# Patient Record
Sex: MALE | Race: WHITE | NOT HISPANIC OR LATINO | Employment: FULL TIME | URBAN - METROPOLITAN AREA
[De-identification: names, ages, dates, MRNs, and addresses within clinical notes are randomized per-mention and may not be internally consistent; named-entity substitution may affect disease eponyms.]

---

## 2017-06-13 ENCOUNTER — ALLSCRIPTS OFFICE VISIT (OUTPATIENT)
Dept: OTHER | Facility: OTHER | Age: 62
End: 2017-06-13

## 2017-06-13 ENCOUNTER — GENERIC CONVERSION - ENCOUNTER (OUTPATIENT)
Dept: OTHER | Facility: OTHER | Age: 62
End: 2017-06-13

## 2017-06-13 DIAGNOSIS — E78.5 HYPERLIPIDEMIA: ICD-10-CM

## 2017-06-13 DIAGNOSIS — R73.01 IMPAIRED FASTING GLUCOSE: ICD-10-CM

## 2017-06-13 DIAGNOSIS — I10 ESSENTIAL (PRIMARY) HYPERTENSION: ICD-10-CM

## 2017-06-13 DIAGNOSIS — Z12.5 ENCOUNTER FOR SCREENING FOR MALIGNANT NEOPLASM OF PROSTATE: ICD-10-CM

## 2017-06-13 LAB — HBA1C MFR BLD HPLC: 6 %

## 2017-07-24 ENCOUNTER — ALLSCRIPTS OFFICE VISIT (OUTPATIENT)
Dept: OTHER | Facility: OTHER | Age: 62
End: 2017-07-24

## 2017-07-24 DIAGNOSIS — M77.52 OTHER ENTHESOPATHY OF LEFT FOOT: ICD-10-CM

## 2017-07-24 DIAGNOSIS — R22.42 LOCALIZED SWELLING, MASS AND LUMP, LEFT LOWER LIMB: ICD-10-CM

## 2017-07-24 DIAGNOSIS — S96.912A STRAIN OF UNSPECIFIED MUSCLE AND TENDON AT ANKLE AND FOOT LEVEL, LEFT FOOT, INITIAL ENCOUNTER: ICD-10-CM

## 2017-07-26 ENCOUNTER — HOSPITAL ENCOUNTER (OUTPATIENT)
Dept: ULTRASOUND IMAGING | Facility: HOSPITAL | Age: 62
Discharge: HOME | End: 2017-07-26
Payer: COMMERCIAL

## 2017-07-26 DIAGNOSIS — R22.42 LOCALIZED SWELLING, MASS AND LUMP, LEFT LOWER LIMB: ICD-10-CM

## 2017-07-26 PROCEDURE — 76882 US LMTD JT/FCL EVL NVASC XTR: CPT

## 2017-07-27 ENCOUNTER — GENERIC CONVERSION - ENCOUNTER (OUTPATIENT)
Dept: OTHER | Facility: OTHER | Age: 62
End: 2017-07-27

## 2017-07-28 ENCOUNTER — TRANSCRIBE ORDERS (OUTPATIENT)
Dept: ADMINISTRATIVE | Facility: HOSPITAL | Age: 62
End: 2017-07-28

## 2017-07-28 DIAGNOSIS — M77.52 TENDINITIS OF LEFT ANKLE: Primary | ICD-10-CM

## 2017-07-28 DIAGNOSIS — S96.912S STRAIN OF LEFT ANKLE, SEQUELA: ICD-10-CM

## 2017-08-04 ENCOUNTER — HOSPITAL ENCOUNTER (OUTPATIENT)
Dept: MRI IMAGING | Facility: HOSPITAL | Age: 62
Discharge: HOME | End: 2017-08-04
Payer: COMMERCIAL

## 2017-08-04 DIAGNOSIS — S96.912A STRAIN OF UNSPECIFIED MUSCLE AND TENDON AT ANKLE AND FOOT LEVEL, LEFT FOOT, INITIAL ENCOUNTER: ICD-10-CM

## 2017-08-04 DIAGNOSIS — M77.52 OTHER ENTHESOPATHY OF LEFT FOOT: ICD-10-CM

## 2017-08-04 PROCEDURE — 73721 MRI JNT OF LWR EXTRE W/O DYE: CPT

## 2017-08-08 ENCOUNTER — GENERIC CONVERSION - ENCOUNTER (OUTPATIENT)
Dept: OTHER | Facility: OTHER | Age: 62
End: 2017-08-08

## 2017-08-14 ENCOUNTER — GENERIC CONVERSION - ENCOUNTER (OUTPATIENT)
Dept: OTHER | Facility: OTHER | Age: 62
End: 2017-08-14

## 2017-08-21 ENCOUNTER — APPOINTMENT (OUTPATIENT)
Dept: PHYSICAL THERAPY | Facility: REHABILITATION | Age: 62
End: 2017-08-21
Payer: COMMERCIAL

## 2017-08-21 PROCEDURE — 97110 THERAPEUTIC EXERCISES: CPT

## 2017-08-21 PROCEDURE — 97140 MANUAL THERAPY 1/> REGIONS: CPT

## 2017-08-21 PROCEDURE — 97161 PT EVAL LOW COMPLEX 20 MIN: CPT

## 2017-08-28 ENCOUNTER — APPOINTMENT (OUTPATIENT)
Dept: PHYSICAL THERAPY | Facility: REHABILITATION | Age: 62
End: 2017-08-28
Payer: COMMERCIAL

## 2017-08-28 PROCEDURE — 97110 THERAPEUTIC EXERCISES: CPT

## 2017-08-28 PROCEDURE — 97140 MANUAL THERAPY 1/> REGIONS: CPT

## 2017-09-01 ENCOUNTER — APPOINTMENT (OUTPATIENT)
Dept: PHYSICAL THERAPY | Facility: REHABILITATION | Age: 62
End: 2017-09-01
Payer: COMMERCIAL

## 2017-09-06 ENCOUNTER — APPOINTMENT (OUTPATIENT)
Dept: PHYSICAL THERAPY | Facility: REHABILITATION | Age: 62
End: 2017-09-06
Payer: COMMERCIAL

## 2017-09-08 ENCOUNTER — APPOINTMENT (OUTPATIENT)
Dept: PHYSICAL THERAPY | Facility: REHABILITATION | Age: 62
End: 2017-09-08
Payer: COMMERCIAL

## 2017-09-11 ENCOUNTER — APPOINTMENT (OUTPATIENT)
Dept: PHYSICAL THERAPY | Facility: REHABILITATION | Age: 62
End: 2017-09-11
Payer: COMMERCIAL

## 2017-09-11 PROCEDURE — 97112 NEUROMUSCULAR REEDUCATION: CPT

## 2017-09-11 PROCEDURE — 97140 MANUAL THERAPY 1/> REGIONS: CPT

## 2017-09-11 PROCEDURE — 97110 THERAPEUTIC EXERCISES: CPT

## 2017-09-18 ENCOUNTER — APPOINTMENT (OUTPATIENT)
Dept: PHYSICAL THERAPY | Facility: REHABILITATION | Age: 62
End: 2017-09-18
Payer: COMMERCIAL

## 2017-09-18 PROCEDURE — 97110 THERAPEUTIC EXERCISES: CPT

## 2017-09-18 PROCEDURE — 97140 MANUAL THERAPY 1/> REGIONS: CPT

## 2017-09-22 ENCOUNTER — ALLSCRIPTS OFFICE VISIT (OUTPATIENT)
Dept: OTHER | Facility: OTHER | Age: 62
End: 2017-09-22

## 2017-09-22 DIAGNOSIS — S93.401A SPRAIN OF LIGAMENT OF RIGHT ANKLE: ICD-10-CM

## 2017-09-25 ENCOUNTER — APPOINTMENT (OUTPATIENT)
Dept: PHYSICAL THERAPY | Facility: REHABILITATION | Age: 62
End: 2017-09-25
Payer: COMMERCIAL

## 2017-09-25 PROCEDURE — 97140 MANUAL THERAPY 1/> REGIONS: CPT

## 2017-09-25 PROCEDURE — 97110 THERAPEUTIC EXERCISES: CPT

## 2017-09-25 PROCEDURE — 97112 NEUROMUSCULAR REEDUCATION: CPT

## 2017-10-02 ENCOUNTER — APPOINTMENT (OUTPATIENT)
Dept: PHYSICAL THERAPY | Facility: REHABILITATION | Age: 62
End: 2017-10-02
Payer: COMMERCIAL

## 2017-10-02 PROCEDURE — 97140 MANUAL THERAPY 1/> REGIONS: CPT

## 2017-10-02 PROCEDURE — 97112 NEUROMUSCULAR REEDUCATION: CPT

## 2017-10-02 PROCEDURE — 97110 THERAPEUTIC EXERCISES: CPT

## 2017-10-09 ENCOUNTER — APPOINTMENT (OUTPATIENT)
Dept: PHYSICAL THERAPY | Facility: REHABILITATION | Age: 62
End: 2017-10-09
Payer: COMMERCIAL

## 2017-10-16 ENCOUNTER — APPOINTMENT (OUTPATIENT)
Dept: PHYSICAL THERAPY | Facility: REHABILITATION | Age: 62
End: 2017-10-16
Payer: COMMERCIAL

## 2017-10-16 PROCEDURE — G8990 OTHER PT/OT CURRENT STATUS: HCPCS

## 2017-10-16 PROCEDURE — 97110 THERAPEUTIC EXERCISES: CPT

## 2017-10-16 PROCEDURE — 97140 MANUAL THERAPY 1/> REGIONS: CPT

## 2017-10-16 PROCEDURE — G8991 OTHER PT/OT GOAL STATUS: HCPCS

## 2017-10-23 ENCOUNTER — APPOINTMENT (OUTPATIENT)
Dept: PHYSICAL THERAPY | Facility: REHABILITATION | Age: 62
End: 2017-10-23
Payer: COMMERCIAL

## 2017-10-30 ENCOUNTER — APPOINTMENT (OUTPATIENT)
Dept: PHYSICAL THERAPY | Facility: REHABILITATION | Age: 62
End: 2017-10-30
Payer: COMMERCIAL

## 2017-10-30 PROCEDURE — G8992 OTHER PT/OT  D/C STATUS: HCPCS

## 2017-10-30 PROCEDURE — 97140 MANUAL THERAPY 1/> REGIONS: CPT

## 2017-10-30 PROCEDURE — 97110 THERAPEUTIC EXERCISES: CPT

## 2017-10-30 PROCEDURE — G8991 OTHER PT/OT GOAL STATUS: HCPCS

## 2017-12-13 DIAGNOSIS — R73.01 IMPAIRED FASTING GLUCOSE: ICD-10-CM

## 2018-01-12 NOTE — MISCELLANEOUS
Message   Recorded as Task   Date: 09/08/2016 09:22 AM, Created By: Gayle Hare   Task Name: Follow Up   Assigned To: Gayle Hare   Regarding Patient: Bernardo Ocampo, Status: Active   Comment:    Gayle Hare - 08 Sep 2016 9:22 AM     TASK CREATED  Caller: Self; Other; [phone] (Home)  Pt called and wants to know if he can get shingles vaccine, he has already checked with insurance re: getting in office  Wayne Tellez Jr - 08 Sep 2016 4:00 PM     TASK REPLIED TO: Previously Assigned To Jessica Fonseca - 08 Sep 2016 6:07 PM     TASK EDITED                 L/m and notified to schedule zostavax        Active Problems    1  Colon cancer screening (V76 51) (Z12 11)   2  Engages in travel abroad (V49 89) (Z78 9)   3  Herpes simplex type 1 infection (054 9) (B00 9)   4  Hyperlipidemia (272 4) (E78 5)   5  Hypertension (401 9) (I10)   6  Idiopathic angioedema (995 1) (T78 3XXA)   7  Influenza vaccination administered during current admission (V04 81) (Z23)   8  Need for hepatitis A immunization (V05 3) (Z23)   9  Need for hepatitis A vaccination (V05 3) (Z23)    Current Meds   1  Atorvastatin Calcium 40 MG Oral Tablet (Lipitor); TAKE 1 TABLET DAILY; Therapy: 00BCN3329 to (Evaluate:28Jan2017)  Requested for: 45Cgh1465; Last   Rx:52Rzm2392 Ordered   2  Benicar HCT 40-12 5 MG Oral Tablet; TAKE 1 TABLET DAILY; Therapy: 81FTN7661 to (Evaluate:28Jan2017)  Requested for: 73Svm4954; Last   Rx:43Qfq7091 Ordered   3  Ciprofloxacin HCl - 500 MG Oral Tablet (Cipro); TAKE 1 TABLET TWICE DAILY; Therapy: 09Edn0195 to (Evaluate:29Jna3107)  Requested for: 59976 80 22 97; Last   Rx:91Owq3301 Ordered   4  Denavir 1 % External Cream; USE AS DIRECTED; Therapy: 53ZUG5676 to (Last Rx:66Yej5531)  Requested for: 67Alc7123 Ordered   5  ValACYclovir HCl - 1 GM Oral Tablet (Valtrex); 2 BID x 1 day for cold sores; Therapy: 24Apr2015 to (Last Rx:24Apr2015)  Requested for: 24Apr2015 Ordered   6  Vivotif Carolyne Vaccine Oral Capsule Delayed Release (Vivotif Carolyne Vaccine Oral   Capsule Delayed Release); take 1 tab every other day 1 hr prior to   a a meal with a cold drink  All 4 doses should be completed 1 wk prior to potential exposure; Therapy: 24Apr2015 to (Last Rx:24Apr2015)  Requested for: 24Apr2015 Ordered    Allergies    1   Capoten TABS   2  Vasotec TABS    Signatures   Electronically signed by : Jim Delarosa, ; Sep  8 2016  6:09PM EST                       (Author)

## 2018-01-14 VITALS
DIASTOLIC BLOOD PRESSURE: 74 MMHG | HEART RATE: 100 BPM | HEIGHT: 70 IN | BODY MASS INDEX: 31.92 KG/M2 | WEIGHT: 223 LBS | SYSTOLIC BLOOD PRESSURE: 124 MMHG

## 2018-01-14 VITALS
BODY MASS INDEX: 32.95 KG/M2 | WEIGHT: 230.13 LBS | TEMPERATURE: 98.2 F | HEIGHT: 70 IN | SYSTOLIC BLOOD PRESSURE: 150 MMHG | HEART RATE: 88 BPM | DIASTOLIC BLOOD PRESSURE: 78 MMHG

## 2018-01-14 VITALS
SYSTOLIC BLOOD PRESSURE: 134 MMHG | WEIGHT: 228 LBS | DIASTOLIC BLOOD PRESSURE: 80 MMHG | TEMPERATURE: 98.5 F | RESPIRATION RATE: 16 BRPM | HEART RATE: 82 BPM | HEIGHT: 70 IN | BODY MASS INDEX: 32.64 KG/M2

## 2018-01-14 NOTE — RESULT NOTES
Verified Results  Hemoglobin A1c- POC [id number] 06:03PM Мария Watson     Test Name Result Flag Reference   HEMOGLOBIN A1C 6 0

## 2018-01-15 NOTE — RESULT NOTES
Verified Results  * MRI ANKLE/HEEL LEFT  WO CONTRAST [id number] 06:54PM Jose J Amato Order Number: [id number]    - Patient Instructions: To schedule this appointment, please contact Central Scheduling at [phone]  Test Name Result Flag Reference   MRI ANKLE/HEEL LEFT  WO CONTRAST (Report)     MRI LEFT ANKLE - WITHOUT CONTRAST     INDICATION: M77 52: Other enthesopathy of left foot S96 912A: Strain of unspecified muscle and tendon at ankle and foot level, left foot, initial encounter  History taken directly from the electronic ordering system  COMPARISON: Left ankle ultrasound from 7/26/2017  TECHNIQUE:  MR sequences were obtained of the left ankle including: Localizers, coronal STIR, coronal T1, axial T2 fat sat, axial PD, sagittal T2 fat sat, sagittal T1 sequences were obtained  Images were acquired on a1 5 Maggy Unit  Gadolinium was not    used  FINDINGS:     SUBCUTANEOUS TISSUES: Normal     JOINT EFFUSION: None  TENDONS:   Achilles tendon: There is fusiform thickening of the Achilles tendon centered 6 5 cm proximal to its insertion consistent with severe tendinosis with a superimposed tiny partial tear involving the posterior surface of the tendon (series 6 image 9 )   Peroneus brevis and longus: Normal    Posterior tibialis, flexor digitorum longus, flexor hallucis longus: Normal    Anterior tibialis, extensor digitorum longus, extensor hallucis longus: Normal      LIGAMENTS:   Lateral collateral ligament complex: There is a chronic avulsion fracture fragment adjacent to the anterior aspect the lateral malleolus  Distal tibiofibular syndesmosis: Normal    Medial collateral ligament complex: Normal      PLANTAR FASCIA: Normal      ARTICULAR SURFACES: Intact  SINUS TARSI: Normal      Tarsal Tunnel: Unremarkable       BONE MARROW SIGNAL: Normal      MUSCULATURE: Normal        IMPRESSION:     There is fusiform thickening of the Achilles tendon centered 6 5 cm proximal to its insertion consistent with severe tendinosis with a superimposed tiny partial tear involving the posterior surface of the tendon (series 6 image 9 )     There is a chronic avulsion fracture fragment adjacent to the anterior aspect the lateral malleolus         ##sigslh##sigslh       Workstation performed: [id number]     Signed by:   Sharita Alejo MD   8/7/17

## 2018-01-17 NOTE — RESULT NOTES
Verified Results  US EXTREMITY SOFT TISSUE [id number] 06:18PM Jhoana Party Order Number: [id number]    - Patient Instructions: To schedule this appointment, please contact Central Scheduling at [phone]  Test Name Result Flag Reference   US EXTREMITY SOFT TISSUE (Report)     ULTRASOUND left ankle     TECHNIQUE:  Using a high frequency transducer, the right ankle was scanned at the area posterior palpable abnormality  INDICATION: Posterior palpable abnormality for one month with pain  COMPARISON:        FINDINGS:     Diffuse thickening of the left Achilles tendon  Increased color-flow of the left Achilles when compared with the right  No surrounding soft tissue collection  No Achilles rupture  IMPRESSION:     Left Achilles tendinitis versus partial tear   Consider MRI follow-up       Workstation performed: [id number]     Signed by:   Cecelia Llanos MD   7/26/17

## 2018-03-07 NOTE — PROGRESS NOTES
History of Present Illness    Revaccination   Vaccine Information: Vaccine(s) Given (names): Hep A 10/29/15  Spoke with patient regarding vaccine out of temperature range and risks and benefits of revaccination  Action(s): Pt will be revaccinated  Appointment scheduled: [id number]  Other Information: patient received Hep a vaccine for traveling to [facility] in 8036  the second dose was non viable  Does patient need to be revaccinated if he has already traveled?     Revaccination Completed: [id number]  Active Problems    1  Colon cancer screening (V76 51) (Z12 11)   2  Engages in travel abroad (V49 89) (Z78 9)   3  Herpes simplex type 1 infection (054 9) (B00 9)   4  Hyperlipidemia (272 4) (E78 5)   5  Hypertension (401 9) (I10)   6  Idiopathic angioedema (995 1) (T78 3XXA)   7  Influenza vaccination administered during current admission (V04 81) (Z23)   8  Need for hepatitis A immunization (V05 3) (Z23)   9  Need for hepatitis A vaccination (V05 3) (Z23)   10  Need for revaccination (V05 9) (Z23)    Immunizations  Hepatitis A --- [place]: [id number] Jet: 29-Oct-2015   Influenza --- Series1: 03-Oct-2014     Current Meds   1  Atorvastatin Calcium 40 MG Oral Tablet; TAKE 1 TABLET DAILY   2  Benicar HCT 40-12 5 MG Oral Tablet; TAKE 1 TABLET DAILY   3  Ciprofloxacin HCl - 500 MG Oral Tablet; TAKE 1 TABLET TWICE DAILY   4  Denavir 1 % External Cream; USE AS DIRECTED   5  ValACYclovir HCl - 1 GM Oral Tablet; 2 BID x 1 day for cold sores   6  Vivotif Carolyne Vaccine Oral Capsule Delayed Release; take 1 tab every other day 1 hr prior   to a a meal with a cold drink  All 4 doses should be completed 1 wk prior to potential exposure    Allergies    1   Capoten TABS   2  Vasotec TABS    Signatures   Electronically signed by : AMOR Aguirre ; Jun 12 2017  9:32PM EST

## 2018-07-16 DIAGNOSIS — I10 HYPERTENSION, UNSPECIFIED TYPE: Primary | ICD-10-CM

## 2018-07-16 DIAGNOSIS — E78.5 HYPERLIPIDEMIA, UNSPECIFIED HYPERLIPIDEMIA TYPE: ICD-10-CM

## 2018-07-16 RX ORDER — OLMESARTAN MEDOXOMIL AND HYDROCHLOROTHIAZIDE 40/12.5 40; 12.5 MG/1; MG/1
1 TABLET ORAL DAILY
COMMUNITY
Start: 2012-05-31 | End: 2018-07-16 | Stop reason: SDUPTHER

## 2018-07-16 RX ORDER — ATORVASTATIN CALCIUM 40 MG/1
1 TABLET, FILM COATED ORAL DAILY
COMMUNITY
Start: 2012-05-31 | End: 2018-07-16 | Stop reason: SDUPTHER

## 2018-07-16 NOTE — TELEPHONE ENCOUNTER
Spoke with patient today, told patient that we will be sending 90 days supply to his [facility] with no refill since he is do for a annual checkup, pt will call us back to schedule an appointment for his physical

## 2018-07-17 RX ORDER — OLMESARTAN MEDOXOMIL AND HYDROCHLOROTHIAZIDE 40/12.5 40; 12.5 MG/1; MG/1
1 TABLET ORAL DAILY
Qty: 90 TABLET | Refills: 0 | Status: SHIPPED | OUTPATIENT
Start: 2018-07-17 | End: 2018-11-06 | Stop reason: SDUPTHER

## 2018-07-17 RX ORDER — ATORVASTATIN CALCIUM 40 MG/1
40 TABLET, FILM COATED ORAL DAILY
Qty: 90 TABLET | Refills: 0 | Status: SHIPPED | OUTPATIENT
Start: 2018-07-17 | End: 2018-11-06 | Stop reason: SDUPTHER

## 2018-07-24 LAB
LEFT EYE DIABETIC RETINOPATHY: NORMAL
RIGHT EYE DIABETIC RETINOPATHY: NORMAL
SEVERITY (EYE EXAM): NORMAL

## 2018-11-06 DIAGNOSIS — I10 HYPERTENSION, UNSPECIFIED TYPE: ICD-10-CM

## 2018-11-06 DIAGNOSIS — E78.5 HYPERLIPIDEMIA, UNSPECIFIED HYPERLIPIDEMIA TYPE: ICD-10-CM

## 2018-11-06 RX ORDER — ATORVASTATIN CALCIUM 40 MG/1
TABLET, FILM COATED ORAL
Qty: 90 TABLET | Refills: 3 | Status: SHIPPED | OUTPATIENT
Start: 2018-11-06 | End: 2019-09-09 | Stop reason: SDUPTHER

## 2018-11-06 RX ORDER — OLMESARTAN MEDOXOMIL AND HYDROCHLOROTHIAZIDE 40/12.5 40; 12.5 MG/1; MG/1
TABLET ORAL
Qty: 90 TABLET | Refills: 3 | Status: SHIPPED | OUTPATIENT
Start: 2018-11-06 | End: 2019-02-27 | Stop reason: RX

## 2019-01-02 ENCOUNTER — OFFICE VISIT (OUTPATIENT)
Dept: FAMILY MEDICINE CLINIC | Facility: CLINIC | Age: 64
End: 2019-01-02
Payer: COMMERCIAL

## 2019-01-02 VITALS
DIASTOLIC BLOOD PRESSURE: 80 MMHG | OXYGEN SATURATION: 95 % | SYSTOLIC BLOOD PRESSURE: 120 MMHG | RESPIRATION RATE: 18 BRPM | HEART RATE: 92 BPM | WEIGHT: 218 LBS | HEIGHT: 70 IN | BODY MASS INDEX: 31.21 KG/M2

## 2019-01-02 DIAGNOSIS — R73.01 IMPAIRED FASTING GLUCOSE: ICD-10-CM

## 2019-01-02 DIAGNOSIS — I10 ESSENTIAL HYPERTENSION: Primary | ICD-10-CM

## 2019-01-02 DIAGNOSIS — Z11.59 NEED FOR HEPATITIS C SCREENING TEST: ICD-10-CM

## 2019-01-02 DIAGNOSIS — L91.8 CUTANEOUS SKIN TAGS: ICD-10-CM

## 2019-01-02 DIAGNOSIS — B00.9 HERPES SIMPLEX TYPE 1 INFECTION: ICD-10-CM

## 2019-01-02 DIAGNOSIS — Z12.11 ENCOUNTER FOR FIT (FECAL IMMUNOCHEMICAL TEST) SCREENING: ICD-10-CM

## 2019-01-02 DIAGNOSIS — E78.2 MIXED HYPERLIPIDEMIA: ICD-10-CM

## 2019-01-02 DIAGNOSIS — Z23 FLU VACCINE NEED: ICD-10-CM

## 2019-01-02 DIAGNOSIS — M75.82 ROTATOR CUFF TENDINITIS, LEFT: ICD-10-CM

## 2019-01-02 DIAGNOSIS — L57.0 ACTINIC KERATOSIS: ICD-10-CM

## 2019-01-02 DIAGNOSIS — Z12.5 PROSTATE CANCER SCREENING: ICD-10-CM

## 2019-01-02 PROBLEM — R73.03 PREDIABETES: Status: ACTIVE | Noted: 2017-06-13

## 2019-01-02 PROCEDURE — 90682 RIV4 VACC RECOMBINANT DNA IM: CPT

## 2019-01-02 PROCEDURE — 3074F SYST BP LT 130 MM HG: CPT | Performed by: FAMILY MEDICINE

## 2019-01-02 PROCEDURE — 3079F DIAST BP 80-89 MM HG: CPT | Performed by: FAMILY MEDICINE

## 2019-01-02 PROCEDURE — 99214 OFFICE O/P EST MOD 30 MIN: CPT | Performed by: FAMILY MEDICINE

## 2019-01-02 PROCEDURE — 90471 IMMUNIZATION ADMIN: CPT

## 2019-01-02 RX ORDER — BIMATOPROST 0.01 %
DROPS OPHTHALMIC (EYE)
Refills: 3 | COMMUNITY
Start: 2018-12-11

## 2019-01-02 NOTE — ASSESSMENT & PLAN NOTE
Blood pressure is quite well controlled  He is having some lightheadedness occasionally when standing  He should contact me if this persists or worsens as we may want to bump back on his blood pressure medication

## 2019-01-02 NOTE — PROGRESS NOTES
Assessment/Plan:       Problem List Items Addressed This Visit        Endocrine    Impaired fasting glucose     Check an A1c  Relevant Orders    Hemoglobin A1C       Cardiovascular and Mediastinum    Essential hypertension - Primary     Blood pressure is quite well controlled  He is having some lightheadedness occasionally when standing  He should contact me if this persists or worsens as we may want to bump back on his blood pressure medication  Relevant Orders    Lipid Panel with Direct LDL reflex    Comprehensive metabolic panel       Musculoskeletal and Integument    Cutaneous skin tags     He has multiple periorbital skin tags which would like to consider having removed  He also does have some pretty obvious ptosis and I am going to have him see Dr Morgan Ortiz referral to Ophthalmology    Actinic keratosis     I have suggested dermatology evaluation for this at some point  I explained these are pre cancers lesions but they could at some point become cancers  There is a treatment with a topical cream that could take care of these  Relevant Orders    Ambulatory referral to Dermatology       Other    Hyperlipidemia     Check fasting lipid           Other Visit Diagnoses     Flu vaccine need        Relevant Orders    influenza vaccine, [id number], quadrivalent, recombinant, PF, 0 5 mL, for patients 18 yr+ (FLUBLOK) (Completed)    Need for hepatitis C screening test        Relevant Orders    Hepatitis C antibody    Encounter for FIT (fecal immunochemical test) screening        Relevant Orders    Occult Bloood,Fecal Immunochemical    Prostate cancer screening        Relevant Orders    PSA, Total Screen    Rotator cuff tendinitis, left                Subjective:      Patient ID: Esthela Vaughan is a 58 y o  male  HPI  He presents today for follow-up for chronic health issues    His initial complaint is that of left shoulder pain intermittently over the past 2-3 months  He denies acute injury  Pain is worse with abduction  He occasionally gets a numb sensation of his left elbow as well  He has tried no medications for this  He has a history of hypertension and denies any current problems chest pain, shortness of breath or palpitations  He has a history of hyperlipidemia  He remains on statin therapy without myalgias  He has history of elevated glucose but denies polyuria or polydipsia  He has a history of cold sores which she gets once or twice a year  He is using a topical cream which he feels is helpful  I offered him Valtrex but he does not feel he needs it  He has a history of angioedema which fortunately has not recurred in many years  The following portions of the patient's history were reviewed and updated as appropriate: allergies, current medications, past family history, past medical history, past social history, past surgical history and problem list     Review of Systems   Constitutional: Negative for appetite change, chills, fatigue, fever and unexpected weight change  HENT: Negative for trouble swallowing  Eyes: Negative for visual disturbance  Respiratory: Negative for cough, chest tightness, shortness of breath and wheezing  Cardiovascular: Negative for chest pain  Gastrointestinal: Negative for abdominal distention, abdominal pain, blood in stool, constipation and diarrhea  Endocrine: Negative for polyuria  Genitourinary: Negative for difficulty urinating and flank pain  Musculoskeletal: Positive for arthralgias  Negative for myalgias  Skin: Negative for rash  He notes some bumpy lesions on his scalp  Neurological: Negative for dizziness and light-headedness  Hematological: Negative for adenopathy  Does not bruise/bleed easily  Psychiatric/Behavioral: Negative for sleep disturbance           Objective:      /80   Pulse 92   Resp 18   Ht 5' 10" (1 778 m)   Wt 98 9 kg (218 lb)   SpO2 95% BMI 31 28 kg/m²          Physical Exam   Constitutional: He is oriented to person, place, and time  He appears well-developed and well-nourished  No distress  HENT:   Head: Normocephalic  Eyes: Pupils are equal, round, and reactive to light  Neck: No tracheal deviation present  No thyromegaly present  Cardiovascular: Normal rate, regular rhythm and normal heart sounds  No murmur heard  Pulmonary/Chest: Effort normal  No respiratory distress  He has no wheezes  He has no rales  Abdominal: Soft  He exhibits no distension  There is no tenderness  Musculoskeletal: Normal range of motion  He exhibits tenderness (He has some mild tenderness over the anterior left shoulder  He has a mildly positive Gonzalez sign  Empty can is normal )  Neurological: He is alert and oriented to person, place, and time  No cranial nerve deficit  Skin: Skin is warm  No rash noted  He is not diaphoretic  No erythema  He has some scattered actinic keratosis on his scalp   Psychiatric: He has a normal mood and affect   Judgment and thought content normal          Kimberly Ruth MD

## 2019-01-02 NOTE — ASSESSMENT & PLAN NOTE
I have suggested dermatology evaluation for this at some point  I explained these are pre cancers lesions but they could at some point become cancers  There is a treatment with a topical cream that could take care of these

## 2019-01-02 NOTE — ASSESSMENT & PLAN NOTE
He has multiple periorbital skin tags which would like to consider having removed    He also does have some pretty obvious ptosis and I am going to have him see Dr Rui Lake

## 2019-01-02 NOTE — ASSESSMENT & PLAN NOTE
This is currently stable with topical cream   If he wants I could always send in some Valtrex for him

## 2019-01-04 LAB
ALBUMIN SERPL-MCNC: 4.3 G/DL (ref 3.6–5.1)
ALBUMIN/GLOB SERPL: 1.4 (CALC) (ref 1–2.5)
ALP SERPL-CCNC: 72 U/L (ref 40–115)
ALT SERPL-CCNC: 39 U/L (ref 9–46)
AST SERPL-CCNC: 21 U/L (ref 10–35)
BILIRUB SERPL-MCNC: 0.6 MG/DL (ref 0.2–1.2)
BUN SERPL-MCNC: 24 MG/DL (ref 7–25)
BUN/CREAT SERPL: ABNORMAL (CALC) (ref 6–22)
CALCIUM SERPL-MCNC: 9.6 MG/DL (ref 8.6–10.3)
CHLORIDE SERPL-SCNC: 98 MMOL/L (ref 98–110)
CHOLEST SERPL-MCNC: 168 MG/DL
CHOLEST/HDLC SERPL: 3.8 (CALC)
CO2 SERPL-SCNC: 27 MMOL/L (ref 20–32)
CREAT SERPL-MCNC: 1.08 MG/DL (ref 0.7–1.25)
GLOBULIN SER CALC-MCNC: 3 G/DL (CALC) (ref 1.9–3.7)
GLUCOSE SERPL-MCNC: 247 MG/DL (ref 65–99)
HBA1C MFR BLD: 11.5 % OF TOTAL HGB
HCV AB S/CO SERPL IA: 0.02
HCV AB SERPL QL IA: NORMAL
HDLC SERPL-MCNC: 44 MG/DL
LDLC SERPL CALC-MCNC: 105 MG/DL (CALC)
NONHDLC SERPL-MCNC: 124 MG/DL (CALC)
POTASSIUM SERPL-SCNC: 3.8 MMOL/L (ref 3.5–5.3)
PROT SERPL-MCNC: 7.3 G/DL (ref 6.1–8.1)
PSA SERPL-MCNC: 5.6 NG/ML
SL AMB EGFR AFRICAN AMERICAN: 84 ML/MIN/1.73M2
SL AMB EGFR NON AFRICAN AMERICAN: 72 ML/MIN/1.73M2
SODIUM SERPL-SCNC: 135 MMOL/L (ref 135–146)
TRIGL SERPL-MCNC: 98 MG/DL

## 2019-01-14 ENCOUNTER — TELEPHONE (OUTPATIENT)
Dept: FAMILY MEDICINE CLINIC | Facility: CLINIC | Age: 64
End: 2019-01-14

## 2019-01-14 ENCOUNTER — CLINICAL SUPPORT (OUTPATIENT)
Dept: FAMILY MEDICINE CLINIC | Facility: CLINIC | Age: 64
End: 2019-01-14
Payer: COMMERCIAL

## 2019-01-14 DIAGNOSIS — Z23 NEED FOR ZOSTER VACCINE: Primary | ICD-10-CM

## 2019-01-14 DIAGNOSIS — R97.20 ELEVATED PSA: ICD-10-CM

## 2019-01-14 DIAGNOSIS — E11.8 TYPE 2 DIABETES MELLITUS WITH COMPLICATION, UNSPECIFIED WHETHER LONG TERM INSULIN USE: Primary | ICD-10-CM

## 2019-01-14 PROCEDURE — 90471 IMMUNIZATION ADMIN: CPT | Performed by: FAMILY MEDICINE

## 2019-01-14 PROCEDURE — 90750 HZV VACC RECOMBINANT IM: CPT | Performed by: FAMILY MEDICINE

## 2019-01-14 RX ORDER — METFORMIN HYDROCHLORIDE EXTENDED-RELEASE TABLETS 1000 MG/1
1000 TABLET, FILM COATED, EXTENDED RELEASE ORAL 2 TIMES DAILY WITH MEALS
Qty: 180 TABLET | Refills: 1 | Status: SHIPPED | OUTPATIENT
Start: 2019-01-14 | End: 2019-01-17

## 2019-01-14 NOTE — TELEPHONE ENCOUNTER
----- Message from Ramesh Redd MD sent at 1/4/2019  4:51 PM EST -----  Call patient regarding test   Diabetic control is quite poor  His A1c is up to 11 5  Please initiate metformin 1000 mg twice daily and get him set up for diabetic education  He also has an elevated PSA and I would like him to see Urology  Please make sure he follows up with 1 of us within the next month to deal with his very high A1c

## 2019-01-14 NOTE — TELEPHONE ENCOUNTER
Call patient regarding test   Diabetic control is quite poor   His A1c is up to 11 5   Please initiate metformin 1000 mg twice daily and get him set up for diabetic education  Melisa Vargas also has an elevated PSA and I would like him to see Urology  Riri Jj make sure he follows up with 1 of us within the next month to deal with his very high A1c

## 2019-01-17 ENCOUNTER — TELEPHONE (OUTPATIENT)
Dept: UROLOGY | Facility: AMBULATORY SURGERY CENTER | Age: 64
End: 2019-01-17

## 2019-01-17 DIAGNOSIS — E11.8 TYPE 2 DIABETES MELLITUS WITH COMPLICATION, UNSPECIFIED WHETHER LONG TERM INSULIN USE: ICD-10-CM

## 2019-01-17 RX ORDER — METFORMIN HYDROCHLORIDE 500 MG/1
1000 TABLET, FILM COATED, EXTENDED RELEASE ORAL 2 TIMES DAILY WITH MEALS
Qty: 360 TABLET | Refills: 1 | Status: SHIPPED | OUTPATIENT
Start: 2019-01-17 | End: 2019-01-18

## 2019-01-17 NOTE — TELEPHONE ENCOUNTER
Pharmacy called  Pts insurance wont cover 1000mg of Metformin  They would possibly cover 500MG 2 tabs bid

## 2019-01-17 NOTE — TELEPHONE ENCOUNTER
Reason for appointment/Complaint/Diagnosis : Elevated PSA     Insurance: [employer]    History of Cancer? no                       If yes, what kind? Previous urologist?     None                  Records requested/where? In [address] [place] [address] Rd    Outside testing/where? In [facility]    Location Preference for office visit?  [place]

## 2019-01-18 DIAGNOSIS — R73.01 IMPAIRED FASTING GLUCOSE: Primary | ICD-10-CM

## 2019-01-18 RX ORDER — METFORMIN HYDROCHLORIDE 500 MG/1
500 TABLET, EXTENDED RELEASE ORAL 2 TIMES DAILY WITH MEALS
Qty: 360 TABLET | Refills: 1 | Status: SHIPPED | OUTPATIENT
Start: 2019-01-18 | End: 2019-03-20

## 2019-01-18 NOTE — TELEPHONE ENCOUNTER
Pt called asking for refill bec going on a trip soon      Called [facility] and was informed pt's insurance will only cover Metformin ER

## 2019-02-01 ENCOUNTER — OFFICE VISIT (OUTPATIENT)
Dept: DIABETES SERVICES | Facility: CLINIC | Age: 64
End: 2019-02-01
Payer: COMMERCIAL

## 2019-02-01 VITALS — WEIGHT: 221.2 LBS | BODY MASS INDEX: 31.74 KG/M2

## 2019-02-01 DIAGNOSIS — E11.8 TYPE 2 DIABETES MELLITUS WITH COMPLICATION, UNSPECIFIED WHETHER LONG TERM INSULIN USE: Primary | ICD-10-CM

## 2019-02-01 PROCEDURE — 97802 MEDICAL NUTRITION INDIV IN: CPT | Performed by: DIETITIAN, REGISTERED

## 2019-02-01 NOTE — PROGRESS NOTES
Medical Nutrition Therapy        Assessment    Visit Type: Initial visit    Chief complaint T2DM    HPI: Teresa Moblye was diagnosed with Type 2 diabetes approximately 3 weeks ago and is here for initial Medical Nutrition Therapy  Alejandro's diet history reveals meal skipping, excessive carbohydrate intake at meals, high intake of saturated fat, and inadequate intake of low fat dairy, fruit, and whole grains    Currently meals range from 30 to 135 grams of carbohydrate  He reports that he usually skips breakfast and often eats out for lunch  Explained basic pathophysiology of diabetes and impact of diet on blood glucose levels  Together we discussed what foods contain CHO, reading a food label, serving sizes, and importance of consistent carbohydrate intake  Used the portion booklet to teach Teresa Mobley more about food groups and basic carbohydrate counting  Created an individualized meal plan for Alejandro with 3 meals providing 45 - 60 g carb per meal   Teresa Mobley declined written version of sample meals  Discussed verbally how to alter portions and food choices from his diet recall to fit 45 - 60 g carb/meal range  Eulene Bowels demonstrated good understanding, Teresa Mobley also has a referral for Living Well with Diabetes classes  He declined scheduling an assessment for classes  He was provided with class schedule and instructed to call if he wishes to schedule a class assessment  Ht Readings from Last 1 Encounters:   01/02/19 5' 10" (1 778 m)     Wt Readings from Last 2 Encounters:   02/01/19 100 kg (221 lb 3 2 oz)   01/02/19 98 9 kg (218 lb)     Weight Change: No    Medical Diagnosis/ICD10: E11 8 Type 2 Diabetes Mellitus with complication, unspecified whether long term insulin use      Barriers to Learning: no barriers    Do you follow any special diet presently?: No  Who shops: patient  Who cooks: patient    Food Log: Completed via the method of food recall    Breakfast:wakes 6 am  Skips breakfast Has 3-4 cups coffee throughout the morning until lunch  Morning Snack:n/a  Lunch:12 pm  Cheeseburger from restaurant with side of french fries  OR Meat and potatoes OR Pasta cajun style with a white sauce (3 cups) Unsweetened iced tea with sweet and low and lemon  Afternoon Snack: n/a  Dinner:7 pm  Chicken salad on pumpernickel bread  Iced tea unsweetened  OR pork/beef/ chicken with rice or mashed potatoes (1 1/2 cups) and brussels, sprouts or green beans, corn (1 cup) ,cauliflower, broccoli  Evening Snack:no  Beverages: unsweetened iced tea with sweet and low  Eating out/Take out:eats out every day for lunch  Exercise Walks from the car to the office every day (about 30 -40 feet)  Calorie needs 1900 kcals/day Carbs: 45 - 60 g/meal         Nutrition Diagnosis:  Excess carbohydrate intake  related to Food and nutrition related knowledge deficit concerning appropriate amount of carbohydrate intake as  evidenced by Estimated carbohydrate intake that is consistently more than recommended amounts    Intervention: increased fiber intake, label reading, carbohydrate counting, increased plant based foods, meal planning, individualized meal plan and monitoring portion control     Treatment Goals: Patient understands education and recommendations, Patient will consume 3 meals a day, Patient will monitor portion control, Patient will increase their intake of plant based foods, Patient will count carbohydrates and Patient will monitor blood glucose    Monitoring and evaluation:    Term code indicator  FH 1 6 3 Carbohydrate Intake Criteria: 45 - 60 grams of carbohydrate per meal  Term code indicator  FH 4 4 Mealtime Behavior Criteria: 3 meals per day, 4 - 5 hours apart    Patients Response to Instruction:  Edwin Mosley  Expected Compliancefair    Thank you for coming to the [facility] for education today  Please feel free to call with any questions or concerns      Tanya Davenport, RD  [address] [facility] [address] [facility] [id number]

## 2019-02-01 NOTE — PATIENT INSTRUCTIONS
45 - 60 grams of carbohydrate per meal  Use portion book to help stay within this range per meal   3 meals per day, 4 - 5 hours apart

## 2019-02-13 ENCOUNTER — OFFICE VISIT (OUTPATIENT)
Dept: UROLOGY | Facility: AMBULATORY SURGERY CENTER | Age: 64
End: 2019-02-13
Payer: COMMERCIAL

## 2019-02-13 VITALS
DIASTOLIC BLOOD PRESSURE: 84 MMHG | BODY MASS INDEX: 32.41 KG/M2 | SYSTOLIC BLOOD PRESSURE: 128 MMHG | WEIGHT: 226.4 LBS | HEART RATE: 78 BPM | HEIGHT: 70 IN

## 2019-02-13 DIAGNOSIS — R97.20 ELEVATED PSA: Primary | ICD-10-CM

## 2019-02-13 PROCEDURE — 99204 OFFICE O/P NEW MOD 45 MIN: CPT | Performed by: UROLOGY

## 2019-02-13 RX ORDER — CIPROFLOXACIN 500 MG/1
500 TABLET, FILM COATED ORAL 2 TIMES DAILY
Qty: 6 TABLET | Refills: 0 | Status: SHIPPED | OUTPATIENT
Start: 2019-02-13 | End: 2019-02-16

## 2019-02-13 NOTE — PROGRESS NOTES
2/13/2019    Cielo Desai  1955  [id number]        Assessment  Elevated PSA      Discussion  We discussed his current PSA of 5 6 and the potential underlying risk of prostate cancer  I recommend repeating the PSA in assuming that the PSA remains elevated we will proceed with a transrectal ultrasound-guided biopsy of the prostate  The patient is amenable with this plan and wishes to proceed  History of Present Illness  58 y o  male with a history of mild lower urinary tract symptoms including nocturia x1 episode per night  Otherwise he has minimal daytime symptoms  He denies hematuria or erectile dysfunction  He is referred for a PSA of 5 6  There are no prior PSA levels for my review  The patient denies family history of prostate cancer  He states that his father passed suddenly at 58years of age  The patient himself has a history of newly diagnosed type 2 diabetes  AUA Symptom Score  AUA SYMPTOM SCORE      Most Recent Value   AUA SYMPTOM SCORE   How often have you had a sensation of not emptying your bladder completely after you finished urinating? 1   How often have you had to urinate again less than two hours after you finished urinating? 4   How often have you found you stopped and started again several times when you urinate? 1   How often have you found it difficult to postpone urination? 0   How often have you had a weak urinary stream?  2   How often have you had to push or strain to begin urination? 2   How many times did you most typically get up to urinate from the time you went to bed at night until the time you got up in the morning? 1   Quality of Life: If you were to spend the rest of your life with your urinary condition just the way it is now, how would you feel about that?  0   AUA SYMPTOM SCORE  11          Review of Systems  Review of Systems   Constitutional: Negative  HENT: Negative  Eyes: Negative  Respiratory: Negative      Cardiovascular: Negative  Gastrointestinal: Negative  Endocrine: Negative  Genitourinary: Negative  Musculoskeletal: Negative  Skin: Negative  Allergic/Immunologic: Negative  Neurological: Negative  Hematological: Negative  Psychiatric/Behavioral: Negative  Past Medical History  Past Medical History:   Diagnosis Date    Diabetes mellitus ([facility] [address] )     Hypertension        Past Social History  History reviewed  No pertinent surgical history  Past Family History  Family History   Problem Relation Age of Onset    No Known Problems Brother     Stroke Mother        Past Social history  Social History     Socioeconomic History    Marital status: Single     Spouse name: Not on file    Number of children: Not on file    Years of education: Not on file    Highest education level: Not on file   Occupational History    Occupation: Work history - travels a lot   Social Needs    Financial resource strain: Not on file    Food insecurity:     Worry: Not on file     Inability: Not on file   [employer] needs:     Medical: Not on file     Non-medical: Not on file   Tobacco Use    Smoking status: Former Smoker    Smokeless tobacco: Never Used   Substance and Sexual Activity    Alcohol use:  Yes     Alcohol/week: 1 2 - 1 8 oz     Types: 2 - 3 Cans of beer per week     Comment: social    Drug use: No    Sexual activity: Not on file   Lifestyle    Physical activity:     Days per week: Not on file     Minutes per session: Not on file    Stress: Not on file   Relationships    Social connections:     Talks on phone: Not on file     Gets together: Not on file     Attends Zoroastrian service: Not on file     Active member of club or organization: Not on file     Attends meetings of clubs or organizations: Not on file     Relationship status: Not on file    Intimate partner violence:     Fear of current or ex partner: Not on file     Emotionally abused: Not on file     Physically abused: Not on file Forced sexual activity: Not on file   Other Topics Concern    Not on file   Social History Narrative    Engages in travel abroad    Living independently alone'       Current Medications  Current Outpatient Medications   Medication Sig Dispense Refill    atorvastatin (LIPITOR) 40 mg tablet TAKE 1 TABLET BY MOUTH EVERY DAY 90 tablet 3    LUMIGAN 0 01 % ophthalmic drops INSTILL 1 DROP INTO BOTH EYES AT BEDTIME  3    metFORMIN (GLUCOPHAGE-XR) 500 mg 24 hr tablet Take 1 tablet (500 mg total) by mouth 2 (two) times a day with meals 360 tablet 1    olmesartan-hydrochlorothiazide (BENICAR HCT) 40-12 5 MG per tablet TAKE 1 TABLET BY MOUTH EVERY DAY 90 tablet 3    ciprofloxacin (CIPRO) 500 mg tablet Take 1 tablet (500 mg total) by mouth 2 (two) times a day for 3 days Start one day prior to biopsy 6 tablet 0     No current facility-administered medications for this visit  Allergies  Allergies   Allergen Reactions    Captopril Cough    Enalapril Cough       Past Medical History, Social History, Family History, medications and allergies were reviewed  Vitals  Vitals:    02/13/19 0821   BP: 128/84   BP Location: Left arm   Patient Position: Sitting   Cuff Size: Adult   Pulse: 78   Weight: 103 kg (226 lb 6 4 oz)   Height: 5' 10" (1 778 m)       Physical Exam  Physical Exam    On examination he is in no acute distress  His abdomen is soft nontender nondistended   examination reveals no CVA tenderness  Skin is warm  Extremities without edema  Neurologic is grossly intact and nonfocal   Gait normal   Affect normal   Digital rectal examination deferred until the time of prostate biopsy      Results  Lab Results   Component Value Date    PSA 5 6 (H) 01/03/2019     Lab Results   Component Value Date    CALCIUM 9 6 01/03/2019    K 3 8 01/03/2019    CO2 27 01/03/2019    CL 98 01/03/2019    BUN 24 01/03/2019     No results found for: WBC, HGB, HCT, MCV, PLT      Office Urine Dip  No results found for this or any previous visit (from the past 1 hour(s)) ]

## 2019-02-16 LAB — PSA SERPL-MCNC: 3.4 NG/ML

## 2019-02-27 ENCOUNTER — TELEPHONE (OUTPATIENT)
Dept: FAMILY MEDICINE CLINIC | Facility: CLINIC | Age: 64
End: 2019-02-27

## 2019-02-27 DIAGNOSIS — I10 ESSENTIAL HYPERTENSION: Primary | ICD-10-CM

## 2019-02-27 NOTE — TELEPHONE ENCOUNTER
Pharmacy faxed sheet to us stating Benicar-HCTZ is on long term MFR Back order  Requesting alternative medication   Please advise

## 2019-02-27 NOTE — TELEPHONE ENCOUNTER
Order put in  Attempted to call and notify pt  L/m to call back  Click on encounter above to go in and approve medication as suggested

## 2019-02-28 RX ORDER — LOSARTAN POTASSIUM AND HYDROCHLOROTHIAZIDE 12.5; 1 MG/1; MG/1
1 TABLET ORAL DAILY
Qty: 90 TABLET | Refills: 3 | Status: SHIPPED | OUTPATIENT
Start: 2019-02-28 | End: 2019-03-20 | Stop reason: ALTCHOICE

## 2019-03-20 ENCOUNTER — OFFICE VISIT (OUTPATIENT)
Dept: FAMILY MEDICINE CLINIC | Facility: CLINIC | Age: 64
End: 2019-03-20
Payer: COMMERCIAL

## 2019-03-20 VITALS
HEART RATE: 80 BPM | OXYGEN SATURATION: 96 % | DIASTOLIC BLOOD PRESSURE: 60 MMHG | BODY MASS INDEX: 31.21 KG/M2 | SYSTOLIC BLOOD PRESSURE: 136 MMHG | WEIGHT: 218 LBS | HEIGHT: 70 IN | RESPIRATION RATE: 18 BRPM

## 2019-03-20 DIAGNOSIS — E78.2 MIXED HYPERLIPIDEMIA: ICD-10-CM

## 2019-03-20 DIAGNOSIS — E11.9 TYPE 2 DIABETES MELLITUS WITHOUT COMPLICATION, WITHOUT LONG-TERM CURRENT USE OF INSULIN (HCC): ICD-10-CM

## 2019-03-20 DIAGNOSIS — Z23 NEED FOR ZOSTER VACCINE: ICD-10-CM

## 2019-03-20 DIAGNOSIS — I10 ESSENTIAL HYPERTENSION: ICD-10-CM

## 2019-03-20 DIAGNOSIS — E11.8 TYPE 2 DIABETES MELLITUS WITH COMPLICATION, UNSPECIFIED WHETHER LONG TERM INSULIN USE: Primary | ICD-10-CM

## 2019-03-20 PROBLEM — R73.03 PREDIABETES: Status: RESOLVED | Noted: 2017-06-13 | Resolved: 2019-03-20

## 2019-03-20 PROBLEM — R73.01 IMPAIRED FASTING GLUCOSE: Status: RESOLVED | Noted: 2017-06-13 | Resolved: 2019-03-20

## 2019-03-20 PROBLEM — M24.573 EQUINUS CONTRACTURE OF ANKLE: Status: ACTIVE | Noted: 2017-08-14

## 2019-03-20 LAB — SL AMB POCT HEMOGLOBIN AIC: 9.8 (ref ?–6.5)

## 2019-03-20 PROCEDURE — 90750 HZV VACC RECOMBINANT IM: CPT | Performed by: FAMILY MEDICINE

## 2019-03-20 PROCEDURE — 83036 HEMOGLOBIN GLYCOSYLATED A1C: CPT | Performed by: FAMILY MEDICINE

## 2019-03-20 PROCEDURE — 99214 OFFICE O/P EST MOD 30 MIN: CPT | Performed by: FAMILY MEDICINE

## 2019-03-20 PROCEDURE — 90471 IMMUNIZATION ADMIN: CPT | Performed by: FAMILY MEDICINE

## 2019-03-20 PROCEDURE — 3078F DIAST BP <80 MM HG: CPT | Performed by: FAMILY MEDICINE

## 2019-03-20 PROCEDURE — 3008F BODY MASS INDEX DOCD: CPT | Performed by: FAMILY MEDICINE

## 2019-03-20 PROCEDURE — 3046F HEMOGLOBIN A1C LEVEL >9.0%: CPT | Performed by: FAMILY MEDICINE

## 2019-03-20 PROCEDURE — 3075F SYST BP GE 130 - 139MM HG: CPT | Performed by: FAMILY MEDICINE

## 2019-03-20 RX ORDER — OLMESARTAN MEDOXOMIL AND HYDROCHLOROTHIAZIDE 40/12.5 40; 12.5 MG/1; MG/1
1 TABLET ORAL DAILY
Refills: 2 | COMMUNITY
Start: 2019-03-01 | End: 2019-06-25

## 2019-03-20 NOTE — PROGRESS NOTES
Assessment/Plan:       Problem List Items Addressed This Visit        Endocrine    Type 2 diabetes mellitus without complication, without long-term current use of insulin ([facility] [address] )     Lab Results   Component Value Date    HGBA1C 9 8 (A) 03/20/2019      A1c has improved from 11 5-9 8  I would like him to start taking metformin 1000 mg twice a day more consistently  He does not really need to be on the long-acting formation, so I gave him a printed prescription for metformin 1000 mg twice daily, as his insurance company would not cover metformin XR 1000 mg tablets  I am going to bring him back in about 3 months and we can repeat his A1c in the office  Relevant Medications    metFORMIN (GLUCOPHAGE) 1000 MG tablet       Cardiovascular and Mediastinum    Essential hypertension     Currently well controlled  No changes         Relevant Medications    olmesartan-hydrochlorothiazide (BENICAR HCT) 40-12 5 MG per tablet       Other    Hyperlipidemia     Continue atorvastatin  Other Visit Diagnoses     Type 2 diabetes mellitus with complication, unspecified whether long term insulin use (HCC)    -  Primary    Relevant Medications    metFORMIN (GLUCOPHAGE) 1000 MG tablet    Other Relevant Orders    POCT hemoglobin A1c (Completed)    Need for zoster vaccine        Relevant Orders    Zoster Vaccine Recombinant IM (Completed)            Subjective:      Patient ID: Valarie Connors is a 58 y o  male  HPI The patient presents today for follow-up for diabetes  Fortunately, the A1c is improving, but still remains quite elevated at 9 8  There have been no significant episodes of hypo or hyperglycemia  The patient is not experiencing any blurry vision, polyuria, polydipsia, or peripheral neuropathy symptoms  Patient remains compliant with medications and routine follow-up  He has hypertension and denies chest pain, shortness of breath or palpitations  He has hyperlipidemia tolerates atorvastatin    His history of angioedema but has no current swelling issues  The following portions of the patient's history were reviewed and updated as appropriate: allergies, current medications, past family history, past medical history, past social history, past surgical history and problem list     Review of Systems   Constitutional: Negative for appetite change, chills, fatigue, fever and unexpected weight change  HENT: Negative for trouble swallowing  Eyes: Negative for visual disturbance  Respiratory: Negative for cough, chest tightness, shortness of breath and wheezing  Cardiovascular: Negative for chest pain  Gastrointestinal: Negative for abdominal distention, abdominal pain, blood in stool, constipation and diarrhea  Endocrine: Negative for polyuria  Genitourinary: Negative for difficulty urinating and flank pain  Musculoskeletal: Negative for arthralgias and myalgias  Skin: Negative for rash  Neurological: Negative for dizziness and light-headedness  Hematological: Negative for adenopathy  Does not bruise/bleed easily  Psychiatric/Behavioral: Negative for sleep disturbance  Objective:      /60   Pulse 80   Resp 18   Ht 5' 10" (1 778 m)   Wt 98 9 kg (218 lb)   SpO2 96%   BMI 31 28 kg/m²          Physical Exam   Constitutional: He is oriented to person, place, and time  He appears well-developed and well-nourished  No distress  HENT:   Head: Normocephalic  Eyes: Pupils are equal, round, and reactive to light  Right eye exhibits no discharge  Left eye exhibits no discharge  Neck: No tracheal deviation present  No thyromegaly present  Cardiovascular: Normal rate, regular rhythm and normal heart sounds  No murmur heard  Pulmonary/Chest: Effort normal  No respiratory distress  He has no wheezes  He has no rales  Abdominal: Soft  He exhibits no distension  There is no tenderness  Musculoskeletal: Normal range of motion  He exhibits no edema     Lymphadenopathy: He has no cervical adenopathy  Neurological: He is alert and oriented to person, place, and time  No cranial nerve deficit  Skin: Skin is warm  He is not diaphoretic  No erythema  Psychiatric: He has a normal mood and affect   Judgment and thought content normal          Barbara Green MD

## 2019-03-20 NOTE — ASSESSMENT & PLAN NOTE
Continue atorvastatin 
Currently well controlled    No changes
Lab Results   Component Value Date    HGBA1C 9 8 (A) 03/20/2019      A1c has improved from 11 5-9 8  I would like him to start taking metformin 1000 mg twice a day more consistently  He does not really need to be on the long-acting formation, so I gave him a printed prescription for metformin 1000 mg twice daily, as his insurance company would not cover metformin XR 1000 mg tablets  I am going to bring him back in about 3 months and we can repeat his A1c in the office 
28-Oct-2017 10:02

## 2019-04-04 ENCOUNTER — TELEPHONE (OUTPATIENT)
Dept: UROLOGY | Facility: AMBULATORY SURGERY CENTER | Age: 64
End: 2019-04-04

## 2019-04-09 LAB — PSA SERPL-MCNC: 4.9 NG/ML

## 2019-04-12 ENCOUNTER — PROCEDURE VISIT (OUTPATIENT)
Dept: UROLOGY | Facility: AMBULATORY SURGERY CENTER | Age: 64
End: 2019-04-12
Payer: COMMERCIAL

## 2019-04-12 VITALS
WEIGHT: 218.6 LBS | HEIGHT: 70 IN | DIASTOLIC BLOOD PRESSURE: 74 MMHG | SYSTOLIC BLOOD PRESSURE: 136 MMHG | BODY MASS INDEX: 31.3 KG/M2 | HEART RATE: 88 BPM

## 2019-04-12 DIAGNOSIS — R97.20 ELEVATED PSA: Primary | ICD-10-CM

## 2019-04-12 PROCEDURE — G0416 PROSTATE BIOPSY, ANY MTHD: HCPCS | Performed by: PATHOLOGY

## 2019-04-12 PROCEDURE — 55700 PR BIOPSY OF PROSTATE,NEEDLE/PUNCH: CPT | Performed by: UROLOGY

## 2019-04-12 PROCEDURE — 76942 ECHO GUIDE FOR BIOPSY: CPT | Performed by: UROLOGY

## 2019-04-12 PROCEDURE — 76872 US TRANSRECTAL: CPT | Performed by: UROLOGY

## 2019-04-12 PROCEDURE — 88344 IMHCHEM/IMCYTCHM EA MLT ANTB: CPT | Performed by: PATHOLOGY

## 2019-04-12 RX ORDER — ERYTHROMYCIN 5 MG/G
OINTMENT OPHTHALMIC
COMMUNITY
Start: 2019-04-09 | End: 2019-07-08 | Stop reason: ALTCHOICE

## 2019-04-26 ENCOUNTER — TELEPHONE (OUTPATIENT)
Dept: UROLOGY | Facility: AMBULATORY SURGERY CENTER | Age: 64
End: 2019-04-26

## 2019-04-26 ENCOUNTER — OFFICE VISIT (OUTPATIENT)
Dept: UROLOGY | Facility: AMBULATORY SURGERY CENTER | Age: 64
End: 2019-04-26
Payer: COMMERCIAL

## 2019-04-26 VITALS
DIASTOLIC BLOOD PRESSURE: 84 MMHG | HEIGHT: 70 IN | HEART RATE: 60 BPM | SYSTOLIC BLOOD PRESSURE: 138 MMHG | WEIGHT: 219.2 LBS | BODY MASS INDEX: 31.38 KG/M2

## 2019-04-26 DIAGNOSIS — C61 PROSTATE CANCER (HCC): Primary | ICD-10-CM

## 2019-04-26 PROCEDURE — 99215 OFFICE O/P EST HI 40 MIN: CPT | Performed by: UROLOGY

## 2019-04-29 ENCOUNTER — TELEPHONE (OUTPATIENT)
Dept: UROLOGY | Facility: AMBULATORY SURGERY CENTER | Age: 64
End: 2019-04-29

## 2019-05-03 ENCOUNTER — APPOINTMENT (OUTPATIENT)
Dept: LAB | Facility: HOSPITAL | Age: 64
End: 2019-05-03
Payer: COMMERCIAL

## 2019-05-03 ENCOUNTER — LAB REQUISITION (OUTPATIENT)
Dept: LAB | Facility: HOSPITAL | Age: 64
End: 2019-05-03
Payer: COMMERCIAL

## 2019-05-03 DIAGNOSIS — R97.20 ELEVATED PROSTATE SPECIFIC ANTIGEN (PSA): ICD-10-CM

## 2019-05-03 DIAGNOSIS — Z12.11 ENCOUNTER FOR FIT (FECAL IMMUNOCHEMICAL TEST) SCREENING: ICD-10-CM

## 2019-05-03 DIAGNOSIS — C61 MALIGNANT NEOPLASM OF PROSTATE (HCC): ICD-10-CM

## 2019-05-03 LAB — HEMOCCULT STL QL IA: NEGATIVE

## 2019-05-03 PROCEDURE — G0328 FECAL BLOOD SCRN IMMUNOASSAY: HCPCS

## 2019-05-08 ENCOUNTER — DOCUMENTATION (OUTPATIENT)
Dept: UROLOGY | Facility: AMBULATORY SURGERY CENTER | Age: 64
End: 2019-05-08

## 2019-05-13 LAB — SCAN RESULT: NORMAL

## 2019-05-21 ENCOUNTER — TELEPHONE (OUTPATIENT)
Dept: UROLOGY | Facility: AMBULATORY SURGERY CENTER | Age: 64
End: 2019-05-21

## 2019-06-21 ENCOUNTER — TELEPHONE (OUTPATIENT)
Dept: FAMILY MEDICINE CLINIC | Facility: CLINIC | Age: 64
End: 2019-06-21

## 2019-06-21 DIAGNOSIS — I10 ESSENTIAL HYPERTENSION: Primary | ICD-10-CM

## 2019-06-21 NOTE — TELEPHONE ENCOUNTER
Pharmacy has reach out via fax, stating Olmesartan-HCTZ 40-12 5 Mg is on back order  Losartan-HCTZ is available  Confirming dosage if you can please advice on what dose?

## 2019-06-25 RX ORDER — LOSARTAN POTASSIUM AND HYDROCHLOROTHIAZIDE 12.5; 1 MG/1; MG/1
1 TABLET ORAL DAILY
Qty: 90 TABLET | Refills: 0 | Status: SHIPPED | OUTPATIENT
Start: 2019-06-25 | End: 2019-07-08 | Stop reason: ALTCHOICE

## 2019-06-27 NOTE — TELEPHONE ENCOUNTER
Received a fax from [phone] , Losartan -HCTZ 100-12 5MG  is not available they would like to know if you can resend individual prescription  I did call pt, before sending this message to you    He mention that he was not aware of previous changes, he has 2  More weeks  of his Olmersatan 40mg -HCTZ 12 5MG and he will call other pharmacy and contact [facility] pharmacy to see when he will be able to get this RX, he is doing really good with this medication and he does not want any changes

## 2019-07-08 ENCOUNTER — OFFICE VISIT (OUTPATIENT)
Dept: FAMILY MEDICINE CLINIC | Facility: CLINIC | Age: 64
End: 2019-07-08
Payer: COMMERCIAL

## 2019-07-08 VITALS
HEART RATE: 80 BPM | BODY MASS INDEX: 30.92 KG/M2 | OXYGEN SATURATION: 96 % | RESPIRATION RATE: 18 BRPM | HEIGHT: 70 IN | WEIGHT: 216 LBS | DIASTOLIC BLOOD PRESSURE: 70 MMHG | SYSTOLIC BLOOD PRESSURE: 120 MMHG

## 2019-07-08 DIAGNOSIS — E66.9 OBESITY, CLASS I, BMI 30-34.9: ICD-10-CM

## 2019-07-08 DIAGNOSIS — I10 ESSENTIAL HYPERTENSION: ICD-10-CM

## 2019-07-08 DIAGNOSIS — E78.2 MIXED HYPERLIPIDEMIA: ICD-10-CM

## 2019-07-08 DIAGNOSIS — E11.9 TYPE 2 DIABETES MELLITUS WITHOUT COMPLICATION, WITHOUT LONG-TERM CURRENT USE OF INSULIN (HCC): ICD-10-CM

## 2019-07-08 DIAGNOSIS — Z00.00 WELCOME TO MEDICARE PREVENTIVE VISIT: Primary | ICD-10-CM

## 2019-07-08 DIAGNOSIS — Z23 NEED FOR 23-POLYVALENT PNEUMOCOCCAL POLYSACCHARIDE VACCINE: ICD-10-CM

## 2019-07-08 DIAGNOSIS — C61 MALIGNANT NEOPLASM OF PROSTATE (HCC): ICD-10-CM

## 2019-07-08 DIAGNOSIS — B00.9 HERPES SIMPLEX TYPE 1 INFECTION: ICD-10-CM

## 2019-07-08 LAB — SL AMB POCT HEMOGLOBIN AIC: 6.8 (ref ?–6.5)

## 2019-07-08 PROCEDURE — G0402 INITIAL PREVENTIVE EXAM: HCPCS | Performed by: FAMILY MEDICINE

## 2019-07-08 PROCEDURE — 83036 HEMOGLOBIN GLYCOSYLATED A1C: CPT | Performed by: FAMILY MEDICINE

## 2019-07-08 PROCEDURE — G0403 EKG FOR INITIAL PREVENT EXAM: HCPCS | Performed by: FAMILY MEDICINE

## 2019-07-08 PROCEDURE — G0009 ADMIN PNEUMOCOCCAL VACCINE: HCPCS | Performed by: FAMILY MEDICINE

## 2019-07-08 PROCEDURE — 90732 PPSV23 VACC 2 YRS+ SUBQ/IM: CPT | Performed by: FAMILY MEDICINE

## 2019-07-08 RX ORDER — OLMESARTAN MEDOXOMIL AND HYDROCHLOROTHIAZIDE 40/12.5 40; 12.5 MG/1; MG/1
TABLET ORAL
Refills: 0 | COMMUNITY
Start: 2019-07-03 | End: 2019-07-08 | Stop reason: SDUPTHER

## 2019-07-08 RX ORDER — OLMESARTAN MEDOXOMIL AND HYDROCHLOROTHIAZIDE 40/12.5 40; 12.5 MG/1; MG/1
1 TABLET ORAL DAILY
Qty: 90 TABLET | Refills: 3 | Status: SHIPPED | OUTPATIENT
Start: 2019-07-08 | End: 2020-07-17

## 2019-07-08 NOTE — TELEPHONE ENCOUNTER
Pts med list has been updated at todays scheduled appt  He is dropping [facility] and going to [facility]  Pharmacy has been updated

## 2019-07-08 NOTE — PROGRESS NOTES
Assessment and Plan:     Problem List Items Addressed This Visit        Endocrine    Type 2 diabetes mellitus without complication, without long-term current use of insulin ([facility] [address] )     Lab Results   Component Value Date    HGBA1C 9 8 (A) 03/20/2019     A1c today was 6 8, which is a rather large drop from his previous reading of 9 8  Continue on the metformin           Relevant Orders    Microalbumin / creatinine urine ratio    POCT hemoglobin A1c (Completed)    Comprehensive metabolic panel    Hemoglobin A1C       Cardiovascular and Mediastinum    Essential hypertension    Relevant Medications    olmesartan-hydrochlorothiazide (BENICAR HCT) 40-12 5 MG per tablet    Other Relevant Orders    CBC and differential    Comprehensive metabolic panel       Genitourinary    Malignant neoplasm of prostate ([facility] [address] )       Other    Herpes simplex type 1 infection    Hyperlipidemia     Continue on atorvastatin  Relevant Orders    Comprehensive metabolic panel    Lipid Panel with Direct LDL reflex      Other Visit Diagnoses     Welcome to Medicare preventive visit    -  Primary    EKG showed normal sinus rhythm  Prevnar 13 will be given today  Follow-up in 6 months time  Relevant Orders    POCT ECG (Completed)    Obesity, Class I, BMI 30-34 9        BMI 30 0-30 9,adult        Need for 23-polyvalent pneumococcal polysaccharide vaccine        Relevant Orders    PNEUMOCOCCAL POLYSACCHARIDE VACCINE 23-VALENT =>1YO SQ IM (Completed)      BMI Counseling: Body mass index is 30 99 kg/m²  Discussed the patient's BMI with him  The BMI is above average  BMI counseling and education was provided to the patient  Nutrition recommendations include reducing portion sizes, decreasing overall calorie intake and 3-5 servings of fruits/vegetables daily  Exercise recommendations include moderate aerobic physical activity for 150 minutes/week  Patient presents today for Medicare wellness visit  Overall, he is doing well    His screening EKG was normal   Diabetic control is improving  He has no trouble with activities of daily life, depression, anxiety or cognitive decline  He continues to be extremely active as an , traveling [place]  He is up-to-date on colon cancer screening as he completed a FIT test back in May  He unfortunately has been diagnosed with prostate cancer, but will be undergoing radiation therapy in the next month or so at [facility]  He does not have a living will, so given copy of 5 wishes today  He will have Prevnar 13 today  History of Present Illness:     Patient presents for Welcome to Medicare visit  He feels he is doing relatively well overall  He has a history of type 2 diabetes and has taken metformin at least daily since I saw him last   His A1c is improved significantly  He notes that he often misses his 2nd dose  He has a history of prostate cancer and has recently gone to [facility] for 2nd opinion  He will be initiating radiation within the next month  He is not having any significant voiding issues at this time  He has history of angioedema with no recent recurrence  The patient presents today for a hypertension follow-up  Patient remains compliant with medications and denies any chest pain, shortness of breath, palpitations, lightheadedness or diaphoresis  Patient Care Team:  Raleigh Bell MD as PCP - General (Family Medicine)  Justin Lewis (Optometry)     Review of Systems:     Review of Systems   Constitutional: Negative for appetite change, chills, fatigue, fever and unexpected weight change  HENT: Negative for trouble swallowing  Eyes: Negative for visual disturbance  Respiratory: Negative for cough, chest tightness, shortness of breath and wheezing  Cardiovascular: Negative for chest pain  Gastrointestinal: Negative for abdominal distention, abdominal pain, blood in stool, bowel incontinence, constipation and diarrhea     Endocrine: Negative for polyuria  Genitourinary: Negative for difficulty urinating and flank pain  Musculoskeletal: Negative for arthralgias and myalgias  Skin: Negative for rash  Neurological: Negative for dizziness and light-headedness  Hematological: Negative for adenopathy  Does not bruise/bleed easily  Psychiatric/Behavioral: Negative for sleep disturbance  The patient is not nervous/anxious  Problem List:     Patient Active Problem List   Diagnosis    Herpes simplex type 1 infection    Hyperlipidemia    Essential hypertension    Idiopathic angioedema    Cutaneous skin tags    Actinic keratosis    Equinus contracture of ankle    Type 2 diabetes mellitus without complication, without long-term current use of insulin (HCC)    Malignant neoplasm of prostate [facility])      Past Medical and Surgical History:     Past Medical History:   Diagnosis Date    Diabetes mellitus ([facility] [address] )     Hypertension      No past surgical history on file     Family History:     Family History   Problem Relation Age of Onset    No Known Problems Brother     Stroke Mother       Social History:     Social History     Tobacco Use   Smoking Status Former Smoker   Smokeless Tobacco Never Used     Social History     Substance and Sexual Activity   Alcohol Use Yes    Alcohol/week: 2 0 - 3 0 standard drinks    Types: 2 - 3 Cans of beer per week    Comment: social     Social History     Substance and Sexual Activity   Drug Use No      Medications and Allergies:     Current Outpatient Medications   Medication Sig Dispense Refill    atorvastatin (LIPITOR) 40 mg tablet TAKE 1 TABLET BY MOUTH EVERY DAY 90 tablet 3    LUMIGAN 0 01 % ophthalmic drops INSTILL 1 DROP INTO BOTH EYES AT BEDTIME  3    metFORMIN (GLUCOPHAGE) 1000 MG tablet Take 1 tablet (1,000 mg total) by mouth 2 (two) times a day with meals 180 tablet 3    olmesartan-hydrochlorothiazide (BENICAR HCT) 40-12 5 MG per tablet Take 1 tablet by mouth daily 90 tablet 3     No current facility-administered medications for this visit  Allergies   Allergen Reactions    Captopril Cough    Enalapril Cough      Immunizations:     Immunization History   Administered Date(s) Administered    Hep A, adult 04/24/2015, 10/29/2015    Hep A, ped/adol, 2 dose 03/07/2017    Influenza Quadrivalent Preservative Free 3 years and older IM 10/03/2014    Influenza, recombinant, quadrivalent,injectable, preservative free 01/02/2019    Pneumococcal Polysaccharide PPV23 07/08/2019    Tdap 02/11/1997    Zoster 07/24/2017    Zoster Vaccine Recombinant 01/14/2019, 03/20/2019      Medicare Screening Tests and Risk Assessments:     Yanet Richmond is here for his Welcome to Medicare visit  Health Risk Assessment:  Patient rates overall health as very good  Patient feels that their physical health rating is Same  Eyesight was rated as Same  Hearing was rated as Same  Patient feels that their emotional and mental health rating is Same  Pain experienced by patient in the last 7 days has been None  Emotional/Mental Health:  Patient has not been feeling nervous/anxious  PHQ-9 Depression Screening:    Frequency of the following problems over the past two weeks:      1  Little interest or pleasure in doing things: 0 - not at all      2  Feeling down, depressed, or hopeless: 0 - not at all  PHQ-2 Score: 0          Broken Bones/Falls: Fall Risk Assessment:    In the past year, patient has experienced: No history of falling in past year          Bladder/Bowel:  Patient has not leaked urine accidently in the last six months  Patient reports no loss of bowel control  Immunizations:  Patient has had a flu vaccination within the last year  Patient has received a pneumonia shot  Patient has received a shingles shot  Patient has not received tetanus/diphtheria shot  Home Safety:  Patient does not have trouble with stairs inside or outside of their home     Patient currently reports that there are no safety hazards present in home, working smoke alarms, working carbon monoxide detectors  Preventative Screenings:   prostate cancer screen performed, colon cancer screen completed, cholesterol screen completed, glaucoma eye exam completed,     Nutrition:  Current diet: Regular with servings of the following:    Medications:  Patient is currently taking over-the-counter supplements  Patient is able to manage medications  Lifestyle Choices:  Patient reports no tobacco use  Patient has smoked or used tobacco in the past   Patient has stopped his tobacco use  Patient reports alcohol use  Patient drives a vehicle  Patient wears seat belt  Activities of Daily Living:  Can get out of bed by his or her self, able to dress self, able to make own meals, able to do own shopping, able to bathe self, can do own laundry/housekeeping, can manage own money, pay bills and track expenses    Previous Hospitalizations:  No hospitalization or ED visit in past 12 months        Advanced Directives:  Patient has not decided on power of   Patient has not completed advanced directive  Advanced Directives:   Patient has no living will for healthcare, does not have durable POA for healthcare, Information on ACP and/or AD provided  5 wishes given  End of life assessment reviewed with patient  No exam data present     Physical Exam:     /70   Pulse 80   Resp 18   Ht 5' 10" (1 778 m)   Wt 98 kg (216 lb)   SpO2 96%   BMI 30 99 kg/m²     Physical Exam   Constitutional: He is oriented to person, place, and time  He appears well-developed and well-nourished  No distress  HENT:   Head: Normocephalic  Eyes: Pupils are equal, round, and reactive to light  Right eye exhibits no discharge  Left eye exhibits no discharge  Neck: No tracheal deviation present  No thyromegaly present  Cardiovascular: Normal rate, regular rhythm and normal heart sounds  No murmur heard    Pulmonary/Chest: Effort normal  No respiratory distress  He has no wheezes  He has no rales  Abdominal: Soft  He exhibits no distension  There is no tenderness  Musculoskeletal: Normal range of motion  He exhibits no edema  Lymphadenopathy:     He has no cervical adenopathy  Neurological: He is alert and oriented to person, place, and time  No cranial nerve deficit  Skin: Skin is warm  He is not diaphoretic  No erythema  Psychiatric: He has a normal mood and affect   Judgment and thought content normal

## 2019-07-08 NOTE — PATIENT INSTRUCTIONS
Obesity   AMBULATORY CARE:   Obesity  is when your body mass index (BMI) is greater than 30  Your healthcare provider will use your height and weight to measure your BMI  The risks of obesity include  many health problems, such as injuries or physical disability  You may need tests to check for the following:  · Diabetes     · High blood pressure or high cholesterol     · Heart disease     · Gallbladder or liver disease     · Cancer of the colon, breast, prostate, liver, or kidney     · Sleep apnea     · Arthritis or gout  Seek care immediately if:   · You have a severe headache, confusion, or difficulty speaking  · You have weakness on one side of your body  · You have chest pain, sweating, or shortness of breath  Contact your healthcare provider if:   · You have symptoms of gallbladder or liver disease, such as pain in your upper abdomen  · You have knee or hip pain and discomfort while walking  · You have symptoms of diabetes, such as intense hunger and thirst, and frequent urination  · You have symptoms of sleep apnea, such as snoring or daytime sleepiness  · You have questions or concerns about your condition or care  Treatment for obesity  focuses on helping you lose weight to improve your health  Even a small decrease in BMI can reduce the risk for many health problems  Your healthcare provider will help you set a weight-loss goal   · Lifestyle changes  are the first step in treating obesity  These include making healthy food choices and getting regular physical activity  Your healthcare provider may suggest a weight-loss program that involves coaching, education, and therapy  · Medicine  may help you lose weight when it is used with a healthy diet and physical activity  · Surgery  can help you lose weight if you are very obese and have other health problems  There are several types of weight-loss surgery  Ask your healthcare provider for more information    Be successful losing weight:   · Set small, realistic goals  An example of a small goal is to walk for 20 minutes 5 days a week  Anther goal is to lose 5% of your body weight  · Tell friends, family members, and coworkers about your goals  and ask for their support  Ask a friend to lose weight with you, or join a weight-loss support group  · Identify foods or triggers that may cause you to overeat , and find ways to avoid them  Remove tempting high-calorie foods from your home and workplace  Place a bowl of fresh fruit on your kitchen counter  If stress causes you to eat, then find other ways to cope with stress  · Keep a diary to track what you eat and drink  Also write down how many minutes of physical activity you do each day  Weigh yourself once a week and record it in your diary  Eating changes: You will need to eat 500 to 1,000 fewer calories each day than you currently eat to lose 1 to 2 pounds a week  The following changes will help you cut calories:  · Eat smaller portions  Use small plates, no larger than 9 inches in diameter  Fill your plate half full of fruits and vegetables  Measure your food using measuring cups until you know what a serving size looks like  · Eat 3 meals and 1 or 2 snacks each day  Plan your meals in advance  [employer] and eat at home most of the time  Eat slowly  · Eat fruits and vegetables at every meal   They are low in calories and high in fiber, which makes you feel full  Do not add butter, margarine, or cream sauce to vegetables  Use herbs to season steamed vegetables  · Eat less fat and fewer fried foods  Eat more baked or grilled chicken and fish  These protein sources are lower in calories and fat than red meat  Limit fast food  Dress your salads with olive oil and vinegar instead of bottled dressing  · Limit the amount of sugar you eat  Do not drink sugary beverages  Limit alcohol  Activity changes:  Physical activity is good for your body in many ways   It helps you burn calories and build strong muscles  It decreases stress and depression, and improves your mood  It can also help you sleep better  Talk to your healthcare provider before you begin an exercise program   · Exercise for at least 30 minutes 5 days a week  Start slowly  Set aside time each day for physical activity that you enjoy and that is convenient for you  It is best to do both weight training and an activity that increases your heart rate, such as walking, bicycling, or swimming  · Find ways to be more active  Do yard work and housecleaning  Walk up the stairs instead of using elevators  Spend your leisure time going to events that require walking, such as outdoor festivals or fairs  This extra physical activity can help you lose weight and keep it off  Follow up with your healthcare provider as directed: You may need to meet with a dietitian  Write down your questions so you remember to ask them during your visits  © 2017 [address] Information is for End User's use only and may not be sold, redistributed or otherwise used for commercial purposes  All illustrations and images included in CareNotes® are the copyrighted property of [facility]  or Сергей Meier  The above information is an  only  It is not intended as medical advice for individual conditions or treatments  Talk to your doctor, nurse or pharmacist before following any medical regimen to see if it is safe and effective for you  Urinary Incontinence   WHAT YOU NEED TO KNOW:   What is urinary incontinence? Urinary incontinence (UI) is when you lose control of your bladder  What causes UI? UI occurs because your bladder cannot store or empty urine properly  The following are the most common types of UI:  · Stress incontinence  is when you leak urine due to increased bladder pressure  This may happen when you cough, sneeze, or exercise       · Urge incontinence  is when you feel the need to urinate right away and leak urine accidentally  · Mixed incontinence  is when you have both stress and urge UI  What are the signs and symptoms of UI?   · You feel like your bladder does not empty completely when you urinate  · You urinate often and need to urinate immediately  · You leak urine when you sleep, or you wake up with the urge to urinate  · You leak urine when you cough, sneeze, exercise, or laugh  How is UI diagnosed? Your healthcare provider will ask how often you leak urine and whether you have stress or urge symptoms  Tell him which medicines you take, how often you urinate, and how much liquid you drink each day  You may need any of the following tests:  · Urine tests  may show infection or kidney function  · A pelvic exam  may be done to check for blockages  A pelvic exam will also show if your bladder, uterus, or other organs have moved out of place  · An x-ray, ultrasound, or CT  may show problems with parts of your urinary system  You may be given contrast liquid to help your organs show up better in the pictures  Tell the healthcare provider if you have ever had an allergic reaction to contrast liquid  Do not enter the MRI room with anything metal  Metal can cause serious injury  Tell the healthcare provider if you have any metal in or on your body  · A bladder scan  will show how much urine is left in your bladder after you urinate  You will be asked to urinate and then healthcare providers will use a small ultrasound machine to check the urine left in your bladder  · Cystometry  is used to check the function of your urinary system  Your healthcare provider checks the pressure in your bladder while filling it with fluid  Your bladder pressure may also be tested when your bladder is full and while you urinate  How is UI treated? · Medicines  can help strengthen your bladder control      · Electrical stimulation  is used to send a small amount of electrical energy to your pelvic floor muscles  This helps control your bladder function  Electrodes may be placed outside your body or in your rectum  For women, the electrodes may be placed in the vagina  · A bulking agent  may be injected into the wall of your urethra to make it thicker  This helps keep your urethra closed and decreases urine leakage  · Devices  such as a clamp, pessary, or tampon may help stop urine leaks  Ask your healthcare provider for more information about these and other devices  · Surgery  may be needed if other treatments do not work  Several types of surgery can help improve your bladder control  Ask your healthcare provider for more information about the surgery you may need  How can I manage my symptoms? · Do pelvic muscle exercises often  Your pelvic muscles help you stop urinating  Squeeze these muscles tight for 5 seconds, then relax for 5 seconds  Gradually work up to squeezing for 10 seconds  Do 3 sets of 15 repetitions a day, or as directed  This will help strengthen your pelvic muscles and improve bladder control  · A catheter  may be used to help empty your bladder  A catheter is a tiny, plastic tube that is put into your bladder to drain your urine  Your healthcare provider may tell you to use a catheter to prevent your bladder from getting too full and leaking urine  · Keep a UI record  Write down how often you leak urine and how much you leak  Make a note of what you were doing when you leaked urine  · Train your bladder  Go to the bathroom at set times, such as every 2 hours, even if you do not feel the urge to go  You can also try to hold your urine when you feel the urge to go  For example, hold your urine for 5 minutes when you feel the urge to go  As that becomes easier, hold your urine for 10 minutes  · Drink liquids as directed  Ask your healthcare provider how much liquid to drink each day and which liquids are best for you   You may need to limit the amount of liquid you drink to help control your urine leakage  Limit or do not have drinks that contain caffeine or alcohol  Do not drink any liquid right before you go to bed  · Prevent constipation  Eat a variety of high-fiber foods  Good examples are high-fiber cereals, beans, vegetables, and whole-grain breads  Prune juice may help make your bowel movement softer  Walking is the best way to trigger your intestines to have a bowel movement  · Exercise regularly and maintain a healthy weight  Ask your healthcare provider how much you should weigh and about the best exercise plan for you  Weight loss and exercise will decrease pressure on your bladder and help you control your leakage  Ask him to help you create a weight loss plan if you are overweight  When should I seek immediate care? · You have severe pain  · You are confused or cannot think clearly  When should I contact my healthcare provider? · You have a fever  · You see blood in your urine  · You have pain when you urinate  · You have new or worse pain, even after treatment  · Your mouth feels dry or you have vision changes  · Your urine is cloudy or smells bad  · You have questions or concerns about your condition or care  CARE AGREEMENT:   You have the right to help plan your care  Learn about your health condition and how it may be treated  Discuss treatment options with your caregivers to decide what care you want to receive  You always have the right to refuse treatment  The above information is an  only  It is not intended as medical advice for individual conditions or treatments  Talk to your doctor, nurse or pharmacist before following any medical regimen to see if it is safe and effective for you  © 2017 [address] [employer] Information is for End User's use only and may not be sold, redistributed or otherwise used for commercial purposes   All illustrations and images included in CareNotes® are the copyrighted property of A D A M , Inc  or Сергей Meier  Cigarette Smoking and Your Health   AMBULATORY CARE:   Risks to your health if you smoke:  Nicotine and other chemicals found in tobacco damage every cell in your body  Even if you are a light smoker, you have an increased risk for cancer, heart disease, and lung disease  If you are pregnant or have diabetes, smoking increases your risk for complications  Benefits to your health if you stop smoking:   · You decrease respiratory symptoms such as coughing, wheezing, and shortness of breath  · You reduce your risk for cancers of the lung, mouth, throat, kidney, bladder, pancreas, stomach, and cervix  If you already have cancer, you increase the benefits of chemotherapy  You also reduce your risk for cancer returning or a second cancer from developing  · You reduce your risk for heart disease, blood clots, heart attack, and stroke  · You reduce your risk for lung infections, and diseases such as pneumonia, asthma, chronic bronchitis, and emphysema  · Your circulation improves  More oxygen can be delivered to your body  If you have diabetes, you lower your risk for complications, such as kidney, artery, and eye diseases  You also lower your risk for nerve damage  Nerve damage can lead to amputations, poor vision, and blindness  · You improve your body's ability to heal and to fight infections  Benefits to the health of others if you stop smoking:  Tobacco is harmful to nonsmokers who breathe in your secondhand smoke  The following are ways the health of others around you may improve when you stop smoking:  · You lower the risks for lung cancer and heart disease in nonsmoking adults  · If you are pregnant, you lower the risk for miscarriage, early delivery, low birth weight, and stillbirth  You also lower your baby's risk for SIDS, obesity, developmental delay, and neurobehavioral problems, such as ADHD  · If you have children, you lower their risk for ear infections, colds, pneumonia, bronchitis, and asthma  For more information and support to stop smoking:   · Smokefree  gov  Phone: 7- 004 - 754-2541  Web Address: www smokefr20/20 [employer]  Follow up with your healthcare provider as directed:  Write down your questions so you remember to ask them during your visits  © 2017 [address] Information is for End User's use only and may not be sold, redistributed or otherwise used for commercial purposes  All illustrations and images included in CareNotes® are the copyrighted property of A D A M , Inc  or Сергей Meier  The above information is an  only  It is not intended as medical advice for individual conditions or treatments  Talk to your doctor, nurse or pharmacist before following any medical regimen to see if it is safe and effective for you  Fall Prevention   WHAT YOU NEED TO KNOW:   What is fall prevention? Fall prevention includes ways to make your home and other areas safer  It also includes ways you can move more carefully to prevent a fall  What increases my risk for falls? · Lack of vitamin D    · Not getting enough sleep each night    · Trouble walking or keeping your balance, or foot problems    · Health conditions that cause changes in your blood pressure, vision, or muscle strength and coordination    · Medicines that make you dizzy, weak, or sleepy    · Problems seeing clearly    · Shoes that have high heels or are not supportive    · Tripping hazards, such as items left on the floor, no handrails on the stairs, or broken steps  How can I help protect myself from falls? · Stand or sit up slowly  This may help you keep your balance and prevent falls  If you need to get up during the night, sit up first  Be sure you are fully awake before you stand  Turn on the light before you start walking  Go slowly in case you are still sleepy   Make sure you will not trip over any pets sleeping in the bedroom  · Use assistive devices as directed  Your healthcare provider may suggest that you use a cane or walker to help you keep your balance  You may need to have grab bars put in your bathroom near the toilet or in the shower  · Wear shoes that fit well and have soles that   Wear shoes both inside and outside  Use slippers with good   Do not wear shoes with high heels  · Wear a personal alarm  This is a device that allows you to call 911 if you fall and need help  Ask your healthcare provider for more information  · Stay active  Exercise can help strengthen your muscles and improve your balance  Your healthcare provider may recommend water aerobics or walking  He or she may also recommend physical therapy to improve your coordination  Never start an exercise program without talking to your healthcare provider first      · Manage medical conditions  Keep all appointments with your healthcare providers  Visit your eye doctor as directed  How can I make my home safer? · Add items to prevent falls in the bathroom  Put nonslip strips on your bath or shower floor to prevent you from slipping  Use a bath mat if you do not have carpet in the bathroom  This will prevent you from falling when you step out of the bath or shower  Use a shower seat so you do not need to stand while you shower  Sit on the toilet or a chair in your bathroom to dry yourself and put on clothing  This will prevent you from losing your balance from drying or dressing yourself while you are standing  · Keep paths clear  Remove books, shoes, and other objects from walkways and stairs  Place cords for telephones and lamps out of the way so that you do not need to walk over them  Tape them down if you cannot move them  Remove small rugs  If you cannot remove a rug, secure it with double-sided tape  This will prevent you from tripping  · Install bright lights in your home  Use night lights to help light paths to the bathroom or kitchen  Always turn on the light before you start walking  · Keep items you use often on shelves within reach  Do not use a step stool to help you reach an item  · Paint or place reflective tape on the edges of your stairs  This will help you see the stairs better  Call 911 or have someone else call if:   · You have fallen and are unconscious  · You have fallen and cannot move part of your body  Contact your healthcare provider if:   · You have fallen and have pain or a headache  · You have questions or concerns about your condition or care  CARE AGREEMENT:   You have the right to help plan your care  Learn about your health condition and how it may be treated  Discuss treatment options with your caregivers to decide what care you want to receive  You always have the right to refuse treatment  The above information is an  only  It is not intended as medical advice for individual conditions or treatments  Talk to your doctor, nurse or pharmacist before following any medical regimen to see if it is safe and effective for you  © 2017 [address] [employer] Information is for End User's use only and may not be sold, redistributed or otherwise used for commercial purposes  All illustrations and images included in CareNotes® are the copyrighted property of [employer]  or Сергей Meier  Advance Directives   WHAT YOU NEED TO KNOW:   What are advance directives? Advance directives are legal documents that state your wishes and plans for medical care  These plans are made ahead of time in case you lose your ability to make decisions for yourself  Advance directives can apply to any medical decision, such as the treatments you want, and if you want to donate organs  What are the types of advance directives? There are many types of advance directives, and each state has rules about how to use them   You may choose a combination of any of the following:  · Living will: This is a written record of the treatment you want  You can also choose which treatments you do not want, which to limit, and which to stop at a certain time  This includes surgery, medicine, IV fluid, and tube feedings  · Durable power of  for healthcare [facility]): This is a written record that states who you want to make healthcare choices for you when you are unable to make them for yourself  This person, called a proxy, is usually a family member or a friend  You may choose more than 1 proxy  · Do not resuscitate (DNR) order:  A DNR order is used in case your heart stops beating or you stop breathing  It is a request not to have certain forms of treatment, such as CPR  A DNR order may be included in other types of advance directives  · Medical directive: This covers the care that you want if you are in a coma, near death, or unable to make decisions for yourself  You can list the treatments you want for each condition  Treatment may include pain medicine, surgery, blood transfusions, dialysis, IV or tube feedings, and a ventilator (breathing machine)  · Values history: This document has questions about your views, beliefs, and how you feel and think about life  This information can help others choose the care that you would choose  Why are advance directives important? An advance directive helps you control your care  Although spoken wishes may be used, it is better to have your wishes written down  Spoken wishes can be misunderstood, or not followed  Treatments may be given even if you do not want them  An advance directive may make it easier for your family to make difficult choices about your care  How do I decide what to put in my advance directives? · Make informed decisions:  Make sure you fully understand treatments or care you may receive   Think about the benefits and problems your decisions could cause for you or your family  Talk to healthcare providers if you have concerns or questions before you write down your wishes  You may also want to talk with your Yarsani or , or a   Check your state laws to make sure that what you put in your advance directive is legal      · Sign all forms:  Sign and date your advance directive when you have finished  You may also need 2 witnesses to sign the forms  Witnesses cannot be your doctor or his staff, your spouse, heirs or beneficiaries, people you owe money to, or your chosen proxy  Talk to your family, proxy, and healthcare providers about your advance directive  Give each person a copy, and keep one for yourself in a place you can get to easily  Do not keep it hidden or locked away  · Review and revise your plans: You can revise your advance directive at any time, as long as you are able to make decisions  Review your plan every year, and when there are changes in your life, or your health  When you make changes, let your family, proxy, and healthcare providers know  Give each a new copy  Where can I find more information? · [employer]  [address] , [address]  Phone: 3- 280 - [phone]  Phone: 2- 346 - [phone]  Web Address: http://www  aafp org  · [address] [facility])  [address] [facility] [place], [address] [facility] , [address] [place] [address]  Phone: [phone]  Phone: [phone]  Web Address: Rosalio hunt  CARE AGREEMENT:   You have the right to help plan your care  To help with this plan, you must learn about your health condition and treatment options  You must also learn about advance directives and how they are used  Work with your healthcare providers to decide what care will be used to treat you  You always have the right to refuse treatment  The above information is an  only   It is not intended as medical advice for individual conditions or treatments  Talk to your doctor, nurse or pharmacist before following any medical regimen to see if it is safe and effective for you  © 2017 [address] [employer] Information is for End User's use only and may not be sold, redistributed or otherwise used for commercial purposes  All illustrations and images included in CareNotes® are the copyrighted property of A D A M , Inc  or Сергей Meier

## 2019-07-08 NOTE — ASSESSMENT & PLAN NOTE
Lab Results   Component Value Date    HGBA1C 9 8 (A) 03/20/2019     A1c today was 6 8, which is a rather large drop from his previous reading of 9 8    Continue on the metformin

## 2019-07-08 NOTE — PROGRESS NOTES
Assessment and Plan:     Problem List Items Addressed This Visit        Endocrine    Type 2 diabetes mellitus without complication, without long-term current use of insulin ([address] )       Cardiovascular and Mediastinum    Essential hypertension    Relevant Medications    olmesartan-hydrochlorothiazide (BENICAR HCT) 40-12 5 MG per tablet       Other    Hyperlipidemia      Other Visit Diagnoses     Welcome to Medicare preventive visit    -  Primary    Obesity, Class I, BMI 30-34 9            Patient presents today for Medicare wellness visit  Overall, he is doing quite well  He is just joining Medicare this month  He has had no problems with depression or anxiety  He has had no falls  He continues to take care of all of his own activities of daily life  He has no cardiovascular limitations to exertion  He is following at [facility] for his prostate cancer and will be undergoing radiation within the upcoming months  He completed a FIT test in May for his colon cancer screening  We will repeat this again next year  He declines colonoscopy at this time  His baseline EKG revealed normal sinus rhythm with no ischemic changes  He has not had Prevnar 13 so this will be given to him today as he has a history of diabetes  He will have Pneumovax next year  He was given a copy of 5 wishes to complete his living will  BMI Counseling: Body mass index is 30 99 kg/m²  Discussed the patient's BMI with him  The BMI is above average  BMI counseling and education was provided to the patient  Nutrition recommendations include reducing portion sizes, decreasing overall calorie intake and 3-5 servings of fruits/vegetables daily  Exercise recommendations include moderate aerobic physical activity for 150 minutes/week       History of Present Illness:     Patient presents for Medicare Annual Wellness visit    Patient Care Team:  Kimberly Butler MD as PCP - General (Family Medicine)  [facility] (Optometry)     Problem List:     Patient Active Problem List   Diagnosis    Herpes simplex type 1 infection    Hyperlipidemia    Essential hypertension    Idiopathic angioedema    Cutaneous skin tags    Actinic keratosis    Equinus contracture of ankle    Type 2 diabetes mellitus without complication, without long-term current use of insulin (HCC)    Malignant neoplasm of prostate [facility])      Past Medical and Surgical History:     Past Medical History:   Diagnosis Date    Diabetes mellitus ([address] )     Hypertension      No past surgical history on file  Family History:     Family History   Problem Relation Age of Onset    No Known Problems Brother     Stroke Mother       Social History:     Social History     Tobacco Use   Smoking Status Former Smoker   Smokeless Tobacco Never Used     Social History     Substance and Sexual Activity   Alcohol Use Yes    Alcohol/week: 2 0 - 3 0 standard drinks    Types: 2 - 3 Cans of beer per week    Comment: social     Social History     Substance and Sexual Activity   Drug Use No      Medications and Allergies:     Current Outpatient Medications   Medication Sig Dispense Refill    atorvastatin (LIPITOR) 40 mg tablet TAKE 1 TABLET BY MOUTH EVERY DAY 90 tablet 3    LUMIGAN 0 01 % ophthalmic drops INSTILL 1 DROP INTO BOTH EYES AT BEDTIME  3    metFORMIN (GLUCOPHAGE) 1000 MG tablet Take 1 tablet (1,000 mg total) by mouth 2 (two) times a day with meals 180 tablet 3    olmesartan-hydrochlorothiazide (BENICAR HCT) 40-12 5 MG per tablet TK 1 T PO QD  0     No current facility-administered medications for this visit        Allergies   Allergen Reactions    Captopril Cough    Enalapril Cough      Immunizations:     Immunization History   Administered Date(s) Administered    Hep A, adult 04/24/2015, 10/29/2015    Hep A, ped/adol, 2 dose 03/07/2017    Influenza Quadrivalent Preservative Free 3 years and older IM 10/03/2014    Influenza, recombinant, quadrivalent,injectable, preservative free 01/02/2019    Tdap 02/11/1997    Zoster 07/24/2017    Zoster Vaccine Recombinant 01/14/2019, 03/20/2019      Medicare Screening Tests and Risk Assessments:     Renaldo Zaidi is here for his Welcome to Medicare visit  Health Risk Assessment:  Patient rates overall health as very good  Patient feels that their physical health rating is Same  Eyesight was rated as Same  Hearing was rated as Same  Patient feels that their emotional and mental health rating is Same  Pain experienced by patient in the last 7 days has been None  Emotional/Mental Health:  Patient has not been feeling nervous/anxious  PHQ-9 Depression Screening:    Frequency of the following problems over the past two weeks:      1  Little interest or pleasure in doing things: 0 - not at all      2  Feeling down, depressed, or hopeless: 0 - not at all  PHQ-2 Score: 0          Broken Bones/Falls: Fall Risk Assessment:    In the past year, patient has experienced: No history of falling in past year          Bladder/Bowel:  Patient has not leaked urine accidently in the last six months  Patient reports no loss of bowel control  Immunizations:  Patient has had a flu vaccination within the last year  Patient has not received a pneumonia shot  Patient has received a shingles shot  Patient has not received tetanus/diphtheria shot  Home Safety:  Patient does not have trouble with stairs inside or outside of their home  Patient currently reports that there are no safety hazards present in home, working smoke alarms, working carbon monoxide detectors  Preventative Screenings:   prostate cancer screen performed, colon cancer screen completed, cholesterol screen completed, glaucoma eye exam completed,     Nutrition:  Current diet: Regular with servings of the following:    Medications:  Patient is currently taking over-the-counter supplements  Patient is able to manage medications      Lifestyle Choices:  Patient reports no tobacco use  Patient has smoked or used tobacco in the past   Patient has stopped his tobacco use  Patient reports alcohol use  Patient drives a vehicle  Patient wears seat belt  Activities of Daily Living:  Can get out of bed by his or her self, able to dress self, able to make own meals, able to do own shopping, able to bathe self, can do own laundry/housekeeping, can manage own money, pay bills and track expenses    Previous Hospitalizations:  No hospitalization or ED visit in past 12 months        Advanced Directives:  Patient has not decided on power of   Patient has not completed advanced directive  Advanced Directives:   Patient has no living will for healthcare, does not have durable POA for healthcare, Information on ACP and/or AD provided  5 wishes given  End of life assessment reviewed with patient

## 2019-07-15 ENCOUNTER — TELEPHONE (OUTPATIENT)
Dept: UROLOGY | Facility: AMBULATORY SURGERY CENTER | Age: 64
End: 2019-07-15

## 2019-07-15 NOTE — TELEPHONE ENCOUNTER
Patient cancelled his 08/28/19 followup appointment with Dr Shania Abarca  Did not wish to reschedule  Stated he was seeing a urologist at [facility]

## 2019-07-15 NOTE — TELEPHONE ENCOUNTER
Called patient to discuss results of genomic testing  Voicemail left to call for discussion  Plan to continue active surveillance as the genomic testing places him into a low risk category  Next follow-up scheduled for August 2019 with PSA

## 2019-07-15 NOTE — TELEPHONE ENCOUNTER
Appointment Information   Name: Ciarra Perez MRN: [id number]   Date: 8/28/2019 Status: Can   Time: 3:30 PM Length: 15   Visit Type: FOLLOW UP PG [[id number]] Copay: $0 00   Provider: John Dao MD Department: [facility]:  Department Phone: [phone]   Referral Number:   Referral Status:     Notes: 4 m f/u psa ptv   Made On:  Canceled: 4/26/2019 4:35 PM  7/15/2019 2:58 PM By:  By: Brian Saldivar [[id number]] (ES)  Minh Vringo [[id number]] (ES)   Cancel Rsn: Patient (Patient is seeing a urologist at [facility] not wish to reschedule )

## 2019-07-25 ENCOUNTER — OFFICE VISIT (OUTPATIENT)
Dept: FAMILY MEDICINE CLINIC | Facility: CLINIC | Age: 64
End: 2019-07-25
Payer: COMMERCIAL

## 2019-07-25 VITALS
BODY MASS INDEX: 31.28 KG/M2 | DIASTOLIC BLOOD PRESSURE: 84 MMHG | HEART RATE: 66 BPM | OXYGEN SATURATION: 97 % | SYSTOLIC BLOOD PRESSURE: 116 MMHG | WEIGHT: 218.5 LBS | HEIGHT: 70 IN | TEMPERATURE: 97.6 F

## 2019-07-25 DIAGNOSIS — L02.214 INGUINAL ABSCESS: Primary | ICD-10-CM

## 2019-07-25 PROCEDURE — 99213 OFFICE O/P EST LOW 20 MIN: CPT | Performed by: PHYSICIAN ASSISTANT

## 2019-07-25 PROCEDURE — 3008F BODY MASS INDEX DOCD: CPT | Performed by: PHYSICIAN ASSISTANT

## 2019-07-25 PROCEDURE — 1036F TOBACCO NON-USER: CPT | Performed by: PHYSICIAN ASSISTANT

## 2019-07-25 RX ORDER — CEPHALEXIN 500 MG/1
500 CAPSULE ORAL EVERY 6 HOURS SCHEDULED
Qty: 40 CAPSULE | Refills: 0 | Status: SHIPPED | OUTPATIENT
Start: 2019-07-25 | End: 2019-08-04

## 2019-07-25 NOTE — PROGRESS NOTES
[facility]  FAMILY PRACTICE ACUTE OFFICE VISIT       NAME: Janie Adames  AGE: 58 y o  SEX: male       : 1955        MRN: [id number]    DATE: 2019  TIME: 11:27 AM    Assessment and Plan     Problem List Items Addressed This Visit     None      Visit Diagnoses     Inguinal abscess    -  Primary    Start Keflex 500 mg every 6 hours x 10 days  Apply warm compresses 3 times a day for 10-15 mintues  Declined I&D today  Call if worsens or persists  Relevant Medications    cephalexin (KEFLEX) 500 mg capsule                Chief Complaint     Chief Complaint   Patient presents with    Cyst       History of Present Illness   Maira Nelson is a 58y o -year-old male who presents for infected area in the left buttock  Notes that he has a sore in the left gluteal fold - has recurrently every 1 5 years or so - about 3 times before in the same area - this episode began 1 week ago and is getting worse - wants to clear up before start radiation for prostate cancer in the near future - no other sx - no tx tried - no drainage yet but usually occurs at some point with previous episodes        Review of Systems   Review of Systems   Constitutional: Negative for chills and fever     Skin:        Lesion as in HPI       Active Problem List     Patient Active Problem List   Diagnosis    Herpes simplex type 1 infection    Hyperlipidemia    Essential hypertension    Idiopathic angioedema    Cutaneous skin tags    Actinic keratosis    Equinus contracture of ankle    Type 2 diabetes mellitus without complication, without long-term current use of insulin (HCC)    Malignant neoplasm of prostate ([address] )         Social History:  Social History     Socioeconomic History    Marital status: Single     Spouse name: Not on file    Number of children: Not on file    Years of education: Not on file    Highest education level: Not on file   Occupational History    Occupation: Work history - travels a lot   Social Needs    Financial resource strain: Not on file    Food insecurity:     Worry: Not on file     Inability: Not on file    Transportation needs:     Medical: Not on file     Non-medical: Not on file   Tobacco Use    Smoking status: Former Smoker    Smokeless tobacco: Never Used   Substance and Sexual Activity    Alcohol use: Yes     Alcohol/week: 2 0 - 3 0 standard drinks     Types: 2 - 3 Cans of beer per week     Comment: social    Drug use: No    Sexual activity: Not on file   Lifestyle    Physical activity:     Days per week: Not on file     Minutes per session: Not on file    Stress: Not on file   Relationships    Social connections:     Talks on phone: Not on file     Gets together: Not on file     Attends Church service: Not on file     Active member of club or organization: Not on file     Attends meetings of clubs or organizations: Not on file     Relationship status: Not on file    Intimate partner violence:     Fear of current or ex partner: Not on file     Emotionally abused: Not on file     Physically abused: Not on file     Forced sexual activity: Not on file   Other Topics Concern    Not on file   Social History Narrative    Engages in travel abroad    Living independently alone'       Objective     Vitals:    07/25/19 1044   BP: 116/84   BP Location: Left arm   Patient Position: Sitting   Cuff Size: Large   Pulse: 66   Temp: 97 6 °F (36 4 °C)   SpO2: 97%   Weight: 99 1 kg (218 lb 8 oz)   Height: 5' 10" (1 778 m)     Wt Readings from Last 3 Encounters:   07/25/19 99 1 kg (218 lb 8 oz)   07/08/19 98 kg (216 lb)   04/26/19 99 4 kg (219 lb 3 2 oz)       Physical Exam   Constitutional: He appears well-developed and well-nourished  No distress  Cardiovascular: Normal rate, regular rhythm, normal heart sounds and intact distal pulses  No murmur heard  Pulmonary/Chest: Effort normal and breath sounds normal  He has no wheezes  He has no rales     Musculoskeletal: He exhibits no edema  Skin: Skin is warm and dry  Cystic mass in the left gluteal cleft about 1 5 cm in diameter with overlying erythema and slight central pustule noted - no drainage or warmth noted   Psychiatric: He has a normal mood and affect  Vitals reviewed  Pertinent Laboratory/Diagnostic Studies:  Results for orders placed or performed in visit on 07/08/19   POCT hemoglobin A1c   Result Value Ref Range    Hemoglobin A1C 6 8 (A) 6 5           ALLERGIES:  Allergies   Allergen Reactions    Captopril Cough    Enalapril Cough       Current Medications     Current Outpatient Medications   Medication Sig Dispense Refill    atorvastatin (LIPITOR) 40 mg tablet TAKE 1 TABLET BY MOUTH EVERY DAY 90 tablet 3    LUMIGAN 0 01 % ophthalmic drops INSTILL 1 DROP INTO BOTH EYES AT BEDTIME  3    metFORMIN (GLUCOPHAGE) 1000 MG tablet Take 1 tablet (1,000 mg total) by mouth 2 (two) times a day with meals 180 tablet 3    olmesartan-hydrochlorothiazide (BENICAR HCT) 40-12 5 MG per tablet Take 1 tablet by mouth daily 90 tablet 3    cephalexin (KEFLEX) 500 mg capsule Take 1 capsule (500 mg total) by mouth every 6 (six) hours for 10 days 40 capsule 0     No current facility-administered medications for this visit            Steph Pardo PA-C  7/25/2019 11:27 AM  [facility]

## 2019-07-25 NOTE — PATIENT INSTRUCTIONS
Problem List Items Addressed This Visit     None      Visit Diagnoses     Inguinal abscess    -  Primary    Start Keflex 500 mg every 6 hours x 10 days  Apply warm compresses 3 times a day for 10-15 mintues  Declined I&D today  Call if worsens or persists      Relevant Medications    cephalexin (KEFLEX) 500 mg capsule

## 2019-09-09 DIAGNOSIS — E78.5 HYPERLIPIDEMIA, UNSPECIFIED HYPERLIPIDEMIA TYPE: ICD-10-CM

## 2019-09-09 RX ORDER — ATORVASTATIN CALCIUM 40 MG/1
TABLET, FILM COATED ORAL
Qty: 30 TABLET | Refills: 5 | Status: SHIPPED | OUTPATIENT
Start: 2019-09-09 | End: 2020-03-16 | Stop reason: SDUPTHER

## 2019-12-30 ENCOUNTER — OFFICE VISIT (OUTPATIENT)
Dept: URGENT CARE | Facility: MEDICAL CENTER | Age: 64
End: 2019-12-30
Payer: COMMERCIAL

## 2019-12-30 VITALS
DIASTOLIC BLOOD PRESSURE: 85 MMHG | WEIGHT: 220 LBS | HEART RATE: 74 BPM | BODY MASS INDEX: 31.5 KG/M2 | OXYGEN SATURATION: 96 % | SYSTOLIC BLOOD PRESSURE: 145 MMHG | RESPIRATION RATE: 20 BRPM | TEMPERATURE: 96.9 F | HEIGHT: 70 IN

## 2019-12-30 DIAGNOSIS — B02.9 HERPES ZOSTER WITHOUT COMPLICATION: Primary | ICD-10-CM

## 2019-12-30 PROCEDURE — G0382 LEV 3 HOSP TYPE B ED VISIT: HCPCS | Performed by: FAMILY MEDICINE

## 2019-12-30 RX ORDER — VALACYCLOVIR HYDROCHLORIDE 1 G/1
1000 TABLET, FILM COATED ORAL 3 TIMES DAILY
Qty: 21 TABLET | Refills: 0 | Status: SHIPPED | OUTPATIENT
Start: 2019-12-30 | End: 2020-08-04 | Stop reason: ALTCHOICE

## 2019-12-30 NOTE — PROGRESS NOTES
[facility]        NAME: Nyasia Mccollum is a 71 y o  male  : 1955    MRN: [id number]  DATE: 2019  TIME: 6:17 PM    Assessment and Plan   Herpes zoster without complication [S37 9]  1  Herpes zoster without complication  valACYclovir (VALTREX) 1,000 mg tablet         Patient Instructions       Follow up with PCP in 3-5 days  Proceed to  ER if symptoms worsen  Chief Complaint     Chief Complaint   Patient presents with    Hand Swelling     Patient states he noticed blisters on his L middle finger  Patient states it itches         History of Present Illness       70-year-old male here today with blistering rash of the left middle finger and a since yesterday  It is slightly tender to touch  Slightly pruritic  Prior to rash appearing, he has some slight pruritus  Denies any fever chills  Describes childhood history of chickenpox  However, patient informs he has received both Zostrix in the past and Shingrix most recently  Review of Systems   Review of Systems   Gastrointestinal: Negative for abdominal distention  Skin: Positive for rash           Current Medications       Current Outpatient Medications:     atorvastatin (LIPITOR) 40 mg tablet, TAKE 1 TABLET BY MOUTH EVERY DAY, Disp: 30 tablet, Rfl: 5    LUMIGAN 0 01 % ophthalmic drops, INSTILL 1 DROP INTO BOTH EYES AT BEDTIME, Disp: , Rfl: 3    metFORMIN (GLUCOPHAGE) 1000 MG tablet, Take 1 tablet (1,000 mg total) by mouth 2 (two) times a day with meals, Disp: 180 tablet, Rfl: 3    olmesartan-hydrochlorothiazide (BENICAR HCT) 40-12 5 MG per tablet, Take 1 tablet by mouth daily, Disp: 90 tablet, Rfl: 3    valACYclovir (VALTREX) 1,000 mg tablet, Take 1 tablet (1,000 mg total) by mouth 3 (three) times a day for 7 days, Disp: 21 tablet, Rfl: 0    Current Allergies     Allergies as of 2019 - Reviewed 2019   Allergen Reaction Noted    Captopril Cough 2012    Enalapril Cough 2012            The following portions of the patient's history were reviewed and updated as appropriate: allergies, current medications, past family history, past medical history, past social history, past surgical history and problem list      Past Medical History:   Diagnosis Date    Diabetes mellitus ([address] )     Hypertension        History reviewed  No pertinent surgical history  Family History   Problem Relation Age of Onset    No Known Problems Brother     Stroke Mother          Medications have been verified  Objective   /85   Pulse 74   Temp (!) 96 9 °F (36 1 °C)   Resp 20   Ht 5' 10" (1 778 m)   Wt 99 8 kg (220 lb)   SpO2 96%   BMI 31 57 kg/m²        Physical Exam     Physical Exam   Skin:   Left hand:  Dorsal aspect reveals cluster of blister erythematous borders at the base of the left middle finger consistent with shingles

## 2019-12-30 NOTE — PATIENT INSTRUCTIONS
I prescribed valacyclovir 1000 mg t i d  For 7 days  Advised patient to keep lesions covered until they of dried out  Follow-up PCP if symptoms persist or worsen  Shingles   WHAT YOU NEED TO KNOW:   Shingles is a painful infection caused by the same virus that causes chickenpox (varicella-zoster virus)  After you get chickenpox, the virus stays in your body for several years without causing any symptoms  Shingles occurs when the virus becomes active again  Once active, the virus will travel along a nerve to your skin and cause a rash  DISCHARGE INSTRUCTIONS:   Return to the emergency department if:   · You have painful, red, warm skin around the blisters, or the blisters drain pus  · Your neck is stiff or you have trouble moving it  · You have trouble moving your arms, legs, or face  · You have a seizure  · You have weakness in an arm or leg  · You become confused, or have difficulty speaking  · You have dizziness, a severe headache, hearing or vision loss  Contact your healthcare provider if:   · You feel weak or have a headache  · You have a cough, chills, or a fever  · You have abdominal pain, nausea, or vomiting  · Your rash becomes more itchy or painful  · Your rash spreads to other parts of your body  · Your pain worsens and does not go away even after taking medicine  · You have questions or concerns about your condition or care  Medicines:   · Antiviral medicine  helps decrease symptoms and healing time  They may also decrease your risk of developing nerve pain  You will need to start taking them within 3 days of the start of symptoms to prevent nerve pain  · Pain medicine  may be prescribed or suggested by your healthcare provider  You may need NSAIDs, acetaminophen, or opioid medicine depending on how much pain you are in  Do not wait until the pain is severe before you take more pain medicine       · Topical anesthetics  are used to numb the skin and decrease pain  They can be a cream, gel, spray, or patch  · Anticonvulsants  decrease nerve pain and may help you sleep at night  · Antidepressants  may be used to decrease nerve pain  · Take your medicine as directed  Contact your healthcare provider if you think your medicine is not helping or if you have side effects  Tell him of her if you are allergic to any medicine  Keep a list of the medicines, vitamins, and herbs you take  Include the amounts, and when and why you take them  Bring the list or the pill bottles to follow-up visits  Carry your medicine list with you in case of an emergency  Follow up with your healthcare provider as directed:  Write down your questions so you remember to ask them during your visits  Self-care:  Keep your rash clean and dry  Cover your rash with a bandage or clothing  Do not use bandages that stick to your skin  The sticky part may irritate your skin and make your rash last longer  Prevent the spread of shingles: The virus can be passed to a person who has never had chickenpox  This person may get chickenpox, but not shingles  You may pass the virus to others as long as you have a rash  The virus is spread by direct contact with the fluid from the blisters  Usually, you cannot spread the virus once the blisters dry up  Prevent shingles or another shingles outbreak:  A vaccine may be given to help prevent shingles  Ask for more information about this vaccine  © 2017 [employer] Information is for End User's use only and may not be sold, redistributed or otherwise used for commercial purposes  All illustrations and images included in CareNotes® are the copyrighted property of A [employer]  or Сергей Meier  The above information is an  only  It is not intended as medical advice for individual conditions or treatments   Talk to your doctor, nurse or pharmacist before following any medical regimen to see if it is safe and effective for you

## 2020-02-19 ENCOUNTER — OFFICE VISIT (OUTPATIENT)
Dept: FAMILY MEDICINE CLINIC | Facility: CLINIC | Age: 65
End: 2020-02-19
Payer: MEDICARE

## 2020-02-19 VITALS
SYSTOLIC BLOOD PRESSURE: 138 MMHG | BODY MASS INDEX: 30.49 KG/M2 | DIASTOLIC BLOOD PRESSURE: 86 MMHG | HEART RATE: 72 BPM | OXYGEN SATURATION: 95 % | RESPIRATION RATE: 18 BRPM | WEIGHT: 213 LBS | HEIGHT: 70 IN

## 2020-02-19 DIAGNOSIS — I10 ESSENTIAL HYPERTENSION: ICD-10-CM

## 2020-02-19 DIAGNOSIS — E11.9 TYPE 2 DIABETES MELLITUS WITHOUT COMPLICATION, WITHOUT LONG-TERM CURRENT USE OF INSULIN (HCC): Primary | ICD-10-CM

## 2020-02-19 DIAGNOSIS — C61 MALIGNANT NEOPLASM OF PROSTATE (HCC): ICD-10-CM

## 2020-02-19 DIAGNOSIS — E66.9 OBESITY, CLASS I, BMI 30-34.9: ICD-10-CM

## 2020-02-19 DIAGNOSIS — E11.8 TYPE 2 DIABETES MELLITUS WITH COMPLICATION (HCC): ICD-10-CM

## 2020-02-19 DIAGNOSIS — C61 PROSTATE CANCER (HCC): ICD-10-CM

## 2020-02-19 PROBLEM — M24.573 EQUINUS CONTRACTURE OF ANKLE: Status: RESOLVED | Noted: 2017-08-14 | Resolved: 2020-02-19

## 2020-02-19 LAB — SL AMB POCT HEMOGLOBIN AIC: 7.2 (ref ?–6.5)

## 2020-02-19 PROCEDURE — 90682 RIV4 VACC RECOMBINANT DNA IM: CPT

## 2020-02-19 PROCEDURE — 99214 OFFICE O/P EST MOD 30 MIN: CPT

## 2020-02-19 PROCEDURE — G0008 ADMIN INFLUENZA VIRUS VAC: HCPCS

## 2020-02-19 PROCEDURE — 3051F HG A1C>EQUAL 7.0%<8.0%: CPT

## 2020-02-19 PROCEDURE — 83036 HEMOGLOBIN GLYCOSYLATED A1C: CPT

## 2020-02-19 PROCEDURE — 4040F PNEUMOC VAC/ADMIN/RCVD: CPT

## 2020-02-19 PROCEDURE — 3079F DIAST BP 80-89 MM HG: CPT

## 2020-02-19 PROCEDURE — 3075F SYST BP GE 130 - 139MM HG: CPT

## 2020-02-19 PROCEDURE — 1036F TOBACCO NON-USER: CPT

## 2020-02-19 NOTE — ASSESSMENT & PLAN NOTE
Lab Results   Component Value Date    HGBA1C 7 2 (A) 02/19/2020    diabetic control has worsened just a bit with an A1c of 7 2  He will get some blood work done about 4 months and I will see him back in 4-6 months  Continue with metformin  Watch his carbohydrates

## 2020-02-19 NOTE — PROGRESS NOTES
Assessment/Plan:       Problem List Items Addressed This Visit        Endocrine    Type 2 diabetes mellitus ([place] [address] ) - Primary       Lab Results   Component Value Date    HGBA1C 7 2 (A) 02/19/2020    diabetic control has worsened just a bit with an A1c of 7 2  He will get some blood work done about 4 months and I will see him back in 4-6 months  Continue with metformin  Watch his carbohydrates  Relevant Medications    metFORMIN (GLUCOPHAGE) 1000 MG tablet    Other Relevant Orders    POCT hemoglobin A1c (Completed)    influenza vaccine, 7954-1500, quadrivalent, recombinant, PF, 0 5 mL, for patients 18 yr+ (FLUBLOK) (Completed)    Lipid Panel with Direct LDL reflex    Hemoglobin A1C    Microalbumin / creatinine urine ratio       Cardiovascular and Mediastinum    Hypertension    Relevant Orders    CBC and differential    Comprehensive metabolic panel       Genitourinary    Malignant neoplasm of prostate ([place] [address] )      follow-up at [facility]           Other Visit Diagnoses     Obesity, Class I, BMI 30-34 9        Relevant Orders    Lipid Panel with Direct LDL reflex    Type 2 diabetes mellitus with complication (HCC)        Relevant Medications    metFORMIN (GLUCOPHAGE) 1000 MG tablet    Other Relevant Orders    Hemoglobin A1C    Prostate cancer ([place] [address] )            BMI Counseling: Body mass index is 30 56 kg/m²  The BMI is above normal  Nutrition recommendations include encouraging healthy choices of fruits and vegetables  Exercise recommendations include moderate physical activity 150 minutes/week  Subjective:      Patient ID: Romie Jimenez is a 71 y o  male  HPI     The patient presents today for follow-up for diabetes  Fortunately, the A1c is well controlled  There have been no significant episodes of hypo or hyperglycemia  The patient is not experiencing any blurry vision, polyuria, polydipsia, or peripheral neuropathy symptoms  Patient remains compliant with medications and routine follow-up  The patient presents today for a hypertension follow-up  Patient remains compliant with medications and denies any chest pain, shortness of breath, palpitations, lightheadedness or diaphoresis  He has history of angioedema but has had no recent recurrences  He has history of prostate cancer and is following at [facility]  He has history history of hyperlipidemia and denies myalgias from statin therapy    The following portions of the patient's history were reviewed and updated as appropriate: allergies, current medications, past family history, past medical history, past social history, past surgical history and problem list       Current Outpatient Medications:     atorvastatin (LIPITOR) 40 mg tablet, TAKE 1 TABLET BY MOUTH EVERY DAY, Disp: 30 tablet, Rfl: 5    LUMIGAN 0 01 % ophthalmic drops, INSTILL 1 DROP INTO BOTH EYES AT BEDTIME, Disp: , Rfl: 3    metFORMIN (GLUCOPHAGE) 1000 MG tablet, Take 1 tablet (1,000 mg total) by mouth 2 (two) times a day with meals, Disp: 180 tablet, Rfl: 3    olmesartan-hydrochlorothiazide (BENICAR HCT) 40-12 5 MG per tablet, Take 1 tablet by mouth daily, Disp: 90 tablet, Rfl: 3    valACYclovir (VALTREX) 1,000 mg tablet, Take 1 tablet (1,000 mg total) by mouth 3 (three) times a day for 7 days, Disp: 21 tablet, Rfl: 0     Review of Systems   Constitutional: Negative for appetite change, chills, fatigue, fever and unexpected weight change  HENT: Negative for trouble swallowing  Eyes: Negative for visual disturbance  Respiratory: Negative for cough, chest tightness, shortness of breath and wheezing  Cardiovascular: Negative for chest pain  Gastrointestinal: Negative for abdominal distention, abdominal pain, blood in stool, constipation and diarrhea  Endocrine: Negative for polyuria  Genitourinary: Negative for difficulty urinating and flank pain  Musculoskeletal: Negative for arthralgias and myalgias  Skin: Negative for rash     Neurological: Negative for dizziness and light-headedness  Hematological: Negative for adenopathy  Does not bruise/bleed easily  Psychiatric/Behavioral: Negative for sleep disturbance  Objective:      /86   Pulse 72   Resp 18   Ht 5' 10" (1 778 m)   Wt 96 6 kg (213 lb)   SpO2 95%   BMI 30 56 kg/m²          Physical Exam   Constitutional: He is oriented to person, place, and time  He appears well-developed and well-nourished  No distress  HENT:   Head: Normocephalic and atraumatic  Right Ear: External ear normal    Left Ear: External ear normal    Mouth/Throat: Oropharynx is clear and moist  No oropharyngeal exudate  Eyes: Pupils are equal, round, and reactive to light  EOM are normal  Right eye exhibits no discharge  Left eye exhibits no discharge  No scleral icterus  Neck: Normal carotid pulses present  Carotid bruit is not present  No tracheal deviation, no edema and no erythema present  No thyromegaly present  Cardiovascular: Normal rate, regular rhythm and normal heart sounds  Exam reveals no gallop  No murmur heard  Pulmonary/Chest: Effort normal  No stridor  No tachypnea  No respiratory distress  He has no wheezes  He has no rales  Abdominal: Soft  Bowel sounds are normal  He exhibits no distension and no mass  There is no hepatosplenomegaly  There is no tenderness  There is no rebound, no guarding and no CVA tenderness  Musculoskeletal: Normal range of motion  He exhibits no edema, tenderness or deformity  Lymphadenopathy:     He has no cervical adenopathy  Neurological: He is alert and oriented to person, place, and time  He displays normal reflexes  No cranial nerve deficit  He exhibits normal muscle tone  Coordination normal    Skin: Skin is warm  No rash noted  He is not diaphoretic  No erythema  No pallor  Psychiatric: His speech is normal and behavior is normal  Judgment and thought content normal  His mood appears not anxious   Cognition and memory are normal  He does not exhibit a depressed mood           Elkin Gauthier MD

## 2020-03-16 DIAGNOSIS — E78.5 HYPERLIPIDEMIA, UNSPECIFIED HYPERLIPIDEMIA TYPE: ICD-10-CM

## 2020-03-16 RX ORDER — ATORVASTATIN CALCIUM 40 MG/1
40 TABLET, FILM COATED ORAL DAILY
Qty: 90 TABLET | Refills: 3 | Status: SHIPPED | OUTPATIENT
Start: 2020-03-16 | End: 2021-03-29

## 2020-06-16 LAB
ALBUMIN SERPL-MCNC: 4.5 G/DL (ref 3.6–5.1)
ALBUMIN/CREAT UR: 16 MCG/MG CREAT
ALBUMIN/GLOB SERPL: 1.5 (CALC) (ref 1–2.5)
ALP SERPL-CCNC: 58 U/L (ref 35–144)
ALT SERPL-CCNC: 24 U/L (ref 9–46)
AST SERPL-CCNC: 15 U/L (ref 10–35)
BASOPHILS # BLD AUTO: 72 CELLS/UL (ref 0–200)
BASOPHILS NFR BLD AUTO: 1.1 %
BILIRUB SERPL-MCNC: 0.3 MG/DL (ref 0.2–1.2)
BUN SERPL-MCNC: 32 MG/DL (ref 7–25)
BUN/CREAT SERPL: 31 (CALC) (ref 6–22)
CALCIUM SERPL-MCNC: 9.8 MG/DL (ref 8.6–10.3)
CHLORIDE SERPL-SCNC: 105 MMOL/L (ref 98–110)
CHOLEST SERPL-MCNC: 159 MG/DL
CHOLEST/HDLC SERPL: 4 (CALC)
CO2 SERPL-SCNC: 26 MMOL/L (ref 20–32)
CREAT SERPL-MCNC: 1.02 MG/DL (ref 0.7–1.25)
CREAT UR-MCNC: 114 MG/DL (ref 20–320)
EOSINOPHIL # BLD AUTO: 163 CELLS/UL (ref 15–500)
EOSINOPHIL NFR BLD AUTO: 2.5 %
ERYTHROCYTE [DISTWIDTH] IN BLOOD BY AUTOMATED COUNT: 12.7 % (ref 11–15)
GLOBULIN SER CALC-MCNC: 3 G/DL (CALC) (ref 1.9–3.7)
GLUCOSE SERPL-MCNC: 191 MG/DL (ref 65–99)
HBA1C MFR BLD: 7.7 % OF TOTAL HGB
HCT VFR BLD AUTO: 41.9 % (ref 38.5–50)
HDLC SERPL-MCNC: 40 MG/DL
HGB BLD-MCNC: 13.9 G/DL (ref 13.2–17.1)
LDLC SERPL CALC-MCNC: 101 MG/DL (CALC)
LYMPHOCYTES # BLD AUTO: 1476 CELLS/UL (ref 850–3900)
LYMPHOCYTES NFR BLD AUTO: 22.7 %
MCH RBC QN AUTO: 30.8 PG (ref 27–33)
MCHC RBC AUTO-ENTMCNC: 33.2 G/DL (ref 32–36)
MCV RBC AUTO: 92.7 FL (ref 80–100)
MICROALBUMIN UR-MCNC: 1.8 MG/DL
MONOCYTES # BLD AUTO: 501 CELLS/UL (ref 200–950)
MONOCYTES NFR BLD AUTO: 7.7 %
NEUTROPHILS # BLD AUTO: 4290 CELLS/UL (ref 1500–7800)
NEUTROPHILS NFR BLD AUTO: 66 %
NONHDLC SERPL-MCNC: 119 MG/DL (CALC)
PLATELET # BLD AUTO: 275 THOUSAND/UL (ref 140–400)
PMV BLD REES-ECKER: 10.6 FL (ref 7.5–12.5)
POTASSIUM SERPL-SCNC: 4.2 MMOL/L (ref 3.5–5.3)
PROT SERPL-MCNC: 7.5 G/DL (ref 6.1–8.1)
RBC # BLD AUTO: 4.52 MILLION/UL (ref 4.2–5.8)
SL AMB EGFR AFRICAN AMERICAN: 89 ML/MIN/1.73M2
SL AMB EGFR NON AFRICAN AMERICAN: 77 ML/MIN/1.73M2
SODIUM SERPL-SCNC: 142 MMOL/L (ref 135–146)
TRIGL SERPL-MCNC: 88 MG/DL
WBC # BLD AUTO: 6.5 THOUSAND/UL (ref 3.8–10.8)

## 2020-07-17 DIAGNOSIS — I10 ESSENTIAL HYPERTENSION: ICD-10-CM

## 2020-07-17 RX ORDER — OLMESARTAN MEDOXOMIL AND HYDROCHLOROTHIAZIDE 40/12.5 40; 12.5 MG/1; MG/1
1 TABLET ORAL DAILY
Qty: 90 TABLET | Refills: 3 | Status: SHIPPED | OUTPATIENT
Start: 2020-07-17 | End: 2021-07-06

## 2020-08-04 ENCOUNTER — OFFICE VISIT (OUTPATIENT)
Dept: FAMILY MEDICINE CLINIC | Facility: CLINIC | Age: 65
End: 2020-08-04
Payer: MEDICARE

## 2020-08-04 VITALS
SYSTOLIC BLOOD PRESSURE: 100 MMHG | OXYGEN SATURATION: 98 % | HEART RATE: 76 BPM | HEIGHT: 70 IN | TEMPERATURE: 97.1 F | WEIGHT: 210 LBS | DIASTOLIC BLOOD PRESSURE: 60 MMHG | BODY MASS INDEX: 30.06 KG/M2 | RESPIRATION RATE: 18 BRPM

## 2020-08-04 DIAGNOSIS — I10 ESSENTIAL HYPERTENSION: ICD-10-CM

## 2020-08-04 DIAGNOSIS — E11.9 TYPE 2 DIABETES MELLITUS WITHOUT COMPLICATION, WITHOUT LONG-TERM CURRENT USE OF INSULIN (HCC): ICD-10-CM

## 2020-08-04 DIAGNOSIS — L20.84 INTRINSIC ECZEMA: ICD-10-CM

## 2020-08-04 DIAGNOSIS — E78.2 MIXED HYPERLIPIDEMIA: ICD-10-CM

## 2020-08-04 DIAGNOSIS — C61 MALIGNANT NEOPLASM OF PROSTATE (HCC): ICD-10-CM

## 2020-08-04 DIAGNOSIS — Z12.11 COLON CANCER SCREENING: ICD-10-CM

## 2020-08-04 DIAGNOSIS — Z00.00 MEDICARE ANNUAL WELLNESS VISIT, SUBSEQUENT: Primary | ICD-10-CM

## 2020-08-04 DIAGNOSIS — E66.9 OBESITY, CLASS I, BMI 30-34.9: ICD-10-CM

## 2020-08-04 PROBLEM — L82.1 SK (SEBORRHEIC KERATOSIS): Status: ACTIVE | Noted: 2020-08-04

## 2020-08-04 PROCEDURE — 4040F PNEUMOC VAC/ADMIN/RCVD: CPT | Performed by: FAMILY MEDICINE

## 2020-08-04 PROCEDURE — 3074F SYST BP LT 130 MM HG: CPT | Performed by: FAMILY MEDICINE

## 2020-08-04 PROCEDURE — 1125F AMNT PAIN NOTED PAIN PRSNT: CPT | Performed by: FAMILY MEDICINE

## 2020-08-04 PROCEDURE — 3078F DIAST BP <80 MM HG: CPT | Performed by: FAMILY MEDICINE

## 2020-08-04 PROCEDURE — G0439 PPPS, SUBSEQ VISIT: HCPCS | Performed by: FAMILY MEDICINE

## 2020-08-04 PROCEDURE — 1036F TOBACCO NON-USER: CPT | Performed by: FAMILY MEDICINE

## 2020-08-04 PROCEDURE — 99214 OFFICE O/P EST MOD 30 MIN: CPT | Performed by: FAMILY MEDICINE

## 2020-08-04 PROCEDURE — 3008F BODY MASS INDEX DOCD: CPT | Performed by: FAMILY MEDICINE

## 2020-08-04 PROCEDURE — 1123F ACP DISCUSS/DSCN MKR DOCD: CPT | Performed by: FAMILY MEDICINE

## 2020-08-04 PROCEDURE — 1160F RVW MEDS BY RX/DR IN RCRD: CPT | Performed by: FAMILY MEDICINE

## 2020-08-04 PROCEDURE — 3051F HG A1C>EQUAL 7.0%<8.0%: CPT | Performed by: FAMILY MEDICINE

## 2020-08-04 PROCEDURE — 1170F FXNL STATUS ASSESSED: CPT | Performed by: FAMILY MEDICINE

## 2020-08-04 NOTE — PROGRESS NOTES
Assessment and Plan:     Problem List Items Addressed This Visit        Endocrine    Type 2 diabetes mellitus ([facility] )      Other Visit Diagnoses     Medicare annual wellness visit, subsequent    -  Primary    Obesity, Class I, BMI 30-34 9        Colon cancer screening        Relevant Orders    Cologuard      Patient presented for Medicare wellness  Overall, he is stable  He screens negative for depression or cognitive decline  He completes all of his own ADLs  He did complete a 5 wishes for living will  He is up-to-date on vaccines  He is due for colon cancer screening which he will complete via Cologuard  He is followed routinely by Urology for history of prostate cancer     Preventive health issues were discussed with patient, and age appropriate screening tests were ordered as noted in patient's After Visit Summary  Personalized health advice and appropriate referrals for health education or preventive services given if needed, as noted in patient's After Visit Summary  History of Present Illness:     Patient presents for Medicare Annual Wellness visit    Patient Care Team:  Rubens Craven MD as PCP - General (Family Medicine)  Calton Mcburney (Optometry)     Problem List:     Patient Active Problem List   Diagnosis    Herpes simplex type 1 infection    Hyperlipidemia    Hypertension    Idiopathic angioedema    Cutaneous skin tags    Actinic keratosis    Type 2 diabetes mellitus ([facility] )    Malignant neoplasm of prostate [facility])      Past Medical and Surgical History:     Past Medical History:   Diagnosis Date    Diabetes mellitus ([facility] )     Hypertension      History reviewed  No pertinent surgical history     Family History:     Family History   Problem Relation Age of Onset    No Known Problems Brother     Stroke Mother       Social History:        Social History     Socioeconomic History    Marital status: Single     Spouse name: None    Number of children: None    Years of education: None    Highest education level: None   Occupational History    Occupation: Work history - travels a lot   Social Needs    Financial resource strain: None    Food insecurity     Worry: None     Inability: None    Transportation needs     Medical: None     Non-medical: None   Tobacco Use    Smoking status: Former Smoker    Smokeless tobacco: Never Used   Substance and Sexual Activity    Alcohol use: Yes     Alcohol/week: 2 0 - 3 0 standard drinks     Types: 2 - 3 Cans of beer per week     Comment: social    Drug use: No    Sexual activity: None   Lifestyle    Physical activity     Days per week: None     Minutes per session: None    Stress: None   Relationships    Social connections     Talks on phone: None     Gets together: None     Attends Taoist service: None     Active member of club or organization: None     Attends meetings of clubs or organizations: None     Relationship status: None    Intimate partner violence     Fear of current or ex partner: None     Emotionally abused: None     Physically abused: None     Forced sexual activity: None   Other Topics Concern    None   Social History Narrative    Engages in travel abroad    Living independently alone'      Medications and Allergies:     Current Outpatient Medications   Medication Sig Dispense Refill    atorvastatin (LIPITOR) 40 mg tablet Take 1 tablet (40 mg total) by mouth daily 90 tablet 3    LUMIGAN 0 01 % ophthalmic drops INSTILL 1 DROP INTO BOTH EYES AT BEDTIME  3    metFORMIN (GLUCOPHAGE) 1000 MG tablet Take 1 tablet (1,000 mg total) by mouth 2 (two) times a day with meals 180 tablet 3    olmesartan-hydrochlorothiazide (BENICAR HCT) 40-12 5 MG per tablet TAKE 1 TABLET BY MOUTH DAILY 90 tablet 3    valACYclovir (VALTREX) 1,000 mg tablet Take 1 tablet (1,000 mg total) by mouth 3 (three) times a day for 7 days 21 tablet 0     No current facility-administered medications for this visit        Allergies   Allergen Reactions    Captopril Cough    Enalapril Cough      Immunizations:     Immunization History   Administered Date(s) Administered    Hep A, adult 04/24/2015, 10/29/2015    Hep A, ped/adol, 2 dose 03/07/2017    Influenza Quadrivalent Preservative Free 3 years and older IM 10/03/2014    Influenza, recombinant, quadrivalent,injectable, preservative free 01/02/2019, 02/19/2020    Pneumococcal Polysaccharide PPV23 07/08/2019    Tdap 02/11/1997    Zoster 07/24/2017    Zoster Vaccine Recombinant 01/14/2019, 03/20/2019      Health Maintenance:         Topic Date Due    Hepatitis C Screening  Completed         Topic Date Due    DTaP,Tdap,and Td Vaccines (2 - Td) 02/11/2007    Influenza Vaccine  07/01/2020      Medicare Health Risk Assessment:     /60   Pulse 76   Temp (!) 97 1 °F (36 2 °C)   Resp 18   Ht 5' 10"   Wt 95 3 kg (210 lb)   SpO2 98%   BMI 30 13 kg/m²      Elly Wood is here for his Subsequent Wellness visit  Health Risk Assessment:   Patient rates overall health as excellent  Patient feels that their physical health rating is same  Eyesight was rated as same  Hearing was rated as same  Patient feels that their emotional and mental health rating is same  Pain experienced in the last 7 days has been none  Patient states that he has experienced no weight loss or gain in last 6 months  Depression Screening:   PHQ-2 Score: 0      Fall Risk Screening: In the past year, patient has experienced: no history of falling in past year      Home Safety:  Patient does not have trouble with stairs inside or outside of their home  Patient has no working smoke alarms and has no working carbon monoxide detector  Home safety hazards include: none  Nutrition:   Current diet is Diabetic  Medications:   Patient is not currently taking any over-the-counter supplements  Patient is able to manage medications       Activities of Daily Living (ADLs)/Instrumental Activities of Daily Living (IADLs):   Walk and transfer into and out of bed and chair?: Yes  Dress and groom yourself?: Yes    Bathe or shower yourself?: Yes    Feed yourself? Yes  Do your laundry/housekeeping?: Yes  Manage your money, pay your bills and track your expenses?: Yes  Make your own meals?: Yes    Do your own shopping?: Yes    Previous Hospitalizations:   Any hospitalizations or ED visits within the last 12 months?: No      Advance Care Planning:   Living will: Yes    Durable POA for healthcare:  Yes    Advanced directive: Yes      Cognitive Screening:   Provider or family/friend/caregiver concerned regarding cognition?: No    PREVENTIVE SCREENINGS      Cardiovascular Screening:    General: Screening Not Indicated and History Lipid Disorder      Diabetes Screening:     General: Screening Not Indicated and History Diabetes      Colorectal Cancer Screening:     General: Risks and Benefits Discussed    Due for: Cologuard      Prostate Cancer Screening:    General: History Prostate Cancer      Osteoporosis Screening:    General: Screening Not Indicated      Abdominal Aortic Aneurysm (AAA) Screening:    Risk factors include: age between 73-69 yo and tobacco use        Lung Cancer Screening:     General: Screening Not Indicated      Hepatitis C Screening:    General: Screening Current      Kaylene Lan MD

## 2020-08-04 NOTE — ASSESSMENT & PLAN NOTE
He has some a case that her persistent  Hold on dermatology evaluation for now  Continue to monitor

## 2020-08-04 NOTE — PROGRESS NOTES
Assessment/Plan:       Problem List Items Addressed This Visit        Endocrine    Type 2 diabetes mellitus ([facility] )    Relevant Orders    Hemoglobin A1C       Cardiovascular and Mediastinum    Essential hypertension    Relevant Orders    CBC and differential    Comprehensive metabolic panel    Lipid Panel with Direct LDL reflex       Musculoskeletal and Integument    Intrinsic eczema     Patient has what appears to be eczema on in particular his right hand  I would suggest a moisturizing agents such as Eucerin chronically and over-the-counter hydrocortisone as needed  If this is getting worse, he should contact me and I can send in a prescription cortisone cream             Genitourinary    Malignant neoplasm of prostate ([facility] [address] )     Continue at [facility]  Other    Mixed hyperlipidemia    Relevant Orders    Comprehensive metabolic panel    Lipid Panel with Direct LDL reflex      Other Visit Diagnoses     Medicare annual wellness visit, subsequent    -  Primary    Obesity, Class I, BMI 30-34 9        Colon cancer screening        Relevant Orders    Cologuard          BMI Counseling: Body mass index is 30 13 kg/m²  The BMI is above normal  Nutrition recommendations include encouraging healthy choices of fruits and vegetables  Exercise recommendations include moderate physical activity 150 minutes/week  Subjective:      Patient ID: Haylee Wang is a 76 y o  male  HPI   Patient presents today for type 2 diabetes  A1c has trended up a bit to 7 7  He has been compliant with metformin  He does not always exercise admits to occasional dietary indiscretions  The patient presents today for a hypertension follow-up  Patient remains compliant with medications and denies any chest pain, shortness of breath, palpitations, lightheadedness or diaphoresis  He has hyperlipidemia and remains on statin therapy  He has history of prostate cancer  He follows routinely at [facility]    He notes he is voiding relatively well  He has had no significant issues recently  He is almost 1 year status post radiation  He is having some scaling of his hands in particular his right hand currently  He notes this gets worse during winter time  It is mildly itchy  The following portions of the patient's history were reviewed and updated as appropriate: allergies, current medications, past family history, past medical history, past social history, past surgical history and problem list       Current Outpatient Medications:     atorvastatin (LIPITOR) 40 mg tablet, Take 1 tablet (40 mg total) by mouth daily, Disp: 90 tablet, Rfl: 3    LUMIGAN 0 01 % ophthalmic drops, INSTILL 1 DROP INTO BOTH EYES AT BEDTIME, Disp: , Rfl: 3    metFORMIN (GLUCOPHAGE) 1000 MG tablet, Take 1 tablet (1,000 mg total) by mouth 2 (two) times a day with meals, Disp: 180 tablet, Rfl: 3    olmesartan-hydrochlorothiazide (BENICAR HCT) 40-12 5 MG per tablet, TAKE 1 TABLET BY MOUTH DAILY, Disp: 90 tablet, Rfl: 3     Review of Systems   Constitutional: Negative for appetite change, chills, fatigue, fever and unexpected weight change  HENT: Negative for trouble swallowing  Eyes: Negative for visual disturbance  Respiratory: Negative for cough, chest tightness, shortness of breath and wheezing  Cardiovascular: Negative for chest pain, palpitations and leg swelling  Gastrointestinal: Negative for abdominal distention, abdominal pain, blood in stool, constipation and diarrhea  Endocrine: Negative for polyuria  Genitourinary: Negative for difficulty urinating and flank pain  Musculoskeletal: Negative for arthralgias and myalgias  Skin: Negative for rash  He has a sensation of tingling and discomfort over his right distal kneecap/patellar tendon when kneeling for prolonged period  Neurological: Negative for dizziness and light-headedness  Hematological: Negative for adenopathy  Does not bruise/bleed easily  Psychiatric/Behavioral: Negative for sleep disturbance  Objective:      /60   Pulse 76   Temp (!) 97 1 °F (36 2 °C)   Resp 18   Ht 5' 10"   Wt 95 3 kg (210 lb)   SpO2 98%   BMI 30 13 kg/m²          Physical Exam   Constitutional: He is oriented to person, place, and time  He appears well-developed  No distress  HENT:   Head: Normocephalic  Eyes: Pupils are equal, round, and reactive to light  Right eye exhibits no discharge  Left eye exhibits no discharge  Neck: No tracheal deviation present  No thyromegaly present  Cardiovascular: Normal rate, regular rhythm and normal heart sounds  Pulses are no weak pulses  No murmur heard  Pulses:       Dorsalis pedis pulses are 2+ on the right side and 2+ on the left side  Posterior tibial pulses are 2+ on the right side and 2+ on the left side  Pulmonary/Chest: Effort normal  No respiratory distress  He has no wheezes  He has no rales  Abdominal: Soft  Bowel sounds are normal  He exhibits no distension  There is no abdominal tenderness  Musculoskeletal: Normal range of motion  Comments: He has no effusions in bilateral knees  He has full range of motion of the right knee  There is no tenderness over his patellar bursa or tendon  Feet:   Right Foot:   Skin Integrity: Negative for ulcer, skin breakdown, erythema, warmth, callus or dry skin  Left Foot:   Skin Integrity: Negative for ulcer, skin breakdown, erythema, warmth, callus or dry skin  Lymphadenopathy:     He has no cervical adenopathy  Neurological: He is alert and oriented to person, place, and time  No cranial nerve deficit  Skin: Skin is warm  He is not diaphoretic  No erythema  Psychiatric: Judgment and thought content normal          [place] Kimberley Araujo Charter, MDDiabetic Foot Exam    Patient's shoes and socks removed  Right Foot/Ankle   Right Foot Inspection  Skin Exam: skin normal and skin intact no dry skin, no warmth, no callus, no erythema, no maceration, no abnormal color, no pre-ulcer, no ulcer and no callus                          Toe Exam: ROM and strength within normal limits  Sensory   Vibration: intact  Proprioception: intact   Monofilament testing: intact  Vascular  Capillary refills: < 3 seconds  The right DP pulse is 2+  The right PT pulse is 2+  Left Foot/Ankle  Left Foot Inspection  Skin Exam: skin normal and skin intactno dry skin, no warmth, no erythema, no maceration, normal color, no pre-ulcer, no ulcer and no callus                         Toe Exam: ROM and strength within normal limits                   Sensory   Vibration: intact  Proprioception: intact  Monofilament: intact  Vascular  Capillary refills: < 3 seconds  The left DP pulse is 2+  The left PT pulse is 2+  Assign Risk Category:  No deformity present; No loss of protective sensation;  No weak pulses       Risk: 0

## 2020-08-04 NOTE — ASSESSMENT & PLAN NOTE
Patient has what appears to be eczema on in particular his right hand  I would suggest a moisturizing agents such as Eucerin chronically and over-the-counter hydrocortisone as needed    If this is getting worse, he should contact me and I can send in a prescription cortisone cream

## 2020-09-04 ENCOUNTER — TELEPHONE (OUTPATIENT)
Dept: FAMILY MEDICINE CLINIC | Facility: CLINIC | Age: 65
End: 2020-09-04

## 2020-12-16 LAB
ALBUMIN SERPL-MCNC: 4.3 G/DL (ref 3.6–5.1)
ALBUMIN/GLOB SERPL: 1.5 (CALC) (ref 1–2.5)
ALP SERPL-CCNC: 61 U/L (ref 35–144)
ALT SERPL-CCNC: 20 U/L (ref 9–46)
AST SERPL-CCNC: 12 U/L (ref 10–35)
BASOPHILS # BLD AUTO: 50 CELLS/UL (ref 0–200)
BASOPHILS NFR BLD AUTO: 0.8 %
BILIRUB SERPL-MCNC: 0.3 MG/DL (ref 0.2–1.2)
BUN SERPL-MCNC: 29 MG/DL (ref 7–25)
BUN/CREAT SERPL: 26 (CALC) (ref 6–22)
CALCIUM SERPL-MCNC: 9.9 MG/DL (ref 8.6–10.3)
CHLORIDE SERPL-SCNC: 103 MMOL/L (ref 98–110)
CHOLEST SERPL-MCNC: 148 MG/DL
CHOLEST/HDLC SERPL: 3.1 (CALC)
CO2 SERPL-SCNC: 27 MMOL/L (ref 20–32)
CREAT SERPL-MCNC: 1.12 MG/DL (ref 0.7–1.25)
EOSINOPHIL # BLD AUTO: 158 CELLS/UL (ref 15–500)
EOSINOPHIL NFR BLD AUTO: 2.5 %
ERYTHROCYTE [DISTWIDTH] IN BLOOD BY AUTOMATED COUNT: 13 % (ref 11–15)
GLOBULIN SER CALC-MCNC: 2.8 G/DL (CALC) (ref 1.9–3.7)
GLUCOSE SERPL-MCNC: 202 MG/DL (ref 65–99)
HBA1C MFR BLD: 9.9 % OF TOTAL HGB
HCT VFR BLD AUTO: 39.2 % (ref 38.5–50)
HDLC SERPL-MCNC: 47 MG/DL
HGB BLD-MCNC: 13 G/DL (ref 13.2–17.1)
LDLC SERPL CALC-MCNC: 86 MG/DL (CALC)
LYMPHOCYTES # BLD AUTO: 1733 CELLS/UL (ref 850–3900)
LYMPHOCYTES NFR BLD AUTO: 27.5 %
MCH RBC QN AUTO: 30.5 PG (ref 27–33)
MCHC RBC AUTO-ENTMCNC: 33.2 G/DL (ref 32–36)
MCV RBC AUTO: 92 FL (ref 80–100)
MONOCYTES # BLD AUTO: 473 CELLS/UL (ref 200–950)
MONOCYTES NFR BLD AUTO: 7.5 %
NEUTROPHILS # BLD AUTO: 3887 CELLS/UL (ref 1500–7800)
NEUTROPHILS NFR BLD AUTO: 61.7 %
NONHDLC SERPL-MCNC: 101 MG/DL (CALC)
PLATELET # BLD AUTO: 259 THOUSAND/UL (ref 140–400)
PMV BLD REES-ECKER: 10.9 FL (ref 7.5–12.5)
POTASSIUM SERPL-SCNC: 5 MMOL/L (ref 3.5–5.3)
PROT SERPL-MCNC: 7.1 G/DL (ref 6.1–8.1)
RBC # BLD AUTO: 4.26 MILLION/UL (ref 4.2–5.8)
SL AMB EGFR AFRICAN AMERICAN: 79 ML/MIN/1.73M2
SL AMB EGFR NON AFRICAN AMERICAN: 68 ML/MIN/1.73M2
SODIUM SERPL-SCNC: 138 MMOL/L (ref 135–146)
TRIGL SERPL-MCNC: 65 MG/DL
WBC # BLD AUTO: 6.3 THOUSAND/UL (ref 3.8–10.8)

## 2021-01-24 ENCOUNTER — OFFICE VISIT (OUTPATIENT)
Dept: URGENT CARE | Facility: MEDICAL CENTER | Age: 66
End: 2021-01-24
Payer: MEDICARE

## 2021-01-24 VITALS
WEIGHT: 210 LBS | SYSTOLIC BLOOD PRESSURE: 132 MMHG | HEART RATE: 89 BPM | RESPIRATION RATE: 20 BRPM | HEIGHT: 70 IN | BODY MASS INDEX: 30.06 KG/M2 | OXYGEN SATURATION: 98 % | DIASTOLIC BLOOD PRESSURE: 85 MMHG | TEMPERATURE: 96.1 F

## 2021-01-24 DIAGNOSIS — S61.001A AVULSION OF SKIN OF RIGHT THUMB, INITIAL ENCOUNTER: Primary | ICD-10-CM

## 2021-01-24 PROCEDURE — 90471 IMMUNIZATION ADMIN: CPT | Performed by: PHYSICIAN ASSISTANT

## 2021-01-24 PROCEDURE — 99213 OFFICE O/P EST LOW 20 MIN: CPT | Performed by: PHYSICIAN ASSISTANT

## 2021-01-24 PROCEDURE — G0463 HOSPITAL OUTPT CLINIC VISIT: HCPCS | Performed by: PHYSICIAN ASSISTANT

## 2021-01-24 PROCEDURE — 90715 TDAP VACCINE 7 YRS/> IM: CPT

## 2021-01-24 NOTE — PROGRESS NOTES
[facility]        NAME: Michel Man is a 76 y o  male  : 1955    MRN: [id number]  DATE: 2021  TIME: 3:14 PM    Assessment and Plan   Avulsion of skin of right thumb, initial encounter [S61 001A]  1  Avulsion of skin of right thumb, initial encounter  TDAP Vaccine greater than or equal to 6yo         Patient Instructions     Keep dressing on for the next 2 days and keep it dry  When taking wound off may have to soak dressing to get off  Follow up with PCP in 3-5 days  Proceed to  ER if symptoms worsen  Chief Complaint     Chief Complaint   Patient presents with    Finger Laceration     Patient state he cut the end of his finger with a utility knife  Area cleansed and patient instructed to apply pressure         History of Present Illness       78-year-old male presents with a right thumb injury  Patient reports he was using utility knife to cut some things and accidentally cut his right thumb  Denies any numbness or tingling  Not sure of last tetanus shot  No other injuries reported  Finger Laceration  This is a new problem  The current episode started today  The problem occurs constantly  The problem has been unchanged  Pertinent negatives include no fever, numbness, vomiting or weakness  Nothing aggravates the symptoms  He has tried nothing for the symptoms  The treatment provided no relief  Review of Systems   Review of Systems   Constitutional: Negative  Negative for fever  Respiratory: Negative  Cardiovascular: Negative  Gastrointestinal: Negative  Negative for vomiting  Musculoskeletal: Negative  Skin: Positive for wound  Neurological: Negative  Negative for weakness and numbness           Current Medications       Current Outpatient Medications:     atorvastatin (LIPITOR) 40 mg tablet, Take 1 tablet (40 mg total) by mouth daily, Disp: 90 tablet, Rfl: 3    LUMIGAN 0 01 % ophthalmic drops, INSTILL 1 DROP INTO BOTH EYES AT BEDTIME, Disp: , Rfl: 3    metFORMIN (GLUCOPHAGE) 1000 MG tablet, Take 1 tablet (1,000 mg total) by mouth 2 (two) times a day with meals, Disp: 180 tablet, Rfl: 3    olmesartan-hydrochlorothiazide (BENICAR HCT) 40-12 5 MG per tablet, TAKE 1 TABLET BY MOUTH DAILY, Disp: 90 tablet, Rfl: 3    Current Allergies     Allergies as of 01/24/2021 - Reviewed 01/24/2021   Allergen Reaction Noted    Captopril Cough 12/17/2012    Crestor [rosuvastatin] Diarrhea 08/04/2020    Enalapril Cough 12/17/2012            The following portions of the patient's history were reviewed and updated as appropriate: allergies, current medications, past family history, past medical history, past social history, past surgical history and problem list      Past Medical History:   Diagnosis Date    Diabetes mellitus ([facility] [address] )     Hypertension        History reviewed  No pertinent surgical history  Family History   Problem Relation Age of Onset    No Known Problems Brother     Stroke Mother          Medications have been verified  Objective   /85   Pulse 89   Temp (!) 96 1 °F (35 6 °C)   Resp 20   Ht 5' 10" (1 778 m)   Wt 95 3 kg (210 lb)   SpO2 98%   BMI 30 13 kg/m²   No LMP for male patient  Physical Exam     Physical Exam  Vitals signs and nursing note reviewed  Constitutional:       General: He is not in acute distress  Appearance: He is well-developed  HENT:      Head: Normocephalic and atraumatic  Right Ear: External ear normal       Left Ear: External ear normal       Nose: Nose normal    Eyes:      General:         Right eye: No discharge  Left eye: No discharge  Conjunctiva/sclera: Conjunctivae normal    Neck:      Musculoskeletal: Normal range of motion and neck supple  Cardiovascular:      Rate and Rhythm: Normal rate and regular rhythm  Pulmonary:      Effort: Pulmonary effort is normal  No respiratory distress  Musculoskeletal: Normal range of motion        Right hand: He exhibits laceration and swelling  He exhibits normal range of motion, no tenderness, no bony tenderness, normal two-point discrimination, normal capillary refill and no deformity  Normal sensation noted  Hands:    Skin:     General: Skin is warm and dry  Findings: Laceration present  Neurological:      Mental Status: He is alert and oriented to person, place, and time  Wound was cleaned thoroughly with dermal cleanser and sterile water  Surgical foam dressing was applied over wound and roll gauze was used to hold surgical foam in place with tape

## 2021-01-24 NOTE — PATIENT INSTRUCTIONS
Skin Avulsion   WHAT YOU NEED TO KNOW:   Skin avulsion is a wound that happens when skin is torn from your body during an accident or other injury  The torn skin may be lost or too damaged to be repaired, and it must be removed  A wound of this type cannot be stitched closed because there is tissue missing  Avulsion wounds are usually bigger and have more scars because of the missing tissue  DISCHARGE INSTRUCTIONS:   Medicines:   · Antibiotic ointment:  Your healthcare provider may tell you to gently rub a topical antibiotic ointment on your wound  This will help prevent an infection and help your wound heal faster  · Pain medicine: You may be given medicine to take away or decrease pain  Do not wait until the pain is severe before you take your medicine  · NSAIDs , such as ibuprofen, help decrease swelling, pain, and fever  This medicine is available with or without a doctor's order  NSAIDs can cause stomach bleeding or kidney problems in certain people  If you take blood thinner medicine, always ask if NSAIDs are safe for you  Always read the medicine label and follow directions  Do not give these medicines to children under 10months of age without direction from your child's healthcare provider  · Take your medicine as directed  Contact your healthcare provider if you think your medicine is not helping or if you have side effects  Tell him of her if you are allergic to any medicine  Keep a list of the medicines, vitamins, and herbs you take  Include the amounts, and when and why you take them  Bring the list or the pill bottles to follow-up visits  Carry your medicine list with you in case of an emergency  Care for your wound:  Avulsion wounds may take longer to heal because they cannot be closed with tape or stitches  Keep your wound clean and protected to prevent infection and speed healing  · Clean your wound:  Wash your hands with soap and water before and after you care for your wound   You may be able to use a soft cloth to gently clean the wound after the first 24 to 48 hours  After that, gently clean the wound once or twice a day with cool water  Do not soak your wound  Use soap to clean around the wound, but try not to get any on the wound itself  Do not use alcohol or hydrogen peroxide to clean your wound unless you are directed to  Gently pat the area dry and reapply the bandage as directed  · Elevate your wound:  Prop your injured area on pillows to raise it above the level of your heart  This will help reduce pain and swelling  Do this for 30 minutes at a time, as often as you can  · Bandage your wound:  Bandages keep your wound clean, dry, and protected from infection  They may also prevent swelling  Use a bandage that does not stick to your wound, and has a spongy layer to absorb fluids  Leave your bandage on as long as directed  Ask your healthcare provider when and how to change your bandage  Do not wrap the bandage too tightly  This could cut off blood flow and cause more injury  · Use cool compresses:  Wet a washcloth or towel with cool water and hold it on your wound as directed  Ask how often to apply the compress and for how long each time  · Reduce scarring:  Avoid direct sunlight on your wound  Sunlight may burn or change the color of the new skin over your wound  Use sunscreen (SPF 30 or higher) on the new skin for at least 1 year after it heals  Support for leg and arm wounds: You may need to use crutches if the wound is on your leg  You may need to use a sling if the wound is on your arm  Crutches and slings help protect the injured area, prevent further injury, and heal the area in the right position  Follow up with your healthcare provider within 2 days or as directed: If you have stitches, ask when to return to have them removed  Write down your questions so you remember to ask them during your visits     Contact your healthcare provider if:   · You have new pain, or it gets worse  · You have trouble moving the injured body area  · Your wound splits open or does not seem to be healing  Return to the emergency department if:   · You have a fever  · You have painful swelling, redness, or warmth around your wound  · Your wound is red and there are red streaks on your skin starting at your wound and moving upward  · Your wound is draining pus  · You have heavy bleeding or bleeding that does not stop after 10 minutes of holding firm, direct pressure over the wound  · You feel like there is an object stuck in your wound  © Copyright [address] Information is for End User's use only and may not be sold, redistributed or otherwise used for commercial purposes  All illustrations and images included in CareNotes® are the copyrighted property of A D A M , Inc  or Kyree Delgado  The above information is an  only  It is not intended as medical advice for individual conditions or treatments  Talk to your doctor, nurse or pharmacist before following any medical regimen to see if it is safe and effective for you

## 2021-02-02 ENCOUNTER — VBI (OUTPATIENT)
Dept: ADMINISTRATIVE | Facility: OTHER | Age: 66
End: 2021-02-02

## 2021-02-10 ENCOUNTER — OFFICE VISIT (OUTPATIENT)
Dept: FAMILY MEDICINE CLINIC | Facility: CLINIC | Age: 66
End: 2021-02-10
Payer: MEDICARE

## 2021-02-10 VITALS
BODY MASS INDEX: 30.21 KG/M2 | SYSTOLIC BLOOD PRESSURE: 140 MMHG | OXYGEN SATURATION: 97 % | HEIGHT: 70 IN | HEART RATE: 85 BPM | TEMPERATURE: 97.8 F | WEIGHT: 211 LBS | DIASTOLIC BLOOD PRESSURE: 80 MMHG

## 2021-02-10 DIAGNOSIS — C61 MALIGNANT NEOPLASM OF PROSTATE (HCC): ICD-10-CM

## 2021-02-10 DIAGNOSIS — E11.9 TYPE 2 DIABETES MELLITUS WITHOUT COMPLICATION, WITHOUT LONG-TERM CURRENT USE OF INSULIN (HCC): ICD-10-CM

## 2021-02-10 DIAGNOSIS — Z23 FLU VACCINE NEED: Primary | ICD-10-CM

## 2021-02-10 DIAGNOSIS — I10 ESSENTIAL HYPERTENSION: ICD-10-CM

## 2021-02-10 PROCEDURE — G0008 ADMIN INFLUENZA VIRUS VAC: HCPCS

## 2021-02-10 PROCEDURE — 99214 OFFICE O/P EST MOD 30 MIN: CPT | Performed by: FAMILY MEDICINE

## 2021-02-10 PROCEDURE — 90662 IIV NO PRSV INCREASED AG IM: CPT

## 2021-02-10 NOTE — PROGRESS NOTES
"12/19/2018     Visit #9       No ref. provider found      72 y.o.           Time in/Out:  5765-0881    ASSESS:    Vitals:    12/19/18 1031   Weight: 102.9 kg (226 lb 12.8 oz)   Height: 1.549 m (5' 1\")            Wt Readings from Last 2 Encounters:   12/19/18 102.9 kg (226 lb 12.8 oz)   12/10/18 102.8 kg (226 lb 11.2 oz)            Body mass index is 42.85 kg/m².       Difference:  + 0.2     Starting weight:  233.5 lbs     Difference:  - 6.7 lbs     Current Dietary/Exercise Habits:  Has not been walking because her wlaking partner is out of town and it is cold outside. Still tracking blood sugars. Weighed herself at home and her weight went up about 6 lbs. She realized it was probably because of the craft Dr. Pepper she had been drinking more often. It did not cause her blood sugar to go up though. Has restarted [employer], feels her blood sugars are much better controlled on it. Has not looked into a gym. More interested in using her hand/foot indoor bike at home. Feels limited if she were to go to the gym because of back pain and she isn't sure what exercises she could do there.     Estimated Stage of Change:    Preparation as evidenced by considering starting exercise, but not ready to commit right away.      ADVISE:      Physical Activity:  Ragini has committed to using her indoor exercise bike for 5 minutes 3x a week starting with one day a week until the new year. On January 1st she is going to increase that to 3 days of the week.      Dietary Guidelines:  Encouraged Ragini to avoid sugar sweetened beverages and choose diet when at all possible and limiting these to only special occasions, but not a daily event. Although they did not cause her blood sugar to rise she is aware that they contribute to her weight gain and will not be beneficial for helping her to meet her weight loss goals.     The patient has been advised of how weight management and physical activity impacts their health and will help to " [address] [facility], PharmD, Jim An    The following is per review of patient's pertinent medical/medication history:     Findings:   Current DM Regimen: Metformin 1gm BID     2/19/2020 6/15/2020 12/15/2020   Hemoglobin A1C 7 2 (A) 7 7 (H) 9 9 (H)     Recommendations:   · Medications: consider addition of GLP1 or SGLT2i based on patient preference; if cost concerns consider glimepiride  · Pharmacy: consider pharmacy referral for assistance with DM mgmt in between PCP appt  · SMBG: consider daily FBG, does not appear patient has glucometer supplies per chart review         Demographics  Interaction Method: Virtual    Topic(s) Addressed  Diabetes    Intervention(s) Made    Pharmacologic:  Medication Initiation    Non-Pharmacologic:      Other    Tool(s) Used  Not Applicable    Time Spent:   Time Spent in Direct Patient Care: 0 minutes    Time Spent in Care Coordination: 20 minutes reduce complications and health risk factors.        AGREE:  New Goals:  1) Starting January 1st use indoor bicycle for 5 minutes 3x a day for 3 days of the week   2) Consider referral for deconditioning   3) Avoid sugar sweetened drinks, only have on occasion     ASSIST:  Our main focus today was in regards to exercise. Ragini needs to get into a habit of more regular physical activity and has committed to a goal today. We will she how she does meeting this.       ARRANGE:     Return for follow-up in 4 weeks      The patient was assisted in making follow-up appointment per orders.

## 2021-02-10 NOTE — PROGRESS NOTES
The the is a trial is just work 3 months well pressure Assessment/Plan:       Problem List Items Addressed This Visit        Endocrine    Type 2 diabetes mellitus without complication, without long-term current use of insulin (HCC)    Relevant Medications    Empagliflozin 25 MG TABS    Other Relevant Orders    Hemoglobin A1C    Comprehensive metabolic panel       Cardiovascular and Mediastinum    Essential hypertension    Relevant Orders    Hemoglobin A1C    Comprehensive metabolic panel       Genitourinary    Malignant neoplasm of prostate ([address] )     Continue follow-up at [facility] every 6 months  Other Visit Diagnoses     Flu vaccine need    -  Primary    Relevant Orders    influenza vaccine, high-dose, PF 0 7 mL (FLUZONE HIGH-DOSE) (Completed)          BMI Counseling: Body mass index is 30 28 kg/m²  The BMI is above normal  Nutrition recommendations include encouraging healthy choices of fruits and vegetables  Exercise recommendations include moderate physical activity 150 minutes/week  Subjective:      Patient ID: Royal García is a 76 y o  male  HPI   Patient presents today for follow-up for type 2 diabetes  He denies polyuria or polydipsia  Unfortunately, A1c has gone up  He notes his diet has not been as good as it normally is  He has a history of hyperlipidemia and tolerates atorvastatin without significant myalgias  He has hypertension and denies chest pain, shortness of breath, palpitations or lightheadedness  He has history of prostate cancer and follows at [facility]      The following portions of the patient's history were reviewed and updated as appropriate: allergies, current medications, past family history, past medical history, past social history, past surgical history and problem list       Current Outpatient Medications:     atorvastatin (LIPITOR) 40 mg tablet, Take 1 tablet (40 mg total) by mouth daily, Disp: 90 tablet, Rfl: 3    LUMIGAN 0 01 % ophthalmic drops, INSTILL 1 DROP INTO BOTH EYES AT BEDTIME, Disp: , Rfl: 3    metFORMIN (GLUCOPHAGE) 1000 MG tablet, Take 1 tablet (1,000 mg total) by mouth 2 (two) times a day with meals, Disp: 180 tablet, Rfl: 3    olmesartan-hydrochlorothiazide (BENICAR HCT) 40-12 5 MG per tablet, TAKE 1 TABLET BY MOUTH DAILY, Disp: 90 tablet, Rfl: 3    Empagliflozin 25 MG TABS, Take 1 tablet (25 mg total) by mouth daily, Disp: 90 tablet, Rfl: 3     Review of Systems   Constitutional: Negative for appetite change, chills, fatigue, fever and unexpected weight change  HENT: Negative for trouble swallowing  Eyes: Negative for visual disturbance  Respiratory: Negative for cough, chest tightness, shortness of breath and wheezing  Cardiovascular: Negative for chest pain, palpitations and leg swelling  Gastrointestinal: Negative for abdominal distention, abdominal pain, blood in stool, constipation and diarrhea  Endocrine: Negative for polyuria  Genitourinary: Negative for difficulty urinating and flank pain  Musculoskeletal: Negative for arthralgias and myalgias  Skin: Negative for rash  Neurological: Negative for dizziness and light-headedness  Hematological: Negative for adenopathy  Does not bruise/bleed easily  Psychiatric/Behavioral: Negative for dysphoric mood and sleep disturbance  The patient is not nervous/anxious  Objective:      /80 (BP Location: Left arm, Patient Position: Sitting, Cuff Size: Standard)   Pulse 85   Temp 97 8 °F (36 6 °C)   Ht 5' 10" (1 778 m)   Wt 95 7 kg (211 lb)   SpO2 97%   BMI 30 28 kg/m²          Physical Exam  Vitals signs reviewed  Constitutional:       General: He is not in acute distress  Appearance: He is well-developed  He is obese  He is not diaphoretic  HENT:      Head: Normocephalic  Eyes:      General:         Right eye: No discharge  Left eye: No discharge  Pupils: Pupils are equal, round, and reactive to light     Neck:      Thyroid: No thyromegaly  Trachea: No tracheal deviation  Cardiovascular:      Rate and Rhythm: Normal rate and regular rhythm  Heart sounds: Normal heart sounds  No murmur  Pulmonary:      Effort: Pulmonary effort is normal  No respiratory distress  Breath sounds: No wheezing or rales  Abdominal:      General: There is no distension  Palpations: Abdomen is soft  Tenderness: There is no abdominal tenderness  Musculoskeletal: Normal range of motion  Lymphadenopathy:      Cervical: No cervical adenopathy  Skin:     General: Skin is warm  Findings: No erythema  Neurological:      Mental Status: He is alert and oriented to person, place, and time  Cranial Nerves: No cranial nerve deficit  Psychiatric:         Thought Content:  Thought content normal          Judgment: Judgment normal            Jaron Villegas MD

## 2021-02-25 DIAGNOSIS — E11.8 TYPE 2 DIABETES MELLITUS WITH COMPLICATION (HCC): ICD-10-CM

## 2021-03-10 DIAGNOSIS — Z23 ENCOUNTER FOR IMMUNIZATION: ICD-10-CM

## 2021-03-11 ENCOUNTER — TELEPHONE (OUTPATIENT)
Dept: FAMILY MEDICINE CLINIC | Facility: CLINIC | Age: 66
End: 2021-03-11

## 2021-03-11 DIAGNOSIS — E11.9 TYPE 2 DIABETES MELLITUS WITHOUT COMPLICATION, WITHOUT LONG-TERM CURRENT USE OF INSULIN (HCC): Primary | ICD-10-CM

## 2021-03-11 NOTE — TELEPHONE ENCOUNTER
Patient called reporting pharmacy states Empagliflozin (Jardiance) 25 mg tabs will cost $700  Requesting alternative

## 2021-03-12 NOTE — TELEPHONE ENCOUNTER
Call placed to patient, unable to reach  Last A1c ~3 months ago; would recommend in-person appt with POCT A1c with PharmD  If patient calls back, will schedule him with PharmD

## 2021-03-16 ENCOUNTER — TELEPHONE (OUTPATIENT)
Dept: FAMILY MEDICINE CLINIC | Facility: CLINIC | Age: 66
End: 2021-03-16

## 2021-03-16 DIAGNOSIS — E11.9 TYPE 2 DIABETES MELLITUS WITHOUT COMPLICATION, WITHOUT LONG-TERM CURRENT USE OF INSULIN (HCC): Primary | ICD-10-CM

## 2021-03-16 NOTE — TELEPHONE ENCOUNTER
Pt has a scheduled appt with [facility] today  Unfortunately pt is due for a A1c and we are out of in office testing A1c cartilages  Pt has been advise to get A1c lab done  [facility]  Lab ordered and rescheduled pt for [place] at 11a to f/u with [facility]

## 2021-03-17 ENCOUNTER — CLINICAL SUPPORT (OUTPATIENT)
Dept: FAMILY MEDICINE CLINIC | Facility: CLINIC | Age: 66
End: 2021-03-17

## 2021-03-17 VITALS — SYSTOLIC BLOOD PRESSURE: 132 MMHG | HEART RATE: 80 BPM | DIASTOLIC BLOOD PRESSURE: 82 MMHG

## 2021-03-17 DIAGNOSIS — I10 ESSENTIAL HYPERTENSION: Primary | ICD-10-CM

## 2021-03-17 DIAGNOSIS — E11.9 TYPE 2 DIABETES MELLITUS WITHOUT COMPLICATION, WITHOUT LONG-TERM CURRENT USE OF INSULIN (HCC): Primary | ICD-10-CM

## 2021-03-17 DIAGNOSIS — E11.9 TYPE 2 DIABETES MELLITUS WITHOUT COMPLICATION, WITHOUT LONG-TERM CURRENT USE OF INSULIN (HCC): ICD-10-CM

## 2021-03-17 LAB — HBA1C MFR BLD: 9.9 % OF TOTAL HGB

## 2021-03-17 NOTE — TELEPHONE ENCOUNTER
Per pharmacist encounter on 03/17/21, pending orders for signature/concurrence from Ora Mighty, MD Janyce Ganser

## 2021-03-17 NOTE — PROGRESS NOTES
[address]  Fernandez Chang, PharmD, BCACP      Reason for visit: Appointment with 76y o  year old for management of T2DM  Current DM Regimen:   Metformin 1000mg BID   Empagliflozin 25mg daily (started 2/2021)    Next PCP appt: 6/9/2021    ASSESSMENT/PLAN                                                                                     1  Type 2 diabetes: goal A1c <7% based on ADA guidelines  Most recent A1c above goal; only reflective of metformin  Would benefit from GLP1 vs SGLT2i d/t increased glycemic potency  No SMBG readings to review  BMP shows hyperglycemia; patient was fasting  No serum B12 while on metformin  Duration: < 5 years (dx 2019)  Microvascular complications: none  Macrovascular complications: none  (No) Aspirin (Yes ) Statin (Yes ) ACEI/ARB  Eye Exam:  7/2018   Foot Exam: 8/2020     Most recent labs and diabetes goals discussed with patient in clinic today   MEDICATIONS: If PCP is in agreeance, patient would benefit from starting [address]; continue metformin  o Will pend rx for PCP signature/concurrence  · Injectable Rx storage and administration reviewed with patient today:   (+) proper injection:   (+) proper storage:    - avoids excessive heat/sunlight     -stores unused pens in refrigerator    - pen in use at room temperature for no more than 56 days   (+) visually inspects insulin prior to administration   (+) resuspends insulin prior to injection   (+) rotates site of administration: abdominal region, buttocks, thigh, or upper arm   (+) proper disposal of sharps   (+) uses new needle for each injection   (+) holds needle into skin for at least 5 seconds      TLCs: Re-enforced the importance of a Diabetic Diet, exercise 30 minutes 5 days a week as tolerated, weight loss/control  2  Hyperlipidemia without clinical ASCVD event: 2018 ACC/AHA lipid guidelines recommend at least moderate intensity statin     Patient currently on high intensity and tolerating well without side effects  Lipid panel/LFTs acceptable   Continue atorvastatin as prescribed  3  HTN: BP goal less than 130/80 mmHg  In office BP very close to goal, HR acceptable  No home readings to review  Patient with room for lifestyle modifications; would anticipate BP will improve as patient loses weight d/t GLP1  Serum K+ WNL   MEDICATIONS: continue as prescribed    4  Medication Reconciliation: Medication list reviewed with pt at today's visit  Discrepancies as discussed below   Medication list updated to reflect medications pt is currently taking    Follow-Up: 1 month via magalieen    SUBJECTIVE                                                                                                            Diagnosed with diabetes: < 5 years  ASCVD History: denies    Previous DM medications/tolerability:    None    1  Medication Adherence: Medication list reviewed with patient, reports the following discrepancies/problems:   Empagliflozin: did not  d/t cost, was ~$700    Misses doses:  No    Likely has not met rx deductible for the year; Ozempic/Trulicity is ~$390/24-REF    2  Medication Efficacy:    Review of Systems   Gastrointestinal: Negative for constipation, diarrhea, nausea and vomiting  Endocrine: Negative for polydipsia, polyphagia and polyuria  Musculoskeletal: Negative for myalgias  Does not check BG at home     3   Lifestyle: 2 meals/day; works as  and occasionally travels; will be traveling next week and eating out frequently    Diet Recall      L -  Food: sandwich     D - Food: cooks at home     Daily Beverages: denies     Snacks: limited      Exercise: no formal routine     Social History     Tobacco Use   Smoking Status Former Smoker   Smokeless Tobacco Never Used     Social History     Substance and Sexual Activity   Alcohol Use Yes    Alcohol/week: 2 0 - 3 0 standard drinks    Types: 2 - 3 Cans of beer per week    Comment: social      OBJECTIVE                                                                                                      Vitals:    03/17/21 1128   BP: 132/82   Pulse: 80       Pertinent Lab Data:  Patient was fasting for most recent labs  Lab Results   Component Value Date    SODIUM 138 12/15/2020    K 5 0 12/15/2020     12/15/2020    CO2 27 12/15/2020    BUN 29 (H) 12/15/2020    CREATININE 1 12 12/15/2020    GLUC 202 (H) 12/15/2020    CALCIUM 9 9 12/15/2020       Lab Results   Component Value Date    HGBA1C 9 9 (H) 03/16/2021     No results found for: Rosanna Villalpando    Microalbumin/Creatinine: 16 (6/2020)    Demographics  Interaction Method: Face to Face  Type of Intervention: New    Topic(s) Addressed  Diabetes  Hypertension  Device Education    Intervention(s) Made    Pharmacologic:  Medication Initiation: ozempic    Non-Pharmacologic:      Other    Tool(s) Used  Not Applicable    Time Spent:   Time Spent in Direct Patient Care: 40 minutes    Time Spent in Care Coordination: 20 minutes    Recommendation(s) Accepted by the Patient/Caregiver:  All Accepted

## 2021-03-18 ENCOUNTER — IMMUNIZATIONS (OUTPATIENT)
Dept: FAMILY MEDICINE CLINIC | Facility: HOSPITAL | Age: 66
End: 2021-03-18

## 2021-03-18 DIAGNOSIS — Z23 ENCOUNTER FOR IMMUNIZATION: Primary | ICD-10-CM

## 2021-03-18 PROCEDURE — 91301 SARS-COV-2 / COVID-19 MRNA VACCINE (MODERNA) 100 MCG: CPT

## 2021-03-18 PROCEDURE — 0011A SARS-COV-2 / COVID-19 MRNA VACCINE (MODERNA) 100 MCG: CPT

## 2021-03-29 DIAGNOSIS — E78.5 HYPERLIPIDEMIA, UNSPECIFIED HYPERLIPIDEMIA TYPE: ICD-10-CM

## 2021-03-29 RX ORDER — ATORVASTATIN CALCIUM 40 MG/1
TABLET, FILM COATED ORAL
Qty: 90 TABLET | Refills: 3 | Status: SHIPPED | OUTPATIENT
Start: 2021-03-29 | End: 2022-04-12

## 2021-04-01 ENCOUNTER — TELEPHONE (OUTPATIENT)
Dept: FAMILY MEDICINE CLINIC | Facility: CLINIC | Age: 66
End: 2021-04-01

## 2021-04-01 NOTE — TELEPHONE ENCOUNTER
Patient called stating Semaglutide,0 25 or 0 5MG/DOS, 2 MG/1 5ML SOPN needs a prior auth  Thank you!

## 2021-04-09 ENCOUNTER — TELEPHONE (OUTPATIENT)
Dept: FAMILY MEDICINE CLINIC | Facility: CLINIC | Age: 66
End: 2021-04-09

## 2021-04-09 NOTE — TELEPHONE ENCOUNTER
Dr Martha Joyner at bedside. ravindra authorization forms has been faxed over to [facility] waiting on response from insurance   I did call the pharmacy and they stated that the medication for the pt is ready for  his insurance does cover 19,000 and the pt would have to pay [phone]  I did speak to the pt and inform him about it he did state that he is aware and he is out on the road on business and hasn't been able to pick it up

## 2021-04-15 ENCOUNTER — IMMUNIZATIONS (OUTPATIENT)
Dept: FAMILY MEDICINE CLINIC | Facility: HOSPITAL | Age: 66
End: 2021-04-15

## 2021-04-15 DIAGNOSIS — Z23 ENCOUNTER FOR IMMUNIZATION: Primary | ICD-10-CM

## 2021-04-15 PROCEDURE — 0012A SARS-COV-2 / COVID-19 MRNA VACCINE (MODERNA) 100 MCG: CPT

## 2021-04-15 PROCEDURE — 91301 SARS-COV-2 / COVID-19 MRNA VACCINE (MODERNA) 100 MCG: CPT

## 2021-04-28 ENCOUNTER — CLINICAL SUPPORT (OUTPATIENT)
Dept: FAMILY MEDICINE CLINIC | Facility: CLINIC | Age: 66
End: 2021-04-28

## 2021-04-28 DIAGNOSIS — E11.9 TYPE 2 DIABETES MELLITUS WITHOUT COMPLICATION, WITHOUT LONG-TERM CURRENT USE OF INSULIN (HCC): Primary | ICD-10-CM

## 2021-04-28 NOTE — PROGRESS NOTES
[facility]  Sunita Ag, PharmD, BCACP      Reason for visit: Appointment with 76y o  year old for management of T2DM  Current DM Regimen:   Metformin    Ozempic (started 3/2021)    Next PCP appt: 6/9/2021    ASSESSMENT/PLAN                                                                                     1  Type 2 diabetes: goal A1c <7% based on ADA guidelines  Most recent A1c above goal; only reflective of metformin  Recently started on Ozempic, tolerating well  No SMBG readings to review  BMP shows hyperglycemia; patient was fasting  No serum B12 while on metformin  Duration: < 5 years (dx 2019)  Microvascular complications: none  Macrovascular complications: none  (No) Aspirin (Yes ) Statin (Yes ) ACEI/ARB  Eye Exam:  7/2018   Foot Exam: 8/2020     Most recent labs and diabetes goals discussed with patient in clinic today   MEDICATIONS: If PCP is in agreeance, patient would benefit from increasing Ozempic to 0 5mg after next dose; continue metformin  o Will consider 90-day supply of 1mg/week if patient continues to tolerate Ozempic   TLCs: Re-enforced the importance of a Diabetic Diet, exercise 30 minutes 5 days a week as tolerated, weight loss/control  2  Medication Reconciliation: Medication list reviewed with pt at today's visit  Discrepancies as discussed below   Medication list updated to reflect medications pt is currently taking    Follow-Up: 1 month via phone    SUBJECTIVE                                                                                                              1  Medication Adherence: Medication list reviewed with patient, reports the following discrepancies/problems:   Ozempic: has taken x3 doses of 0 25mg; only received 56-day supply from pharmacy instead of 90-day supply  High cost with rx but has now likely met rx deductible  Misses doses:  No    2   Medication Efficacy:    Review of Systems Gastrointestinal: Negative for constipation, diarrhea, nausea and vomiting  Endocrine: Negative for polydipsia, polyphagia and polyuria  Musculoskeletal: Negative for myalgias  Does not check BG at home, not interested at this time  3  Lifestyle: 2 meals/day; works as  and occasionally travels; will be traveling next week and eating out frequently  Diet Recall      L -  Food: sandwich     D - Food: cooks at home     Daily Beverages: denies     Snacks: limited      Exercise: no formal routine     Social History     Tobacco Use   Smoking Status Former Smoker   Smokeless Tobacco Never Used     Social History     Substance and Sexual Activity   Alcohol Use Yes    Alcohol/week: 2 0 - 3 0 standard drinks    Types: 2 - 3 Cans of beer per week    Comment: social      OBJECTIVE                                                                                                      There were no vitals filed for this visit  Pertinent Lab Data:  Patient was fasting for most recent labs  Lab Results   Component Value Date    SODIUM 138 12/15/2020    K 5 0 12/15/2020     12/15/2020    CO2 27 12/15/2020    BUN 29 (H) 12/15/2020    CREATININE 1 12 12/15/2020    GLUC 202 (H) 12/15/2020    CALCIUM 9 9 12/15/2020       Lab Results   Component Value Date    HGBA1C 9 9 (H) 03/16/2021     No results found for: Adelina Kim    Microalbumin/Creatinine: 16 (6/2020)    Demographics  Interaction Method: Phone  Type of Intervention: Follow-Up    Topic(s) Addressed  Diabetes    Intervention(s) Made    Pharmacologic:      Medication Adjustment - Dose or Frequency: Ozempic    Non-Pharmacologic:      Other    Tool(s) Used  Not Applicable    Time Spent:   Time Spent in Direct Patient Care: 15 minutes    Time Spent in Care Coordination: 10 minutes    Recommendation(s) Accepted by the Patient/Caregiver:  All Accepted

## 2021-05-26 ENCOUNTER — CLINICAL SUPPORT (OUTPATIENT)
Dept: FAMILY MEDICINE CLINIC | Facility: CLINIC | Age: 66
End: 2021-05-26

## 2021-05-26 DIAGNOSIS — E11.9 TYPE 2 DIABETES MELLITUS WITHOUT COMPLICATION, WITHOUT LONG-TERM CURRENT USE OF INSULIN (HCC): Primary | ICD-10-CM

## 2021-05-26 RX ORDER — SEMAGLUTIDE 1.34 MG/ML
1 INJECTION, SOLUTION SUBCUTANEOUS WEEKLY
Qty: 9 ML | Refills: 1 | Status: SHIPPED | OUTPATIENT
Start: 2021-05-26 | End: 2021-11-29

## 2021-05-26 NOTE — PROGRESS NOTES
[address] [facility]  Morgan Christensen, PharmD, BCACP      Reason for visit: Appointment with 76y o  year old for management of T2DM  Current DM Regimen:   Metformin    Ozempic (started 3/2021)    Next PCP appt: 6/9/2021    ASSESSMENT/PLAN                                                                                     1  Type 2 diabetes: goal A1c <7% based on ADA guidelines  Most recent A1c above goal; only reflective of metformin  Recently started on Ozempic, tolerating well  No SMBG readings to review  BMP shows hyperglycemia; patient was fasting  No serum B12 while on metformin  Duration: < 5 years (dx 2019)  Microvascular complications: none  Macrovascular complications: none  (No) Aspirin (Yes ) Statin (Yes ) ACEI/ARB  Eye Exam:  7/2018   Foot Exam: 8/2020     Most recent labs and diabetes goals discussed with patient in clinic today   MEDICATIONS: If PCP is in agreeance, patient would benefit from increasing Ozempic to 1mg after next dose; continue metformin  o Rx sent for 90-day supply to pharmacy per CPA   TLCs: Re-enforced the importance of a Diabetic Diet, exercise 30 minutes 5 days a week as tolerated, weight loss/control  2  Medication Reconciliation: Medication list reviewed with pt at today's visit  Discrepancies as discussed below   Medication list updated to reflect medications pt is currently taking    Follow-Up: 2 weeks with PCP as scheduled; will f/u with patient based on A1c (which will not be fully reflective of Ozempic)  SUBJECTIVE                                                                                                              1  Medication Adherence: Medication list reviewed with patient, reports the following discrepancies/problems:   Ozempic: on last pen  Misses doses:  No    Visits Dr Navarro Paiz - last exam a couple weeks ago    2   Medication Efficacy:    Review of Systems   Gastrointestinal: Negative for constipation, diarrhea, nausea and vomiting  Endocrine: Negative for polydipsia, polyphagia and polyuria  Musculoskeletal: Negative for myalgias  Does not check BG at home, not interested at this time  3  Lifestyle: 2 meals/day; Denies changes since last appt  Diet Recall      L -  Food: sandwich     D - Food: cooks at home     Daily Beverages: denies     Snacks: limited      Exercise: no formal routine     Social History     Tobacco Use   Smoking Status Former Smoker   Smokeless Tobacco Never Used     Social History     Substance and Sexual Activity   Alcohol Use Yes    Alcohol/week: 2 0 - 3 0 standard drinks    Types: 2 - 3 Cans of beer per week    Comment: social      OBJECTIVE                                                                                                      Pertinent Lab Data:  Patient was fasting for most recent labs  Lab Results   Component Value Date    SODIUM 138 12/15/2020    K 5 0 12/15/2020     12/15/2020    CO2 27 12/15/2020    BUN 29 (H) 12/15/2020    CREATININE 1 12 12/15/2020    GLUC 202 (H) 12/15/2020    CALCIUM 9 9 12/15/2020     Lab Results   Component Value Date    HGBA1C 9 9 (H) 03/16/2021     No results found for: Stevphen Opal    Microalbumin/Creatinine: 16 (6/2020)    Demographics  Interaction Method: Phone  Type of Intervention: Follow-Up    Topic(s) Addressed  Diabetes    Intervention(s) Made    Pharmacologic:      Medication Adjustment - Dose or Frequency: Ozempic    Non-Pharmacologic:      Other    Tool(s) Used  Not Applicable    Time Spent:   Time Spent in Direct Patient Care: 15 minutes    Time Spent in Care Coordination: 10 minutes    Recommendation(s) Accepted by the Patient/Caregiver:  All Accepted

## 2021-05-28 ENCOUNTER — TELEPHONE (OUTPATIENT)
Dept: ADMINISTRATIVE | Facility: OTHER | Age: 66
End: 2021-05-28

## 2021-05-28 NOTE — LETTER
Diabetic Eye Exam Form    Date Requested: 21  Patient: Jaylene Nolan  Patient : 1955   Referring Provider: Angela Veloz MD    Dilated Retinal Exam, Optomap-Iris Exam, or Fundus Photography Done           Yes ([place] one above)         No     Date of Diabetic Eye Exam ______________________________    Left Eye      Exam did show retinopathy    Exam did not show retinopathy         Mild       Moderate       None       Proliferative       Severe     Right Eye     Exam did show retinopathy    Exam did not show retinopathy         Mild       Moderate       None       Proliferative       Severe     Comments __________________________________________________________    Practice Providing Exam ______________________________________________    Exam Performed By (print name) _______________________________________      Provider Signature ___________________________________________________      These reports are needed for  compliance    Please fax this completed form and a copy of the Diabetic Eye Exam report to our office located at [place] [address] as soon as possible to [phone] adilia [employer]: Phone [phone]    We thank you for your assistance in treating our mutual patient

## 2021-05-28 NOTE — TELEPHONE ENCOUNTER
----- Message from Anna Major Pharmacist sent at 5/26/2021  2:22 PM EDT -----  Regarding: Care Gap Outreach  05/26/21    Hello, our patient has had Diabetic Eye Exam completed/performed  Please assist in updating the patient chart by making an External outreach to [facility] facility located in [facility]  The date of service is UNKNOWN      Thank you,  Anna Major, Pharmacist  [facility]

## 2021-06-01 NOTE — TELEPHONE ENCOUNTER
Upon review of the In Basket request and the patient's chart, initial outreach has been made via fax, please see Contacts section for details       Thank you  Victorino Duggan

## 2021-06-02 LAB
ALBUMIN SERPL-MCNC: 4.5 G/DL (ref 3.6–5.1)
ALBUMIN/GLOB SERPL: 1.6 (CALC) (ref 1–2.5)
ALP SERPL-CCNC: 61 U/L (ref 35–144)
ALT SERPL-CCNC: 17 U/L (ref 9–46)
AST SERPL-CCNC: 13 U/L (ref 10–35)
BILIRUB SERPL-MCNC: 0.5 MG/DL (ref 0.2–1.2)
BUN SERPL-MCNC: 30 MG/DL (ref 7–25)
BUN/CREAT SERPL: 26 (CALC) (ref 6–22)
CALCIUM SERPL-MCNC: 10.2 MG/DL (ref 8.6–10.3)
CHLORIDE SERPL-SCNC: 100 MMOL/L (ref 98–110)
CO2 SERPL-SCNC: 27 MMOL/L (ref 20–32)
CREAT SERPL-MCNC: 1.15 MG/DL (ref 0.7–1.25)
GLOBULIN SER CALC-MCNC: 2.9 G/DL (CALC) (ref 1.9–3.7)
GLUCOSE SERPL-MCNC: 184 MG/DL (ref 65–99)
HBA1C MFR BLD: 9 % OF TOTAL HGB
POTASSIUM SERPL-SCNC: 4.4 MMOL/L (ref 3.5–5.3)
PROT SERPL-MCNC: 7.4 G/DL (ref 6.1–8.1)
SL AMB EGFR AFRICAN AMERICAN: 76 ML/MIN/1.73M2
SL AMB EGFR NON AFRICAN AMERICAN: 66 ML/MIN/1.73M2
SODIUM SERPL-SCNC: 135 MMOL/L (ref 135–146)

## 2021-06-04 ENCOUNTER — DOCUMENTATION (OUTPATIENT)
Dept: FAMILY MEDICINE CLINIC | Facility: CLINIC | Age: 66
End: 2021-06-04

## 2021-06-04 DIAGNOSIS — E11.9 TYPE 2 DIABETES MELLITUS WITHOUT COMPLICATION, WITHOUT LONG-TERM CURRENT USE OF INSULIN (HCC): Primary | ICD-10-CM

## 2021-06-04 NOTE — PROGRESS NOTES
Mariajose Murillo    Communication with patient: Yes, Phone call with patient    Reason for documentation: call received from patient    Patient had to reschedule PCP appt in 6/2021 for personal reasons and was not able to get an appt with PCP until 8/2021  Patient was fasting for labs; lab results reviewed with patient  Discussion with patient on SMBG and professional CGM however patient would like to hold off for now  Patient will contact PharmD if he would like to discuss further  Patient may consider having POCT A1c obtained during PCP appt in 8/2021, aware this would be < 3 months from past A1c and insurance may not fully cover         Demographics  Interaction Method: Phone  Type of Intervention: Follow-Up    Topic(s) Addressed  Diabetes    Intervention(s) Made      Non-Pharmacologic:      Other: CGM, SMBG    Tool(s) Used  Not Applicable    Time Spent:   Time Spent in Direct Patient Care: 10 minutes    Time Spent in Care Coordination: 10 minutes    Recommendation(s) Accepted by the Patient/Caregiver: None Accepted

## 2021-06-04 NOTE — TELEPHONE ENCOUNTER
Upon review of the In Basket request we were able to locate, review, and update the patient chart as requested for Diabetic Eye Exam     Any additional questions or concerns should be emailed to the Practice Liaisons via [email or web address] email, please do not reply via In Basket      Thank you  Flor Guerra

## 2021-07-04 DIAGNOSIS — I10 ESSENTIAL HYPERTENSION: ICD-10-CM

## 2021-07-06 RX ORDER — OLMESARTAN MEDOXOMIL AND HYDROCHLOROTHIAZIDE 40/12.5 40; 12.5 MG/1; MG/1
1 TABLET ORAL DAILY
Qty: 90 TABLET | Refills: 3 | Status: SHIPPED | OUTPATIENT
Start: 2021-07-06 | End: 2021-07-26

## 2021-07-24 DIAGNOSIS — I10 ESSENTIAL HYPERTENSION: ICD-10-CM

## 2021-07-26 RX ORDER — OLMESARTAN MEDOXOMIL AND HYDROCHLOROTHIAZIDE 40/12.5 40; 12.5 MG/1; MG/1
1 TABLET ORAL DAILY
Qty: 90 TABLET | Refills: 3 | Status: SHIPPED | OUTPATIENT
Start: 2021-07-26 | End: 2022-07-18 | Stop reason: SDUPTHER

## 2021-08-02 ENCOUNTER — OFFICE VISIT (OUTPATIENT)
Dept: FAMILY MEDICINE CLINIC | Facility: CLINIC | Age: 66
End: 2021-08-02
Payer: MEDICARE

## 2021-08-02 VITALS
BODY MASS INDEX: 28.63 KG/M2 | SYSTOLIC BLOOD PRESSURE: 120 MMHG | HEART RATE: 88 BPM | HEIGHT: 70 IN | DIASTOLIC BLOOD PRESSURE: 80 MMHG | WEIGHT: 200 LBS | RESPIRATION RATE: 12 BRPM | OXYGEN SATURATION: 97 %

## 2021-08-02 DIAGNOSIS — E11.8 TYPE 2 DIABETES MELLITUS WITH COMPLICATION (HCC): ICD-10-CM

## 2021-08-02 DIAGNOSIS — I10 ESSENTIAL HYPERTENSION: ICD-10-CM

## 2021-08-02 DIAGNOSIS — C61 MALIGNANT NEOPLASM OF PROSTATE (HCC): ICD-10-CM

## 2021-08-02 DIAGNOSIS — E78.2 MIXED HYPERLIPIDEMIA: ICD-10-CM

## 2021-08-02 DIAGNOSIS — E11.9 TYPE 2 DIABETES MELLITUS WITHOUT COMPLICATION, WITHOUT LONG-TERM CURRENT USE OF INSULIN (HCC): Primary | ICD-10-CM

## 2021-08-02 PROCEDURE — 99214 OFFICE O/P EST MOD 30 MIN: CPT | Performed by: FAMILY MEDICINE

## 2021-08-02 NOTE — ASSESSMENT & PLAN NOTE
Lab Results   Component Value Date    HGBA1C 9 0 (H) 06/01/2021   Unfortunately his last A1c remained at 9 but is on a downward trend  He only really had been on Ozempic for about a month  He would like to hold on further medication adjustments right now and see how he can do over the next few months  I will see him back within 4 months and he will have blood work done prior to that    He declines home monitoring of his glucose or CGM at this time

## 2021-08-02 NOTE — PROGRESS NOTES
Assessment/Plan:       Problem List Items Addressed This Visit        Endocrine    Type 2 diabetes mellitus without complication, without long-term current use of insulin ([facility] ) - Primary       Lab Results   Component Value Date    HGBA1C 9 0 (H) 06/01/2021   Unfortunately his last A1c remained at 9 but is on a downward trend  He only really had been on Ozempic for about a month  He would like to hold on further medication adjustments right now and see how he can do over the next few months  I will see him back within 4 months and he will have blood work done prior to that  He declines home monitoring of his glucose or CGM at this time         Relevant Orders    Comprehensive metabolic panel    Lipid Panel with Direct LDL reflex    Hemoglobin A1C    CBC and differential       Cardiovascular and Mediastinum    Essential hypertension    Relevant Orders    Comprehensive metabolic panel    Lipid Panel with Direct LDL reflex    Hemoglobin A1C    CBC and differential       Genitourinary    Malignant neoplasm of prostate ([facility] )     Continue follow[facility]up at [facility]  Other    Mixed hyperlipidemia    Relevant Orders    Lipid Panel with Direct LDL reflex      Other Visit Diagnoses     Type 2 diabetes mellitus with complication ([place] [address] )              BMI Counseling: Body mass index is 28 7 kg/m²  The BMI is above normal  Nutrition recommendations include encouraging healthy choices of fruits and vegetables  Exercise recommendations include moderate physical activity 150 minutes/week  Subjective:      Patient ID: Mike Moore is a 78 y o  male  HPI The patient presents today for a hypertension follow-up  Patient remains compliant with medications and denies any chest pain, shortness of breath, palpitations, lightheadedness or diaphoresis  He has history of type 2 diabetes  A1c is improving but is still elevated  He denies excessive polyuria or polydipsia    He has history of hyperlipidemia tolerate statin therapy without excessive myalgias  The following portions of the patient's history were reviewed and updated as appropriate: allergies, current medications, past family history, past medical history, past social history, past surgical history and problem list       Current Outpatient Medications:     atorvastatin (LIPITOR) 40 mg tablet, TAKE 1 TABLET(40 MG) BY MOUTH DAILY, Disp: 90 tablet, Rfl: 3    LUMIGAN 0 01 % ophthalmic drops, INSTILL 1 DROP INTO BOTH EYES AT BEDTIME, Disp: , Rfl: 3    metFORMIN (GLUCOPHAGE) 1000 MG tablet, TAKE 1 TABLET(1000 MG) BY MOUTH TWICE DAILY WITH MEALS, Disp: 180 tablet, Rfl: 3    olmesartan-hydrochlorothiazide (BENICAR HCT) 40-12 5 MG per tablet, TAKE 1 TABLET BY MOUTH DAILY, Disp: 90 tablet, Rfl: 3    semaglutide, 1 mg/dose, (Ozempic, 1 MG/DOSE,) 4 MG/3ML SOPN injection pen, Inject 0 75 mL (1 mg total) under the skin once a week For diabetes, Disp: 9 mL, Rfl: 1     Review of Systems   Constitutional: Negative for appetite change, chills, fatigue, fever and unexpected weight change  HENT: Negative for trouble swallowing  Eyes: Negative for visual disturbance  Respiratory: Negative for cough, chest tightness, shortness of breath and wheezing  Cardiovascular: Negative for chest pain, palpitations and leg swelling  Gastrointestinal: Negative for abdominal distention, abdominal pain, blood in stool, constipation and diarrhea  Endocrine: Negative for polyuria  Genitourinary: Negative for difficulty urinating, flank pain and urgency  Musculoskeletal: Negative for arthralgias and myalgias  Skin: Negative for rash  Neurological: Negative for dizziness and light-headedness  Hematological: Negative for adenopathy  Does not bruise/bleed easily  Psychiatric/Behavioral: Negative for dysphoric mood and sleep disturbance  The patient is not nervous/anxious            Objective:      /80 (BP Location: Left arm, Patient Position: Sitting, Cuff Size: Standard)   Pulse 88   Resp 12   Ht 5' 10" (1 778 m)   Wt 90 7 kg (200 lb)   SpO2 97%   BMI 28 70 kg/m²          Physical Exam  Constitutional:       General: He is not in acute distress  Appearance: He is well-developed  He is not diaphoretic  HENT:      Head: Normocephalic  Eyes:      General:         Right eye: No discharge  Left eye: No discharge  Pupils: Pupils are equal, round, and reactive to light  Neck:      Thyroid: No thyromegaly  Trachea: No tracheal deviation  Cardiovascular:      Rate and Rhythm: Normal rate and regular rhythm  Heart sounds: Normal heart sounds  No murmur heard  Pulmonary:      Effort: Pulmonary effort is normal  No respiratory distress  Breath sounds: No wheezing or rales  Abdominal:      General: There is no distension  Palpations: Abdomen is soft  Tenderness: There is no abdominal tenderness  Musculoskeletal:         General: Normal range of motion  Right lower leg: No edema  Left lower leg: No edema  Lymphadenopathy:      Cervical: No cervical adenopathy  Skin:     General: Skin is warm  Coloration: Skin is not jaundiced  Findings: No erythema  Neurological:      Mental Status: He is alert and oriented to person, place, and time  Cranial Nerves: No cranial nerve deficit  Psychiatric:         Thought Content:  Thought content normal          Judgment: Judgment normal            Taylor Knight MD

## 2021-11-18 LAB
ALBUMIN SERPL-MCNC: 4.1 G/DL (ref 3.6–5.1)
ALBUMIN/GLOB SERPL: 1.4 (CALC) (ref 1–2.5)
ALP SERPL-CCNC: 61 U/L (ref 35–144)
ALT SERPL-CCNC: 15 U/L (ref 9–46)
AST SERPL-CCNC: 12 U/L (ref 10–35)
BASOPHILS # BLD AUTO: 48 CELLS/UL (ref 0–200)
BASOPHILS NFR BLD AUTO: 0.7 %
BILIRUB SERPL-MCNC: 0.4 MG/DL (ref 0.2–1.2)
BUN SERPL-MCNC: 26 MG/DL (ref 7–25)
BUN/CREAT SERPL: 23 (CALC) (ref 6–22)
CALCIUM SERPL-MCNC: 10 MG/DL (ref 8.6–10.3)
CHLORIDE SERPL-SCNC: 101 MMOL/L (ref 98–110)
CHOLEST SERPL-MCNC: 142 MG/DL
CHOLEST/HDLC SERPL: 3 (CALC)
CO2 SERPL-SCNC: 27 MMOL/L (ref 20–32)
CREAT SERPL-MCNC: 1.13 MG/DL (ref 0.7–1.25)
EOSINOPHIL # BLD AUTO: 235 CELLS/UL (ref 15–500)
EOSINOPHIL NFR BLD AUTO: 3.4 %
ERYTHROCYTE [DISTWIDTH] IN BLOOD BY AUTOMATED COUNT: 12.6 % (ref 11–15)
GLOBULIN SER CALC-MCNC: 3 G/DL (CALC) (ref 1.9–3.7)
GLUCOSE SERPL-MCNC: 150 MG/DL (ref 65–99)
HBA1C MFR BLD: 6.8 % OF TOTAL HGB
HCT VFR BLD AUTO: 35.3 % (ref 38.5–50)
HDLC SERPL-MCNC: 48 MG/DL
HGB BLD-MCNC: 12 G/DL (ref 13.2–17.1)
LDLC SERPL CALC-MCNC: 80 MG/DL (CALC)
LYMPHOCYTES # BLD AUTO: 1601 CELLS/UL (ref 850–3900)
LYMPHOCYTES NFR BLD AUTO: 23.2 %
MCH RBC QN AUTO: 31.3 PG (ref 27–33)
MCHC RBC AUTO-ENTMCNC: 34 G/DL (ref 32–36)
MCV RBC AUTO: 91.9 FL (ref 80–100)
MONOCYTES # BLD AUTO: 552 CELLS/UL (ref 200–950)
MONOCYTES NFR BLD AUTO: 8 %
NEUTROPHILS # BLD AUTO: 4464 CELLS/UL (ref 1500–7800)
NEUTROPHILS NFR BLD AUTO: 64.7 %
NONHDLC SERPL-MCNC: 94 MG/DL (CALC)
PLATELET # BLD AUTO: 236 THOUSAND/UL (ref 140–400)
PMV BLD REES-ECKER: 10.6 FL (ref 7.5–12.5)
POTASSIUM SERPL-SCNC: 4.1 MMOL/L (ref 3.5–5.3)
PROT SERPL-MCNC: 7.1 G/DL (ref 6.1–8.1)
RBC # BLD AUTO: 3.84 MILLION/UL (ref 4.2–5.8)
SL AMB EGFR AFRICAN AMERICAN: 78 ML/MIN/1.73M2
SL AMB EGFR NON AFRICAN AMERICAN: 67 ML/MIN/1.73M2
SODIUM SERPL-SCNC: 136 MMOL/L (ref 135–146)
TRIGL SERPL-MCNC: 60 MG/DL
WBC # BLD AUTO: 6.9 THOUSAND/UL (ref 3.8–10.8)

## 2021-11-29 DIAGNOSIS — E11.9 TYPE 2 DIABETES MELLITUS WITHOUT COMPLICATION, WITHOUT LONG-TERM CURRENT USE OF INSULIN (HCC): ICD-10-CM

## 2021-11-29 RX ORDER — SEMAGLUTIDE 1.34 MG/ML
INJECTION, SOLUTION SUBCUTANEOUS
Qty: 9 ML | Refills: 1 | Status: SHIPPED | OUTPATIENT
Start: 2021-11-29 | End: 2022-05-09

## 2021-12-20 ENCOUNTER — OFFICE VISIT (OUTPATIENT)
Dept: FAMILY MEDICINE CLINIC | Facility: CLINIC | Age: 66
End: 2021-12-20
Payer: MEDICARE

## 2021-12-20 VITALS
SYSTOLIC BLOOD PRESSURE: 126 MMHG | DIASTOLIC BLOOD PRESSURE: 82 MMHG | OXYGEN SATURATION: 99 % | BODY MASS INDEX: 28.49 KG/M2 | WEIGHT: 199 LBS | RESPIRATION RATE: 18 BRPM | HEART RATE: 80 BPM | HEIGHT: 70 IN

## 2021-12-20 DIAGNOSIS — E78.2 MIXED HYPERLIPIDEMIA: ICD-10-CM

## 2021-12-20 DIAGNOSIS — E66.3 OVERWEIGHT WITH BODY MASS INDEX (BMI) OF 28 TO 28.9 IN ADULT: ICD-10-CM

## 2021-12-20 DIAGNOSIS — Z00.00 MEDICARE ANNUAL WELLNESS VISIT, SUBSEQUENT: Primary | ICD-10-CM

## 2021-12-20 DIAGNOSIS — Z23 FLU VACCINE NEED: ICD-10-CM

## 2021-12-20 DIAGNOSIS — E11.9 TYPE 2 DIABETES MELLITUS WITHOUT COMPLICATION, WITHOUT LONG-TERM CURRENT USE OF INSULIN (HCC): ICD-10-CM

## 2021-12-20 DIAGNOSIS — I10 ESSENTIAL HYPERTENSION: ICD-10-CM

## 2021-12-20 DIAGNOSIS — L91.8 CUTANEOUS SKIN TAGS: ICD-10-CM

## 2021-12-20 DIAGNOSIS — C61 MALIGNANT NEOPLASM OF PROSTATE (HCC): ICD-10-CM

## 2021-12-20 DIAGNOSIS — D64.9 ANEMIA, UNSPECIFIED TYPE: ICD-10-CM

## 2021-12-20 PROCEDURE — 99214 OFFICE O/P EST MOD 30 MIN: CPT | Performed by: FAMILY MEDICINE

## 2021-12-20 PROCEDURE — 1123F ACP DISCUSS/DSCN MKR DOCD: CPT | Performed by: FAMILY MEDICINE

## 2021-12-20 PROCEDURE — G0438 PPPS, INITIAL VISIT: HCPCS | Performed by: FAMILY MEDICINE

## 2021-12-20 PROCEDURE — 90662 IIV NO PRSV INCREASED AG IM: CPT

## 2021-12-20 PROCEDURE — G0008 ADMIN INFLUENZA VIRUS VAC: HCPCS

## 2021-12-24 LAB
BASOPHILS # BLD AUTO: 41 CELLS/UL (ref 0–200)
BASOPHILS NFR BLD AUTO: 0.6 %
BUN SERPL-MCNC: 30 MG/DL (ref 7–25)
BUN/CREAT SERPL: 24 (CALC) (ref 6–22)
CALCIUM SERPL-MCNC: 9.7 MG/DL (ref 8.6–10.3)
CHLORIDE SERPL-SCNC: 102 MMOL/L (ref 98–110)
CO2 SERPL-SCNC: 23 MMOL/L (ref 20–32)
CREAT SERPL-MCNC: 1.27 MG/DL (ref 0.7–1.25)
EOSINOPHIL # BLD AUTO: 190 CELLS/UL (ref 15–500)
EOSINOPHIL NFR BLD AUTO: 2.8 %
ERYTHROCYTE [DISTWIDTH] IN BLOOD BY AUTOMATED COUNT: 12.5 % (ref 11–15)
FERRITIN SERPL-MCNC: 174 NG/ML (ref 24–380)
GLUCOSE SERPL-MCNC: 120 MG/DL (ref 65–99)
HCT VFR BLD AUTO: 38.2 % (ref 38.5–50)
HGB BLD-MCNC: 12.8 G/DL (ref 13.2–17.1)
IRON SATN MFR SERPL: 34 % (CALC) (ref 20–48)
IRON SERPL-MCNC: 110 MCG/DL (ref 50–180)
LYMPHOCYTES # BLD AUTO: 1571 CELLS/UL (ref 850–3900)
LYMPHOCYTES NFR BLD AUTO: 23.1 %
MCH RBC QN AUTO: 31.2 PG (ref 27–33)
MCHC RBC AUTO-ENTMCNC: 33.5 G/DL (ref 32–36)
MCV RBC AUTO: 93.2 FL (ref 80–100)
MONOCYTES # BLD AUTO: 558 CELLS/UL (ref 200–950)
MONOCYTES NFR BLD AUTO: 8.2 %
NEUTROPHILS # BLD AUTO: 4440 CELLS/UL (ref 1500–7800)
NEUTROPHILS NFR BLD AUTO: 65.3 %
PLATELET # BLD AUTO: 274 THOUSAND/UL (ref 140–400)
PMV BLD REES-ECKER: 10.6 FL (ref 7.5–12.5)
POTASSIUM SERPL-SCNC: 4.7 MMOL/L (ref 3.5–5.3)
RBC # BLD AUTO: 4.1 MILLION/UL (ref 4.2–5.8)
SL AMB EGFR AFRICAN AMERICAN: 67 ML/MIN/1.73M2
SL AMB EGFR NON AFRICAN AMERICAN: 58 ML/MIN/1.73M2
SODIUM SERPL-SCNC: 137 MMOL/L (ref 135–146)
TIBC SERPL-MCNC: 324 MCG/DL (CALC) (ref 250–425)
WBC # BLD AUTO: 6.8 THOUSAND/UL (ref 3.8–10.8)

## 2021-12-28 DIAGNOSIS — R52 BODY ACHES: ICD-10-CM

## 2021-12-28 DIAGNOSIS — J02.9 SORE THROAT: ICD-10-CM

## 2021-12-28 DIAGNOSIS — R51.9 NONINTRACTABLE HEADACHE, UNSPECIFIED CHRONICITY PATTERN, UNSPECIFIED HEADACHE TYPE: Primary | ICD-10-CM

## 2021-12-28 PROCEDURE — U0003 INFECTIOUS AGENT DETECTION BY NUCLEIC ACID (DNA OR RNA); SEVERE ACUTE RESPIRATORY SYNDROME CORONAVIRUS 2 (SARS-COV-2) (CORONAVIRUS DISEASE [COVID-19]), AMPLIFIED PROBE TECHNIQUE, MAKING USE OF HIGH THROUGHPUT TECHNOLOGIES AS DESCRIBED BY CMS-2020-01-R: HCPCS | Performed by: FAMILY MEDICINE

## 2021-12-28 PROCEDURE — U0005 INFEC AGEN DETEC AMPLI PROBE: HCPCS | Performed by: FAMILY MEDICINE

## 2021-12-29 ENCOUNTER — TELEMEDICINE (OUTPATIENT)
Dept: FAMILY MEDICINE CLINIC | Facility: CLINIC | Age: 66
End: 2021-12-29
Payer: MEDICARE

## 2021-12-29 VITALS — TEMPERATURE: 99 F

## 2021-12-29 DIAGNOSIS — U07.1 COVID-19: Primary | ICD-10-CM

## 2021-12-29 PROCEDURE — 99442 PR PHYS/QHP TELEPHONE EVALUATION 11-20 MIN: CPT | Performed by: FAMILY MEDICINE

## 2021-12-29 RX ORDER — PROMETHAZINE HYDROCHLORIDE AND CODEINE PHOSPHATE 6.25; 1 MG/5ML; MG/5ML
5 SYRUP ORAL EVERY 4 HOURS PRN
Qty: 120 ML | Refills: 0 | Status: SHIPPED | OUTPATIENT
Start: 2021-12-29 | End: 2022-01-05 | Stop reason: ALTCHOICE

## 2021-12-29 RX ORDER — FLUVOXAMINE MALEATE 100 MG
100 TABLET ORAL 2 TIMES DAILY
Qty: 14 TABLET | Refills: 0 | Status: SHIPPED | OUTPATIENT
Start: 2021-12-29 | End: 2022-01-05 | Stop reason: ALTCHOICE

## 2021-12-29 RX ORDER — CHOLECALCIFEROL (VITAMIN D3) 50 MCG
2000 TABLET ORAL DAILY
Qty: 30 TABLET | Refills: 0 | Status: SHIPPED | OUTPATIENT
Start: 2021-12-29 | End: 2022-06-20 | Stop reason: ALTCHOICE

## 2021-12-29 RX ORDER — BUDESONIDE 180 UG/1
4 AEROSOL, POWDER RESPIRATORY (INHALATION) 2 TIMES DAILY
Qty: 1 EACH | Refills: 0 | Status: SHIPPED | OUTPATIENT
Start: 2021-12-29 | End: 2022-01-05 | Stop reason: SDUPTHER

## 2022-01-04 ENCOUNTER — TELEPHONE (OUTPATIENT)
Dept: FAMILY MEDICINE CLINIC | Facility: CLINIC | Age: 67
End: 2022-01-04

## 2022-01-04 NOTE — TELEPHONE ENCOUNTER
----- Message from Cheyenne Talbot MD sent at 1/3/2022 10:06 PM EST -----  Regarding: FW: Covid Recovery  Please see if we can perform a virtual visit to check in on this patient with COVID  I did send in codeine cough syrup which could certainly be contributing to his fatigue  It is certainly reassuring that he has been having and normal oxygen level   ----- Message -----  From: Bhanu Gudino  Sent: 1/3/2022   4:58 PM EST  To: Cheyenne Talbot MD  Subject: Marlin Carlos: Covid Recovery                                 ----- Message -----  From: Gene Miller  Sent: 1/3/2022   2:59 PM EST  To: [facility]  Subject: Kallie Severs Afternoon Dr [place],                  I'm still extremely fatigued  Sleep most of the day as well as all night, around 10 hours  Pulse and oxygen both at 97  Coughing can still be an issue  A refill for that medication would be helpful  Feng Adames

## 2022-01-05 ENCOUNTER — TELEMEDICINE (OUTPATIENT)
Dept: FAMILY MEDICINE CLINIC | Facility: CLINIC | Age: 67
End: 2022-01-05
Payer: MEDICARE

## 2022-01-05 VITALS — OXYGEN SATURATION: 97 %

## 2022-01-05 DIAGNOSIS — U07.1 COVID-19: Primary | ICD-10-CM

## 2022-01-05 PROCEDURE — 99442 PR PHYS/QHP TELEPHONE EVALUATION 11-20 MIN: CPT | Performed by: FAMILY MEDICINE

## 2022-01-05 RX ORDER — BENZONATATE 200 MG/1
200 CAPSULE ORAL 3 TIMES DAILY PRN
Qty: 30 CAPSULE | Refills: 1 | Status: SHIPPED | OUTPATIENT
Start: 2022-01-05 | End: 2022-06-20 | Stop reason: ALTCHOICE

## 2022-01-05 RX ORDER — BUDESONIDE 180 UG/1
4 AEROSOL, POWDER RESPIRATORY (INHALATION) 2 TIMES DAILY
Qty: 1 EACH | Refills: 0 | Status: SHIPPED | OUTPATIENT
Start: 2022-01-05 | End: 2022-01-06 | Stop reason: ALTCHOICE

## 2022-01-05 NOTE — PROGRESS NOTES
COVID-19 Outpatient Progress Note    Assessment/Plan:    Problem List Items Addressed This Visit        Other    COVID-19 - Primary    Relevant Medications    benzonatate (TESSALON) 200 MG capsule    budesonide (Pulmicort Flexhaler) 180 MCG/ACT inhaler         Disposition:     I have spent 15 minutes directly with the patient  Greater than 50% of this time was spent in counseling/coordination of care regarding: risks and benefits of treatment options and impressions  I am going to place him on budesonide as he is having a persistent cough  I also prescribed him Tessalon Perles  He will contact me if he is getting worse  Encounter provider Kimberly Ruth MD    Provider located at [address] [id number] [phone]    Recent Visits  Date Type Provider Dept   01/04/22 Telephone [address] [place] [address]   12/29/21 Telemedicine Kimberly Ruth MD [facility]   Showing recent visits within past 7 days and meeting all other requirements  Today's Visits  Date Type Provider Dept   01/05/22 Telemedicine Kimberly Ruth MD [facility]   Showing today's visits and meeting all other requirements  Future Appointments  No visits were found meeting these conditions  Showing future appointments within next 150 days and meeting all other requirements     This virtual check-in was done via telephone and he agrees to proceed  Patient agrees to participate in a virtual check in via telephone or video visit instead of presenting to the office to address urgent/immediate medical needs  Patient is aware this is a billable service  After connecting through Telephone, the patient was identified by name and date of birth  Eranalaina Powersjuan daniel was informed that this was a telemedicine visit and that the exam was being conducted confidentially over secure lines  Lina Mccabe acknowledged consent and understanding of privacy and security of the telemedicine visit  I informed the patient that I have reviewed his record in Epic and presented the opportunity for him to ask any questions regarding the visit today  The patient agreed to participate  It was my intent to perform this visit via video technology but the patient was not able to do a video connection so the visit was completed via audio telephone only  Verification of patient location:  Patient is located in the following state in which I hold an active license: [place]    Subjective:   Lina Mccabe is a 78 y o  male who has been screened for COVID-19  Symptom change since last report: improving  Patient's symptoms include fatigue and cough  Patient denies fever, chills, congestion, sore throat (this has resolved), anosmia, loss of taste, shortness of breath, abdominal pain, nausea, vomiting, diarrhea, myalgias and headaches  Date of symptom onset: 12/27/2021  Date of positive COVID-19 PCR: 12/28/2021  COVID-19 vaccination status: Fully vaccinated (primary series)    Patient is doing well  He is concerned about some persistent fatigue as well as a hacking cough mostly at bedtime  Lab Results   Component Value Date    SARSCOV2 Positive (A) 12/28/2021    SARSCOV2 Negative 08/24/2021     Past Medical History:   Diagnosis Date    Diabetes mellitus ([facility] )     Hypertension      History reviewed  No pertinent surgical history  Current Outpatient Medications   Medication Sig Dispense Refill    atorvastatin (LIPITOR) 40 mg tablet TAKE 1 TABLET(40 MG) BY MOUTH DAILY 90 tablet 3    benzonatate (TESSALON) 200 MG capsule Take 1 capsule (200 mg total) by mouth 3 (three) times a day as needed for cough 30 capsule 1    budesonide (Pulmicort Flexhaler) 180 MCG/ACT inhaler Inhale 4 puffs 2 (two) times a day for 14 days Rinse mouth after use   1 each 0    Cholecalciferol (Vitamin D) 50 MCG (2000 UT) tablet Take 1 tablet (2,000 Units total) by mouth daily 30 tablet 0    LUMIGAN 0 01 % ophthalmic drops INSTILL 1 DROP INTO BOTH EYES AT BEDTIME  3    metFORMIN (GLUCOPHAGE) 1000 MG tablet TAKE 1 TABLET(1000 MG) BY MOUTH TWICE DAILY WITH MEALS 180 tablet 3    olmesartan-hydrochlorothiazide (BENICAR HCT) 40-12 5 MG per tablet TAKE 1 TABLET BY MOUTH DAILY 90 tablet 3    Ozempic, 1 MG/DOSE, 4 MG/3ML SOPN injection pen INJECT 1MG UNDER THE SKIN ONCE A WEEK 9 mL 1     No current facility-administered medications for this visit  Allergies   Allergen Reactions    Captopril Cough    Crestor [Rosuvastatin] Diarrhea    Enalapril Cough       Review of Systems   Constitutional: Positive for fatigue  Negative for chills and fever  HENT: Negative for congestion and sore throat (this has resolved)  Respiratory: Positive for cough  Negative for shortness of breath  Gastrointestinal: Negative for abdominal pain, diarrhea, nausea and vomiting  Musculoskeletal: Negative for myalgias  Neurological: Negative for headaches  Objective:    Vitals:    01/05/22 1823   SpO2: 97%       Physical Exam    VIRTUAL VISIT DISCLAIMER    Sonja Osborn verbally agrees to participate in [employer]  Pt is aware that [employer] could be limited without vital signs or the ability to perform a full hands-on physical exam  Alejandro Adames understands he or the provider may request at any time to terminate the video visit and request the patient to seek care or treatment in person

## 2022-01-06 DIAGNOSIS — U07.1 COVID-19: Primary | ICD-10-CM

## 2022-01-06 RX ORDER — FLUTICASONE PROPIONATE 110 UG/1
4 AEROSOL, METERED RESPIRATORY (INHALATION) 2 TIMES DAILY
Qty: 12 G | Refills: 0 | Status: SHIPPED | OUTPATIENT
Start: 2022-01-06 | End: 2022-06-20 | Stop reason: ALTCHOICE

## 2022-04-10 DIAGNOSIS — E11.8 TYPE 2 DIABETES MELLITUS WITH COMPLICATION (HCC): ICD-10-CM

## 2022-04-10 DIAGNOSIS — E78.5 HYPERLIPIDEMIA, UNSPECIFIED HYPERLIPIDEMIA TYPE: ICD-10-CM

## 2022-04-12 DIAGNOSIS — E78.5 HYPERLIPIDEMIA, UNSPECIFIED HYPERLIPIDEMIA TYPE: ICD-10-CM

## 2022-04-12 DIAGNOSIS — E11.8 TYPE 2 DIABETES MELLITUS WITH COMPLICATION (HCC): ICD-10-CM

## 2022-04-12 RX ORDER — ATORVASTATIN CALCIUM 40 MG/1
40 TABLET, FILM COATED ORAL DAILY
Qty: 90 TABLET | Refills: 3 | OUTPATIENT
Start: 2022-04-12 | End: 2022-07-11

## 2022-04-12 RX ORDER — ATORVASTATIN CALCIUM 40 MG/1
TABLET, FILM COATED ORAL
Qty: 90 TABLET | Refills: 3 | Status: SHIPPED | OUTPATIENT
Start: 2022-04-12 | End: 2022-07-15 | Stop reason: SDUPTHER

## 2022-05-09 DIAGNOSIS — E11.9 TYPE 2 DIABETES MELLITUS WITHOUT COMPLICATION, WITHOUT LONG-TERM CURRENT USE OF INSULIN (HCC): ICD-10-CM

## 2022-05-09 RX ORDER — SEMAGLUTIDE 1.34 MG/ML
INJECTION, SOLUTION SUBCUTANEOUS
Qty: 9 ML | Refills: 1 | Status: SHIPPED | OUTPATIENT
Start: 2022-05-09

## 2022-06-11 LAB
ALBUMIN SERPL-MCNC: 4.3 G/DL (ref 3.6–5.1)
ALBUMIN/GLOB SERPL: 1.4 (CALC) (ref 1–2.5)
ALP SERPL-CCNC: 67 U/L (ref 35–144)
ALT SERPL-CCNC: 21 U/L (ref 9–46)
AST SERPL-CCNC: 13 U/L (ref 10–35)
BASOPHILS # BLD AUTO: 61 CELLS/UL (ref 0–200)
BASOPHILS NFR BLD AUTO: 0.9 %
BILIRUB SERPL-MCNC: 0.3 MG/DL (ref 0.2–1.2)
BUN SERPL-MCNC: 29 MG/DL (ref 7–25)
BUN/CREAT SERPL: 24 (CALC) (ref 6–22)
CALCIUM SERPL-MCNC: 10 MG/DL (ref 8.6–10.3)
CHLORIDE SERPL-SCNC: 101 MMOL/L (ref 98–110)
CHOLEST SERPL-MCNC: 151 MG/DL
CHOLEST/HDLC SERPL: 2.6 (CALC)
CO2 SERPL-SCNC: 26 MMOL/L (ref 20–32)
CREAT SERPL-MCNC: 1.22 MG/DL (ref 0.7–1.25)
EOSINOPHIL # BLD AUTO: 258 CELLS/UL (ref 15–500)
EOSINOPHIL NFR BLD AUTO: 3.8 %
ERYTHROCYTE [DISTWIDTH] IN BLOOD BY AUTOMATED COUNT: 12.6 % (ref 11–15)
GLOBULIN SER CALC-MCNC: 3 G/DL (CALC) (ref 1.9–3.7)
GLUCOSE SERPL-MCNC: 220 MG/DL (ref 65–99)
HBA1C MFR BLD: 8.4 % OF TOTAL HGB
HCT VFR BLD AUTO: 38.9 % (ref 38.5–50)
HDLC SERPL-MCNC: 58 MG/DL
HGB BLD-MCNC: 12.9 G/DL (ref 13.2–17.1)
LDLC SERPL CALC-MCNC: 79 MG/DL (CALC)
LYMPHOCYTES # BLD AUTO: 1584 CELLS/UL (ref 850–3900)
LYMPHOCYTES NFR BLD AUTO: 23.3 %
MCH RBC QN AUTO: 31.2 PG (ref 27–33)
MCHC RBC AUTO-ENTMCNC: 33.2 G/DL (ref 32–36)
MCV RBC AUTO: 94 FL (ref 80–100)
MONOCYTES # BLD AUTO: 462 CELLS/UL (ref 200–950)
MONOCYTES NFR BLD AUTO: 6.8 %
NEUTROPHILS # BLD AUTO: 4434 CELLS/UL (ref 1500–7800)
NEUTROPHILS NFR BLD AUTO: 65.2 %
NONHDLC SERPL-MCNC: 93 MG/DL (CALC)
PLATELET # BLD AUTO: 240 THOUSAND/UL (ref 140–400)
PMV BLD REES-ECKER: 10.2 FL (ref 7.5–12.5)
POTASSIUM SERPL-SCNC: 4.5 MMOL/L (ref 3.5–5.3)
PROT SERPL-MCNC: 7.3 G/DL (ref 6.1–8.1)
RBC # BLD AUTO: 4.14 MILLION/UL (ref 4.2–5.8)
SL AMB EGFR AFRICAN AMERICAN: 71 ML/MIN/1.73M2
SL AMB EGFR NON AFRICAN AMERICAN: 61 ML/MIN/1.73M2
SODIUM SERPL-SCNC: 137 MMOL/L (ref 135–146)
TRIGL SERPL-MCNC: 61 MG/DL
WBC # BLD AUTO: 6.8 THOUSAND/UL (ref 3.8–10.8)

## 2022-06-20 ENCOUNTER — OFFICE VISIT (OUTPATIENT)
Dept: FAMILY MEDICINE CLINIC | Facility: CLINIC | Age: 67
End: 2022-06-20
Payer: MEDICARE

## 2022-06-20 VITALS
HEIGHT: 70 IN | WEIGHT: 198 LBS | SYSTOLIC BLOOD PRESSURE: 120 MMHG | DIASTOLIC BLOOD PRESSURE: 74 MMHG | BODY MASS INDEX: 28.35 KG/M2 | HEART RATE: 70 BPM | OXYGEN SATURATION: 98 %

## 2022-06-20 DIAGNOSIS — D64.9 ANEMIA, UNSPECIFIED TYPE: ICD-10-CM

## 2022-06-20 DIAGNOSIS — L20.84 INTRINSIC ECZEMA: ICD-10-CM

## 2022-06-20 DIAGNOSIS — I10 ESSENTIAL HYPERTENSION: ICD-10-CM

## 2022-06-20 DIAGNOSIS — E11.9 TYPE 2 DIABETES MELLITUS WITHOUT COMPLICATION, WITHOUT LONG-TERM CURRENT USE OF INSULIN (HCC): ICD-10-CM

## 2022-06-20 DIAGNOSIS — E11.8 TYPE 2 DIABETES MELLITUS WITH COMPLICATION (HCC): ICD-10-CM

## 2022-06-20 DIAGNOSIS — E78.2 MIXED HYPERLIPIDEMIA: ICD-10-CM

## 2022-06-20 DIAGNOSIS — C61 MALIGNANT NEOPLASM OF PROSTATE (HCC): ICD-10-CM

## 2022-06-20 DIAGNOSIS — Z23 NEED FOR PNEUMOCOCCAL VACCINATION: Primary | ICD-10-CM

## 2022-06-20 PROBLEM — L91.8 CUTANEOUS SKIN TAGS: Status: RESOLVED | Noted: 2019-01-02 | Resolved: 2022-06-20

## 2022-06-20 PROCEDURE — G0009 ADMIN PNEUMOCOCCAL VACCINE: HCPCS

## 2022-06-20 PROCEDURE — 99214 OFFICE O/P EST MOD 30 MIN: CPT | Performed by: FAMILY MEDICINE

## 2022-06-20 PROCEDURE — 90677 PCV20 VACCINE IM: CPT

## 2022-06-20 RX ORDER — TRIAMCINOLONE ACETONIDE 1 MG/G
CREAM TOPICAL 2 TIMES DAILY
Qty: 30 G | Refills: 0 | Status: SHIPPED | OUTPATIENT
Start: 2022-06-20 | End: 2022-07-27

## 2022-06-20 NOTE — ASSESSMENT & PLAN NOTE
Lab Results   Component Value Date    HGBA1C 8 4 (H) 06/10/2022   He has some mild CKD    Continue routine monitoring

## 2022-06-20 NOTE — ASSESSMENT & PLAN NOTE
This seems to be pretty persistent on his hands this year so I placed him on triamcinolone    He is typically using just Cortaid

## 2022-06-20 NOTE — PROGRESS NOTES
Assessment/Plan:       Problem List Items Addressed This Visit        Endocrine    Type 2 diabetes mellitus with complication [facility])       Lab Results   Component Value Date    HGBA1C 8 4 (H) 06/10/2022   He has some mild CKD  Continue routine monitoring              Cardiovascular and Mediastinum    Essential hypertension    Relevant Orders    Hemoglobin A1C    Comprehensive metabolic panel    CBC and differential       Musculoskeletal and Integument    Intrinsic eczema     This seems to be pretty persistent on his hands this year so I placed him on triamcinolone  He is typically using just Cortaid           Relevant Medications    triamcinolone (KENALOG) 0 1 % cream       Genitourinary    Malignant neoplasm of prostate ([address] )       Other    Mixed hyperlipidemia    Relevant Orders    Comprehensive metabolic panel    Anemia    Relevant Orders    CBC and differential      Other Visit Diagnoses     Need for pneumococcal vaccination    -  Primary    Relevant Orders    Pneumococcal Conjugate Vaccine 20-valent (Pcv20) (Completed)          He had COVID back in late December  This would be a great time for him to consider getting a booster  Subjective:      Patient ID: Bandar Velasquez is a 78 y o  male  HPI patient presents today for follow-up for chronic health issues  He has no acute complaints  A1c has trended up and he notes his diet has not been excellent  He declines diabetic Education but feels he can make some lifestyle changes on his own  Lipids look excellent  Blood pressure is well controlled  He is having no chest pain, shortness a breath or palpitations  His eczema seems to be getting worse on both hands      The following portions of the patient's history were reviewed and updated as appropriate: allergies, current medications, past family history, past medical history, past social history, past surgical history and problem list       Current Outpatient Medications:     atorvastatin (LIPITOR) 40 mg tablet, TAKE 1 TABLET(40 MG) BY MOUTH DAILY, Disp: 90 tablet, Rfl: 3    LUMIGAN 0 01 % ophthalmic drops, INSTILL 1 DROP INTO BOTH EYES AT BEDTIME, Disp: , Rfl: 3    metFORMIN (GLUCOPHAGE) 1000 MG tablet, TAKE 1 TABLET(1000 MG) BY MOUTH TWICE DAILY WITH MEALS, Disp: 180 tablet, Rfl: 3    olmesartan-hydrochlorothiazide (BENICAR HCT) 40-12 5 MG per tablet, TAKE 1 TABLET BY MOUTH DAILY, Disp: 90 tablet, Rfl: 3    Ozempic, 1 MG/DOSE, 4 MG/3ML SOPN injection pen, INJECT 1 MG UNDER THE SKIN ONCE A WEEK, Disp: 9 mL, Rfl: 1    triamcinolone (KENALOG) 0 1 % cream, Apply topically 2 (two) times a day, Disp: 30 g, Rfl: 0     Review of Systems   Constitutional: Negative for appetite change, chills, fatigue, fever and unexpected weight change  HENT: Negative for trouble swallowing  Eyes: Negative for visual disturbance  Respiratory: Negative for cough, chest tightness, shortness of breath and wheezing  Cardiovascular: Negative for chest pain, palpitations and leg swelling  Gastrointestinal: Negative for abdominal distention, abdominal pain, blood in stool, constipation and diarrhea  Endocrine: Negative for polyuria  Genitourinary: Negative for difficulty urinating and flank pain  Musculoskeletal: Negative for arthralgias and myalgias  Skin: Negative for rash  Neurological: Negative for dizziness and light-headedness  Hematological: Negative for adenopathy  Does not bruise/bleed easily  Psychiatric/Behavioral: Negative for dysphoric mood and sleep disturbance  The patient is not nervous/anxious  Objective:      /74 (BP Location: Left arm, Patient Position: Sitting, Cuff Size: Adult)   Pulse 70   Ht 5' 10" (1 778 m)   Wt 89 8 kg (198 lb)   SpO2 98%   BMI 28 41 kg/m²          Physical Exam  Vitals reviewed  Constitutional:       General: He is not in acute distress  Appearance: He is well-developed  He is not diaphoretic  HENT:      Head: Normocephalic     Eyes: General:         Right eye: No discharge  Left eye: No discharge  Pupils: Pupils are equal, round, and reactive to light  Neck:      Thyroid: No thyromegaly  Trachea: No tracheal deviation  Cardiovascular:      Rate and Rhythm: Normal rate and regular rhythm  Heart sounds: Normal heart sounds  No murmur heard  Pulmonary:      Effort: Pulmonary effort is normal  No respiratory distress  Breath sounds: No wheezing or rales  Abdominal:      General: There is no distension  Palpations: Abdomen is soft  Tenderness: There is no abdominal tenderness  Musculoskeletal:         General: Normal range of motion  Right lower leg: No edema  Left lower leg: No edema  Lymphadenopathy:      Cervical: No cervical adenopathy  Skin:     General: Skin is warm  Findings: Rash (He has a rash on bilateral dorsal hands consistent with significant eczema  He has a few spots that look like dyshidrotic eczema) present  No erythema  Neurological:      General: No focal deficit present  Mental Status: He is alert and oriented to person, place, and time  Gait: Gait normal    Psychiatric:         Thought Content:  Thought content normal          Judgment: Judgment normal            Nura Nj MD

## 2022-07-15 DIAGNOSIS — E78.5 HYPERLIPIDEMIA, UNSPECIFIED HYPERLIPIDEMIA TYPE: ICD-10-CM

## 2022-07-15 RX ORDER — ATORVASTATIN CALCIUM 40 MG/1
40 TABLET, FILM COATED ORAL DAILY
Qty: 90 TABLET | Refills: 3 | Status: SHIPPED | OUTPATIENT
Start: 2022-07-15

## 2022-07-18 DIAGNOSIS — I10 ESSENTIAL HYPERTENSION: ICD-10-CM

## 2022-07-18 RX ORDER — OLMESARTAN MEDOXOMIL AND HYDROCHLOROTHIAZIDE 40/12.5 40; 12.5 MG/1; MG/1
1 TABLET ORAL DAILY
Qty: 90 TABLET | Refills: 3 | Status: SHIPPED | OUTPATIENT
Start: 2022-07-18

## 2022-07-27 DIAGNOSIS — L20.84 INTRINSIC ECZEMA: ICD-10-CM

## 2022-07-27 RX ORDER — TRIAMCINOLONE ACETONIDE 1 MG/G
CREAM TOPICAL
Qty: 30 G | Refills: 0 | Status: SHIPPED | OUTPATIENT
Start: 2022-07-27 | End: 2022-10-16

## 2022-09-06 DIAGNOSIS — E11.9 TYPE 2 DIABETES MELLITUS WITHOUT COMPLICATION, WITHOUT LONG-TERM CURRENT USE OF INSULIN (HCC): ICD-10-CM

## 2022-09-06 RX ORDER — SEMAGLUTIDE 1.34 MG/ML
1 INJECTION, SOLUTION SUBCUTANEOUS WEEKLY
Qty: 9 ML | Refills: 1 | Status: SHIPPED | OUTPATIENT
Start: 2022-09-06

## 2022-10-12 ENCOUNTER — RA CDI HCC (OUTPATIENT)
Dept: OTHER | Facility: HOSPITAL | Age: 67
End: 2022-10-12

## 2022-10-12 LAB
ALBUMIN SERPL-MCNC: 4.4 G/DL (ref 3.6–5.1)
ALBUMIN/GLOB SERPL: 1.4 (CALC) (ref 1–2.5)
ALP SERPL-CCNC: 66 U/L (ref 35–144)
ALT SERPL-CCNC: 15 U/L (ref 9–46)
AST SERPL-CCNC: 13 U/L (ref 10–35)
BASOPHILS # BLD AUTO: 60 CELLS/UL (ref 0–200)
BASOPHILS NFR BLD AUTO: 1 %
BILIRUB SERPL-MCNC: 0.5 MG/DL (ref 0.2–1.2)
BUN SERPL-MCNC: 28 MG/DL (ref 7–25)
BUN/CREAT SERPL: 24 (CALC) (ref 6–22)
CALCIUM SERPL-MCNC: 9.7 MG/DL (ref 8.6–10.3)
CHLORIDE SERPL-SCNC: 99 MMOL/L (ref 98–110)
CO2 SERPL-SCNC: 26 MMOL/L (ref 20–32)
CREAT SERPL-MCNC: 1.17 MG/DL (ref 0.7–1.35)
EOSINOPHIL # BLD AUTO: 132 CELLS/UL (ref 15–500)
EOSINOPHIL NFR BLD AUTO: 2.2 %
ERYTHROCYTE [DISTWIDTH] IN BLOOD BY AUTOMATED COUNT: 12.3 % (ref 11–15)
GFR/BSA.PRED SERPLBLD CYS-BASED-ARV: 68 ML/MIN/1.73M2
GLOBULIN SER CALC-MCNC: 3.2 G/DL (CALC) (ref 1.9–3.7)
GLUCOSE SERPL-MCNC: 176 MG/DL (ref 65–99)
HBA1C MFR BLD: 8 % OF TOTAL HGB
HCT VFR BLD AUTO: 37.8 % (ref 38.5–50)
HGB BLD-MCNC: 12.7 G/DL (ref 13.2–17.1)
LYMPHOCYTES # BLD AUTO: 1386 CELLS/UL (ref 850–3900)
LYMPHOCYTES NFR BLD AUTO: 23.1 %
MCH RBC QN AUTO: 29.8 PG (ref 27–33)
MCHC RBC AUTO-ENTMCNC: 33.6 G/DL (ref 32–36)
MCV RBC AUTO: 88.7 FL (ref 80–100)
MONOCYTES # BLD AUTO: 510 CELLS/UL (ref 200–950)
MONOCYTES NFR BLD AUTO: 8.5 %
NEUTROPHILS # BLD AUTO: 3912 CELLS/UL (ref 1500–7800)
NEUTROPHILS NFR BLD AUTO: 65.2 %
PLATELET # BLD AUTO: 268 THOUSAND/UL (ref 140–400)
PMV BLD REES-ECKER: 10.7 FL (ref 7.5–12.5)
POTASSIUM SERPL-SCNC: 4.2 MMOL/L (ref 3.5–5.3)
PROT SERPL-MCNC: 7.6 G/DL (ref 6.1–8.1)
RBC # BLD AUTO: 4.26 MILLION/UL (ref 4.2–5.8)
SODIUM SERPL-SCNC: 134 MMOL/L (ref 135–146)
WBC # BLD AUTO: 6 THOUSAND/UL (ref 3.8–10.8)

## 2022-10-12 NOTE — PROGRESS NOTES
E11 65  [facility]  coding opportunities          Chart Reviewed number of suggestions sent to Provider: 1     Patients Insurance     Medicare Insurance: [employer]

## 2022-10-16 DIAGNOSIS — L20.84 INTRINSIC ECZEMA: ICD-10-CM

## 2022-10-16 RX ORDER — TRIAMCINOLONE ACETONIDE 1 MG/G
CREAM TOPICAL
Qty: 30 G | Refills: 0 | Status: SHIPPED | OUTPATIENT
Start: 2022-10-16

## 2022-10-17 ENCOUNTER — OFFICE VISIT (OUTPATIENT)
Dept: FAMILY MEDICINE CLINIC | Facility: CLINIC | Age: 67
End: 2022-10-17
Payer: MEDICARE

## 2022-10-17 ENCOUNTER — LAB (OUTPATIENT)
Dept: LAB | Facility: MEDICAL CENTER | Age: 67
End: 2022-10-17
Payer: MEDICARE

## 2022-10-17 VITALS
WEIGHT: 194.2 LBS | OXYGEN SATURATION: 96 % | DIASTOLIC BLOOD PRESSURE: 76 MMHG | HEIGHT: 70 IN | SYSTOLIC BLOOD PRESSURE: 128 MMHG | RESPIRATION RATE: 12 BRPM | HEART RATE: 76 BPM | BODY MASS INDEX: 27.8 KG/M2

## 2022-10-17 DIAGNOSIS — E78.2 MIXED HYPERLIPIDEMIA: ICD-10-CM

## 2022-10-17 DIAGNOSIS — I10 ESSENTIAL HYPERTENSION: ICD-10-CM

## 2022-10-17 DIAGNOSIS — L72.3 SEBACEOUS CYST: ICD-10-CM

## 2022-10-17 DIAGNOSIS — Z23 NEED FOR INFLUENZA VACCINATION: Primary | ICD-10-CM

## 2022-10-17 DIAGNOSIS — L20.84 INTRINSIC ECZEMA: ICD-10-CM

## 2022-10-17 DIAGNOSIS — E11.8 TYPE 2 DIABETES MELLITUS WITH COMPLICATION (HCC): ICD-10-CM

## 2022-10-17 DIAGNOSIS — D64.9 ANEMIA, UNSPECIFIED TYPE: ICD-10-CM

## 2022-10-17 DIAGNOSIS — C61 MALIGNANT NEOPLASM OF PROSTATE (HCC): ICD-10-CM

## 2022-10-17 DIAGNOSIS — D64.9 RELATIVE ANEMIA: Primary | ICD-10-CM

## 2022-10-17 DIAGNOSIS — C61 PROSTATE CANCER (HCC): ICD-10-CM

## 2022-10-17 LAB
FERRITIN SERPL-MCNC: 183 NG/ML (ref 8–388)
IRON SERPL-MCNC: 85 UG/DL (ref 65–175)
PSA SERPL-MCNC: 1.3 NG/ML (ref 0–4)
TIBC SERPL-MCNC: 311 UG/DL (ref 250–450)
TSH SERPL DL<=0.05 MIU/L-ACNC: 2.49 UIU/ML (ref 0.45–4.5)

## 2022-10-17 PROCEDURE — G0103 PSA SCREENING: HCPCS

## 2022-10-17 PROCEDURE — 36415 COLL VENOUS BLD VENIPUNCTURE: CPT

## 2022-10-17 PROCEDURE — 90662 IIV NO PRSV INCREASED AG IM: CPT

## 2022-10-17 PROCEDURE — 83550 IRON BINDING TEST: CPT

## 2022-10-17 PROCEDURE — 83540 ASSAY OF IRON: CPT

## 2022-10-17 PROCEDURE — 82728 ASSAY OF FERRITIN: CPT

## 2022-10-17 PROCEDURE — 84443 ASSAY THYROID STIM HORMONE: CPT

## 2022-10-17 PROCEDURE — G0008 ADMIN INFLUENZA VIRUS VAC: HCPCS

## 2022-10-17 PROCEDURE — 99214 OFFICE O/P EST MOD 30 MIN: CPT | Performed by: FAMILY MEDICINE

## 2022-10-17 NOTE — PROGRESS NOTES
Assessment/Plan:       Problem List Items Addressed This Visit        Endocrine    Type 2 diabetes mellitus with complication (HCC)    Relevant Orders    Hemoglobin A1C    Comprehensive metabolic panel    CBC and differential       Cardiovascular and Mediastinum    Essential hypertension    Relevant Orders    Hemoglobin A1C    Comprehensive metabolic panel    CBC and differential       Musculoskeletal and Integument    Intrinsic eczema    Relevant Orders    TSH, 3rd generation with Free T4 reflex (Completed)    Sebaceous cyst mid back       Genitourinary    Malignant neoplasm of prostate ([address] )       Other    Mixed hyperlipidemia    Relevant Orders    Comprehensive metabolic panel    CBC and differential    Anemia    Relevant Orders    TSH, 3rd generation with Free T4 reflex (Completed)      Other Visit Diagnoses     Need for influenza vaccination    -  Primary    Relevant Orders    influenza vaccine, high-dose, PF 0 7 mL (FLUZONE HIGH-DOSE) (Completed)            Subjective:      Patient ID: Susan Call is a 75 y o  male  HPI  Patient presents for follow-up for chronic health issues to  Diabetic control is improved slightly  He now has an A1c of 8 0  His blood pressure has been well controlled at home  He denies chest pain, shortness breast or palpitations        The following portions of the patient's history were reviewed and updated as appropriate: allergies, current medications, past family history, past medical history, past social history, past surgical history and problem list       Current Outpatient Medications:   •  atorvastatin (LIPITOR) 40 mg tablet, Take 1 tablet (40 mg total) by mouth daily, Disp: 90 tablet, Rfl: 3  •  LUMIGAN 0 01 % ophthalmic drops, INSTILL 1 DROP INTO BOTH EYES AT BEDTIME, Disp: , Rfl: 3  •  metFORMIN (GLUCOPHAGE) 1000 MG tablet, TAKE 1 TABLET(1000 MG) BY MOUTH TWICE DAILY WITH MEALS, Disp: 180 tablet, Rfl: 3  •  olmesartan-hydrochlorothiazide (BENICAR HCT) 40-12 5 MG per tablet, Take 1 tablet by mouth daily, Disp: 90 tablet, Rfl: 3  •  semaglutide, 1 mg/dose, (Ozempic, 1 MG/DOSE,) 4 MG/3ML SOPN injection pen, Inject 0 75 mL (1 mg total) under the skin once a week, Disp: 9 mL, Rfl: 1  •  triamcinolone (KENALOG) 0 1 % cream, APPLY TOPICALLY TO THE AFFECTED AREA TWICE DAILY, Disp: 30 g, Rfl: 0     Review of Systems   Constitutional: Negative for fatigue  HENT: Negative for trouble swallowing  Respiratory: Negative for chest tightness, shortness of breath and wheezing  Cardiovascular: Negative for chest pain, palpitations and leg swelling  Gastrointestinal: Negative for abdominal pain, constipation and diarrhea  Endocrine: Negative for polyuria  Genitourinary: Negative for difficulty urinating and flank pain  Musculoskeletal: Negative for arthralgias and myalgias  Skin: Negative for rash  Neurological: Negative for light-headedness  Psychiatric/Behavioral: Negative for sleep disturbance  Objective:      /76 (BP Location: Left arm, Patient Position: Sitting, Cuff Size: Standard)   Pulse 76   Resp 12   Ht 5' 10" (1 778 m)   Wt 88 1 kg (194 lb 3 2 oz)   SpO2 96%   BMI 27 86 kg/m²          Physical Exam  Vitals reviewed  Constitutional:       Appearance: He is well-developed  HENT:      Head: Normocephalic  Cardiovascular:      Rate and Rhythm: Regular rhythm  Heart sounds: Normal heart sounds  No murmur heard  Pulmonary:      Effort: No respiratory distress  Breath sounds: No wheezing or rales  Abdominal:      General: There is no distension  Tenderness: There is no abdominal tenderness  Musculoskeletal:      Right lower leg: No edema  Left lower leg: No edema  Skin:     Findings: No erythema or rash  Neurological:      Mental Status: He is alert and oriented to person, place, and time             [facility]

## 2023-01-23 DIAGNOSIS — E11.9 TYPE 2 DIABETES MELLITUS WITHOUT COMPLICATION, WITHOUT LONG-TERM CURRENT USE OF INSULIN (HCC): ICD-10-CM

## 2023-02-06 DIAGNOSIS — L20.84 INTRINSIC ECZEMA: ICD-10-CM

## 2023-02-06 RX ORDER — TRIAMCINOLONE ACETONIDE 1 MG/G
CREAM TOPICAL
Qty: 30 G | Refills: 0 | Status: SHIPPED | OUTPATIENT
Start: 2023-02-06

## 2023-03-07 ENCOUNTER — OFFICE VISIT (OUTPATIENT)
Dept: FAMILY MEDICINE CLINIC | Facility: CLINIC | Age: 68
End: 2023-03-07

## 2023-03-07 VITALS
HEIGHT: 70 IN | BODY MASS INDEX: 27.09 KG/M2 | DIASTOLIC BLOOD PRESSURE: 80 MMHG | WEIGHT: 189.2 LBS | OXYGEN SATURATION: 97 % | RESPIRATION RATE: 16 BRPM | SYSTOLIC BLOOD PRESSURE: 110 MMHG | HEART RATE: 82 BPM | TEMPERATURE: 97.4 F

## 2023-03-07 DIAGNOSIS — E11.8 TYPE 2 DIABETES MELLITUS WITH COMPLICATION (HCC): ICD-10-CM

## 2023-03-07 DIAGNOSIS — H66.001 NON-RECURRENT ACUTE SUPPURATIVE OTITIS MEDIA OF RIGHT EAR WITHOUT SPONTANEOUS RUPTURE OF TYMPANIC MEMBRANE: Primary | ICD-10-CM

## 2023-03-07 DIAGNOSIS — H60.311 ACUTE DIFFUSE OTITIS EXTERNA OF RIGHT EAR: ICD-10-CM

## 2023-03-07 DIAGNOSIS — C61 MALIGNANT NEOPLASM OF PROSTATE (HCC): ICD-10-CM

## 2023-03-07 RX ORDER — OFLOXACIN 3 MG/ML
5 SOLUTION AURICULAR (OTIC) DAILY
Qty: 10 ML | Refills: 0 | Status: SHIPPED | OUTPATIENT
Start: 2023-03-07 | End: 2023-03-07

## 2023-03-07 RX ORDER — OFLOXACIN 3 MG/ML
5 SOLUTION AURICULAR (OTIC) 2 TIMES DAILY
Qty: 10 ML | Refills: 0 | Status: SHIPPED | OUTPATIENT
Start: 2023-03-07

## 2023-03-07 NOTE — PROGRESS NOTES
Assessment/Plan:       Problem List Items Addressed This Visit        Endocrine    Type 2 diabetes mellitus with complication ([facility] [address] )     Follow up as planned continue current medication  Lab Results   Component Value Date    HGBA1C 8 0 (H) 10/11/2022               Genitourinary    Malignant neoplasm of prostate ([facility] [address] )     Continue follow up at [facility]        Other Visit Diagnoses     Non-recurrent acute suppurative otitis media of right ear without spontaneous rupture of tympanic membrane    -  Primary    Acute diffuse otitis externa of right ear        Relevant Medications    ofloxacin (FLOXIN) 0 3 % otic solution          Ceruem partially removed, hearing noted to improve by patient, treat otitis externa return or see ENT if not resolved  Ear cerumen removal    Date/Time: 3/7/2023 1:52 PM  Performed by: Armaan Patel MD  Authorized by: Armaan Patel MD   Universal Protocol:  Consent: Verbal consent obtained  Timeout called at: 3/7/2023 1:52 PM   Patient understanding: patient states understanding of the procedure being performed      Patient location:  Bedside  Procedure details:     Local anesthetic:  None    Location:  R ear    Procedure type: irrigation only      Approach:  External  Post-procedure details:     Complication:  None    Hearing quality:  Improved    Patient tolerance of procedure: Tolerated well, no immediate complications  Comments:      Cerumen partiall removed, not able to fully remove, will treat otitis externa, follow up if not resolved or refer to ENT  Subjective:      Patient ID: Lian Elizalde is a 75 y o  male  HPI     75year old male presenting for right ear clogged for one week  Has noted pain and at one pain had a bleed from his right ear, the bleeding has since resolved but he has been unable to hear out of the ear          The following portions of the patient's history were reviewed and updated as appropriate: allergies, current medications, past family history, past medical history, past social history, past surgical history and problem list       Current Outpatient Medications:   •  atorvastatin (LIPITOR) 40 mg tablet, Take 1 tablet (40 mg total) by mouth daily, Disp: 90 tablet, Rfl: 3  •  LUMIGAN 0 01 % ophthalmic drops, INSTILL 1 DROP INTO BOTH EYES AT BEDTIME, Disp: , Rfl: 3  •  metFORMIN (GLUCOPHAGE) 1000 MG tablet, TAKE 1 TABLET(1000 MG) BY MOUTH TWICE DAILY WITH MEALS, Disp: 180 tablet, Rfl: 3  •  ofloxacin (FLOXIN) 0 3 % otic solution, Administer 5 drops to the right ear 2 (two) times a day, Disp: 10 mL, Rfl: 0  •  olmesartan-hydrochlorothiazide (BENICAR HCT) 40-12 5 MG per tablet, Take 1 tablet by mouth daily, Disp: 90 tablet, Rfl: 3  •  semaglutide, 1 mg/dose, (Ozempic, 1 MG/DOSE,) 4 mg/3 mL injection pen, Inject 0 75 mL (1 mg total) under the skin once a week, Disp: 9 mL, Rfl: 3  •  triamcinolone (KENALOG) 0 1 % cream, APPLY TOPICALLY TO THE AFFECTED AREA TWICE DAILY, Disp: 30 g, Rfl: 0     Review of Systems   Constitutional: Negative for activity change and appetite change  Respiratory: Negative for apnea and chest tightness  Cardiovascular: Negative for palpitations  Gastrointestinal: Negative for abdominal distention and abdominal pain  Musculoskeletal: Negative for arthralgias and back pain  Objective:      /80 (BP Location: Right arm, Cuff Size: Large)   Pulse 82   Temp (!) 97 4 °F (36 3 °C) (Tympanic)   Resp 16   Ht 5' 10" (1 778 m)   Wt 85 8 kg (189 lb 3 2 oz)   SpO2 97%   BMI 27 15 kg/m²          Physical Exam  Constitutional:       Appearance: Normal appearance  HENT:      Right Ear: Tympanic membrane normal  There is impacted cerumen  Left Ear: Tympanic membrane normal       Ears:      Comments: dried blood in ear canal      Nose: Nose normal       Mouth/Throat:      Mouth: Mucous membranes are moist    Eyes:      Pupils: Pupils are equal, round, and reactive to light     Cardiovascular: Rate and Rhythm: Normal rate and regular rhythm  Musculoskeletal:         General: Normal range of motion  Skin:     Capillary Refill: Capillary refill takes less than 2 seconds  Neurological:      General: No focal deficit present  Mental Status: He is alert and oriented to person, place, and time             Abdirizak Mehta MD

## 2023-03-07 NOTE — ASSESSMENT & PLAN NOTE
Follow up as planned continue current medication  Lab Results   Component Value Date    HGBA1C 8 0 (H) 10/11/2022

## 2023-03-16 ENCOUNTER — OFFICE VISIT (OUTPATIENT)
Dept: FAMILY MEDICINE CLINIC | Facility: CLINIC | Age: 68
End: 2023-03-16

## 2023-03-16 VITALS
SYSTOLIC BLOOD PRESSURE: 120 MMHG | DIASTOLIC BLOOD PRESSURE: 80 MMHG | WEIGHT: 188 LBS | TEMPERATURE: 98.4 F | HEART RATE: 89 BPM | OXYGEN SATURATION: 95 % | BODY MASS INDEX: 26.92 KG/M2 | RESPIRATION RATE: 16 BRPM | HEIGHT: 70 IN

## 2023-03-16 DIAGNOSIS — H61.21 IMPACTED CERUMEN OF RIGHT EAR: Primary | ICD-10-CM
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
## 2023-03-16 NOTE — PROGRESS NOTES
Assessment/Plan:       Problem List Items Addressed This Visit    None  Visit Diagnoses     Impacted cerumen of right ear    -  Primary    Relevant Medications    carbamide peroxide (DEBROX) 6 5 % otic solution    Other Relevant Orders    Ambulatory Referral to Otolaryngology    Dog bite, initial encounter              1  Impacted cerumen of right ear  Removal unsuccessful no complications placed on Debrox and referral for ENT  - Ambulatory Referral to Otolaryngology; Future  - carbamide peroxide (DEBROX) 6 5 % otic solution; Administer 5 drops to the right ear 2 (two) times a day  Dispense: 15 mL; Refill: 0    2  Dog bite, initial encounter  Patient had superficial dog bite to left leg yesterday appears to be healing well up-to-date on tetanus advised to keep area clean follow-up if worsening  Ear cerumen removal    Date/Time: 3/16/2023 2:11 PM  Performed by: Benjamin Espinal MD  Authorized by: Benjamin Espinal MD   Universal Protocol:  Consent: Verbal consent obtained  Consent given by: patient  Timeout called at: 3/16/2023 1:55 PM   Patient understanding: patient states understanding of the procedure being performed      Patient location:  Bedside  Procedure details:     Location:  R ear    Procedure type: irrigation only      Approach:  External  Post-procedure details:     Complication:  None    Hearing quality:  Diminished    Patient tolerance of procedure: Tolerated well, no immediate complications  Comments:      Attempted irrigation unsuccessful no complications but unable to remove cerumen placed referral to ENT advised to use Debrox eardrops 2 times per day until he sees them  Subjective:      Patient ID: Nataly Murillo is a 75 y o  male  HPI      71-year-old male following up from last week otitis externa and cerumen impaction reports his ear is still blocked  Did use ofloxacin eardrops for the last week      The following portions of the patient's history were reviewed and updated as appropriate: allergies, current medications, past family history, past medical history, past social history, past surgical history and problem list       Current Outpatient Medications:   •  atorvastatin (LIPITOR) 40 mg tablet, Take 1 tablet (40 mg total) by mouth daily, Disp: 90 tablet, Rfl: 3  •  carbamide peroxide (DEBROX) 6 5 % otic solution, Administer 5 drops to the right ear 2 (two) times a day, Disp: 15 mL, Rfl: 0  •  LUMIGAN 0 01 % ophthalmic drops, INSTILL 1 DROP INTO BOTH EYES AT BEDTIME, Disp: , Rfl: 3  •  metFORMIN (GLUCOPHAGE) 1000 MG tablet, TAKE 1 TABLET(1000 MG) BY MOUTH TWICE DAILY WITH MEALS, Disp: 180 tablet, Rfl: 3  •  ofloxacin (FLOXIN) 0 3 % otic solution, Administer 5 drops to the right ear 2 (two) times a day, Disp: 10 mL, Rfl: 0  •  olmesartan-hydrochlorothiazide (BENICAR HCT) 40-12 5 MG per tablet, Take 1 tablet by mouth daily, Disp: 90 tablet, Rfl: 3  •  semaglutide, 1 mg/dose, (Ozempic, 1 MG/DOSE,) 4 mg/3 mL injection pen, Inject 0 75 mL (1 mg total) under the skin once a week, Disp: 9 mL, Rfl: 3  •  triamcinolone (KENALOG) 0 1 % cream, APPLY TOPICALLY TO THE AFFECTED AREA TWICE DAILY, Disp: 30 g, Rfl: 0     Review of Systems   Constitutional: Negative for activity change and appetite change  Respiratory: Negative for apnea and chest tightness  Cardiovascular: Negative for chest pain and palpitations  Gastrointestinal: Negative for abdominal distention and abdominal pain  Musculoskeletal: Negative for arthralgias  Objective:      /80 (BP Location: Left arm, Cuff Size: Standard)   Pulse 89   Temp 98 4 °F (36 9 °C) (Tympanic)   Resp 16   Ht 5' 10" (1 778 m)   Wt 85 3 kg (188 lb)   SpO2 95%   BMI 26 98 kg/m²          Physical Exam  HENT:      Right Ear: There is impacted cerumen        Left Ear: Tympanic membrane normal       Mouth/Throat:      Mouth: Mucous membranes are moist    Cardiovascular:      Rate and Rhythm: Normal rate and regular rhythm  Musculoskeletal:         General: Normal range of motion  Skin:     General: Skin is warm  Capillary Refill: Capillary refill takes less than 2 seconds  Comments: Superficial scratch/bite marks on left thigh   Neurological:      General: No focal deficit present  Mental Status: He is alert and oriented to person, place, and time             Jimmy Zaragoza MD

## 2023-04-12 PROBLEM — Z01.818 PREOP EXAMINATION: Status: ACTIVE | Noted: 2023-04-12

## 2023-04-30 DIAGNOSIS — E11.8 TYPE 2 DIABETES MELLITUS WITH COMPLICATION (HCC): ICD-10-CM

## 2023-07-05 ENCOUNTER — RA CDI HCC (OUTPATIENT)
Dept: OTHER | Facility: HOSPITAL | Age: 68
End: 2023-07-05

## 2023-07-05 NOTE — PROGRESS NOTES
E11.65   [address] [facility] coding opportunities          Chart Reviewed number of suggestions sent to Provider: 1     Patients Insurance     Medicare Insurance: [employer]

## 2023-07-11 LAB
ALBUMIN SERPL-MCNC: 4.3 G/DL (ref 3.6–5.1)
ALBUMIN/GLOB SERPL: 1.3 (CALC) (ref 1–2.5)
ALP SERPL-CCNC: 81 U/L (ref 35–144)
ALT SERPL-CCNC: 19 U/L (ref 9–46)
AST SERPL-CCNC: 15 U/L (ref 10–35)
BASOPHILS # BLD AUTO: 61 CELLS/UL (ref 0–200)
BASOPHILS NFR BLD AUTO: 1 %
BILIRUB SERPL-MCNC: 0.5 MG/DL (ref 0.2–1.2)
BUN SERPL-MCNC: 31 MG/DL (ref 7–25)
BUN/CREAT SERPL: 25 (CALC) (ref 6–22)
CALCIUM SERPL-MCNC: 9.9 MG/DL (ref 8.6–10.3)
CHLORIDE SERPL-SCNC: 101 MMOL/L (ref 98–110)
CO2 SERPL-SCNC: 26 MMOL/L (ref 20–32)
CREAT SERPL-MCNC: 1.26 MG/DL (ref 0.7–1.35)
EOSINOPHIL # BLD AUTO: 159 CELLS/UL (ref 15–500)
EOSINOPHIL NFR BLD AUTO: 2.6 %
ERYTHROCYTE [DISTWIDTH] IN BLOOD BY AUTOMATED COUNT: 12.7 % (ref 11–15)
GFR/BSA.PRED SERPLBLD CYS-BASED-ARV: 62 ML/MIN/1.73M2
GLOBULIN SER CALC-MCNC: 3.3 G/DL (CALC) (ref 1.9–3.7)
GLUCOSE SERPL-MCNC: 171 MG/DL (ref 65–99)
HBA1C MFR BLD: 8.6 % OF TOTAL HGB
HCT VFR BLD AUTO: 39.6 % (ref 38.5–50)
HGB BLD-MCNC: 13 G/DL (ref 13.2–17.1)
LYMPHOCYTES # BLD AUTO: 1531 CELLS/UL (ref 850–3900)
LYMPHOCYTES NFR BLD AUTO: 25.1 %
MCH RBC QN AUTO: 30.2 PG (ref 27–33)
MCHC RBC AUTO-ENTMCNC: 32.8 G/DL (ref 32–36)
MCV RBC AUTO: 91.9 FL (ref 80–100)
MONOCYTES # BLD AUTO: 506 CELLS/UL (ref 200–950)
MONOCYTES NFR BLD AUTO: 8.3 %
NEUTROPHILS # BLD AUTO: 3843 CELLS/UL (ref 1500–7800)
NEUTROPHILS NFR BLD AUTO: 63 %
PLATELET # BLD AUTO: 272 THOUSAND/UL (ref 140–400)
PMV BLD REES-ECKER: 10.4 FL (ref 7.5–12.5)
POTASSIUM SERPL-SCNC: 4.3 MMOL/L (ref 3.5–5.3)
PROT SERPL-MCNC: 7.6 G/DL (ref 6.1–8.1)
RBC # BLD AUTO: 4.31 MILLION/UL (ref 4.2–5.8)
SODIUM SERPL-SCNC: 136 MMOL/L (ref 135–146)
WBC # BLD AUTO: 6.1 THOUSAND/UL (ref 3.8–10.8)

## 2023-07-12 ENCOUNTER — OFFICE VISIT (OUTPATIENT)
Dept: FAMILY MEDICINE CLINIC | Facility: CLINIC | Age: 68
End: 2023-07-12
Payer: MEDICARE

## 2023-07-12 VITALS
RESPIRATION RATE: 12 BRPM | DIASTOLIC BLOOD PRESSURE: 80 MMHG | SYSTOLIC BLOOD PRESSURE: 128 MMHG | WEIGHT: 185.4 LBS | BODY MASS INDEX: 26.54 KG/M2 | HEIGHT: 70 IN | OXYGEN SATURATION: 96 % | TEMPERATURE: 97.2 F | HEART RATE: 87 BPM

## 2023-07-12 DIAGNOSIS — E78.2 MIXED HYPERLIPIDEMIA: ICD-10-CM

## 2023-07-12 DIAGNOSIS — N18.2 TYPE 2 DIABETES MELLITUS WITH STAGE 2 CHRONIC KIDNEY DISEASE, WITHOUT LONG-TERM CURRENT USE OF INSULIN (HCC): ICD-10-CM

## 2023-07-12 DIAGNOSIS — L82.1 SK (SEBORRHEIC KERATOSIS): ICD-10-CM

## 2023-07-12 DIAGNOSIS — R26.9 GAIT DISORDER: ICD-10-CM

## 2023-07-12 DIAGNOSIS — E11.8 TYPE 2 DIABETES MELLITUS WITH COMPLICATION (HCC): ICD-10-CM

## 2023-07-12 DIAGNOSIS — C61 MALIGNANT NEOPLASM OF PROSTATE (HCC): ICD-10-CM

## 2023-07-12 DIAGNOSIS — Z23 ENCOUNTER FOR IMMUNIZATION: ICD-10-CM

## 2023-07-12 DIAGNOSIS — I10 ESSENTIAL HYPERTENSION: Primary | ICD-10-CM

## 2023-07-12 DIAGNOSIS — L20.84 INTRINSIC ECZEMA: ICD-10-CM

## 2023-07-12 DIAGNOSIS — E11.22 TYPE 2 DIABETES MELLITUS WITH STAGE 2 CHRONIC KIDNEY DISEASE, WITHOUT LONG-TERM CURRENT USE OF INSULIN (HCC): ICD-10-CM

## 2023-07-12 PROBLEM — Z01.818 PREOP EXAMINATION: Status: RESOLVED | Noted: 2023-04-12 | Resolved: 2023-07-12

## 2023-07-12 PROBLEM — M22.2X2 PATELLOFEMORAL PAIN SYNDROME OF BOTH KNEES: Status: ACTIVE | Noted: 2023-07-12

## 2023-07-12 PROBLEM — M22.2X1 PATELLOFEMORAL PAIN SYNDROME OF BOTH KNEES: Status: ACTIVE | Noted: 2023-07-12

## 2023-07-12 PROCEDURE — 99214 OFFICE O/P EST MOD 30 MIN: CPT | Performed by: FAMILY MEDICINE

## 2023-07-12 NOTE — PROGRESS NOTES
Assessment/Plan:       Problem List Items Addressed This Visit        Endocrine    Type 2 diabetes mellitus with kidney complication, without long-term current use of insulin (HCC)    Relevant Orders    Albumin / creatinine urine ratio    Comprehensive metabolic panel    Hemoglobin A1C       Cardiovascular and Mediastinum    Essential hypertension - Primary    Relevant Orders    Albumin / creatinine urine ratio    Comprehensive metabolic panel    Hemoglobin A1C    CBC and differential       Musculoskeletal and Integument    Intrinsic eczema    SK (seborrheic keratosis)       Genitourinary    Malignant neoplasm of prostate ([address])     Continue follow-up with x-rays            Other    Mixed hyperlipidemia    Relevant Orders    Lipid Panel with Direct LDL reflex    Gait disorder     Neurologic exam today is normal.  He is having some imbalance. I am going to send him to our physical therapy team.         Relevant Orders    Ambulatory Referral to Physical Therapy   Other Visit Diagnoses     Encounter for immunization                Subjective:      Patient ID: Evelia Barker is a 75 y.o. male. Patient presents today for follow-up of chronic health issues. He has noted some issues with his balance. He feels off balance especially when he stands abruptly or turns abruptly. He denies any peripheral neuropathy symptoms such as foot pain or numbness. Diabetes gotten slightly worse with an A1c of 8.6. He had no falls or syncope.   He is having some pain in bilateral knees      The following portions of the patient's history were reviewed and updated as appropriate: allergies, current medications, past family history, past medical history, past social history, past surgical history and problem list.      Current Outpatient Medications:   •  atorvastatin (LIPITOR) 40 mg tablet, Take 1 tablet (40 mg total) by mouth daily, Disp: 90 tablet, Rfl: 3  •  metFORMIN (GLUCOPHAGE) 1000 MG tablet, TAKE 1 TABLET(1000 MG) BY MOUTH TWICE DAILY WITH MEALS, Disp: 180 tablet, Rfl: 3  •  olmesartan-hydrochlorothiazide (BENICAR HCT) 40-12.5 MG per tablet, Take 1 tablet by mouth daily, Disp: 90 tablet, Rfl: 3  •  semaglutide, 1 mg/dose, (Ozempic, 1 MG/DOSE,) 4 mg/3 mL injection pen, Inject 0.75 mL (1 mg total) under the skin once a week, Disp: 9 mL, Rfl: 3  •  triamcinolone (KENALOG) 0.1 % cream, APPLY TOPICALLY TO THE AFFECTED AREA TWICE DAILY, Disp: 30 g, Rfl: 0     Review of Systems   Constitutional: Negative for appetite change, chills, fatigue, fever and unexpected weight change. HENT: Negative for trouble swallowing. Eyes: Negative for visual disturbance. Respiratory: Negative for cough, chest tightness, shortness of breath and wheezing. Cardiovascular: Negative for chest pain, palpitations and leg swelling. Gastrointestinal: Negative for abdominal distention, abdominal pain, blood in stool, constipation and diarrhea. Endocrine: Negative for polyuria. Genitourinary: Negative for difficulty urinating and flank pain. Musculoskeletal: Positive for myalgias (r foot and calf cramps on occasion). Negative for arthralgias. Skin: Negative for rash. Neurological: Negative for dizziness, weakness, light-headedness and headaches. Hematological: Negative for adenopathy. Does not bruise/bleed easily. Psychiatric/Behavioral: Negative for dysphoric mood and sleep disturbance. The patient is not nervous/anxious. Objective:      /80 (BP Location: Left arm, Patient Position: Sitting, Cuff Size: Large)   Pulse 87   Temp (!) 97.2 °F (36.2 °C) (Temporal)   Resp 12   Ht 5' 10" (1.778 m)   Wt 84.1 kg (185 lb 6.4 oz)   SpO2 96%   BMI 26.60 kg/m²          Physical Exam  Vitals reviewed. Constitutional:       Appearance: He is well-developed. HENT:      Head: Normocephalic. Neck:      Vascular: No carotid bruit. Cardiovascular:      Rate and Rhythm: Regular rhythm.       Pulses: no weak pulses          Dorsalis pedis pulses are 2+ on the right side and 2+ on the left side. Posterior tibial pulses are 2+ on the right side and 2+ on the left side. Heart sounds: Normal heart sounds. No murmur heard. Pulmonary:      Effort: No respiratory distress. Breath sounds: No wheezing or rales. Abdominal:      General: There is no distension. Tenderness: There is no abdominal tenderness. Musculoskeletal:      Cervical back: No tenderness. Right lower leg: No edema. Left lower leg: No edema. Feet:      Right foot:      Skin integrity: No ulcer, skin breakdown, erythema, warmth, callus or dry skin. Left foot:      Skin integrity: No ulcer, skin breakdown, erythema, warmth, callus or dry skin. Lymphadenopathy:      Cervical: No cervical adenopathy. Skin:     Findings: No erythema or rash. Neurological:      Mental Status: He is alert and oriented to person, place, and time. Psychiatric:         Mood and Affect: Mood normal.         Diabetic Foot Exam    Patient's shoes and socks removed. Right Foot/Ankle   Right Foot Inspection  Skin Exam: skin normal and skin intact. No dry skin, no warmth, no callus, no erythema, no maceration, no abnormal color, no pre-ulcer, no ulcer and no callus. Toe Exam: ROM and strength within normal limits. No swelling, no tenderness, erythema and  no right toe deformity    Sensory   Monofilament testing: intact    Vascular  Capillary refills: < 3 seconds  The right DP pulse is 2+. The right PT pulse is 2+. Left Foot/Ankle  Left Foot Inspection  Skin Exam: skin normal and skin intact. No dry skin, no warmth, no erythema, no maceration, normal color, no pre-ulcer, no ulcer and no callus. Toe Exam: ROM and strength within normal limits. No swelling, no tenderness, no erythema and no left toe deformity. Sensory   Monofilament testing: intact    Vascular  Capillary refills: < 3 seconds  The left DP pulse is 2+. The left PT pulse is 2+.      Assign Risk Category  No deformity present  No loss of protective sensation  No weak pulses  Risk: 0    Raji Alamo MD

## 2023-07-12 NOTE — PATIENT INSTRUCTIONS
Patellofemoral Pain Syndrome   WHAT YOU NEED TO KNOW:   Patellofemoral pain syndrome (PFPS) is pain in or around your patella (kneecap). PFPS is also called runner's knee or jumper's knee and is common in athletes. Rest, ice, and elevate your knee. You may need tape or brace to support your kneecap. Wear shoe inserts as directed and go to physical therapy. DISCHARGE INSTRUCTIONS:   Call your doctor if:   You have trouble walking. You have a fever. Your knee brace or sleeve is too tight. Your symptoms are not getting better, or they get worse. Your pain and swelling increase even after you take pain medicine. You have questions or concerns about your condition or care. Manage your symptoms:       Change your activity. You may need to rest your knee. You may need to change your exercise routine to low-impact activities. Apply ice  on your knee for 15 to 20 minutes every hour or as directed. Use an ice pack, or put crushed ice in a plastic bag. Cover it with a towel before you apply it. Ice helps prevent tissue damage and decreases swelling and pain. Elevate  your knee above the level of your heart as often as you can. This will help decrease swelling and pain. Prop your leg on pillows or blankets to keep it elevated comfortably. Do not  put a pillow directly under your knee. Support  your knee by wrapping it with tape or an elastic bandage. You may need a brace for more support. This will help decrease swelling and keep your kneecap in the correct spot. Wear shoe inserts as directed. Orthotics or arch supports help keep your foot and ankle stable and in line to decrease stress on your knee. Go to physical therapy as directed. A physical therapist will teach you exercises to help improve movement and strength, and to decrease pain. Maintain a healthy weight. Ask your healthcare provider what a healthy weight is for you.  Ask him or her to help you create a weight loss plan if you are overweight. Weight loss can help decrease pressure on your knee. Medicines: You may need the following:  Acetaminophen  decreases pain and fever. It is available without a doctor's order. Ask how much to take and how often to take it. Follow directions. Read the labels of all other medicines you are using to see if they also contain acetaminophen, or ask your doctor or pharmacist. Acetaminophen can cause liver damage if not taken correctly. NSAIDs , such as ibuprofen, help decrease swelling, pain, and fever. This medicine is available with or without a doctor's order. NSAIDs can cause stomach bleeding or kidney problems in certain people. If you take blood thinner medicine, always ask your healthcare provider if NSAIDs are safe for you. Always read the medicine label and follow directions. Take your medicine as directed. Contact your healthcare provider if you think your medicine is not helping or if you have side effects. Tell your provider if you are allergic to any medicine. Keep a list of the medicines, vitamins, and herbs you take. Include the amounts, and when and why you take them. Bring the list or the pill bottles to follow-up visits. Carry your medicine list with you in case of an emergency. Prevent another episode:   Wear the right shoes for your activities. Increase activity gradually. Warm up before you exercise. Stretch your leg muscles before and after activity. Follow up with your doctor as directed: You may be referred to an orthopedic surgeon. Write down your questions so you remember to ask them during your visits. © Copyright Jourdan Peraza 2022 Information is for End User's use only and may not be sold, redistributed or otherwise used for commercial purposes. The above information is an  only. It is not intended as medical advice for individual conditions or treatments.  Talk to your doctor, nurse or pharmacist before following any medical regimen to see if it is safe and effective for you.

## 2023-07-12 NOTE — ASSESSMENT & PLAN NOTE
Neurologic exam today is normal.  He is having some imbalance.   I am going to send him to our physical therapy team.

## 2023-08-04 DIAGNOSIS — E78.5 HYPERLIPIDEMIA, UNSPECIFIED HYPERLIPIDEMIA TYPE: ICD-10-CM

## 2023-08-04 DIAGNOSIS — I10 ESSENTIAL HYPERTENSION: ICD-10-CM

## 2023-08-04 RX ORDER — ATORVASTATIN CALCIUM 40 MG/1
40 TABLET, FILM COATED ORAL DAILY
Qty: 90 TABLET | Refills: 3 | Status: SHIPPED | OUTPATIENT
Start: 2023-08-04

## 2023-08-04 RX ORDER — OLMESARTAN MEDOXOMIL AND HYDROCHLOROTHIAZIDE 40/12.5 40; 12.5 MG/1; MG/1
1 TABLET ORAL DAILY
Qty: 90 TABLET | Refills: 3 | Status: SHIPPED | OUTPATIENT
Start: 2023-08-04

## 2023-08-04 RX ORDER — ATORVASTATIN CALCIUM 40 MG/1
40 TABLET, FILM COATED ORAL DAILY
Qty: 30 TABLET | Refills: 0 | Status: SHIPPED | OUTPATIENT
Start: 2023-08-04 | End: 2023-08-04

## 2023-10-24 LAB
ALBUMIN SERPL-MCNC: 4.5 G/DL (ref 3.6–5.1)
ALBUMIN/GLOB SERPL: 1.3 (CALC) (ref 1–2.5)
ALP SERPL-CCNC: 74 U/L (ref 35–144)
ALT SERPL-CCNC: 19 U/L (ref 9–46)
AST SERPL-CCNC: 14 U/L (ref 10–35)
BASOPHILS # BLD AUTO: 67 CELLS/UL (ref 0–200)
BASOPHILS NFR BLD AUTO: 1 %
BILIRUB SERPL-MCNC: 0.5 MG/DL (ref 0.2–1.2)
BUN SERPL-MCNC: 31 MG/DL (ref 7–25)
BUN/CREAT SERPL: 26 (CALC) (ref 6–22)
CALCIUM SERPL-MCNC: 10.4 MG/DL (ref 8.6–10.3)
CHLORIDE SERPL-SCNC: 98 MMOL/L (ref 98–110)
CHOLEST SERPL-MCNC: 158 MG/DL
CHOLEST/HDLC SERPL: 3 (CALC)
CO2 SERPL-SCNC: 27 MMOL/L (ref 20–32)
CREAT SERPL-MCNC: 1.2 MG/DL (ref 0.7–1.35)
EOSINOPHIL # BLD AUTO: 161 CELLS/UL (ref 15–500)
EOSINOPHIL NFR BLD AUTO: 2.4 %
ERYTHROCYTE [DISTWIDTH] IN BLOOD BY AUTOMATED COUNT: 12.2 % (ref 11–15)
GFR/BSA.PRED SERPLBLD CYS-BASED-ARV: 65 ML/MIN/1.73M2
GLOBULIN SER CALC-MCNC: 3.4 G/DL (CALC) (ref 1.9–3.7)
GLUCOSE SERPL-MCNC: 204 MG/DL (ref 65–99)
HBA1C MFR BLD: 8.5 % OF TOTAL HGB
HCT VFR BLD AUTO: 40.3 % (ref 38.5–50)
HDLC SERPL-MCNC: 52 MG/DL
HGB BLD-MCNC: 13.6 G/DL (ref 13.2–17.1)
LDLC SERPL CALC-MCNC: 92 MG/DL (CALC)
LYMPHOCYTES # BLD AUTO: 1668 CELLS/UL (ref 850–3900)
LYMPHOCYTES NFR BLD AUTO: 24.9 %
MCH RBC QN AUTO: 30.6 PG (ref 27–33)
MCHC RBC AUTO-ENTMCNC: 33.7 G/DL (ref 32–36)
MCV RBC AUTO: 90.6 FL (ref 80–100)
MONOCYTES # BLD AUTO: 496 CELLS/UL (ref 200–950)
MONOCYTES NFR BLD AUTO: 7.4 %
NEUTROPHILS # BLD AUTO: 4308 CELLS/UL (ref 1500–7800)
NEUTROPHILS NFR BLD AUTO: 64.3 %
NONHDLC SERPL-MCNC: 106 MG/DL (CALC)
PLATELET # BLD AUTO: 282 THOUSAND/UL (ref 140–400)
PMV BLD REES-ECKER: 10.9 FL (ref 7.5–12.5)
POTASSIUM SERPL-SCNC: 4.7 MMOL/L (ref 3.5–5.3)
PROT SERPL-MCNC: 7.9 G/DL (ref 6.1–8.1)
RBC # BLD AUTO: 4.45 MILLION/UL (ref 4.2–5.8)
SODIUM SERPL-SCNC: 134 MMOL/L (ref 135–146)
TRIGL SERPL-MCNC: 62 MG/DL
WBC # BLD AUTO: 6.7 THOUSAND/UL (ref 3.8–10.8)

## 2023-10-30 ENCOUNTER — TELEPHONE (OUTPATIENT)
Dept: FAMILY MEDICINE CLINIC | Facility: CLINIC | Age: 68
End: 2023-10-30

## 2023-10-30 DIAGNOSIS — E11.22 TYPE 2 DIABETES MELLITUS WITH STAGE 2 CHRONIC KIDNEY DISEASE, WITHOUT LONG-TERM CURRENT USE OF INSULIN: Primary | ICD-10-CM

## 2023-10-30 DIAGNOSIS — N18.2 TYPE 2 DIABETES MELLITUS WITH STAGE 2 CHRONIC KIDNEY DISEASE, WITHOUT LONG-TERM CURRENT USE OF INSULIN: Primary | ICD-10-CM

## 2023-10-30 NOTE — TELEPHONE ENCOUNTER
PATIENT CALLED SAID HE HAS ONE MORE REFILL ON HIS OZEMPIC 1MG LEFT AND NO PHARMACY HAS THIS IN STOCK , THOUGHT DR. Anuja Bernal WANT TO HOGAN THE MEDICINE SINCE IT IS IMPOSIBLE TO FIND.

## 2023-11-01 RX ORDER — DULAGLUTIDE 4.5 MG/.5ML
4.5 INJECTION, SOLUTION SUBCUTANEOUS
Qty: 6 ML | Refills: 1 | Status: SHIPPED | OUTPATIENT
Start: 2023-11-01 | End: 2024-04-29

## 2023-11-01 NOTE — TELEPHONE ENCOUNTER
Pharmacist Tracking Tool  Reason For Outreach: Embedded Pharmacist  Demographics:  Intervention Method: edelighthart Message  Type of Intervention: New  Topics Addressed: Diabetes  Pharmacologic Interventions: Medication Conversion  Non-Pharmacologic Interventions: Care coordination  Time:  Direct Patient Care:  5  mins  Care Coordination:  10  mins  Recommendation Recipient: Patient/Caregiver  Outcome: Accepted

## 2023-11-21 ENCOUNTER — TELEPHONE (OUTPATIENT)
Dept: FAMILY MEDICINE CLINIC | Facility: CLINIC | Age: 68
End: 2023-11-21

## 2023-11-21 NOTE — TELEPHONE ENCOUNTER
[facility], Pharmacist    Support Staff: see below -  please include the appeal rationale below when you submit the appeal for [facility]  ____________________________________________________________________     Reason for documentation: review for [facility] prior auth denial    Denial reason per CMM:   Denied. This drug is not covered on the formulary. We are denying your request because we do not show that you have tried at least 2 covered drugs that can treat your condition. Other covered drug(s) is/are: Bydureon BCise 2 mg/0.85ml, Byetta 10 MCG Pen 10 mcg/0.4ml, Ozempic (0.25 or 0.5 MG/DOSE) 2 mg/1.5ml, Ozempic (1 MG/DOSE) (2 mg/1.5ml, 4 mg/3ml), Rybelsus tablet (3 mg, 7 mg, 14 mg), Trulicity (2.32 MC/2.4PA, 1.5 mg/0.5ml, 3 mg/0.5ml, 4.5/0.5ml), Victoza 18mg/3ml4.5,mg/0.5ml). We may be able to make an exception to cover this drug. Your doctor will need to send us medical records showing that you tried this drug. If you cannot take the covered drug, your doctor will need to tell us why. Note: Some covered drug(s) may have quantity limits. Please refer to the formulary for details. Appeal rationale: Previous formulary options include Ozempic from 3/2021-11/2023; patient was converted to Trulicity in 26/1796. Patient has been maintained on metformin 2000mg/day since 1/2019. Since being on Ozempic, his A1c has started in increase above his A1c goal < 7%. Given most recent A1c, he would benefit from converting to [facility] due to Mounjaro's higher potency for glycemic lowering and weight loss when compared to available data for Ozempic and Trulicity.      Lab Results   Component Value Date    HGBA1C 8.5 (H) 10/23/2023    HGBA1C 8.6 (H) 07/10/2023    HGBA1C 8.0 (H) 10/11/2022    HGBA1C 8.4 (H) 06/10/2022    HGBA1C 6.8 (H) 11/18/2021    HGBA1C 9.0 (H) 06/01/2021    HGBA1C 9.9 (H) 03/16/2021

## 2023-11-22 ENCOUNTER — OFFICE VISIT (OUTPATIENT)
Dept: FAMILY MEDICINE CLINIC | Facility: CLINIC | Age: 68
End: 2023-11-22
Payer: MEDICARE

## 2023-11-22 ENCOUNTER — TELEPHONE (OUTPATIENT)
Dept: FAMILY MEDICINE CLINIC | Facility: CLINIC | Age: 68
End: 2023-11-22

## 2023-11-22 VITALS
SYSTOLIC BLOOD PRESSURE: 136 MMHG | BODY MASS INDEX: 26.37 KG/M2 | HEART RATE: 83 BPM | OXYGEN SATURATION: 100 % | WEIGHT: 184.2 LBS | HEIGHT: 70 IN | DIASTOLIC BLOOD PRESSURE: 82 MMHG

## 2023-11-22 DIAGNOSIS — I10 ESSENTIAL HYPERTENSION: ICD-10-CM

## 2023-11-22 DIAGNOSIS — N18.2 TYPE 2 DIABETES MELLITUS WITH STAGE 2 CHRONIC KIDNEY DISEASE, WITHOUT LONG-TERM CURRENT USE OF INSULIN: Primary | ICD-10-CM

## 2023-11-22 DIAGNOSIS — C61 MALIGNANT NEOPLASM OF PROSTATE (HCC): ICD-10-CM

## 2023-11-22 DIAGNOSIS — Z12.11 COLON CANCER SCREENING: ICD-10-CM

## 2023-11-22 DIAGNOSIS — L20.84 INTRINSIC ECZEMA: ICD-10-CM

## 2023-11-22 DIAGNOSIS — E11.22 TYPE 2 DIABETES MELLITUS WITH STAGE 2 CHRONIC KIDNEY DISEASE, WITHOUT LONG-TERM CURRENT USE OF INSULIN: Primary | ICD-10-CM

## 2023-11-22 DIAGNOSIS — L72.3 SEBACEOUS CYST: ICD-10-CM

## 2023-11-22 DIAGNOSIS — Z23 ENCOUNTER FOR IMMUNIZATION: ICD-10-CM

## 2023-11-22 DIAGNOSIS — E78.2 MIXED HYPERLIPIDEMIA: ICD-10-CM

## 2023-11-22 DIAGNOSIS — D64.9 ANEMIA, UNSPECIFIED TYPE: ICD-10-CM

## 2023-11-22 LAB
BILIRUB UR QL STRIP: NEGATIVE
CLARITY UR: CLEAR
COLOR UR: COLORLESS
GLUCOSE UR STRIP-MCNC: ABNORMAL MG/DL
HGB UR QL STRIP.AUTO: NEGATIVE
KETONES UR STRIP-MCNC: NEGATIVE MG/DL
LEUKOCYTE ESTERASE UR QL STRIP: NEGATIVE
NITRITE UR QL STRIP: NEGATIVE
PH UR STRIP.AUTO: 5.5 [PH]
PROT UR STRIP-MCNC: NEGATIVE MG/DL
SP GR UR STRIP.AUTO: 1.01 (ref 1–1.03)
UROBILINOGEN UR STRIP-ACNC: <2 MG/DL

## 2023-11-22 PROCEDURE — 99214 OFFICE O/P EST MOD 30 MIN: CPT | Performed by: FAMILY MEDICINE

## 2023-11-22 PROCEDURE — 90662 IIV NO PRSV INCREASED AG IM: CPT | Performed by: FAMILY MEDICINE

## 2023-11-22 PROCEDURE — G0008 ADMIN INFLUENZA VIRUS VAC: HCPCS | Performed by: FAMILY MEDICINE

## 2023-11-22 NOTE — ASSESSMENT & PLAN NOTE
He had an obvious sebaceous cyst that ruptured. Pictures show purulent drainage. At this point, this seems to be pretty stable but if he keeps having an issue in this particular spot, I am going to have him see surgery.

## 2023-11-22 NOTE — TELEPHONE ENCOUNTER
Appeal has been faxed to [facility]. Documentation has been attached and form was signed by PCP 11/22/23 IM.

## 2023-11-22 NOTE — ASSESSMENT & PLAN NOTE
Lab Results   Component Value Date    HGBA1C 8.5 (H) 21/81/7224   Now on Trulicity for 3 weeks. A1c does remain a bit elevated 8.5 so would like to get labs in about 3 months. If is not improving I would really like to get him on something such as [address], which his insurance company previously rejected.

## 2023-11-22 NOTE — ASSESSMENT & PLAN NOTE
Blood pressure was elevated upon arrival but came down throughout the visit. Continue current regimen.

## 2023-11-22 NOTE — TELEPHONE ENCOUNTER
Spoke with representative Mojgan Fernandez to process appeal for Pt Mounjaro rejection. Rep faxed over the forms which shall soon be received to complete.  Appeal ticket #[id number]

## 2023-11-22 NOTE — PROGRESS NOTES
Assessment/Plan:       Problem List Items Addressed This Visit        Endocrine    Type 2 diabetes mellitus with kidney complication, without long-term current use of insulin ([address]) - Primary       Lab Results   Component Value Date    HGBA1C 8.5 (H) 59/64/5371   Now on Trulicity for 3 weeks. A1c does remain a bit elevated 8.5 so would like to get labs in about 3 months. If is not improving I would really like to get him on something such as [employer] [place], which his insurance company previously rejected. Relevant Orders    UA w Reflex to Microscopic w Reflex to Culture - Clinic Collect (Completed)    Comprehensive metabolic panel    Hemoglobin A1C    Lipid Panel with Direct LDL reflex       Cardiovascular and Mediastinum    Essential hypertension     Blood pressure was elevated upon arrival but came down throughout the visit. Continue current regimen. Relevant Orders    Hemoglobin A1C    CBC and differential    Lipid Panel with Direct LDL reflex       Musculoskeletal and Integument    Intrinsic eczema    Sebaceous cyst mid back     He had an obvious sebaceous cyst that ruptured. Pictures show purulent drainage. At this point, this seems to be pretty stable but if he keeps having an issue in this particular spot, I am going to have him see surgery. Genitourinary    Malignant neoplasm of prostate ([address])       Other    Mixed hyperlipidemia    Relevant Orders    Comprehensive metabolic panel    Lipid Panel with Direct LDL reflex    Anemia    Relevant Orders    Comprehensive metabolic panel    CBC and differential   Other Visit Diagnoses     Encounter for immunization        Relevant Orders    influenza vaccine, high-dose, PF 0.7 mL (FLUZONE HIGH-DOSE)    Colon cancer screening        Relevant Orders    Cologuard            Subjective:      Patient ID: Rachel Infante is a 70 y.o. male. HPI patient presents today for follow-up of chronic health issues.   He does have a small bump on his back which seems to be stable. He does occasionally get some drainage from it. Diabetic control is gotten slightly worse but he admits to poor diet lately as well as running out of Trulicity for a few weeks. He remains compliant with metformin. He has not no history of chest pain, shortness of breath, palpitation or lightheadedness. The following portions of the patient's history were reviewed and updated as appropriate: allergies, current medications, past family history, past medical history, past social history, past surgical history and problem list.      Current Outpatient Medications:   •  atorvastatin (LIPITOR) 40 mg tablet, TAKE 1 TABLET(40 MG) BY MOUTH DAILY, Disp: 90 tablet, Rfl: 3  •  dulaglutide (Trulicity) 4.5 IK/6.9KH injection, Inject 0.5 mL (4.5 mg total) under the skin every 7 days, Disp: 6 mL, Rfl: 1  •  metFORMIN (GLUCOPHAGE) 1000 MG tablet, TAKE 1 TABLET(1000 MG) BY MOUTH TWICE DAILY WITH MEALS, Disp: 180 tablet, Rfl: 3  •  olmesartan-hydrochlorothiazide (BENICAR HCT) 40-12.5 MG per tablet, TAKE 1 TABLET BY MOUTH DAILY, Disp: 90 tablet, Rfl: 3  •  triamcinolone (KENALOG) 0.1 % cream, APPLY TOPICALLY TO THE AFFECTED AREA TWICE DAILY, Disp: 30 g, Rfl: 0     Review of Systems   Constitutional:  Negative for fatigue. HENT:  Negative for trouble swallowing. Respiratory:  Negative for chest tightness, shortness of breath and wheezing. Cardiovascular:  Negative for chest pain, palpitations and leg swelling. Gastrointestinal:  Negative for abdominal pain, constipation and diarrhea. Endocrine: Negative for polyuria. Genitourinary:  Negative for difficulty urinating and flank pain. Musculoskeletal:  Negative for arthralgias and myalgias. Skin:  Negative for rash. Psychiatric/Behavioral:  Negative for sleep disturbance.           Objective:      /82 (BP Location: Left arm, Patient Position: Sitting, Cuff Size: Large)   Pulse 83   Ht 5' 10" (1.778 m)   Wt 83.6 kg (184 lb 3.2 oz)   SpO2 100%   BMI 26.43 kg/m²          Physical Exam  Vitals reviewed. Constitutional:       General: He is not in acute distress. Appearance: He is well-developed. He is not diaphoretic. HENT:      Head: Normocephalic. Eyes:      General:         Right eye: No discharge. Left eye: No discharge. Pupils: Pupils are equal, round, and reactive to light. Neck:      Thyroid: No thyromegaly. Trachea: No tracheal deviation. Cardiovascular:      Rate and Rhythm: Normal rate and regular rhythm. Heart sounds: Normal heart sounds. No murmur heard. Pulmonary:      Effort: Pulmonary effort is normal. No respiratory distress. Breath sounds: No wheezing or rales. Abdominal:      General: There is no distension. Palpations: Abdomen is soft. Tenderness: There is no abdominal tenderness. Musculoskeletal:         General: Normal range of motion. Right lower leg: No edema. Left lower leg: No edema. Lymphadenopathy:      Cervical: No cervical adenopathy. Skin:     General: Skin is warm. Findings: No erythema. Comments: He has what appears to be a 3 x 3  centimeter slightly raised cystic structure on his back consistent with a sebaceous cyst   Neurological:      General: No focal deficit present. Mental Status: He is alert and oriented to person, place, and time. Gait: Gait normal.   Psychiatric:         Thought Content:  Thought content normal.         Judgment: Judgment normal.           Nasima Mg MD

## 2023-11-27 DIAGNOSIS — E11.22 TYPE 2 DIABETES MELLITUS WITH STAGE 2 CHRONIC KIDNEY DISEASE, WITHOUT LONG-TERM CURRENT USE OF INSULIN: ICD-10-CM

## 2023-11-27 DIAGNOSIS — N18.2 TYPE 2 DIABETES MELLITUS WITH STAGE 2 CHRONIC KIDNEY DISEASE, WITHOUT LONG-TERM CURRENT USE OF INSULIN: ICD-10-CM

## 2023-11-27 RX ORDER — DULAGLUTIDE 4.5 MG/.5ML
4.5 INJECTION, SOLUTION SUBCUTANEOUS
Qty: 6 ML | Refills: 0 | Status: SHIPPED | OUTPATIENT
Start: 2023-11-27 | End: 2024-05-25

## 2023-12-16 LAB — COLOGUARD RESULT REPORTABLE: NEGATIVE

## 2024-01-10 ENCOUNTER — OFFICE VISIT (OUTPATIENT)
Dept: FAMILY MEDICINE CLINIC | Facility: CLINIC | Age: 69
End: 2024-01-10
Payer: MEDICARE

## 2024-01-10 VITALS
BODY MASS INDEX: 26.23 KG/M2 | DIASTOLIC BLOOD PRESSURE: 76 MMHG | WEIGHT: 182.8 LBS | OXYGEN SATURATION: 98 % | SYSTOLIC BLOOD PRESSURE: 132 MMHG | HEART RATE: 87 BPM
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
## 2024-01-10 DIAGNOSIS — E11.22 TYPE 2 DIABETES MELLITUS WITH STAGE 2 CHRONIC KIDNEY DISEASE, WITHOUT LONG-TERM CURRENT USE OF INSULIN: ICD-10-CM

## 2024-01-10 DIAGNOSIS — N18.2 TYPE 2 DIABETES MELLITUS WITH STAGE 2 CHRONIC KIDNEY DISEASE, WITHOUT LONG-TERM CURRENT USE OF INSULIN: ICD-10-CM

## 2024-01-10 PROCEDURE — 99213 OFFICE O/P EST LOW 20 MIN: CPT | Performed by: FAMILY MEDICINE

## 2024-01-10 RX ORDER — AMOXICILLIN AND CLAVULANATE POTASSIUM 875; 125 MG/1; MG/1
1 TABLET, FILM COATED ORAL EVERY 12 HOURS SCHEDULED
Qty: 10 TABLET | Refills: 0 | Status: SHIPPED | OUTPATIENT
Start: 2024-01-10 | End: 2024-01-15

## 2024-01-10 NOTE — PROGRESS NOTES
Assessment/Plan:       Problem List Items Addressed This Visit          Endocrine    Type 2 diabetes mellitus with kidney complication, without long-term current use of insulin (HCC)     High risk for infections  Lab Results   Component Value Date    HGBA1C 8.5 (H) 10/23/2023             Other Visit Diagnoses       Nail, injury by, initial encounter    -  Primary    Relevant Medications    amoxicillin-clavulanate (AUGMENTIN) 875-125 mg per tablet          Start prophylactic antibiotics due to risk factors, follow up if worsening, went through side of finger, nail completely removed prior to viist.     Wound cleaned, mupirocin applied, wrapped prior to discharge.  Return/ER precautions discussed, any worsening ,swelling erythema or drainage.    TDAP up to date    Subjective:      Patient ID: Alejandro Adames is a 68 y.o. male.    HPI    68 year old male with pmh of diabetes, presenting for nail injury, reports nail went through side of finger and punctured to other side, he cleaned the area, used a bandaid and applied neosporin.    No pain full ROM    The following portions of the patient's history were reviewed and updated as appropriate: allergies, current medications, past family history, past medical history, past social history, past surgical history and problem list.      Current Outpatient Medications:     amoxicillin-clavulanate (AUGMENTIN) 875-125 mg per tablet, Take 1 tablet by mouth every 12 (twelve) hours for 5 days, Disp: 10 tablet, Rfl: 0    atorvastatin (LIPITOR) 40 mg tablet, TAKE 1 TABLET(40 MG) BY MOUTH DAILY, Disp: 90 tablet, Rfl: 3    dulaglutide (Trulicity) 4.5 MG/0.5ML injection, Inject 0.5 mL (4.5 mg total) under the skin every 7 days, Disp: 6 mL, Rfl: 0    metFORMIN (GLUCOPHAGE) 1000 MG tablet, TAKE 1 TABLET(1000 MG) BY MOUTH TWICE DAILY WITH MEALS, Disp: 180 tablet, Rfl: 3    olmesartan-hydrochlorothiazide (BENICAR HCT) 40-12.5 MG per tablet, TAKE 1 TABLET BY MOUTH DAILY, Disp: 90 tablet, Rfl:  3    triamcinolone (KENALOG) 0.1 % cream, APPLY TOPICALLY TO THE AFFECTED AREA TWICE DAILY, Disp: 30 g, Rfl: 0     Review of Systems   Constitutional:  Negative for activity change and appetite change.   Respiratory:  Negative for apnea and chest tightness.    Cardiovascular:  Negative for chest pain and palpitations.   Gastrointestinal:  Negative for abdominal distention and abdominal pain.   Musculoskeletal:  Negative for arthralgias and back pain.         Objective:      /76 (BP Location: Right arm, Patient Position: Sitting, Cuff Size: Large)   Pulse 87   Wt 82.9 kg (182 lb 12.8 oz)   SpO2 98%   BMI 26.23 kg/m²          Physical Exam  Constitutional:       Appearance: Normal appearance.   Cardiovascular:      Rate and Rhythm: Normal rate and regular rhythm.      Pulses: Normal pulses.      Heart sounds: Normal heart sounds.   Pulmonary:      Effort: Pulmonary effort is normal.   Musculoskeletal:      Comments: Wound through ring finger, bleeding on palmar aspect, mild stopped with pressure   Neurological:      Mental Status: He is alert.           Romie Ely MD

## 2024-01-10 NOTE — PATIENT INSTRUCTIONS
1. Nail, injury by, initial encounter     Call us or go to the ER for any worsening, finger swelling or pain.

## 2024-02-12 DIAGNOSIS — N18.2 TYPE 2 DIABETES MELLITUS WITH STAGE 2 CHRONIC KIDNEY DISEASE, WITHOUT LONG-TERM CURRENT USE OF INSULIN: ICD-10-CM

## 2024-02-12 DIAGNOSIS — E11.22 TYPE 2 DIABETES MELLITUS WITH STAGE 2 CHRONIC KIDNEY DISEASE, WITHOUT LONG-TERM CURRENT USE OF INSULIN: ICD-10-CM

## 2024-02-13 RX ORDER — DULAGLUTIDE 4.5 MG/.5ML
4.5 INJECTION, SOLUTION SUBCUTANEOUS
Qty: 6 ML | Refills: 0 | Status: SHIPPED | OUTPATIENT
Start: 2024-02-13 | End: 2024-08-11

## 2024-02-17 LAB
ALBUMIN SERPL-MCNC: 4.5 G/DL (ref 3.6–5.1)
ALBUMIN/GLOB SERPL: 1.5 (CALC) (ref 1–2.5)
ALP SERPL-CCNC: 79 U/L (ref 35–144)
ALT SERPL-CCNC: 24 U/L (ref 9–46)
AST SERPL-CCNC: 13 U/L (ref 10–35)
BASOPHILS # BLD AUTO: 80 CELLS/UL (ref 0–200)
BASOPHILS NFR BLD AUTO: 1.6 %
BILIRUB SERPL-MCNC: 0.5 MG/DL (ref 0.2–1.2)
BUN SERPL-MCNC: 31 MG/DL (ref 7–25)
BUN/CREAT SERPL: 25 (CALC) (ref 6–22)
CALCIUM SERPL-MCNC: 10.2 MG/DL (ref 8.6–10.3)
CHLORIDE SERPL-SCNC: 97 MMOL/L (ref 98–110)
CHOLEST SERPL-MCNC: 146 MG/DL
CHOLEST/HDLC SERPL: 3.1 (CALC)
CO2 SERPL-SCNC: 27 MMOL/L (ref 20–32)
CREAT SERPL-MCNC: 1.23 MG/DL (ref 0.7–1.35)
EOSINOPHIL # BLD AUTO: 140 CELLS/UL (ref 15–500)
EOSINOPHIL NFR BLD AUTO: 2.8 %
ERYTHROCYTE [DISTWIDTH] IN BLOOD BY AUTOMATED COUNT: 12.7 % (ref 11–15)
GFR/BSA.PRED SERPLBLD CYS-BASED-ARV: 64 ML/MIN/1.73M2
GLOBULIN SER CALC-MCNC: 3.1 G/DL (CALC) (ref 1.9–3.7)
GLUCOSE SERPL-MCNC: 305 MG/DL (ref 65–99)
HBA1C MFR BLD: 10.4 % OF TOTAL HGB
HCT VFR BLD AUTO: 38.9 % (ref 38.5–50)
HDLC SERPL-MCNC: 47 MG/DL
HGB BLD-MCNC: 13.1 G/DL (ref 13.2–17.1)
LDLC SERPL CALC-MCNC: 83 MG/DL (CALC)
LYMPHOCYTES # BLD AUTO: 1150 CELLS/UL (ref 850–3900)
LYMPHOCYTES NFR BLD AUTO: 23 %
MCH RBC QN AUTO: 30.4 PG (ref 27–33)
MCHC RBC AUTO-ENTMCNC: 33.7 G/DL (ref 32–36)
MCV RBC AUTO: 90.3 FL (ref 80–100)
MONOCYTES # BLD AUTO: 465 CELLS/UL (ref 200–950)
MONOCYTES NFR BLD AUTO: 9.3 %
NEUTROPHILS # BLD AUTO: 3165 CELLS/UL (ref 1500–7800)
NEUTROPHILS NFR BLD AUTO: 63.3 %
NONHDLC SERPL-MCNC: 99 MG/DL (CALC)
PLATELET # BLD AUTO: 270 THOUSAND/UL (ref 140–400)
PMV BLD REES-ECKER: 10.9 FL (ref 7.5–12.5)
POTASSIUM SERPL-SCNC: 4.3 MMOL/L (ref 3.5–5.3)
PROT SERPL-MCNC: 7.6 G/DL (ref 6.1–8.1)
RBC # BLD AUTO: 4.31 MILLION/UL (ref 4.2–5.8)
SODIUM SERPL-SCNC: 133 MMOL/L (ref 135–146)
TRIGL SERPL-MCNC: 82 MG/DL
WBC # BLD AUTO: 5 THOUSAND/UL (ref 3.8–10.8)

## 2024-02-20 ENCOUNTER — TELEPHONE (OUTPATIENT)
Dept: FAMILY MEDICINE CLINIC | Facility: CLINIC | Age: 69
End: 2024-02-20

## 2024-02-20 NOTE — TELEPHONE ENCOUNTER
Pt CB to advise he is outside of [place]. He is in [place]. I informed we cannot do a telemedicine as our Providers do not hold license outside of the state. He asked if you can send a script to him in [place]. He may return after the first week of March.

## 2024-02-21 ENCOUNTER — TELEPHONE (OUTPATIENT)
Dept: FAMILY MEDICINE CLINIC | Facility: CLINIC | Age: 69
End: 2024-02-21

## 2024-02-22 NOTE — TELEPHONE ENCOUNTER
Patient returned phone call to schedule a virtual appointment with the pharmacist.  Please return the patients call.

## 2024-03-25 ENCOUNTER — TELEPHONE (OUTPATIENT)
Age: 69
End: 2024-03-25

## 2024-03-25 DIAGNOSIS — E11.22 TYPE 2 DIABETES MELLITUS WITH STAGE 2 CHRONIC KIDNEY DISEASE, WITHOUT LONG-TERM CURRENT USE OF INSULIN  (HCC): Primary | ICD-10-CM

## 2024-03-25 DIAGNOSIS — N18.2 TYPE 2 DIABETES MELLITUS WITH STAGE 2 CHRONIC KIDNEY DISEASE, WITHOUT LONG-TERM CURRENT USE OF INSULIN  (HCC): Primary | ICD-10-CM

## 2024-03-25 NOTE — TELEPHONE ENCOUNTER
Pt called to speak to Sharron regarding a message he received on the phone from her. Please review pt states he wants to talk to Sharron.

## 2024-03-27 NOTE — TELEPHONE ENCOUNTER
Pt is now back in [place] - called to speak to [facility].  Pt also needed lab work results.  Scheduled earliest available appt 2/22.  If earlier appt available please contact pt.    Warm transfer to  to go over labs.    Pt waiting for Breana to call back  [phone]

## 2024-03-28 RX ORDER — LANCETS 33 GAUGE
EACH MISCELLANEOUS
Qty: 100 EACH | Refills: 3 | Status: SHIPPED | OUTPATIENT
Start: 2024-03-28

## 2024-03-28 RX ORDER — BLOOD SUGAR DIAGNOSTIC
STRIP MISCELLANEOUS
Qty: 100 EACH | Refills: 3 | Status: SHIPPED | OUTPATIENT
Start: 2024-03-28

## 2024-03-28 RX ORDER — BLOOD-GLUCOSE METER
KIT MISCELLANEOUS
Qty: 1 KIT | Refills: 0 | Status: SHIPPED | OUTPATIENT
Start: 2024-03-28

## 2024-03-28 NOTE — TELEPHONE ENCOUNTER
Return VM  received from patient.     Call returned.     Patient has Trulicity #7 pens remaining (takes on Sunday), taking metformin as prescribed. Since having A1c drawn, patient has cut down pasta, rice and potato consumption. Prior to A1c, patient was snacking more due to holidays but feels that has cut down. Not currently checking blood sugar readings. Has noticed constipation every other day while being on Trulicity, no issues while on Ozempic.     Lab Results   Component Value Date    HGBA1C 10.4 (H) 02/16/2024    HGBA1C 8.5 (H) 10/23/2023    HGBA1C 8.6 (H) 07/10/2023       Reviewed GLP1 backorder with patient, will have patient continue on Trulicity for now to use up supply; will plan on converting to more potent GLP1 based on supply availability next month. Patient will start checking fasting blood sugar 2-3x/week, aware to bring in glucometer to PCP appointment.     Pharmacist Tracking Tool  Reason For Outreach: Embedded Pharmacist  Demographics:  Intervention Method: Phone  Type of Intervention: New  Topics Addressed: Diabetes  Pharmacologic Interventions: N/A  Non-Pharmacologic Interventions: Disease state education and Home Monitoring  Time:  Direct Patient Care:  15  mins  Care Coordination:  10  mins  Recommendation Recipient: Patient/Caregiver  Outcome: Accepted

## 2024-04-15 ENCOUNTER — RA CDI HCC (OUTPATIENT)
Dept: OTHER | Facility: HOSPITAL | Age: 69
End: 2024-04-15

## 2024-04-15 NOTE — PROGRESS NOTES
E11.65   HCC coding opportunities          Chart Reviewed number of suggestions sent to Provider: 1     Patients Insurance     Medicare Insurance: Medicare

## 2024-04-22 ENCOUNTER — CLINICAL SUPPORT (OUTPATIENT)
Dept: FAMILY MEDICINE CLINIC | Facility: CLINIC | Age: 69
End: 2024-04-22
Payer: MEDICARE

## 2024-04-22 ENCOUNTER — OFFICE VISIT (OUTPATIENT)
Dept: FAMILY MEDICINE CLINIC | Facility: CLINIC | Age: 69
End: 2024-04-22
Payer: MEDICARE

## 2024-04-22 VITALS
SYSTOLIC BLOOD PRESSURE: 134 MMHG | OXYGEN SATURATION: 97 % | DIASTOLIC BLOOD PRESSURE: 82 MMHG | HEART RATE: 94 BPM | BODY MASS INDEX: 25.63 KG/M2 | WEIGHT: 178.6 LBS

## 2024-04-22 DIAGNOSIS — N18.2 TYPE 2 DIABETES MELLITUS WITH STAGE 2 CHRONIC KIDNEY DISEASE, WITHOUT LONG-TERM CURRENT USE OF INSULIN  (HCC): Primary | ICD-10-CM

## 2024-04-22 DIAGNOSIS — E11.22 TYPE 2 DIABETES MELLITUS WITH STAGE 2 CHRONIC KIDNEY DISEASE, WITHOUT LONG-TERM CURRENT USE OF INSULIN  (HCC): Primary | ICD-10-CM

## 2024-04-22 DIAGNOSIS — L20.84 INTRINSIC ECZEMA: ICD-10-CM

## 2024-04-22 DIAGNOSIS — D64.9 ANEMIA, UNSPECIFIED TYPE: ICD-10-CM

## 2024-04-22 DIAGNOSIS — E11.9 TYPE 2 DIABETES MELLITUS WITHOUT COMPLICATION, WITHOUT LONG-TERM CURRENT USE OF INSULIN (HCC): ICD-10-CM

## 2024-04-22 DIAGNOSIS — E78.2 MIXED HYPERLIPIDEMIA: ICD-10-CM

## 2024-04-22 DIAGNOSIS — I10 ESSENTIAL HYPERTENSION: ICD-10-CM

## 2024-04-22 DIAGNOSIS — C61 MALIGNANT NEOPLASM OF PROSTATE (HCC): ICD-10-CM

## 2024-04-22 PROCEDURE — 99214 OFFICE O/P EST MOD 30 MIN: CPT | Performed by: FAMILY MEDICINE

## 2024-04-22 PROCEDURE — G2211 COMPLEX E/M VISIT ADD ON: HCPCS | Performed by: FAMILY MEDICINE

## 2024-04-22 NOTE — ASSESSMENT & PLAN NOTE
Lab Results   Component Value Date    HGBA1C 10.4 (H) 02/16/2024   Well-controlled this time.  He did much better on Ozempic and switch back over to 2 mg weekly.  Stop Trulicity.

## 2024-04-22 NOTE — ASSESSMENT & PLAN NOTE
Lab Results   Component Value Date    HGBA1C 10.4 (H) 02/16/2024   Will convert patient from Trulicity to Ozempic 2mg/week due to higher potency with Ozempic. Ozempic should not have supply issues based on conversations with pharmacy. Patient will  Ozempic to start this week, patient aware how to administer Ozempic as he was previously on.

## 2024-04-22 NOTE — PROGRESS NOTES
[facility] Clinical [facility]acy [facility]  Sharron Gray, Pharmacist    Patient was seen immediately after PCP appt     ASSESSMENT/PLAN                                                                                     1. Type 2 diabetes mellitus with stage 2 chronic kidney disease, without long-term current use of insulin  ([facility])  Assessment & Plan:    Lab Results   Component Value Date    HGBA1C 10.4 (H) 02/16/2024   Will convert patient from Trulicity to Ozempic 2mg/week due to higher potency with Ozempic. Ozempic should not have supply issues based on conversations with pharmacy. Patient will  Ozempic to start this week, patient aware how to administer Ozempic as he was previously on.         Orders:  -     semaglutide, 2 mg/dose, (Ozempic, 2 MG/DOSE,) 8 mg/ mL injection pen; Inject 0.75 mL (2 mg total) under the skin once a week        Dr. Regi marte, [facility]    Follow-Up: after labs in 3 months per patient preference.       SUBJECTIVE                                                                                                              Medication Adherence: Medication list reviewed with patient, reports the following discrepancies/problems:  Trulicity: takes on Saturdays, has 3 doses remaining    2. Medication Efficacy:      Home Monitoring:  Glucometer: Yes, Brand: OneTouch Verio Reflect       OBJECTIVE                                                                                                      Home Blood Glucose Readings:  Fasting blood sugar: 167, 220, 178, 150, 230, 210, 210, 191    Eye Exam:    Lab Results   Component Value Date    LEFTDIABRET None 09/01/2020    RIGHTDIABRET None 09/01/2020       Lab Results   Component Value Date    SODIUM 133 (L) 02/16/2024    K 4.3 02/16/2024    EGFR 64 02/16/2024    CREATININE 1.23 02/16/2024       Lab Results   Component Value Date    HGBA1C 10.4 (H) 02/16/2024    HGBA1C 8.5 (H) 10/23/2023    HGBA1C 8.6 (H) 07/10/2023        Pharmacist Tracking Tool  Reason For Outreach: Embedded Pharmacist  Demographics:  Intervention Method: In Person  Type of Intervention: Follow-Up  Topics Addressed: Diabetes  Pharmacologic Interventions: Medication Conversion  Non-Pharmacologic Interventions: N/A  Time:  Direct Patient Care:  10  mins   Care Coordination:  5  mins  Recommendation Recipient: Patient/Caregiver  Outcome: Accepted

## 2024-04-22 NOTE — PROGRESS NOTES
Assessment/Plan:       Problem List Items Addressed This Visit        Cardiovascular and Mediastinum    Essential hypertension     Well-controlled at this time.         Relevant Orders    Albumin / creatinine urine ratio    Comprehensive metabolic panel    Hemoglobin A1C    Lipid Panel with Direct LDL reflex       Endocrine    Type 2 diabetes mellitus with kidney complication, without long-term current use of insulin ([facility]) - Primary       Lab Results   Component Value Date    HGBA1C 10.4 (H) 02/16/2024   Well-controlled this time.  He did much better on Ozempic and switch back over to 2 mg weekly.  Stop Trulicity.         Relevant Orders    Albumin / creatinine urine ratio    Comprehensive metabolic panel    Hemoglobin A1C    Lipid Panel with Direct LDL reflex    TSH, 3rd generation with Free T4 reflex       Musculoskeletal and Integument    Intrinsic eczema    Relevant Orders    TSH, 3rd generation with Free T4 reflex       Genitourinary    Malignant neoplasm of prostate (HCC)     He continues to follow at [facility].            Blood    Anemia     Stable on recent labs.            Other    Mixed hyperlipidemia     Continue atorvastatin.         Relevant Orders    Albumin / creatinine urine ratio    Comprehensive metabolic panel    Hemoglobin A1C    Lipid Panel with Direct LDL reflex   Other Visit Diagnoses     Type 2 diabetes mellitus without complication, without long-term current use of insulin ([facility])        Relevant Orders    Albumin / creatinine urine ratio    Comprehensive metabolic panel    Hemoglobin A1C    Lipid Panel with Direct LDL reflex            Subjective:      Patient ID: Alejandro Adames is a 68 y.o. male.    HPI patient presents today for follow-up of chronic health issues.  Overall, he seems to be feeling pretty well.  He has noted some increasing nausea on Trulicity.  A1c has also trended up.  He denies any current positive chest pain, shortness of breath or palpitations.      The following  portions of the patient's history were reviewed and updated as appropriate: allergies, current medications, past family history, past medical history, past social history, past surgical history and problem list.      Current Outpatient Medications:   •  atorvastatin (LIPITOR) 40 mg tablet, TAKE 1 TABLET(40 MG) BY MOUTH DAILY, Disp: 90 tablet, Rfl: 3  •  glucose blood (OneTouch Verio) test strip, Check blood sugars once daily. Please substitute with appropriate alternative as covered by patient's insurance. Dx: E11.65, Disp: 100 each, Rfl: 3  •  metFORMIN (GLUCOPHAGE) 1000 MG tablet, TAKE 1 TABLET(1000 MG) BY MOUTH TWICE DAILY WITH MEALS, Disp: 180 tablet, Rfl: 3  •  olmesartan-hydrochlorothiazide (BENICAR HCT) 40-12.5 MG per tablet, TAKE 1 TABLET BY MOUTH DAILY, Disp: 90 tablet, Rfl: 3  •  OneTouch Delica Lancets 33G MISC, Check blood sugars once daily. Please substitute with appropriate alternative as covered by patient's insurance. Dx: E11.65, Disp: 100 each, Rfl: 3  •  semaglutide, 2 mg/dose, (Ozempic, 2 MG/DOSE,) 8 mg/ mL injection pen, Inject 0.75 mL (2 mg total) under the skin once a week, Disp: 9 mL, Rfl: 1  •  triamcinolone (KENALOG) 0.1 % cream, APPLY TOPICALLY TO THE AFFECTED AREA TWICE DAILY, Disp: 30 g, Rfl: 0     Review of Systems   Constitutional:  Negative for appetite change, chills, fatigue, fever and unexpected weight change.   HENT:  Negative for trouble swallowing.    Eyes:  Negative for visual disturbance.   Respiratory:  Negative for cough, chest tightness, shortness of breath and wheezing.    Cardiovascular:  Negative for chest pain, palpitations and leg swelling.   Gastrointestinal:  Negative for abdominal distention, abdominal pain, blood in stool, constipation and diarrhea.   Endocrine: Negative for polyuria.   Genitourinary:  Negative for difficulty urinating and flank pain.   Musculoskeletal:  Negative for arthralgias and myalgias.   Skin:  Negative for rash.   Neurological:  Negative for  dizziness and light-headedness.   Hematological:  Negative for adenopathy. Does not bruise/bleed easily.   Psychiatric/Behavioral:  Negative for dysphoric mood and sleep disturbance. The patient is not nervous/anxious.          Objective:      /82 (BP Location: Left arm, Patient Position: Sitting, Cuff Size: Large)   Pulse 94   Wt 81 kg (178 lb 9.6 oz)   SpO2 97%   BMI 25.63 kg/m²          Physical Exam  Vitals reviewed.   Constitutional:       General: He is not in acute distress.     Appearance: He is well-developed. He is not diaphoretic.   HENT:      Head: Normocephalic.   Eyes:      General:         Right eye: No discharge.         Left eye: No discharge.      Pupils: Pupils are equal, round, and reactive to light.   Neck:      Thyroid: No thyromegaly.      Trachea: No tracheal deviation.   Cardiovascular:      Rate and Rhythm: Normal rate and regular rhythm.      Heart sounds: Normal heart sounds. No murmur heard.  Pulmonary:      Effort: Pulmonary effort is normal. No respiratory distress.      Breath sounds: No wheezing or rales.   Abdominal:      General: There is no distension.      Palpations: Abdomen is soft.      Tenderness: There is no abdominal tenderness.   Musculoskeletal:         General: Normal range of motion.      Right lower leg: No edema.      Left lower leg: No edema.   Lymphadenopathy:      Cervical: No cervical adenopathy.   Skin:     General: Skin is warm.      Findings: No erythema.   Neurological:      General: No focal deficit present.      Mental Status: He is alert and oriented to person, place, and time.      Gait: Gait normal.   Psychiatric:         Thought Content: Thought content normal.         Judgment: Judgment normal.           Wayne Uriarte Jr, MD

## 2024-04-23 ENCOUNTER — TELEPHONE (OUTPATIENT)
Dept: ADMINISTRATIVE | Facility: OTHER | Age: 69
End: 2024-04-23

## 2024-04-23 NOTE — LETTER
Diabetic Eye Exam Form    Date Requested: 24  Patient: Alejandro Adames  Patient : 1955   Referring Provider: Wayne Uriarte Jr, MD      DIABETIC Eye Exam Date _______________________________      Type of Exam MUST be documented for Diabetic Eye Exams. Please CHECK ONE.     Retinal Exam       Dilated Retinal Exam       OCT       Optomap-Iris Exam      Fundus Photography       Left Eye - Please check Retinopathy or No Retinopathy        Exam did show retinopathy    Exam did not show retinopathy       Right Eye - Please check Retinopathy or No Retinopathy       Exam did show retinopathy    Exam did not show retinopathy       Comments __________________________________________________________    Practice Providing Exam ______________________________________________    Exam Performed By (print name) _______________________________________      Provider Signature ___________________________________________________      These reports are needed for  compliance.  Please fax this completed form and a copy of the Diabetic Eye Exam report to our office located at [address] [place] [address] as soon as possible via Fax [phone] attention Bessie: Phone [phone]  We thank you for your assistance in treating our mutual patient.

## 2024-04-23 NOTE — TELEPHONE ENCOUNTER
Upon review of the In Basket request we were able to locate, review, and update the patient chart as requested for Diabetic Eye Exam.    Any additional questions or concerns should be emailed to the Practice Liaisons via the appropriate education email address, please do not reply via In Basket.    Thank you  Bessie Garcia MA

## 2024-04-23 NOTE — TELEPHONE ENCOUNTER
----- Message from Sharron Gray, Pharmacist sent at 4/22/2024  2:58 PM EDT -----  04/22/24 2:58 PM    Hello, our patient Alejandro Adames has had Diabetic Eye Exam completed/performed. Please assist in updating the patient chart by making an External outreach to Dr. Deluna, [facility] facility located in [place]. The date of service is 5350-0624.    Thank you,  Sharron Gray, Pharmacist  [facility]

## 2024-04-23 NOTE — TELEPHONE ENCOUNTER
Upon review of the In Basket request and the patient's chart, initial outreach has been made via fax to facility. Please see Contacts section for details.     Thank you  Bessie Garcia MA

## 2024-05-02 DIAGNOSIS — E11.8 TYPE 2 DIABETES MELLITUS WITH COMPLICATION (HCC): ICD-10-CM

## 2024-08-05 DIAGNOSIS — I10 ESSENTIAL HYPERTENSION: ICD-10-CM

## 2024-08-05 DIAGNOSIS — E78.5 HYPERLIPIDEMIA, UNSPECIFIED HYPERLIPIDEMIA TYPE: ICD-10-CM

## 2024-08-05 RX ORDER — ATORVASTATIN CALCIUM 40 MG/1
40 TABLET, FILM COATED ORAL DAILY
Qty: 100 TABLET | Refills: 1 | Status: SHIPPED | OUTPATIENT
Start: 2024-08-05

## 2024-08-05 RX ORDER — OLMESARTAN MEDOXOMIL AND HYDROCHLOROTHIAZIDE 40/12.5 40; 12.5 MG/1; MG/1
1 TABLET ORAL DAILY
Qty: 100 TABLET | Refills: 1 | Status: SHIPPED | OUTPATIENT
Start: 2024-08-05

## 2024-08-10 DIAGNOSIS — E11.8 TYPE 2 DIABETES MELLITUS WITH COMPLICATION (HCC): ICD-10-CM

## 2024-08-14 ENCOUNTER — TELEPHONE (OUTPATIENT)
Dept: FAMILY MEDICINE CLINIC | Facility: CLINIC | Age: 69
End: 2024-08-14

## 2024-08-29 ENCOUNTER — TELEPHONE (OUTPATIENT)
Dept: FAMILY MEDICINE CLINIC | Facility: CLINIC | Age: 69
End: 2024-08-29

## 2024-08-29 DIAGNOSIS — N18.2 TYPE 2 DIABETES MELLITUS WITH STAGE 2 CHRONIC KIDNEY DISEASE, WITHOUT LONG-TERM CURRENT USE OF INSULIN  (HCC): Primary | ICD-10-CM

## 2024-08-29 DIAGNOSIS — E11.22 TYPE 2 DIABETES MELLITUS WITH STAGE 2 CHRONIC KIDNEY DISEASE, WITHOUT LONG-TERM CURRENT USE OF INSULIN  (HCC): Primary | ICD-10-CM

## 2024-08-29 LAB
ALBUMIN SERPL-MCNC: 4.3 G/DL (ref 3.6–5.1)
ALBUMIN/CREAT UR: 18 MG/G CREAT
ALBUMIN/GLOB SERPL: 1.4 (CALC) (ref 1–2.5)
ALP SERPL-CCNC: 99 U/L (ref 35–144)
ALT SERPL-CCNC: 14 U/L (ref 9–46)
AST SERPL-CCNC: 11 U/L (ref 10–35)
BILIRUB SERPL-MCNC: 0.5 MG/DL (ref 0.2–1.2)
BUN SERPL-MCNC: 31 MG/DL (ref 7–25)
BUN/CREAT SERPL: 26 (CALC) (ref 6–22)
CALCIUM SERPL-MCNC: 9.8 MG/DL (ref 8.6–10.3)
CHLORIDE SERPL-SCNC: 98 MMOL/L (ref 98–110)
CHOLEST SERPL-MCNC: 154 MG/DL
CHOLEST/HDLC SERPL: 3.2 (CALC)
CO2 SERPL-SCNC: 24 MMOL/L (ref 20–32)
CREAT SERPL-MCNC: 1.18 MG/DL (ref 0.7–1.28)
CREAT UR-MCNC: 77 MG/DL (ref 20–320)
GFR/BSA.PRED SERPLBLD CYS-BASED-ARV: 66 ML/MIN/1.73M2
GLOBULIN SER CALC-MCNC: 3.1 G/DL (CALC) (ref 1.9–3.7)
GLUCOSE SERPL-MCNC: 250 MG/DL (ref 65–99)
HBA1C MFR BLD: 9.8 % OF TOTAL HGB
HDLC SERPL-MCNC: 48 MG/DL
LDLC SERPL CALC-MCNC: 88 MG/DL (CALC)
MICROALBUMIN UR-MCNC: 1.4 MG/DL
NONHDLC SERPL-MCNC: 106 MG/DL (CALC)
POTASSIUM SERPL-SCNC: 4.5 MMOL/L (ref 3.5–5.3)
PROT SERPL-MCNC: 7.4 G/DL (ref 6.1–8.1)
SODIUM SERPL-SCNC: 133 MMOL/L (ref 135–146)
TRIGL SERPL-MCNC: 88 MG/DL
TSH SERPL-ACNC: 2.85 MIU/L (ref 0.4–4.5)

## 2024-09-09 ENCOUNTER — CLINICAL SUPPORT (OUTPATIENT)
Dept: FAMILY MEDICINE CLINIC | Facility: CLINIC | Age: 69
End: 2024-09-09

## 2024-09-09 ENCOUNTER — TELEPHONE (OUTPATIENT)
Dept: FAMILY MEDICINE CLINIC | Facility: CLINIC | Age: 69
End: 2024-09-09

## 2024-09-09 VITALS — SYSTOLIC BLOOD PRESSURE: 130 MMHG | BODY MASS INDEX: 24.31 KG/M2 | WEIGHT: 169.4 LBS | DIASTOLIC BLOOD PRESSURE: 86 MMHG

## 2024-09-09 DIAGNOSIS — E11.22 TYPE 2 DIABETES MELLITUS WITH STAGE 2 CHRONIC KIDNEY DISEASE, WITHOUT LONG-TERM CURRENT USE OF INSULIN  (HCC): Primary | ICD-10-CM

## 2024-09-09 DIAGNOSIS — N18.2 TYPE 2 DIABETES MELLITUS WITH STAGE 2 CHRONIC KIDNEY DISEASE, WITHOUT LONG-TERM CURRENT USE OF INSULIN  (HCC): Primary | ICD-10-CM

## 2024-09-09 RX ORDER — TIRZEPATIDE 7.5 MG/.5ML
7.5 INJECTION, SOLUTION SUBCUTANEOUS WEEKLY
Qty: 2 ML | Refills: 1 | Status: SHIPPED | OUTPATIENT
Start: 2024-09-09 | End: 2024-09-10

## 2024-09-09 NOTE — PROGRESS NOTES
[facility] Clinical Pharmacy Services  Sharron Gray, Pharmacist      ASSESSMENT/PLAN                                                                                     1. Type 2 diabetes mellitus with stage 2 chronic kidney disease, without long-term current use of insulin  ([facility])  Assessment & Plan:    Lab Results   Component Value Date    HGBA1C 9.8 (H) 08/28/2024   Most recent A1c above goal but minimally improved since converting from Trulicity to Ozempic  Reported fasting blood sugar elevated  Limited appetite during the day  May benefit from basal insulin; could consider converting Ozempic to Mounjaro however uncertain if Mounjaro will get blood sugar readings to goal.     Medications: reviewed basal insulin vs Mounjaro, patient would prefer to trial Mounjaro as he would like to avoid insulin if possible. Patient aware next steps if Mounjaro is ineffective  Mounjaro: will have patient start Mounjaro 7.5mg when he is due for Ozempic dose on 9/15. Patient will keep Ozempic in the fridge if needed for future use.  Home Monitoring:   BLOOD SUGAR TESTING  Please test your blood sugar:  1 Times per day.  When to test your blood sugar:   Before Breakfast  Target Blood sugar range: Before Meals: , 2 hours after meals: < 180  Call if your blood sugar is less than 60   Lifestyle Modifications:reviewed CHO amounts in foods  Orders:  -     Mounjaro 7.5 MG/0.5ML; Inject 0.5 mL (7.5 mg total) under the skin once a week      Follow-Up: 1 month, patient aware not to fill Mounjaro for second fill prior to seeing PharmD next      SUBJECTIVE                                                                                                              Medication Adherence: Medication list reviewed with patient, reports the following discrepancies/problems:  atorvastatin  metFORMIN  olmesartan-hydrochlorothiazide  OneTouch Delica Lancets 33G Misc  OneTouch Verio Strp  Ozempic: takes on Sunday, 1 full pen remaining    triamcinolone:     2. Medication Efficacy:    Review of Systems   Constitutional:  Positive for fatigue. Negative for unexpected weight change.   Eyes:  Negative for visual disturbance.   Gastrointestinal:  Negative for constipation, diarrhea, nausea and vomiting.   Endocrine: Positive for polyuria. Negative for polydipsia and polyphagia.   Neurological:         Occasional dizziness when changing positions, especially when hot outside.     Home blood sugar readings (no log, per memory):  284 this morning before breakfast. Stopped checked due to high blood sugar readings    3. Lifestyle: limited appetite during the week, limited snacking/drinking     Diet Recall - yesterday, only ate 1 time      D - Food: kielbase on roll with pickle spear     Daily Beverages: iced tea with sweet/low, black coffee     Snacks: limited     OBJECTIVE                                                                                                      Vitals:    09/09/24 1131   BP: 130/86   Weight: 76.8 kg (169 lb 6.4 oz)       Eye Exam:    Lab Results   Component Value Date    LEFTDIABRET None 01/31/2023    RIGHTDIABRET None 01/31/2023       Lab Results   Component Value Date    SODIUM 133 (L) 08/28/2024    K 4.5 08/28/2024    EGFR 66 08/28/2024    CREATININE 1.18 08/28/2024       Lab Results   Component Value Date    HGBA1C 9.8 (H) 08/28/2024    HGBA1C 10.4 (H) 02/16/2024    HGBA1C 8.5 (H) 10/23/2023       Pharmacist Tracking Tool  Reason For Outreach: Embedded Pharmacist  Demographics:  Intervention Method: In Person  Type of Intervention: Follow-Up  Topics Addressed: Diabetes  Pharmacologic Interventions: Medication Conversion  Non-Pharmacologic Interventions: Care coordination and Disease state education  Time:  Direct Patient Care:  20  mins   Care Coordination:  5  mins  Recommendation Recipient: Patient/Caregiver  Outcome: Accepted

## 2024-09-09 NOTE — ASSESSMENT & PLAN NOTE
Lab Results   Component Value Date    HGBA1C 9.8 (H) 08/28/2024   Most recent A1c above goal but minimally improved since converting from Trulicity to Ozempic  Reported fasting blood sugar elevated  Limited appetite during the day  May benefit from basal insulin; could consider converting Ozempic to Mounjaro however uncertain if Mounjaro will get blood sugar readings to goal.     Medications: reviewed basal insulin vs Mounjaro, patient would prefer to trial Mounjaro as he would like to avoid insulin if possible. Patient aware next steps if Mounjaro is ineffective  Mounjaro: will have patient start Mounjaro 7.5mg when he is due for Ozempic dose on 9/15. Patient will keep Ozempic in the fridge if needed for future use.  Home Monitoring:   BLOOD SUGAR TESTING  Please test your blood sugar:  1 Times per day.  When to test your blood sugar:   Before Breakfast  Target Blood sugar range: Before Meals: , 2 hours after meals: < 180  Call if your blood sugar is less than 60   Lifestyle Modifications:reviewed CHO amounts in foods

## 2024-09-10 DIAGNOSIS — N18.2 TYPE 2 DIABETES MELLITUS WITH STAGE 2 CHRONIC KIDNEY DISEASE, WITHOUT LONG-TERM CURRENT USE OF INSULIN  (HCC): ICD-10-CM

## 2024-09-10 DIAGNOSIS — E11.22 TYPE 2 DIABETES MELLITUS WITH STAGE 2 CHRONIC KIDNEY DISEASE, WITHOUT LONG-TERM CURRENT USE OF INSULIN  (HCC): ICD-10-CM

## 2024-09-10 RX ORDER — TIRZEPATIDE 7.5 MG/.5ML
7.5 INJECTION, SOLUTION SUBCUTANEOUS WEEKLY
Qty: 2 ML | Refills: 1 | Status: SHIPPED | OUTPATIENT
Start: 2024-09-10

## 2024-09-10 NOTE — TELEPHONE ENCOUNTER
PA for Mounjaro 7.5 MG/0.5ML SUBMITTED     via    [x]CMM-KEY: [id number]  []Surescripts-Case ID #   []Faxed to plan   []Other website   []Phone call Case ID #     Office notes sent, clinical questions answered. Awaiting determination    Turnaround time for your insurance to make a decision on your Prior Authorization can take 7-21 business days.

## 2024-09-18 NOTE — TELEPHONE ENCOUNTER
PA for Mounjaro 7.5 MG/0.5ML  APPROVED     Date(s) approved Until 9/8/25    Case # [id number]        Patient advised by          []Jooixhart Message  []Phone call   [x]LMOM  []L/M to call office as no active Communication consent on file  []Unable to leave detailed message as VM not approved on Communication consent       Pharmacy advised by    [x]Fax  []Phone call    Approval letter scanned into Media No Not Available

## 2024-09-25 ENCOUNTER — OFFICE VISIT (OUTPATIENT)
Dept: UROLOGY | Facility: AMBULATORY SURGERY CENTER | Age: 69
End: 2024-09-25
Payer: MEDICARE

## 2024-09-25 VITALS
OXYGEN SATURATION: 99 % | HEART RATE: 101 BPM | DIASTOLIC BLOOD PRESSURE: 74 MMHG | SYSTOLIC BLOOD PRESSURE: 118 MMHG | HEIGHT: 70 IN | WEIGHT: 168 LBS | BODY MASS INDEX: 24.05 KG/M2

## 2024-09-25 DIAGNOSIS — R19.00 FULLNESS OF INGUINAL REGION: ICD-10-CM

## 2024-09-25 DIAGNOSIS — N50.812 PAIN IN LEFT TESTICLE: Primary | ICD-10-CM

## 2024-09-25 LAB
SL AMB  POCT GLUCOSE, UA: NORMAL
SL AMB LEUKOCYTE ESTERASE,UA: NORMAL
SL AMB POCT BILIRUBIN,UA: NORMAL
SL AMB POCT BLOOD,UA: NORMAL
SL AMB POCT CLARITY,UA: CLEAR
SL AMB POCT COLOR,UA: YELLOW
SL AMB POCT KETONES,UA: NORMAL
SL AMB POCT NITRITE,UA: NORMAL
SL AMB POCT PH,UA: 5
SL AMB POCT SPECIFIC GRAVITY,UA: 1
SL AMB POCT URINE PROTEIN: 100
SL AMB POCT UROBILINOGEN: 0.2

## 2024-09-25 PROCEDURE — 99203 OFFICE O/P NEW LOW 30 MIN: CPT

## 2024-09-25 PROCEDURE — 81002 URINALYSIS NONAUTO W/O SCOPE: CPT

## 2024-09-25 RX ORDER — CELECOXIB 200 MG/1
CAPSULE ORAL
COMMUNITY
Start: 2024-09-23

## 2024-09-25 NOTE — PROGRESS NOTES
Office Visit- Urology  Alejandro Adames 1955 MRN: [id number]      Assessment/Discussion/Plan    69 y.o. male managed by     .  Prostate cancer  -History of Kayley 3+4 equal 7 prostate cancer stage II (cT1 cN0 M0)  -Status post radiation therapy completed in September 2019  -Follows with [facility]  -Last PSA low and and relatively stable at 0.56 in August 2024 stable from 0.66 in February 2024 0.48 in April 2023  -Continue follow-up with [facility] with follow-up appointment scheduled for May 2025  2. Left sided Testicular pain   -Chronic   -4 out of 10 on average  -Patient isolates pain to left hip, groin, testicle   -On exam today right testicle without any palpable abnormality.  Left testicle did seem to be more ascendent and mobile -possible retractile testicle?   Not in inguinal region.  No testicular mass appreciated on left testicle. on palpation of inguinal region there was increased fullness with suspicion of possible inguinal hernia  -Ultrasound of both groin and scrotum for further evaluation.  Will call patient the results        Chief Complaint:   Alejandro is a 69 y.o. male presenting to the office for an initial evaluation regarding  Left Testicular         Subjective    Patient is a 69-year-old male with a past urologic history of prostate cancer.  He follows with [facility] and is status post radiation therapy completed in September 2018.  He has a history of Kayley 3+4 equal 7 prostate cancer.  Postradiation PSAs have been stable most recent measurements returned at 0.56 in August 2024 from 0.66 November 2024 and .0.48 in April 2023.  He was last seen by [facility] in May 2024 May 2025    He presents to the office today for evaluation of left testicular pain.  He states that he has been having chronic pain for a number of months at this point in time.  He was recently evaluated by orthopedics he recommended that patient be evaluated by urology  to rule out a urologic etiology of his discomfort.  He describes left hip pain, groin pain and left testicular pain.  There is no inciting trauma or injury pain on average is a 4 out of 10 he takes Tylenol for the pain which is helpful.  He had x-ray images by orthopedics.  No associated urinary tract symptoms, no dysuria, gross materia, flank pain.  No previous history of nephrolithiasis    ROS:   Review of Systems   Constitutional: Negative.  Negative for chills, fatigue and fever.   HENT: Negative.     Respiratory:  Negative for shortness of breath.    Cardiovascular:  Negative for chest pain.   Gastrointestinal: Negative.  Negative for abdominal pain.   Endocrine: Negative.    Musculoskeletal: Negative.    Skin: Negative.    Neurological: Negative.  Negative for dizziness and light-headedness.   Hematological: Negative.    Psychiatric/Behavioral: Negative.           Past Medical History  Past Medical History:   Diagnosis Date    Cutaneous skin tags 1/2/2019    Diabetes mellitus (HCC)     Hypertension        Past Surgical History  History reviewed. No pertinent surgical history.    Past Family History  Family History   Problem Relation Age of Onset    No Known Problems Brother     Stroke Mother        Past Social history  Social History     Socioeconomic History    Marital status: Single     Spouse name: Not on file    Number of children: Not on file    Years of education: Not on file    Highest education level: Not on file   Occupational History    Occupation: Work history - travels a lot   Tobacco Use    Smoking status: Former    Smokeless tobacco: Never   Vaping Use    Vaping status: Never Used   Substance and Sexual Activity    Alcohol use: Yes     Alcohol/week: 2.0 - 3.0 standard drinks of alcohol     Types: 2 - 3 Cans of beer per week     Comment: social    Drug use: No    Sexual activity: Not on file   Other Topics Concern    Not on file   Social History Narrative    Engages in travel abroad    Living  "independently alone'     Social Determinants of Health     Financial Resource Strain: Not on file   Food Insecurity: Not on file   Transportation Needs: Not on file   Physical Activity: Not on file   Stress: Not on file   Social Connections: Not on file   Intimate Partner Violence: Not on file   Housing Stability: Not on file       Current Medications  Current Outpatient Medications   Medication Sig Dispense Refill    atorvastatin (LIPITOR) 40 mg tablet TAKE 1 TABLET BY MOUTH EVERY  tablet 1    celecoxib (CeleBREX) 200 mg capsule Take 1 capsule twice a day by oral route, for 1 month.      glucose blood (OneTouch Verio) test strip Check blood sugars once daily. Please substitute with appropriate alternative as covered by patient's insurance. Dx: E11.65 100 each 3    metFORMIN (GLUCOPHAGE) 1000 MG tablet TAKE 1 TABLET BY MOUTH TWICE A DAY WITH FOOD 180 tablet 1    Mounjaro 7.5 MG/0.5ML INJECT 0.5 ML (7.5 MG TOTAL) UNDER THE SKIN ONCE A WEEK 2 mL 1    olmesartan-hydrochlorothiazide (BENICAR HCT) 40-12.5 MG per tablet TAKE 1 TABLET BY MOUTH EVERY  tablet 1    OneTouch Delica Lancets 33G MISC Check blood sugars once daily. Please substitute with appropriate alternative as covered by patient's insurance. Dx: E11.65 100 each 3    triamcinolone (KENALOG) 0.1 % cream APPLY TOPICALLY TO THE AFFECTED AREA TWICE DAILY 30 g 0     No current facility-administered medications for this visit.       Allergies  Allergies   Allergen Reactions    Captopril Cough    Crestor [Rosuvastatin] Diarrhea    Enalapril Cough       OBJECTIVE    Vitals   Vitals:    09/25/24 1315   BP: 118/74   BP Location: Left arm   Patient Position: Sitting   Cuff Size: Standard   Pulse: 101   SpO2: 99%   Weight: 76.2 kg (168 lb)   Height: 5' 10\" (1.778 m)       PVR:    Physical Exam  Constitutional:       General: He is not in acute distress.     Appearance: Normal appearance. He is normal weight. He is not ill-appearing or toxic-appearing. "   HENT:      Head: Normocephalic and atraumatic.   Eyes:      Conjunctiva/sclera: Conjunctivae normal.   Cardiovascular:      Rate and Rhythm: Normal rate.   Pulmonary:      Effort: Pulmonary effort is normal. No respiratory distress.   Genitourinary:     Comments: On scrotal examination there is no swelling or erythema noted.  Right testicle palpably normal.  Left testicle  raised but not in inguinal canal.  Seems to be mobile able to be brought lower into scrotum.  No mass appreciated the left testicle.  No significant pain to palpation no induration cremasteric reflex intact.  Fullness in the left inguinal region possibly indicating a inguinal hernia.  Was able to pinpoint an area of pain in the inguinal region  Skin:     General: Skin is warm and dry.   Neurological:      General: No focal deficit present.      Mental Status: He is alert and oriented to person, place, and time.      Cranial Nerves: No cranial nerve deficit.   Psychiatric:         Mood and Affect: Mood normal.         Behavior: Behavior normal.         Thought Content: Thought content normal.          Labs:     Lab Results   Component Value Date    PSA 1.3 10/17/2022    PSA 4.9 (H) 04/08/2019    PSA 3.4 02/15/2019    PSA 5.6 (H) 01/03/2019     Lab Results   Component Value Date    CREATININE 1.18 08/28/2024      Lab Results   Component Value Date    HGBA1C 9.8 (H) 08/28/2024     Lab Results   Component Value Date    CALCIUM 9.8 08/28/2024    K 4.5 08/28/2024    CO2 24 08/28/2024    CL 98 08/28/2024    BUN 31 (H) 08/28/2024    CREATININE 1.18 08/28/2024       I have personally reviewed all pertinent lab results and reviewed with patient    Imaging       Floyd Stovall PA-C  Date: 9/25/2024 Time: 2:46 PM  [facility]    This note was written using fluency dictation software. Please excuse any resulting minor grammatical errors.

## 2024-10-07 ENCOUNTER — HOSPITAL ENCOUNTER (OUTPATIENT)
Dept: ULTRASOUND IMAGING | Facility: MEDICAL CENTER | Age: 69
Discharge: HOME | End: 2024-10-07
Payer: MEDICARE

## 2024-10-07 DIAGNOSIS — R19.00 FULLNESS OF INGUINAL REGION: ICD-10-CM

## 2024-10-07 PROCEDURE — 76870 US EXAM SCROTUM: CPT

## 2024-10-09 ENCOUNTER — IMMUNIZATIONS (OUTPATIENT)
Dept: FAMILY MEDICINE CLINIC | Facility: CLINIC | Age: 69
End: 2024-10-09
Payer: MEDICARE

## 2024-10-09 ENCOUNTER — CLINICAL SUPPORT (OUTPATIENT)
Dept: FAMILY MEDICINE CLINIC | Facility: CLINIC | Age: 69
End: 2024-10-09
Payer: MEDICARE

## 2024-10-09 VITALS — BODY MASS INDEX: 25.11 KG/M2 | WEIGHT: 175 LBS

## 2024-10-09 DIAGNOSIS — E11.22 TYPE 2 DIABETES MELLITUS WITH STAGE 2 CHRONIC KIDNEY DISEASE, WITHOUT LONG-TERM CURRENT USE OF INSULIN  (HCC): Primary | ICD-10-CM

## 2024-10-09 DIAGNOSIS — N18.2 TYPE 2 DIABETES MELLITUS WITH STAGE 2 CHRONIC KIDNEY DISEASE, WITHOUT LONG-TERM CURRENT USE OF INSULIN  (HCC): Primary | ICD-10-CM

## 2024-10-09 DIAGNOSIS — Z23 ENCOUNTER FOR IMMUNIZATION: Primary | ICD-10-CM

## 2024-10-09 PROCEDURE — G0008 ADMIN INFLUENZA VIRUS VAC: HCPCS

## 2024-10-09 PROCEDURE — 90662 IIV NO PRSV INCREASED AG IM: CPT

## 2024-10-09 RX ORDER — TIRZEPATIDE 10 MG/.5ML
10 INJECTION, SOLUTION SUBCUTANEOUS WEEKLY
Qty: 2 ML | Refills: 0 | Status: ON HOLD | OUTPATIENT
Start: 2024-10-09

## 2024-10-09 NOTE — ASSESSMENT & PLAN NOTE
Lab Results   Component Value Date    HGBA1C 9.8 (H) 08/28/2024     Most recent A1c above goal but not reflective of current treatment as patient was on Ozempic.   Fasting blood sugar improving but remains above goal     Medications:  mounjaro: patient would to increase further to see if blood sugar readings have further improvement prior to starting basal insulin  Home Monitoring:   BLOOD SUGAR TESTING  Please test your blood sugar:  1 Times per day.  When to test your blood sugar:   Before Breakfast  Target Blood sugar range: Before Meals: , 2 hours after meals: < 180  Call if your blood sugar is less than 60  Will complete foot exam during next visit    Orders:    Mounjaro 10 MG/0.5ML; Inject 0.5 mL (10 mg total) under the skin once a week

## 2024-10-09 NOTE — PROGRESS NOTES
[facility] Clinical Pharmacy Services  Sharron Gray, Pharmacist    Assessment/ Plan     Assessment & Plan  Type 2 diabetes mellitus with stage 2 chronic kidney disease, without long-term current use of insulin  ([facility])    Lab Results   Component Value Date    HGBA1C 9.8 (H) 08/28/2024     Most recent A1c above goal but not reflective of current treatment as patient was on Ozempic.   Fasting blood sugar improving but remains above goal     Medications:  mounjaro: patient would to increase further to see if blood sugar readings have further improvement prior to starting basal insulin  Home Monitoring:   BLOOD SUGAR TESTING  Please test your blood sugar:  1 Times per day.  When to test your blood sugar:   Before Breakfast  Target Blood sugar range: Before Meals: , 2 hours after meals: < 180  Call if your blood sugar is less than 60  Will complete foot exam during next visit    Orders:    Mounjaro 10 MG/0.5ML; Inject 0.5 mL (10 mg total) under the skin once a week      Patient to obtain flu shot today    Pain: reviewed with patient on DDI between celecoxib and naproxen as DDI could be contributing to CAMERON. Patient to stop naproxen, continue celecoxib and monitor for change in  CAMERON. As patient has had limited pain improvement on NSAID, patient to try taking acetaminophen on scheduled basis    Follow-up: 1 month - if patient sees improvement in blood sugar readings, can push to 2 months    Subjective   HPI    Medication Adherence/ Tolerability/ Cost:   Aleve: taking 1 tablet prior to bed, started around the same time he started celecoxib.   atorvastatin  Celecoxib: started 9/24, taking twice daily   metFORMIN  Mouncelina Sopn: Sundays, has taken x4 doses  olmesartan-hydrochlorothiazide  OneTouch Delica Lancets 33G Misc  OneTouch Verio Strp  triamcinolone    Would like to get flu shot today    Review of Systems   Constitutional:  Positive for unexpected weight change (increased).   Cardiovascular:  Positive for leg  swelling (since starting celecoxib).   Gastrointestinal:  Positive for constipation (slight but tolerable). Negative for diarrhea, nausea and vomiting.   Musculoskeletal:         No change in hip pain since starting celecoxib      2. Lifestyle: trying to limit cho intake during the day, usually only eats one meal    Objective       Blood Sugar Readings  The patient is currently checking blood glucose 1 times per day. Patient reports with SMBG logs.    Date AM Post-Breakfast Lunch Post-Lunch Dinner Post-Dinner Comments   10/9 240         10/7 238         10/5 270         10/3 217         9/30 254         9/17 245                                                                     Avg                ASCVD Risk:  The 10-year ASCVD risk score (Adelaida MENDOZA, et al., 2019) is: 29.3%    Values used to calculate the score:      Age: 69 years      Sex: Male      Is Non- : No      Diabetic: Yes      Tobacco smoker: No      Systolic Blood Pressure: 118 mmHg      Is BP treated: Yes      HDL Cholesterol: 48 mg/dL      Total Cholesterol: 154 mg/dL     Vitals:    Wt Readings from Last 3 Encounters:   10/09/24 79.4 kg (175 lb)   09/25/24 76.2 kg (168 lb)   09/09/24 76.8 kg (169 lb 6.4 oz)         Eye Exam:    Lab Results   Component Value Date    LEFTDIABRET None 01/31/2023    RIGHTDIABRET None 01/31/2023       Labs:  Lab Results   Component Value Date    SODIUM 133 (L) 08/28/2024    K 4.5 08/28/2024    EGFR 66 08/28/2024    CREATININE 1.18 08/28/2024       Lab Results   Component Value Date    HGBA1C 9.8 (H) 08/28/2024    HGBA1C 10.4 (H) 02/16/2024    HGBA1C 8.5 (H) 10/23/2023       Pharmacist Tracking Tool     Pharmacist Tracking Tool  Reason For Outreach: Embedded Pharmacist  Demographics:  Intervention Method: In Person  Type of Intervention: Follow-Up  Topics Addressed: Diabetes  Pharmacologic Interventions: Dose or Frequency Adjusted  Non-Pharmacologic Interventions: Disease state education  Time:  Direct  Patient Care:  15  mins  Care Coordination:  15  mins  Recommendation Recipient: Patient/Caregiver  Outcome: Accepted

## 2024-10-10 ENCOUNTER — TELEPHONE (OUTPATIENT)
Age: 69
End: 2024-10-10

## 2024-10-10 DIAGNOSIS — R59.9 ENLARGED LYMPH NODE: Primary | ICD-10-CM

## 2024-10-10 NOTE — TELEPHONE ENCOUNTER
Radiology Test Results - Caitlyn from Radiology Calling with report update    Pt under care of: Floyd Stovall     Imaging Completed: US scrotum and groin area    Significant Findings - Please review

## 2024-10-11 DIAGNOSIS — C61 MALIGNANT NEOPLASM OF PROSTATE (HCC): Primary | ICD-10-CM

## 2024-10-11 NOTE — TELEPHONE ENCOUNTER
Called patient to discuss findings of abnormal enlarged lymph node.  Radiology recommending tissue sampling.  Will place referral to IR for this

## 2024-10-14 NOTE — TELEPHONE ENCOUNTER
Rancho for Charli that Ct scan placed in system for him to have completed - he can call [phone] to schedule

## 2024-10-14 NOTE — TELEPHONE ENCOUNTER
Discussed with attending Dr. Rees.   he is recommending ASAP CT imaging at this point in time.  I placed the order for this please arrange this for ASAP if patient not able to get this week I will then put an order for stat.  Will CC IR to make them aware that we will obtain CT imaging first prior to proceeding with biopsy and hopefully can have CT-guided biopsy

## 2024-10-18 ENCOUNTER — DOCUMENTATION (OUTPATIENT)
Dept: UROLOGY | Facility: CLINIC | Age: 69
End: 2024-10-18

## 2024-10-18 ENCOUNTER — HOSPITAL ENCOUNTER (OUTPATIENT)
Dept: CT IMAGING | Facility: HOSPITAL | Age: 69
Discharge: HOME | End: 2024-10-18
Payer: MEDICARE

## 2024-10-18 ENCOUNTER — APPOINTMENT (INPATIENT)
Dept: RADIOLOGY | Facility: HOSPITAL | Age: 69
DRG: 824 | End: 2024-10-18
Payer: MEDICARE

## 2024-10-18 ENCOUNTER — HOSPITAL ENCOUNTER (INPATIENT)
Facility: HOSPITAL | Age: 69
LOS: 9 days | Discharge: HOME HEALTH | DRG: 824 | End: 2024-10-27
Attending: EMERGENCY MEDICINE | Admitting: INTERNAL MEDICINE
Payer: MEDICARE

## 2024-10-18 ENCOUNTER — PATIENT MESSAGE (OUTPATIENT)
Dept: UROLOGY | Facility: AMBULATORY SURGERY CENTER | Age: 69
End: 2024-10-18

## 2024-10-18 ENCOUNTER — TELEPHONE (OUTPATIENT)
Dept: UROLOGY | Facility: CLINIC | Age: 69
End: 2024-10-18

## 2024-10-18 DIAGNOSIS — N17.9 ACUTE KIDNEY INJURY (HCC): Primary | ICD-10-CM

## 2024-10-18 DIAGNOSIS — R19.00 RETROPERITONEAL MASS: ICD-10-CM

## 2024-10-18 DIAGNOSIS — I10 ESSENTIAL HYPERTENSION: ICD-10-CM

## 2024-10-18 DIAGNOSIS — N13.30 HYDRONEPHROSIS: ICD-10-CM

## 2024-10-18 DIAGNOSIS — C79.9 METASTATIC DISEASE (HCC): ICD-10-CM

## 2024-10-18 DIAGNOSIS — N13.39 OTHER HYDRONEPHROSIS: ICD-10-CM

## 2024-10-18 DIAGNOSIS — R59.9 ENLARGED LYMPH NODE: ICD-10-CM

## 2024-10-18 DIAGNOSIS — N17.9 ACUTE RENAL FAILURE, UNSPECIFIED ACUTE RENAL FAILURE TYPE (HCC): ICD-10-CM

## 2024-10-18 DIAGNOSIS — E53.8 FOLIC ACID DEFICIENCY: ICD-10-CM

## 2024-10-18 DIAGNOSIS — N13.30 HYDRONEPHROSIS OF LEFT KIDNEY: Primary | ICD-10-CM

## 2024-10-18 PROBLEM — E87.1 HYPONATREMIA: Status: ACTIVE | Noted: 2024-10-18

## 2024-10-18 PROBLEM — R91.1 LUNG NODULE: Status: ACTIVE | Noted: 2024-10-18

## 2024-10-18 LAB
ALBUMIN SERPL BCG-MCNC: 3.9 G/DL (ref 3.5–5)
ALP SERPL-CCNC: 142 U/L (ref 34–104)
ALT SERPL W P-5'-P-CCNC: 23 U/L (ref 7–52)
ANION GAP SERPL CALCULATED.3IONS-SCNC: 13 MMOL/L (ref 4–13)
AST SERPL W P-5'-P-CCNC: 15 U/L (ref 13–39)
BACTERIA UR QL AUTO: NORMAL /HPF
BASOPHILS # BLD AUTO: 0.05 THOUSANDS/ΜL (ref 0–0.1)
BASOPHILS NFR BLD AUTO: 0 % (ref 0–1)
BILIRUB SERPL-MCNC: 0.37 MG/DL (ref 0.2–1)
BILIRUB UR QL STRIP: NEGATIVE
BUN SERPL-MCNC: 40 MG/DL (ref 5–25)
CALCIUM SERPL-MCNC: 9.7 MG/DL (ref 8.4–10.2)
CHLORIDE SERPL-SCNC: 92 MMOL/L (ref 96–108)
CLARITY UR: CLEAR
CO2 SERPL-SCNC: 22 MMOL/L (ref 21–32)
COLOR UR: ABNORMAL
CREAT SERPL-MCNC: 2.43 MG/DL (ref 0.6–1.3)
EOSINOPHIL # BLD AUTO: 0.02 THOUSAND/ΜL (ref 0–0.61)
EOSINOPHIL NFR BLD AUTO: 0 % (ref 0–6)
ERYTHROCYTE [DISTWIDTH] IN BLOOD BY AUTOMATED COUNT: 13.5 % (ref 11.6–15.1)
GFR SERPL CREATININE-BSD FRML MDRD: 25 ML/MIN/1.73SQ M
GLUCOSE SERPL-MCNC: 271 MG/DL (ref 65–140)
GLUCOSE SERPL-MCNC: 276 MG/DL (ref 65–140)
GLUCOSE UR STRIP-MCNC: ABNORMAL MG/DL
HCT VFR BLD AUTO: 37.4 % (ref 36.5–49.3)
HGB BLD-MCNC: 12.4 G/DL (ref 12–17)
HGB UR QL STRIP.AUTO: NEGATIVE
IMM GRANULOCYTES # BLD AUTO: 0.04 THOUSAND/UL (ref 0–0.2)
IMM GRANULOCYTES NFR BLD AUTO: 0 % (ref 0–2)
KETONES UR STRIP-MCNC: ABNORMAL MG/DL
LEUKOCYTE ESTERASE UR QL STRIP: NEGATIVE
LYMPHOCYTES # BLD AUTO: 0.84 THOUSANDS/ΜL (ref 0.6–4.47)
LYMPHOCYTES NFR BLD AUTO: 7 % (ref 14–44)
MCH RBC QN AUTO: 29.8 PG (ref 26.8–34.3)
MCHC RBC AUTO-ENTMCNC: 33.2 G/DL (ref 31.4–37.4)
MCV RBC AUTO: 90 FL (ref 82–98)
MONOCYTES # BLD AUTO: 1.12 THOUSAND/ΜL (ref 0.17–1.22)
MONOCYTES NFR BLD AUTO: 10 % (ref 4–12)
NEUTROPHILS # BLD AUTO: 9.56 THOUSANDS/ΜL (ref 1.85–7.62)
NEUTS SEG NFR BLD AUTO: 83 % (ref 43–75)
NITRITE UR QL STRIP: NEGATIVE
NON-SQ EPI CELLS URNS QL MICRO: NORMAL /HPF
NRBC BLD AUTO-RTO: 0 /100 WBCS
PH UR STRIP.AUTO: 5.5 [PH]
PLATELET # BLD AUTO: 603 THOUSANDS/UL (ref 149–390)
PMV BLD AUTO: 10 FL (ref 8.9–12.7)
POTASSIUM SERPL-SCNC: 4.3 MMOL/L (ref 3.5–5.3)
PROT SERPL-MCNC: 8.5 G/DL (ref 6.4–8.4)
PROT UR STRIP-MCNC: ABNORMAL MG/DL
RBC # BLD AUTO: 4.16 MILLION/UL (ref 3.88–5.62)
RBC #/AREA URNS AUTO: NORMAL /HPF
SODIUM SERPL-SCNC: 127 MMOL/L (ref 135–147)
SP GR UR STRIP.AUTO: >=1.05 (ref 1–1.03)
UROBILINOGEN UR STRIP-ACNC: <2 MG/DL
WBC # BLD AUTO: 11.63 THOUSAND/UL (ref 4.31–10.16)
WBC #/AREA URNS AUTO: NORMAL /HPF

## 2024-10-18 PROCEDURE — 81001 URINALYSIS AUTO W/SCOPE: CPT

## 2024-10-18 PROCEDURE — 99223 1ST HOSP IP/OBS HIGH 75: CPT | Performed by: INTERNAL MEDICINE

## 2024-10-18 PROCEDURE — 93005 ELECTROCARDIOGRAM TRACING: CPT

## 2024-10-18 PROCEDURE — 80053 COMPREHEN METABOLIC PANEL: CPT

## 2024-10-18 PROCEDURE — 85025 COMPLETE CBC W/AUTO DIFF WBC: CPT

## 2024-10-18 PROCEDURE — 74177 CT ABD & PELVIS W/CONTRAST: CPT

## 2024-10-18 PROCEDURE — 82948 REAGENT STRIP/BLOOD GLUCOSE: CPT

## 2024-10-18 PROCEDURE — 81001 URINALYSIS AUTO W/SCOPE: CPT | Performed by: INTERNAL MEDICINE

## 2024-10-18 PROCEDURE — 71250 CT THORAX DX C-: CPT

## 2024-10-18 PROCEDURE — 36415 COLL VENOUS BLD VENIPUNCTURE: CPT

## 2024-10-18 PROCEDURE — 99285 EMERGENCY DEPT VISIT HI MDM: CPT | Performed by: EMERGENCY MEDICINE

## 2024-10-18 PROCEDURE — 99284 EMERGENCY DEPT VISIT MOD MDM: CPT

## 2024-10-18 RX ORDER — INSULIN LISPRO 100 [IU]/ML
1-6 INJECTION, SOLUTION INTRAVENOUS; SUBCUTANEOUS
Status: DISCONTINUED | OUTPATIENT
Start: 2024-10-19 | End: 2024-10-27 | Stop reason: HOSPADM

## 2024-10-18 RX ORDER — SODIUM CHLORIDE, SODIUM GLUCONATE, SODIUM ACETATE, POTASSIUM CHLORIDE, MAGNESIUM CHLORIDE, SODIUM PHOSPHATE, DIBASIC, AND POTASSIUM PHOSPHATE .53; .5; .37; .037; .03; .012; .00082 G/100ML; G/100ML; G/100ML; G/100ML; G/100ML; G/100ML; G/100ML
75 INJECTION, SOLUTION INTRAVENOUS CONTINUOUS
Status: DISCONTINUED | OUTPATIENT
Start: 2024-10-18 | End: 2024-10-19

## 2024-10-18 RX ORDER — ATORVASTATIN CALCIUM 40 MG/1
40 TABLET, FILM COATED ORAL DAILY
Status: DISCONTINUED | OUTPATIENT
Start: 2024-10-19 | End: 2024-10-27 | Stop reason: HOSPADM

## 2024-10-18 RX ORDER — ACETAMINOPHEN 325 MG/1
650 TABLET ORAL EVERY 6 HOURS PRN
Status: DISCONTINUED | OUTPATIENT
Start: 2024-10-18 | End: 2024-10-19

## 2024-10-18 RX ORDER — TAMSULOSIN HYDROCHLORIDE 0.4 MG/1
0.4 CAPSULE ORAL ONCE
Status: COMPLETED | OUTPATIENT
Start: 2024-10-18 | End: 2024-10-18

## 2024-10-18 RX ORDER — INSULIN GLARGINE 100 [IU]/ML
10 INJECTION, SOLUTION SUBCUTANEOUS
Status: DISCONTINUED | OUTPATIENT
Start: 2024-10-18 | End: 2024-10-21

## 2024-10-18 RX ORDER — ONDANSETRON 2 MG/ML
4 INJECTION INTRAMUSCULAR; INTRAVENOUS EVERY 6 HOURS PRN
Status: DISCONTINUED | OUTPATIENT
Start: 2024-10-18 | End: 2024-10-27 | Stop reason: HOSPADM

## 2024-10-18 RX ORDER — HYDROMORPHONE HCL/PF 1 MG/ML
0.5 SYRINGE (ML) INJECTION ONCE
Status: COMPLETED | OUTPATIENT
Start: 2024-10-18 | End: 2024-10-18

## 2024-10-18 RX ORDER — OXYCODONE HYDROCHLORIDE 5 MG/1
5 TABLET ORAL EVERY 4 HOURS PRN
Status: DISCONTINUED | OUTPATIENT
Start: 2024-10-18 | End: 2024-10-27 | Stop reason: HOSPADM

## 2024-10-18 RX ORDER — HYDROMORPHONE HCL IN WATER/PF 6 MG/30 ML
0.2 PATIENT CONTROLLED ANALGESIA SYRINGE INTRAVENOUS EVERY 4 HOURS PRN
Status: DISCONTINUED | OUTPATIENT
Start: 2024-10-18 | End: 2024-10-27 | Stop reason: HOSPADM

## 2024-10-18 RX ORDER — INSULIN LISPRO 100 [IU]/ML
1-5 INJECTION, SOLUTION INTRAVENOUS; SUBCUTANEOUS
Status: DISCONTINUED | OUTPATIENT
Start: 2024-10-18 | End: 2024-10-27 | Stop reason: HOSPADM

## 2024-10-18 RX ADMIN — HYDROMORPHONE HYDROCHLORIDE 0.5 MG: 1 INJECTION, SOLUTION INTRAMUSCULAR; INTRAVENOUS; SUBCUTANEOUS at 21:04

## 2024-10-18 RX ADMIN — SODIUM CHLORIDE, SODIUM GLUCONATE, SODIUM ACETATE, POTASSIUM CHLORIDE, MAGNESIUM CHLORIDE, SODIUM PHOSPHATE, DIBASIC, AND POTASSIUM PHOSPHATE 75 ML/HR: .53; .5; .37; .037; .03; .012; .00082 INJECTION, SOLUTION INTRAVENOUS at 22:42

## 2024-10-18 RX ADMIN — HYDROMORPHONE HYDROCHLORIDE 0.2 MG: 0.2 INJECTION, SOLUTION INTRAMUSCULAR; INTRAVENOUS; SUBCUTANEOUS at 22:42

## 2024-10-18 RX ADMIN — TAMSULOSIN HYDROCHLORIDE 0.4 MG: 0.4 CAPSULE ORAL at 18:49

## 2024-10-18 RX ADMIN — INSULIN LISPRO 3 UNITS: 100 INJECTION, SOLUTION INTRAVENOUS; SUBCUTANEOUS at 22:16

## 2024-10-18 RX ADMIN — IOHEXOL 100 ML: 350 INJECTION, SOLUTION INTRAVENOUS at 13:25

## 2024-10-18 RX ADMIN — INSULIN GLARGINE 10 UNITS: 100 INJECTION, SOLUTION SUBCUTANEOUS at 22:16

## 2024-10-18 RX ADMIN — OXYCODONE HYDROCHLORIDE 5 MG: 5 TABLET ORAL at 20:59

## 2024-10-18 NOTE — TELEPHONE ENCOUNTER
Called Radiology and put a rush on ct scan results.     Also called pt and LVM relaying AP's message below: Office call back number provided. Pt instructed to ask for MADAN Lynne.    Floyd Stovall PA-C   to Alejandro Antunezprashanth         10/18/24  3:13 PM  Hi Alejandro     I reviewed your imaging.  The formal report/read from radiology is not back yet but on my view there looks to be significant swelling within the left kidney that is likely the cause of your back/flank pain.  I do not see any specific stone within the ureter be causing this blockage of urine. I think for management of this kidney swelling now you should present to the ER as you may require a stent which will be a hollow strawlike structure that sits within the ureter to allow urine to go into the bladder.  Because of the weekend I would advise that you go to [facility]  as this is the location where a physician/surgeon is present due to the weekend     Floyd     This [facility] [employer] message has not been read.  Floyd Stovall PA-C   to [facility]       10/18/24  3:13 PM   Please call patient with recommendations.  Please also lets get read on CT scan expedited so that ER team has read front patient presents to ER.  Patient should go to [facility] because of weekend

## 2024-10-18 NOTE — ED PROVIDER NOTES
Chief Complaint   Patient presents with    Back Pain     Patient is having left lower back pain that started a week. Patient was sent by Urology for left hydronephrosis and retroperitoneal mass. Patient denies any fevers or chills. Patient denies any urinary symptoms.      History of Present Illness and Review of Systems   This is a 69 y.o. male with history of prostate cancer who comes in for evaluation of left flank pain and abnormal CT findings at the outpatient facility which showed left hydronephrosis retroperitoneal lymphadenopathy and new metastatic disease in his femur and lung.  Patient was told to come in to be seen by urology for likely stenting due to the hydronephrosis.  Patient denies any fever chills nausea vomiting diarrhea chest pain shortness of breath    No other complaints for this encounter.    Remainder of ROS Reviewed and Non-Pertinent    Past Medical, Past Surgical History:    has a past medical history of Cutaneous skin tags (1/2/2019), Diabetes mellitus (HCC), and Hypertension.   has no past surgical history on file.     Allergies:     Allergies   Allergen Reactions    Captopril Cough    Crestor [Rosuvastatin] Diarrhea    Enalapril Cough       Social and Family History:     Social History     Substance and Sexual Activity   Alcohol Use Yes    Alcohol/week: 2.0 - 3.0 standard drinks of alcohol    Types: 2 - 3 Cans of beer per week    Comment: social     Social History     Tobacco Use   Smoking Status Former   Smokeless Tobacco Never     Social History     Substance and Sexual Activity   Drug Use No       Physical Examination     Vitals:    10/19/24 1515 10/19/24 2125 10/20/24 0825 10/20/24 1501   BP: 132/73 134/73 133/73 152/82   BP Location: Left arm Right arm     Pulse: 87 87 98 81   Resp: 19 17 16 18   Temp: 97.6 °F (36.4 °C) 97.5 °F (36.4 °C) 98.7 °F (37.1 °C) 98.2 °F (36.8 °C)   TempSrc: Oral Oral     SpO2: 96% 97% 95% 95%   Weight:       Height:           Physical Exam  Vitals and  nursing note reviewed.   Constitutional:       General: He is not in acute distress.     Appearance: He is well-developed.   HENT:      Head: Normocephalic and atraumatic.   Eyes:      Conjunctiva/sclera: Conjunctivae normal.   Cardiovascular:      Rate and Rhythm: Normal rate and regular rhythm.      Heart sounds: No murmur heard.  Pulmonary:      Effort: Pulmonary effort is normal. No respiratory distress.      Breath sounds: Normal breath sounds.   Abdominal:      Palpations: Abdomen is soft.      Tenderness: There is abdominal tenderness in the suprapubic area. There is right CVA tenderness and left CVA tenderness. There is no guarding or rebound. Negative signs include Fiore's sign, Rovsing's sign, McBurney's sign and psoas sign.   Musculoskeletal:         General: No swelling.      Cervical back: Neck supple.   Skin:     General: Skin is warm and dry.      Capillary Refill: Capillary refill takes less than 2 seconds.   Neurological:      Mental Status: He is alert.   Psychiatric:         Mood and Affect: Mood normal.           Procedures   Procedures      MDM:   Medical Decision Making  Amount and/or Complexity of Data Reviewed  Labs: ordered.    Risk  Prescription drug management.  Decision regarding hospitalization.    69-year-old male with likely metastatic disease and prostate cancer who comes in for evaluation after being seen outpatient with found hydronephrosis.    Patient heart and lungs clear to auscultation abdomen is tender over the suprapubic area as well as bilateral CVA's.  Patient does not have any fever chills nausea vomiting diarrhea neurologically intact    Plan to get basic labs urinalysis    Labs for this patient significant for JULIO.  Urology consulted and the patient admitted  ED Course as of 10/20/24 1507   Fri Oct 18, 2024   1821  significant retroperitoneal mass and lymphadenopathy of unknown primary with new onset left sided hydronephrosis. Patient informed of CT results over phone.  Patient relays he is in left sided flank discomfort. Advised patient be sent to ER for further evalualtion, pain control and likely admission. stent vs PCN for collecting system decompression.    1823 CT from today Moderate left hydronephrosis with peripelvic/proximal periureteral inflammatory stranding secondary to obstructing large left retroperitoneal toribio conglomerate.  Enlarged left pelvic and other retroperitoneal nodes as described.  Findings consistent with metastasis, noting history of prostate cancer.  3 mm left lower lobe nodule.  CT of the chest recommended for full evaluation of the lung fields in setting of metastatic disease.     3 mm sclerotic lesions in the right iliac body and right femoral head may represent bone islands versus sclerotic mets.        Final Dispo   Final Diagnosis:  1. Acute kidney injury (HCC)    2. Hydronephrosis    3. Metastatic disease (HCC)    4. Other hydronephrosis    5. Retroperitoneal mass    6. Acute renal failure, unspecified acute renal failure type (HCC)      Time reflects when diagnosis was documented in both MDM as applicable and the Disposition within this note       Time User Action Codes Description Comment    10/18/2024  7:12 PM Michael Winters Add [N13.30] Hydronephrosis     10/18/2024  7:55 PM Jae Avendaño Add [N17.9] Acute kidney injury (HCC)     10/18/2024  7:55 PM Jae Avendaño Modify [N13.30] Hydronephrosis     10/18/2024  7:55 PM Jae Avendaño Modify [N17.9] Acute kidney injury (HCC)     10/18/2024  7:56 PM Jae Avendaño Add [C79.9] Metastatic disease (HCC)     10/18/2024  8:36 PM Yari Harrison Add [N13.39] Other hydronephrosis     10/19/2024  8:36 AM Dirk Guerrero Add [R19.00] Retroperitoneal mass     10/20/2024  9:23 AM Dirk Guerrero Add [N17.9] Acute renal failure, unspecified acute renal failure type (HCC)           ED Disposition       ED Disposition   Admit    Condition   Stable    Date/Time   Fri Oct 18, 2024  8:22 PM    Comment   Case  was discussed with Dr. Trevino and the patient's admission status was agreed to be Admission Status: inpatient status to the service of Dr. Trevino .               Follow-up Information    None       Medications   atorvastatin (LIPITOR) tablet 40 mg (40 mg Oral Given 10/20/24 0829)   ondansetron (ZOFRAN) injection 4 mg (has no administration in time range)   oxyCODONE (ROXICODONE) split tablet 2.5 mg (2.5 mg Oral Given 10/20/24 0841)     Or   oxyCODONE (ROXICODONE) IR tablet 5 mg ( Oral See Alternative 10/20/24 0841)   HYDROmorphone HCl (DILAUDID) injection 0.2 mg (0.2 mg Intravenous Given 10/18/24 2242)   insulin lispro (HumALOG/ADMELOG) 100 units/mL subcutaneous injection 1-6 Units (4 Units Subcutaneous Given 10/20/24 1130)   insulin lispro (HumALOG/ADMELOG) 100 units/mL subcutaneous injection 1-5 Units (3 Units Subcutaneous Given 10/19/24 2138)   insulin glargine (LANTUS) subcutaneous injection 10 Units 0.1 mL (10 Units Subcutaneous Given 10/19/24 2136)   ceFAZolin (ANCEF) IVPB (premix in dextrose) 2,000 mg 50 mL (2,000 mg Intravenous Not Given 10/19/24 0933)   amLODIPine (NORVASC) tablet 5 mg (5 mg Oral Given 10/20/24 0829)   ondansetron (ZOFRAN) injection 4 mg (has no administration in time range)   fentaNYL (SUBLIMAZE) injection 25 mcg (has no administration in time range)   heparin (porcine) subcutaneous injection 5,000 Units (5,000 Units Subcutaneous Given 10/20/24 1347)   acetaminophen (TYLENOL) tablet 650 mg (has no administration in time range)   insulin lispro (HumALOG/ADMELOG) 100 units/mL subcutaneous injection 3 Units (3 Units Subcutaneous Given 10/20/24 1130)   polyethylene glycol (MIRALAX) packet 17 g (17 g Oral Given 10/20/24 1003)   senna-docusate sodium (SENOKOT S) 8.6-50 mg per tablet 2 tablet (2 tablets Oral Given 10/20/24 1003)   folic acid (FOLVITE) tablet 1 mg (1 mg Oral Given 10/20/24 1008)   multi-electrolyte (PLASMALYTE-A/ISOLYTE-S PH 7.4) IV solution (100 mL/hr Intravenous New Bag  10/20/24 1347)   multi-electrolyte (ISOLYTE-S PH 7.4) bolus 1,000 mL (has no administration in time range)   tamsulosin (FLOMAX) capsule 0.4 mg (0.4 mg Oral Given 10/18/24 1849)   HYDROmorphone (DILAUDID) injection 0.5 mg (0.5 mg Intravenous Given 10/18/24 2104)   lidocaine 1% buffered (10 mL Infiltration Given 10/19/24 0932)   lidocaine 1% buffered (10 mL Infiltration Given 10/19/24 1053)   iohexol (OMNIPAQUE) 350 MG/ML injection (SINGLE-DOSE) 50 mL (20 mL Intrathecal Given 10/19/24 1059)   magnesium sulfate 2 g/50 mL IVPB (premix) 2 g (0 g Intravenous Stopped 10/20/24 1030)       Risk Stratification Tools                Orders Placed This Encounter   Procedures    CT chest wo contrast    IR nephrostomy tube placement    IR biopsy inguinal lymph node    IR nephrostomy to nephroureteral stent    CBC and differential    Comprehensive metabolic panel    UA w Reflex to Microscopic w Reflex to Culture    Urine Microscopic    Urinalysis with microscopic    Basic metabolic panel    CBC (With Platelets)    PSA Total, Diagnostic    Protime-INR    Leukemia/Lymphoma flow cytometry    LD,Blood    CBC and differential    Basic metabolic panel    Magnesium    Retic Count    Folate    Vitamin B12    TIBC Panel (incl. Iron, TIBC, % Iron Saturation)    Ferritin    CBC and differential    Basic metabolic panel    Magnesium    TSH, 3rd generation with Free T4 reflex    Diet Avtar/CHO Controlled; Consistent Carbohydrate Diet Level 2 (5 carb servings/75 grams CHO/meal)    Vital Signs per unit routine    Up and OOB as tolerated    Bladder Scan    Urinary retention protocol    Medication hold parameters, if not noted on medication order    Insulin Subcutaneous Notify Physician    Insulin Subcutaneous Instruction    Hypoglycemia Protocol    Apply SCD or Foot pumps    Fingerstick Glucose (POCT)    Fingerstick Glucose (POCT) Before meals and at bedtime    Vital signs (per unit protocol)    Continuous Pulse Ox    Nursing communication May  "Discharge From PACU    Fingerstick glucose for all known diabetic patients and/or for all patients who had blood glucose greater than 120 preoperatively    Vital signs (specify frequency)    Bed rest with bathroom privileges    Patient Assessment    IR Tube #1 Instructions    Change tube dressing daily and as needed    Patient may shower after 48 hours    IR Nurse phone call Call IR if patient reports increased pain or pressure when flushing, drainage around tube insertion site when flushing, leakage around tube insertion site with flushing, fever of >101, reid blood drainage, broken sutures, disp...    Nursing communication    Nursing communication Please release the IR Post-procedure \"Signed and Held\" orders.    Drainage Tube Home Care - (Does not include  Asept/Tenkhoff/PD catheters and G/GJ tubes)    Level 3 - DNAR (Do Not Attempt Resuscitation) and DNI (Do Not Intubate)    Inpatient consult to Urology    Inpatient Consult to IR    Inpatient Consult to IR    Inpatient consult to Oncology    Inpatient consult to Nephrology    OT eval and treat    PT eval and treat    ECG 12 lead    INPATIENT ADMISSION       Labs:     Labs Reviewed   CBC AND DIFFERENTIAL - Abnormal       Result Value Ref Range Status    WBC 11.63 (*) 4.31 - 10.16 Thousand/uL Final    RBC 4.16  3.88 - 5.62 Million/uL Final    Hemoglobin 12.4  12.0 - 17.0 g/dL Final    Hematocrit 37.4  36.5 - 49.3 % Final    MCV 90  82 - 98 fL Final    MCH 29.8  26.8 - 34.3 pg Final    MCHC 33.2  31.4 - 37.4 g/dL Final    RDW 13.5  11.6 - 15.1 % Final    MPV 10.0  8.9 - 12.7 fL Final    Platelets 603 (*) 149 - 390 Thousands/uL Final    nRBC 0  /100 WBCs Final    Segmented % 83 (*) 43 - 75 % Final    Immature Grans % 0  0 - 2 % Final    Lymphocytes % 7 (*) 14 - 44 % Final    Monocytes % 10  4 - 12 % Final    Eosinophils Relative 0  0 - 6 % Final    Basophils Relative 0  0 - 1 % Final    Absolute Neutrophils 9.56 (*) 1.85 - 7.62 Thousands/µL Final    Absolute " Immature Grans 0.04  0.00 - 0.20 Thousand/uL Final    Absolute Lymphocytes 0.84  0.60 - 4.47 Thousands/µL Final    Absolute Monocytes 1.12  0.17 - 1.22 Thousand/µL Final    Eosinophils Absolute 0.02  0.00 - 0.61 Thousand/µL Final    Basophils Absolute 0.05  0.00 - 0.10 Thousands/µL Final   COMPREHENSIVE METABOLIC PANEL - Abnormal    Sodium 127 (*) 135 - 147 mmol/L Final    Potassium 4.3  3.5 - 5.3 mmol/L Final    Chloride 92 (*) 96 - 108 mmol/L Final    CO2 22  21 - 32 mmol/L Final    ANION GAP 13  4 - 13 mmol/L Final    BUN 40 (*) 5 - 25 mg/dL Final    Creatinine 2.43 (*) 0.60 - 1.30 mg/dL Final    Comment: Standardized to IDMS reference method    Glucose 276 (*) 65 - 140 mg/dL Final    Comment: If the patient is fasting, the ADA then defines impaired fasting glucose as > 100 mg/dL and diabetes as > or equal to 123 mg/dL.    Calcium 9.7  8.4 - 10.2 mg/dL Final    AST 15  13 - 39 U/L Final    ALT 23  7 - 52 U/L Final    Comment: Specimen collection should occur prior to Sulfasalazine administration due to the potential for falsely depressed results.     Alkaline Phosphatase 142 (*) 34 - 104 U/L Final    Total Protein 8.5 (*) 6.4 - 8.4 g/dL Final    Albumin 3.9  3.5 - 5.0 g/dL Final    Total Bilirubin 0.37  0.20 - 1.00 mg/dL Final    Comment: Use of this assay is not recommended for patients undergoing treatment with eltrombopag due to the potential for falsely elevated results.  N-acetyl-p-benzoquinone imine (metabolite of Acetaminophen) will generate erroneously low results in samples for patients that have taken an overdose of Acetaminophen.    eGFR 25  ml/min/1.73sq m Final    Narrative:     National Kidney Disease Foundation guidelines for Chronic Kidney Disease (CKD):     Stage 1 with normal or high GFR (GFR > 90 mL/min/1.73 square meters)    Stage 2 Mild CKD (GFR = 60-89 mL/min/1.73 square meters)    Stage 3A Moderate CKD (GFR = 45-59 mL/min/1.73 square meters)    Stage 3B Moderate CKD (GFR = 30-44 mL/min/1.73  "square meters)    Stage 4 Severe CKD (GFR = 15-29 mL/min/1.73 square meters)    Stage 5 End Stage CKD (GFR <15 mL/min/1.73 square meters)  Note: GFR calculation is accurate only with a steady state creatinine   UA W REFLEX TO MICROSCOPIC WITH REFLEX TO CULTURE - Abnormal    Color, UA Light Yellow   Final    Clarity, UA Clear   Final    Specific Gravity, UA >=1.050 (*) 1.003 - 1.030 Final    pH, UA 5.5  4.5, 5.0, 5.5, 6.0, 6.5, 7.0, 7.5, 8.0 Final    Leukocytes, UA Negative  Negative Final    Nitrite, UA Negative  Negative Final    Protein, UA 30 (1+) (*) Negative mg/dl Final    Glucose,  (3/10%) (*) Negative mg/dl Final    Ketones, UA Trace (*) Negative mg/dl Final    Urobilinogen, UA <2.0  <2.0 mg/dl mg/dl Final    Bilirubin, UA Negative  Negative Final    Occult Blood, UA Negative  Negative Final   URINE MICROSCOPIC - Normal    RBC, UA 1-2  None Seen, 1-2 /hpf Final    WBC, UA 1-2  None Seen, 1-2 /hpf Final    Epithelial Cells None Seen  None Seen, Occasional /hpf Final    Bacteria, UA None Seen  None Seen, Occasional /hpf Final       Imaging:     CT chest wo contrast   Final Result by Dona Galvan MD (10/19 0930)      Nothing to suggest malignancy in the chest. The 3 mm left lower lobe nodule is of doubtful significance.         Workstation performed: [id number]         IR nephrostomy tube placement    (Results Pending)   IR biopsy inguinal lymph node    (Results Pending)   IR nephrostomy to nephroureteral stent    (Results Pending)      All details of the evaluation and treatment plan were made clear and additionally all questions and concerns were addressed while under my care.    Portions of the record may have been created with voice recognition software. Occasional wrong word or \"sound a like\" substitutions may have occurred due to the inherent limitations of voice recognition software. Read the chart carefully and recognize, using context, where substitutions have occurred.    The attending " physician physically available and evaluated the above patient alongside myself.      Michael Winters MD  10/20/24 7150

## 2024-10-19 ENCOUNTER — APPOINTMENT (INPATIENT)
Dept: RADIOLOGY | Facility: HOSPITAL | Age: 69
DRG: 824 | End: 2024-10-19
Attending: STUDENT IN AN ORGANIZED HEALTH CARE EDUCATION/TRAINING PROGRAM
Payer: MEDICARE

## 2024-10-19 ENCOUNTER — ANESTHESIA EVENT (INPATIENT)
Dept: RADIOLOGY | Facility: HOSPITAL | Age: 69
End: 2024-10-19
Payer: MEDICARE

## 2024-10-19 ENCOUNTER — ANESTHESIA (INPATIENT)
Dept: RADIOLOGY | Facility: HOSPITAL | Age: 69
End: 2024-10-19
Payer: MEDICARE

## 2024-10-19 PROBLEM — R19.00 RETROPERITONEAL MASS: Status: ACTIVE | Noted: 2024-10-19

## 2024-10-19 LAB
ANION GAP SERPL CALCULATED.3IONS-SCNC: 10 MMOL/L (ref 4–13)
BACTERIA UR QL AUTO: ABNORMAL /HPF
BILIRUB UR QL STRIP: NEGATIVE
BUN SERPL-MCNC: 40 MG/DL (ref 5–25)
CALCIUM SERPL-MCNC: 9.1 MG/DL (ref 8.4–10.2)
CHLORIDE SERPL-SCNC: 94 MMOL/L (ref 96–108)
CLARITY UR: CLEAR
CO2 SERPL-SCNC: 25 MMOL/L (ref 21–32)
COLOR UR: ABNORMAL
CREAT SERPL-MCNC: 2.54 MG/DL (ref 0.6–1.3)
ERYTHROCYTE [DISTWIDTH] IN BLOOD BY AUTOMATED COUNT: 13.5 % (ref 11.6–15.1)
FERRITIN SERPL-MCNC: 494 NG/ML (ref 24–336)
FOLATE SERPL-MCNC: 4.6 NG/ML
GFR SERPL CREATININE-BSD FRML MDRD: 24 ML/MIN/1.73SQ M
GLUCOSE SERPL-MCNC: 218 MG/DL (ref 65–140)
GLUCOSE SERPL-MCNC: 218 MG/DL (ref 65–140)
GLUCOSE SERPL-MCNC: 221 MG/DL (ref 65–140)
GLUCOSE SERPL-MCNC: 240 MG/DL (ref 65–140)
GLUCOSE SERPL-MCNC: 299 MG/DL (ref 65–140)
GLUCOSE UR STRIP-MCNC: ABNORMAL MG/DL
HCT VFR BLD AUTO: 33.2 % (ref 36.5–49.3)
HGB BLD-MCNC: 10.7 G/DL (ref 12–17)
HGB UR QL STRIP.AUTO: NEGATIVE
INR PPP: 1.05 (ref 0.85–1.19)
IRON SATN MFR SERPL: 10 % (ref 15–50)
IRON SERPL-MCNC: 20 UG/DL (ref 50–212)
KETONES UR STRIP-MCNC: ABNORMAL MG/DL
LDH SERPL-CCNC: 192 U/L (ref 140–271)
LEUKOCYTE ESTERASE UR QL STRIP: NEGATIVE
MCH RBC QN AUTO: 29 PG (ref 26.8–34.3)
MCHC RBC AUTO-ENTMCNC: 32.2 G/DL (ref 31.4–37.4)
MCV RBC AUTO: 90 FL (ref 82–98)
NITRITE UR QL STRIP: NEGATIVE
NON-SQ EPI CELLS URNS QL MICRO: ABNORMAL /HPF
PH UR STRIP.AUTO: 5.5 [PH]
PLATELET # BLD AUTO: 419 THOUSANDS/UL (ref 149–390)
PMV BLD AUTO: 10 FL (ref 8.9–12.7)
POTASSIUM SERPL-SCNC: 3.8 MMOL/L (ref 3.5–5.3)
PROT UR STRIP-MCNC: ABNORMAL MG/DL
PROTHROMBIN TIME: 14 SECONDS (ref 12.3–15)
PSA SERPL-MCNC: 0.35 NG/ML (ref 0–4)
RBC # BLD AUTO: 3.69 MILLION/UL (ref 3.88–5.62)
RBC #/AREA URNS AUTO: ABNORMAL /HPF
RETICS # AUTO: NORMAL 10*3/UL (ref 14356–105094)
RETICS # CALC: 1.02 % (ref 0.37–1.87)
SODIUM SERPL-SCNC: 129 MMOL/L (ref 135–147)
SP GR UR STRIP.AUTO: >=1.05 (ref 1–1.03)
TIBC SERPL-MCNC: 196 UG/DL (ref 250–450)
UIBC SERPL-MCNC: 176 UG/DL (ref 155–355)
UROBILINOGEN UR STRIP-ACNC: <2 MG/DL
VIT B12 SERPL-MCNC: 606 PG/ML (ref 180–914)
WBC # BLD AUTO: 10.76 THOUSAND/UL (ref 4.31–10.16)
WBC #/AREA URNS AUTO: ABNORMAL /HPF

## 2024-10-19 PROCEDURE — 07BC3ZX EXCISION OF PELVIS LYMPHATIC, PERCUTANEOUS APPROACH, DIAGNOSTIC: ICD-10-PCS | Performed by: STUDENT IN AN ORGANIZED HEALTH CARE EDUCATION/TRAINING PROGRAM

## 2024-10-19 PROCEDURE — 82948 REAGENT STRIP/BLOOD GLUCOSE: CPT

## 2024-10-19 PROCEDURE — 38505 NEEDLE BIOPSY LYMPH NODES: CPT

## 2024-10-19 PROCEDURE — C1887 CATHETER, GUIDING: HCPCS

## 2024-10-19 PROCEDURE — 50432 PLMT NEPHROSTOMY CATHETER: CPT

## 2024-10-19 PROCEDURE — 83540 ASSAY OF IRON: CPT | Performed by: INTERNAL MEDICINE

## 2024-10-19 PROCEDURE — 88185 FLOWCYTOMETRY/TC ADD-ON: CPT | Performed by: INTERNAL MEDICINE

## 2024-10-19 PROCEDURE — 85610 PROTHROMBIN TIME: CPT | Performed by: STUDENT IN AN ORGANIZED HEALTH CARE EDUCATION/TRAINING PROGRAM

## 2024-10-19 PROCEDURE — 99232 SBSQ HOSP IP/OBS MODERATE 35: CPT | Performed by: INTERNAL MEDICINE

## 2024-10-19 PROCEDURE — 82728 ASSAY OF FERRITIN: CPT | Performed by: INTERNAL MEDICINE

## 2024-10-19 PROCEDURE — 88342 IMHCHEM/IMCYTCHM 1ST ANTB: CPT | Performed by: STUDENT IN AN ORGANIZED HEALTH CARE EDUCATION/TRAINING PROGRAM

## 2024-10-19 PROCEDURE — 88341 IMHCHEM/IMCYTCHM EA ADD ANTB: CPT | Performed by: STUDENT IN AN ORGANIZED HEALTH CARE EDUCATION/TRAINING PROGRAM

## 2024-10-19 PROCEDURE — 76942 ECHO GUIDE FOR BIOPSY: CPT

## 2024-10-19 PROCEDURE — NC001 PR NO CHARGE: Performed by: STUDENT IN AN ORGANIZED HEALTH CARE EDUCATION/TRAINING PROGRAM

## 2024-10-19 PROCEDURE — 76377 3D RENDER W/INTRP POSTPROCES: CPT | Performed by: STUDENT IN AN ORGANIZED HEALTH CARE EDUCATION/TRAINING PROGRAM

## 2024-10-19 PROCEDURE — 38505 NEEDLE BIOPSY LYMPH NODES: CPT | Performed by: STUDENT IN AN ORGANIZED HEALTH CARE EDUCATION/TRAINING PROGRAM

## 2024-10-19 PROCEDURE — C1769 GUIDE WIRE: HCPCS

## 2024-10-19 PROCEDURE — 85045 AUTOMATED RETICULOCYTE COUNT: CPT | Performed by: INTERNAL MEDICINE

## 2024-10-19 PROCEDURE — 76942 ECHO GUIDE FOR BIOPSY: CPT | Performed by: STUDENT IN AN ORGANIZED HEALTH CARE EDUCATION/TRAINING PROGRAM

## 2024-10-19 PROCEDURE — 50432 PLMT NEPHROSTOMY CATHETER: CPT | Performed by: STUDENT IN AN ORGANIZED HEALTH CARE EDUCATION/TRAINING PROGRAM

## 2024-10-19 PROCEDURE — 88305 TISSUE EXAM BY PATHOLOGIST: CPT | Performed by: STUDENT IN AN ORGANIZED HEALTH CARE EDUCATION/TRAINING PROGRAM

## 2024-10-19 PROCEDURE — 83550 IRON BINDING TEST: CPT | Performed by: INTERNAL MEDICINE

## 2024-10-19 PROCEDURE — 99223 1ST HOSP IP/OBS HIGH 75: CPT | Performed by: INTERNAL MEDICINE

## 2024-10-19 PROCEDURE — 0T9430Z DRAINAGE OF LEFT KIDNEY PELVIS WITH DRAINAGE DEVICE, PERCUTANEOUS APPROACH: ICD-10-PCS | Performed by: STUDENT IN AN ORGANIZED HEALTH CARE EDUCATION/TRAINING PROGRAM

## 2024-10-19 PROCEDURE — C1894 INTRO/SHEATH, NON-LASER: HCPCS

## 2024-10-19 PROCEDURE — 88184 FLOWCYTOMETRY/ TC 1 MARKER: CPT | Performed by: INTERNAL MEDICINE

## 2024-10-19 PROCEDURE — 82607 VITAMIN B-12: CPT | Performed by: INTERNAL MEDICINE

## 2024-10-19 PROCEDURE — 84153 ASSAY OF PSA TOTAL: CPT | Performed by: PHYSICIAN ASSISTANT

## 2024-10-19 PROCEDURE — C1729 CATH, DRAINAGE: HCPCS

## 2024-10-19 PROCEDURE — 99222 1ST HOSP IP/OBS MODERATE 55: CPT | Performed by: UROLOGY

## 2024-10-19 PROCEDURE — 82746 ASSAY OF FOLIC ACID SERUM: CPT | Performed by: INTERNAL MEDICINE

## 2024-10-19 PROCEDURE — 80048 BASIC METABOLIC PNL TOTAL CA: CPT | Performed by: INTERNAL MEDICINE

## 2024-10-19 PROCEDURE — 83615 LACTATE (LD) (LDH) ENZYME: CPT | Performed by: INTERNAL MEDICINE

## 2024-10-19 PROCEDURE — 85027 COMPLETE CBC AUTOMATED: CPT | Performed by: INTERNAL MEDICINE

## 2024-10-19 RX ORDER — AMLODIPINE BESYLATE 5 MG/1
5 TABLET ORAL DAILY
Status: DISCONTINUED | OUTPATIENT
Start: 2024-10-19 | End: 2024-10-27 | Stop reason: HOSPADM

## 2024-10-19 RX ORDER — HEPARIN SODIUM 5000 [USP'U]/ML
5000 INJECTION, SOLUTION INTRAVENOUS; SUBCUTANEOUS EVERY 8 HOURS SCHEDULED
Status: DISCONTINUED | OUTPATIENT
Start: 2024-10-19 | End: 2024-10-27 | Stop reason: HOSPADM

## 2024-10-19 RX ORDER — PROPOFOL 10 MG/ML
INJECTION, EMULSION INTRAVENOUS AS NEEDED
Status: DISCONTINUED | OUTPATIENT
Start: 2024-10-19 | End: 2024-10-19

## 2024-10-19 RX ORDER — LIDOCAINE WITH 8.4% SOD BICARB 0.9%(10ML)
SYRINGE (ML) INJECTION AS NEEDED
Status: COMPLETED | OUTPATIENT
Start: 2024-10-19 | End: 2024-10-19

## 2024-10-19 RX ORDER — SODIUM CHLORIDE 9 MG/ML
100 INJECTION, SOLUTION INTRAVENOUS CONTINUOUS
Status: DISCONTINUED | OUTPATIENT
Start: 2024-10-19 | End: 2024-10-20

## 2024-10-19 RX ORDER — ROCURONIUM BROMIDE 10 MG/ML
INJECTION, SOLUTION INTRAVENOUS AS NEEDED
Status: DISCONTINUED | OUTPATIENT
Start: 2024-10-19 | End: 2024-10-19

## 2024-10-19 RX ORDER — SODIUM CHLORIDE 9 MG/ML
10 INJECTION, SOLUTION INTRAMUSCULAR; INTRAVENOUS; SUBCUTANEOUS DAILY
Qty: 300 ML | Refills: 3 | Status: SHIPPED | OUTPATIENT
Start: 2024-10-19 | End: 2025-02-16

## 2024-10-19 RX ORDER — CEFAZOLIN SODIUM 2 G/50ML
2000 SOLUTION INTRAVENOUS ONCE
Status: DISCONTINUED | OUTPATIENT
Start: 2024-10-19 | End: 2024-10-27 | Stop reason: HOSPADM

## 2024-10-19 RX ORDER — ONDANSETRON 2 MG/ML
INJECTION INTRAMUSCULAR; INTRAVENOUS AS NEEDED
Status: DISCONTINUED | OUTPATIENT
Start: 2024-10-19 | End: 2024-10-19

## 2024-10-19 RX ORDER — LIDOCAINE HYDROCHLORIDE 10 MG/ML
INJECTION, SOLUTION EPIDURAL; INFILTRATION; INTRACAUDAL; PERINEURAL AS NEEDED
Status: DISCONTINUED | OUTPATIENT
Start: 2024-10-19 | End: 2024-10-19

## 2024-10-19 RX ORDER — SODIUM CHLORIDE, SODIUM LACTATE, POTASSIUM CHLORIDE, CALCIUM CHLORIDE 600; 310; 30; 20 MG/100ML; MG/100ML; MG/100ML; MG/100ML
INJECTION, SOLUTION INTRAVENOUS CONTINUOUS PRN
Status: DISCONTINUED | OUTPATIENT
Start: 2024-10-19 | End: 2024-10-19

## 2024-10-19 RX ORDER — FENTANYL CITRATE/PF 50 MCG/ML
25 SYRINGE (ML) INJECTION
Status: DISCONTINUED | OUTPATIENT
Start: 2024-10-19 | End: 2024-10-21

## 2024-10-19 RX ORDER — PHENYLEPHRINE HCL IN 0.9% NACL 1 MG/10 ML
SYRINGE (ML) INTRAVENOUS AS NEEDED
Status: DISCONTINUED | OUTPATIENT
Start: 2024-10-19 | End: 2024-10-19

## 2024-10-19 RX ORDER — ONDANSETRON 2 MG/ML
4 INJECTION INTRAMUSCULAR; INTRAVENOUS ONCE AS NEEDED
Status: DISCONTINUED | OUTPATIENT
Start: 2024-10-19 | End: 2024-10-23 | Stop reason: HOSPADM

## 2024-10-19 RX ORDER — CEFAZOLIN SODIUM 1 G/3ML
INJECTION, POWDER, FOR SOLUTION INTRAMUSCULAR; INTRAVENOUS AS NEEDED
Status: DISCONTINUED | OUTPATIENT
Start: 2024-10-19 | End: 2024-10-19

## 2024-10-19 RX ORDER — FENTANYL CITRATE 50 UG/ML
INJECTION, SOLUTION INTRAMUSCULAR; INTRAVENOUS AS NEEDED
Status: DISCONTINUED | OUTPATIENT
Start: 2024-10-19 | End: 2024-10-19

## 2024-10-19 RX ORDER — AMOXICILLIN 250 MG
1 CAPSULE ORAL
Status: DISCONTINUED | OUTPATIENT
Start: 2024-10-19 | End: 2024-10-20

## 2024-10-19 RX ORDER — ACETAMINOPHEN 325 MG/1
650 TABLET ORAL EVERY 4 HOURS PRN
Status: DISCONTINUED | OUTPATIENT
Start: 2024-10-19 | End: 2024-10-27 | Stop reason: HOSPADM

## 2024-10-19 RX ADMIN — PROPOFOL 20 MG: 10 INJECTION, EMULSION INTRAVENOUS at 09:49

## 2024-10-19 RX ADMIN — FENTANYL CITRATE 25 MCG: 50 INJECTION INTRAMUSCULAR; INTRAVENOUS at 10:38

## 2024-10-19 RX ADMIN — INSULIN GLARGINE 10 UNITS: 100 INJECTION, SOLUTION SUBCUTANEOUS at 21:36

## 2024-10-19 RX ADMIN — FENTANYL CITRATE 25 MCG: 50 INJECTION INTRAMUSCULAR; INTRAVENOUS at 09:51

## 2024-10-19 RX ADMIN — ATORVASTATIN CALCIUM 40 MG: 40 TABLET, FILM COATED ORAL at 16:36

## 2024-10-19 RX ADMIN — SENNOSIDES AND DOCUSATE SODIUM 1 TABLET: 50; 8.6 TABLET ORAL at 21:37

## 2024-10-19 RX ADMIN — HEPARIN SODIUM 5000 UNITS: 5000 INJECTION INTRAVENOUS; SUBCUTANEOUS at 13:51

## 2024-10-19 RX ADMIN — ONDANSETRON 4 MG: 2 INJECTION INTRAMUSCULAR; INTRAVENOUS at 09:07

## 2024-10-19 RX ADMIN — ROCURONIUM 20 MG: 50 INJECTION, SOLUTION INTRAVENOUS at 09:49

## 2024-10-19 RX ADMIN — Medication 200 MCG: at 09:14

## 2024-10-19 RX ADMIN — Medication 10 ML: at 09:32

## 2024-10-19 RX ADMIN — AMLODIPINE BESYLATE 5 MG: 5 TABLET ORAL at 11:58

## 2024-10-19 RX ADMIN — LIDOCAINE HYDROCHLORIDE 50 MG: 10 INJECTION, SOLUTION EPIDURAL; INFILTRATION; INTRACAUDAL; PERINEURAL at 09:03

## 2024-10-19 RX ADMIN — PROPOFOL 150 MG: 10 INJECTION, EMULSION INTRAVENOUS at 09:03

## 2024-10-19 RX ADMIN — SODIUM CHLORIDE, SODIUM LACTATE, POTASSIUM CHLORIDE, AND CALCIUM CHLORIDE: .6; .31; .03; .02 INJECTION, SOLUTION INTRAVENOUS at 09:08

## 2024-10-19 RX ADMIN — NOREPINEPHRINE BITARTRATE 8 MCG: 1 INJECTION, SOLUTION, CONCENTRATE INTRAVENOUS at 09:54

## 2024-10-19 RX ADMIN — CEFAZOLIN 2000 MG: 1 INJECTION, POWDER, FOR SOLUTION INTRAMUSCULAR; INTRAVENOUS at 09:08

## 2024-10-19 RX ADMIN — ROCURONIUM 50 MG: 50 INJECTION, SOLUTION INTRAVENOUS at 09:03

## 2024-10-19 RX ADMIN — NOREPINEPHRINE BITARTRATE 8 MCG: 1 INJECTION, SOLUTION, CONCENTRATE INTRAVENOUS at 09:22

## 2024-10-19 RX ADMIN — HEPARIN SODIUM 5000 UNITS: 5000 INJECTION INTRAVENOUS; SUBCUTANEOUS at 21:36

## 2024-10-19 RX ADMIN — OXYCODONE HYDROCHLORIDE 5 MG: 5 TABLET ORAL at 21:37

## 2024-10-19 RX ADMIN — NOREPINEPHRINE BITARTRATE 8 MCG: 1 INJECTION, SOLUTION, CONCENTRATE INTRAVENOUS at 10:49

## 2024-10-19 RX ADMIN — INSULIN LISPRO 3 UNITS: 100 INJECTION, SOLUTION INTRAVENOUS; SUBCUTANEOUS at 21:38

## 2024-10-19 RX ADMIN — OXYCODONE HYDROCHLORIDE 5 MG: 5 TABLET ORAL at 17:38

## 2024-10-19 RX ADMIN — FENTANYL CITRATE 25 MCG: 50 INJECTION INTRAMUSCULAR; INTRAVENOUS at 09:03

## 2024-10-19 RX ADMIN — IOHEXOL 20 ML: 350 INJECTION, SOLUTION INTRAVENOUS at 10:59

## 2024-10-19 RX ADMIN — INSULIN LISPRO 2 UNITS: 100 INJECTION, SOLUTION INTRAVENOUS; SUBCUTANEOUS at 12:02

## 2024-10-19 RX ADMIN — OXYCODONE HYDROCHLORIDE 5 MG: 5 TABLET ORAL at 06:18

## 2024-10-19 RX ADMIN — NOREPINEPHRINE BITARTRATE 2 MCG/MIN: 1 INJECTION, SOLUTION, CONCENTRATE INTRAVENOUS at 09:18

## 2024-10-19 RX ADMIN — INSULIN LISPRO 2 UNITS: 100 INJECTION, SOLUTION INTRAVENOUS; SUBCUTANEOUS at 16:36

## 2024-10-19 RX ADMIN — Medication 10 ML: at 10:53

## 2024-10-19 RX ADMIN — SUGAMMADEX 160 MG: 100 INJECTION, SOLUTION INTRAVENOUS at 11:00

## 2024-10-19 RX ADMIN — OXYCODONE HYDROCHLORIDE 5 MG: 5 TABLET ORAL at 01:31

## 2024-10-19 RX ADMIN — SODIUM CHLORIDE 100 ML/HR: 0.9 INJECTION, SOLUTION INTRAVENOUS at 12:24

## 2024-10-19 RX ADMIN — NOREPINEPHRINE BITARTRATE 16 MCG: 1 INJECTION, SOLUTION, CONCENTRATE INTRAVENOUS at 10:42

## 2024-10-19 RX ADMIN — FENTANYL CITRATE 25 MCG: 50 INJECTION INTRAMUSCULAR; INTRAVENOUS at 09:46

## 2024-10-19 NOTE — CONSULTS
Inpatient consult to Urology  Consult performed by: Yari Harrison PA-C  Consult ordered by: Jae Avendaño MD      : UROLOGY  Alejandro Adames 69 y.o. male [id number]   Unit/Bed #: [facility] 909-01  Encounter: [id number]        Assessment  & Plan  :  69-year-old male with a history of prostate cancer status post radiation 2019 most recent PSA 0.5 clinically insignificant.  Follow-up with urology on outpatient discovered to have inguinal lymph nodes ordered outpatient CT scan which revealed hydronephrosis on the left side with large left retroperitoneal mass.  Nodes encasing left ureter causing upstream obstruction, associated with JULIO :    -CT scan revealing Conglomerate of left retroperitoneal nodes measuring approximately 6.5 x 5 x 13 cm (2:106)  These nodes encase the left ureter causing upstream obstruction,The left psoas muscle is infiltrated by this toribio mass,Enlarged nodes anterior to the aortic bifurcation and within the right retroperitoneum measure 3 and 1.8 cm (2:98),Enlarged left external iliac chain nodes measure 2.6 and 2 cm (2:130)  Other scattered enlarged retroperitoneal nodes  -JULIO of 2.43 most likely combination of postrenal and prerenal causes, patient taking NSAIDs on outpatient for pain control.  Continue to trend.  Consider nephrology consultation if no improvement despite conservative management.  Avoid nephrotoxins.  -In the setting of hydronephrosis.  Recommend IR percutaneous nephrostomy tube due to CT findings of nodes encasing the left ureter resulting in upstream obstruction.  High risk of stent failure.  Can consider internalization to PCNU.Patient not anticoagulated on outpatient.  Hold DVT prophylaxis for now pending PCN   placement.  INR 1.3  -Discussed PCN with patient at bedside.  And reasoning for PCN versus stent.  Discussed with patient possibility for internalization at a later date however timeframe with PCN is unknown.This will be based off of biopsy results and treatment  plans moving forward.  Patient agreeable to plan and workup.  -Recommend medical oncology consultation, most likely will recommend IR percutaneous biopsy of retroperitoneal mass or inguinal nodes.  Defer site to medical oncology and IR.   -Awaiting repeat PSA, most recent PSA 0.5 which does not appear to be consistent with metastatic prostate cancer.   -Awaiting repeat labs today.  -Keep n.p.o. pending IR availability for procedure.      Subjective :    Alejandro Adames  is a 69 y.o. male previously known to our practice in 2019 for elevated PSA status post a biopsy which revealed [place] 6 prostate cancer disease with a single core of 3+4 [place] 7 disease.  Patient's case discussed at multidisciplinary case review with plan being for molecular testing of pathology and surveillance in 4 months possible MRI and repeat biopsy in 1 year.  Patient then followed with Zay Santoyo for second opinion.  Recommendation was for treatment.  Patient declining and not interested in surgical intervention at that time and referral was made to radiation oncology.  [facility]  Continued to follow with radiation oncology at regular intervals with PSAs.  Most recent PSA 8/28/2024 at 0.56.  Patient most recently reestablished with our practice.  Patient was seen on 9/25/2024 for left-sided testicular pain noted to be chronic rating from left hip groin and testicle.  At that time ultrasound of groin and scrotum were obtained a which revealed abnormal enlarged left inguinal lymph node corresponding to the area of concern.  Patient arranged for IR biopsy of area.  Given these ultrasound findings, it was also recommended patient undergo CT scan of abdomen and pelvis for CT guided biopsy.  Patient had reached out to the office due to movement of pain from groin to left flank area.  Upon review of CT imaging patient noted to have hydronephrosis and recommended he go to the  emergency room for further workup.  CT imaging final read was expedited revealing moderate left hydronephrosis with.  Pelvic proximal periureteral inflammatory stranding secondary to obstructing large left retroperitoneal toribio conglomerate.  Enlarged left pelvic and other retroperitoneal nodes.  Findings consistent with metastasis.  3 mm left lower lobe nodule.  CT chest recommended.  And 3 mm sclerotic lesions in the right iliac body and right femoral head may represent bone islands versus sclerotic mets.  Upon evaluation in the ED patient was noted to have JULIO of 2.43, leukocytosis of 11, hemoglobin stable at 12.4 hyponatremia 127.  Patient is otherwise afebrile hemodynamically stable.  Patient was admitted to the medicine staff for assistance with management for chronic medical conditions and for further need for workup.  Patient currently sitting comfortably in bed no acute distress.  He reports that pain medication has significantly helped his flank pain.  Reports he has left-sided flank pain that radiates down to the groin left hip and left scrotum.  Denies any significant weight loss.  Reports that is difficult to say this as he is currently having adjustments in diabetes medication.  He reports that he has not had much of an appetite as of recent.  Patient is an .          Allergies   Allergen Reactions    Captopril Cough    Crestor [Rosuvastatin] Diarrhea    Enalapril Cough      Current Outpatient Medications   Medication Instructions    atorvastatin (LIPITOR) 40 mg, Oral, Daily    celecoxib (CeleBREX) 200 mg capsule Take 1 capsule twice a day by oral route, for 1 month.    glucose blood (OneTouch Verio) test strip Check blood sugars once daily. Please substitute with appropriate alternative as covered by patient's insurance. Dx: E11.65    metFORMIN (GLUCOPHAGE) 1,000 mg, Oral, 2 times daily with meals    Mounjaro 10 mg, Subcutaneous, Weekly    olmesartan-hydrochlorothiazide (BENICAR  HCT) 40-12.5 MG per tablet 1 tablet, Oral, Daily    OneTouch Delica Lancets 33G MISC Check blood sugars once daily. Please substitute with appropriate alternative as covered by patient's insurance. Dx: E11.65    triamcinolone (KENALOG) 0.1 % cream APPLY TOPICALLY TO THE AFFECTED AREA TWICE DAILY      Past Medical History:   Diagnosis Date    Cutaneous skin tags 1/2/2019    Diabetes mellitus (HCC)     Hypertension      History reviewed. No pertinent surgical history.  Family History   Problem Relation Age of Onset    No Known Problems Brother     Stroke Mother      Social History     Socioeconomic History    Marital status: Single     Spouse name: None    Number of children: None    Years of education: None    Highest education level: None   Occupational History    Occupation: Work history - travels a lot   Tobacco Use    Smoking status: Former    Smokeless tobacco: Never   Vaping Use    Vaping status: Never Used   Substance and Sexual Activity    Alcohol use: Yes     Alcohol/week: 2.0 - 3.0 standard drinks of alcohol     Types: 2 - 3 Cans of beer per week     Comment: social    Drug use: No    Sexual activity: None   Other Topics Concern    None   Social History Narrative    Engages in travel abroad    Living independently alone'     Social Determinants of Health     Financial Resource Strain: Not on file   Food Insecurity: Not on file   Transportation Needs: Not on file   Physical Activity: Not on file   Stress: Not on file   Social Connections: Not on file   Intimate Partner Violence: Not on file   Housing Stability: Not on file        Review of Systems     Objective     Physical Exam  HENT:      Head: Normocephalic and atraumatic.      Right Ear: External ear normal.      Left Ear: External ear normal.      Mouth/Throat:      Pharynx: Oropharynx is clear.   Eyes:      General: No scleral icterus.     Conjunctiva/sclera: Conjunctivae normal.   Cardiovascular:      Rate and Rhythm: Normal rate and regular rhythm.       Pulses: Normal pulses.      Heart sounds: No murmur heard.     No friction rub. No gallop.   Pulmonary:      Effort: Pulmonary effort is normal. No respiratory distress.      Breath sounds: No wheezing, rhonchi or rales.   Abdominal:      General: Bowel sounds are normal. There is no distension.      Palpations: Abdomen is soft.      Tenderness: There is no abdominal tenderness. There is no right CVA tenderness or left CVA tenderness.   Genitourinary:     Comments: Abnormal inguinal lymph nodes within the left groin.  Patient reporting chronic and has been there since he was a child.  Musculoskeletal:         General: Normal range of motion.   Skin:     General: Skin is warm and dry.   Neurological:      General: No focal deficit present.      Mental Status: He is alert and oriented to person, place, and time.                Imaging:  CT ABDOMEN AND PELVIS WITH IV CONTRAST     INDICATION: R59.9: Enlarged lymph nodes, unspecified. .  Abnormal enlarged lymph node in left groin.  History of prostate cancer status post radiation therapy.     COMPARISON: None.     TECHNIQUE: CT examination of the abdomen and pelvis was performed. Multiplanar 2D reformatted images were created from the source data.     This examination, like all CT scans performed in the [facility], was performed utilizing techniques to minimize radiation dose exposure, including the use of iterative reconstruction and automated exposure control. Radiation dose length   product (DLP) for this visit: 636 mGy-cm     IV Contrast: 100 mL of iohexol (OMNIPAQUE)  Enteric Contrast: Not administered.     FINDINGS:     ABDOMEN     LOWER CHEST: 3 mm left lower lobe nodule (2:24)     LIVER/BILIARY TREE: Unremarkable.     GALLBLADDER: No calcified gallstones. No pericholecystic inflammatory change.     SPLEEN: Unremarkable.     PANCREAS: Unremarkable.     ADRENAL GLANDS: Unremarkable.     KIDNEYS/URETERS:     Moderate left hydronephrosis with  peripelvic and proximal periureteral inflammatory change.     Rim calcified anterior right mid pole pelvic cyst. Otherwise right kidney/ureter unremarkable.     STOMACH AND BOWEL:  Colonic diverticulosis without findings of acute diverticulitis.  Stomach and small bowel remarkable.     APPENDIX: No findings to suggest appendicitis.     ABDOMINOPELVIC CAVITY:     Conglomerate of left retroperitoneal nodes measuring approximately 6.5 x 5 x 13 cm (2:106)  These nodes encase the left ureter causing upstream obstruction.  The left psoas muscle is infiltrated by this toribio mass.  Enlarged nodes anterior to the aortic bifurcation and within the right retroperitoneum measure 3 and 1.8 cm (2:98).  Enlarged left external iliac chain nodes measure 2.6 and 2 cm (2:130)  Other scattered enlarged retroperitoneal nodes.     No ascites. No pneumoperitoneum.     VESSELS:  There is toribio encasement of the left external iliac artery/vein, without focal vascular abnormality, noting that limited venous opacification limits evaluation for thrombus.     PELVIS     REPRODUCTIVE ORGANS: Unremarkable for patient's age.     URINARY BLADDER: Unremarkable.     ABDOMINAL WALL/INGUINAL REGIONS: Small fat-containing umbilical hernia.     BONES: No acute fracture or suspicious osseous lesion.  4 mm sclerotic focus in the right femoral head (2:158).  3 mm sclerotic focus in the right iliac body (2:115)        IMPRESSION:     Moderate left hydronephrosis with peripelvic/proximal periureteral inflammatory stranding secondary to obstructing large left retroperitoneal toribio conglomerate.  Enlarged left pelvic and other retroperitoneal nodes as described.  Findings consistent with metastasis, noting history of prostate cancer.  No other primary source of malignancy identified.     3 mm left lower lobe nodule.  CT of the chest recommended for full evaluation of the lung fields in setting of metastatic disease.     3 mm sclerotic lesions in the right iliac  body and right femoral head may represent bone islands versus sclerotic mets.  Correlation with any prior imaging advised.     Findings were discussed with Terrie STARK at 4:30 p.m. on 10/18/2024       Labs:  Lab Results   Component Value Date    SODIUM 127 (L) 10/18/2024    K 4.3 10/18/2024    CL 92 (L) 10/18/2024    CO2 22 10/18/2024    BUN 40 (H) 10/18/2024    CREATININE 2.43 (H) 10/18/2024    GLUC 276 (H) 10/18/2024    CALCIUM 9.7 10/18/2024         Lab Results   Component Value Date    WBC 11.63 (H) 10/18/2024    HGB 12.4 10/18/2024    HCT 37.4 10/18/2024    MCV 90 10/18/2024     (H) 10/18/2024         VTE Pharmacologic Prophylaxis: Reason for no pharmacologic prophylaxis need for surgical procedure.  VTE Mechanical Prophylaxis: sequential compression device     Yari Harrison PA-C

## 2024-10-19 NOTE — CONSULTS
"e-Consult (IPC)  - Interventional Radiology  Alejandro Adames 69 y.o. male MRN: [id number]  Unit/Bed#: [facility] 909-01 Encounter: [id number]          Interventional Radiology has been consulted to evaluate Alejandro Adames    We were consulted by Urology and SL[facility] concerning this patient with history of [place] 3+4 = 7 Stage II prostate cancer diagnosed/treated in 2019 with radiation therapy with low PSAs on surveillance who has had approximately 2 weeks of left flank pain and now has been found on imaging to have retroperitoneal and left iliac lymphadenopathy with associated left hydronephrosis secondary to obstruction by a retroperitoneal node conglomerate.  He has newly elevated creatinine consistent with obstructive nephropathy.  There is mild leukocytosis but he does not have clinical signs of an obstructed pyelonephritis.  Urinalysis is not overtly \"dirty\".    Given the toribio conglomerate, placement of a stent by urology may not be technically successful and would be at high risk of failure.  A consultation has therefore been requested for percutaneous nephrostomy/nephroureteral catheter placement.    Of note, he was also previously referred as an outpatient for biopsy of one of these abnormal lymph nodes.    Inpatient Consult to IR  Consult performed by: Yovani Gaxiola MD  Consult ordered by: Yari Harrison PA-C      Inpatient Consult to IR  Consult performed by: Yovani Gaxiola MD  Consult ordered by: Dirk Guerrero DO        10/19/24    Assessment/Recommendation:   Left PCN/PCNU placement is indicated and appropriate in this setting.  We will make arrangements for this to be performed with anesthesiology assistance.  DNR/DNI status in the periprocedural period will be discussed at the time of procedural consent.    Lymph node biopsy is performed in a different position and is less urgent.  Depending on staffing and timing, biopsy may be performed immediately following PCN/PCNU placement or at " a later date.    I have ordered an INR to complete pre-procedure workup.  Other labs have been reviewed and are appropriate.     21-30 minutes, >50% of the total time devoted to medical consultative verbal/EMR discussion between providers. Written report will be generated in the EMR.     Thank you for allowing Interventional Radiology to participate in the care of Alejandro Adames. Please don't hesitate to call or TigerText us with any questions.     Yovani Gaxiola MD

## 2024-10-19 NOTE — ASSESSMENT & PLAN NOTE
Baseline creatinine 1.2  CT showed lymphadenopathy partially obstructing L ureter  Likely secondary to hydronephrosis and decreased oral intake  Continue to hold olmesartan/HCTZ  Status post left nephrostomy tube placement today  Avoid nephrotoxic medications and hypovolemia  Continue with IV fluid for hydration  Consider nephrology evaluation if no improvement

## 2024-10-19 NOTE — ASSESSMENT & PLAN NOTE
POA  Suspect multifactorial in the setting of dehydration, acute pain hyperglycemia and possible malignancy  Continue to monitor

## 2024-10-19 NOTE — CONSULTS
Consultation Note: Medical Oncology:   Alejandro Adames 69 y.o. male MRN: [id number]  Unit/Bed#: [facility] 909-01 Encounter: [id number]    Assessment and Plan:  High-Suspicion of Metastatic Disease - Prostate Versus Other Solid Tumor Versus Lymphoma:  New Large Left Retroperitoneal Zhou Mass with Left Pelvic and Retroperitoneal LAD:   Sclerotic Osseous Lesions Involving the Right Iliac Body and Right Femoral Head:  Acute Kidney Injury Secondary to Obstructive-Nephropathy:  Normocytic Anemia of Unclear Chronicity:  Leukocytosis with Neutrophilic Predominance and Thrombocytosis (Likely Reactive):  History of Localized Intermediate-Risk Adenocarcinoma of the Prostate:  Patient is a 69-year-old male, with an established history of localized intermediate-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 Repeat PSA: 0.355. CT Abdomen, and Pelvis shows moderate left hydronephrosis, with peripelvic/proximal periureteral inflammatory stranding secondary to an obstructing large left retroperitoneal zhou conglomerate. Enlarged left pelvic, and other retroperitoneal nodes noted, as well. Sclerotic lesions (Measuring 3-millimeters) involving the right iliac body and right femoral head were identified, as well. CT Chest was unremarkable for evidence of distant metastatic disease. Highly-suspect metastatic disease. Differential considerations include: Recurrent metastatic prostate cancer versus metastatic disease of other solid primary (e.g. Melanoma, versus sarcoma, versus testicular choriocarcinoma) versus lymphoma. Patient is now status-post left inguinal lymph node biopsy on October 19th,  2024. Will follow-up pathology results, accordingly. Remainder of plan, as outlined below.    Summary of Recommendations:  Ordered LDH.  Initiated anemia-evaluation, as follows: Reticulocyte Count, Iron Panel, Vitamin B12, and Folate levels.  Status-post percutaneous nephrostomy-tube placement, and left inguinal LN biopsy on October 19th, 2024.  Follow-up pathology from left inguinal lymph node biopsy.  Will coordinate for close interval follow-up with Medical Oncology within 2-weeks of discharge.    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 Historical information was obtained from patient and prior documentation.     Of particular importance, patient has an established history of intermediate-risk adenocarcinoma of the prostate (Stage II - cT1c, cN0, cM0, [place] Score: 3+4=7, PSA: 4.9) followed by [facility]. Patient underwent SBRT (Administered 3,700-Gy over 5-fractions), of which was completed on September 18th, 2019. He continues to follow with Radiation Oncology, without evidence of biochemical recurrence of disease. Recent uptrend in PSA (PSA: 0.48 to 0.66) over approximately 10-months was noted to be within normal physiologic variation. Most recent PSA in October 2024: 0.355.    Interval events are remarkable for presentation to Urology for evaluation of left-sided testicular pain, in late September 2024. Ultrasound Scrotum and Groin was  performed, and demonstrated an abnormally enlarged left inguinal lymph node corresponding to the area of concern indicated by the patient (e.g. Hypoechoic reniform structure was noted, measuring 4.2 x 1.8 x 2.2-centimeters without a normal echogenic hilum). Outpatient tissue-sampling was scheduled; however, patient developed acute-onset left flank pain in the interim, prompting CT Abdomen, and Pelvis, of which demonstrated moderate left hydronephrosis, with peripelvic/proximal periureteral inflammatory stranding secondary to an obstructing large left retroperitoneal toribio conglomerate. Enlarged left pelvic, and other retroperitoneal nodes noted, as well. Left lower lobe nodule (Measuring 3-millimeters), as well as sclerotic lesions (Measuring 3-millimeters) involving the right iliac body and right femoral head were identified, as well. CT Chest was unremarkable for evidence of distant metastatic disease. Given the above-mentioned findings, patient was referred directly to the [facility] Emergency Department on October 18th, 2024. Urology was consulted, and recommended percutaneous nephrostomy-tube placement. Interventional Radiology was consulted for the above-mentioned, as well as consideration of tissue-sampling. Medical Oncology was consulted for further assistance with evaluation, as well.    Note: Patient was somnolent upon returning from Interventional Radiology procedure this afternoon. He was unable to provide a comprehensive history of present illness, or review of systems.    Review of Systems:  All systems reviewed and negative except otherwise listed in the History of Present Illness.    Historical Information:  Past Medical History:   Diagnosis Date    Cutaneous skin tags 1/2/2019    Diabetes mellitus (HCC)     Hypertension      History reviewed. No pertinent surgical history.  Social History:  Social History     Substance and Sexual Activity   Alcohol Use Yes     Alcohol/week: 2.0 - 3.0 standard drinks of alcohol    Types: 2 - 3 Cans of beer per week    Comment: social     Social History     Substance and Sexual Activity   Drug Use No     Social History     Tobacco Use   Smoking Status Former   Smokeless Tobacco Never     E-Cigarette/Vaping    E-Cigarette Use Never User      E-Cigarette/Vaping Substances     Family History: Non-Contributory.    Medications and Allergies: All Medications Reviewed.    Objective:  Vitals:    10/19/24 0810   BP: 160/88   Pulse: 91   Resp: 17   Temp: 98.8 °F (37.1 °C)   SpO2: 96%     Physical Exam:  General: Somnolent Post-Procedure - Biopsy and Percutaneous Nephrostomy-Tube Placement  HEENT: Atraumatic, and normocephalic; PERRLA; EOMI; Moist mucosal membranes  Neck: Trachea midline; No neck masses, thyromegaly, or cervical lymphadenopathy present  Cardiovascular: Tachycardic (Regular); No murmurs, rubs, or gallops appreciated; No edema  Respiratory: Clear to auscultation bilaterally; Adequate aeration; No supplemental oxygen requirement  Abdomen: Soft, Non-tender; Non-distended; No organomegaly appreciated; Bowel sounds present; Left Flank with Percutaneous Nephrostomy-Tube Draining Serosanguinous Fluid  Extremities: No obvious deformities; No peripheral edema; Moves all; Left Inguinal Lymphadenopathy with Accompanying Tenderness to Palpation  Neurologic: Appropriately alert (Somnolent); No focal neurologic deficits    WBC   Date Value Ref Range Status   10/19/2024 10.76 (H) 4.31 - 10.16 Thousand/uL Final   10/18/2024 11.63 (H) 4.31 - 10.16 Thousand/uL Final     White Blood Cell Count   Date Value Ref Range Status   02/16/2024 5.0 3.8 - 10.8 Thousand/uL Final   10/23/2023 6.7 3.8 - 10.8 Thousand/uL Final   07/10/2023 6.1 3.8 - 10.8 Thousand/uL Final     Hemoglobin   Date Value Ref Range Status   10/19/2024 10.7 (L) 12.0 - 17.0 g/dL Final   10/18/2024 12.4 12.0 - 17.0 g/dL Final   02/16/2024 13.1 (L) 13.2 - 17.1 g/dL Final   10/23/2023 13.6 13.2  - 17.1 g/dL Final   07/10/2023 13.0 (L) 13.2 - 17.1 g/dL Final     Platelet Count   Date Value Ref Range Status   02/16/2024 270 140 - 400 Thousand/uL Final   10/23/2023 282 140 - 400 Thousand/uL Final   07/10/2023 272 140 - 400 Thousand/uL Final     Platelets   Date Value Ref Range Status   10/19/2024 419 (H) 149 - 390 Thousands/uL Final   10/18/2024 603 (H) 149 - 390 Thousands/uL Final     MCV   Date Value Ref Range Status   10/19/2024 90 82 - 98 fL Final   10/18/2024 90 82 - 98 fL Final   02/16/2024 90.3 80.0 - 100.0 fL Final   10/23/2023 90.6 80.0 - 100.0 fL Final   07/10/2023 91.9 80.0 - 100.0 fL Final      Potassium   Date Value Ref Range Status   10/19/2024 3.8 3.5 - 5.3 mmol/L Final   10/18/2024 4.3 3.5 - 5.3 mmol/L Final   08/28/2024 4.5 3.5 - 5.3 mmol/L Final   02/16/2024 4.3 3.5 - 5.3 mmol/L Final   10/23/2023 4.7 3.5 - 5.3 mmol/L Final     Chloride   Date Value Ref Range Status   10/19/2024 94 (L) 96 - 108 mmol/L Final   10/18/2024 92 (L) 96 - 108 mmol/L Final   08/28/2024 98 98 - 110 mmol/L Final   02/16/2024 97 (L) 98 - 110 mmol/L Final   10/23/2023 98 98 - 110 mmol/L Final     CO2   Date Value Ref Range Status   10/19/2024 25 21 - 32 mmol/L Final   10/18/2024 22 21 - 32 mmol/L Final   08/28/2024 24 20 - 32 mmol/L Final   02/16/2024 27 20 - 32 mmol/L Final   10/23/2023 27 20 - 32 mmol/L Final     BUN   Date Value Ref Range Status   10/19/2024 40 (H) 5 - 25 mg/dL Final   10/18/2024 40 (H) 5 - 25 mg/dL Final   08/28/2024 31 (H) 7 - 25 mg/dL Final   02/16/2024 31 (H) 7 - 25 mg/dL Final   10/23/2023 31 (H) 7 - 25 mg/dL Final     Creatinine   Date Value Ref Range Status   10/19/2024 2.54 (H) 0.60 - 1.30 mg/dL Final     Comment:     Standardized to IDMS reference method   10/18/2024 2.43 (H) 0.60 - 1.30 mg/dL Final     Comment:     Standardized to IDMS reference method   08/28/2024 1.18 0.70 - 1.28 mg/dL Final   02/16/2024 1.23 0.70 - 1.35 mg/dL Final   10/23/2023 1.20 0.70 - 1.35 mg/dL Final     Glucose    Date Value Ref Range Status   10/19/2024 240 (H) 65 - 140 mg/dL Final     Comment:     If the patient is fasting, the ADA then defines impaired fasting glucose as > 100 mg/dL and diabetes as > or equal to 123 mg/dL.   10/18/2024 276 (H) 65 - 140 mg/dL Final     Comment:     If the patient is fasting, the ADA then defines impaired fasting glucose as > 100 mg/dL and diabetes as > or equal to 123 mg/dL.     Glucose, Random   Date Value Ref Range Status   08/28/2024 250 (H) 65 - 99 mg/dL Final     Comment:                   Fasting reference interval     For someone without known diabetes, a glucose  value >125 mg/dL indicates that they may have  diabetes and this should be confirmed with a  follow-up test.        02/16/2024 305 (H) 65 - 99 mg/dL Final     Comment:                   Fasting reference interval     For someone without known diabetes, a glucose  value >125 mg/dL indicates that they may have  diabetes and this should be confirmed with a  follow-up test.        10/23/2023 204 (H) 65 - 99 mg/dL Final     Comment:                   Fasting reference interval     For someone without known diabetes, a glucose  value >125 mg/dL indicates that they may have  diabetes and this should be confirmed with a  follow-up test.          eGFR    Date Value Ref Range Status   06/10/2022 71 > OR = 60 mL/min/1.73m2 Final   12/23/2021 67 > OR = 60 mL/min/1.73m2 Final   11/18/2021 78 > OR = 60 mL/min/1.73m2 Final     Calcium   Date Value Ref Range Status   10/19/2024 9.1 8.4 - 10.2 mg/dL Final   10/18/2024 9.7 8.4 - 10.2 mg/dL Final   08/28/2024 9.8 8.6 - 10.3 mg/dL Final   02/16/2024 10.2 8.6 - 10.3 mg/dL Final   10/23/2023 10.4 (H) 8.6 - 10.3 mg/dL Final     Albumin   Date Value Ref Range Status   10/18/2024 3.9 3.5 - 5.0 g/dL Final   08/28/2024 4.3 3.6 - 5.1 g/dL Final   02/16/2024 4.5 3.6 - 5.1 g/dL Final   10/23/2023 4.5 3.6 - 5.1 g/dL Final     Total Bilirubin   Date Value Ref Range Status   10/18/2024  "0.37 0.20 - 1.00 mg/dL Final     Comment:     Use of this assay is not recommended for patients undergoing treatment with eltrombopag due to the potential for falsely elevated results.  N-acetyl-p-benzoquinone imine (metabolite of Acetaminophen) will generate erroneously low results in samples for patients that have taken an overdose of Acetaminophen.     TOTAL BILIRUBIN   Date Value Ref Range Status   08/28/2024 0.5 0.2 - 1.2 mg/dL Final   02/16/2024 0.5 0.2 - 1.2 mg/dL Final   10/23/2023 0.5 0.2 - 1.2 mg/dL Final     Alkaline Phosphatase   Date Value Ref Range Status   10/18/2024 142 (H) 34 - 104 U/L Final   08/28/2024 99 35 - 144 U/L Final   02/16/2024 79 35 - 144 U/L Final   10/23/2023 74 35 - 144 U/L Final     AST   Date Value Ref Range Status   10/18/2024 15 13 - 39 U/L Final   08/28/2024 11 10 - 35 U/L Final   02/16/2024 13 10 - 35 U/L Final   10/23/2023 14 10 - 35 U/L Final     ALT   Date Value Ref Range Status   10/18/2024 23 7 - 52 U/L Final     Comment:     Specimen collection should occur prior to Sulfasalazine administration due to the potential for falsely depressed results.    08/28/2024 14 9 - 46 U/L Final   02/16/2024 24 9 - 46 U/L Final   10/23/2023 19 9 - 46 U/L Final    No results found for: \"LDH\" No results found for: \"TSH\" No results found for: \"V5EUNUW\" No results found for: \"FREET4\"     Patient was seen and discussed with attending physician, JACQUELINE Rojas D.O.  Hematology-Oncology Fellow (PGY-5)   "

## 2024-10-19 NOTE — PROGRESS NOTES
Progress Note - Hospitalist   Name: Alejandro Adames 69 y.o. male I MRN: [id number]  Unit/Bed#: [facility] 909-01 I Date of Admission: 10/18/2024   Date of Service: 10/19/2024 I Hospital Day: 1    Assessment & Plan  Other hydronephrosis  The patient started having left flank pain 1.5 weeks ago. He saw his urologist outpatient  for left flank pain and a CT was done which shows left hydronephrosis, retroperitoneal lymphadenopathy, and new sclerotic lesions concerning for metastatic disease. The patient was instructed by urologist to come to ED to be admitted for possible L ureter stent placement.   Urology and IR consulted s/p left percutaneous nephrostomy tube placement by IR today  Continue pain management   Retroperitoneal mass  CT scan with peripelvic/proximal periureteral inflammatory stranding secondary to obstructing large left retroperitoneal toribio conglomerate. Enlarged left pelvic and other retroperitoneal nodes   Findings consistent with metastasis. 3 mm sclerotic lesions in the right iliac body and right femoral head may represent bone islands versus sclerotic mets.       Patient with underlying history of prostate cancer  IR consult for biopsy, status post left inguinal lymph node biopsy today  Follow-up on biopsy report  Urology is following  Medical oncology consulted  Acute renal failure (ARF) (HCC)  Baseline creatinine 1.2  CT showed lymphadenopathy partially obstructing L ureter  Likely secondary to hydronephrosis and decreased oral intake  Continue to hold olmesartan/HCTZ  Status post left nephrostomy tube placement today  Avoid nephrotoxic medications and hypovolemia  Continue with IV fluid for hydration  Consider nephrology evaluation if no improvement  Hyponatremia  POA  Suspect multifactorial in the setting of dehydration, acute pain hyperglycemia and possible malignancy  Continue to monitor  Essential hypertension  BP elevated on presentation possibly secondary to pain  Olmesartan/HCTZ on hold due to  acute renal failure  Add Norvasc  Will continue to monitor  Type 2 diabetes mellitus with kidney complication, without long-term current use of insulin (HCC)  Lab Results   Component Value Date    HGBA1C 9.8 (H) 08/28/2024       Recent Labs     10/18/24  2138 10/19/24  1112 10/19/24  1202   POCGLU 271* 218* 221*       Blood Sugar Average: Last 72 hrs:  (P) 236.1024961206453211  Uncontrolled and hyperglycemic on admission   Accu-Cheks.  Hold home oral medications/non-insulin injectables  Continue Lantus at bedtime  Initiate hypoglycemia protocol    Malignant neoplasm of prostate (HCC)  Diagnosed in 2019 and underwent biopsy of the prostate and s/p XRT  PSA 0.355  Urology is following  Lung nodule  3 mm left lower lobe lung nodule noted on visible portion of lung on CT abdomen/pelvis  Given concern for metastatic disease will obtain CT chest to better clarify  Chest CT scan, negative for malignancy, per radiology, the 3 mm left lower lobe nodule is of doubtful significance.     VTE Pharmacologic Prophylaxis: VTE Score: 7 High Risk (Score >/= 5) - Pharmacological DVT Prophylaxis Ordered: heparin. Sequential Compression Devices Ordered.    Mobility:   Basic Mobility Inpatient Raw Score: 24  JH-HLM Goal: 8: Walk 250 feet or more  JH-HLM Achieved: 7: Walk 25 feet or more  JH-HLM Goal NOT achieved. Continue with multidisciplinary rounding and encourage appropriate mobility to improve upon JH-HLM goals.    Patient Centered Rounds: I performed bedside rounds with nursing staff today.   Discussions with Specialists or Other Care Team Provider: Nursing    Education and Discussions with Family / Patient: Patient declined call to .     Current Length of Stay: 1 day(s)  Current Patient Status: Inpatient   Certification Statement: The patient will continue to require additional inpatient hospital stay due to management of acute renal failure and hydronephrosis  Discharge Plan: Anticipate discharge in >72 hrs to  TBD    Code Status: Level 3 - DNAR and DNI    Subjective   Patient seen and examined  Comfortable in bed  I want to sleep  Patient just came back from nephrostomy tube placement and lymph node biopsy  No Chest pain or shortness of breath    Objective :  Temp:  [97.7 °F (36.5 °C)-98.8 °F (37.1 °C)] 98.1 °F (36.7 °C)  HR:  [] 85  BP: (131-179)/(71-89) 131/71  Resp:  [14-21] 17  SpO2:  [94 %-98 %] 96 %  O2 Device: None (Room air)    Body mass index is 25.11 kg/m².     Input and Output Summary (last 24 hours):     Intake/Output Summary (Last 24 hours) at 10/19/2024 1216  Last data filed at 10/19/2024 1101  Gross per 24 hour   Intake 1100 ml   Output 515 ml   Net 585 ml       Physical Exam  Patient is awake alert in no acute distress  Comfortable in bed  Pale  Lung clear to auscultation bilateral anteriorly  Heart positive S1-S2 no murmur  Abdomen soft nontender  Lower extremities no edema    Lines/Drains:  Lines/Drains/Airways       Active Status       Name Placement date Placement time Site Days    Nephrostomy Left 10 Fr. 10/19/24  1004  Left  less than 1                            Lab Results: I have reviewed the following results:   Results from last 7 days   Lab Units 10/19/24  0629 10/18/24  1849   WBC Thousand/uL 10.76* 11.63*   HEMOGLOBIN g/dL 10.7* 12.4   HEMATOCRIT % 33.2* 37.4   PLATELETS Thousands/uL 419* 603*   SEGS PCT %  --  83*   LYMPHO PCT %  --  7*   MONO PCT %  --  10   EOS PCT %  --  0     Results from last 7 days   Lab Units 10/19/24  0629 10/18/24  1849   SODIUM mmol/L 129* 127*   POTASSIUM mmol/L 3.8 4.3   CHLORIDE mmol/L 94* 92*   CO2 mmol/L 25 22   BUN mg/dL 40* 40*   CREATININE mg/dL 2.54* 2.43*   ANION GAP mmol/L 10 13   CALCIUM mg/dL 9.1 9.7   ALBUMIN g/dL  --  3.9   TOTAL BILIRUBIN mg/dL  --  0.37   ALK PHOS U/L  --  142*   ALT U/L  --  23   AST U/L  --  15   GLUCOSE RANDOM mg/dL 240* 276*     Results from last 7 days   Lab Units 10/19/24  0823   INR  1.05     Results from last 7 days    Lab Units 10/19/24  1202 10/19/24  1112 10/18/24  2138   POC GLUCOSE mg/dl 221* 218* 271*               Recent Cultures (last 7 days):           Last 24 Hours Medication List:     Current Facility-Administered Medications:     acetaminophen (TYLENOL) tablet 650 mg, Q4H PRN    amLODIPine (NORVASC) tablet 5 mg, Daily    atorvastatin (LIPITOR) tablet 40 mg, Daily    ceFAZolin (ANCEF) IVPB (premix in dextrose) 2,000 mg 50 mL, Once    fentaNYL (SUBLIMAZE) injection 25 mcg, Q3 min PRN    heparin (porcine) subcutaneous injection 5,000 Units, Q8H CHONG    HYDROmorphone HCl (DILAUDID) injection 0.2 mg, Q4H PRN    insulin glargine (LANTUS) subcutaneous injection 10 Units 0.1 mL, HS    insulin lispro (HumALOG/ADMELOG) 100 units/mL subcutaneous injection 1-5 Units, HS    insulin lispro (HumALOG/ADMELOG) 100 units/mL subcutaneous injection 1-6 Units, TID AC **AND** Fingerstick Glucose (POCT), TID AC    ondansetron (ZOFRAN) injection 4 mg, Q6H PRN    ondansetron (ZOFRAN) injection 4 mg, Once PRN    oxyCODONE (ROXICODONE) split tablet 2.5 mg, Q4H PRN **OR** oxyCODONE (ROXICODONE) IR tablet 5 mg, Q4H PRN    senna-docusate sodium (SENOKOT S) 8.6-50 mg per tablet 1 tablet, HS    sodium chloride 0.9 % infusion, Continuous    Administrative Statements   Today, Patient Was Seen By: Dirk Guerrero DO      **Please Note: This note may have been constructed using a voice recognition system.**

## 2024-10-19 NOTE — ASSESSMENT & PLAN NOTE
CT scan with peripelvic/proximal periureteral inflammatory stranding secondary to obstructing large left retroperitoneal toribio conglomerate. Enlarged left pelvic and other retroperitoneal nodes   Findings consistent with metastasis. 3 mm sclerotic lesions in the right iliac body and right femoral head may represent bone islands versus sclerotic mets.       Patient with underlying history of prostate cancer  IR consult for biopsy, status post left inguinal lymph node biopsy today  Follow-up on biopsy report  Urology is following  Medical oncology consulted

## 2024-10-19 NOTE — ASSESSMENT & PLAN NOTE
The patient started having left flank pain 1.5 weeks ago. He saw his urologist outpatient  for left flank pain and a CT was done which shows left hydronephrosis, retroperitoneal lymphadenopathy, and new sclerotic lesions concerning for metastatic disease. The patient was instructed by urologist to come to ED to be admitted for possible L ureter stent placement.   Urology and IR consulted s/p left percutaneous nephrostomy tube placement by IR today  Continue pain management

## 2024-10-19 NOTE — ASSESSMENT & PLAN NOTE
3 mm left lower lobe lung nodule noted on visible portion of lung on CT abdomen/pelvis  Given concern for metastatic disease will obtain CT chest to better clarify  Chest CT scan, negative for malignancy, per radiology, the 3 mm left lower lobe nodule is of doubtful significance.

## 2024-10-19 NOTE — ASSESSMENT & PLAN NOTE
BP elevated on presentation possibly secondary to pain  Olmesartan/HCTZ on hold due to acute renal failure  Add Norvasc  Will continue to monitor

## 2024-10-19 NOTE — SEDATION DOCUMENTATION
Pt in IR for Biopsy of left inguinal lymph node performed by Dr. Gaxiola. Pt tolerated well, with anesthesia presented throughout case. Specimen collected and sent to lab. IR Bedrest started at 11:00am (two hours). Report given to PACU RN.

## 2024-10-19 NOTE — ASSESSMENT & PLAN NOTE
Lab Results   Component Value Date    HGBA1C 9.8 (H) 08/28/2024       Recent Labs     10/18/24  2138 10/19/24  1112 10/19/24  1202   POCGLU 271* 218* 221*       Blood Sugar Average: Last 72 hrs:  (P) 236.1726536464041639  Uncontrolled and hyperglycemic on admission   Accu-Cheks.  Hold home oral medications/non-insulin injectables  Continue Lantus at bedtime  Initiate hypoglycemia protocol

## 2024-10-19 NOTE — ASSESSMENT & PLAN NOTE
The patient started having left flank pain 1.5 weeks ago. He saw his urologist outpatient today for left flank pain and a CT was done which shows left hydronephrosis, retroperitoneal lymphadenopathy, and new sclerotic lesions concerning for metastatic disease. The patient was instructed by urologist to come to ED to be admitted for possible L ureter stent placement.   He denies any trauma to the area. He reports left flank pain, nausea, fatigue, and constipation. He denies f/c, SOB, chest pain, palpitations, diarrhea, abdominal pain, increase in the frequency of urinating, urinary frequency, dysuria, hematuria, any change in color of urine, or any other muscle/joint pain.   Urology and IR consulted and discussing intervention  Continue pain management as per adult acute pain management order set

## 2024-10-19 NOTE — ASSESSMENT & PLAN NOTE
GFR 25, BUN 40, Creatinine 2.43  CT showed lymphadenopathy partially obstructing L ureter  Urology and IR consulted and discussing intervention  Trial IV fluids overnight  CBC and BMP in AM  Avoid nephrotoxic medications and hypovolemia

## 2024-10-19 NOTE — DISCHARGE INSTRUCTIONS
POST BIOPSY    Care after your procedure:    1. Limit your activities for 24 hours after your biopsy.    2. No driving day of biopsy.    3. Return to your normal diet.Small sips of flat soda will help with mild nausea.    4. Remove band-aid or dressing 24 hours after procedure.     Contact Interventional Radiology at [phone] (ALETHA PATIENTS: Contact Interventional Radiology at [phone]) (RENATO PATIENTS: Contact Interventional Radiology at [phone]) if:    1. Difficulty breathing, nausea or vomiting.    2. Chills or fever above 101 degrees F.     3. Pain at biopsy site not relieved by medication.     4. Develop any redness, swelling, heat, unusual drainage, heavy bruising or bleeding from biopsy site.     Nephrostomy Tube Care     WHAT YOU NEED TO KNOW:   A nephrostomy tube is a catheter (thin plastic tube) that is inserted through your skin and into your kidney. The nephrostomy tube drains urine from your kidney into a collecting bag outside your body. You may need a nephrostomy tube when something is blocking the normal flow of urine. A nephrostomy tube may be used for a short or a long period of time. The nephrostomy tube comes out of your back, so you will need someone to help care for your nephrostomy tube.    DISCHARGE INSTRUCTIONS:     Resume your normal diet. Small sips of flat soda will help with mild nausea.     How to clean the skin around the nephrostomy tube and change the bandage:  Since the nephrostomy tube comes out of your back, you will not be able to care for it by yourself. Ask someone to follow the general directions below to check and care for your nephrostomy tube.   Gather the items you will need.          Disposable (single use) under-pad, and a clean washcloth  Plain soap, warm water, and new medical gloves  Sterile gauze bandages  Clear adhesive dressing or medical tape  Skin barrier  Protective skin film  Trash bag  Remove the old bandage, and check the tube entry site.     Have the patient lie on his side with the nephrostomy tube entry site facing up. Place the under-pad where it will catch drainage as you are working with the nephrostomy tube.   Wash your hands with soap and water. Put on new medical gloves.  Gently remove the old bandage, without pulling on the tube. Do this by holding the skin beside the tube with one hand. With the other hand, gently remove sticky tape and the skin barrier by pulling in the same direction as hair growth. Do not touch the side of the bandage that is placed over or around the tube. Throw the bandage and skin barrier away in a trash bag.  Look for signs of infection, such as skin redness and swelling. Report any skin changes to healthcare providers.  Clean the tube entry site.    Hold the tube in place to keep it from being pulled out while you are cleaning around it.  You will need to clean the area twice. For the first cleaning, wet a new gauze bandage with soap and water.  Begin at the entry site of the tube. Wipe the skin in circles, moving away from the entry site. Remove blood and any other material with the gauze. Do this as often as needed. Use a new gauze bandage each time you clean the area, moving away from the entry site.   For the second cleaning, wet a new gauze bandage with water. Begin at the entry site of the tube. Wipe the skin in circles, moving away from the entry site. Use a new gauze bandage each time you clean the area, moving away from the entry site.   Gently pat the skin with a clean washcloth to dry it.    Apply the skin barrier and bandages.    Roll up a bandage to make it thick, and place it under  the place where the tube enters the skin. Place it to support the tube, and stop it from kinking or bending. Tape the bandage in place, and apply more bandages if directed by a healthcare provider.   Bring the tubing forward to the front and tape it to the skin. Do not stretch the tube tight, because this may pull the  nephrostomy tube out.  How often to change the bandage.  Change the bandage around the tube, every other day. If your bandages  get dirty or wet, change them right away, and as often as needed. If your nephrostomy tube is to be used for a long period of time, the tube needs to be changed every 2 to 3 months. Healthcare providers will tell you when you need to make an appointment to have your tube changed.     How to care for the urine drainage bag:   Ask if you need to measure and write down how much urine is in the bag before you empty it. Drain urine out of the drainage bag when it is ½ to ? full. Open the spout at the bottom of the bag to empty the urine into the toilet.   You may need to detach the drainage bag from the nephrostomy tube to change it.. If so, attach a new drainage bag tightly to the nephrostomy tube.     How to prevent problems with your nephrostomy tube:   Change bandages, directed.  This helps to prevent infection. Throw away or clean your drainage bag as directed by your healthcare provider.    Wipe the connecting ends of the drainage bag with alcohol before you reconnect the bag to the tube.  This helps prevent infection.     Keep the tube taped to your skin and connected to a drainage bag placed below the level of your kidneys.  This helps prevent urine from backing up into your kidneys. You may wear a small drainage bag strapped to your leg to let you move around more easily.    Check the catheter to be sure it is in place after you change your clothes or do other activities.  Do not wear tight clothing over the tube. Place the tubing over your thigh rather than under it when you are sitting down. Be sure that nothing is pulling on the nephrostomy tube when you move around.    Change positions if you see little or no urine in your drainage bag.  Check to see if the urine tube is twisted or bent. Be sure that you are not sitting or lying on the tube. If there are no kinks and there is little  or no urine in the drainage bag, tell your healthcare provider.    Flush out the tube as directed. Some tubes get flushed one time a day with 10 mls of NSS You will be given a prescription for the flushes.  To flush the nephrostomy tube, clean both connections with alcohol swap. Twist off the drainage bag tube and twist the saline syringe into the nephrostomy tube and flush briskly. Remove the syringe and twist the drainage bag tube back into the nephrostomy tube.  Keep the site covered while you shower.  Tape a piece of clear adhesive plastic over the dressing to keep it dry while you shower. Do not take tub baths.    Contact Interventional Radiology at [phone] (ALETHA PATIENTS: Contact Interventional Radiology at [phone]) (RENATO PATIENTS: Contact Interventional Radiology at [phone]) if:  The skin around the nephrostomy tube is red, swollen, itches, or has a rash.   You have a fever.  You have lower back or hip pain.  There are changes in how your urine looks or smells.  You have little or no urine draining from the nephrostomy tube.   You have nausea and are vomiting.  The black helen on your tube has moved, or the tube is longer than when it was put in.   You have questions or concerns about your condition or care.  The nephrostomy tube comes out completely.   There is blood, pus, or a bad smell coming from the place where the tube enters your skin.  Urine is leaking around the tube.    Tube Capping Trial        If your tube is capped and has no drainage bag, the urine will flow through the ureter         and into the bladder for you to urinate normally. This is called a capping trial.                   Continue to flush your tube one time per day. You will have an extra drainage bag to       keep at home in case the capping trial fails. Call the IR department if you experience        any of the following: Pain, fever greater than 101. Persistent nausea or vomiting.      The following pharmacies  carry the flush syringes.       [facility]                     [facility]                         [facility]       [address]                     [address] [facility]                    [phone]  [place]                       [place]  Phone [phone]            Phone [phone]                 [facility]                                                                                                   [phone]  [facility]             [facility]                             [address]                      [address] [facility]   [place]                                 [phone]  Phone [phone]            Phone [phone]    [facility]                                                                         [facility] [phone]  [facility] [address]  [place]   Phone [phone]

## 2024-10-19 NOTE — BRIEF OP NOTE (RAD/CATH)
INTERVENTIONAL RADIOLOGY PROCEDURE NOTE    Date: 10/19/2024    Procedure:   Procedure Summary       Date: 10/19/24 Room / Location: [facility] Interventional Radiology    Anesthesia Start: 0859 Anesthesia Stop:     Procedure: IR NEPHROSTOMY TUBE PLACEMENT Diagnosis: (Hydronephrosis secondary to malignant obstruction)    Scheduled Providers:  Responsible Provider: Eddie Belcher MD    Anesthesia Type: Not recorded ASA Status: Not recorded            Preoperative diagnosis:   1. Acute kidney injury (HCC)    2. Hydronephrosis    3. Metastatic disease (HCC)    4. Other hydronephrosis    5. Retroperitoneal mass         Postoperative diagnosis: Same.    Surgeon: Yovani Gaxiola MD     Assistant: None. No qualified resident was available.    Blood loss: Minimal    Specimens: Four 18-gauge x 1.3 cm cores of the left inguinal/external iliac lymph node.     Findings:     Moderate left hydronephrosis.  Successful placement of a 10.2 Fr Shore-Ortiz percutaneous nephrostomy tube.  There was evidence of incomplete obstruction secondary to extrinsic mass effect at the mid-ureter; some contrast did pass into the bladder.  Bleeding/clot formation in the collecting system precluded placement of a PCNU at this time.    Uncomplicated left inguinal/external iliac lymph node biopsy.  Four 18-gauge x 1.3 cm core needle specimens were obtained; three were sent in formalin and one in RPMI.    Complications: None immediate.    Anesthesia: general

## 2024-10-19 NOTE — H&P
"H&P - Hospitalist   Name: Alejandro Adames 69 y.o. male I MRN: [id number]  [facility] Date of Admission: 10/18/2024   Date of Service: 10/18/2024 I Hospital Day: 0     Assessment & Plan  Other hydronephrosis  The patient started having left flank pain 1.5 weeks ago. He saw his urologist outpatient today for left flank pain and a CT was done which shows left hydronephrosis, retroperitoneal lymphadenopathy, and new sclerotic lesions concerning for metastatic disease. The patient was instructed by urologist to come to ED to be admitted for possible L ureter stent placement.   He denies any trauma to the area. He reports left flank pain, nausea, fatigue, and constipation. He denies f/c, SOB, chest pain, palpitations, diarrhea, abdominal pain, increase in the frequency of urinating, urinary frequency, dysuria, hematuria, any change in color of urine, or any other muscle/joint pain.   Urology and IR consulted and discussing intervention  Continue pain management as per adult acute pain management order set  Essential hypertension  BP elevated on presentation possibly secondary to pain  Need to hold olmesartan/HCTZ due to acute renal failure, management of pain as per adult acute pain management order set. If remains uncontrolled can consider amlodipine vs as needed IV antihypertensive  Will continue to monitor  Type 2 diabetes mellitus with kidney complication, without long-term current use of insulin (HCC)  Lab Results   Component Value Date    HGBA1C 9.8 (H) 08/28/2024       No results for input(s): \"POCGLU\" in the last 72 hours.    Blood Sugar Average: Last 72 hrs:  Uncontrolled and hyperglycemic on admission  While hospitalized start Lantus 10 units nightly along with correctional insulin coverage with Accu-Cheks.  Hold home oral medications/non-insulin injectables  Initiate hypoglycemia protocol    Malignant neoplasm of prostate (HCC)  Diagnosed in 2019 and underwent biopsy of the prostate and s/p " XRT  PSA in AM, appreciate Urology consultation  Acute renal failure (ARF) (HCC)  GFR 25, BUN 40, Creatinine 2.43  CT showed lymphadenopathy partially obstructing L ureter  Urology and IR consulted and discussing intervention  Trial IV fluids overnight  CBC and BMP in AM  Avoid nephrotoxic medications and hypovolemia  Hyponatremia  Sodium 127. Corrected sodium is 131.  Blood glucose 276.   BMP in AM  Lung nodule  3 mm left lower lobe lung nodule noted on visible portion of lung on CT abdomen/pelvis  Given concern for metastatic disease will obtain CT chest to better clarify      VTE Pharmacologic Prophylaxis: VTE Score: 5 High Risk (Score >/= 5) - Pharmacological DVT Prophylaxis Contraindicated. Sequential Compression Devices Ordered.  Code Status: DNR/DNI    Anticipated Length of Stay: Patient will be admitted on an inpatient basis with an anticipated length of stay of greater than 2 midnights secondary to possible urology intervention.    History of Present Illness   Chief Complaint: Left flank pain    Alejandro Adames is a 69 y.o. male with a PMH of Type 2 DM, HTN, and prostate cancer who presents with left flank pain x 1.5 weeks. The patient saw his urologist outpatient today for the left flank pain and a CT was done which shows left hydronephrosis and a retroperitoneal mass. The patient was instructed by urologist to come to ED to be admitted for possible stent placement. The patient states the pain started as left groin and hip pain and then became left flank pain about a week ago. He describes the pain as constant and sharp and states the pain does not radiate. He states the pain is worse when laying down and improves a little with Tylenol. He denies any trauma to the area. He reports left flank pain, nausea, fatigue, and constipation. He denies f/c, SOB, cough, chest pain, palpitations, diarrhea, abdominal pain, increase in the frequency of urinating, urinary frequency, dysuria, hematuria, any change in color  of urine, or any other muscle/joint pain.     Review of Systems   Constitutional:  Positive for fatigue. Negative for chills and fever.   Respiratory:  Negative for cough and shortness of breath.    Cardiovascular:  Negative for chest pain and palpitations.   Gastrointestinal:  Positive for constipation and nausea. Negative for abdominal distention, abdominal pain, diarrhea and vomiting.   Genitourinary:  Negative for dysuria, frequency, hematuria and urgency.   Musculoskeletal:  Positive for back pain.   Neurological:  Negative for dizziness and headaches.       Historical Information   Past Medical History:   Diagnosis Date    Cutaneous skin tags 1/2/2019    Diabetes mellitus (HCC)     Hypertension      History reviewed. No pertinent surgical history.  Social History     Tobacco Use    Smoking status: Former    Smokeless tobacco: Never   Vaping Use    Vaping status: Never Used   Substance and Sexual Activity    Alcohol use: Yes     Alcohol/week: 2.0 - 3.0 standard drinks of alcohol     Types: 2 - 3 Cans of beer per week     Comment: social    Drug use: No    Sexual activity: Not on file     E-Cigarette/Vaping    E-Cigarette Use Never User      E-Cigarette/Vaping Substances     Family history non-contributory  Social History:  Marital Status: Single   Patient Pre-hospital Living Situation: Home  Patient Pre-hospital Level of Mobility: walks  Patient Pre-hospital Diet Restrictions: Diabetic diet    Meds/Allergies   I have reviewed home medications with patient personally.  Prior to Admission medications    Medication Sig Start Date End Date Taking? Authorizing Provider   atorvastatin (LIPITOR) 40 mg tablet TAKE 1 TABLET BY MOUTH EVERY DAY 8/5/24   Wayne Tellez Jr., MD   celecoxib (CeleBREX) 200 mg capsule Take 1 capsule twice a day by oral route, for 1 month. 9/23/24   Historical Provider, MD   glucose blood (OneTouch Verio) test strip Check blood sugars once daily. Please substitute with appropriate alternative  as covered by patient's insurance. Dx: E11.65 3/28/24   Wayne Tellez Jr., MD   metFORMIN (GLUCOPHAGE) 1000 MG tablet TAKE 1 TABLET BY MOUTH TWICE A DAY WITH FOOD 8/10/24   MD Alejo Valdovinos Jr. 10 MG/0.5ML Inject 0.5 mL (10 mg total) under the skin once a week 10/9/24   Wayne Tellez Jr., MD   olmesartan-hydrochlorothiazide (BENICAR HCT) 40-12.5 MG per tablet TAKE 1 TABLET BY MOUTH EVERY DAY 8/5/24   MD Leilani Valdovinos Jr. Delica Lancets 33G MISC Check blood sugars once daily. Please substitute with appropriate alternative as covered by patient's insurance. Dx: E11.65 3/28/24   Wayne Tellez Jr., MD   triamcinolone (KENALOG) 0.1 % cream APPLY TOPICALLY TO THE AFFECTED AREA TWICE DAILY 2/6/23   Wayne Tellez Jr., MD     Allergies   Allergen Reactions    Captopril Cough    Crestor [Rosuvastatin] Diarrhea    Enalapril Cough       Objective :  Temp:  [97.7 °F (36.5 °C)-97.9 °F (36.6 °C)] 97.9 °F (36.6 °C)  HR:  [] 88  BP: (159-179)/(78-89) 179/89  Resp:  [16] 16  SpO2:  [97 %-98 %] 97 %  O2 Device: None (Room air)    Physical Exam  Constitutional:       General: He is not in acute distress.  HENT:      Head: Normocephalic and atraumatic.      Nose: Nose normal.   Cardiovascular:      Rate and Rhythm: Normal rate and regular rhythm.      Heart sounds: No murmur heard.     No friction rub. No gallop.   Pulmonary:      Effort: Pulmonary effort is normal. No respiratory distress.      Breath sounds: Normal breath sounds. No wheezing, rhonchi or rales.   Abdominal:      General: There is no distension.      Palpations: Abdomen is soft.      Tenderness: There is no abdominal tenderness.   Musculoskeletal:         General: Tenderness present. No swelling.      Comments: L flank tenderness   Skin:     General: Skin is warm and dry.   Neurological:      General: No focal deficit present.      Mental Status: He is alert and oriented to person, place, and time.   Psychiatric:          Mood and Affect: Mood normal.         Behavior: Behavior normal.         Thought Content: Thought content normal.         Judgment: Judgment normal.         Lines/Drains:            Lab Results: I have reviewed the following results:  Results from last 7 days   Lab Units 10/18/24  1849   WBC Thousand/uL 11.63*   HEMOGLOBIN g/dL 12.4   HEMATOCRIT % 37.4   PLATELETS Thousands/uL 603*   SEGS PCT % 83*   LYMPHO PCT % 7*   MONO PCT % 10   EOS PCT % 0     Results from last 7 days   Lab Units 10/18/24  1849   SODIUM mmol/L 127*   POTASSIUM mmol/L 4.3   CHLORIDE mmol/L 92*   CO2 mmol/L 22   BUN mg/dL 40*   CREATININE mg/dL 2.43*   ANION GAP mmol/L 13   CALCIUM mg/dL 9.7   ALBUMIN g/dL 3.9   TOTAL BILIRUBIN mg/dL 0.37   ALK PHOS U/L 142*   ALT U/L 23   AST U/L 15   GLUCOSE RANDOM mg/dL 276*             Lab Results   Component Value Date    HGBA1C 9.8 (H) 08/28/2024    HGBA1C 10.4 (H) 02/16/2024    HGBA1C 8.5 (H) 10/23/2023           Imaging Results Review: I reviewed radiology reports from this admission including: CT abdomen/pelvis.  Other Study Results Review: No additional pertinent studies reviewed.        ** Please Note: This note has been constructed using a voice recognition system. **

## 2024-10-19 NOTE — SEDATION DOCUMENTATION
PT in IR for Nephrostomy tube placement performed by Dr. Gaxiola. Pt tolerated well with Anesthesia present throughout the case. 10 FR tubing placed left side. Pt to continue with Biopsy of left inguinal lymph node in IR.

## 2024-10-19 NOTE — ED ATTENDING ATTESTATION
10/18/2024  I, Jae Avendaño MD, saw and evaluated the patient. I have discussed the patient with the resident/non-physician practitioner and agree with the resident's/non-physician practitioner's findings, Plan of Care, and MDM as documented in the resident's/non-physician practitioner's note, except where noted. All available labs and Radiology studies were reviewed.  I was present for key portions of any procedure(s) performed by the resident/non-physician practitioner and I was immediately available to provide assistance.       At this point I agree with the current assessment done in the Emergency Department.  I have conducted an independent evaluation of this patient a history and physical is as follows:    69-year-old man with history of prostate cancer 5 years ago presenting with left flank pain and after abnormal CT findings.  Patient had outpatient CT which shows left hydronephrosis, retroperitoneal lymphadenopathy, and new sclerotic lesions which are concerning for new metastatic disease.  I discussed with the patient and he states he was not aware of the metastatic disease findings.  We discussed this potential diagnosis and he understands that he will have continued evaluation for this in the hospital.  Patient denies any fever.  Denies any urinary symptoms.  States he is making normal amount of urine.  On exam he is awake and alert no acute distress.  Heart regular rate and rhythm, no murmurs rubs or gallops.  Lungs clear to auscultation bilaterally.  Abdomen is soft, nontender, nondistended.  There is some left CVA tenderness.  Lab findings significant for JULIO.  Urology consulted.  Patient admitted.        ED Course         Critical Care Time  Procedures

## 2024-10-19 NOTE — ANESTHESIA POSTPROCEDURE EVALUATION
Post-Op Assessment Note    CV Status:  Stable  Pain Score: 0    Pain management: adequate       Mental Status:  Alert and awake   Hydration Status:  Euvolemic   PONV Controlled:  Controlled   Airway Patency:  Patent     Post Op Vitals Reviewed: Yes    No anethesia notable event occurred.    Staff: CRNA           Last Filed PACU Vitals:  Vitals Value Taken Time   Temp 97.8 °F (36.6 °C) 10/19/24 1108   Pulse 92 10/19/24 1113   /74 10/19/24 1109   Resp 30 10/19/24 1113   SpO2 95 % 10/19/24 1113   Vitals shown include unfiled device data.    Modified Jose:  Activity: 2 (10/19/2024 11:08 AM)  Respiration: 2 (10/19/2024 11:08 AM)  Circulation: 2 (10/19/2024 11:08 AM)  Consciousness: 2 (10/19/2024 11:08 AM)  Oxygen Saturation: 2 (10/19/2024 11:08 AM)  Modified Jose Score: 10 (10/19/2024 11:08 AM)

## 2024-10-19 NOTE — ASSESSMENT & PLAN NOTE
BP elevated on presentation possibly secondary to pain  Need to hold olmesartan/HCTZ due to acute renal failure, management of pain as per adult acute pain management order set. If remains uncontrolled can consider amlodipine vs as needed IV antihypertensive  Will continue to monitor

## 2024-10-19 NOTE — ASSESSMENT & PLAN NOTE
"Lab Results   Component Value Date    HGBA1C 9.8 (H) 08/28/2024       No results for input(s): \"POCGLU\" in the last 72 hours.    Blood Sugar Average: Last 72 hrs:  Uncontrolled and hyperglycemic on admission  While hospitalized start Lantus 10 units nightly along with correctional insulin coverage with Accu-Cheks.  Hold home oral medications/non-insulin injectables  Initiate hypoglycemia protocol    "

## 2024-10-19 NOTE — ASSESSMENT & PLAN NOTE
3 mm left lower lobe lung nodule noted on visible portion of lung on CT abdomen/pelvis  Given concern for metastatic disease will obtain CT chest to better clarify

## 2024-10-19 NOTE — ASSESSMENT & PLAN NOTE
Diagnosed in 2019 and underwent biopsy of the prostate and s/p XRT  PSA 0.355  Urology is following

## 2024-10-19 NOTE — ASSESSMENT & PLAN NOTE
Diagnosed in 2019 and underwent biopsy of the prostate and s/p XRT  PSA in AM, appreciate Urology consultation

## 2024-10-20 LAB
ANION GAP SERPL CALCULATED.3IONS-SCNC: 10 MMOL/L (ref 4–13)
ATRIAL RATE: 105 BPM
BASOPHILS # BLD AUTO: 0.07 THOUSANDS/ΜL (ref 0–0.1)
BASOPHILS NFR BLD AUTO: 1 % (ref 0–1)
BUN SERPL-MCNC: 41 MG/DL (ref 5–25)
CALCIUM SERPL-MCNC: 8.6 MG/DL (ref 8.4–10.2)
CHLORIDE SERPL-SCNC: 101 MMOL/L (ref 96–108)
CO2 SERPL-SCNC: 25 MMOL/L (ref 21–32)
CREAT SERPL-MCNC: 2.73 MG/DL (ref 0.6–1.3)
EOSINOPHIL # BLD AUTO: 0.09 THOUSAND/ΜL (ref 0–0.61)
EOSINOPHIL NFR BLD AUTO: 1 % (ref 0–6)
ERYTHROCYTE [DISTWIDTH] IN BLOOD BY AUTOMATED COUNT: 13.8 % (ref 11.6–15.1)
GFR SERPL CREATININE-BSD FRML MDRD: 22 ML/MIN/1.73SQ M
GLUCOSE SERPL-MCNC: 120 MG/DL (ref 65–140)
GLUCOSE SERPL-MCNC: 135 MG/DL (ref 65–140)
GLUCOSE SERPL-MCNC: 229 MG/DL (ref 65–140)
GLUCOSE SERPL-MCNC: 279 MG/DL (ref 65–140)
GLUCOSE SERPL-MCNC: 311 MG/DL (ref 65–140)
HCT VFR BLD AUTO: 32 % (ref 36.5–49.3)
HGB BLD-MCNC: 9.9 G/DL (ref 12–17)
IMM GRANULOCYTES # BLD AUTO: 0.03 THOUSAND/UL (ref 0–0.2)
IMM GRANULOCYTES NFR BLD AUTO: 1 % (ref 0–2)
LYMPHOCYTES # BLD AUTO: 0.63 THOUSANDS/ΜL (ref 0.6–4.47)
LYMPHOCYTES NFR BLD AUTO: 10 % (ref 14–44)
MAGNESIUM SERPL-MCNC: 1.8 MG/DL (ref 1.9–2.7)
MCH RBC QN AUTO: 28.6 PG (ref 26.8–34.3)
MCHC RBC AUTO-ENTMCNC: 30.9 G/DL (ref 31.4–37.4)
MCV RBC AUTO: 93 FL (ref 82–98)
MONOCYTES # BLD AUTO: 0.67 THOUSAND/ΜL (ref 0.17–1.22)
MONOCYTES NFR BLD AUTO: 10 % (ref 4–12)
NEUTROPHILS # BLD AUTO: 4.94 THOUSANDS/ΜL (ref 1.85–7.62)
NEUTS SEG NFR BLD AUTO: 77 % (ref 43–75)
NRBC BLD AUTO-RTO: 0 /100 WBCS
P AXIS: 73 DEGREES
PLATELET # BLD AUTO: 415 THOUSANDS/UL (ref 149–390)
PMV BLD AUTO: 9.2 FL (ref 8.9–12.7)
POTASSIUM SERPL-SCNC: 4.3 MMOL/L (ref 3.5–5.3)
PR INTERVAL: 138 MS
QRS AXIS: 40 DEGREES
QRSD INTERVAL: 78 MS
QT INTERVAL: 342 MS
QTC INTERVAL: 452 MS
RBC # BLD AUTO: 3.46 MILLION/UL (ref 3.88–5.62)
SODIUM SERPL-SCNC: 136 MMOL/L (ref 135–147)
T WAVE AXIS: 75 DEGREES
VENTRICULAR RATE: 105 BPM
WBC # BLD AUTO: 6.43 THOUSAND/UL (ref 4.31–10.16)

## 2024-10-20 PROCEDURE — 99232 SBSQ HOSP IP/OBS MODERATE 35: CPT | Performed by: INTERNAL MEDICINE

## 2024-10-20 PROCEDURE — 83735 ASSAY OF MAGNESIUM: CPT | Performed by: INTERNAL MEDICINE

## 2024-10-20 PROCEDURE — 97162 PT EVAL MOD COMPLEX 30 MIN: CPT

## 2024-10-20 PROCEDURE — 99222 1ST HOSP IP/OBS MODERATE 55: CPT | Performed by: INTERNAL MEDICINE

## 2024-10-20 PROCEDURE — 99232 SBSQ HOSP IP/OBS MODERATE 35: CPT | Performed by: PHYSICIAN ASSISTANT

## 2024-10-20 PROCEDURE — 93010 ELECTROCARDIOGRAM REPORT: CPT | Performed by: INTERNAL MEDICINE

## 2024-10-20 PROCEDURE — 97166 OT EVAL MOD COMPLEX 45 MIN: CPT

## 2024-10-20 PROCEDURE — 82948 REAGENT STRIP/BLOOD GLUCOSE: CPT

## 2024-10-20 PROCEDURE — 80048 BASIC METABOLIC PNL TOTAL CA: CPT | Performed by: INTERNAL MEDICINE

## 2024-10-20 PROCEDURE — 85025 COMPLETE CBC W/AUTO DIFF WBC: CPT | Performed by: INTERNAL MEDICINE

## 2024-10-20 RX ORDER — INSULIN LISPRO 100 [IU]/ML
3 INJECTION, SOLUTION INTRAVENOUS; SUBCUTANEOUS
Status: DISCONTINUED | OUTPATIENT
Start: 2024-10-20 | End: 2024-10-21

## 2024-10-20 RX ORDER — AMOXICILLIN 250 MG
2 CAPSULE ORAL 2 TIMES DAILY
Status: DISCONTINUED | OUTPATIENT
Start: 2024-10-20 | End: 2024-10-27 | Stop reason: HOSPADM

## 2024-10-20 RX ORDER — FOLIC ACID 1 MG/1
1 TABLET ORAL DAILY
Status: DISCONTINUED | OUTPATIENT
Start: 2024-10-20 | End: 2024-10-27 | Stop reason: HOSPADM

## 2024-10-20 RX ORDER — MAGNESIUM SULFATE HEPTAHYDRATE 40 MG/ML
2 INJECTION, SOLUTION INTRAVENOUS ONCE
Status: COMPLETED | OUTPATIENT
Start: 2024-10-20 | End: 2024-10-20

## 2024-10-20 RX ORDER — SODIUM CHLORIDE, SODIUM GLUCONATE, SODIUM ACETATE, POTASSIUM CHLORIDE, MAGNESIUM CHLORIDE, SODIUM PHOSPHATE, DIBASIC, AND POTASSIUM PHOSPHATE .53; .5; .37; .037; .03; .012; .00082 G/100ML; G/100ML; G/100ML; G/100ML; G/100ML; G/100ML; G/100ML
1000 INJECTION, SOLUTION INTRAVENOUS ONCE
Status: COMPLETED | OUTPATIENT
Start: 2024-10-20 | End: 2024-10-21

## 2024-10-20 RX ORDER — POLYETHYLENE GLYCOL 3350 17 G/17G
17 POWDER, FOR SOLUTION ORAL DAILY
Status: DISCONTINUED | OUTPATIENT
Start: 2024-10-20 | End: 2024-10-27 | Stop reason: HOSPADM

## 2024-10-20 RX ORDER — SODIUM CHLORIDE, SODIUM GLUCONATE, SODIUM ACETATE, POTASSIUM CHLORIDE, MAGNESIUM CHLORIDE, SODIUM PHOSPHATE, DIBASIC, AND POTASSIUM PHOSPHATE .53; .5; .37; .037; .03; .012; .00082 G/100ML; G/100ML; G/100ML; G/100ML; G/100ML; G/100ML; G/100ML
75 INJECTION, SOLUTION INTRAVENOUS CONTINUOUS
Status: DISCONTINUED | OUTPATIENT
Start: 2024-10-20 | End: 2024-10-23

## 2024-10-20 RX ORDER — AMOXICILLIN 250 MG
2 CAPSULE ORAL 2 TIMES DAILY
Status: DISCONTINUED | OUTPATIENT
Start: 2024-10-20 | End: 2024-10-20

## 2024-10-20 RX ADMIN — SENNOSIDES AND DOCUSATE SODIUM 2 TABLET: 50; 8.6 TABLET ORAL at 17:37

## 2024-10-20 RX ADMIN — INSULIN LISPRO 3 UNITS: 100 INJECTION, SOLUTION INTRAVENOUS; SUBCUTANEOUS at 17:37

## 2024-10-20 RX ADMIN — FOLIC ACID 1 MG: 1 TABLET ORAL at 10:03

## 2024-10-20 RX ADMIN — SODIUM CHLORIDE 100 ML/HR: 0.9 INJECTION, SOLUTION INTRAVENOUS at 02:39

## 2024-10-20 RX ADMIN — INSULIN LISPRO 3 UNITS: 100 INJECTION, SOLUTION INTRAVENOUS; SUBCUTANEOUS at 11:30

## 2024-10-20 RX ADMIN — POLYETHYLENE GLYCOL 3350 17 G: 17 POWDER, FOR SOLUTION ORAL at 10:03

## 2024-10-20 RX ADMIN — OXYCODONE HYDROCHLORIDE 5 MG: 5 TABLET ORAL at 02:38

## 2024-10-20 RX ADMIN — Medication 2.5 MG: at 08:41

## 2024-10-20 RX ADMIN — INSULIN LISPRO 2 UNITS: 100 INJECTION, SOLUTION INTRAVENOUS; SUBCUTANEOUS at 17:37

## 2024-10-20 RX ADMIN — INSULIN LISPRO 3 UNITS: 100 INJECTION, SOLUTION INTRAVENOUS; SUBCUTANEOUS at 21:27

## 2024-10-20 RX ADMIN — HEPARIN SODIUM 5000 UNITS: 5000 INJECTION INTRAVENOUS; SUBCUTANEOUS at 13:47

## 2024-10-20 RX ADMIN — AMLODIPINE BESYLATE 5 MG: 5 TABLET ORAL at 08:29

## 2024-10-20 RX ADMIN — HEPARIN SODIUM 5000 UNITS: 5000 INJECTION INTRAVENOUS; SUBCUTANEOUS at 21:27

## 2024-10-20 RX ADMIN — SODIUM CHLORIDE, SODIUM GLUCONATE, SODIUM ACETATE, POTASSIUM CHLORIDE, MAGNESIUM CHLORIDE, SODIUM PHOSPHATE, DIBASIC, AND POTASSIUM PHOSPHATE 100 ML/HR: .53; .5; .37; .037; .03; .012; .00082 INJECTION, SOLUTION INTRAVENOUS at 19:45

## 2024-10-20 RX ADMIN — SODIUM CHLORIDE 100 ML/HR: 0.9 INJECTION, SOLUTION INTRAVENOUS at 11:41

## 2024-10-20 RX ADMIN — MAGNESIUM SULFATE HEPTAHYDRATE 2 G: 40 INJECTION, SOLUTION INTRAVENOUS at 08:29

## 2024-10-20 RX ADMIN — SODIUM CHLORIDE, SODIUM GLUCONATE, SODIUM ACETATE, POTASSIUM CHLORIDE, MAGNESIUM CHLORIDE, SODIUM PHOSPHATE, DIBASIC, AND POTASSIUM PHOSPHATE 1000 ML: .53; .5; .37; .037; .03; .012; .00082 INJECTION, SOLUTION INTRAVENOUS at 15:07

## 2024-10-20 RX ADMIN — INSULIN LISPRO 4 UNITS: 100 INJECTION, SOLUTION INTRAVENOUS; SUBCUTANEOUS at 11:30

## 2024-10-20 RX ADMIN — INSULIN GLARGINE 10 UNITS: 100 INJECTION, SOLUTION SUBCUTANEOUS at 21:27

## 2024-10-20 RX ADMIN — ATORVASTATIN CALCIUM 40 MG: 40 TABLET, FILM COATED ORAL at 08:29

## 2024-10-20 RX ADMIN — SODIUM CHLORIDE, SODIUM GLUCONATE, SODIUM ACETATE, POTASSIUM CHLORIDE, MAGNESIUM CHLORIDE, SODIUM PHOSPHATE, DIBASIC, AND POTASSIUM PHOSPHATE 100 ML/HR: .53; .5; .37; .037; .03; .012; .00082 INJECTION, SOLUTION INTRAVENOUS at 13:47

## 2024-10-20 RX ADMIN — SENNOSIDES AND DOCUSATE SODIUM 2 TABLET: 50; 8.6 TABLET ORAL at 10:03

## 2024-10-20 RX ADMIN — HEPARIN SODIUM 5000 UNITS: 5000 INJECTION INTRAVENOUS; SUBCUTANEOUS at 05:54

## 2024-10-20 NOTE — ASSESSMENT & PLAN NOTE
Noted enlarged left pelvic and other retroperitoneal nodes consistent with likely metastatic disease with history of prostate cancer.

## 2024-10-20 NOTE — ASSESSMENT & PLAN NOTE
POA  Suspect multifactorial in the setting of dehydration, acute pain hyperglycemia and possible malignancy   Resolved  Replace magnesium  Continue to monitor

## 2024-10-20 NOTE — ASSESSMENT & PLAN NOTE
-CT findings with conglomerate of left retroperitoneal nodes measuring approximately 6.5 x 5 x 13 cm.  These nodes encased the left ureter causing upstream obstruction.  The left psoas muscle was infiltrated by this node mass.  Enlarged nodes anterior to the aortic bifurcation within the right retroperitoneal measures 3 and 1.8 cm.  Enlarged left external iliac chain nodes measuring 2.6 and 2 cm.  Other scattered enlarged retroperitoneal nodes.  Sclerotic lesions measuring 3 mm involving right iliac body and right femoral head.    -Status postbiopsy of left inguinal/external iliac lymph node biopsy  -Medical oncology consulted to follow-up with pathology discussion and results in 2 weeks time.

## 2024-10-20 NOTE — PHYSICAL THERAPY NOTE
Physical Therapy Evaluation     Patient's Name: Alejandro Adames    Admitting Diagnosis  Hydronephrosis [N13.30]  Back pain [M54.9]  Metastatic disease (HCC) [C79.9]  Acute kidney injury (HCC) [N17.9]  Other hydronephrosis [N13.39]    Problem List  Patient Active Problem List   Diagnosis    Herpes simplex type 1 infection    Mixed hyperlipidemia    Essential hypertension    Idiopathic angioedema    Actinic keratosis    Type 2 diabetes mellitus with kidney complication, without long-term current use of insulin (HCC)    Malignant neoplasm of prostate (HCC)    Intrinsic eczema    SK (seborrheic keratosis)    Anemia    COVID-19    Sebaceous cyst mid back    Patellofemoral pain syndrome of both knees    Gait disorder    Other hydronephrosis    Acute renal failure (ARF) (HCC)    Hyponatremia    Lung nodule    Retroperitoneal mass       Past Medical History  Past Medical History:   Diagnosis Date    Cutaneous skin tags 1/2/2019    Diabetes mellitus (HCC)     Hypertension        Past Surgical History  History reviewed. No pertinent surgical history.       10/20/24 0842   PT Last Visit   PT Visit Date 10/20/24   Note Type   Note type Evaluation   Pain Assessment   Pain Assessment Tool 0-10   Pain Score 4   Pain Location/Orientation Orientation: Left;Location: Abdomen   Hospital Pain Intervention(s) Repositioned;Ambulation/increased activity;Emotional support   Restrictions/Precautions   Weight Bearing Precautions Per Order No   Other Precautions Multiple lines;Fall Risk;Pain  (L side nephrostomy tube)   Home Living   Type of Home House   Home Layout Two level;Bed/bath upstairs  (1 ROSALINDA)   Bathroom Shower/Tub Tub/shower unit   Bathroom Toilet Standard   Bathroom Equipment   (denies)   Home Equipment   (denies)   Prior Function   Level of [place] Independent with ADLs;Independent with functional mobility;Independent with IADLS   Lives With Family   Receives Help From Family   IADLs Independent with driving;Independent with  meal prep;Independent with medication management   Falls in the last 6 months 0   Vocational Retired   General   Family/Caregiver Present No   Cognition   Overall Cognitive Status WFL   Attention Within functional limits   Orientation Level Oriented X4   Memory Within functional limits   Following Commands Follows all commands and directions without difficulty   Comments pleasant and cooperative   RLE Assessment   RLE Assessment WFL   LLE Assessment   LLE Assessment WFL   Bed Mobility   Supine to Sit Unable to assess   Sit to Supine Unable to assess   Additional Comments pt found/left sitting OOB with all needs wihtin reach   Transfers   Sit to Stand 7  Independent   Stand to Sit 7  Independent   Additional Comments completes without AD   Ambulation/Elevation   Gait pattern Short stride;Inconsistent lacie   Gait Assistance 5  Supervision   Assistive Device None   Distance 100'   Stair Management Assistance 5  Supervision   Additional items Verbal cues   Stair Management Technique One rail R;Alternating pattern;Foreward   Number of Stairs 7   Balance   Static Sitting Fair +   Dynamic Sitting Fair +   Static Standing Fair   Dynamic Standing Fair   Ambulatory Fair   Activity Tolerance   Activity Tolerance Patient tolerated treatment well   Medical Staff Made Aware OLENA Carpio; co-session completed this date 2* increased medical complexity and multiple co-morbidities   Nurse Made Aware RN cleared   Assessment   Prognosis Good   Assessment Pt seen for moderate complexity PT evaluation. Pt with active PT eval/treat orders. Pt is a 69 y.o. male who was admitted to [facility] on 10/18 with hydronephrosis s/p IR drain placement. Pt's active problems include: ARF, hyponatremia, lung nodule.Pt  has a past medical history of Cutaneous skin tags (1/2/2019), Diabetes mellitus (HCC), and Hypertension. Pt resides with brother in [facility] and was independent prior to hospital admission. Currently upon evaluation pt performing  funational transfers at I level and ambulation at S level without AD. Pt was left sitting OOB in recliner at the end of PT session with all needs within reach. Pt with no questions or concerns regarding d/c home; appears to be functioning at/ near baseline mobility levels. Pt with no further acute inpatient PT needs at this time- please re-consult if needed. PT to discharge pt and recommends home with family support as needed. Encourage pt to ambulate at least 3-4x/day with restorative/ nursing staff while remaining in hospital. The patient's AM-PAC Basic Mobility Inpatient Short Form Raw Score is 20. A Raw score of greater than 16 suggests the patient may benefit from discharge to home. Please also refer to the recommendation of the Physical Therapist for safe discharge planning.   Goals   Patient Goals to go home   Plan   PT Frequency   (eval only- d/c PT)   Discharge Recommendation   Rehab Resource Intensity Level, PT No post-acute rehabilitation needs   AM-PAC Basic Mobility Inpatient   Turning in Flat Bed Without Bedrails 3   Lying on Back to Sitting on Edge of Flat Bed Without Bedrails 3   Moving Bed to Chair 4   Standing Up From Chair Using Arms 4   Walk in Room 3   Climb 3-5 Stairs With Railing 3   Basic Mobility Inpatient Raw Score 20   Basic Mobility Standardized Score 43.99   [facility] Highest Level Of Mobility   -HLM Goal 6: Walk 10 steps or more   -HLM Achieved 7: Walk 25 feet or more   Modified Jim Scale   Modified Spiro Scale 2         Loren Jamison, PT DPT

## 2024-10-20 NOTE — CONSULTS
Consultation - Nephrology   Alejandro Adames 69 y.o. male MRN: [id number]  Unit/Bed#: [facility] 909-01 Encounter: [id number]      Assessment & Plan  Acute renal failure (ARF) (HCC)  Acute kidney injury, etiology multifactorial, noted likely component due to obstructive uropathy with recent left PCN placement.  However also suspect component due to contrast associate nephropathy as well as previous NSAID use as he was prescribed Celebrex as an outpatient.    Previous baseline creatinine noted in August 1.18.  With baseline creatinine near 1.25.  Hyponatremia  Likely component due to volume depletion, resolved with IV fluids    Other hydronephrosis  Postoperative left PCN placement given obstructive retroperitoneal mass  Retroperitoneal mass  Biopsy is currently pending.  Further management as per hematology oncology/urology.  Malignant neoplasm of prostate (HCC)  Noted enlarged left pelvic and other retroperitoneal nodes consistent with likely metastatic disease with history of prostate cancer.    Summary: Again etiology for renal failure multifactorial, suspecting prerenal component, to contrast associate nephropathy as well as obstructive uropathy given need for left PCN placement.  May have a component of ATN, patient remains nonoliguric.  Recommend continuation of IV fluids although will switch to Isolyte will also give an additional 1 L bolus.    Continue to avoid NSAIDs.    History of Present Illness   Physician Requesting Consult: Dirk Guerrero DO  Reason for Consult / Principal Problem: Kidney injury  HPI: Alejandro Adames is a 69 y.o. year old male who presents with left flank pain.    Patient is a 6-year-old male with known history of type 2 diabetes, hypertension and prostate cancer who presents with left flank pain for at least several weeks although may even longer since July.  He states that in July he started having some vague and intermittent groin pain.  Eventually had seen his orthopedist and was prescribed  Celebrex.  He used this for about a week however the pain in his hip started to move towards the back.  Also complains of significant swelling in his left knee and ankle.  Given the pain and discomfort patient underwent further imaging with contrast-enhanced CT.  CT revealed significant retroperitoneal adenopathy with left-sided hydronephrosis.    Presentation creatinine was 2.43 and has increased to 2.73.  Remains nonoliguric.  Left-sided flank pain has significant improved.  Appetite also has improved.  No longer taking NSAIDs.    History obtained from chart review and the patient    Review of Systems   Constitutional:  Positive for appetite change and fatigue.   Respiratory:  Negative for chest tightness and shortness of breath.    Gastrointestinal:  Negative for diarrhea, nausea and vomiting.   Genitourinary:  Positive for flank pain. Negative for difficulty urinating.   Skin:  Negative for wound.   Neurological:  Negative for dizziness, syncope and light-headedness.       Pertinent findings of a 10 point review of systems noted above otherwise all others negative    Historical Information   Patient Active Problem List   Diagnosis    Herpes simplex type 1 infection    Mixed hyperlipidemia    Essential hypertension    Idiopathic angioedema    Actinic keratosis    Type 2 diabetes mellitus with kidney complication, without long-term current use of insulin (HCC)    Malignant neoplasm of prostate (HCC)    Intrinsic eczema    SK (seborrheic keratosis)    Anemia    COVID-19    Sebaceous cyst mid back    Patellofemoral pain syndrome of both knees    Gait disorder    Other hydronephrosis    Acute renal failure (ARF) (HCC)    Hyponatremia    Lung nodule    Retroperitoneal mass     Past Medical History:   Diagnosis Date    Cutaneous skin tags 1/2/2019    Diabetes mellitus (HCC)     Hypertension      History reviewed. No pertinent surgical history.  Social History   Social History     Substance and Sexual Activity   Alcohol  Use Yes    Alcohol/week: 2.0 - 3.0 standard drinks of alcohol    Types: 2 - 3 Cans of beer per week    Comment: social     Social History     Substance and Sexual Activity   Drug Use No     Social History     Tobacco Use   Smoking Status Former   Smokeless Tobacco Never     Family History   Problem Relation Age of Onset    No Known Problems Brother     Stroke Mother        Meds/Allergies   current meds:   Current Facility-Administered Medications:     acetaminophen (TYLENOL) tablet 650 mg, Q4H PRN    amLODIPine (NORVASC) tablet 5 mg, Daily    atorvastatin (LIPITOR) tablet 40 mg, Daily    ceFAZolin (ANCEF) IVPB (premix in dextrose) 2,000 mg 50 mL, Once    fentaNYL (SUBLIMAZE) injection 25 mcg, Q3 min PRN    folic acid (FOLVITE) tablet 1 mg, Daily    heparin (porcine) subcutaneous injection 5,000 Units, Q8H CHONG    HYDROmorphone HCl (DILAUDID) injection 0.2 mg, Q4H PRN    insulin glargine (LANTUS) subcutaneous injection 10 Units 0.1 mL, HS    insulin lispro (HumALOG/ADMELOG) 100 units/mL subcutaneous injection 1-5 Units, HS    insulin lispro (HumALOG/ADMELOG) 100 units/mL subcutaneous injection 1-6 Units, TID AC **AND** Fingerstick Glucose (POCT), TID AC    insulin lispro (HumALOG/ADMELOG) 100 units/mL subcutaneous injection 3 Units, TID With Meals    multi-electrolyte (ISOLYTE-S PH 7.4) bolus 1,000 mL, Once    multi-electrolyte (PLASMALYTE-A/ISOLYTE-S PH 7.4) IV solution, Continuous, Last Rate: 100 mL/hr (10/20/24 1347)    ondansetron (ZOFRAN) injection 4 mg, Q6H PRN    ondansetron (ZOFRAN) injection 4 mg, Once PRN    oxyCODONE (ROXICODONE) split tablet 2.5 mg, Q4H PRN **OR** oxyCODONE (ROXICODONE) IR tablet 5 mg, Q4H PRN    polyethylene glycol (MIRALAX) packet 17 g, Daily    senna-docusate sodium (SENOKOT S) 8.6-50 mg per tablet 2 tablet, BID    Allergies   Allergen Reactions    Captopril Cough    Crestor [Rosuvastatin] Diarrhea    Enalapril Cough         Objective   /73   Pulse 98   Temp 98.7 °F (37.1 °C)    "Resp 16   Ht 5' 10\" (1.778 m)   Wt 79.4 kg (175 lb)   SpO2 95%   BMI 25.11 kg/m²     Intake/Output Summary (Last 24 hours) at 10/20/2024 1351  Last data filed at 10/20/2024 1143  Gross per 24 hour   Intake 2458.33 ml   Output 3450 ml   Net -991.67 ml       Current Weight: Weight - Scale: 79.4 kg (175 lb)    Physical Exam  Constitutional:       Appearance: He is not ill-appearing.   Eyes:      General: No scleral icterus.  Cardiovascular:      Rate and Rhythm: Normal rate and regular rhythm.   Pulmonary:      Effort: Pulmonary effort is normal.      Breath sounds: Normal breath sounds.   Abdominal:      General: There is no distension.      Palpations: Abdomen is soft.      Tenderness: There is no abdominal tenderness.   Musculoskeletal:         General: No deformity.      Right lower leg: No edema.      Left lower leg: No edema.   Lymphadenopathy:      Cervical: No cervical adenopathy.   Skin:     General: Skin is warm and dry.      Findings: No rash.   Neurological:      Mental Status: He is alert and oriented to person, place, and time.           Lab Results:    Results from last 7 days   Lab Units 10/20/24  0518   WBC Thousand/uL 6.43   HEMOGLOBIN g/dL 9.9*   HEMATOCRIT % 32.0*   PLATELETS Thousands/uL 415*     Results from last 7 days   Lab Units 10/20/24  0518   POTASSIUM mmol/L 4.3   CHLORIDE mmol/L 101   CO2 mmol/L 25   BUN mg/dL 41*   CREATININE mg/dL 2.73*   CALCIUM mg/dL 8.6       "

## 2024-10-20 NOTE — ASSESSMENT & PLAN NOTE
In the setting of hydronephrosis due to retroperitoneal mass  Continuing to trend renal function.  Anticipate should improve with conservative management now that PCN has been placed for optimal drainage.

## 2024-10-20 NOTE — ASSESSMENT & PLAN NOTE
The patient started having left flank pain 1.5 weeks ago. He saw his urologist outpatient  for left flank pain and a CT was done which shows left hydronephrosis, retroperitoneal lymphadenopathy, and new sclerotic lesions concerning for metastatic disease. The patient was instructed by urologist to come to ED to be admitted for possible L ureter stent placement.   Urology and IR consulted s/p left percutaneous nephrostomy tube placement by IR on 10/19  Continue pain management   Urology is following

## 2024-10-20 NOTE — ASSESSMENT & PLAN NOTE
Lab Results   Component Value Date    HGBA1C 9.8 (H) 08/28/2024       Recent Labs     10/19/24  1112 10/19/24  1202 10/19/24  1630 10/19/24  2048   POCGLU 218* 221* 218* 299*       Blood Sugar Average: Last 72 hrs:  (P) 245.4  Primary team

## 2024-10-20 NOTE — ANESTHESIA POSTPROCEDURE EVALUATION
Post-Op Assessment Note    CV Status:  Stable          No anethesia notable event occurred.    Staff: Anesthesiologist           Last Filed PACU Vitals:  Vitals Value Taken Time   Temp 97.8 °F (36.6 °C) 10/19/24 1108   Pulse 86 10/19/24 1128   /72 10/19/24 1115   Resp 21 10/19/24 1128   SpO2 94 % 10/19/24 1128   Vitals shown include unfiled device data.    Modified Jose:  Activity: 2 (10/19/2024 11:26 AM)  Respiration: 2 (10/19/2024 11:26 AM)  Circulation: 2 (10/19/2024 11:26 AM)  Consciousness: 2 (10/19/2024 11:26 AM)  Oxygen Saturation: 2 (10/19/2024 11:26 AM)  Modified Jose Score: 10 (10/19/2024 11:26 AM)

## 2024-10-20 NOTE — ASSESSMENT & PLAN NOTE
CT scan with peripelvic/proximal periureteral inflammatory stranding secondary to obstructing large left retroperitoneal toribio conglomerate. Enlarged left pelvic and other retroperitoneal nodes   Findings consistent with metastasis. 3 mm sclerotic lesions in the right iliac body and right femoral head may represent bone islands versus sclerotic mets.       Patient with underlying history of prostate cancer  IR consulted for biopsy, status post left inguinal lymph node biopsy on 10/19  Follow-up on biopsy report  Urology is following  Follow on medical oncology recommendation

## 2024-10-20 NOTE — ANESTHESIA PREPROCEDURE EVALUATION
Procedure:  IR NEPHROSTOMY TUBE PLACEMENT    Relevant Problems   CARDIO   (+) Essential hypertension   (+) Mixed hyperlipidemia      ENDO   (+) Type 2 diabetes mellitus with kidney complication, without long-term current use of insulin (HCC)      /RENAL   (+) Acute renal failure (ARF) (HCC)   (+) Malignant neoplasm of prostate (HCC)   (+) Other hydronephrosis   (+) Type 2 diabetes mellitus with kidney complication, without long-term current use of insulin (HCC)      HEMATOLOGY   (+) Anemia        Physical Exam    Airway    Mallampati score: II         Dental   No notable dental hx     Cardiovascular      Pulmonary      Other Findings        Anesthesia Plan  ASA Score- 3     Anesthesia Type- general with ASA Monitors.         Additional Monitors:     Airway Plan: ETT.    Comment: I, Dr. Belcher, the attending physician, have personally seen and evaluated the patient prior to anesthetic care.  I have reviewed the pre-anesthetic record, and other medical records if appropriate to the anesthetic care.  If a CRNA is involved in the case, I have reviewed the CRNA assessment, if present, and agree.  The patient is in a suitable condition to proceed with my formulated anesthetic plan.  .       Plan Factors-    Chart reviewed.                      Induction- intravenous.    Postoperative Plan-         Informed Consent- Anesthetic plan and risks discussed with patient.  I personally reviewed this patient with the CRNA. Discussed and agreed on the Anesthesia Plan with the CRNA..

## 2024-10-20 NOTE — ASSESSMENT & PLAN NOTE
Baseline creatinine 1.2  CT showed lymphadenopathy partially obstructing L ureter  Likely secondary to hydronephrosis and decreased oral intake  Continue to hold olmesartan/HCTZ  Status post left nephrostomy tube placement 10/19  Avoid nephrotoxic medications and hypovolemia  Continue with IV fluid for hydration  Will consult nephrology for further management

## 2024-10-20 NOTE — ASSESSMENT & PLAN NOTE
-In the setting of conglomerate of left retroperitoneal nodes measuring approximately 6.5 x 5 x 13 cm.  These nodes encased the left ureter causing upstream obstruction.  The left psoas muscle was infiltrated by this node mass.  Enlarged nodes anterior to the aortic bifurcation within the right retroperitoneal measures 3 and 1.8 cm.  Enlarged left external iliac chain nodes measuring 2.6 and 2 cm.  Other scattered enlarged retroperitoneal nodes.  -Now status post successful placement of left PCN.  PCNU unable to be placed at this time due to bleeding/clot formation in collecting system.  Findings revealed incomplete obstruction secondary to extrinsic mass effect at the mid ureter some contrast did pass into urinary bladder  -Anticipate PCNU conversion at a later date on outpatient, possible clamping trial and internalization to stent pending patient's course and management of retroperitoneal mass.  -Trending renal function.  2.54 yesterday elevated at 2.73 most likely reactionary to PCN placement.

## 2024-10-20 NOTE — PLAN OF CARE
Problem: PAIN - ADULT  Goal: Verbalizes/displays adequate comfort level or baseline comfort level  Description: Interventions:  - Encourage patient to monitor pain and request assistance  - Assess pain using appropriate pain scale  - Administer analgesics based on type and severity of pain and evaluate response  - Implement non-pharmacological measures as appropriate and evaluate response  - Consider cultural and social influences on pain and pain management  - Notify physician/advanced practitioner if interventions unsuccessful or patient reports new pain  Outcome: Progressing     Problem: INFECTION - ADULT  Goal: Absence or prevention of progression during hospitalization  Description: INTERVENTIONS:  - Assess and monitor for signs and symptoms of infection  - Monitor lab/diagnostic results  - Monitor all insertion sites, i.e. indwelling lines, tubes, and drains  - Monitor endotracheal if appropriate and nasal secretions for changes in amount and color  - [place] appropriate cooling/warming therapies per order  - Administer medications as ordered  - Instruct and encourage patient and family to use good hand hygiene technique  - Identify and instruct in appropriate isolation precautions for identified infection/condition  Outcome: Progressing  Goal: Absence of fever/infection during neutropenic period  Description: INTERVENTIONS:  - Monitor WBC    Outcome: Progressing     Problem: SAFETY ADULT  Goal: Patient will remain free of falls  Description: INTERVENTIONS:  - Educate patient/family on patient safety including physical limitations  - Instruct patient to call for assistance with activity   - Consult OT/PT to assist with strengthening/mobility   - Keep Call bell within reach  - Keep bed low and locked with side rails adjusted as appropriate  - Keep care items and personal belongings within reach  - Initiate and maintain comfort rounds  - Make Fall Risk Sign visible to staff  - Offer Toileting every  Hours,  in advance of need  - Initiate/Maintain alarm  - Obtain necessary fall risk management equipment:   - Apply yellow socks and bracelet for high fall risk patients  - Consider moving patient to room near nurses station  Outcome: Progressing  Goal: Maintain or return to baseline ADL function  Description: INTERVENTIONS:  -  Assess patient's ability to carry out ADLs; assess patient's baseline for ADL function and identify physical deficits which impact ability to perform ADLs (bathing, care of mouth/teeth, toileting, grooming, dressing, etc.)  - Assess/evaluate cause of self-care deficits   - Assess range of motion  - Assess patient's mobility; develop plan if impaired  - Assess patient's need for assistive devices and provide as appropriate  - Encourage maximum independence but intervene and supervise when necessary  - Involve family in performance of ADLs  - Assess for home care needs following discharge   - Consider OT consult to assist with ADL evaluation and planning for discharge  - Provide patient education as appropriate  Outcome: Progressing  Goal: Maintains/Returns to pre admission functional level  Description: INTERVENTIONS:  - Perform AM-PAC 6 Click Basic Mobility/ Daily Activity assessment daily.  - Set and communicate daily mobility goal to care team and patient/family/caregiver.   - Collaborate with rehabilitation services on mobility goals if consulted  - Perform Range of Motion  times a day.  - Reposition patient every  hours.  - Dangle patient  times a day  - Stand patient  times a day  - Ambulate patient  times a day  - Out of bed to chair  times a day   - Out of bed for meals  times a day  - Out of bed for toileting  - Record patient progress and toleration of activity level   Outcome: Progressing     Problem: DISCHARGE PLANNING  Goal: Discharge to home or other facility with appropriate resources  Description: INTERVENTIONS:  - Identify barriers to discharge w/patient and caregiver  - Arrange for  needed discharge resources and transportation as appropriate  - Identify discharge learning needs (meds, wound care, etc.)  - Arrange for interpretive services to assist at discharge as needed  - Refer to Case Management Department for coordinating discharge planning if the patient needs post-hospital services based on physician/advanced practitioner order or complex needs related to functional status, cognitive ability, or social support system  Outcome: Progressing     Problem: Knowledge Deficit  Goal: Patient/family/caregiver demonstrates understanding of disease process, treatment plan, medications, and discharge instructions  Description: Complete learning assessment and assess knowledge base.  Interventions:  - Provide teaching at level of understanding  - Provide teaching via preferred learning methods  Outcome: Progressing

## 2024-10-20 NOTE — ASSESSMENT & PLAN NOTE
BP elevated on presentation possibly secondary to pain  Olmesartan/HCTZ on hold due to acute renal failure  Norvasc was added  Continue to monitor

## 2024-10-20 NOTE — ASSESSMENT & PLAN NOTE
Acute kidney injury, etiology multifactorial, noted likely component due to obstructive uropathy with recent left PCN placement.  However also suspect component due to contrast associate nephropathy as well as previous NSAID use as he was prescribed Celebrex as an outpatient.    Previous baseline creatinine noted in August 1.18.  With baseline creatinine near 1.25.

## 2024-10-20 NOTE — PROGRESS NOTES
Progress Note - Hospitalist   Name: Alejandro Adames 69 y.o. male I MRN: [id number]  Unit/Bed#: [facility] 909-01 I Date of Admission: 10/18/2024   Date of Service: 10/20/2024 I Hospital Day: 2    Assessment & Plan  Other hydronephrosis  The patient started having left flank pain 1.5 weeks ago. He saw his urologist outpatient  for left flank pain and a CT was done which shows left hydronephrosis, retroperitoneal lymphadenopathy, and new sclerotic lesions concerning for metastatic disease. The patient was instructed by urologist to come to ED to be admitted for possible L ureter stent placement.   Urology and IR consulted s/p left percutaneous nephrostomy tube placement by IR on 10/19  Continue pain management   Urology is following  Retroperitoneal mass  CT scan with peripelvic/proximal periureteral inflammatory stranding secondary to obstructing large left retroperitoneal toribio conglomerate. Enlarged left pelvic and other retroperitoneal nodes   Findings consistent with metastasis. 3 mm sclerotic lesions in the right iliac body and right femoral head may represent bone islands versus sclerotic mets.       Patient with underlying history of prostate cancer  IR consulted for biopsy, status post left inguinal lymph node biopsy on 10/19  Follow-up on biopsy report  Urology is following  Follow on medical oncology recommendation  Acute renal failure (ARF) (HCC)  Baseline creatinine 1.2  CT showed lymphadenopathy partially obstructing L ureter  Likely secondary to hydronephrosis and decreased oral intake  Continue to hold olmesartan/HCTZ  Status post left nephrostomy tube placement 10/19  Avoid nephrotoxic medications and hypovolemia  Continue with IV fluid for hydration  Will consult nephrology for further management  Hyponatremia  POA  Suspect multifactorial in the setting of dehydration, acute pain hyperglycemia and possible malignancy   Resolved  Replace magnesium  Continue to monitor  Anemia  Anemia panel consistent with  anemia of chronic disease and folate deficiency  Start folic acid supplement  Monitor  Essential hypertension  BP elevated on presentation possibly secondary to pain  Olmesartan/HCTZ on hold due to acute renal failure  Norvasc was added  Continue to monitor  Type 2 diabetes mellitus with kidney complication, without long-term current use of insulin (HCC)  Lab Results   Component Value Date    HGBA1C 9.8 (H) 08/28/2024       Recent Labs     10/19/24  1202 10/19/24  1630 10/19/24  2048 10/20/24  0731   POCGLU 221* 218* 299* 135       Blood Sugar Average: Last 72 hrs:  (P) 227  Uncontrolled and hyperglycemic on admission   Accu-Cheks.  Hold home oral medications/non-insulin injectables  Continue Lantus at bedtime, add Humalog with meals  Initiate hypoglycemia protocol    Malignant neoplasm of prostate (HCC)  Diagnosed in 2019 and underwent biopsy of the prostate and s/p XRT  PSA 0.355  Urology is following  Lung nodule  3 mm left lower lobe lung nodule noted on visible portion of lung on CT abdomen/pelvis  Given concern for metastatic disease will obtain CT chest to better clarify  Chest CT scan, negative for malignancy, per radiology, the 3 mm left lower lobe nodule is of doubtful significance.     VTE Pharmacologic Prophylaxis: VTE Score: 7 High Risk (Score >/= 5) - Pharmacological DVT Prophylaxis Ordered: heparin. Sequential Compression Devices Ordered.    Mobility:   Basic Mobility Inpatient Raw Score: 24  JH-HLM Goal: 8: Walk 250 feet or more  JH-HLM Achieved: 6: Walk 10 steps or more  JH-HLM Goal NOT achieved. Continue with multidisciplinary rounding and encourage appropriate mobility to improve upon JH-HLM goals.    Patient Centered Rounds: I performed bedside rounds with nursing staff today.   Discussions with Specialists or Other Care Team Provider: Nursing    Education and Discussions with Family / Patient: Patient declined call to .     Current Length of Stay: 2 day(s)  Current Patient Status:  Inpatient   Certification Statement: The patient will continue to require additional inpatient hospital stay due to acute renal failure  Discharge Plan: Anticipate discharge in 48-72 hrs to home.    Code Status: Level 3 - DNAR and DNI    Subjective   Patient seen and examined  Comfortable sitting in chair  Requesting stool softeners  No chest pain or shortness of breath    Objective :  Temp:  [97.5 °F (36.4 °C)-98.7 °F (37.1 °C)] 98.7 °F (37.1 °C)  HR:  [85-98] 98  BP: (131-161)/(71-74) 133/73  Resp:  [14-21] 16  SpO2:  [94 %-97 %] 95 %  O2 Device: None (Room air)    Body mass index is 25.11 kg/m².     Input and Output Summary (last 24 hours):     Intake/Output Summary (Last 24 hours) at 10/20/2024 0943  Last data filed at 10/20/2024 0730  Gross per 24 hour   Intake 800 ml   Output 2750 ml   Net -1950 ml       Physical Exam  Patient is awake alert in no acute distress  Comfortable sitting in chair  Lung clear to auscultation bilateral anteriorly  Heart positive S1-S2 no murmur  Abdomen soft nontender  Left nephrostomy tube with clear urine  Lower extremities no edema    Lines/Drains:  Lines/Drains/Airways       Active Status       Name Placement date Placement time Site Days    Nephrostomy Left 10 Fr. 10/19/24  1004  Left  less than 1                            Lab Results: I have reviewed the following results:   Results from last 7 days   Lab Units 10/20/24  0518   WBC Thousand/uL 6.43   HEMOGLOBIN g/dL 9.9*   HEMATOCRIT % 32.0*   PLATELETS Thousands/uL 415*   SEGS PCT % 77*   LYMPHO PCT % 10*   MONO PCT % 10   EOS PCT % 1     Results from last 7 days   Lab Units 10/20/24  0518 10/19/24  0629 10/18/24  1849   SODIUM mmol/L 136   < > 127*   POTASSIUM mmol/L 4.3   < > 4.3   CHLORIDE mmol/L 101   < > 92*   CO2 mmol/L 25   < > 22   BUN mg/dL 41*   < > 40*   CREATININE mg/dL 2.73*   < > 2.43*   ANION GAP mmol/L 10   < > 13   CALCIUM mg/dL 8.6   < > 9.7   ALBUMIN g/dL  --   --  3.9   TOTAL BILIRUBIN mg/dL  --   --  0.37    ALK PHOS U/L  --   --  142*   ALT U/L  --   --  23   AST U/L  --   --  15   GLUCOSE RANDOM mg/dL 120   < > 276*    < > = values in this interval not displayed.     Results from last 7 days   Lab Units 10/19/24  0823   INR  1.05     Results from last 7 days   Lab Units 10/20/24  0731 10/19/24  2048 10/19/24  1630 10/19/24  1202 10/19/24  1112 10/18/24  2138   POC GLUCOSE mg/dl 135 299* 218* 221* 218* 271*               Recent Cultures (last 7 days):       Last 24 Hours Medication List:     Current Facility-Administered Medications:     acetaminophen (TYLENOL) tablet 650 mg, Q4H PRN    amLODIPine (NORVASC) tablet 5 mg, Daily    atorvastatin (LIPITOR) tablet 40 mg, Daily    ceFAZolin (ANCEF) IVPB (premix in dextrose) 2,000 mg 50 mL, Once    fentaNYL (SUBLIMAZE) injection 25 mcg, Q3 min PRN    folic acid (FOLVITE) tablet 1 mg, Daily    heparin (porcine) subcutaneous injection 5,000 Units, Q8H CHONG    HYDROmorphone HCl (DILAUDID) injection 0.2 mg, Q4H PRN    insulin glargine (LANTUS) subcutaneous injection 10 Units 0.1 mL, HS    insulin lispro (HumALOG/ADMELOG) 100 units/mL subcutaneous injection 1-5 Units, HS    insulin lispro (HumALOG/ADMELOG) 100 units/mL subcutaneous injection 1-6 Units, TID AC **AND** Fingerstick Glucose (POCT), TID AC    insulin lispro (HumALOG/ADMELOG) 100 units/mL subcutaneous injection 3 Units, TID With Meals    magnesium sulfate 2 g/50 mL IVPB (premix) 2 g, Once, Last Rate: 2 g (10/20/24 0829)    ondansetron (ZOFRAN) injection 4 mg, Q6H PRN    ondansetron (ZOFRAN) injection 4 mg, Once PRN    oxyCODONE (ROXICODONE) split tablet 2.5 mg, Q4H PRN **OR** oxyCODONE (ROXICODONE) IR tablet 5 mg, Q4H PRN    polyethylene glycol (MIRALAX) packet 17 g, Daily    senna-docusate sodium (SENOKOT S) 8.6-50 mg per tablet 2 tablet, BID    sodium chloride 0.9 % infusion, Continuous, Last Rate: 100 mL/hr (10/20/24 9057)    Administrative Statements   Today, Patient Was Seen By: Dirk Guerrero DO      **Please Note:  This note may have been constructed using a voice recognition system.**

## 2024-10-20 NOTE — ASSESSMENT & PLAN NOTE
Lab Results   Component Value Date    HGBA1C 9.8 (H) 08/28/2024       Recent Labs     10/19/24  1202 10/19/24  1630 10/19/24  2048 10/20/24  0731   POCGLU 221* 218* 299* 135       Blood Sugar Average: Last 72 hrs:  (P) 227  Uncontrolled and hyperglycemic on admission   Accu-Cheks.  Hold home oral medications/non-insulin injectables  Continue Lantus at bedtime, add Humalog with meals  Initiate hypoglycemia protocol

## 2024-10-20 NOTE — OCCUPATIONAL THERAPY NOTE
Occupational Therapy Evaluation     Patient Name: Alejandro Adames  Today's Date: 10/20/2024  Problem List  Principal Problem:    Other hydronephrosis  Active Problems:    Essential hypertension    Type 2 diabetes mellitus with kidney complication, without long-term current use of insulin (HCC)    Malignant neoplasm of prostate (HCC)    Anemia    Acute renal failure (ARF) (HCC)    Hyponatremia    Lung nodule    Retroperitoneal mass    Past Medical History  Past Medical History:   Diagnosis Date    Cutaneous skin tags 1/2/2019    Diabetes mellitus (HCC)     Hypertension      Past Surgical History  History reviewed. No pertinent surgical history.        10/20/24 0843   OT Last Visit   OT Visit Date 10/20/24   Note Type   Note type Evaluation   Pain Assessment   Pain Assessment Tool 0-10   Pain Score 4   Pain Location/Orientation Orientation: Left;Location: Abdomen;Location: Incision   Effect of Pain on Daily Activities limits comfort   Patient's Stated Pain Goal No pain   Hospital Pain Intervention(s) Repositioned;Ambulation/increased activity;Emotional support   Restrictions/Precautions   Weight Bearing Precautions Per Order No   Other Precautions Multiple lines;Fall Risk;Pain  (nephrostomy tube (L-side))   Home Living   Type of Home House   Home Layout Two level;Bed/bath upstairs  (1 ROSALINDA)   Bathroom Shower/Tub Tub/shower unit   Bathroom Toilet Standard   Bathroom Equipment   (pt denies)   Home Equipment   (pt denies)   Prior Function   Level of [place] Independent with ADLs;Independent with functional mobility;Independent with IADLS   Lives With Family   Receives Help From Family   IADLs Independent with driving;Independent with meal prep;Independent with medication management   Falls in the last 6 months 0   Vocational Retired   Lifestyle   Autonomy Pt reports I w/ ADLs, IADLs, and fxnl mobility w/o AD at baseline; + driving.   Reciprocal Relationships Pt lives w/ his brother.   Service to Others Pt is a  retired .   Intrinsic Gratification Pt enjoys hunting and fishing.   General   Family/Caregiver Present No   ADL   Where Assessed Chair   Eating Assistance 7  Independent   Grooming Assistance 7  Independent   UB Bathing Assistance 6  Modified Independent   LB Bathing Assistance 5  Supervision/Setup   UB Dressing Assistance 6  Modified independent   LB Dressing Assistance 5  Supervision/Setup   Toileting Assistance  6  Modified independent   Bed Mobility   Supine to Sit Unable to assess   Sit to Supine Unable to assess   Additional Comments Pt seated OOB in chair upon therapist's arrival and at end of OT evaluation w/ all needs within reach.   Transfers   Sit to Stand 7  Independent   Stand to Sit 7  Independent   Additional Comments completed w/o AD   Functional Mobility   Functional Mobility 5  Supervision   Additional Comments Pt ambulated household distance w/ S and w/o AD.   Additional items   (no AD)   Balance   Static Sitting Fair +   Dynamic Sitting Fair +   Static Standing Fair   Dynamic Standing Fair   Ambulatory Fair   Activity Tolerance   Activity Tolerance Patient tolerated treatment well   Medical Staff Made Aware PTLoren, due to pt's medical complexity and multiple comorbidities   Nurse Made Aware RN clearance prior to session   RUE Assessment   RUE Assessment WFL   LUE Assessment   LUE Assessment WFL   Hand Function   Gross Motor Coordination Functional   Fine Motor Coordination Functional   Cognition   Overall Cognitive Status WFL   Arousal/Participation Alert;Responsive;Cooperative   Attention Within functional limits   Orientation Level Oriented X4   Memory Within functional limits   Following Commands Follows all commands and directions without difficulty   Comments Pt identified by name and . Pt was pleasant, cooperative, and willing to participate in OT evaluation.   Assessment   Assessment Pt is a 69 y.o. male seen for OT evaluation s/p admission to [facility] on  10/18/2024 due to L flank pain. Pt diagnosed with Other hydronephrosis; s/p  left percutaneous nephrostomy tube placement by IR on 10/19. Pt has a significant PMH impacting occupational performance including: Cutaneous skin tags, Diabetes mellitus (HCC), and Hypertension. Pt with active OT evaluation and treatment orders and activity orders. PTA, pt living with his brother in a 2 SH w/ 1 ROSALINDA. Pt reports I w/ ADLs, IADLs, and fxnl mobility w/o AD at baseline; + driving. Pt agreeable and willing to participate in OT evaluation. During evaluation, pt was I for eating and grooming, mod I for UB ADLs and toileting, and S for LB ADLs. Pt was also I for transfers and mod I for fxnl mobility w/o AD. Pt performing ADLs and mobility tasks at/near baseline level of independence and reports having good social support upon return home. No further acute OT needs identified at this time. Recommend continued active ADL participation and mobilization with hospital staff while in the hospital to increase pt’s endurance and strength upon D/C. From OT standpoint, recommend D/C to home with family support when medically cleared. D/C pt from OT caseload at this time.   Goals   Patient Goals to go home   Plan   OT Frequency Eval only   Discharge Recommendation   Rehab Resource Intensity Level, OT No post-acute rehabilitation needs   AM-PAC Daily Activity Inpatient   Lower Body Dressing 3   Bathing 3   Toileting 4   Upper Body Dressing 4   Grooming 4   Eating 4   Daily Activity Raw Score 22   Daily Activity Standardized Score (Calc for Raw Score >=11) 47.1   AM-PAC Applied Cognition Inpatient   Following a Speech/Presentation 4   Understanding Ordinary Conversation 4   Taking Medications 4   Remembering Where Things Are Placed or Put Away 4   Remembering List of 4-5 Errands 4   Taking Care of Complicated Tasks 4   Applied Cognition Raw Score 24   Applied Cognition Standardized Score 62.21       The patient's raw score on the AM-PAC Daily  Activity Inpatient Short Form is 22. A raw score of greater than or equal to 19 suggests the patient may benefit from discharge to home. Please refer to the recommendation of the Occupational Therapist for safe discharge planning.    GABBY Doshi, OTR/L

## 2024-10-20 NOTE — ASSESSMENT & PLAN NOTE
-History of prostate cancer status post radiation in 2019.  Continue to repeat PSAs have been undetectable.  Last PSA in August 0.5.  Repeat this admission 0.3.  -Medical oncology following.

## 2024-10-20 NOTE — PROGRESS NOTES
Progress Note - Urology   Name: Alejandro Adames 69 y.o. male I MRN: [id number]  Unit/Bed#: [facility] 909-01 I Date of Admission: 10/18/2024   Date of Service: 10/20/2024 I Hospital Day: 2     Assessment & Plan  Other hydronephrosis  -In the setting of conglomerate of left retroperitoneal nodes measuring approximately 6.5 x 5 x 13 cm.  These nodes encased the left ureter causing upstream obstruction.  The left psoas muscle was infiltrated by this node mass.  Enlarged nodes anterior to the aortic bifurcation within the right retroperitoneal measures 3 and 1.8 cm.  Enlarged left external iliac chain nodes measuring 2.6 and 2 cm.  Other scattered enlarged retroperitoneal nodes.  -Now status post successful placement of left PCN.  PCNU unable to be placed at this time due to bleeding/clot formation in collecting system.  Findings revealed incomplete obstruction secondary to extrinsic mass effect at the mid ureter some contrast did pass into urinary bladder  -Anticipate PCNU conversion at a later date on outpatient, possible clamping trial and internalization to stent pending patient's course and management of retroperitoneal mass.  -Trending renal function.  2.54 yesterday elevated at 2.73 most likely reactionary to PCN placement.  Essential hypertension  Primary team  Type 2 diabetes mellitus with kidney complication, without long-term current use of insulin ([facility])  Lab Results   Component Value Date    HGBA1C 9.8 (H) 08/28/2024       Recent Labs     10/19/24  1112 10/19/24  1202 10/19/24  1630 10/19/24  2048   POCGLU 218* 221* 218* 299*       Blood Sugar Average: Last 72 hrs:  (P) 245.4  Primary team  Malignant neoplasm of prostate (HCC)  -History of prostate cancer status post radiation in 2019.  Continue to repeat PSAs have been undetectable.  Last PSA in August 0.5.  Repeat this admission 0.3.  -Medical oncology following.  Acute renal failure (ARF) (HCC)  In the setting of hydronephrosis due to retroperitoneal  mass  Continuing to trend renal function.  Anticipate should improve with conservative management now that PCN has been placed for optimal drainage.  Hyponatremia  Per primary  Lung nodule  -Chest CT nothing to suggest malignancy in the chest.  3 mm left lower lobe nodule thought to be of doubtful significance.  Retroperitoneal mass  -CT findings with conglomerate of left retroperitoneal nodes measuring approximately 6.5 x 5 x 13 cm.  These nodes encased the left ureter causing upstream obstruction.  The left psoas muscle was infiltrated by this node mass.  Enlarged nodes anterior to the aortic bifurcation within the right retroperitoneal measures 3 and 1.8 cm.  Enlarged left external iliac chain nodes measuring 2.6 and 2 cm.  Other scattered enlarged retroperitoneal nodes.  Sclerotic lesions measuring 3 mm involving right iliac body and right femoral head.    -Status postbiopsy of left inguinal/external iliac lymph node biopsy  -Medical oncology consulted to follow-up with pathology discussion and results in 2 weeks time.    Urology service will follow.  Patient cleared from urologic standpoint once renal function begins to trend downward.    Subjective   Patient sitting comfortably in bed in no acute distress.  Reporting mild tenderness at site of PCN.  And biopsy site    Objective :  Temp:  [97.5 °F (36.4 °C)-98.8 °F (37.1 °C)] 97.5 °F (36.4 °C)  HR:  [85-91] 87  BP: (131-161)/(71-88) 134/73  Resp:  [14-21] 17  SpO2:  [94 %-97 %] 97 %  O2 Device: None (Room air)    I/O         10/18 0701  10/19 0700 10/19 0701  10/20 0700    I.V. (mL/kg)  1100 (13.9)    Total Intake(mL/kg)  1100 (13.9)    Urine (mL/kg/hr) 365 2125 (1.1)    Total Output 365 2125    Net -365 -1025                Lines/Drains/Airways       Active Status       Name Placement date Placement time Site Days    Nephrostomy Left 10 Fr. 10/19/24  1004  Left  less than 1                  Physical Exam  Constitutional:       General: He is not in acute  distress.     Appearance: He is normal weight. He is not ill-appearing, toxic-appearing or diaphoretic.   HENT:      Head: Normocephalic and atraumatic.      Right Ear: External ear normal.      Left Ear: External ear normal.      Nose: Nose normal.      Mouth/Throat:      Pharynx: Oropharynx is clear.   Eyes:      General: No scleral icterus.     Conjunctiva/sclera: Conjunctivae normal.   Cardiovascular:      Rate and Rhythm: Normal rate and regular rhythm.      Pulses: Normal pulses.      Heart sounds: No murmur heard.     No friction rub. No gallop.   Pulmonary:      Effort: Pulmonary effort is normal. No respiratory distress.      Breath sounds: No wheezing, rhonchi or rales.   Abdominal:      General: Bowel sounds are normal. There is no distension.      Tenderness: There is no abdominal tenderness.   Genitourinary:     Comments: Stent in place draining dileep/pink-tinged colored urine.  Good urinary output  Musculoskeletal:      Cervical back: Normal range of motion.   Skin:     General: Skin is warm and dry.   Neurological:      General: No focal deficit present.      Mental Status: He is alert and oriented to person, place, and time.           Lab Results: I have reviewed the following results:  Recent Labs     10/18/24  1849 10/19/24  0629 10/19/24  0823   WBC 11.63* 10.76*  --    HGB 12.4 10.7*  --    HCT 37.4 33.2*  --    * 419*  --    SODIUM 127* 129*  --    K 4.3 3.8  --    CL 92* 94*  --    CO2 22 25  --    BUN 40* 40*  --    CREATININE 2.43* 2.54*  --    GLUC 276* 240*  --    AST 15  --   --    ALT 23  --   --    ALB 3.9  --   --    TBILI 0.37  --   --    ALKPHOS 142*  --   --    INR  --   --  1.05     CT ABDOMEN AND PELVIS WITH IV CONTRAST     INDICATION: R59.9: Enlarged lymph nodes, unspecified. .  Abnormal enlarged lymph node in left groin.  History of prostate cancer status post radiation therapy.     COMPARISON: None.     TECHNIQUE: CT examination of the abdomen and pelvis was performed.  Multiplanar 2D reformatted images were created from the source data.     This examination, like all CT scans performed in the [facility], was performed utilizing techniques to minimize radiation dose exposure, including the use of iterative reconstruction and automated exposure control. Radiation dose length   product (DLP) for this visit: 636 mGy-cm     IV Contrast: 100 mL of iohexol (OMNIPAQUE)  Enteric Contrast: Not administered.     FINDINGS:     ABDOMEN     LOWER CHEST: 3 mm left lower lobe nodule (2:24)     LIVER/BILIARY TREE: Unremarkable.     GALLBLADDER: No calcified gallstones. No pericholecystic inflammatory change.     SPLEEN: Unremarkable.     PANCREAS: Unremarkable.     ADRENAL GLANDS: Unremarkable.     KIDNEYS/URETERS:     Moderate left hydronephrosis with peripelvic and proximal periureteral inflammatory change.     Rim calcified anterior right mid pole pelvic cyst. Otherwise right kidney/ureter unremarkable.     STOMACH AND BOWEL:  Colonic diverticulosis without findings of acute diverticulitis.  Stomach and small bowel remarkable.     APPENDIX: No findings to suggest appendicitis.     ABDOMINOPELVIC CAVITY:     Conglomerate of left retroperitoneal nodes measuring approximately 6.5 x 5 x 13 cm (2:106)  These nodes encase the left ureter causing upstream obstruction.  The left psoas muscle is infiltrated by this toribio mass.  Enlarged nodes anterior to the aortic bifurcation and within the right retroperitoneum measure 3 and 1.8 cm (2:98).  Enlarged left external iliac chain nodes measure 2.6 and 2 cm (2:130)  Other scattered enlarged retroperitoneal nodes.     No ascites. No pneumoperitoneum.     VESSELS:  There is toribio encasement of the left external iliac artery/vein, without focal vascular abnormality, noting that limited venous opacification limits evaluation for thrombus.     PELVIS     REPRODUCTIVE ORGANS: Unremarkable for patient's age.     URINARY BLADDER: Unremarkable.      ABDOMINAL WALL/INGUINAL REGIONS: Small fat-containing umbilical hernia.     BONES: No acute fracture or suspicious osseous lesion.  4 mm sclerotic focus in the right femoral head (2:158).  3 mm sclerotic focus in the right iliac body (2:115)        IMPRESSION:     Moderate left hydronephrosis with peripelvic/proximal periureteral inflammatory stranding secondary to obstructing large left retroperitoneal toribio conglomerate.  Enlarged left pelvic and other retroperitoneal nodes as described.  Findings consistent with metastasis, noting history of prostate cancer.  No other primary source of malignancy identified.     3 mm left lower lobe nodule.  CT of the chest recommended for full evaluation of the lung fields in setting of metastatic disease.     3 mm sclerotic lesions in the right iliac body and right femoral head may represent bone islands versus sclerotic mets.  Correlation with any prior imaging advised.     Findings were discussed with Terrie STARK at 4:30 p.m. on 10/18/2024       VTE Pharmacologic Prophylaxis: Heparin  VTE Mechanical Prophylaxis: sequential compression device      Yari Harrison PA-C

## 2024-10-20 NOTE — ASSESSMENT & PLAN NOTE
-Chest CT nothing to suggest malignancy in the chest.  3 mm left lower lobe nodule thought to be of doubtful significance.

## 2024-10-20 NOTE — ASSESSMENT & PLAN NOTE
Anemia panel consistent with anemia of chronic disease and folate deficiency  Start folic acid supplement  Monitor

## 2024-10-21 PROBLEM — R35.89 POLYURIA: Status: ACTIVE | Noted: 2024-10-21

## 2024-10-21 LAB
ANION GAP SERPL CALCULATED.3IONS-SCNC: 9 MMOL/L (ref 4–13)
BASOPHILS # BLD AUTO: 0.05 THOUSANDS/ΜL (ref 0–0.1)
BASOPHILS NFR BLD AUTO: 1 % (ref 0–1)
BUN SERPL-MCNC: 38 MG/DL (ref 5–25)
CALCIUM SERPL-MCNC: 8.8 MG/DL (ref 8.4–10.2)
CHLORIDE SERPL-SCNC: 102 MMOL/L (ref 96–108)
CO2 SERPL-SCNC: 26 MMOL/L (ref 21–32)
CREAT SERPL-MCNC: 2.22 MG/DL (ref 0.6–1.3)
EOSINOPHIL # BLD AUTO: 0.16 THOUSAND/ΜL (ref 0–0.61)
EOSINOPHIL NFR BLD AUTO: 2 % (ref 0–6)
ERYTHROCYTE [DISTWIDTH] IN BLOOD BY AUTOMATED COUNT: 13.7 % (ref 11.6–15.1)
GFR SERPL CREATININE-BSD FRML MDRD: 28 ML/MIN/1.73SQ M
GLUCOSE SERPL-MCNC: 169 MG/DL (ref 65–140)
GLUCOSE SERPL-MCNC: 170 MG/DL (ref 65–140)
GLUCOSE SERPL-MCNC: 204 MG/DL (ref 65–140)
GLUCOSE SERPL-MCNC: 216 MG/DL (ref 65–140)
GLUCOSE SERPL-MCNC: 283 MG/DL (ref 65–140)
HCT VFR BLD AUTO: 32.9 % (ref 36.5–49.3)
HGB BLD-MCNC: 10.2 G/DL (ref 12–17)
IMM GRANULOCYTES # BLD AUTO: 0.03 THOUSAND/UL (ref 0–0.2)
IMM GRANULOCYTES NFR BLD AUTO: 0 % (ref 0–2)
LYMPHOCYTES # BLD AUTO: 0.85 THOUSANDS/ΜL (ref 0.6–4.47)
LYMPHOCYTES NFR BLD AUTO: 12 % (ref 14–44)
MAGNESIUM SERPL-MCNC: 2.1 MG/DL (ref 1.9–2.7)
MCH RBC QN AUTO: 29 PG (ref 26.8–34.3)
MCHC RBC AUTO-ENTMCNC: 31 G/DL (ref 31.4–37.4)
MCV RBC AUTO: 94 FL (ref 82–98)
MONOCYTES # BLD AUTO: 0.71 THOUSAND/ΜL (ref 0.17–1.22)
MONOCYTES NFR BLD AUTO: 10 % (ref 4–12)
NEUTROPHILS # BLD AUTO: 5.04 THOUSANDS/ΜL (ref 1.85–7.62)
NEUTS SEG NFR BLD AUTO: 75 % (ref 43–75)
NRBC BLD AUTO-RTO: 0 /100 WBCS
PLATELET # BLD AUTO: 438 THOUSANDS/UL (ref 149–390)
PMV BLD AUTO: 9.2 FL (ref 8.9–12.7)
POTASSIUM SERPL-SCNC: 4.1 MMOL/L (ref 3.5–5.3)
RBC # BLD AUTO: 3.52 MILLION/UL (ref 3.88–5.62)
SCAN RESULT: NORMAL
SODIUM SERPL-SCNC: 137 MMOL/L (ref 135–147)
TSH SERPL DL<=0.05 MIU/L-ACNC: 2.1 UIU/ML (ref 0.45–4.5)
WBC # BLD AUTO: 6.84 THOUSAND/UL (ref 4.31–10.16)

## 2024-10-21 PROCEDURE — 85025 COMPLETE CBC W/AUTO DIFF WBC: CPT | Performed by: INTERNAL MEDICINE

## 2024-10-21 PROCEDURE — 99232 SBSQ HOSP IP/OBS MODERATE 35: CPT | Performed by: INTERNAL MEDICINE

## 2024-10-21 PROCEDURE — 84443 ASSAY THYROID STIM HORMONE: CPT | Performed by: INTERNAL MEDICINE

## 2024-10-21 PROCEDURE — 80048 BASIC METABOLIC PNL TOTAL CA: CPT | Performed by: INTERNAL MEDICINE

## 2024-10-21 PROCEDURE — 83735 ASSAY OF MAGNESIUM: CPT | Performed by: INTERNAL MEDICINE

## 2024-10-21 PROCEDURE — 99232 SBSQ HOSP IP/OBS MODERATE 35: CPT

## 2024-10-21 PROCEDURE — 99232 SBSQ HOSP IP/OBS MODERATE 35: CPT | Performed by: UROLOGY

## 2024-10-21 PROCEDURE — 82948 REAGENT STRIP/BLOOD GLUCOSE: CPT

## 2024-10-21 RX ORDER — INSULIN LISPRO 100 [IU]/ML
5 INJECTION, SOLUTION INTRAVENOUS; SUBCUTANEOUS
Status: DISCONTINUED | OUTPATIENT
Start: 2024-10-21 | End: 2024-10-27 | Stop reason: HOSPADM

## 2024-10-21 RX ORDER — INSULIN GLARGINE 100 [IU]/ML
15 INJECTION, SOLUTION SUBCUTANEOUS
Status: DISCONTINUED | OUTPATIENT
Start: 2024-10-21 | End: 2024-10-25

## 2024-10-21 RX ADMIN — HEPARIN SODIUM 5000 UNITS: 5000 INJECTION INTRAVENOUS; SUBCUTANEOUS at 13:43

## 2024-10-21 RX ADMIN — INSULIN LISPRO 5 UNITS: 100 INJECTION, SOLUTION INTRAVENOUS; SUBCUTANEOUS at 11:26

## 2024-10-21 RX ADMIN — FOLIC ACID 1 MG: 1 TABLET ORAL at 08:30

## 2024-10-21 RX ADMIN — SODIUM CHLORIDE, SODIUM GLUCONATE, SODIUM ACETATE, POTASSIUM CHLORIDE, MAGNESIUM CHLORIDE, SODIUM PHOSPHATE, DIBASIC, AND POTASSIUM PHOSPHATE 100 ML/HR: .53; .5; .37; .037; .03; .012; .00082 INJECTION, SOLUTION INTRAVENOUS at 22:41

## 2024-10-21 RX ADMIN — ACETAMINOPHEN 650 MG: 325 TABLET, FILM COATED ORAL at 12:47

## 2024-10-21 RX ADMIN — ACETAMINOPHEN 650 MG: 325 TABLET, FILM COATED ORAL at 20:50

## 2024-10-21 RX ADMIN — SODIUM CHLORIDE, SODIUM GLUCONATE, SODIUM ACETATE, POTASSIUM CHLORIDE, MAGNESIUM CHLORIDE, SODIUM PHOSPHATE, DIBASIC, AND POTASSIUM PHOSPHATE 100 ML/HR: .53; .5; .37; .037; .03; .012; .00082 INJECTION, SOLUTION INTRAVENOUS at 05:33

## 2024-10-21 RX ADMIN — INSULIN LISPRO 2 UNITS: 100 INJECTION, SOLUTION INTRAVENOUS; SUBCUTANEOUS at 11:26

## 2024-10-21 RX ADMIN — INSULIN LISPRO 1 UNITS: 100 INJECTION, SOLUTION INTRAVENOUS; SUBCUTANEOUS at 17:11

## 2024-10-21 RX ADMIN — POLYETHYLENE GLYCOL 3350 17 G: 17 POWDER, FOR SOLUTION ORAL at 08:30

## 2024-10-21 RX ADMIN — INSULIN LISPRO 3 UNITS: 100 INJECTION, SOLUTION INTRAVENOUS; SUBCUTANEOUS at 08:30

## 2024-10-21 RX ADMIN — HEPARIN SODIUM 5000 UNITS: 5000 INJECTION INTRAVENOUS; SUBCUTANEOUS at 22:34

## 2024-10-21 RX ADMIN — ATORVASTATIN CALCIUM 40 MG: 40 TABLET, FILM COATED ORAL at 08:30

## 2024-10-21 RX ADMIN — OXYCODONE HYDROCHLORIDE 5 MG: 5 TABLET ORAL at 01:03

## 2024-10-21 RX ADMIN — HEPARIN SODIUM 5000 UNITS: 5000 INJECTION INTRAVENOUS; SUBCUTANEOUS at 05:25

## 2024-10-21 RX ADMIN — AMLODIPINE BESYLATE 5 MG: 5 TABLET ORAL at 08:30

## 2024-10-21 RX ADMIN — INSULIN LISPRO 2 UNITS: 100 INJECTION, SOLUTION INTRAVENOUS; SUBCUTANEOUS at 08:30

## 2024-10-21 RX ADMIN — INSULIN LISPRO 5 UNITS: 100 INJECTION, SOLUTION INTRAVENOUS; SUBCUTANEOUS at 17:12

## 2024-10-21 RX ADMIN — INSULIN GLARGINE 15 UNITS: 100 INJECTION, SOLUTION SUBCUTANEOUS at 22:34

## 2024-10-21 RX ADMIN — SENNOSIDES AND DOCUSATE SODIUM 2 TABLET: 50; 8.6 TABLET ORAL at 08:30

## 2024-10-21 RX ADMIN — INSULIN LISPRO 1 UNITS: 100 INJECTION, SOLUTION INTRAVENOUS; SUBCUTANEOUS at 22:34

## 2024-10-21 RX ADMIN — SODIUM CHLORIDE, SODIUM GLUCONATE, SODIUM ACETATE, POTASSIUM CHLORIDE, MAGNESIUM CHLORIDE, SODIUM PHOSPHATE, DIBASIC, AND POTASSIUM PHOSPHATE 100 ML/HR: .53; .5; .37; .037; .03; .012; .00082 INJECTION, SOLUTION INTRAVENOUS at 17:11

## 2024-10-21 NOTE — PROGRESS NOTES
Progress Note - Urology   Name: Alejandro Adames 69 y.o. male I MRN: [id number]  Unit/Bed#: [facility] 909-01 I Date of Admission: 10/18/2024   Date of Service: 10/21/2024 I Hospital Day: 3    Assessment & Plan  Other hydronephrosis  Likely secondary to retroperitoneal mass encasing the left ureter   S/p placement of left PCN 10/19  Anticipate PCNU conversion at a later date on outpatient, possible clamping trial and internalization to stent pending patient's course and management of retroperitoneal mass  Creatinine 2.22, trending down s/p PCN placement  PCN output 4400 mL / 24 hours  Continue to trend labs and monitor PCN output  Malignant neoplasm of prostate (HCC)  History of prostate cancer status post radiation in 2019  PSA 0.3  Medical oncology following.  Acute renal failure (ARF) (HCC)  In the setting of hydronephrosis due to retroperitoneal mass encasing left ureter, s/p left PCN placement 10/19  trend renal function.  Anticipate improvement with conservative management now that PCN has been placed for optimal drainage.  Left PCN output 4400 mL / 24 hours  Urine output 1365 mL / 24-hour  Creat 2.22, trending down  Lung nodule  Chest CT: 3 mm left lower lobe nodule thought to be of doubtful significance.  Retroperitoneal mass  CT findings with conglomerate of left retroperitoneal nodes measuring approximately 6.5 x 5 x 13 cm.  These nodes encased the left ureter causing upstream obstruction.  The left psoas muscle was infiltrated by this node mass.  Enlarged nodes anterior to the aortic bifurcation within the right retroperitoneal measures 3 and 1.8 cm.  Enlarged left external iliac chain nodes measuring 2.6 and 2 cm.  Other scattered enlarged retroperitoneal nodes.  Sclerotic lesions measuring 3 mm involving right iliac body and right femoral head.  Status postbiopsy of left inguinal/external iliac lymph node   Medical oncology following     Ok for discharge from Urology service perspective.  Please contact the  [employer] role for the Urology service with any questions/concerns.    Subjective   Patient with history of prostate cancer status post radiation in 2019.  He underwent outpatient CT scan secondary to left-sided flank pain. CT imaging revealed moderate left hydronephrosis and retroperitoneal mass.    Objective :  Temp:  [98 °F (36.7 °C)-98.7 °F (37.1 °C)] 98.7 °F (37.1 °C)  HR:  [81-92] 92  BP: (152-159)/(82-86) 159/86  Resp:  [16-18] 16  SpO2:  [94 %-98 %] 94 %  O2 Device: None (Room air)    I/O         10/19 0701  10/20 0700 10/20 0701  10/21 0700 10/21 0701  10/22 0700    P.O.  360     I.V. (mL/kg) 1100 (13.9) 2328.3 (29.3)     Other  10     Total Intake(mL/kg) 1100 (13.9) 2698.3 (34)     Urine (mL/kg/hr) 2575 (1.4) 5765 (3) 600 (4.7)    Total Output 2575 5765 600    Net -1475 -3066.7 -600                 Lines/Drains/Airways       Active Status       Name Placement date Placement time Site Days    Nephrostomy Left 10 Fr. 10/19/24  1004  Left  1                  Physical Exam  Constitutional:       General: He is not in acute distress.     Appearance: Normal appearance. He is not ill-appearing, toxic-appearing or diaphoretic.   HENT:      Head: Normocephalic and atraumatic.   Eyes:      General: No scleral icterus.  Cardiovascular:      Rate and Rhythm: Normal rate.   Pulmonary:      Effort: Pulmonary effort is normal. No respiratory distress.   Abdominal:      General: Abdomen is flat. There is no distension.   Genitourinary:     Comments: Left nephrostomy tube draining pink-tinged urine  Musculoskeletal:         General: No swelling.   Skin:     General: Skin is warm and dry.      Coloration: Skin is not jaundiced or pale.      Findings: No rash.   Neurological:      General: No focal deficit present.      Mental Status: He is alert and oriented to person, place, and time.      Gait: Gait normal.   Psychiatric:         Mood and Affect: Mood normal.         Behavior: Behavior normal.         Thought Content:  Thought content normal.         Judgment: Judgment normal.           Lab Results: I have reviewed the following results:  Recent Labs     10/18/24  1849 10/19/24  0629 10/19/24  0823 10/20/24  0518 10/21/24  0604   WBC 11.63*   < >  --  6.43 6.84   HGB 12.4   < >  --  9.9* 10.2*   HCT 37.4   < >  --  32.0* 32.9*   *   < >  --  415* 438*   SODIUM 127*   < >  --  136  --    K 4.3   < >  --  4.3  --    CL 92*   < >  --  101  --    CO2 22   < >  --  25  --    BUN 40*   < >  --  41*  --    CREATININE 2.43*   < >  --  2.73*  --    GLUC 276*   < >  --  120  --    MG  --   --   --  1.8*  --    AST 15  --   --   --   --    ALT 23  --   --   --   --    ALB 3.9  --   --   --   --    TBILI 0.37  --   --   --   --    ALKPHOS 142*  --   --   --   --    INR  --   --  1.05  --   --     < > = values in this interval not displayed.       Imaging Results Review: I reviewed radiology reports from this admission including: CT abdomen/pelvis.  Other Study Results Review: No additional pertinent studies reviewed.    VTE Pharmacologic Prophylaxis: VTE covered by:  heparin (porcine), Subcutaneous, 5,000 Units at 10/21/24 8799

## 2024-10-21 NOTE — PLAN OF CARE
Problem: PAIN - ADULT  Goal: Verbalizes/displays adequate comfort level or baseline comfort level  Description: Interventions:  - Encourage patient to monitor pain and request assistance  - Assess pain using appropriate pain scale  - Administer analgesics based on type and severity of pain and evaluate response  - Implement non-pharmacological measures as appropriate and evaluate response  - Consider cultural and social influences on pain and pain management  - Notify physician/advanced practitioner if interventions unsuccessful or patient reports new pain  Outcome: Progressing     Problem: INFECTION - ADULT  Goal: Absence or prevention of progression during hospitalization  Description: INTERVENTIONS:  - Assess and monitor for signs and symptoms of infection  - Monitor lab/diagnostic results  - Monitor all insertion sites, i.e. indwelling lines, tubes, and drains  - Monitor endotracheal if appropriate and nasal secretions for changes in amount and color  - [place] appropriate cooling/warming therapies per order  - Administer medications as ordered  - Instruct and encourage patient and family to use good hand hygiene technique  - Identify and instruct in appropriate isolation precautions for identified infection/condition  Outcome: Progressing

## 2024-10-21 NOTE — ASSESSMENT & PLAN NOTE
In the setting of hydronephrosis due to retroperitoneal mass encasing left ureter, s/p left PCN placement 10/19  trend renal function.  Anticipate improvement with conservative management now that PCN has been placed for optimal drainage.  Left PCN output 4400 mL / 24 hours  Urine output 1365 mL / 24-hour  Creat 2.22, trending down

## 2024-10-21 NOTE — PROGRESS NOTES
Progress Note -Interventional Radiology NP  Alejandro Adames 69 y.o. male MRN: [id number]  Unit/Bed#: [facility] 909-01 Encounter: [id number]    Assessment/Plan:    Alejandro Adames is a 69y male who presented with approximately 2 weeks of left flank pain and now has been found on imaging to have retroperitoneal and left iliac lymphadenopathy with associated left hydronephrosis secondary to obstruction by a retroperitoneal node conglomerate.  He has newly elevated creatinine consistent with obstructive nephropathy.  There is mild leukocytosis but he does not have clinical signs of an obstructed pyelonephritis.     Urology recommended IR PCN vs PCNU placement.    Patient is s/p left PCN placement on 10/19/24.    Patient has 10 Fr left PCN tube. PCN draining clear dileep urine.    Plan for possible PCN to PCNU conversion in 2-3 weeks outpatient.  Will plan to schedule once patient is discharged.    R/x for flushes sent to [facility]. Patient can  day of discharge.    Continue to flush left PCN daily with 10 ml NSS and record output.    Please contact IR immediately if unable to flush, pain or leaking with flushing, if tube becomes dislodged of if output significantly decreases.    Feel free to reach out to IR with any questions or concerns.      Subjective:  Patient sitting up in bedside chair, awake and alert. Patient has no reports of pain. Reports that he is eating, drinking and urinating well. Tenderness to PCN insertion site with palpation.      Patient Active Problem List   Diagnosis    Herpes simplex type 1 infection    Mixed hyperlipidemia    Essential hypertension    Idiopathic angioedema    Actinic keratosis    Type 2 diabetes mellitus with kidney complication, without long-term current use of insulin (HCC)    Malignant neoplasm of prostate (HCC)    Intrinsic eczema    SK (seborrheic keratosis)    Anemia    COVID-19    Sebaceous cyst mid back    Patellofemoral pain syndrome of both knees    Gait disorder  "   Other hydronephrosis    Acute renal failure (ARF) (HCC)    Hyponatremia    Lung nodule    Retroperitoneal mass    Polyuria          Objective:    Vitals:  /86   Pulse 92   Temp 98.7 °F (37.1 °C)   Resp 16   Ht 5' 10\" (1.778 m)   Wt 79.4 kg (175 lb)   SpO2 94%   BMI 25.11 kg/m²   Body mass index is 25.11 kg/m².  Weight (last 2 days)       None            I/Os:    Intake/Output Summary (Last 24 hours) at 10/21/2024 1347  Last data filed at 10/21/2024 1249  Gross per 24 hour   Intake 430 ml   Output 6465 ml   Net -6035 ml       Invasive Devices       Peripheral Intravenous Line  Duration             Peripheral IV 10/18/24 Distal;Left;Upper;Ventral (anterior) Arm 2 days              Drain  Duration             Nephrostomy Left 10 Fr. 2 days                    Physical Exam:  Physical Exam  HENT:      Head: Normocephalic.      Mouth/Throat:      Mouth: Mucous membranes are moist.   Cardiovascular:      Rate and Rhythm: Normal rate.      Pulses: Normal pulses.   Pulmonary:      Effort: Pulmonary effort is normal.   Abdominal:      Palpations: Abdomen is soft.   Genitourinary:     Comments: Left PCN draining dileep urine  Musculoskeletal:         General: Tenderness present. Normal range of motion.      Cervical back: Normal range of motion.      Comments: Left CVA tenderness w/palpation at tube insertion site.   Skin:     General: Skin is warm and dry.      Capillary Refill: Capillary refill takes less than 2 seconds.   Neurological:      Mental Status: He is alert and oriented to person, place, and time.   Psychiatric:         Mood and Affect: Mood normal.         Behavior: Behavior normal.         Thought Content: Thought content normal.         Judgment: Judgment normal.                      Lab Results and Cultures:   CBC with diff:   Lab Results   Component Value Date    WBC 6.84 10/21/2024    HGB 10.2 (L) 10/21/2024    HCT 32.9 (L) 10/21/2024    MCV 94 10/21/2024     (H) 10/21/2024    RBC 3.52 " "(L) 10/21/2024    MCH 29.0 10/21/2024    MCHC 31.0 (L) 10/21/2024    RDW 13.7 10/21/2024    MPV 9.2 10/21/2024    NRBC 0 10/21/2024      BMP/CMP:  Lab Results   Component Value Date    K 4.1 10/21/2024    K 4.5 08/28/2024     10/21/2024    CL 98 08/28/2024    CO2 26 10/21/2024    CO2 24 08/28/2024    BUN 38 (H) 10/21/2024    BUN 31 (H) 08/28/2024    CREATININE 2.22 (H) 10/21/2024    CALCIUM 8.8 10/21/2024    CALCIUM 9.8 08/28/2024    AST 15 10/18/2024    AST 11 08/28/2024    ALT 23 10/18/2024    ALT 14 08/28/2024    ALKPHOS 142 (H) 10/18/2024    ALKPHOS 99 08/28/2024    EGFR 28 10/21/2024   ,     Coags:   Lab Results   Component Value Date    INR 1.05 10/19/2024   ,   Results from last 7 days   Lab Units 10/19/24  0823   INR  1.05        Lipid Panel: No results found for: \"CHOL\"  Lab Results   Component Value Date    HDL 48 08/28/2024     Lab Results   Component Value Date    HDL 48 08/28/2024     Lab Results   Component Value Date    LDLCALC 88 08/28/2024     Lab Results   Component Value Date    TRIG 88 08/28/2024       HgbA1c:   Lab Results   Component Value Date    HGBA1C 9.8 (H) 08/28/2024    HGBA1C 10.4 (H) 02/16/2024    HGBA1C 8.5 (H) 10/23/2023       Blood Culture: No results found for: \"BLOODCX\",   Urinalysis:   Lab Results   Component Value Date    COLORU Light Yellow 10/18/2024    CLARITYU Clear 10/18/2024    SPECGRAV >=1.050 (H) 10/18/2024    PHUR 5.5 10/18/2024    LEUKOCYTESUR Negative 10/18/2024    NITRITE Negative 10/18/2024    GLUCOSEU 300 (3/10%) (A) 10/18/2024    KETONESU Trace (A) 10/18/2024    BILIRUBINUR Negative 10/18/2024    BLOODU Negative 10/18/2024   ,   Urine Culture: No results found for: \"URINECX\",   Wound Culure:  No results found for: \"WOUNDCULT\"    VTE Pharmacologic Prophylaxis: Heparin      Thank you for allowing me to participate in the care of Alejandro Adames. Please don't hesitate to call, text, email, or TigerText with any questions.     This text is generated with voice " recognition software. There may be translation, syntax,  or grammatical errors. If you have any questions, please contact the dictating provider.

## 2024-10-21 NOTE — ASSESSMENT & PLAN NOTE
Likely secondary to retroperitoneal mass encasing the left ureter   S/p placement of left PCN 10/19  Anticipate PCNU conversion at a later date on outpatient, possible clamping trial and internalization to stent pending patient's course and management of retroperitoneal mass  Creatinine 2.22, trending down s/p PCN placement  PCN output 4400 mL / 24 hours  Continue to trend labs and monitor PCN output

## 2024-10-21 NOTE — ASSESSMENT & PLAN NOTE
CT findings with conglomerate of left retroperitoneal nodes measuring approximately 6.5 x 5 x 13 cm.  These nodes encased the left ureter causing upstream obstruction.  The left psoas muscle was infiltrated by this node mass.  Enlarged nodes anterior to the aortic bifurcation within the right retroperitoneal measures 3 and 1.8 cm.  Enlarged left external iliac chain nodes measuring 2.6 and 2 cm.  Other scattered enlarged retroperitoneal nodes.  Sclerotic lesions measuring 3 mm involving right iliac body and right femoral head.  Status postbiopsy of left inguinal/external iliac lymph node   Medical oncology following

## 2024-10-21 NOTE — PROGRESS NOTES
NEPHROLOGY PROGRESS NOTE    Alejandro Aadmes 69 y.o. male MRN: [id number]  Unit/Bed#: [facility] 909-01 Encounter: [id number]  Reason for Consult: Acute kidney injury and hyponatremia    Patient is stable clinically with no significantly acute changes.  No confusion no respiratory distress.  Excellent output via nephrostomy tube.    ASSESSMENT/PLAN:  1.  Renal    Patient has acute renal failure with baseline creatinine of around 1.2 so it is well-preserved kidney function.  Found to have retroperitoneal mass with left-sided hydronephrosis and underwent nephrostomy tube placement on the left.  Post relief of obstruction yesterday patient put out 4.4 L by the nephrostomy tube alone and is having a postobstructive diuresis this was polyuric with 5.7 L produced totally.  Labs today show resolution of hyponatremia sodium is under 37 mEq/L and creatinine is down to 2.2.    Continue IV fluids to avoid volume depletion  Monitor urine output  BMP a.m.  Nephrostomy tube management per urology and interventional radiology.    2.  Retroperitoneal mass.        Patient with history of prostate cancer per the chart and PSA just done was still low at 0.35.  He underwent left inguinal lymph node biopsy pathology pending.    3.  Polyuria    Patient with over 5 L of urine yesterday so is having a postobstructive diuresis.  Will continue with IV fluids as he is running isotonic fluids at 100 cc/h once output drops we will begin to taper down IV fluids.    SUBJECTIVE:  Review of Systems   Constitutional: Negative for chills, diaphoresis and fever.   HENT: Negative.     Eyes: Negative.    Cardiovascular:  Negative for chest pain, dyspnea on exertion and orthopnea.   Respiratory:  Negative for cough, shortness of breath and sputum production.    Gastrointestinal:  Negative for abdominal pain, diarrhea, nausea and vomiting.   Neurological:  Negative for dizziness and headaches.   Psychiatric/Behavioral:  Negative for altered mental status.   "      OBJECTIVE:  Current Weight: Weight - Scale: 79.4 kg (175 lb)  Vitals:Temp (24hrs), Av.3 °F (36.8 °C), Min:98 °F (36.7 °C), Max:98.7 °F (37.1 °C)  Current: Temperature: 98.7 °F (37.1 °C)   Blood pressure 159/86, pulse 92, temperature 98.7 °F (37.1 °C), resp. rate 16, height 5' 10\" (1.778 m), weight 79.4 kg (175 lb), SpO2 94%. , Body mass index is 25.11 kg/m².      Intake/Output Summary (Last 24 hours) at 10/21/2024 1044  Last data filed at 10/21/2024 0948  Gross per 24 hour   Intake 2758.33 ml   Output 5990 ml   Net -3231.67 ml       Physical Exam: /86   Pulse 92   Temp 98.7 °F (37.1 °C)   Resp 16   Ht 5' 10\" (1.778 m)   Wt 79.4 kg (175 lb)   SpO2 94%   BMI 25.11 kg/m²   Physical Exam  Constitutional:       General: He is not in acute distress.     Appearance: He is not toxic-appearing.   HENT:      Head: Normocephalic and atraumatic.      Nose: Nose normal.      Mouth/Throat:      Mouth: Mucous membranes are moist.   Eyes:      General: No scleral icterus.     Extraocular Movements: Extraocular movements intact.   Cardiovascular:      Rate and Rhythm: Normal rate and regular rhythm.      Heart sounds:      No gallop.   Pulmonary:      Effort: Pulmonary effort is normal. No respiratory distress.      Breath sounds: No wheezing or rales.   Abdominal:      General: Bowel sounds are normal. There is no distension.      Palpations: Abdomen is soft.      Tenderness: There is no abdominal tenderness.   Neurological:      Mental Status: He is alert.         Medications:    Current Facility-Administered Medications:     acetaminophen (TYLENOL) tablet 650 mg, 650 mg, Oral, Q4H PRN, Dirk Guerrero DO    amLODIPine (NORVASC) tablet 5 mg, 5 mg, Oral, Daily, Dirk Guerrero DO, 5 mg at 10/21/24 0830    atorvastatin (LIPITOR) tablet 40 mg, 40 mg, Oral, Daily, Zeyad Trevino DO, 40 mg at 10/21/24 0830    ceFAZolin (ANCEF) IVPB (premix in dextrose) 2,000 mg 50 mL, 2,000 mg, Intravenous, Once, Yovani Cha" MD Minor    fentaNYL (SUBLIMAZE) injection 25 mcg, 25 mcg, Intravenous, Q3 min PRN, Michelle Jensen CRNA    folic acid (FOLVITE) tablet 1 mg, 1 mg, Oral, Daily, Dirk Guerrero DO, 1 mg at 10/21/24 0830    heparin (porcine) subcutaneous injection 5,000 Units, 5,000 Units, Subcutaneous, Q8H CHONG, Dirk Guerrero DO, 5,000 Units at 10/21/24 0525    HYDROmorphone HCl (DILAUDID) injection 0.2 mg, 0.2 mg, Intravenous, Q4H PRN, Zeyad Trevino DO, 0.2 mg at 10/18/24 2242    insulin glargine (LANTUS) subcutaneous injection 10 Units 0.1 mL, 10 Units, Subcutaneous, HS, Zeyad Trevino DO, 10 Units at 10/20/24 2127    insulin lispro (HumALOG/ADMELOG) 100 units/mL subcutaneous injection 1-5 Units, 1-5 Units, Subcutaneous, HS, Zeyad Trevino DO, 3 Units at 10/20/24 2127    insulin lispro (HumALOG/ADMELOG) 100 units/mL subcutaneous injection 1-6 Units, 1-6 Units, Subcutaneous, TID AC, 2 Units at 10/21/24 0830 **AND** Fingerstick Glucose (POCT), , , TID AC, Zeyad Trevino DO    insulin lispro (HumALOG/ADMELOG) 100 units/mL subcutaneous injection 3 Units, 3 Units, Subcutaneous, TID With Meals, Dirk Guerrero DO, 3 Units at 10/21/24 0830    multi-electrolyte (PLASMALYTE-A/ISOLYTE-S PH 7.4) IV solution, 100 mL/hr, Intravenous, Continuous, Nakul Du DO, Last Rate: 100 mL/hr at 10/21/24 0533, 100 mL/hr at 10/21/24 0533    ondansetron (ZOFRAN) injection 4 mg, 4 mg, Intravenous, Q6H PRN, Zeyad Trevino DO    ondansetron (ZOFRAN) injection 4 mg, 4 mg, Intravenous, Once PRN, Michelle Jensen CRNA    oxyCODONE (ROXICODONE) split tablet 2.5 mg, 2.5 mg, Oral, Q4H PRN, 2.5 mg at 10/20/24 0841 **OR** oxyCODONE (ROXICODONE) IR tablet 5 mg, 5 mg, Oral, Q4H PRN, Zeyad Trevino DO, 5 mg at 10/21/24 0103    polyethylene glycol (MIRALAX) packet 17 g, 17 g, Oral, Daily, Dirk Guerrero DO, 17 g at 10/21/24 0830    senna-docusate sodium (SENOKOT S) 8.6-50 mg per tablet 2 tablet, 2 tablet, Oral, BID, Dirk Guerrero DO, 2 tablet at 10/21/24  "0830    Laboratory Results:  Lab Results   Component Value Date    WBC 6.84 10/21/2024    HGB 10.2 (L) 10/21/2024    HCT 32.9 (L) 10/21/2024    MCV 94 10/21/2024     (H) 10/21/2024     Lab Results   Component Value Date    SODIUM 137 10/21/2024    K 4.1 10/21/2024     10/21/2024    CO2 26 10/21/2024    BUN 38 (H) 10/21/2024    CREATININE 2.22 (H) 10/21/2024    GLUC 283 (H) 10/21/2024    CALCIUM 8.8 10/21/2024     Lab Results   Component Value Date    CALCIUM 8.8 10/21/2024     No results found for: \"LABPROT\"    "

## 2024-10-21 NOTE — CASE MANAGEMENT
Case Management Assessment & Discharge Planning Note    Patient name Alejandro Adames  Location [facility] 909/[facility] 909-01 MRN [id number]  : 1955 Date 10/21/2024       Current Admission Date: 10/18/2024  Current Admission Diagnosis:Other hydronephrosis   Patient Active Problem List    Diagnosis Date Noted Date Diagnosed    Retroperitoneal mass 10/19/2024     Other hydronephrosis 10/18/2024     Acute renal failure (ARF) (HCC) 10/18/2024     Hyponatremia 10/18/2024     Lung nodule 10/18/2024     Patellofemoral pain syndrome of both knees 2023     Gait disorder 2023     Sebaceous cyst mid back 10/17/2022     COVID-19 2021     Anemia 2021     Intrinsic eczema 2020     SK (seborrheic keratosis) 2020     Malignant neoplasm of prostate (HCC) 2019     Type 2 diabetes mellitus with kidney complication, without long-term current use of insulin (HCC)      Actinic keratosis 2019     Idiopathic angioedema 2015     Essential hypertension 2012     Mixed hyperlipidemia 2012     Herpes simplex type 1 infection 05/10/2012       LOS (days): 3  Geometric Mean LOS (GMLOS) (days):   Days to GMLOS:     OBJECTIVE:    Risk of Unplanned Readmission Score: 15.96         Current admission status: Inpatient       Preferred Pharmacy:   [id number] - [place] - [address]  [address] [id number]  Phone: [phone] Fax: [phone]    [facility] - [place] - [address]  [address]  [place] [address]  Phone: [phone] Fax: [phone]    Primary Care Provider: Wayne Uriarte Jr, MD    Primary Insurance: MEDICARE  Secondary Insurance: That{img}    ASSESSMENT:  Active Health Care Proxies    There are no active Health Care Proxies on file.                 Readmission Root Cause  30 Day Readmission: No    Patient Information  Admitted from:: Home  Mental Status:  Alert  During Assessment patient was accompanied by: Not accompanied during assessment  Assessment information provided by:: Patient  Primary Caregiver: Self  Support Systems: Self, Family members  County of Residence: [place]  What city do you live in?: [place]  Home entry access options. Select all that apply.: Stairs  Number of steps to enter home.: 2  Type of Current Residence: 2 story home  Upon entering residence, is there a bedroom on the main floor (no further steps)?: No  A bedroom is located on the following floor levels of residence (select all that apply):: 2nd Floor  Upon entering residence, is there a bathroom on the main floor (no further steps)?: No  Indicate which floors of current residence have a bathroom (select all the apply):: 2nd Floor  Number of steps to 2nd floor from main floor: One Flight  Living Arrangements: Lives w/ Family members, Other (Comment) (lives with brother)  Is patient a ?: No    Activities of Daily Living Prior to Admission  Functional Status: Independent  Completes ADLs independently?: Yes  Ambulates independently?: Yes  Does patient use assisted devices?: No  Does patient currently own DME?: No  Does patient have a history of Outpatient Therapy (PT/OT)?: Yes  Does the patient have a history of Short-Term Rehab?: No  Does patient have a history of HHC?: No  Does patient currently have HHC?: No         Patient Information Continued  Income Source: SSI/SSD  Does patient have prescription coverage?: Yes  Does patient receive dialysis treatments?: No  Does patient have a history of substance abuse?: No  Does patient have a history of Mental Health Diagnosis?: No         Means of Transportation  Means of Transport to [facility]:: Drives Self      Social Determinants of Health (SDOH)      Flowsheet Row Most Recent Value   Housing Stability    In the last 12 months, was there a time when you were not able to pay the mortgage or rent on time? N   In the past 12 months, how many  times have you moved where you were living? 0   At any time in the past 12 months, were you homeless or living in a shelter (including now)? N   Transportation Needs    In the past 12 months, has lack of transportation kept you from medical appointments or from getting medications? no   In the past 12 months, has lack of transportation kept you from meetings, work, or from getting things needed for daily living? No   Food Insecurity    Within the past 12 months, you worried that your food would run out before you got the money to buy more. Never true   Within the past 12 months, the food you bought just didn't last and you didn't have money to get more. Never true   Utilities    In the past 12 months has the electric, gas, oil, or water company threatened to shut off services in your home? No            DISCHARGE DETAILS:    Discharge planning discussed with:: Pt  Freedom of Choice: Yes  Comments - Freedom of Choice: Pt agreeable to blanket HHC referrals  CM contacted family/caregiver?: No- see comments (Pt alert and oriented)  Were Treatment Team discharge recommendations reviewed with patient/caregiver?: Yes  Did patient/caregiver verbalize understanding of patient care needs?: Yes  Were patient/caregiver advised of the risks associated with not following Treatment Team discharge recommendations?: Yes         Requested Home Health Care         Is the patient interested in HHC at discharge?: Yes  Home Health Discipline requested:: Nursing  Home Health Follow-Up Provider:: PCP  Home Health Services Needed:: Other (comment) (nephrostomy tubes, will require flushing and teaching)  Homebound Criteria Met:: Immunosuppressed  Supporting Clincal Findings:: Limited Endurance    DME Referral Provided  Referral made for DME?: No    Other Referral/Resources/Interventions Provided:  Interventions: HHC  Referral Comments: CM spoke with pt regarding VNA services on discharge, pt agreeable to blanket referrals         Treatment  Team Recommendation: Home with Home Health Care  Discharge Destination Plan:: Home with Home Health Care               CM spoke with pt to complete assessment and explain CM role.  Pt lives with his brother in a 2 story home, has 2 [address].  Pt's bedroom and bathroom is on 2nd floor.  Pt reports being independent with all ADL's.  Pt has no DME.  Pt reports previous outpatient PT, denies STR or HHC.  Pt denies any mental health or substance use hx.  Pt receives SSD and drives.  Pt agreeable to blanket VNA referrals.  CM will continue to follow for discharge planning needs.

## 2024-10-21 NOTE — PROGRESS NOTES
"   KAYLYN STUDENT  Inpatient Progress Note for TRAINING ONLY  Not Part of Legal Medical Record     Progress Note - Alejandro Adames 69 y.o. male MRN: [id number]  Unit/Bed#: [facility] 909-01 Encounter: [id number]    Assessment:  Other hydronephrosis  Patient initially reported several week history of left flank and hip pain, US of scrotum and groin on 10/7 by outpatient urology showed an enlarged inguinal lymph node. A subsequent CT showed \"Moderate left hydronephrosis with peripelvic/proximal periureteral inflammatory stranding secondary to obstructing large left retroperitoneal toribio conglomerate. Enlarged left pelvic and other retroperitoneal nodes \"   Underwent left IR nephrostomy tube placement on 10/19  Continue pain regimen  Appreciate urology consult  Monitor urine output    Retroperitoneal mass  CT showed \"Conglomerate of left retroperitoneal nodes measuring approximately 6.5 x 5 x 13 cm. Enlarged nodes anterior to the aortic bifurcation and within the right retroperitoneum measure 3 and 1.8 cm. Enlarged left external iliac chain nodes measure 2.6 and 2 cm\"  Biopsy results pending  Appreciate heme/onc consult and recommendation    Essential Hypertension  BP continued elevation   Olmesartan-HCTZ held due to acute renal failure   Cont amlodipine 5mg    Lung nodule   Incidental finding on CT of abdomen and pelvis showed 3 mm left lower lobe nodule, further CT of chest showed \"Nothing to suggest malignancy in the chest. The 3 mm left lower lobe nodule is of doubtful significance.     Acute renal failure   Baseline creatinine 1.2  Likely secondary to hydronephrosis and decreased oral intake  BUN/ creat slightly improved today  Continue to hold olmesartan/HCTZ  S/p left nephrostomy tube placement 10/18  Per nephrology \"Patient with over 5 L of urine yesterday so is having a postobstructive diuresis\"  Continue to monitor CMP  Continue IV fluids    Hyponatremia  Resolved   Continued to monitor CMP    Anemia  Improving " "  Continue folic acid   Continue to monitor CBC     Type 2 Diabetes mellitus with kidney complication  POCglu 216 this AM  Change Lantus 15 u QHS  Change Humalog 5 u and SSI   Hypoglycemia protocol   Diabetic diet    Malignant neoplasm of prostate  Diagnosed in 2019 and underwent biopsy of the prostate and s/p XRT  PSA 0.355  Urology is following    Principal Problem:    Other hydronephrosis  Active Problems:    Essential hypertension    Type 2 diabetes mellitus with kidney complication, without long-term current use of insulin (HCC)    Malignant neoplasm of prostate (HCC)    Anemia    Acute renal failure (ARF) (HCC)    Hyponatremia    Lung nodule    Retroperitoneal mass            VTE Pharmacologic Prophylaxis:   Pharmacologic: Heparin  Mechanical VTE Prophylaxis in Place: Yes    Patient Centered Rounds: I have performed bedside rounds with nursing staff today.      Time Spent for Care: 30 minutes.  More than 50% of total time spent on counseling and coordination of care as described above.    Current Length of Stay: 3 day(s)    Current Patient Status: Inpatient   Certification Statement: The patient will continue to require additional inpatient hospital stay due to pending workup    Discharge Plan:     Code Status: Level 3 - DNAR and DNI    Subjective:   Mr. Adames is a 68 y/o male with a PMH of Type 2 DM, HTN, and prostate cancer.    Overnight he states he slept okay. He had some pack pain which was relived with oxycodone. He has been urinating but staes he has not had a BM in over a week. He denies abdominal pain and states he has been passing gas. Today he reports no fever, chills, headache, dissness, chest pain, palpitations, cough, trouble breathing, hematuria, dysuria, and is in no acute pain. He has been eating and drinking well. He states he is very thirsty and has been peeing \"a ton\". He is ambulating well.      Objective:     Vitals:   Temp (24hrs), Av.3 °F (36.8 °C), Min:98 °F (36.7 °C), Max:98.7 " °F (37.1 °C)    Temp:  [98 °F (36.7 °C)-98.7 °F (37.1 °C)] 98.7 °F (37.1 °C)  HR:  [81-92] 92  Resp:  [16-18] 16  BP: (152-159)/(82-86) 159/86  SpO2:  [94 %-98 %] 94 %  Body mass index is 25.11 kg/m².     Input and Output Summary (last 24 hours):       Intake/Output Summary (Last 24 hours) at 10/21/2024 0908  Last data filed at 10/21/2024 0836  Gross per 24 hour   Intake 2578.33 ml   Output 6040 ml   Net -3461.67 ml       Physical Exam:     Physical Exam  Constitutional:       Appearance: Normal appearance.   HENT:      Head: Normocephalic and atraumatic.      Nose: No congestion.   Eyes:      Conjunctiva/sclera: Conjunctivae normal.   Cardiovascular:      Rate and Rhythm: Normal rate and regular rhythm.   Pulmonary:      Effort: Pulmonary effort is normal.      Breath sounds: Normal breath sounds. No wheezing, rhonchi or rales.   Abdominal:      General: Bowel sounds are normal. There is no distension.      Palpations: Abdomen is soft.      Tenderness: There is no abdominal tenderness.   Musculoskeletal:      Right lower leg: No edema.      Left lower leg: No edema.   Skin:     General: Skin is warm and dry.   Neurological:      Mental Status: He is alert and oriented to person, place, and time.         Historical Information   Past Medical History:   Diagnosis Date    Cutaneous skin tags 1/2/2019    Diabetes mellitus (HCC)     Hypertension      History reviewed. No pertinent surgical history.  Social History   Social History     Substance and Sexual Activity   Alcohol Use Yes    Alcohol/week: 2.0 - 3.0 standard drinks of alcohol    Types: 2 - 3 Cans of beer per week    Comment: social     Social History     Substance and Sexual Activity   Drug Use No     Social History     Tobacco Use   Smoking Status Former   Smokeless Tobacco Never     Family History: Family history non-contributory    Meds/Allergies   all medications and allergies reviewed  Allergies   Allergen Reactions    Captopril Cough    Crestor  [Rosuvastatin] Diarrhea    Enalapril Cough       Additional Data:     Labs:    Results from last 7 days   Lab Units 10/21/24  0604   WBC Thousand/uL 6.84   HEMOGLOBIN g/dL 10.2*   HEMATOCRIT % 32.9*   PLATELETS Thousands/uL 438*   SEGS PCT % 75   LYMPHO PCT % 12*   MONO PCT % 10   EOS PCT % 2     Results from last 7 days   Lab Units 10/20/24  0518 10/19/24  0629 10/18/24  1849   SODIUM mmol/L 136   < > 127*   POTASSIUM mmol/L 4.3   < > 4.3   CHLORIDE mmol/L 101   < > 92*   CO2 mmol/L 25   < > 22   BUN mg/dL 41*   < > 40*   CREATININE mg/dL 2.73*   < > 2.43*   ANION GAP mmol/L 10   < > 13   CALCIUM mg/dL 8.6   < > 9.7   ALBUMIN g/dL  --   --  3.9   TOTAL BILIRUBIN mg/dL  --   --  0.37   ALK PHOS U/L  --   --  142*   ALT U/L  --   --  23   AST U/L  --   --  15   GLUCOSE RANDOM mg/dL 120   < > 276*    < > = values in this interval not displayed.     Results from last 7 days   Lab Units 10/19/24  0823   INR  1.05     Results from last 7 days   Lab Units 10/21/24  0826 10/20/24  2113 10/20/24  1559 10/20/24  1116 10/20/24  0731 10/19/24  2048 10/19/24  1630 10/19/24  1202 10/19/24  1112 10/18/24  2138   POC GLUCOSE mg/dl 216* 311* 229* 279* 135 299* 218* 221* 218* 271*                 * I Have Reviewed All Lab Data Listed Above.  * Additional Pertinent Lab Tests Reviewed: All Labs Within Last 24 Hours Reviewed    Imaging:      Recent Cultures (last 7 days):           Last 24 Hours Medication List:   Current Facility-Administered Medications   Medication Dose Route Frequency Provider Last Rate    acetaminophen  650 mg Oral Q4H PRN Dirk Guerrero DO      amLODIPine  5 mg Oral Daily Dirk Guerrero DO      atorvastatin  40 mg Oral Daily Zeyad Trevino DO      cefazolin  2,000 mg Intravenous Once Yovani Gaxiola MD      fentaNYL  25 mcg Intravenous Q3 min PRN Michelle Jensen CRNA      folic acid  1 mg Oral Daily Dirk Guerrero DO      heparin (porcine)  5,000 Units Subcutaneous Q8H [facility] Dirk Guerrero DO       HYDROmorphone  0.2 mg Intravenous Q4H PRN Zeyad Trevino, DO      insulin glargine  10 Units Subcutaneous HS Zeyad Trevino, DO      insulin lispro  1-5 Units Subcutaneous HS Zeyad Trevino, DO      insulin lispro  1-6 Units Subcutaneous TID AC Zeyad Trevino, DO      insulin lispro  3 Units Subcutaneous TID With Meals Dirk Guerrero DO      multi-electrolyte  100 mL/hr Intravenous Continuous Nakul Du,  mL/hr (10/21/24 3883)    ondansetron  4 mg Intravenous Q6H PRN Zeyad Trevino, DO      ondansetron  4 mg Intravenous Once PRN Michelle Jensen CRNA      oxyCODONE  2.5 mg Oral Q4H PRN Zeyad Trevino DO      Or    oxyCODONE  5 mg Oral Q4H PRN Zeyad Trevino, DO      polyethylene glycol  17 g Oral Daily Dirk Guerrero,       senna-docusate sodium  2 tablet Oral BID Dirk Guerrero DO          Today, Patient Was Seen By: Caity Davenport    ** Please Note: Dictation voice to text software may have been used in the creation of this document. **

## 2024-10-22 LAB
ANION GAP SERPL CALCULATED.3IONS-SCNC: 9 MMOL/L (ref 4–13)
BUN SERPL-MCNC: 35 MG/DL (ref 5–25)
CALCIUM SERPL-MCNC: 8.3 MG/DL (ref 8.4–10.2)
CHLORIDE SERPL-SCNC: 104 MMOL/L (ref 96–108)
CO2 SERPL-SCNC: 25 MMOL/L (ref 21–32)
CREAT SERPL-MCNC: 1.78 MG/DL (ref 0.6–1.3)
GFR SERPL CREATININE-BSD FRML MDRD: 37 ML/MIN/1.73SQ M
GLUCOSE SERPL-MCNC: 124 MG/DL (ref 65–140)
GLUCOSE SERPL-MCNC: 159 MG/DL (ref 65–140)
GLUCOSE SERPL-MCNC: 224 MG/DL (ref 65–140)
GLUCOSE SERPL-MCNC: 92 MG/DL (ref 65–140)
GLUCOSE SERPL-MCNC: 95 MG/DL (ref 65–140)
POTASSIUM SERPL-SCNC: 3.8 MMOL/L (ref 3.5–5.3)
SODIUM SERPL-SCNC: 138 MMOL/L (ref 135–147)

## 2024-10-22 PROCEDURE — 99232 SBSQ HOSP IP/OBS MODERATE 35: CPT | Performed by: INTERNAL MEDICINE

## 2024-10-22 PROCEDURE — 82948 REAGENT STRIP/BLOOD GLUCOSE: CPT

## 2024-10-22 PROCEDURE — 80048 BASIC METABOLIC PNL TOTAL CA: CPT | Performed by: INTERNAL MEDICINE

## 2024-10-22 RX ADMIN — INSULIN LISPRO 5 UNITS: 100 INJECTION, SOLUTION INTRAVENOUS; SUBCUTANEOUS at 11:51

## 2024-10-22 RX ADMIN — AMLODIPINE BESYLATE 5 MG: 5 TABLET ORAL at 08:36

## 2024-10-22 RX ADMIN — FOLIC ACID 1 MG: 1 TABLET ORAL at 08:39

## 2024-10-22 RX ADMIN — INSULIN LISPRO 2 UNITS: 100 INJECTION, SOLUTION INTRAVENOUS; SUBCUTANEOUS at 11:52

## 2024-10-22 RX ADMIN — HEPARIN SODIUM 5000 UNITS: 5000 INJECTION INTRAVENOUS; SUBCUTANEOUS at 06:13

## 2024-10-22 RX ADMIN — ATORVASTATIN CALCIUM 40 MG: 40 TABLET, FILM COATED ORAL at 08:36

## 2024-10-22 RX ADMIN — SODIUM CHLORIDE, SODIUM GLUCONATE, SODIUM ACETATE, POTASSIUM CHLORIDE, MAGNESIUM CHLORIDE, SODIUM PHOSPHATE, DIBASIC, AND POTASSIUM PHOSPHATE 75 ML/HR: .53; .5; .37; .037; .03; .012; .00082 INJECTION, SOLUTION INTRAVENOUS at 08:37

## 2024-10-22 RX ADMIN — INSULIN GLARGINE 15 UNITS: 100 INJECTION, SOLUTION SUBCUTANEOUS at 22:27

## 2024-10-22 RX ADMIN — INSULIN LISPRO 5 UNITS: 100 INJECTION, SOLUTION INTRAVENOUS; SUBCUTANEOUS at 17:26

## 2024-10-22 RX ADMIN — HEPARIN SODIUM 5000 UNITS: 5000 INJECTION INTRAVENOUS; SUBCUTANEOUS at 14:13

## 2024-10-22 RX ADMIN — ACETAMINOPHEN 650 MG: 325 TABLET, FILM COATED ORAL at 14:19

## 2024-10-22 RX ADMIN — INSULIN LISPRO 5 UNITS: 100 INJECTION, SOLUTION INTRAVENOUS; SUBCUTANEOUS at 08:40

## 2024-10-22 RX ADMIN — INSULIN LISPRO 1 UNITS: 100 INJECTION, SOLUTION INTRAVENOUS; SUBCUTANEOUS at 22:26

## 2024-10-22 RX ADMIN — SENNOSIDES AND DOCUSATE SODIUM 2 TABLET: 50; 8.6 TABLET ORAL at 08:35

## 2024-10-22 RX ADMIN — ACETAMINOPHEN 650 MG: 325 TABLET, FILM COATED ORAL at 03:18

## 2024-10-22 RX ADMIN — SODIUM CHLORIDE, SODIUM GLUCONATE, SODIUM ACETATE, POTASSIUM CHLORIDE, MAGNESIUM CHLORIDE, SODIUM PHOSPHATE, DIBASIC, AND POTASSIUM PHOSPHATE 75 ML/HR: .53; .5; .37; .037; .03; .012; .00082 INJECTION, SOLUTION INTRAVENOUS at 22:25

## 2024-10-22 RX ADMIN — HEPARIN SODIUM 5000 UNITS: 5000 INJECTION INTRAVENOUS; SUBCUTANEOUS at 22:27

## 2024-10-22 NOTE — ASSESSMENT & PLAN NOTE
Current Rx: Amlodipine 5 mg daily  Blood pressure is currently acceptable  Continue amlodipine at current dose

## 2024-10-22 NOTE — ASSESSMENT & PLAN NOTE
Etiology thought to be multifactorial due to component of obstructive uropathy +/- contrast associated nephropathy +/- previous NSAID use  Creatinine on admission 2.43 on 10/18/2024  Peak creatinine 2.73 on 10/20/2024  Creatinine today improved to 1.78  Currently polyuric with urine output of close to 6000 cc  Decrease IV fluids to 75 cc/h  Trend BMP daily

## 2024-10-22 NOTE — ASSESSMENT & PLAN NOTE
Anemia panel consistent with anemia of chronic disease and folate deficiency  Continue folic acid supplementation  Monitor hemoglobin

## 2024-10-22 NOTE — ASSESSMENT & PLAN NOTE
3 mm left lower lobe lung nodule noted on visible portion of lung on CT abdomen/pelvis  Chest CT scan, negative for malignancy, per radiology, the 3 mm left lower lobe nodule is of doubtful significance.

## 2024-10-22 NOTE — PLAN OF CARE
Problem: PAIN - ADULT  Goal: Verbalizes/displays adequate comfort level or baseline comfort level  Description: Interventions:  - Encourage patient to monitor pain and request assistance  - Assess pain using appropriate pain scale  - Administer analgesics based on type and severity of pain and evaluate response  - Implement non-pharmacological measures as appropriate and evaluate response  - Consider cultural and social influences on pain and pain management  - Notify physician/advanced practitioner if interventions unsuccessful or patient reports new pain  Outcome: Progressing     Problem: INFECTION - ADULT  Goal: Absence or prevention of progression during hospitalization  Description: INTERVENTIONS:  - Assess and monitor for signs and symptoms of infection  - Monitor lab/diagnostic results  - Monitor all insertion sites, i.e. indwelling lines, tubes, and drains  - Monitor endotracheal if appropriate and nasal secretions for changes in amount and color  - [place] appropriate cooling/warming therapies per order  - Administer medications as ordered  - Instruct and encourage patient and family to use good hand hygiene technique  - Identify and instruct in appropriate isolation precautions for identified infection/condition  Outcome: Progressing     Problem: SAFETY ADULT  Goal: Maintain or return to baseline ADL function  Description: INTERVENTIONS:  -  Assess patient's ability to carry out ADLs; assess patient's baseline for ADL function and identify physical deficits which impact ability to perform ADLs (bathing, care of mouth/teeth, toileting, grooming, dressing, etc.)  - Assess/evaluate cause of self-care deficits   - Assess range of motion  - Assess patient's mobility; develop plan if impaired  - Assess patient's need for assistive devices and provide as appropriate  - Encourage maximum independence but intervene and supervise when necessary  - Involve family in performance of ADLs  - Assess for home care  needs following discharge   - Consider OT consult to assist with ADL evaluation and planning for discharge  - Provide patient education as appropriate  Outcome: Progressing

## 2024-10-22 NOTE — ASSESSMENT & PLAN NOTE
Baseline creatinine 1.2  CT showed lymphadenopathy partially obstructing L ureter  Likely secondary to hydronephrosis and decreased oral intake  Status post left nephrostomy placement by IR 10/19  Receiving IV fluids with improving kidney function  Discussed with nephrology  Avoid nephrotoxins  Nephrology following       Major bleeding event  []   [x]       Thromboembolic event  Takes warfarin in AM  Duration of warfarin Therapy: indefinitely    INR Range:  2.0-3.0  Cautious when holding warfarin--INR drops quickly     Patient is generally rarely in range. He fluctuates on his dose due to diet inconsistencies and alcohol. He self- doses and adjusts based on what he has been drinking. Non-compliant with apts.    Was due back 9/18---> 10/23---> 12/21---> 2/15---> 3/21 --> 5/9    INR is 1.6 today after patient missed over the weekend d/t drinking.  Counseled patient on the difficulty of self adjusting doses and how it can impact consistency in INR   Take 7.5mg tonight continue dose of 3.75 mg on Tues, Thurs and Sat and 7.5 mg all other days of the week    Encouraged to maintain a consistency of vegetables/salads.  Recheck INR in 3 weeks, 7/18  Does not need pw printed or apt card, [facility]     Consent form signed on 1/4/24    Referring is Dr. Windy Livingston  INR (no units)   Date Value   06/30/2023 2.01 (H)   04/15/2021 4.43 (H)   09/20/2018 2.1   08/17/2018 1.9     INR,(POC) (no units)   Date Value   05/30/2024 2.4   04/25/2024 1.6   04/11/2024 4.2   03/28/2024 3.4     For Pharmacy Admin Tracking Only    Intervention Detail: Dose Adjustment: 1, reason: Therapy Optimization  Total # of Interventions Recommended: 1  Total # of Interventions Accepted: 1  Time Spent (min): 15

## 2024-10-22 NOTE — CASE MANAGEMENT
Case Management Discharge Planning Note    Patient name Alejandro Adames  Location [facility] 909/[facility] 909-01 MRN [id number]  : 1955 Date 10/22/2024       Current Admission Date: 10/18/2024  Current Admission Diagnosis:Other hydronephrosis   Patient Active Problem List    Diagnosis Date Noted Date Diagnosed    Polyuria 10/21/2024     Retroperitoneal mass 10/19/2024     Other hydronephrosis 10/18/2024     Acute renal failure (ARF) (HCC) 10/18/2024     Hyponatremia 10/18/2024     Lung nodule 10/18/2024     Patellofemoral pain syndrome of both knees 2023     Gait disorder 2023     Sebaceous cyst mid back 10/17/2022     COVID-19 2021     Anemia 2021     Intrinsic eczema 2020     SK (seborrheic keratosis) 2020     Malignant neoplasm of prostate (HCC) 2019     Type 2 diabetes mellitus with kidney complication, without long-term current use of insulin (HCC)      Actinic keratosis 2019     Idiopathic angioedema 2015     Essential hypertension 2012     Mixed hyperlipidemia 2012     Herpes simplex type 1 infection 05/10/2012       LOS (days): 4  Geometric Mean LOS (GMLOS) (days): 2.5  Days to GMLOS:-1.2     OBJECTIVE:  Risk of Unplanned Readmission Score: 18.25         Current admission status: Inpatient   Preferred Pharmacy:   [id number] - [place] - [address]  [address] [address]  Phone: [phone] Fax: [phone]    [facility] - [place], [place] - [address]  [address]  [place] [address]  Phone: [phone] Fax: [phone]    Primary Care Provider: Wayne Uriarte Jr, MD    Primary Insurance: MEDICARE  Secondary Insurance: [facility]    DISCHARGE DETAILS:    Discharge planning discussed with:: Pt  Freedom of Choice: Yes  Comments - Freedom of Choice: Pt chose [facility] for [facility]  CM contacted family/caregiver?: No- see comments  Were Treatment Team  discharge recommendations reviewed with patient/caregiver?: Yes  Did patient/caregiver verbalize understanding of patient care needs?: Yes  Were patient/caregiver advised of the risks associated with not following Treatment Team discharge recommendations?: Yes         Requested Home Health Care         Is the patient interested in HHC at discharge?: Yes  Home Health Discipline requested:: Nursing  Home Health Agency Name:: [facility]  A External Referral Reason (only applicable if external HHA name selected): Patient has established relationship with provider  Home Health Follow-Up Provider:: PCP  Home Health Services Needed:: Other (comment) (Nephrostomy tubes, will require flushing and teaching)  Homebound Criteria Met:: Immunosuppressed  Supporting Clincal Findings:: Limited Endurance    DME Referral Provided  Referral made for DME?: No    Other Referral/Resources/Interventions Provided:  Interventions: HHC  Referral Comments: Pt chose [facility] for HHC         Treatment Team Recommendation: Home with Home Health Care  Discharge Destination Plan:: Home with Home Health Care                                IMM Given (Date):: 10/22/24  IMM Given to:: Patient                            IMM reviewed with patient, patient agrees with discharge determination.

## 2024-10-22 NOTE — PROGRESS NOTES
NEPHROLOGY HOSPITAL PROGRESS NOTE   Alejandro Adames 69 y.o. male MRN: [id number]  Unit/Bed#: [facility] 909-01 Encounter: [id number]  Reason for Consult: JULIO  Assessment & Plan  Acute renal failure (ARF) (HCC)  Etiology thought to be multifactorial due to component of obstructive uropathy +/- contrast associated nephropathy +/- previous NSAID use  Creatinine on admission 2.43 on 10/18/2024  Peak creatinine 2.73 on 10/20/2024  Creatinine today improved to 1.78  Currently polyuric with urine output of close to 6000 cc  Decrease IV fluids to 75 cc/h  Trend BMP daily  Hyponatremia  This is currently resolved  Other hydronephrosis  Left PCN placement given obstructive retroperitoneal mass management per  Retroperitoneal mass  Biopsy results are currently pending  Management per oncology  Malignant neoplasm of prostate (HCC)  Noted enlarged left pelvic and in the retroperitoneum nodes consistent with likely metastatic disease with history of prostate cancer  Essential hypertension  Current Rx: Amlodipine 5 mg daily  Blood pressure is currently acceptable  Continue amlodipine at current dose  Type 2 diabetes mellitus with kidney complication, without long-term current use of insulin (HCC)  Management per primary team  Polyuria  Most likely due to postobstructive diuresis, management as above    SUBJECTIVE / 24H INTERVAL HISTORY:  Urine output recorded as 5775 cc.  Denies dyspnea.  Denies abdominal pain.  Nephrostomy tube in place.    OBJECTIVE:  Current Weight: Weight - Scale: 79.4 kg (175 lb)  Vitals:    10/20/24 2116 10/21/24 0829 10/21/24 1512 10/21/24 2356   BP: 154/82 159/86 152/79 139/74   Pulse: 84 92 79 79   Resp: 16 16 18 18   Temp: 98 °F (36.7 °C) 98.7 °F (37.1 °C) 97.9 °F (36.6 °C) 98.1 °F (36.7 °C)   TempSrc:       SpO2: 98% 94% 97% 97%   Weight:       Height:           Intake/Output Summary (Last 24 hours) at 10/22/2024 0732  Last data filed at 10/22/2024 0620  Gross per 24 hour   Intake 3489 ml   Output 5775 ml    Net -2286 ml     Review of Systems   Constitutional:  Negative for chills and fever.   HENT:  Negative for ear pain and sore throat.    Eyes:  Negative for pain and visual disturbance.   Respiratory:  Negative for cough and shortness of breath.    Cardiovascular:  Negative for chest pain and palpitations.   Gastrointestinal:  Negative for abdominal pain and vomiting.   Genitourinary:  Negative for dysuria and hematuria.   Musculoskeletal:  Negative for arthralgias and back pain.   Skin:  Negative for color change and rash.   Neurological:  Negative for seizures and syncope.   All other systems reviewed and are negative.    Physical Exam  Vitals and nursing note reviewed.   Constitutional:       General: He is not in acute distress.     Appearance: He is well-developed.   HENT:      Head: Normocephalic and atraumatic.   Eyes:      Conjunctiva/sclera: Conjunctivae normal.   Cardiovascular:      Rate and Rhythm: Normal rate and regular rhythm.      Pulses: Normal pulses.      Heart sounds: Normal heart sounds. No murmur heard.  Pulmonary:      Effort: Pulmonary effort is normal. No respiratory distress.      Breath sounds: Normal breath sounds.   Abdominal:      Palpations: Abdomen is soft.      Tenderness: There is no abdominal tenderness.   Genitourinary:     Comments: Left-sided PCN in place  Musculoskeletal:         General: No swelling.      Cervical back: Neck supple.      Right lower leg: No edema.      Left lower leg: No edema.   Skin:     General: Skin is warm and dry.      Capillary Refill: Capillary refill takes less than 2 seconds.   Neurological:      Mental Status: He is alert.   Psychiatric:         Mood and Affect: Mood normal.       Medications:    Current Facility-Administered Medications:     acetaminophen (TYLENOL) tablet 650 mg, 650 mg, Oral, Q4H PRN, Dirk Guerrero DO, 650 mg at 10/22/24 0318    amLODIPine (NORVASC) tablet 5 mg, 5 mg, Oral, Daily, Dirk Guerrero DO, 5 mg at 10/21/24 4467     atorvastatin (LIPITOR) tablet 40 mg, 40 mg, Oral, Daily, Zeyad Trevino DO, 40 mg at 10/21/24 0830    ceFAZolin (ANCEF) IVPB (premix in dextrose) 2,000 mg 50 mL, 2,000 mg, Intravenous, Once, Yovani Gaxiola MD    folic acid (FOLVITE) tablet 1 mg, 1 mg, Oral, Daily, Dirk Guerrero DO, 1 mg at 10/21/24 0830    heparin (porcine) subcutaneous injection 5,000 Units, 5,000 Units, Subcutaneous, Q8H CHONG, Dirk Guerrero DO, 5,000 Units at 10/22/24 0613    HYDROmorphone HCl (DILAUDID) injection 0.2 mg, 0.2 mg, Intravenous, Q4H PRN, Zeyad Trevino DO, 0.2 mg at 10/18/24 2242    insulin glargine (LANTUS) subcutaneous injection 15 Units 0.15 mL, 15 Units, Subcutaneous, HS, Dirk Guerrero DO, 15 Units at 10/21/24 2234    insulin lispro (HumALOG/ADMELOG) 100 units/mL subcutaneous injection 1-5 Units, 1-5 Units, Subcutaneous, HS, Zeyad Trevino DO, 1 Units at 10/21/24 2234    insulin lispro (HumALOG/ADMELOG) 100 units/mL subcutaneous injection 1-6 Units, 1-6 Units, Subcutaneous, TID AC, 1 Units at 10/21/24 1711 **AND** Fingerstick Glucose (POCT), , , TID AC, Zeyad Trevino DO    insulin lispro (HumALOG/ADMELOG) 100 units/mL subcutaneous injection 5 Units, 5 Units, Subcutaneous, TID With Meals, Dirk Guerrero DO, 5 Units at 10/21/24 1712    multi-electrolyte (PLASMALYTE-A/ISOLYTE-S PH 7.4) IV solution, 100 mL/hr, Intravenous, Continuous, Nakul Du DO, Last Rate: 100 mL/hr at 10/21/24 2241, 100 mL/hr at 10/21/24 2241    ondansetron (ZOFRAN) injection 4 mg, 4 mg, Intravenous, Q6H PRN, Zeyad Trevino DO    ondansetron (ZOFRAN) injection 4 mg, 4 mg, Intravenous, Once PRN, Michelle Jensen CRNA    oxyCODONE (ROXICODONE) split tablet 2.5 mg, 2.5 mg, Oral, Q4H PRN, 2.5 mg at 10/20/24 0841 **OR** oxyCODONE (ROXICODONE) IR tablet 5 mg, 5 mg, Oral, Q4H PRN, Zeyad Trevino DO, 5 mg at 10/21/24 0103    polyethylene glycol (MIRALAX) packet 17 g, 17 g, Oral, Daily, Dirk Guerrero DO, 17 g at 10/21/24 0830    senna-docusate  "sodium (SENOKOT S) 8.6-50 mg per tablet 2 tablet, 2 tablet, Oral, BID, Dirk Guerrero, , 2 tablet at 10/21/24 0830    Laboratory Results:  Results from last 7 days   Lab Units 10/22/24  0534 10/21/24  0947 10/21/24  0604 10/20/24  0518 10/19/24  0629 10/18/24  1849   WBC Thousand/uL  --   --  6.84 6.43 10.76* 11.63*   HEMOGLOBIN g/dL  --   --  10.2* 9.9* 10.7* 12.4   HEMATOCRIT %  --   --  32.9* 32.0* 33.2* 37.4   PLATELETS Thousands/uL  --   --  438* 415* 419* 603*   POTASSIUM mmol/L 3.8 4.1  --  4.3 3.8 4.3   CHLORIDE mmol/L 104 102  --  101 94* 92*   CO2 mmol/L 25 26  --  25 25 22   BUN mg/dL 35* 38*  --  41* 40* 40*   CREATININE mg/dL 1.78* 2.22*  --  2.73* 2.54* 2.43*   CALCIUM mg/dL 8.3* 8.8  --  8.6 9.1 9.7   MAGNESIUM mg/dL  --  2.1  --  1.8*  --   --        Portions of the record may have been created with voice recognition software. Occasional wrong word or \"sound a like\" substitutions may have occurred due to the inherent limitations of voice recognition software. Read the chart carefully and recognize, using context, where substitutions have occurred.If you have any questions, please contact the dictating provider.    "

## 2024-10-22 NOTE — PLAN OF CARE
Problem: PAIN - ADULT  Goal: Verbalizes/displays adequate comfort level or baseline comfort level  Description: Interventions:  - Encourage patient to monitor pain and request assistance  - Assess pain using appropriate pain scale  - Administer analgesics based on type and severity of pain and evaluate response  - Implement non-pharmacological measures as appropriate and evaluate response  - Consider cultural and social influences on pain and pain management  - Notify physician/advanced practitioner if interventions unsuccessful or patient reports new pain  Outcome: Progressing     Problem: INFECTION - ADULT  Goal: Absence or prevention of progression during hospitalization  Description: INTERVENTIONS:  - Assess and monitor for signs and symptoms of infection  - Monitor lab/diagnostic results  - Monitor all insertion sites, i.e. indwelling lines, tubes, and drains  - Monitor endotracheal if appropriate and nasal secretions for changes in amount and color  - [place] appropriate cooling/warming therapies per order  - Administer medications as ordered  - Instruct and encourage patient and family to use good hand hygiene technique  - Identify and instruct in appropriate isolation precautions for identified infection/condition  Outcome: Progressing

## 2024-10-22 NOTE — ASSESSMENT & PLAN NOTE
Pressures reviewed, systolic hypertension noted  Olmesartan HCTZ presently on hold  Continue amlodipine 5 mg p.o. daily  Monitor blood pressures  Avoid hypotension

## 2024-10-22 NOTE — ASSESSMENT & PLAN NOTE
Lab Results   Component Value Date    HGBA1C 9.8 (H) 08/28/2024       Recent Labs     10/21/24  1646 10/21/24  2112 10/22/24  0744 10/22/24  1117   POCGLU 169* 170* 95 224*       Blood Sugar Average: Last 72 hrs:  (P) [id number]  Uncontrolled diabetes mellitus type 2 A1c 9.8  Accu-Cheks reviewed  Presently on insulin therapy  Discussed insulin therapy at discharge patient is reluctant.  He would like to continue with M[facility]  Outpatient endocrinology follow up recommended he is agreeable.  Contact information placed in discharge instructions  Monitor Accu-Cheks  Avoid hypoglycemia

## 2024-10-22 NOTE — PROGRESS NOTES
Progress Note - Hospitalist   Name: Alejandro Adames 69 y.o. male I MRN: [id number]  [facility] I Date of Admission: 10/18/2024   Date of Service: 10/22/2024 I Hospital Day: 4    Assessment & Plan  Other hydronephrosis  The patient started having left flank pain 1.5 weeks ago. He saw his urologist outpatient  for left flank pain and a CT was done which shows left hydronephrosis, retroperitoneal lymphadenopathy, and new sclerotic lesions concerning for metastatic disease. The patient was instructed by urologist to come to ED to be admitted for possible L ureter stent placement.  Status post left PCN placement 10/19/2024 per IR  Urology inputs noted  Supportive cares  Retroperitoneal mass  CT scan with peripelvic/proximal periureteral inflammatory stranding secondary to obstructing large left retroperitoneal toribio conglomerate. Enlarged left pelvic and other retroperitoneal nodes   Findings consistent with metastasis. 3 mm sclerotic lesions in the right iliac body and right femoral head may represent bone islands versus sclerotic mets.       Patient with underlying history of prostate cancer  IR consulted for biopsy, status post left inguinal lymph node biopsy on 10/19  Follow-up on biopsy report  Urology is following  Follow on medical oncology recommendation  Acute renal failure (ARF) (HCC)  Baseline creatinine 1.2  CT showed lymphadenopathy partially obstructing L ureter  Likely secondary to hydronephrosis and decreased oral intake  Status post left nephrostomy placement by IR 10/19  Receiving IV fluids with improving kidney function  Discussed with nephrology  Avoid nephrotoxins  Nephrology following    Hyponatremia  POA  Suspect multifactorial  Resolved  Anemia  Anemia panel consistent with anemia of chronic disease and folate deficiency  Continue folic acid supplementation  Monitor hemoglobin    Essential hypertension  Pressures reviewed, systolic hypertension noted  Olmesartan HCTZ presently on  hold  Continue amlodipine 5 mg p.o. daily  Monitor blood pressures  Avoid hypotension    Type 2 diabetes mellitus with kidney complication, without long-term current use of insulin (HCC)  Lab Results   Component Value Date    HGBA1C 9.8 (H) 08/28/2024       Recent Labs     10/21/24  1646 10/21/24  2112 10/22/24  0744 10/22/24  1117   POCGLU 169* 170* 95 224*       Blood Sugar Average: Last 72 hrs:  (P) [id number]  Uncontrolled diabetes mellitus type 2 A1c 9.8  Accu-Cheks reviewed  Presently on insulin therapy  Discussed insulin therapy at discharge patient is reluctant.  He would like to continue with Mounjaro  Outpatient endocrinology follow up recommended he is agreeable.  Contact information placed in discharge instructions  Monitor Accu-Cheks  Avoid hypoglycemia    Malignant neoplasm of prostate (HCC)  Diagnosed in 2019 and underwent biopsy of the prostate and s/p XRT  PSA 0.355  Urology inputs noted  Lung nodule  3 mm left lower lobe lung nodule noted on visible portion of lung on CT abdomen/pelvis  Chest CT scan, negative for malignancy, per radiology, the 3 mm left lower lobe nodule is of doubtful significance.     VTE Pharmacologic Prophylaxis: VTE Score: 7 High Risk (Score >/= 5) - Pharmacological DVT Prophylaxis Ordered: heparin. Sequential Compression Devices Ordered.    Mobility:   Basic Mobility Inpatient Raw Score: 24  JH-HLM Goal: 8: Walk 250 feet or more  JH-HLM Achieved: 8: Walk 250 feet ot more  JH-HLM Goal achieved. Continue to encourage appropriate mobility.    Patient Centered Rounds: I performed bedside rounds with nursing staff today.   Discussions with Specialists or Other Care Team Provider: Nephrology, case management    Education and Discussions with Family / Patient:  Discussed with the patient, reports he is keeping them updated.     Current Length of Stay: 4 day(s)  Current Patient Status: Inpatient   Certification Statement: The patient will continue to require additional  inpatient hospital stay due to as outlined  Discharge Plan:  Awaiting clinical symptomatic improvement, nephrology inputs for disposition planning, possible discharge 24 to 48 hours    Code Status: Level 3 - DNAR and DNI    Subjective     Comfortably in chair  Reports feeling better  Uncontrolled diabetes and diabetes care discussed  Insulin therapy recommended discharge patient is reluctant.  He would like to continue with Mounjaro  Close Accu-Chek monitoring and outpatient endocrinology follow-up discussed he is agreeable  History chart labs medications reviewed  Left lower extremity edema noted likely due to a trip and lymphadenopathy contributing discussed limb elevation compression stockings.      Objective :  Temp:  [97.9 °F (36.6 °C)-98.1 °F (36.7 °C)] 98.1 °F (36.7 °C)  HR:  [79-92] 92  BP: (139-154)/(74-95) 154/95  Resp:  [18] 18  SpO2:  [97 %-98 %] 97 %  O2 Device: None (Room air)    Body mass index is 25.11 kg/m².     Input and Output Summary (last 24 hours):     Intake/Output Summary (Last 24 hours) at 10/22/2024 1311  Last data filed at 10/22/2024 1101  Gross per 24 hour   Intake 3579 ml   Output 5925 ml   Net -2346 ml       Physical Exam    Comfortably sitting up in chair  Neck supple  Lungs vesicular breath sounds  Heart sounds S1-S2 noted  Abdomen soft  Awake follows commands  Left lower extremity edema noted  No rash    Lines/Drains:  Lines/Drains/Airways       Active Status       Name Placement date Placement time Site Days    Nephrostomy Left 10 Fr. 10/19/24  1004  Left  3                            Lab Results: I have reviewed the following results:   Results from last 7 days   Lab Units 10/21/24  0604   WBC Thousand/uL 6.84   HEMOGLOBIN g/dL 10.2*   HEMATOCRIT % 32.9*   PLATELETS Thousands/uL 438*   SEGS PCT % 75   LYMPHO PCT % 12*   MONO PCT % 10   EOS PCT % 2     Results from last 7 days   Lab Units 10/22/24  0534 10/19/24  0629 10/18/24  1849   SODIUM mmol/L 138   < > 127*   POTASSIUM mmol/L  3.8   < > 4.3   CHLORIDE mmol/L 104   < > 92*   CO2 mmol/L 25   < > 22   BUN mg/dL 35*   < > 40*   CREATININE mg/dL 1.78*   < > 2.43*   ANION GAP mmol/L 9   < > 13   CALCIUM mg/dL 8.3*   < > 9.7   ALBUMIN g/dL  --   --  3.9   TOTAL BILIRUBIN mg/dL  --   --  0.37   ALK PHOS U/L  --   --  142*   ALT U/L  --   --  23   AST U/L  --   --  15   GLUCOSE RANDOM mg/dL 92   < > 276*    < > = values in this interval not displayed.     Results from last 7 days   Lab Units 10/19/24  0823   INR  1.05     Results from last 7 days   Lab Units 10/22/24  1117 10/22/24  0744 10/21/24  2112 10/21/24  1646 10/21/24  1125 10/21/24  0826 10/20/24  2113 10/20/24  1559 10/20/24  1116 10/20/24  0731 10/19/24  2048 10/19/24  1630   POC GLUCOSE mg/dl 224* 95 170* 169* 204* 216* 311* 229* 279* 135 299* 218*               Recent Cultures (last 7 days):         Imaging Results Review: I personally reviewed the following image studies/reports in PACS and discussed pertinent findings with Radiology: CT chest, CT abdomen/pelvis, and procedure reports. My interpretation of the radiology images/reports is:  .  Other Study Results Review: EKG was reviewed.     Last 24 Hours Medication List:     Current Facility-Administered Medications:     acetaminophen (TYLENOL) tablet 650 mg, Q4H PRN    amLODIPine (NORVASC) tablet 5 mg, Daily    atorvastatin (LIPITOR) tablet 40 mg, Daily    ceFAZolin (ANCEF) IVPB (premix in dextrose) 2,000 mg 50 mL, Once    folic acid (FOLVITE) tablet 1 mg, Daily    heparin (porcine) subcutaneous injection 5,000 Units, Q8H CHONG    HYDROmorphone HCl (DILAUDID) injection 0.2 mg, Q4H PRN    insulin glargine (LANTUS) subcutaneous injection 15 Units 0.15 mL, HS    insulin lispro (HumALOG/ADMELOG) 100 units/mL subcutaneous injection 1-5 Units, HS    insulin lispro (HumALOG/ADMELOG) 100 units/mL subcutaneous injection 1-6 Units, TID AC **AND** Fingerstick Glucose (POCT), TID AC    insulin lispro (HumALOG/ADMELOG) 100 units/mL subcutaneous  injection 5 Units, TID With Meals    multi-electrolyte (PLASMALYTE-A/ISOLYTE-S PH 7.4) IV solution, Continuous, Last Rate: 75 mL/hr (10/22/24 0837)    ondansetron (ZOFRAN) injection 4 mg, Q6H PRN    ondansetron (ZOFRAN) injection 4 mg, Once PRN    oxyCODONE (ROXICODONE) split tablet 2.5 mg, Q4H PRN **OR** oxyCODONE (ROXICODONE) IR tablet 5 mg, Q4H PRN    polyethylene glycol (MIRALAX) packet 17 g, Daily    senna-docusate sodium (SENOKOT S) 8.6-50 mg per tablet 2 tablet, BID    Administrative Statements   Today, Patient Was Seen By: Reji Naik MD  I have spent a total time of 33 minutes in caring for this patient on the day of the visit/encounter including Diagnostic results, Importance of tx compliance, Counseling / Coordination of care, Obtaining or reviewing history  , and Communicating with other healthcare professionals .    **Please Note: This note may have been constructed using a voice recognition system.**

## 2024-10-22 NOTE — ASSESSMENT & PLAN NOTE
Noted enlarged left pelvic and in the retroperitoneum nodes consistent with likely metastatic disease with history of prostate cancer

## 2024-10-22 NOTE — ASSESSMENT & PLAN NOTE
Diagnosed in 2019 and underwent biopsy of the prostate and s/p XRT  PSA 0.355  Urology inputs noted

## 2024-10-22 NOTE — ASSESSMENT & PLAN NOTE
The patient started having left flank pain 1.5 weeks ago. He saw his urologist outpatient  for left flank pain and a CT was done which shows left hydronephrosis, retroperitoneal lymphadenopathy, and new sclerotic lesions concerning for metastatic disease. The patient was instructed by urologist to come to ED to be admitted for possible L ureter stent placement.  Status post left PCN placement 10/19/2024 per IR  Urology inputs noted  Supportive cares

## 2024-10-22 NOTE — PROGRESS NOTES
Patient:    MRN:  [id number]    [place] Request ID:  [id number]    Level of care reserved:  Home Health Agency    Partner Reserved:  [facility]04 [phone]    Clinical needs requested:    Geography searched:  [phone]    Start of Service:    Request sent:  10:14am EDT on 10/21/2024 by Jazmin Moss    Partner reserved:  12:47pm EDT on 10/22/2024 by Jazmin Moss    Choice list shared:  10:28am EDT on 10/21/2024 by Jazmin Moss

## 2024-10-23 ENCOUNTER — DOCUMENTATION (OUTPATIENT)
Dept: HEMATOLOGY ONCOLOGY | Facility: CLINIC | Age: 69
End: 2024-10-23

## 2024-10-23 ENCOUNTER — TELEPHONE (OUTPATIENT)
Dept: HEMATOLOGY ONCOLOGY | Facility: CLINIC | Age: 69
End: 2024-10-23

## 2024-10-23 LAB
ANION GAP SERPL CALCULATED.3IONS-SCNC: 10 MMOL/L (ref 4–13)
BUN SERPL-MCNC: 31 MG/DL (ref 5–25)
CALCIUM SERPL-MCNC: 8.3 MG/DL (ref 8.4–10.2)
CHLORIDE SERPL-SCNC: 105 MMOL/L (ref 96–108)
CO2 SERPL-SCNC: 24 MMOL/L (ref 21–32)
CREAT SERPL-MCNC: 1.7 MG/DL (ref 0.6–1.3)
GFR SERPL CREATININE-BSD FRML MDRD: 39 ML/MIN/1.73SQ M
GLUCOSE SERPL-MCNC: 102 MG/DL (ref 65–140)
GLUCOSE SERPL-MCNC: 181 MG/DL (ref 65–140)
GLUCOSE SERPL-MCNC: 251 MG/DL (ref 65–140)
GLUCOSE SERPL-MCNC: 83 MG/DL (ref 65–140)
GLUCOSE SERPL-MCNC: 88 MG/DL (ref 65–140)
POTASSIUM SERPL-SCNC: 3.9 MMOL/L (ref 3.5–5.3)
SODIUM SERPL-SCNC: 139 MMOL/L (ref 135–147)

## 2024-10-23 PROCEDURE — 82948 REAGENT STRIP/BLOOD GLUCOSE: CPT

## 2024-10-23 PROCEDURE — 80048 BASIC METABOLIC PNL TOTAL CA: CPT | Performed by: INTERNAL MEDICINE

## 2024-10-23 PROCEDURE — 99232 SBSQ HOSP IP/OBS MODERATE 35: CPT | Performed by: INTERNAL MEDICINE

## 2024-10-23 RX ADMIN — INSULIN LISPRO 5 UNITS: 100 INJECTION, SOLUTION INTRAVENOUS; SUBCUTANEOUS at 09:29

## 2024-10-23 RX ADMIN — ACETAMINOPHEN 650 MG: 325 TABLET, FILM COATED ORAL at 22:00

## 2024-10-23 RX ADMIN — INSULIN GLARGINE 15 UNITS: 100 INJECTION, SOLUTION SUBCUTANEOUS at 21:54

## 2024-10-23 RX ADMIN — AMLODIPINE BESYLATE 5 MG: 5 TABLET ORAL at 09:27

## 2024-10-23 RX ADMIN — INSULIN LISPRO 5 UNITS: 100 INJECTION, SOLUTION INTRAVENOUS; SUBCUTANEOUS at 17:36

## 2024-10-23 RX ADMIN — ATORVASTATIN CALCIUM 40 MG: 40 TABLET, FILM COATED ORAL at 09:27

## 2024-10-23 RX ADMIN — SENNOSIDES AND DOCUSATE SODIUM 2 TABLET: 50; 8.6 TABLET ORAL at 09:27

## 2024-10-23 RX ADMIN — FOLIC ACID 1 MG: 1 TABLET ORAL at 09:27

## 2024-10-23 RX ADMIN — ACETAMINOPHEN 650 MG: 325 TABLET, FILM COATED ORAL at 17:30

## 2024-10-23 RX ADMIN — INSULIN LISPRO 1 UNITS: 100 INJECTION, SOLUTION INTRAVENOUS; SUBCUTANEOUS at 21:55

## 2024-10-23 RX ADMIN — HEPARIN SODIUM 5000 UNITS: 5000 INJECTION INTRAVENOUS; SUBCUTANEOUS at 21:55

## 2024-10-23 RX ADMIN — HEPARIN SODIUM 5000 UNITS: 5000 INJECTION INTRAVENOUS; SUBCUTANEOUS at 05:48

## 2024-10-23 RX ADMIN — ACETAMINOPHEN 650 MG: 325 TABLET, FILM COATED ORAL at 02:35

## 2024-10-23 RX ADMIN — SENNOSIDES AND DOCUSATE SODIUM 2 TABLET: 50; 8.6 TABLET ORAL at 17:30

## 2024-10-23 RX ADMIN — ACETAMINOPHEN 650 MG: 325 TABLET, FILM COATED ORAL at 09:27

## 2024-10-23 RX ADMIN — INSULIN LISPRO 3 UNITS: 100 INJECTION, SOLUTION INTRAVENOUS; SUBCUTANEOUS at 12:41

## 2024-10-23 RX ADMIN — INSULIN LISPRO 5 UNITS: 100 INJECTION, SOLUTION INTRAVENOUS; SUBCUTANEOUS at 12:42

## 2024-10-23 RX ADMIN — HEPARIN SODIUM 5000 UNITS: 5000 INJECTION INTRAVENOUS; SUBCUTANEOUS at 13:01

## 2024-10-23 NOTE — ASSESSMENT & PLAN NOTE
Baseline creatinine 1.2  CT showed lymphadenopathy partially obstructing L ureter  Likely secondary to hydronephrosis and decreased oral intake  Status post left nephrostomy placement by IR 10/19  Kidney function improving  Discussed with nephrology, nephrology plans to monitor off of IV fluids noted  Monitor postvoid residuals, avoid nephrotoxins  Monitor kidney function  Nephrology following

## 2024-10-23 NOTE — ASSESSMENT & PLAN NOTE
Etiology thought to be multifactorial due to component of obstructive uropathy +/- contrast associated nephropathy +/- previous NSAID use  Baseline creatinine 1.0-1.2  Creatinine on admission 2.43 on 10/18/2024  Peak creatinine 2.73 on 10/20/2024  Creatinine today improved to 1.70  Currently polyuric but polyuria decreasing  Discontinue IV fluids today  Trend BMP daily

## 2024-10-23 NOTE — PROGRESS NOTES
NEPHROLOGY HOSPITAL PROGRESS NOTE   Alejandro Adames 69 y.o. male MRN: [id number]  Unit/Bed#: [facility] 909-01 Encounter: [id number]  Reason for Consult: JULIO  Assessment & Plan  Acute renal failure (ARF) (HCC)  Etiology thought to be multifactorial due to component of obstructive uropathy +/- contrast associated nephropathy +/- previous NSAID use  Baseline creatinine 1.0-1.2  Creatinine on admission 2.43 on 10/18/2024  Peak creatinine 2.73 on 10/20/2024  Creatinine today improved to 1.70  Currently polyuric but polyuria decreasing  Discontinue IV fluids today  Trend BMP daily  Hyponatremia  This is currently resolved  Other hydronephrosis  Left PCN placement given obstructive retroperitoneal mass  Management per urology  Retroperitoneal mass  Biopsy results are currently pending  Management per oncology  Malignant neoplasm of prostate (HCC)  Noted enlarged left pelvic and in the retroperitoneum nodes consistent with likely metastatic disease with history of prostate cancer  Essential hypertension  Blood pressure currently above goal  Current Rx: Amlodipine 5 mg daily  Blood pressure will improve after stopping IV fluids today  Continue amlodipine at current dose  Type 2 diabetes mellitus with kidney complication, without long-term current use of insulin (HCC)  Management per primary team  Polyuria  Most likely due to postobstructive diuresis, management as above    Discussed with internal medicine team.  After discussion, we agreed that kidney function continues to show improvement and polyuria is improving and today observe off IV fluids and monitor kidney function to make sure there is no volume depletion before discharge.    SUBJECTIVE / 24H INTERVAL HISTORY:  Urine output recorded as 4800 cc.  Denies dyspnea.  Complains of some lower abdominal pain.  Continues to have significant urine output from nephrostomy tube.    OBJECTIVE:  Current Weight: Weight - Scale: 79.4 kg (175 lb)  Vitals:    10/22/24 0838 10/22/24 0845  10/22/24 1501 10/22/24 2108   BP: 154/95  145/83 157/85   Pulse: 92      Resp:   18 15   Temp:   98.6 °F (37 °C) 97.6 °F (36.4 °C)   TempSrc:    Oral   SpO2: 98% 97%     Weight:       Height:           Intake/Output Summary (Last 24 hours) at 10/23/2024 0719  Last data filed at 10/23/2024 0455  Gross per 24 hour   Intake 3623.75 ml   Output 4875 ml   Net -1251.25 ml     Review of Systems   Constitutional:  Negative for chills and fever.   HENT:  Negative for ear pain and sore throat.    Eyes:  Negative for pain and visual disturbance.   Respiratory:  Negative for cough and shortness of breath.    Cardiovascular:  Negative for chest pain and palpitations.   Gastrointestinal:  Positive for abdominal pain. Negative for vomiting.   Genitourinary:  Negative for dysuria and hematuria.   Musculoskeletal:  Negative for arthralgias and back pain.   Skin:  Negative for color change and rash.   Neurological:  Negative for seizures and syncope.   All other systems reviewed and are negative.    Physical Exam  Vitals and nursing note reviewed.   Constitutional:       General: He is not in acute distress.     Appearance: He is well-developed.   HENT:      Head: Normocephalic and atraumatic.   Eyes:      Conjunctiva/sclera: Conjunctivae normal.   Cardiovascular:      Rate and Rhythm: Normal rate and regular rhythm.      Pulses: Normal pulses.      Heart sounds: Normal heart sounds. No murmur heard.  Pulmonary:      Effort: Pulmonary effort is normal. No respiratory distress.      Breath sounds: Normal breath sounds.   Abdominal:      Palpations: Abdomen is soft.      Tenderness: There is no abdominal tenderness.   Genitourinary:     Comments: Left-sided PCN present  Musculoskeletal:         General: No swelling.      Cervical back: Neck supple.      Right lower leg: No edema.      Left lower leg: No edema.   Skin:     General: Skin is warm and dry.      Capillary Refill: Capillary refill takes less than 2 seconds.   Neurological:       Mental Status: He is alert.   Psychiatric:         Mood and Affect: Mood normal.       Medications:    Current Facility-Administered Medications:     acetaminophen (TYLENOL) tablet 650 mg, 650 mg, Oral, Q4H PRN, Dirk Guerrero DO, 650 mg at 10/23/24 0235    amLODIPine (NORVASC) tablet 5 mg, 5 mg, Oral, Daily, Dirk Guerrero DO, 5 mg at 10/22/24 0836    atorvastatin (LIPITOR) tablet 40 mg, 40 mg, Oral, Daily, Zeyad Trevino DO, 40 mg at 10/22/24 0836    ceFAZolin (ANCEF) IVPB (premix in dextrose) 2,000 mg 50 mL, 2,000 mg, Intravenous, Once, Yovani Gaxiola MD    folic acid (FOLVITE) tablet 1 mg, 1 mg, Oral, Daily, Dirk Guerrero DO, 1 mg at 10/22/24 0839    heparin (porcine) subcutaneous injection 5,000 Units, 5,000 Units, Subcutaneous, Q8H CHONG, Dirk Guerrero DO, 5,000 Units at 10/23/24 0548    HYDROmorphone HCl (DILAUDID) injection 0.2 mg, 0.2 mg, Intravenous, Q4H PRN, Zeyad Trevino DO, 0.2 mg at 10/18/24 2242    insulin glargine (LANTUS) subcutaneous injection 15 Units 0.15 mL, 15 Units, Subcutaneous, HS, Dirk Guerrero DO, 15 Units at 10/22/24 2227    insulin lispro (HumALOG/ADMELOG) 100 units/mL subcutaneous injection 1-5 Units, 1-5 Units, Subcutaneous, HS, Zeyad Trevino DO, 1 Units at 10/22/24 2226    insulin lispro (HumALOG/ADMELOG) 100 units/mL subcutaneous injection 1-6 Units, 1-6 Units, Subcutaneous, TID AC, 2 Units at 10/22/24 1152 **AND** Fingerstick Glucose (POCT), , , TID AC, Zeyad Trevino DO    insulin lispro (HumALOG/ADMELOG) 100 units/mL subcutaneous injection 5 Units, 5 Units, Subcutaneous, TID With Meals, Dirk Guerrero DO, 5 Units at 10/22/24 1726    ondansetron (ZOFRAN) injection 4 mg, 4 mg, Intravenous, Q6H PRN, Zeyad Trevino DO    ondansetron (ZOFRAN) injection 4 mg, 4 mg, Intravenous, Once PRN, Michelle Jensen CRNA    oxyCODONE (ROXICODONE) split tablet 2.5 mg, 2.5 mg, Oral, Q4H PRN, 2.5 mg at 10/20/24 0841 **OR** oxyCODONE (ROXICODONE) IR tablet 5 mg, 5 mg, Oral, Q4H PRN,  "Zeyadjuan daniel Trevino , 5 mg at 10/21/24 0103    polyethylene glycol (MIRALAX) packet 17 g, 17 g, Oral, Daily, Dirk Guerrero DO, 17 g at 10/21/24 0830    senna-docusate sodium (SENOKOT S) 8.6-50 mg per tablet 2 tablet, 2 tablet, Oral, BID, Dirk Guerrero DO, 2 tablet at 10/22/24 0835    Laboratory Results:  Results from last 7 days   Lab Units 10/23/24  0451 10/22/24  0534 10/21/24  0947 10/21/24  0604 10/20/24  0518 10/19/24  0629 10/18/24  1849   WBC Thousand/uL  --   --   --  6.84 6.43 10.76* 11.63*   HEMOGLOBIN g/dL  --   --   --  10.2* 9.9* 10.7* 12.4   HEMATOCRIT %  --   --   --  32.9* 32.0* 33.2* 37.4   PLATELETS Thousands/uL  --   --   --  438* 415* 419* 603*   POTASSIUM mmol/L 3.9 3.8 4.1  --  4.3 3.8 4.3   CHLORIDE mmol/L 105 104 102  --  101 94* 92*   CO2 mmol/L 24 25 26  --  25 25 22   BUN mg/dL 31* 35* 38*  --  41* 40* 40*   CREATININE mg/dL 1.70* 1.78* 2.22*  --  2.73* 2.54* 2.43*   CALCIUM mg/dL 8.3* 8.3* 8.8  --  8.6 9.1 9.7   MAGNESIUM mg/dL  --   --  2.1  --  1.8*  --   --        Portions of the record may have been created with voice recognition software. Occasional wrong word or \"sound a like\" substitutions may have occurred due to the inherent limitations of voice recognition software. Read the chart carefully and recognize, using context, where substitutions have occurred.If you have any questions, please contact the dictating provider.    "

## 2024-10-23 NOTE — TELEPHONE ENCOUNTER
"Soft Intake Form   Patient Details   Alejandro Adames     1955     Reason For Appointment   Who is Calling? Velma Vaughn   If not patient, Name? NA    DID YOU CONFIRM INSURANCE WITH PATIENT? Okd by Finance, Routed to finance (Medicare is primary)   Who is the Referring Doctor? Dr. Guerra   What is the diagnosis? 1. Left retroperitoneal mass with left pelvic and retroperitoneal lymphadenopathy 2. Sclerotic osseous lesions involving the right iliac body and right femoral head 3. History of prostate adenocarcinoma s/p SBRT completed September 2019 4. Obstructive nephropathy  5. Anemia   Has this diagnosis been confirmed by a biopsy/surgery?    If yes, what is the date it was done? L Inguinal/external iliac LN bx taken on 10/19   Biopsy done at [facility]?  If not, where was it done? Yes   Was imaging done, and was it done at [facility]?  If not, where was it done? Yes-H   Have you been seen by another Oncologist?  If so, who and where (name of facility, city and state) Per notes: localized intermediate-risk adenocarcinoma of the prostate (Stage II - cT1c, cN0, cM0, [place] Score: 3+4=7, PSA: 4.9) status-post SBRT (Administered 3,700-Gy over 5-fractions - Completed on September 18th, 2019)    For 2nd Opinions Only: Are you currently undergoing treatment, or are you scheduled to start treatment?  If yes, name of facility, city and state  No   For \"History Of\" only: Have you completed treatment?  Yes   Have you had Genetic Testing done in the past?  If so, advise to bring test results to their visit Unknown   Record Gathering Information   Did you advise to have records faxed to [phone]?  The patient is currently inpatient. Rad Onc and Urology notes from [facility] in Care Everywhere (2019)   Did you advise to have disks sent to the proper address with imaging? (\"History of\" Patients)  5 years of imaging for breast patients-Mammos, US etc The patient is currently inpatient   Scheduling Information   Did " you send new patient paperwork?  Email or mail? NA   What is the best call back number?   (If the [facility] is calling, please use their number) NA   Miscellaneous Information      The patient is scheduled for his HFU appointment with Dr. Castellon on 11/1 at 1100 in the [facility].

## 2024-10-23 NOTE — ASSESSMENT & PLAN NOTE
Blood pressure currently above goal  Current Rx: Amlodipine 5 mg daily  Blood pressure will improve after stopping IV fluids today  Continue amlodipine at current dose

## 2024-10-23 NOTE — PROGRESS NOTES
Progress Note - Hospitalist   Name: Alejandro Adames 69 y.o. male I MRN: [id number]  Unit/Bed#: [facility] 909-01 I Date of Admission: 10/18/2024   Date of Service: 10/23/2024 I Hospital Day: 5    Assessment & Plan  Other hydronephrosis  The patient started having left flank pain 1.5 weeks ago. He saw his urologist outpatient  for left flank pain and a CT was done which shows left hydronephrosis, retroperitoneal lymphadenopathy, and new sclerotic lesions concerning for metastatic disease. The patient was instructed by urologist to come to ED to be admitted for possible L ureter stent placement.  Status post left PCN placement 10/19/2024 per IR  Urology inputs noted  Supportive cares  Outpatient follow-up  Retroperitoneal mass  CT scan with peripelvic/proximal periureteral inflammatory stranding secondary to obstructing large left retroperitoneal toribio conglomerate. Enlarged left pelvic and other retroperitoneal nodes   Findings consistent with metastasis. 3 mm sclerotic lesions in the right iliac body and right femoral head may represent bone islands versus sclerotic mets.       Patient with underlying history of prostate cancer  IR consulted for biopsy, status post left inguinal lymph node biopsy on 10/19  Follow-up on biopsy report  Urology is following  Follow on medical oncology recommendation  Acute renal failure (ARF) (HCC)  Baseline creatinine 1.2  CT showed lymphadenopathy partially obstructing L ureter  Likely secondary to hydronephrosis and decreased oral intake  Status post left nephrostomy placement by IR 10/19  Kidney function improving  Discussed with nephrology, nephrology plans to monitor off of IV fluids noted  Monitor postvoid residuals, avoid nephrotoxins  Monitor kidney function  Nephrology following  Hyponatremia  POA  Suspect multifactorial  Resolved  Anemia  Anemia panel consistent with anemia of chronic disease and folate deficiency  Continue folic acid supplementation  Monitor hemoglobin  Essential  hypertension  Blood pressures reviewed, systolic hypertension noted  Continue amlodipine 5 mg p.o. daily  Olmesartan HCTZ presently on hold  Nephrology inputs noted  Monitor blood pressures      Type 2 diabetes mellitus with kidney complication, without long-term current use of insulin (HCC)  Lab Results   Component Value Date    HGBA1C 9.8 (H) 08/28/2024       Recent Labs     10/22/24  1703 10/22/24  2109 10/23/24  0731 10/23/24  1114   POCGLU 124 159* 83 251*       Blood Sugar Average: Last 72 hrs:  (P) 189.9958897628423377  Uncontrolled diabetes mellitus type 2 A1c 9.8  Accu-Cheks reviewed  Continue Lantus, Humalog 3 times daily with meals  Discussed insulin therapy at discharge patient is reluctant.  He would like to continue with Mounjaro  Outpatient endocrinology follow up recommended he is agreeable.  Contact information placed in discharge instructions  Monitor Accu-Cheks  Avoid hypoglycemia    Malignant neoplasm of prostate (HCC)  Diagnosed in 2019 and underwent biopsy of the prostate and s/p XRT  PSA 0.355  Urology inputs noted  Lung nodule  3 mm left lower lobe lung nodule noted on visible portion of lung on CT abdomen/pelvis  Chest CT scan, negative for malignancy, per radiology, the 3 mm left lower lobe nodule is of doubtful significance.     VTE Pharmacologic Prophylaxis: VTE Score: 7 High Risk (Score >/= 5) - Pharmacological DVT Prophylaxis Ordered: heparin. Sequential Compression Devices Ordered.    Mobility:   Basic Mobility Inpatient Raw Score: 24  JH-HLM Goal: 8: Walk 250 feet or more  JH-HLM Achieved: 8: Walk 250 feet ot more  JH-HLM Goal achieved. Continue to encourage appropriate mobility.    Patient Centered Rounds: I performed bedside rounds with nursing staff today.   Discussions with Specialists or Other Care Team Provider: Nephrology, case management    Education and Discussions with Family / Patient:  Discussed with the patient, reports he is keeping his family updated.     Current Length  of Stay: 5 day(s)  Current Patient Status: Inpatient   Certification Statement: The patient will continue to require additional inpatient hospital stay due to as outlined  Discharge Plan:  Nephrology recommends monitoring off IV fluids, possible discharge 24 to 48 hours    Code Status: Level 3 - DNAR and DNI    Subjective     Comfortably sitting up in chair  Reports feeling better  Occasional flank pain on and off responsive to Tylenol  Encourage out of bed and ambulation as able      Objective :  Temp:  [97.5 °F (36.4 °C)-98.6 °F (37 °C)] 97.5 °F (36.4 °C)  HR:  [88] 88  BP: (145-157)/(83-87) 156/87  Resp:  [15-18] 18    Body mass index is 25.11 kg/m².     Input and Output Summary (last 24 hours):     Intake/Output Summary (Last 24 hours) at 10/23/2024 1311  Last data filed at 10/23/2024 1100  Gross per 24 hour   Intake 3565.75 ml   Output 4550 ml   Net -984.25 ml       Physical Exam    Comfortably in chair  Neck supple  Lungs vesicular breath sounds  Diminished breath sounds at the bases  Heart sounds S1-S2 noted  Abdomen soft  Left PCN noted  Awake follows commands  No pedal edema  No rash    Lines/Drains:  Lines/Drains/Airways       Active Status       Name Placement date Placement time Site Days    Nephrostomy Left 10 Fr. 10/19/24  1004  Left  4                            Lab Results: I have reviewed the following results:   Results from last 7 days   Lab Units 10/21/24  0604   WBC Thousand/uL 6.84   HEMOGLOBIN g/dL 10.2*   HEMATOCRIT % 32.9*   PLATELETS Thousands/uL 438*   SEGS PCT % 75   LYMPHO PCT % 12*   MONO PCT % 10   EOS PCT % 2     Results from last 7 days   Lab Units 10/23/24  0451 10/19/24  0629 10/18/24  1849   SODIUM mmol/L 139   < > 127*   POTASSIUM mmol/L 3.9   < > 4.3   CHLORIDE mmol/L 105   < > 92*   CO2 mmol/L 24   < > 22   BUN mg/dL 31*   < > 40*   CREATININE mg/dL 1.70*   < > 2.43*   ANION GAP mmol/L 10   < > 13   CALCIUM mg/dL 8.3*   < > 9.7   ALBUMIN g/dL  --   --  3.9   TOTAL BILIRUBIN  mg/dL  --   --  0.37   ALK PHOS U/L  --   --  142*   ALT U/L  --   --  23   AST U/L  --   --  15   GLUCOSE RANDOM mg/dL 88   < > 276*    < > = values in this interval not displayed.     Results from last 7 days   Lab Units 10/19/24  0823   INR  1.05     Results from last 7 days   Lab Units 10/23/24  1114 10/23/24  0731 10/22/24  2109 10/22/24  1703 10/22/24  1117 10/22/24  0744 10/21/24  2112 10/21/24  1646 10/21/24  1125 10/21/24  0826 10/20/24  2113 10/20/24  1559   POC GLUCOSE mg/dl 251* 83 159* 124 224* 95 170* 169* 204* 216* 311* 229*               Recent Cultures (last 7 days):         Imaging Results Review: I personally reviewed the following image studies/reports in PACS and discussed pertinent findings with Radiology: CT chest, CT abdomen/pelvis, and procedure reports. My interpretation of the radiology images/reports is:  .  Other Study Results Review: No additional pertinent studies reviewed.    Last 24 Hours Medication List:     Current Facility-Administered Medications:     acetaminophen (TYLENOL) tablet 650 mg, Q4H PRN    amLODIPine (NORVASC) tablet 5 mg, Daily    atorvastatin (LIPITOR) tablet 40 mg, Daily    ceFAZolin (ANCEF) IVPB (premix in dextrose) 2,000 mg 50 mL, Once    folic acid (FOLVITE) tablet 1 mg, Daily    heparin (porcine) subcutaneous injection 5,000 Units, Q8H CHONG    HYDROmorphone HCl (DILAUDID) injection 0.2 mg, Q4H PRN    insulin glargine (LANTUS) subcutaneous injection 15 Units 0.15 mL, HS    insulin lispro (HumALOG/ADMELOG) 100 units/mL subcutaneous injection 1-5 Units, HS    insulin lispro (HumALOG/ADMELOG) 100 units/mL subcutaneous injection 1-6 Units, TID AC **AND** Fingerstick Glucose (POCT), TID AC    insulin lispro (HumALOG/ADMELOG) 100 units/mL subcutaneous injection 5 Units, TID With Meals    ondansetron (ZOFRAN) injection 4 mg, Q6H PRN    oxyCODONE (ROXICODONE) split tablet 2.5 mg, Q4H PRN **OR** oxyCODONE (ROXICODONE) IR tablet 5 mg, Q4H PRN    polyethylene glycol  (MIRALAX) packet 17 g, Daily    senna-docusate sodium (SENOKOT S) 8.6-50 mg per tablet 2 tablet, BID    Administrative Statements   Today, Patient Was Seen By: Reji Naik MD  I have spent a total time of 31 minutes in caring for this patient on the day of the visit/encounter including Diagnostic results, Impressions, and Communicating with other healthcare professionals .    **Please Note: This note may have been constructed using a voice recognition system.**

## 2024-10-23 NOTE — ASSESSMENT & PLAN NOTE
Lab Results   Component Value Date    HGBA1C 9.8 (H) 08/28/2024       Recent Labs     10/22/24  1703 10/22/24  2109 10/23/24  0731 10/23/24  1114   POCGLU 124 159* 83 251*       Blood Sugar Average: Last 72 hrs:  (P) 189.2372805527994510  Uncontrolled diabetes mellitus type 2 A1c 9.8  Accu-Cheks reviewed  Continue Lantus, Humalog 3 times daily with meals  Discussed insulin therapy at discharge patient is reluctant.  He would like to continue with Mounjaro  Outpatient endocrinology follow up recommended he is agreeable.  Contact information placed in discharge instructions  Monitor Accu-Cheks  Avoid hypoglycemia

## 2024-10-23 NOTE — PROGRESS NOTES
Follow up on LN biopsy    All records needed are in patients chart. No records retrieval needed at this time.     Referral received/ Chart reviewed for work up completed    Imaging completed: [x][facility] []Other:   [] PET/CT   [] MRI   [x] CT   [] US   [] Mammo   [] Bone scan   [] N/A    Pathology completed: [x][facility] []Other:   Date: 4/12/19 - prostate   10/19/24 - lymph node    Location:   []N/A    Additional records needed:[][facility] []Other:   [] Genomic report   [] Genetic testing results   [] Office Note   [] Procedure/ Operative note   [] Lab results   [x] N/A      [] Radiation Oncology records retrieval needed (PN to route to rad/onc clerical pool once scheduled)  Date:  Location:       Referring physician:   U      Labs ordered:   Imaging ordered:

## 2024-10-23 NOTE — ASSESSMENT & PLAN NOTE
The patient started having left flank pain 1.5 weeks ago. He saw his urologist outpatient  for left flank pain and a CT was done which shows left hydronephrosis, retroperitoneal lymphadenopathy, and new sclerotic lesions concerning for metastatic disease. The patient was instructed by urologist to come to ED to be admitted for possible L ureter stent placement.  Status post left PCN placement 10/19/2024 per IR  Urology inputs noted  Supportive cares  Outpatient follow-up

## 2024-10-23 NOTE — ASSESSMENT & PLAN NOTE
Blood pressures reviewed, systolic hypertension noted  Continue amlodipine 5 mg p.o. daily  Olmesartan HCTZ presently on hold  Nephrology inputs noted  Monitor blood pressures

## 2024-10-24 PROBLEM — C64.2 RENAL CELL CARCINOMA OF LEFT KIDNEY (HCC): Status: ACTIVE | Noted: 2024-10-24

## 2024-10-24 LAB
ANION GAP SERPL CALCULATED.3IONS-SCNC: 10 MMOL/L (ref 4–13)
BUN SERPL-MCNC: 31 MG/DL (ref 5–25)
CALCIUM SERPL-MCNC: 8.7 MG/DL (ref 8.4–10.2)
CHLORIDE SERPL-SCNC: 106 MMOL/L (ref 96–108)
CO2 SERPL-SCNC: 24 MMOL/L (ref 21–32)
CREAT SERPL-MCNC: 1.75 MG/DL (ref 0.6–1.3)
GFR SERPL CREATININE-BSD FRML MDRD: 38 ML/MIN/1.73SQ M
GLUCOSE SERPL-MCNC: 196 MG/DL (ref 65–140)
GLUCOSE SERPL-MCNC: 209 MG/DL (ref 65–140)
GLUCOSE SERPL-MCNC: 248 MG/DL (ref 65–140)
GLUCOSE SERPL-MCNC: 75 MG/DL (ref 65–140)
GLUCOSE SERPL-MCNC: 82 MG/DL (ref 65–140)
POTASSIUM SERPL-SCNC: 3.8 MMOL/L (ref 3.5–5.3)
SODIUM SERPL-SCNC: 140 MMOL/L (ref 135–147)

## 2024-10-24 PROCEDURE — 88305 TISSUE EXAM BY PATHOLOGIST: CPT | Performed by: STUDENT IN AN ORGANIZED HEALTH CARE EDUCATION/TRAINING PROGRAM

## 2024-10-24 PROCEDURE — 88342 IMHCHEM/IMCYTCHM 1ST ANTB: CPT | Performed by: STUDENT IN AN ORGANIZED HEALTH CARE EDUCATION/TRAINING PROGRAM

## 2024-10-24 PROCEDURE — 99232 SBSQ HOSP IP/OBS MODERATE 35: CPT | Performed by: INTERNAL MEDICINE

## 2024-10-24 PROCEDURE — 80048 BASIC METABOLIC PNL TOTAL CA: CPT | Performed by: INTERNAL MEDICINE

## 2024-10-24 PROCEDURE — 88341 IMHCHEM/IMCYTCHM EA ADD ANTB: CPT | Performed by: STUDENT IN AN ORGANIZED HEALTH CARE EDUCATION/TRAINING PROGRAM

## 2024-10-24 PROCEDURE — 82948 REAGENT STRIP/BLOOD GLUCOSE: CPT

## 2024-10-24 RX ORDER — SODIUM CHLORIDE 450 MG/100ML
75 INJECTION, SOLUTION INTRAVENOUS CONTINUOUS
Status: DISCONTINUED | OUTPATIENT
Start: 2024-10-24 | End: 2024-10-25

## 2024-10-24 RX ADMIN — INSULIN LISPRO 5 UNITS: 100 INJECTION, SOLUTION INTRAVENOUS; SUBCUTANEOUS at 08:48

## 2024-10-24 RX ADMIN — SODIUM CHLORIDE 75 ML/HR: 0.45 INJECTION, SOLUTION INTRAVENOUS at 22:58

## 2024-10-24 RX ADMIN — INSULIN LISPRO 5 UNITS: 100 INJECTION, SOLUTION INTRAVENOUS; SUBCUTANEOUS at 12:48

## 2024-10-24 RX ADMIN — INSULIN LISPRO 2 UNITS: 100 INJECTION, SOLUTION INTRAVENOUS; SUBCUTANEOUS at 22:46

## 2024-10-24 RX ADMIN — SODIUM CHLORIDE 75 ML/HR: 0.45 INJECTION, SOLUTION INTRAVENOUS at 11:13

## 2024-10-24 RX ADMIN — SENNOSIDES AND DOCUSATE SODIUM 2 TABLET: 50; 8.6 TABLET ORAL at 18:10

## 2024-10-24 RX ADMIN — AMLODIPINE BESYLATE 5 MG: 5 TABLET ORAL at 08:44

## 2024-10-24 RX ADMIN — ACETAMINOPHEN 650 MG: 325 TABLET, FILM COATED ORAL at 18:11

## 2024-10-24 RX ADMIN — INSULIN GLARGINE 15 UNITS: 100 INJECTION, SOLUTION SUBCUTANEOUS at 22:45

## 2024-10-24 RX ADMIN — HEPARIN SODIUM 5000 UNITS: 5000 INJECTION INTRAVENOUS; SUBCUTANEOUS at 12:50

## 2024-10-24 RX ADMIN — HEPARIN SODIUM 5000 UNITS: 5000 INJECTION INTRAVENOUS; SUBCUTANEOUS at 22:46

## 2024-10-24 RX ADMIN — INSULIN LISPRO 2 UNITS: 100 INJECTION, SOLUTION INTRAVENOUS; SUBCUTANEOUS at 12:48

## 2024-10-24 RX ADMIN — INSULIN LISPRO 2 UNITS: 100 INJECTION, SOLUTION INTRAVENOUS; SUBCUTANEOUS at 18:09

## 2024-10-24 RX ADMIN — INSULIN LISPRO 5 UNITS: 100 INJECTION, SOLUTION INTRAVENOUS; SUBCUTANEOUS at 18:08

## 2024-10-24 RX ADMIN — ACETAMINOPHEN 650 MG: 325 TABLET, FILM COATED ORAL at 06:07

## 2024-10-24 RX ADMIN — ACETAMINOPHEN 650 MG: 325 TABLET, FILM COATED ORAL at 22:57

## 2024-10-24 RX ADMIN — ATORVASTATIN CALCIUM 40 MG: 40 TABLET, FILM COATED ORAL at 08:44

## 2024-10-24 RX ADMIN — HEPARIN SODIUM 5000 UNITS: 5000 INJECTION INTRAVENOUS; SUBCUTANEOUS at 05:59

## 2024-10-24 RX ADMIN — FOLIC ACID 1 MG: 1 TABLET ORAL at 08:44

## 2024-10-24 RX ADMIN — SENNOSIDES AND DOCUSATE SODIUM 2 TABLET: 50; 8.6 TABLET ORAL at 08:44

## 2024-10-24 NOTE — ASSESSMENT & PLAN NOTE
Blood pressure currently above goal  Current Rx: Amlodipine 5 mg daily  Blood pressure is currently acceptable  Continue current dose of amlodipine

## 2024-10-24 NOTE — ASSESSMENT & PLAN NOTE
CT scan with peripelvic/proximal periureteral inflammatory stranding secondary to obstructing large left retroperitoneal toribio conglomerate. Enlarged left pelvic and other retroperitoneal nodes   Findings consistent with metastasis. 3 mm sclerotic lesions in the right iliac body and right femoral head may represent bone islands versus sclerotic mets.     Patient with underlying history of prostate cancer  IR consulted for biopsy, status post left inguinal lymph node biopsy on 10/19  Biopsy reported -metastatic carcinoma most suggestive of renal origin  Oncology following

## 2024-10-24 NOTE — ASSESSMENT & PLAN NOTE
This is currently resolved and sodium level is now rising with postobstructive diuresis  Use half-normal saline as above

## 2024-10-24 NOTE — ASSESSMENT & PLAN NOTE
Baseline creatinine 1.2  CT showed lymphadenopathy partially obstructing L ureter  Likely secondary to hydronephrosis and decreased oral intake  Status post left nephrostomy placement by IR 10/19  Discussed with nephrology patient likely with post obstructive diuresis requiring IV fluid hydration and close monitoring  Monitor kidney function  Avoid nephrotoxins  Nephrology following

## 2024-10-24 NOTE — PROGRESS NOTES
NEPHROLOGY HOSPITAL PROGRESS NOTE   Alejandro Adames 69 y.o. male MRN: [id number]  Unit/Bed#: [facility] 909-01 Encounter: [id number]  Reason for Consult: JULIO  Assessment & Plan  Acute renal failure (ARF) (HCC)  Etiology thought to be multifactorial due to component of obstructive uropathy +/- contrast associated nephropathy +/- previous NSAID use  Baseline creatinine 1.0-1.2  Creatinine on admission 2.43 on 10/18/2024  Peak creatinine 2.73 on 10/20/2024  Creatinine yesterday 1.70 status post discontinuation of IV fluids  Creatinine today increased to 1.75  Remains polyuric but overall improved  Resume IV fluids, use half-normal saline at 75 cc/h today  Trend BMP daily  Hyponatremia  This is currently resolved and sodium level is now rising with postobstructive diuresis  Use half-normal saline as above  Other hydronephrosis  Left PCN placement given obstructive retroperitoneal mass  Management per urology  Retroperitoneal mass  Biopsy results (prelim renal primary)  Management per oncology  Malignant neoplasm of prostate (HCC)  Noted enlarged left pelvic and in the retroperitoneum nodes  Biopsy results (prelim renal primary)  Essential hypertension  Blood pressure currently above goal  Current Rx: Amlodipine 5 mg daily  Blood pressure is currently acceptable  Continue current dose of amlodipine  Type 2 diabetes mellitus with kidney complication, without long-term current use of insulin (HCC)  Management per primary team  Polyuria  This is most likely due to postobstructive diuresis, management as above    Discussed with internal medicine team.  After discussion, we agreed that kidney function is slightly worsened with ongoing polyuria and to start half-normal saline and to maintain hospitalization for another 24 hours.    SUBJECTIVE / 24H INTERVAL HISTORY:  Urine output recorded as 4800 cc.  Denies dyspnea.  Complains of some lower abdominal pain.    OBJECTIVE:  Current Weight: Weight - Scale: 79.4 kg (175 lb)  Vitals:     10/23/24 0732 10/23/24 1503 10/23/24 2225 10/24/24 0718   BP: 156/87 151/84 119/85 150/87   BP Location:  Left arm Right arm    Pulse: 88 87 86 86   Resp: 18 18 20    Temp: 97.5 °F (36.4 °C) 98.1 °F (36.7 °C) 97.9 °F (36.6 °C) 98.5 °F (36.9 °C)   TempSrc:  Oral Oral Oral   SpO2:  97% 98%    Weight:       Height:           Intake/Output Summary (Last 24 hours) at 10/24/2024 0735  Last data filed at 10/24/2024 0601  Gross per 24 hour   Intake 842 ml   Output 4800 ml   Net -3958 ml     Review of Systems   Constitutional:  Negative for chills and fever.   HENT:  Negative for ear pain and sore throat.    Eyes:  Negative for pain and visual disturbance.   Respiratory:  Negative for cough and shortness of breath.    Cardiovascular:  Negative for chest pain and palpitations.   Gastrointestinal:  Positive for abdominal pain. Negative for vomiting.   Genitourinary:  Negative for dysuria and hematuria.   Musculoskeletal:  Negative for arthralgias and back pain.   Skin:  Negative for color change and rash.   Neurological:  Negative for seizures and syncope.   All other systems reviewed and are negative.    Physical Exam  Vitals and nursing note reviewed.   Constitutional:       General: He is not in acute distress.     Appearance: He is well-developed.   HENT:      Head: Normocephalic and atraumatic.   Eyes:      Conjunctiva/sclera: Conjunctivae normal.   Cardiovascular:      Rate and Rhythm: Normal rate and regular rhythm.      Pulses: Normal pulses.      Heart sounds: Normal heart sounds. No murmur heard.  Pulmonary:      Effort: Pulmonary effort is normal. No respiratory distress.      Breath sounds: Normal breath sounds.   Abdominal:      Palpations: Abdomen is soft.      Tenderness: There is no abdominal tenderness.   Genitourinary:     Comments: Left PCN present  Musculoskeletal:         General: No swelling.      Cervical back: Neck supple.      Right lower leg: No edema.      Left lower leg: No edema.   Skin:      General: Skin is warm and dry.      Capillary Refill: Capillary refill takes less than 2 seconds.   Neurological:      Mental Status: He is alert.   Psychiatric:         Mood and Affect: Mood normal.       Medications:    Current Facility-Administered Medications:     acetaminophen (TYLENOL) tablet 650 mg, 650 mg, Oral, Q4H PRN, Dirk Guerrero DO, 650 mg at 10/24/24 0607    amLODIPine (NORVASC) tablet 5 mg, 5 mg, Oral, Daily, Dirk Guerrero DO, 5 mg at 10/23/24 0927    atorvastatin (LIPITOR) tablet 40 mg, 40 mg, Oral, Daily, Zeyad Trevino DO, 40 mg at 10/23/24 0927    ceFAZolin (ANCEF) IVPB (premix in dextrose) 2,000 mg 50 mL, 2,000 mg, Intravenous, Once, Yovani Gaxiola MD    folic acid (FOLVITE) tablet 1 mg, 1 mg, Oral, Daily, Dirk Guerrero DO, 1 mg at 10/23/24 0927    heparin (porcine) subcutaneous injection 5,000 Units, 5,000 Units, Subcutaneous, Q8H CHONG, Dirk Guerrero DO, 5,000 Units at 10/24/24 0559    HYDROmorphone HCl (DILAUDID) injection 0.2 mg, 0.2 mg, Intravenous, Q4H PRN, Zeyad Trevino DO, 0.2 mg at 10/18/24 2242    insulin glargine (LANTUS) subcutaneous injection 15 Units 0.15 mL, 15 Units, Subcutaneous, HS, Dirk Guerrero DO, 15 Units at 10/23/24 2154    insulin lispro (HumALOG/ADMELOG) 100 units/mL subcutaneous injection 1-5 Units, 1-5 Units, Subcutaneous, HS, Zeyad Trevino DO, 1 Units at 10/23/24 2155    insulin lispro (HumALOG/ADMELOG) 100 units/mL subcutaneous injection 1-6 Units, 1-6 Units, Subcutaneous, TID AC, 3 Units at 10/23/24 1241 **AND** Fingerstick Glucose (POCT), , , TID AC, Zeyad Trevino DO    insulin lispro (HumALOG/ADMELOG) 100 units/mL subcutaneous injection 5 Units, 5 Units, Subcutaneous, TID With Meals, Dirk Guerrero DO, 5 Units at 10/23/24 1736    ondansetron (ZOFRAN) injection 4 mg, 4 mg, Intravenous, Q6H PRN, Zeyad Trevino DO    oxyCODONE (ROXICODONE) split tablet 2.5 mg, 2.5 mg, Oral, Q4H PRN, 2.5 mg at 10/20/24 0848 **OR** oxyCODONE (ROXICODONE) IR tablet 5 mg,  "5 mg, Oral, Q4H PRN, Zeyad Uriel, DO, 5 mg at 10/21/24 0103    polyethylene glycol (MIRALAX) packet 17 g, 17 g, Oral, Daily, Dirk Guerrero DO, 17 g at 10/21/24 0830    senna-docusate sodium (SENOKOT S) 8.6-50 mg per tablet 2 tablet, 2 tablet, Oral, BID, Dirk Guerrero DO, 2 tablet at 10/23/24 1730    sodium chloride infusion 0.45 %, 75 mL/hr, Intravenous, Continuous, Umberto Alessio Cohen MD    Laboratory Results:  Results from last 7 days   Lab Units 10/24/24  0559 10/23/24  0451 10/22/24  0534 10/21/24  0947 10/21/24  0604 10/20/24  0518 10/19/24  0629 10/18/24  1849   WBC Thousand/uL  --   --   --   --  6.84 6.43 10.76* 11.63*   HEMOGLOBIN g/dL  --   --   --   --  10.2* 9.9* 10.7* 12.4   HEMATOCRIT %  --   --   --   --  32.9* 32.0* 33.2* 37.4   PLATELETS Thousands/uL  --   --   --   --  438* 415* 419* 603*   POTASSIUM mmol/L 3.8 3.9 3.8 4.1  --  4.3 3.8 4.3   CHLORIDE mmol/L 106 105 104 102  --  101 94* 92*   CO2 mmol/L 24 24 25 26  --  25 25 22   BUN mg/dL 31* 31* 35* 38*  --  41* 40* 40*   CREATININE mg/dL 1.75* 1.70* 1.78* 2.22*  --  2.73* 2.54* 2.43*   CALCIUM mg/dL 8.7 8.3* 8.3* 8.8  --  8.6 9.1 9.7   MAGNESIUM mg/dL  --   --   --  2.1  --  1.8*  --   --        Portions of the record may have been created with voice recognition software. Occasional wrong word or \"sound a like\" substitutions may have occurred due to the inherent limitations of voice recognition software. Read the chart carefully and recognize, using context, where substitutions have occurred.If you have any questions, please contact the dictating provider.    "

## 2024-10-24 NOTE — PLAN OF CARE
Problem: PAIN - ADULT  Goal: Verbalizes/displays adequate comfort level or baseline comfort level  Description: Interventions:  - Encourage patient to monitor pain and request assistance  - Assess pain using appropriate pain scale  - Administer analgesics based on type and severity of pain and evaluate response  - Implement non-pharmacological measures as appropriate and evaluate response  - Consider cultural and social influences on pain and pain management  - Notify physician/advanced practitioner if interventions unsuccessful or patient reports new pain  Outcome: Progressing     Problem: INFECTION - ADULT  Goal: Absence or prevention of progression during hospitalization  Description: INTERVENTIONS:  - Assess and monitor for signs and symptoms of infection  - Monitor lab/diagnostic results  - Monitor all insertion sites, i.e. indwelling lines, tubes, and drains  - Monitor endotracheal if appropriate and nasal secretions for changes in amount and color  - [place] appropriate cooling/warming therapies per order  - Administer medications as ordered  - Instruct and encourage patient and family to use good hand hygiene technique  - Identify and instruct in appropriate isolation precautions for identified infection/condition  Outcome: Progressing     Problem: SAFETY ADULT  Goal: Patient will remain free of falls  Description: INTERVENTIONS:  - Educate patient/family on patient safety including physical limitations  - Instruct patient to call for assistance with activity   - Consult OT/PT to assist with strengthening/mobility   - Keep Call bell within reach  - Keep bed low and locked with side rails adjusted as appropriate  - Keep care items and personal belongings within reach  - Initiate and maintain comfort rounds  - Make Fall Risk Sign visible to staff  - Offer Toileting every 2 Hours, in advance of need  - Initiate/Maintain alarm  - Obtain necessary fall risk management equipment:   - Apply yellow socks and  bracelet for high fall risk patients  - Consider moving patient to room near nurses station  Outcome: Progressing  Goal: Maintain or return to baseline ADL function  Description: INTERVENTIONS:  -  Assess patient's ability to carry out ADLs; assess patient's baseline for ADL function and identify physical deficits which impact ability to perform ADLs (bathing, care of mouth/teeth, toileting, grooming, dressing, etc.)  - Assess/evaluate cause of self-care deficits   - Assess range of motion  - Assess patient's mobility; develop plan if impaired  - Assess patient's need for assistive devices and provide as appropriate  - Encourage maximum independence but intervene and supervise when necessary  - Involve family in performance of ADLs  - Assess for home care needs following discharge   - Consider OT consult to assist with ADL evaluation and planning for discharge  - Provide patient education as appropriate  Outcome: Progressing     Problem: DISCHARGE PLANNING  Goal: Discharge to home or other facility with appropriate resources  Description: INTERVENTIONS:  - Identify barriers to discharge w/patient and caregiver  - Arrange for needed discharge resources and transportation as appropriate  - Identify discharge learning needs (meds, wound care, etc.)  - Arrange for interpretive services to assist at discharge as needed  - Refer to Case Management Department for coordinating discharge planning if the patient needs post-hospital services based on physician/advanced practitioner order or complex needs related to functional status, cognitive ability, or social support system  Outcome: Progressing     Problem: Knowledge Deficit  Goal: Patient/family/caregiver demonstrates understanding of disease process, treatment plan, medications, and discharge instructions  Description: Complete learning assessment and assess knowledge base.  Interventions:  - Provide teaching at level of understanding  - Provide teaching via preferred  learning methods  Outcome: Progressing     Problem: GENITOURINARY - ADULT  Goal: Maintains or returns to baseline urinary function  Description: INTERVENTIONS:  - Assess urinary function  - Encourage oral fluids to ensure adequate hydration if ordered  - Administer IV fluids as ordered to ensure adequate hydration  - Administer ordered medications as needed  - Offer frequent toileting  - Follow urinary retention protocol if ordered  Outcome: Progressing

## 2024-10-24 NOTE — ASSESSMENT & PLAN NOTE
Blood pressures reviewed  Systolic hypertension noted  Continue amlodipine 5 mg p.o. daily  Presently HCTZ olmesartan on hold  Nephrology inputs noted  Monitor blood pressures  Avoid hypotension

## 2024-10-24 NOTE — ASSESSMENT & PLAN NOTE
Lab Results   Component Value Date    HGBA1C 9.8 (H) 08/28/2024       Recent Labs     10/23/24  1624 10/23/24  2109 10/24/24  0701 10/24/24  1110   POCGLU 102 181* 82 209*       Blood Sugar Average: Last 72 hrs:  (P) 162.2125175046671680  Uncontrolled diabetes mellitus type 2 A1c 9.8  Accu-Cheks reviewed  Continue Lantus 15 units, Humalog 5 needs 3 times daily with meals  Discussed insulin therapy at discharge patient is reluctant.  Outpatient endocrinology follow-up recommended  He would like to resume Mounjaro at discharge.  Monitor Accu-Cheks  Avoid hypoglycemia

## 2024-10-24 NOTE — PROGRESS NOTES
Progress Note - Hospitalist   Name: Alejandro Adames 69 y.o. male I MRN: [id number]  [facility] Date of Admission: 10/18/2024   Date of Service: 10/24/2024 I Hospital Day: 6    Assessment & Plan  Other hydronephrosis  The patient started having left flank pain 1.5 weeks ago. He saw his urologist outpatient  for left flank pain and a CT was done which shows left hydronephrosis, retroperitoneal lymphadenopathy, and new sclerotic lesions concerning for metastatic disease. The patient was instructed by urologist to come to ED to be admitted for possible L ureter stent placement.  Status post left PCN placement 10/19/2024 per IR  Urology inputs noted  Supportive cares  Outpatient follow-up  Retroperitoneal mass  CT scan with peripelvic/proximal periureteral inflammatory stranding secondary to obstructing large left retroperitoneal toribio conglomerate. Enlarged left pelvic and other retroperitoneal nodes   Findings consistent with metastasis. 3 mm sclerotic lesions in the right iliac body and right femoral head may represent bone islands versus sclerotic mets.     Patient with underlying history of prostate cancer  IR consulted for biopsy, status post left inguinal lymph node biopsy on 10/19  Biopsy reported -metastatic carcinoma most suggestive of renal origin  Oncology following  Acute renal failure (ARF) (HCC)  Baseline creatinine 1.2  CT showed lymphadenopathy partially obstructing L ureter  Likely secondary to hydronephrosis and decreased oral intake  Status post left nephrostomy placement by IR 10/19  Discussed with nephrology patient likely with post obstructive diuresis requiring IV fluid hydration and close monitoring  Monitor kidney function  Avoid nephrotoxins  Nephrology following    Hyponatremia  POA  Suspect multifactorial  Resolved  Anemia  Anemia panel consistent with anemia of chronic disease and folate deficiency  Continue folic acid supplementation  Monitor hemoglobin  Essential  hypertension  Blood pressures reviewed  Systolic hypertension noted  Continue amlodipine 5 mg p.o. daily  Presently HCTZ olmesartan on hold  Nephrology inputs noted  Monitor blood pressures  Avoid hypotension    Type 2 diabetes mellitus with kidney complication, without long-term current use of insulin (HCC)  Lab Results   Component Value Date    HGBA1C 9.8 (H) 08/28/2024       Recent Labs     10/23/24  1624 10/23/24  2109 10/24/24  0701 10/24/24  1110   POCGLU 102 181* 82 209*       Blood Sugar Average: Last 72 hrs:  (P) 162.0703364863711031  Uncontrolled diabetes mellitus type 2 A1c 9.8  Accu-Cheks reviewed  Continue Lantus 15 units, Humalog 5 needs 3 times daily with meals  Discussed insulin therapy at discharge patient is reluctant.  Outpatient endocrinology follow-up recommended  He would like to resume Mounjaro at discharge.  Monitor Accu-Cheks  Avoid hypoglycemia    Malignant neoplasm of prostate (HCC)  Diagnosed in 2019 and underwent biopsy of the prostate and s/p XRT  PSA 0.355  Urology inputs noted  Lung nodule  3 mm left lower lobe lung nodule noted on visible portion of lung on CT abdomen/pelvis  Chest CT scan, negative for malignancy, per radiology, the 3 mm left lower lobe nodule is of doubtful significance.     VTE Pharmacologic Prophylaxis: VTE Score: 7 High Risk (Score >/= 5) - Pharmacological DVT Prophylaxis Ordered: heparin. Sequential Compression Devices Ordered.    Mobility:   Basic Mobility Inpatient Raw Score: 24  JH-HLM Goal: 8: Walk 250 feet or more  JH-HLM Achieved: 8: Walk 250 feet ot more  JH-HLM Goal achieved. Continue to encourage appropriate mobility.    Patient Centered Rounds: I performed bedside rounds with nursing staff today.   Discussions with Specialists or Other Care Team Provider: Pathology, oncology, case management nephrology    Education and Discussions with Family / Patient:  Discussed with the patient, reports he is keeping his family updated and declined my offer to call  family.     Current Length of Stay: 6 day(s)  Current Patient Status: Inpatient   Certification Statement: The patient will continue to require additional inpatient hospital stay due to as outlined  Discharge Plan:  Awaiting nephrology, oncology inputs for disposition planning    Code Status: Level 3 - DNAR and DNI    Subjective     Comfortably sitting up in chair  Reports left lower extremity swelling -discussed possibly due to retroperitoneal involvement lymphadenopathy with lymphedema venous stasis.  Encouraged limb elevation compression stocking  Encouraged ambulation        Objective :  Temp:  [97.9 °F (36.6 °C)-98.5 °F (36.9 °C)] 98.5 °F (36.9 °C)  HR:  [86-87] 86  BP: (119-151)/(84-87) 150/87  Resp:  [18-20] 20  SpO2:  [97 %-98 %] 98 %  O2 Device: None (Room air)    Body mass index is 25.11 kg/m².     Input and Output Summary (last 24 hours):     Intake/Output Summary (Last 24 hours) at 10/24/2024 1256  Last data filed at 10/24/2024 0601  Gross per 24 hour   Intake 620 ml   Output 3250 ml   Net -2630 ml       Physical Exam      Comfortably sitting up in chair  Neck supple  Lungs diminished breath sounds bases  Vesicular breath sounds  Heart sounds S1-S2 noted  Abdomen soft  Awake follows commands  Left lower extremity edema noted  No rash      Lines/Drains:  Lines/Drains/Airways       Active Status       Name Placement date Placement time Site Days    Nephrostomy Left 10 Fr. 10/19/24  1004  Left  5                            Lab Results: I have reviewed the following results:   Results from last 7 days   Lab Units 10/21/24  0604   WBC Thousand/uL 6.84   HEMOGLOBIN g/dL 10.2*   HEMATOCRIT % 32.9*   PLATELETS Thousands/uL 438*   SEGS PCT % 75   LYMPHO PCT % 12*   MONO PCT % 10   EOS PCT % 2     Results from last 7 days   Lab Units 10/24/24  0559 10/19/24  0629 10/18/24  1849   SODIUM mmol/L 140   < > 127*   POTASSIUM mmol/L 3.8   < > 4.3   CHLORIDE mmol/L 106   < > 92*   CO2 mmol/L 24   < > 22   BUN mg/dL 31*    < > 40*   CREATININE mg/dL 1.75*   < > 2.43*   ANION GAP mmol/L 10   < > 13   CALCIUM mg/dL 8.7   < > 9.7   ALBUMIN g/dL  --   --  3.9   TOTAL BILIRUBIN mg/dL  --   --  0.37   ALK PHOS U/L  --   --  142*   ALT U/L  --   --  23   AST U/L  --   --  15   GLUCOSE RANDOM mg/dL 75   < > 276*    < > = values in this interval not displayed.     Results from last 7 days   Lab Units 10/19/24  0823   INR  1.05     Results from last 7 days   Lab Units 10/24/24  1110 10/24/24  0701 10/23/24  2109 10/23/24  1624 10/23/24  1114 10/23/24  0731 10/22/24  2109 10/22/24  1703 10/22/24  1117 10/22/24  0744 10/21/24  2112 10/21/24  1646   POC GLUCOSE mg/dl 209* 82 181* 102 251* 83 159* 124 224* 95 170* 169*               Recent Cultures (last 7 days):         Imaging Results Review: I personally reviewed the following image studies/reports in PACS and discussed pertinent findings with Radiology: CT chest, CT abdomen/pelvis, and procedure reports. My interpretation of the radiology images/reports is: Biopsy results.  Other Study Results Review: No additional pertinent studies reviewed.    Last 24 Hours Medication List:     Current Facility-Administered Medications:     acetaminophen (TYLENOL) tablet 650 mg, Q4H PRN    amLODIPine (NORVASC) tablet 5 mg, Daily    atorvastatin (LIPITOR) tablet 40 mg, Daily    ceFAZolin (ANCEF) IVPB (premix in dextrose) 2,000 mg 50 mL, Once    folic acid (FOLVITE) tablet 1 mg, Daily    heparin (porcine) subcutaneous injection 5,000 Units, Q8H CHONG    HYDROmorphone HCl (DILAUDID) injection 0.2 mg, Q4H PRN    insulin glargine (LANTUS) subcutaneous injection 15 Units 0.15 mL, HS    insulin lispro (HumALOG/ADMELOG) 100 units/mL subcutaneous injection 1-5 Units, HS    insulin lispro (HumALOG/ADMELOG) 100 units/mL subcutaneous injection 1-6 Units, TID AC **AND** Fingerstick Glucose (POCT), TID AC    insulin lispro (HumALOG/ADMELOG) 100 units/mL subcutaneous injection 5 Units, TID With Meals    ondansetron (ZOFRAN)  injection 4 mg, Q6H PRN    oxyCODONE (ROXICODONE) split tablet 2.5 mg, Q4H PRN **OR** oxyCODONE (ROXICODONE) IR tablet 5 mg, Q4H PRN    polyethylene glycol (MIRALAX) packet 17 g, Daily    senna-docusate sodium (SENOKOT S) 8.6-50 mg per tablet 2 tablet, BID    sodium chloride infusion 0.45 %, Continuous, Last Rate: 75 mL/hr (10/24/24 1113)    Administrative Statements   Today, Patient Was Seen By: Reji Naik MD  I have spent a total time of 32 minutes in caring for this patient on the day of the visit/encounter including Diagnostic results, Impressions, and Communicating with other healthcare professionals .    **Please Note: This note may have been constructed using a voice recognition system.**

## 2024-10-24 NOTE — ASSESSMENT & PLAN NOTE
Noted enlarged left pelvic and in the retroperitoneum nodes  Biopsy results (prelim renal primary)

## 2024-10-24 NOTE — PROGRESS NOTES
Hematology/Oncology Outpatient Office Note    Date of Service: 2024    [facility] [address]  [place] [address]  [phone]    Reason for Consultation:   Chief Complaint   Patient presents with    Follow-up       Cancer Stage at diagnosis: IV    Referral Physician: No ref. provider found    Primary Care Physician:  Wayne Uriarte Jr, MD     Nickname: Charli    Lives with his brother, Gordy Ellis ECO     Today's ECO    Goals and Barriers:  Current Goal: Minimize effects of disease burden, extend life.   Barriers to accomplishing this: malaise    Patient's Capacity to Self Care:  none    ASSESSMENT & PLAN      Diagnosis ICD-10-CM Associated Orders   1. Renal cell carcinoma of left kidney (HCC)  C64.2       2. Malignant neoplasm of prostate (HCC)  C61       3. Chemotherapy induced nausea and vomiting  R11.2     T45.1X5A             This is a 69 y.o. c PMHx notable for DM, HTN, prostate cancer under good control, being seen in consultation for metastatic RCC      Discussion of decision making  Oncology history updated, accordingly, during this visit  Goals of care/patient communication  I discussed with the patient the clinical course leading up to their cancer diagnosis. I reviewed relevant office notes, imaging reports and pathology result as well.  I told the patient that this is a case of incurable disease and what this means. We discussed that the goal of anti-cancer therapy is to provide best quality of life, extend overall survival, and progression free survival as shown in clinical trials. We also discussed that there might be a point when the cancer will no longer respond to this anti-neoplastic therapy. As a result, we also discussed the role of the palliative care team being introduced early in the treatment course. We will be making this referral  I explained the risks/benefits of the proposed cancer therapy:  Lenvima + Keytruda and after discussion including understanding risks of possible life-threatening complications and therapy-related malignancy development, informed consent for blood products and treatment has been signed and obtained.  TNM/Staging At Diagnosis  Cancer Staging   Malignant neoplasm of prostate (HCC)  Staging form: Prostate, AJCC 8th Edition  - Clinical: Stage IIB (cT1c, cN0, cM0, PSA: 4.9, Grade Group: 2) - Signed by Isaak Castellon MD on 10/24/2024  Prostate specific antigen (PSA) range: Less than 10  Trade score: 7  Histologic grading system: 5 grade system    Renal cell carcinoma of left kidney (HCC)  Staging form: Kidney, AJCC 8th Edition  - Clinical: Stage IV (cTX, cN1, cM1) - Signed by Isaak Castellon MD on 10/24/2024    Disease Features/Tumor Markers/Genetics  Tumor Marker: PSA  1/3/2019: 5.6  4/8/2019: 4.9  10/17/2022: 1.3  10/19/2024: 0.355  Notable Path Features:   10/19/2024 inguinal LN bx: retroperitoneal bx is positive this is most compatible with renal primary   Treatment: Lenvima + Keytruda  Other Supportive care:  Treatment Team Members  Surgeon  Rad Onc  Palliative  [facility]: Dr. Connors  Labs  10/24/2024: creat 1.75  Diagnostics  10/18/2024 CT Chest w/o c: Nothing to suggest malignancy in the chest. The 3 mm left lower lobe nodule is of doubtful significance.  Abd/pelv w/c: Moderate left hydronephrosis with peripelvic/proximal periureteral inflammatory stranding secondary to obstructing large left retroperitoneal toribio conglomerate. Conglomerate of left retroperitoneal nodes measuring approximately 6.5 x 5 x 13 cm (2:106)   Enlarged left pelvic and other retroperitoneal nodes as described  3 mm sclerotic lesions in the right iliac body and right femoral head  10/26/2024 MRI Abd w/wo c: No renal cortical mass is identified bilaterally. Mild urothelial thickening and enhancement within the left renal pelvis without measurable lesion.     Discussion of decision making    I  personally reviewed the following lab results, the image studies, pathology, other specialty/physicians consult notes and recommendations, and outside medical records. I had a lengthy discussion with the patient and shared the work-up findings. We discussed the diagnosis and management plan as below. I spent 41 minutes reviewing the records (labs, clinician notes, outside records, medical history, ordering medicine/tests/procedures, monitoring of anti-neoplastic toxicities, interpreting the imaging/labs previously done) and coordination of care as well as direct time with the patient today, of which greater than 50% of the time was spent in counseling and coordination of care with the patient/family.      Plan/Labs  Zofran 8mg q8 prn nausea/vomiting to be sent home  Tumor board discussion being pursued for consensus on the plan going forward  F/u Urologist at [facility] for prostate cancer monitoring   Referral for Palliative care for advanced disease  NM bone scan to determine if there is actual osseous disease  Zometa 4 mg q12 weeks  Guardant NGS to assess for actionable biomarkers  Coordination with PCP to see if he can come off of Alejo ELLIOTT NGS needs to be sent off the lymph node bx*  Restaging CT CAP w/c in 3 months   We discussed Lenvima 20 mg daily + Keytruda 400 mg q6 weeks which can be used for both clear cell and non clear cell indication as the histology is not clear to which subtype we are dealing with        Follow Up: q4 weeks for 3 months, then q12 weeks    All questions were answered to the patient's satisfaction during this encounter. The patient knows the contact information for our office and knows to reach out for any relevant concerns related to this encounter. They are to call for any temperature 100.4 or higher, new symptoms including but not restricted to shaking chills, decreased appetite, nausea, vomiting, diarrhea, increased fatigue, shortness of breath or chest pain, confusion, and not  feeling the strength to come to the clinic. For all other listed problems and medical diagnosis in their chart - they are managed by PCP and/or other specialists, which the patient acknowledges. Thank you very much for your consultation and making us a part of this patient's care. We are continuing to follow closely with you. Please do not hesitate to reach out to me with any additional questions or concerns.    Isaak Castellon MD  Hematology & Medical Oncology Staff Physician             Disclaimer: This document was prepared using Contour Direct technology. If a word or phrase is confusing, or does not make sense, this is likely due to recognition error which was not discovered during this clinician's review. If you believe an error has occurred, please contact me through [employer] service line for amena?cation.      ONCOLOGY HISTORY OF PRESENT ILLNESS        Oncology History   Malignant neoplasm of prostate (HCC)   6/18/2019 Initial Diagnosis    Malignant neoplasm of prostate (HCC)     10/24/2024 -  Cancer Staged    Staging form: Prostate, AJCC 8th Edition  - Clinical: Stage IIB (cT1c, cN0, cM0, PSA: 4.9, Grade Group: 2) - Signed by Isaak Castellon MD on 10/24/2024  Prostate specific antigen (PSA) range: Less than 10  Kayley score: 7  Histologic grading system: 5 grade system       Renal cell carcinoma of left kidney (HCC)   10/24/2024 Initial Diagnosis    Renal cell carcinoma of left kidney (HCC)     10/24/2024 -  Cancer Staged    Staging form: Kidney, AJCC 8th Edition  - Clinical: Stage IV (cTX, cN1, cM1) - Signed by Isaak Castellon MD on 10/24/2024         History of prostate adenocarcinoma localized intermediate-favorable risk adenocarcinoma of the prostate (Stage II - cT1c, cN0, cM0, Kayley Score: 3+4=7, PSA: 4.9)  s/p SBRT (Administered 3,700-Gy over 5-fractions - Completed on September 18th, 2019)    SUBJECTIVE  (INTERVAL HISTORY)      Clotting History None   Bleeding History None   Cancer  History Prostate s/p above treatment, RCC   Family Cancer History None   H/O Blood/Plt Transfusion None   Tobacco/etoh/drug abuse 2 PPD x 20 years, quit at age 49, no etoh abuse or rec drug use           Retired Electric  (retired)     Pain: L hip and mid lower abd when leaning forward    Intermittent diarrhea, constipation on Mounjaro.    I have reviewed the relevant past medical, surgical, social and family history. I have also reviewed allergies and medications for this patient.    Review of Systems  Baseline weight: 175 lbs    Lost 13 lbs while on Mounjaro, since 7/2024, appetite has been decreased.    Denies F/C, N/V, SOB, CP, LH, HA, rash, itching, gen weakness, melena, hematuria, hematochezia, falls.        A 10-point review of system was performed, pertinent positive and negative were detailed as above. Otherwise, the 10-point review of system was negative.      Past Medical History:   Diagnosis Date    Cutaneous skin tags 1/2/2019    Diabetes mellitus (HCC)     Hypertension        Past Surgical History:   Procedure Laterality Date    IR BIOPSY INGUINAL LYMPH NODE  10/19/2024    IR NEPHROSTOMY TUBE PLACEMENT  10/19/2024       Family History   Problem Relation Age of Onset    No Known Problems Brother     Stroke Mother        Social History     Socioeconomic History    Marital status: Single     Spouse name: Not on file    Number of children: Not on file    Years of education: Not on file    Highest education level: Not on file   Occupational History    Occupation: Work history - travels a lot   Tobacco Use    Smoking status: Former    Smokeless tobacco: Never   Vaping Use    Vaping status: Never Used   Substance and Sexual Activity    Alcohol use: Yes     Alcohol/week: 2.0 - 3.0 standard drinks of alcohol     Types: 2 - 3 Cans of beer per week     Comment: social    Drug use: No    Sexual activity: Not on file   Other Topics Concern    Not on file   Social History Narrative    Engages in travel  abroad    Living independently alone'     Social Determinants of Health     Financial Resource Strain: Not on file   Food Insecurity: Patient Unable To Answer (10/22/2024)    Nursing - Inadequate Food Risk Classification     Worried About Running Out of Food in the Last Year: Never true     Ran Out of Food in the Last Year: Never true     Ran Out of Food in the Last Year: 97   Transportation Needs: Patient Unable To Answer (10/22/2024)    Nursing - Transportation Risk Classification     Lack of Transportation: Not on file     Lack of Transportation: 97   Physical Activity: Not on file   Stress: Not on file   Social Connections: Not on file   Intimate Partner Violence: Patient Unable To Answer (10/22/2024)    Nursing IPS     Feels Physically and Emotionally Safe: Not on file     Physically Hurt by Someone: Not on file     Humiliated or Emotionally Abused by Someone: Not on file     Physically Hurt by Someone: 97     Hurt or Threatened by Someone: 97   Housing Stability: Patient Unable To Answer (10/22/2024)    Nursing: Inadequate Housing Risk Classification     Has Housing: Not on file     Worried About Losing Housing: Not on file     Unable to Get Utilities: Not on file     Unable to Pay for Housing in the Last Year: 97     Has Housin       Allergies   Allergen Reactions    Captopril Cough    Crestor [Rosuvastatin] Diarrhea    Enalapril Cough       Current Outpatient Medications   Medication Sig Dispense Refill    amLODIPine (NORVASC) 5 mg tablet Take 1 tablet (5 mg total) by mouth daily 30 tablet 0    atorvastatin (LIPITOR) 40 mg tablet TAKE 1 TABLET BY MOUTH EVERY  tablet 1    celecoxib (CeleBREX) 200 mg capsule Take 1 capsule twice a day by oral route, for 1 month.      folic acid (FOLVITE) 1 mg tablet Take 1 tablet (1 mg total) by mouth daily 30 tablet 0    glucose blood (OneTouch Verio) test strip Check blood sugars once daily. Please substitute with appropriate alternative as covered by patient's  insurance. Dx: E11.65 100 each 3    metFORMIN (GLUCOPHAGE) 1000 MG tablet TAKE 1 TABLET BY MOUTH TWICE A DAY WITH FOOD 180 tablet 1    Mounjaro 10 MG/0.5ML Inject 0.5 mL (10 mg total) under the skin once a week 2 mL 0    OneTouch Delica Lancets 33G MISC Check blood sugars once daily. Please substitute with appropriate alternative as covered by patient's insurance. Dx: E11.65 100 each 3    oxyCODONE (ROXICODONE) 5 immediate release tablet Take 0.5 tablets (2.5 mg total) by mouth every 8 (eight) hours as needed for severe pain Max Daily Amount: 7.5 mg 10 tablet 0    polyethylene glycol (MIRALAX) 17 g packet Take 17 g by mouth daily 510 g 0    senna-docusate sodium (SENOKOT S) 8.6-50 mg per tablet Take 1 tablet by mouth 2 (two) times a day 60 tablet 0    sodium chloride, PF, 0.9 % 10 mL by Intracatheter route daily Intracatheter flushing daily. May substitute prefilled syringe with normal saline 10 mL vials, 10 mL syringes, and 18 g blunt needles 300 mL 3    triamcinolone (KENALOG) 0.1 % cream APPLY TOPICALLY TO THE AFFECTED AREA TWICE DAILY 30 g 0     No current facility-administered medications for this visit.       (Not in a hospital admission)      Objective:     24 Hour Vitals Assessment:     Vitals:    11/01/24 0916   BP: 128/78   Pulse: 98   Resp: 16   Temp: (!) 97.4 °F (36.3 °C)   SpO2: 99%       PHYSICIAN EXAM:    General: Appearance: alert, cooperative, no distress.  HEENT: Normocephalic, atraumatic. No scleral icterus. conjunctivae clear. EOMI.  Chest: No tenderness to palpation. No open wound noted.  Lungs: Clear to auscultation bilaterally, Respirations unlabored.  Cardiac: Regular rate, +S1and S2  Abdomen: Soft, non-tender, non-distended. Bowel sounds are normal.   Extremities:  No edema, cyanosis, clubbing.  Skin: Skin color, turgor are normal. No rashes.  Lymphatics: no palpable supra-cervical, axillary, or inguinal adenopathy  Neurologic: Awake, Alert, and oriented, no gross focal deficits noted b/l.        DATA REVIEW:    Pathology Result:    Final Diagnosis   Date Value Ref Range Status   10/26/2024   Final    A. Urine, Renal, Left, ThinPrep:  Atypical urothelial cells (AUC) - see comment.  Benign squamous cells and mixed inflammatory cells.    Satisfactory for evaluation.    Comment:  The above diagnostic category is from the recently published book, The Sandra System for Reporting Urinary Cytology, and is in keeping with the ongoing effort for utilization of standardized diagnostic terminology in urine cytology.*    *The Sandra System for Reporting Urinary Cytology. Jennifer Blanca, Caitlyn Kerr, Grant Aguila; 2016.     Interpretation performed at [facility] [address] [place] [place] [address]      10/19/2024   Final    A. Lymph node, inguinal, left, biopsy:  -   Metastatic carcinoma most suggestive of renal origin, see comment.     Comment: The patient's history of prostate cancer with new onset acute renal failure and retroperitoneal mass are noted. As per radiology there are no clear masses identified. The current sample is positive for metastatic carcinoma. The morphologic and immunohistochemical findings are most consistent with renal origin. However, in the absence of a clear mass lesion, further clinical and radiographic work-up is advised.    Intradepartmental consultation is in agreement (IK/CV).  Dr. Castellon is notified of the findings by Dr. Hines via [employer] Secure Chat on 10/24/24.     04/12/2019   Final    A. Prostate, right peripheral zone, needle core biopsy:  - Benign prostate glands and stroma.    B. Prostate, right central zone, needle core biopsy:  - Prostatic adenocarcinoma, Craig grade 3 + 3 = 6/10 (Grade group 1), involving one of two cores and 5% of the involved core.  - Focal high-grade prostatic intraepithelial neoplasia (HGPIN).  - Perineural invasion is absent.    C. Prostate, left peripheral zone, needle core biopsy:  - Prostatic adenocarcinoma, Craig  grade 3 + 4 = 7 (>95% pattern 3 and <5% pattern 4, Grade group 2), involving one of 4 cores and 20% of the tissue.  - Prostatic adenocarcinoma, Houston grade 3 + 3 = 6/10 (Grade group 1), involving two of four cores and 5%, 10% of involved cores.  - Perineural invasion is absent.    D. Prostate, left central zone, needle core biopsy:  - Benign prostate glands and stroma.    Comment: Immunohistochemistry for prostate triple stain multiplex (,P63,[id number]) was preformed ob blocks A2, B1, C1, and C2 supporting the above diagnosis.    Intradepartmental consultation is in agreement.  Interpret[facility] [address]            Image Results:   Image result are reviewed and documented in Hematology/Oncology history    MRI abdomen w wo contrast  Narrative: MRI - ABDOMEN - WITH AND WITHOUT CONTRAST    INDICATION: 69 years / Male. evaluate for renal lesion given left iliac biopsy unexpectedly showed metastatic renal carcinoma in a patient with known prostate cancer.    COMPARISON: CT abdomen pelvis 10/18/2024    TECHNIQUE: Multiplanar/multisequence MRI of the abdomen was performed before and after administration of contrast.    IV Contrast: 7 mL of Gadobutrol injection (SINGLE-DOSE)    FINDINGS:    LOWER CHEST: Unremarkable.    LIVER:  Normal in size and configuration.  No suspicious mass.  Patent hepatic and portal veins.    BILE DUCTS: No intrahepatic or extrahepatic bile duct dilation.    GALLBLADDER: Normal.    PANCREAS: Unremarkable.    ADRENAL GLANDS: Unremarkable.    SPLEEN: Normal.    KIDNEYS/PROXIMAL URETERS: There is a left percutaneous nephrostomy tube with mild residual left hydroureteronephrosis (series 8 images 28-31). Mild urothelial thickening and enhancement. No discrete measurable mass either cortical or urothelial. Small   wedge-shaped scarlike focus in the posterior lower pole.    Oval bilobed cystlike structure in the anterior mid upper renal hilum has an  "appearance most suggestive of small renal artery aneurysm, 1.3 cm in diameter.    Bilateral symmetric perinephric fluid stranding.    BOWEL: No dilated loops of bowel.    PERITONEUM/RETROPERITONEUM: No ascites.    LYMPH NODES: Conglomerate left periaortic adenopathy again demonstrated, measuring approximately 4.1 x 2.9 x 8.3 cm (series 7 image 19, series 32729 image 319). Several enhancing right periaortic lymph nodes again noted, including a 2.8 x 1.1 cm node   abutting the IVC (series 21958 image 311).    VESSELS: No aneurysm.    ABDOMINAL WALL: Unremarkable    BONES: No suspicious osseous lesion.  Impression: No renal cortical mass is identified bilaterally. Mild urothelial thickening and enhancement within the left renal pelvis without measurable lesion.    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    Extensive retroperitoneal adenopathy again demonstrated, consistent with metastatic disease.    Resident: Gordy Drake I, the attending radiologist, have reviewed the images and agree with the final report above.    Workstation performed: [id number]      LABS:  Lab data are [facility] and [facility].       Lab Results   Component Value Date    HGB 10.2 (L) 10/21/2024    HCT 32.9 (L) 10/21/2024    MCV 94 10/21/2024     (H) 10/21/2024    WBC 6.84 10/21/2024    NRBC 0 10/21/2024     Lab Results   Component Value Date    K 3.8 10/27/2024     10/27/2024    CO2 25 10/27/2024    BUN 27 (H) 10/27/2024    CREATININE 1.40 (H) 10/27/2024    CALCIUM 9.0 10/27/2024    AST 15 10/18/2024    ALT 23 10/18/2024    ALKPHOS 142 (H) 10/18/2024    EGFR 50 10/27/2024       Lab Results   Component Value Date    IRON 20 (L) 10/19/2024    TIBC 196 (L) 10/19/2024    FERRITIN 494 (H) 10/19/2024    FERRITIN 183 10/17/2022    FERRITIN 174 12/23/2021       Lab Results   Component Value Date    [id number] 606 10/19/2024       No results for input(s): \"WBC\", \"CREAT\", \"PLT\" in the " last 72 hours.    By:  Isaak Castellon MD, 11/1/2024, 9:25 AM

## 2024-10-24 NOTE — ASSESSMENT & PLAN NOTE
Etiology thought to be multifactorial due to component of obstructive uropathy +/- contrast associated nephropathy +/- previous NSAID use  Baseline creatinine 1.0-1.2  Creatinine on admission 2.43 on 10/18/2024  Peak creatinine 2.73 on 10/20/2024  Creatinine yesterday 1.70 status post discontinuation of IV fluids  Creatinine today increased to 1.75  Remains polyuric but overall improved  Resume IV fluids, use half-normal saline at 75 cc/h today  Trend BMP daily

## 2024-10-25 ENCOUNTER — PATIENT OUTREACH (OUTPATIENT)
Dept: HEMATOLOGY ONCOLOGY | Facility: CLINIC | Age: 69
End: 2024-10-25

## 2024-10-25 LAB
ANION GAP SERPL CALCULATED.3IONS-SCNC: 9 MMOL/L (ref 4–13)
BUN SERPL-MCNC: 30 MG/DL (ref 5–25)
CALCIUM SERPL-MCNC: 8.5 MG/DL (ref 8.4–10.2)
CHLORIDE SERPL-SCNC: 104 MMOL/L (ref 96–108)
CO2 SERPL-SCNC: 23 MMOL/L (ref 21–32)
CREAT SERPL-MCNC: 1.67 MG/DL (ref 0.6–1.3)
GFR SERPL CREATININE-BSD FRML MDRD: 40 ML/MIN/1.73SQ M
GLUCOSE SERPL-MCNC: 154 MG/DL (ref 65–140)
GLUCOSE SERPL-MCNC: 185 MG/DL (ref 65–140)
GLUCOSE SERPL-MCNC: 252 MG/DL (ref 65–140)
GLUCOSE SERPL-MCNC: 264 MG/DL (ref 65–140)
GLUCOSE SERPL-MCNC: 278 MG/DL (ref 65–140)
POTASSIUM SERPL-SCNC: 4 MMOL/L (ref 3.5–5.3)
SODIUM SERPL-SCNC: 136 MMOL/L (ref 135–147)

## 2024-10-25 PROCEDURE — 99233 SBSQ HOSP IP/OBS HIGH 50: CPT | Performed by: INTERNAL MEDICINE

## 2024-10-25 PROCEDURE — 99232 SBSQ HOSP IP/OBS MODERATE 35: CPT | Performed by: INTERNAL MEDICINE

## 2024-10-25 PROCEDURE — 82948 REAGENT STRIP/BLOOD GLUCOSE: CPT

## 2024-10-25 PROCEDURE — 88341 IMHCHEM/IMCYTCHM EA ADD ANTB: CPT | Performed by: STUDENT IN AN ORGANIZED HEALTH CARE EDUCATION/TRAINING PROGRAM

## 2024-10-25 PROCEDURE — 80048 BASIC METABOLIC PNL TOTAL CA: CPT | Performed by: INTERNAL MEDICINE

## 2024-10-25 RX ORDER — INSULIN GLARGINE 100 [IU]/ML
20 INJECTION, SOLUTION SUBCUTANEOUS
Status: DISCONTINUED | OUTPATIENT
Start: 2024-10-25 | End: 2024-10-27 | Stop reason: HOSPADM

## 2024-10-25 RX ADMIN — Medication 2.5 MG: at 04:32

## 2024-10-25 RX ADMIN — INSULIN LISPRO 5 UNITS: 100 INJECTION, SOLUTION INTRAVENOUS; SUBCUTANEOUS at 12:11

## 2024-10-25 RX ADMIN — INSULIN LISPRO 5 UNITS: 100 INJECTION, SOLUTION INTRAVENOUS; SUBCUTANEOUS at 09:30

## 2024-10-25 RX ADMIN — SENNOSIDES AND DOCUSATE SODIUM 2 TABLET: 50; 8.6 TABLET ORAL at 17:09

## 2024-10-25 RX ADMIN — INSULIN LISPRO 3 UNITS: 100 INJECTION, SOLUTION INTRAVENOUS; SUBCUTANEOUS at 21:26

## 2024-10-25 RX ADMIN — Medication 2.5 MG: at 21:29

## 2024-10-25 RX ADMIN — ATORVASTATIN CALCIUM 40 MG: 40 TABLET, FILM COATED ORAL at 09:29

## 2024-10-25 RX ADMIN — SENNOSIDES AND DOCUSATE SODIUM 2 TABLET: 50; 8.6 TABLET ORAL at 09:29

## 2024-10-25 RX ADMIN — INSULIN LISPRO 1 UNITS: 100 INJECTION, SOLUTION INTRAVENOUS; SUBCUTANEOUS at 17:10

## 2024-10-25 RX ADMIN — INSULIN LISPRO 1 UNITS: 100 INJECTION, SOLUTION INTRAVENOUS; SUBCUTANEOUS at 09:30

## 2024-10-25 RX ADMIN — FOLIC ACID 1 MG: 1 TABLET ORAL at 09:29

## 2024-10-25 RX ADMIN — INSULIN LISPRO 5 UNITS: 100 INJECTION, SOLUTION INTRAVENOUS; SUBCUTANEOUS at 17:09

## 2024-10-25 RX ADMIN — AMLODIPINE BESYLATE 5 MG: 5 TABLET ORAL at 09:29

## 2024-10-25 RX ADMIN — HEPARIN SODIUM 5000 UNITS: 5000 INJECTION INTRAVENOUS; SUBCUTANEOUS at 06:33

## 2024-10-25 RX ADMIN — HEPARIN SODIUM 5000 UNITS: 5000 INJECTION INTRAVENOUS; SUBCUTANEOUS at 13:46

## 2024-10-25 RX ADMIN — POLYETHYLENE GLYCOL 3350 17 G: 17 POWDER, FOR SOLUTION ORAL at 09:29

## 2024-10-25 RX ADMIN — INSULIN GLARGINE 20 UNITS: 100 INJECTION, SOLUTION SUBCUTANEOUS at 21:26

## 2024-10-25 RX ADMIN — INSULIN LISPRO 3 UNITS: 100 INJECTION, SOLUTION INTRAVENOUS; SUBCUTANEOUS at 12:11

## 2024-10-25 RX ADMIN — HEPARIN SODIUM 5000 UNITS: 5000 INJECTION INTRAVENOUS; SUBCUTANEOUS at 21:25

## 2024-10-25 NOTE — ASSESSMENT & PLAN NOTE
CT scan with peripelvic/proximal periureteral inflammatory stranding secondary to obstructing large left retroperitoneal toribio conglomerate. Enlarged left pelvic and other retroperitoneal nodes   Findings consistent with metastasis. 3 mm sclerotic lesions in the right iliac body and right femoral head may represent bone islands versus sclerotic mets.     Patient with underlying history of prostate cancer  IR consulted for biopsy, status post left inguinal lymph node biopsy on 10/19  Biopsy reported -metastatic carcinoma most suggestive of renal origin  Given the biopsy result findings and oncology plans for further imaging and workup noted  Oncology following

## 2024-10-25 NOTE — PROGRESS NOTES
Medical Oncology/Hematology Progress Note  Alejandro Adames, male, 69 y.o., 1955,  [facility], [id number]     Assessment and plan:  High-Suspicion of Metastatic Disease - Prostate Versus Other Solid Tumor Versus Lymphoma:  New Large Left Retroperitoneal Toribio Mass with Left Pelvic and Retroperitoneal LAD:  Sclerotic Osseous Lesions Involving the Right Iliac Body and Right Femoral Head:  Acute Kidney Injury Secondary to Obstructive-Nephropathy:  Normocytic Anemia of Mixed Etiology: iron deficiency, folate deficiency, and anemia of chronic inflammation  Leukocytosis with Neutrophilic Predominance and Thrombocytosis (Likely Reactive):  History of Localized Intermediate-Risk Adenocarcinoma of the Prostate:  Patient is a 69-year-old male, with an established history of localized intermediate-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 Repeat PSA: 0.355. CT Abdomen, and Pelvis shows moderate left hydronephrosis, with peripelvic/proximal periureteral inflammatory stranding secondary to an obstructing large left retroperitoneal toribio conglomerate. Enlarged left pelvic, and other retroperitoneal nodes noted, as well. Sclerotic lesions (Measuring 3-millimeters) involving the right iliac body and right femoral head were identified, as well. CT Chest was unremarkable for evidence of distant metastatic disease. Highly-suspect metastatic disease. Differential considerations include: Recurrent metastatic prostate cancer versus metastatic disease of other solid primary (e.g. Melanoma, versus sarcoma, versus testicular choriocarcinoma) versus lymphoma. Patient is now  status-post left external inguinal node biopsy on October 19th, 2024 with results unexpectedly showing metastatic carcinoma of renal origin.     Summary of Recommendations:  Status-post percutaneous nephrostomy-tube placement, and left inguinal LN biopsy (10/19/24) unexpectedly showing metastatic carcinoma of renal origin in the setting of discordant CT Abd/Pelvis W (10/18) which did not show any renal lesions.  Discussed with radiology and it was determined that we should order MRI Abd/Pelvis WWO to r/o an occult renal cell carcinoma.  We will re-engage urology who was consulted early in the admission for their diagnostic opinion on the risk of a possible transition cell carcinoma  Anemia of Mixed Etiology: Iron deficiency, folate deficiency, and anemia of chronic inflammation  Folic acid 1 mg PO daily  Ferrous sulfate 324 mg PO Q48H with daily Vitamin C  Will coordinate for close interval follow-up with Medical Oncology within 2-weeks of discharge.    Interval history:  Pathology results of L inguinal lymph node biopsy unexpectedly showed metastatic carcinoma of renal origin.  Iron panel showed a mixed picture with iron deficiency, folate deficiency, and anemia of chronic inflammation.    History of present illness:  Alejandro Adames is a 69-year-old male, with an established history of hypertension, hyperlipidemia, type 2 diabetes mellitus, and localized intermediate-risk adenocarcinoma of the prostate (Stage II - cT1c, cN0, cM0, [place] Score: 3+4=7, PSA: 4.9) status-post SBRT (Administered 3,700-Gy over 5-fractions - Completed on September 18th, 2019); who initially presented to [facility] on October 18th, 2024, as a referral by Urology given finding of left hydronephrosis in the setting of a new retroperitoneal mass. Historical information was obtained from patient and prior documentation.      Of particular importance, patient has an established history of intermediate-risk  adenocarcinoma of the prostate (Stage II - cT1c, cN0, cM0, Kayley Score: 3+4=7, PSA: 4.9) followed by [facility]. Patient underwent SBRT (Administered 3,700-Gy over 5-fractions), of which was completed on September 18th, 2019. He continues to follow with Radiation Oncology, without evidence of biochemical recurrence of disease. Recent uptrend in PSA (PSA: 0.48 to 0.66) over approximately 10-months was noted to be within normal physiologic variation. Most recent PSA in October 2024: 0.355.     Interval events are remarkable for presentation to Urology for evaluation of left-sided testicular pain, in late September 2024. Ultrasound Scrotum and Groin was performed, and demonstrated an abnormally enlarged left inguinal lymph node corresponding to the area of concern indicated by the patient (e.g. Hypoechoic reniform structure was noted, measuring 4.2 x 1.8 x 2.2-centimeters without a normal echogenic hilum). Outpatient tissue-sampling was scheduled; however, patient developed acute-onset left flank pain in the interim, prompting CT Abdomen, and Pelvis, of which demonstrated moderate left hydronephrosis, with peripelvic/proximal periureteral inflammatory stranding secondary to an obstructing large left retroperitoneal toribio conglomerate. Enlarged left pelvic, and other retroperitoneal nodes noted, as well. Left lower lobe nodule (Measuring 3-millimeters), as well as sclerotic lesions (Measuring 3-millimeters) involving the right iliac body and right femoral head were identified, as well. CT Chest was unremarkable for evidence of distant metastatic disease. Given the above-mentioned findings, patient was referred directly to the [facility] Emergency Department on October 18th, 2024. Urology was consulted, and recommended percutaneous nephrostomy-tube placement. Interventional Radiology was consulted for the above-mentioned, as well as consideration of  tissue-sampling. Medical Oncology was consulted for further assistance with evaluation, as well.     Note: Patient was somnolent upon returning from Interventional Radiology procedure this afternoon. He was unable to provide a comprehensive history of present illness, or review of systems.    Review of Systems:   ROS negative except as indicated above.    Current Facility-Administered Medications:     acetaminophen (TYLENOL) tablet 650 mg, 650 mg, Oral, Q4H PRN, Dirk Guerrero DO, 650 mg at 10/24/24 2257    amLODIPine (NORVASC) tablet 5 mg, 5 mg, Oral, Daily, Dirk Guerrero DO, 5 mg at 10/25/24 0929    atorvastatin (LIPITOR) tablet 40 mg, 40 mg, Oral, Daily, Zeyad Trevino DO, 40 mg at 10/25/24 0929    ceFAZolin (ANCEF) IVPB (premix in dextrose) 2,000 mg 50 mL, 2,000 mg, Intravenous, Once, Yovani Gaxiola MD    folic acid (FOLVITE) tablet 1 mg, 1 mg, Oral, Daily, Dirk Guerrero DO, 1 mg at 10/25/24 0929    heparin (porcine) subcutaneous injection 5,000 Units, 5,000 Units, Subcutaneous, Q8H CHONG, Dirk Guerrero DO, 5,000 Units at 10/25/24 0633    HYDROmorphone HCl (DILAUDID) injection 0.2 mg, 0.2 mg, Intravenous, Q4H PRN, Zeyad Trevino DO, 0.2 mg at 10/18/24 2242    insulin glargine (LANTUS) subcutaneous injection 15 Units 0.15 mL, 15 Units, Subcutaneous, HS, Dirk Guerrero DO, 15 Units at 10/24/24 2245    insulin lispro (HumALOG/ADMELOG) 100 units/mL subcutaneous injection 1-5 Units, 1-5 Units, Subcutaneous, HS, Zeyad Trevino DO, 2 Units at 10/24/24 2246    insulin lispro (HumALOG/ADMELOG) 100 units/mL subcutaneous injection 1-6 Units, 1-6 Units, Subcutaneous, TID AC, 1 Units at 10/25/24 0930 **AND** Fingerstick Glucose (POCT), , , TID AC, Zeyad Trevino DO    insulin lispro (HumALOG/ADMELOG) 100 units/mL subcutaneous injection 5 Units, 5 Units, Subcutaneous, TID With Meals, Dirk Guerrero DO, 5 Units at 10/25/24 0930    ondansetron (ZOFRAN) injection 4 mg, 4 mg, Intravenous, Q6H PRN, Zeyad Trevino DO     "oxyCODONE (ROXICODONE) split tablet 2.5 mg, 2.5 mg, Oral, Q4H PRN, 2.5 mg at 10/25/24 0432 **OR** oxyCODONE (ROXICODONE) IR tablet 5 mg, 5 mg, Oral, Q4H PRN, Zeyad Trevino, DO, 5 mg at 10/21/24 0103    polyethylene glycol (MIRALAX) packet 17 g, 17 g, Oral, Daily, Dirk Guerrero, DO, 17 g at 10/25/24 0929    senna-docusate sodium (SENOKOT S) 8.6-50 mg per tablet 2 tablet, 2 tablet, Oral, BID, Dirk Guerrero DO, 2 tablet at 10/25/24 0929    sodium chloride infusion 0.45 %, 75 mL/hr, Intravenous, Continuous, Umberto Cohen MD, Last Rate: 75 mL/hr at 10/24/24 2258, 75 mL/hr at 10/24/24 2258      Medications Prior to Admission:     atorvastatin (LIPITOR) 40 mg tablet    celecoxib (CeleBREX) 200 mg capsule    glucose blood (OneTouch Verio) test strip    metFORMIN (GLUCOPHAGE) 1000 MG tablet    Mounjaro 10 MG/0.5ML    olmesartan-hydrochlorothiazide (BENICAR HCT) 40-12.5 MG per tablet    OneTouch Delica Lancets 33G MISC    triamcinolone (KENALOG) 0.1 % cream    Allergies   Allergen Reactions    Captopril Cough    Crestor [Rosuvastatin] Diarrhea    Enalapril Cough     Physical Exam:    /81   Pulse 84   Temp 98.6 °F (37 °C)   Resp 16   Ht 5' 10\" (1.778 m)   Wt 79.4 kg (175 lb)   SpO2 97%   BMI 25.11 kg/m²     General: WDWN, pleasant, no acute distress  HEENT: Atraumatic, and normocephalic; PERRLA; EOMI; Moist mucosal membranes  Neck: Trachea midline; No neck masses, thyromegaly, or cervical lymphadenopathy present  Cardiovascular: No murmurs, rubs, or gallops appreciated; RRR; No edema  Respiratory: Clear to auscultation bilaterally; Adequate aeration; No supplemental oxygen requirement  Abdomen: Soft, Non-tender; Non-distended; No organomegaly appreciated; Bowel sounds present; Left Flank with Percutaneous Nephrostomy-Tube Draining Serosanguinous Fluid  Extremities: No obvious deformities; No peripheral edema; Moves all; Left Inguinal Lymphadenopathy with Accompanying Tenderness to Palpation  Neurologic: " Appropriately alert; No focal neurologic deficits    Recent Results (from the past 48 hour(s))   Fingerstick Glucose (POCT)    Collection Time: 10/23/24  4:24 PM   Result Value Ref Range    POC Glucose 102 65 - 140 mg/dl   Fingerstick Glucose (POCT)    Collection Time: 10/23/24  9:09 PM   Result Value Ref Range    POC Glucose 181 (H) 65 - 140 mg/dl   Basic metabolic panel    Collection Time: 10/24/24  5:59 AM   Result Value Ref Range    Sodium 140 135 - 147 mmol/L    Potassium 3.8 3.5 - 5.3 mmol/L    Chloride 106 96 - 108 mmol/L    CO2 24 21 - 32 mmol/L    ANION GAP 10 4 - 13 mmol/L    BUN 31 (H) 5 - 25 mg/dL    Creatinine 1.75 (H) 0.60 - 1.30 mg/dL    Glucose 75 65 - 140 mg/dL    Calcium 8.7 8.4 - 10.2 mg/dL    eGFR 38 ml/min/1.73sq m   Fingerstick Glucose (POCT)    Collection Time: 10/24/24  7:01 AM   Result Value Ref Range    POC Glucose 82 65 - 140 mg/dl   Fingerstick Glucose (POCT)    Collection Time: 10/24/24 11:10 AM   Result Value Ref Range    POC Glucose 209 (H) 65 - 140 mg/dl   Fingerstick Glucose (POCT)    Collection Time: 10/24/24  4:05 PM   Result Value Ref Range    POC Glucose 196 (H) 65 - 140 mg/dl   Fingerstick Glucose (POCT)    Collection Time: 10/24/24  8:59 PM   Result Value Ref Range    POC Glucose 248 (H) 65 - 140 mg/dl   Fingerstick Glucose (POCT)    Collection Time: 10/25/24  8:47 AM   Result Value Ref Range    POC Glucose 154 (H) 65 - 140 mg/dl   Basic metabolic panel    Collection Time: 10/25/24  9:22 AM   Result Value Ref Range    Sodium 136 135 - 147 mmol/L    Potassium 4.0 3.5 - 5.3 mmol/L    Chloride 104 96 - 108 mmol/L    CO2 23 21 - 32 mmol/L    ANION GAP 9 4 - 13 mmol/L    BUN 30 (H) 5 - 25 mg/dL    Creatinine 1.67 (H) 0.60 - 1.30 mg/dL    Glucose 264 (H) 65 - 140 mg/dL    Calcium 8.5 8.4 - 10.2 mg/dL    eGFR 40 ml/min/1.73sq m   Fingerstick Glucose (POCT)    Collection Time: 10/25/24 11:16 AM   Result Value Ref Range    POC Glucose 252 (H) 65 - 140 mg/dl     IR nephrostomy tube  "placement    Result Date: 10/23/2024  Narrative: PROCEDURE: Left percutaneous nephrostomy placement, ultrasound-guided left external iliac lymph node biopsy Procedural Personnel Attending physician(s): Yovani Gaxiola MD Pre-procedure diagnosis: Hydronephrosis, lymphadenopathy. Post-procedure diagnosis: Same Clinical History: 69-year-old man with history of Kayley 3+4 = 7 Stage II prostate cancer diagnosed/treated in 2019 with radiation therapy with low PSAs on surveillance who has had approximately 2 weeks of left flank pain and now has been found on imaging to have retroperitoneal and left iliac lymphadenopathy with associated left hydronephrosis secondary to obstruction by a retroperitoneal node conglomerate.  He has newly elevated creatinine consistent with obstructive nephropathy.  There is mild leukocytosis but  he does not have clinical signs of an obstructed pyelonephritis.  Urinalysis is not overtly \"dirty\". Given the toriboi conglomerate, placement of a stent by urology may not be technically successful and would be at high risk of failure.  A consultation has therefore been requested for percutaneous nephrostomy/nephroureteral catheter placement. Of note, he was also previously referred as an outpatient for biopsy of one of these abnormal lymph nodes. PROCEDURE SUMMARY: - Left percutaneous nephrostomy placement under ultrasound and fluoroscopic guidance - Cone beam CT of the abdomen, interpreted on a separate workstation - Ultrasound-guided left external iliac lymph node biopsy PROCEDURE DETAILS: Pre-procedure Consent: Informed consent for the procedure including risks, benefits and alternatives was obtained and time-out was performed prior to the procedure. Preparation: The site was prepared and draped using maximal sterile barrier technique including cutaneous antisepsis. Anesthesia/sedation Level of anesthesia/sedation: General anesthesia Anesthesia/sedation administered by: Anesthesiology " Technique and Findings: Percutaneous nephrostomy placement: Initial ultrasound demonstrated a moderately dilated left renal collecting system. Local anesthetic was administered. Under real-time ultrasound guidance, a 22-gauge InRad SampleMaster needle was advanced into a dilated posterior lower pole calyx. There was no return of urine upon removal of the stylette and contrast injection demonstrated suboptimal angle for future conversion to a nephroureteral catheter. After two additional passes, a satisfactory access angle was achieved. Contrast injection opacified the collecting system, which demonstrated a high-grade but subocclusive stricture at the mid ureter secondary to extrinsic mass effect from the known retroperitoneal lymphadenopathy. Contrast did pass into the bladder. There were filling defects within the collecting system secondary to expected hemorrhage from access. A Nitrex wire was advanced via the needle but could not be navigated down the ureter secondary to developing clot within the collecting system. It was positioned within the renal pelvis and the tract was dilated with an AccuStick set. The transitional dilator and the Nitrex wire were exchanged for an angled Glidewire. A second attempt to traverse the ureter was similarly unsuccessful. The angled Glidewire was exchanged for an Amplatz wire for stability. The access tract was dilated and a 10.2 [place] MPD catheter was placed. The pigtail of the MPD catheter would not form as expected within the renal pelvis. This was favored secondary to thrombus within the collecting system rather than malposition. The MPD was exchanged for a 10.2 [place] Shore-Ortiz catheter which was formed within the renal pelvis. Given the possibility of malposition, a cone beam CT was performed. This was reviewed and interpreted on a separate workstation.  The course of the newly placed nephrostomy catheter was appropriate and it terminated in the renal pelvis.  There  was either  irregularity of the proximal ureter (as seen on prior diagnostic CT) or small volume extravasation of injected contrast from a wire perforation of the friable ureter/pelvis, too small to require additional intervention. The catheter was secured with a 2-0 Prolene suture.  The collecting system was serially irrigated to disrupt thrombus and attached to a gravity bag. Left external iliac lymph node biopsy: After transitioning the patient to a supine position, the left groin was assessed with ultrasound. There was a rounded enlarged left external iliac lymph node with complete effacement of the fatty hilum correlating to findings on prior diagnostic CT. This node was targeted for biopsy. Local anesthetic was administered. A small dermatotomy was made. Under real-time ultrasound guidance, a 17-gauge coaxial introducer needle was advanced into the node. Four 18-gauge by 1.3 cm core needle specimens were obtained using a Accelae device. Three of these were sent in formalin and one in RPMI for analysis. The access tract was embolized with Gelfoam slurry as a routine precaution. Contrast Contrast dose: 20 mL of iohexol (OMNIPAQUE) Radiation Dose Fluoroscopy time: 10.9 MINUTES (accession [id number]), NONE (accession [id number]) Reference air kerma: 26.97 mGy  (accession [id number]), 0 mGy  (accession [id number]) Additional Details Specimens removed: Four 18-gauge by 1.3 cm core biopsies of an enlarged left external iliac lymph node. Estimated blood loss (mL): Less than 10 Complications: No immediate complications.     Impression: 1. Successful placement of a left percutaneous nephrostomy catheter. 2. Ultrasound-guided biopsy of left external iliac lymph node. Plan: Return in 2 to 3 weeks for conversion of percutaneous nephrostomy catheter to percutaneous nephroureteral stent. Workstation performed: [id number]     IR biopsy inguinal lymph node    Result Date: 10/23/2024  Narrative: PROCEDURE: Left percutaneous  "nephrostomy placement, ultrasound-guided left external iliac lymph node biopsy Procedural Personnel Attending physician(s): Yovani Gaxiola MD Pre-procedure diagnosis: Hydronephrosis, lymphadenopathy. Post-procedure diagnosis: Same Clinical History: 69-year-old man with history of [place] 3+4 = 7 Stage II prostate cancer diagnosed/treated in 2019 with radiation therapy with low PSAs on surveillance who has had approximately 2 weeks of left flank pain and now has been found on imaging to have retroperitoneal and left iliac lymphadenopathy with associated left hydronephrosis secondary to obstruction by a retroperitoneal node conglomerate.  He has newly elevated creatinine consistent with obstructive nephropathy.  There is mild leukocytosis but  he does not have clinical signs of an obstructed pyelonephritis.  Urinalysis is not overtly \"dirty\". Given the toribio conglomerate, placement of a stent by urology may not be technically successful and would be at high risk of failure.  A consultation has therefore been requested for percutaneous nephrostomy/nephroureteral catheter placement. Of note, he was also previously referred as an outpatient for biopsy of one of these abnormal lymph nodes. PROCEDURE SUMMARY: - Left percutaneous nephrostomy placement under ultrasound and fluoroscopic guidance - Cone beam CT of the abdomen, interpreted on a separate workstation - Ultrasound-guided left external iliac lymph node biopsy PROCEDURE DETAILS: Pre-procedure Consent: Informed consent for the procedure including risks, benefits and alternatives was obtained and time-out was performed prior to the procedure. Preparation: The site was prepared and draped using maximal sterile barrier technique including cutaneous antisepsis. Anesthesia/sedation Level of anesthesia/sedation: General anesthesia Anesthesia/sedation administered by: Anesthesiology Technique and Findings: Percutaneous nephrostomy placement: Initial ultrasound " demonstrated a moderately dilated left renal collecting system. Local anesthetic was administered. Under real-time ultrasound guidance, a 22-gauge InRad SampleMaster needle was advanced into a dilated posterior lower pole calyx. There was no return of urine upon removal of the stylette and contrast injection demonstrated suboptimal angle for future conversion to a nephroureteral catheter. After two additional passes, a satisfactory access angle was achieved. Contrast injection opacified the collecting system, which demonstrated a high-grade but subocclusive stricture at the mid ureter secondary to extrinsic mass effect from the known retroperitoneal lymphadenopathy. Contrast did pass into the bladder. There were filling defects within the collecting system secondary to expected hemorrhage from access. A Nitrex wire was advanced via the needle but could not be navigated down the ureter secondary to developing clot within the collecting system. It was positioned within the renal pelvis and the tract was dilated with an AccuStick set. The transitional dilator and the Nitrex wire were exchanged for an angled Glidewire. A second attempt to traverse the ureter was similarly unsuccessful. The angled Glidewire was exchanged for an Amplatz wire for stability. The access tract was dilated and a 10.2 [place]an MPD catheter was placed. The pigtail of the MPD catheter would not form as expected within the renal pelvis. This was favored secondary to thrombus within the collecting system rather than malposition. The MPD was exchanged for a 10.2 [place] Shore-Ortiz catheter which was formed within the renal pelvis. Given the possibility of malposition, a cone beam CT was performed. This was reviewed and interpreted on a separate workstation.  The course of the newly placed nephrostomy catheter was appropriate and it terminated in the renal pelvis.  There was either  irregularity of the proximal ureter (as seen on prior diagnostic  CT) or small volume extravasation of injected contrast from a wire perforation of the friable ureter/pelvis, too small to require additional intervention. The catheter was secured with a 2-0 Prolene suture.  The collecting system was serially irrigated to disrupt thrombus and attached to a gravity bag. Left external iliac lymph node biopsy: After transitioning the patient to a supine position, the left groin was assessed with ultrasound. There was a rounded enlarged left external iliac lymph node with complete effacement of the fatty hilum correlating to findings on prior diagnostic CT. This node was targeted for biopsy. Local anesthetic was administered. A small dermatotomy was made. Under real-time ultrasound guidance, a 17-gauge coaxial introducer needle was advanced into the node. Four 18-gauge by 1.3 cm core needle specimens were obtained using a Azuki (Vozero/Gengibre) device. Three of these were sent in formalin and one in RPMI for analysis. The access tract was embolized with Gelfoam slurry as a routine precaution. Contrast Contrast dose: 20 mL of iohexol (OMNIPAQUE) Radiation Dose Fluoroscopy time: 10.9 MINUTES (accession [id number]), NONE (accession [id number]) Reference air kerma: 26.97 mGy  (accession [id number]), 0 mGy  (accession [id number]) Additional Details Specimens removed: Four 18-gauge by 1.3 cm core biopsies of an enlarged left external iliac lymph node. Estimated blood loss (mL): Less than 10 Complications: No immediate complications.     Impression: 1. Successful placement of a left percutaneous nephrostomy catheter. 2. Ultrasound-guided biopsy of left external iliac lymph node. Plan: Return in 2 to 3 weeks for conversion of percutaneous nephrostomy catheter to percutaneous nephroureteral stent. Workstation performed: [id number]     CT chest wo contrast    Result Date: 10/19/2024  Narrative: CT CHEST WITHOUT IV CONTRAST INDICATION:   Lung nodule on visible portion of CT A/P earlier today, CT A/P with concern for  malignancy. Evaluation for metastatic disease. COMPARISON: Abdomen CT 10/18/2024 per my review of the medical record, left retroperitoneal mass on abdomen CT with 3 mm left lower lobe nodule. TECHNIQUE: Chest CT without intravenous contrast.  Axial, sagittal, coronal 2D reformats and coronal MIPS from source data. Radiation dose length product (DLP):  317.16 mGy-cm . Radiation dose exposure minimized using iterative reconstruction and automated exposure control. FINDINGS: LUNGS: Nothing to suggest malignancy. Redemonstration of 3 mm lateral basal left lower lobe nodule, of doubtful significance (302, 188). Benign intrapulmonary lymph node or scar in the lateral right lower lobe (302/151). Benign calcified granulomas. Benign linear calcification in the right lower lobe. AIRWAYS: No significant filling defects. PLEURA:  Unremarkable. HEART/GREAT VESSELS: Normal heart size. Mild coronary artery calcification indicating atherosclerotic heart disease. MEDIASTINUM AND MICHELLE:  Unremarkable. CHEST WALL AND LOWER NECK: 1 cm left thyroid nodule. No follow-up needed. Mild right gynecomastia. UPPER ABDOMEN: Vicarious excretion of contrast in the gallbladder. Persistent contrast in the left renal collecting system from CT of the abdomen several hours earlier. OSSEOUS STRUCTURES: Mild degenerative disease in the spine.     Impression: Nothing to suggest malignancy in the chest. The 3 mm left lower lobe nodule is of doubtful significance. Workstation performed: [id number]     CT abdomen pelvis w contrast    Result Date: 10/18/2024  Narrative: CT ABDOMEN AND PELVIS WITH IV CONTRAST INDICATION: R59.9: Enlarged lymph nodes, unspecified. . Abnormal enlarged lymph node in left groin.  History of prostate cancer status post radiation therapy. COMPARISON: None. TECHNIQUE: CT examination of the abdomen and pelvis was performed. Multiplanar 2D reformatted images were created from the source data. This examination, like all CT scans  performed in the [facility], was performed utilizing techniques to minimize radiation dose exposure, including the use of iterative reconstruction and automated exposure control. Radiation dose length product (DLP) for this visit: 636 mGy-cm IV Contrast: 100 mL of iohexol (OMNIPAQUE) Enteric Contrast: Not administered. FINDINGS: ABDOMEN LOWER CHEST: 3 mm left lower lobe nodule (2:24) LIVER/BILIARY TREE: Unremarkable. GALLBLADDER: No calcified gallstones. No pericholecystic inflammatory change. SPLEEN: Unremarkable. PANCREAS: Unremarkable. ADRENAL GLANDS: Unremarkable. KIDNEYS/URETERS: Moderate left hydronephrosis with peripelvic and proximal periureteral inflammatory change. Rim calcified anterior right mid pole pelvic cyst. Otherwise right kidney/ureter unremarkable. STOMACH AND BOWEL: Colonic diverticulosis without findings of acute diverticulitis. Stomach and small bowel remarkable. APPENDIX: No findings to suggest appendicitis. ABDOMINOPELVIC CAVITY: Conglomerate of left retroperitoneal nodes measuring approximately 6.5 x 5 x 13 cm (2:106) These nodes encase the left ureter causing upstream obstruction. The left psoas muscle is infiltrated by this toribio mass. Enlarged nodes anterior to the aortic bifurcation and within the right retroperitoneum measure 3 and 1.8 cm (2:98). Enlarged left external iliac chain nodes measure 2.6 and 2 cm (2:130) Other scattered enlarged retroperitoneal nodes. No ascites. No pneumoperitoneum. VESSELS: There is toribio encasement of the left external iliac artery/vein, without focal vascular abnormality, noting that limited venous opacification limits evaluation for thrombus. PELVIS REPRODUCTIVE ORGANS: Unremarkable for patient's age. URINARY BLADDER: Unremarkable. ABDOMINAL WALL/INGUINAL REGIONS: Small fat-containing umbilical hernia. BONES: No acute fracture or suspicious osseous lesion. 4 mm sclerotic focus in the right femoral head (2:158). 3 mm sclerotic focus in  the right iliac body (2:115)     Impression: Moderate left hydronephrosis with peripelvic/proximal periureteral inflammatory stranding secondary to obstructing large left retroperitoneal toribio conglomerate. Enlarged left pelvic and other retroperitoneal nodes as described. Findings consistent with metastasis, noting history of prostate cancer. No other primary source of malignancy identified. 3 mm left lower lobe nodule. CT of the chest recommended for full evaluation of the lung fields in setting of metastatic disease. 3 mm sclerotic lesions in the right iliac body and right femoral head may represent bone islands versus sclerotic mets. Correlation with any prior imaging advised. Findings were discussed with Terrie STARK at 4:30 p.m. on 10/18/2024 Workstation performed: [id number]     US scrotum and groin area    Result Date: 10/10/2024  Narrative: SCROTAL ULTRASOUND INDICATION: R19.00: Intra-abdominal and pelvic swelling, mass and lump, unspecified site. COMPARISON: None TECHNIQUE: Ultrasound the scrotal contents was performed with a high frequency linear transducer utilizing volumetric sweep imaging as well as standard still image techniques. Imaging performed in longitudinal and transverse orientation. Color and spectral Doppler evaluation also performed bilaterally. FINDINGS: TESTES: Testes are symmetric and normal in size. RIGHT testis = 3.3 x 2.2 x 2.4 cm. Volume 8.8 mL Normal contour with homogeneous smooth echotexture. No intratesticular mass lesion or calcifications. LEFT testis = 3.7 x 1.8 x 2.7 cm. Volume 9.1 mL Normal contour with homogeneous smooth echotexture. No intratesticular mass lesion or calcifications. Doppler flow within both testes is present and symmetric. EPIDIDYMIDES: Normal size. Doppler ultrasound demonstrates normal blood flow. No epididymal lesions. HYDROCELE: No significant fluid present. VARICOCELE: None present. SCROTUM: Scrotal thickness and appearance within normal limits. No  evidence for hernia demonstrated. Targeted imaging of the area of concern in the left groin indicated by the patient demonstrates an underlying hypoechoic reniform structure measuring 4.2  x 1.8 x 2.2 cm without normal echogenic hilum.     Impression: Abnormal enlarged left inguinal lymph node corresponds to the area of concern indicated by the patient. Advise follow-up with ultrasound-guided tissue sampling. Remainder of the examination is normal. This study demonstrates a significant  finding and was documented as such in [facility] for liaison and referring practitioner notification. Workstation performed: [id number]     Labs and pertinent reports reviewed.      This note has been generated by voice recognition software system.  Therefore, there may be spelling, grammar, and or syntax errors. Please contact if questions arise.    Additional recommendations to follow per attending, Dr. White.    Marty Reyes DO, PGY4  Hematology/Oncology Fellow

## 2024-10-25 NOTE — PROGRESS NOTES
NEPHROLOGY HOSPITAL PROGRESS NOTE   Alejandro Adames 69 y.o. male MRN: [id number]  Unit/Bed#: [facility] 909-01 Encounter: [id number]  Reason for Consult: JULIO  Assessment & Plan  Acute renal failure (ARF) (HCC)  Etiology thought to be multifactorial due to component of obstructive uropathy +/- contrast associated nephropathy +/- previous NSAID use  Baseline creatinine 1.0-1.2  Creatinine on admission 2.43 on 10/18/2024  Peak creatinine 2.73 on 10/20/2024  Creatinine yesterday 1.75 increased after discontinuation of IV fluids  Creatinine today 1.67  Polyuria has been improving  Okay to stop IV fluids today  Trend BMP  Hyponatremia  This is currently resolved  Sodium level today 136  Other hydronephrosis  Left PCN placement given obstructive retroperitoneal mass  Management per urology  Retroperitoneal mass  Biopsy results (prelim renal primary)  Management per oncology  Malignant neoplasm of prostate (HCC)  Noted enlarged left pelvic and in the retroperitoneum nodes  Biopsy results (prelim renal primary)  Essential hypertension  Blood pressure currently acceptable  Current Rx: Amlodipine 5 mg daily  Continue current dose  Type 2 diabetes mellitus with kidney complication, without long-term current use of insulin (HCC)  Management per primary team  Polyuria  This is due to postobstructive diuresis, management as above    Discussed with internal medicine team.  After discussion, we agreed that kidney function has stabilized and to stop further IV fluids and monitor kidney function.    SUBJECTIVE / 24H INTERVAL HISTORY:  Urine output recorded as 3900 cc.  Complains of lower abdominal/groin pain.  Denies dyspnea.    OBJECTIVE:  Current Weight: Weight - Scale: 79.4 kg (175 lb)  Vitals:    10/24/24 1544 10/24/24 1923 10/24/24 2150 10/25/24 0336   BP: 147/84 145/82 146/82 148/83   Pulse: 78 78 75 83   Resp: 16  16 16   Temp: 98.4 °F (36.9 °C) 98.5 °F (36.9 °C) 98.3 °F (36.8 °C) 98.4 °F (36.9 °C)   TempSrc:       SpO2: 97% 96% 97%  97%   Weight:       Height:           Intake/Output Summary (Last 24 hours) at 10/25/2024 0740  Last data filed at 10/25/2024 0636  Gross per 24 hour   Intake 1458.75 ml   Output 3925 ml   Net -2466.25 ml     Review of Systems   Constitutional:  Negative for chills and fever.   HENT:  Negative for ear pain and sore throat.    Eyes:  Negative for pain and visual disturbance.   Respiratory:  Negative for cough and shortness of breath.    Cardiovascular:  Negative for chest pain and palpitations.   Gastrointestinal:  Positive for abdominal pain. Negative for vomiting.   Genitourinary:  Negative for dysuria and hematuria.   Musculoskeletal:  Negative for arthralgias and back pain.   Skin:  Negative for color change and rash.   Neurological:  Negative for seizures and syncope.   All other systems reviewed and are negative.    Physical Exam  Vitals and nursing note reviewed.   Constitutional:       General: He is not in acute distress.     Appearance: He is well-developed.   HENT:      Head: Normocephalic and atraumatic.   Eyes:      Conjunctiva/sclera: Conjunctivae normal.   Cardiovascular:      Rate and Rhythm: Normal rate and regular rhythm.      Pulses: Normal pulses.      Heart sounds: Normal heart sounds. No murmur heard.  Pulmonary:      Effort: Pulmonary effort is normal. No respiratory distress.      Breath sounds: Normal breath sounds.   Abdominal:      Palpations: Abdomen is soft.      Tenderness: There is no abdominal tenderness.   Genitourinary:     Comments: Left PCN present  Musculoskeletal:         General: No swelling.      Cervical back: Neck supple.      Right lower leg: No edema.      Left lower leg: No edema.   Skin:     General: Skin is warm and dry.      Capillary Refill: Capillary refill takes less than 2 seconds.   Neurological:      Mental Status: He is alert.   Psychiatric:         Mood and Affect: Mood normal.       Medications:    Current Facility-Administered Medications:     acetaminophen  (TYLENOL) tablet 650 mg, 650 mg, Oral, Q4H PRN, Dirk Guerrero DO, 650 mg at 10/24/24 2257    amLODIPine (NORVASC) tablet 5 mg, 5 mg, Oral, Daily, Dirk Guerrero DO, 5 mg at 10/24/24 0844    atorvastatin (LIPITOR) tablet 40 mg, 40 mg, Oral, Daily, Zeyad Trevino DO, 40 mg at 10/24/24 0844    ceFAZolin (ANCEF) IVPB (premix in dextrose) 2,000 mg 50 mL, 2,000 mg, Intravenous, Once, Yovani Gaxiola MD    folic acid (FOLVITE) tablet 1 mg, 1 mg, Oral, Daily, Dirk Guerrero DO, 1 mg at 10/24/24 0844    heparin (porcine) subcutaneous injection 5,000 Units, 5,000 Units, Subcutaneous, Q8H CHONG, Dirk Guerrero DO, 5,000 Units at 10/25/24 0633    HYDROmorphone HCl (DILAUDID) injection 0.2 mg, 0.2 mg, Intravenous, Q4H PRN, Zeyad Trevino DO, 0.2 mg at 10/18/24 2242    insulin glargine (LANTUS) subcutaneous injection 15 Units 0.15 mL, 15 Units, Subcutaneous, HS, Dirk Guerrero DO, 15 Units at 10/24/24 2245    insulin lispro (HumALOG/ADMELOG) 100 units/mL subcutaneous injection 1-5 Units, 1-5 Units, Subcutaneous, HS, Zeyad Trevino DO, 2 Units at 10/24/24 2246    insulin lispro (HumALOG/ADMELOG) 100 units/mL subcutaneous injection 1-6 Units, 1-6 Units, Subcutaneous, TID AC, 2 Units at 10/24/24 1809 **AND** Fingerstick Glucose (POCT), , , TID AC, Zeyad Trevino DO    insulin lispro (HumALOG/ADMELOG) 100 units/mL subcutaneous injection 5 Units, 5 Units, Subcutaneous, TID With Meals, Dirk Guerrero DO, 5 Units at 10/24/24 1808    ondansetron (ZOFRAN) injection 4 mg, 4 mg, Intravenous, Q6H PRN, Zeyad Trevino DO    oxyCODONE (ROXICODONE) split tablet 2.5 mg, 2.5 mg, Oral, Q4H PRN, 2.5 mg at 10/25/24 0432 **OR** oxyCODONE (ROXICODONE) IR tablet 5 mg, 5 mg, Oral, Q4H PRN, Zeyad Trevino DO, 5 mg at 10/21/24 0103    polyethylene glycol (MIRALAX) packet 17 g, 17 g, Oral, Daily, Dirk Guerrero DO, 17 g at 10/21/24 0830    senna-docusate sodium (SENOKOT S) 8.6-50 mg per tablet 2 tablet, 2 tablet, Oral, BID, Dirk Guerrero DO, 2 tablet  "at 10/24/24 1810    sodium chloride infusion 0.45 %, 75 mL/hr, Intravenous, Continuous, Umberto Cohen MD, Last Rate: 75 mL/hr at 10/24/24 2258, 75 mL/hr at 10/24/24 2258    Laboratory Results:  Results from last 7 days   Lab Units 10/24/24  0559 10/23/24  0451 10/22/24  0534 10/21/24  0947 10/21/24  0604 10/20/24  0518 10/19/24  0629 10/18/24  1849   WBC Thousand/uL  --   --   --   --  6.84 6.43 10.76* 11.63*   HEMOGLOBIN g/dL  --   --   --   --  10.2* 9.9* 10.7* 12.4   HEMATOCRIT %  --   --   --   --  32.9* 32.0* 33.2* 37.4   PLATELETS Thousands/uL  --   --   --   --  438* 415* 419* 603*   POTASSIUM mmol/L 3.8 3.9 3.8 4.1  --  4.3 3.8 4.3   CHLORIDE mmol/L 106 105 104 102  --  101 94* 92*   CO2 mmol/L 24 24 25 26  --  25 25 22   BUN mg/dL 31* 31* 35* 38*  --  41* 40* 40*   CREATININE mg/dL 1.75* 1.70* 1.78* 2.22*  --  2.73* 2.54* 2.43*   CALCIUM mg/dL 8.7 8.3* 8.3* 8.8  --  8.6 9.1 9.7   MAGNESIUM mg/dL  --   --   --  2.1  --  1.8*  --   --        Portions of the record may have been created with voice recognition software. Occasional wrong word or \"sound a like\" substitutions may have occurred due to the inherent limitations of voice recognition software. Read the chart carefully and recognize, using context, where substitutions have occurred.If you have any questions, please contact the dictating provider.    "

## 2024-10-25 NOTE — PROGRESS NOTES
Progress Note - Hospitalist   Name: Alejandro Adames 69 y.o. male I MRN: [id number]  [facility] I Date of Admission: 10/18/2024   Date of Service: 10/25/2024 I Hospital Day: 7    Assessment & Plan  Other hydronephrosis  The patient started having left flank pain 1.5 weeks ago. He saw his urologist outpatient  for left flank pain and a CT was done which shows left hydronephrosis, retroperitoneal lymphadenopathy, and new sclerotic lesions concerning for metastatic disease. The patient was instructed by urologist to come to ED to be admitted for possible L ureter stent placement.  Status post left PCN placement 10/19/2024 per IR  Urology inputs noted  Supportive cares    Retroperitoneal mass  CT scan with peripelvic/proximal periureteral inflammatory stranding secondary to obstructing large left retroperitoneal toribio conglomerate. Enlarged left pelvic and other retroperitoneal nodes   Findings consistent with metastasis. 3 mm sclerotic lesions in the right iliac body and right femoral head may represent bone islands versus sclerotic mets.     Patient with underlying history of prostate cancer  IR consulted for biopsy, status post left inguinal lymph node biopsy on 10/19  Biopsy reported -metastatic carcinoma most suggestive of renal origin  Given the biopsy result findings and oncology plans for further imaging and workup noted  Oncology following  Acute renal failure (ARF) (HCC)  Baseline creatinine 1.2  CT showed lymphadenopathy partially obstructing L ureter  Likely secondary to hydronephrosis and decreased oral intake  Status post left nephrostomy placement by IR 10/19  Kidney function improving  Discussed with nephrology  Avoid nephrotoxins  Monitor kidney function  Hyponatremia  POA  Suspect multifactorial  Resolved  Anemia  Anemia panel consistent with anemia of chronic disease and folate deficiency  Continue folic acid supplementation  Monitor hemoglobin  Essential hypertension  Blood pressures  reviewed  Continue budipine 5 mg p.o. daily  Presently HCTZ olmesartan on hold  Monitor blood pressures  Avoid hypotension      Type 2 diabetes mellitus with kidney complication, without long-term current use of insulin (HCC)  Lab Results   Component Value Date    HGBA1C 9.8 (H) 08/28/2024       Recent Labs     10/24/24  1605 10/24/24  2059 10/25/24  0847 10/25/24  1116   POCGLU 196* 248* 154* 252*       Blood Sugar Average: Last 72 hrs:  (P) 168.8231438423483571  Uncontrolled diabetes mellitus type 2 A1c 9.8  Accu-Cheks reviewed  Continue Lantus increased to 20 units daily, continue Humalog 5 units 3 times daily with meals  Outpatient endocrinology follow-up  Patient declines insulin therapy at discharge  He would like to resume Mounjaro at discharge  Monitor Accu-Cheks  Avoid hypoglycemia      Malignant neoplasm of prostate (HCC)  Diagnosed in 2019 and underwent biopsy of the prostate and s/p XRT  PSA 0.355  Urology inputs noted  Lung nodule  3 mm left lower lobe lung nodule noted on visible portion of lung on CT abdomen/pelvis  Chest CT scan, negative for malignancy, per radiology, the 3 mm left lower lobe nodule is of doubtful significance.     VTE Pharmacologic Prophylaxis: VTE Score: 7 High Risk (Score >/= 5) - Pharmacological DVT Prophylaxis Ordered: heparin. Sequential Compression Devices Ordered.    Mobility:   Basic Mobility Inpatient Raw Score: 24  JH-HLM Goal: 8: Walk 250 feet or more  JH-HLM Achieved: 7: Walk 25 feet or more  JH-HLM Goal achieved. Continue to encourage appropriate mobility.    Patient Centered Rounds: I performed bedside rounds with nursing staff today.   Discussions with Specialists or Other Care Team Provider: Nephrology, case management    Education and Discussions with Family / Patient:  Discussed with the patient, offered to call family reports he is keeping them updated.     Current Length of Stay: 7 day(s)  Current Patient Status: Inpatient   Certification Statement: The patient  will continue to require additional inpatient hospital stay due to as outlined  Discharge Plan:  Oncology plans for further imaging and management given biopsy results noted    Code Status: Level 3 - DNAR and DNI    Subjective     Sitting up in chair  Reports left flank and groin pain especially when sleeping flat  Reports required oxycodone yesterday night with relief  Offered oxycodone at discharge patient reports he will continue to take excess and Tylenol at home  Encouraged limb elevation      Objective :  Temp:  [98.3 °F (36.8 °C)-98.6 °F (37 °C)] 98.6 °F (37 °C)  HR:  [75-84] 84  BP: (145-148)/(81-84) 148/81  Resp:  [16] 16  SpO2:  [96 %-97 %] 97 %  O2 Device: None (Room air)    Body mass index is 25.11 kg/m².     Input and Output Summary (last 24 hours):     Intake/Output Summary (Last 24 hours) at 10/25/2024 1251  Last data filed at 10/25/2024 0636  Gross per 24 hour   Intake 1458.75 ml   Output 3925 ml   Net -2466.25 ml       Physical Exam    Comfortably sitting up in chair  Neck supple  Lungs diminished breath sounds at the bases  Vesicular breath sounds  Abdomen soft nontender  Awake, follows commands  Left lower extremity edema noted  No rash      Lines/Drains:  Lines/Drains/Airways       Active Status       Name Placement date Placement time Site Days    Nephrostomy Left 10 Fr. 10/19/24  1004  Left  6                            Lab Results: I have reviewed the following results:   Results from last 7 days   Lab Units 10/21/24  0604   WBC Thousand/uL 6.84   HEMOGLOBIN g/dL 10.2*   HEMATOCRIT % 32.9*   PLATELETS Thousands/uL 438*   SEGS PCT % 75   LYMPHO PCT % 12*   MONO PCT % 10   EOS PCT % 2     Results from last 7 days   Lab Units 10/25/24  0922 10/19/24  0629 10/18/24  1849   SODIUM mmol/L 136   < > 127*   POTASSIUM mmol/L 4.0   < > 4.3   CHLORIDE mmol/L 104   < > 92*   CO2 mmol/L 23   < > 22   BUN mg/dL 30*   < > 40*   CREATININE mg/dL 1.67*   < > 2.43*   ANION GAP mmol/L 9   < > 13   CALCIUM mg/dL  8.5   < > 9.7   ALBUMIN g/dL  --   --  3.9   TOTAL BILIRUBIN mg/dL  --   --  0.37   ALK PHOS U/L  --   --  142*   ALT U/L  --   --  23   AST U/L  --   --  15   GLUCOSE RANDOM mg/dL 264*   < > 276*    < > = values in this interval not displayed.     Results from last 7 days   Lab Units 10/19/24  0823   INR  1.05     Results from last 7 days   Lab Units 10/25/24  1116 10/25/24  0847 10/24/24  2059 10/24/24  1605 10/24/24  1110 10/24/24  0701 10/23/24  2109 10/23/24  1624 10/23/24  1114 10/23/24  0731 10/22/24  2109 10/22/24  1703   POC GLUCOSE mg/dl 252* 154* 248* 196* 209* 82 181* 102 251* 83 159* 124               Recent Cultures (last 7 days):         Imaging Results Review: I personally reviewed the following image studies/reports in PACS and discussed pertinent findings with Radiology: CT chest, CT abdomen/pelvis, and procedure reports. My interpretation of the radiology images/reports is: Biopsy results.  Other Study Results Review: No additional pertinent studies reviewed.    Last 24 Hours Medication List:     Current Facility-Administered Medications:     acetaminophen (TYLENOL) tablet 650 mg, Q4H PRN    amLODIPine (NORVASC) tablet 5 mg, Daily    atorvastatin (LIPITOR) tablet 40 mg, Daily    ceFAZolin (ANCEF) IVPB (premix in dextrose) 2,000 mg 50 mL, Once    folic acid (FOLVITE) tablet 1 mg, Daily    heparin (porcine) subcutaneous injection 5,000 Units, Q8H CHONG    HYDROmorphone HCl (DILAUDID) injection 0.2 mg, Q4H PRN    insulin glargine (LANTUS) subcutaneous injection 20 Units 0.2 mL, HS    insulin lispro (HumALOG/ADMELOG) 100 units/mL subcutaneous injection 1-5 Units, HS    insulin lispro (HumALOG/ADMELOG) 100 units/mL subcutaneous injection 1-6 Units, TID AC **AND** Fingerstick Glucose (POCT), TID AC    insulin lispro (HumALOG/ADMELOG) 100 units/mL subcutaneous injection 5 Units, TID With Meals    ondansetron (ZOFRAN) injection 4 mg, Q6H PRN    oxyCODONE (ROXICODONE) split tablet 2.5 mg, Q4H PRN **OR**  oxyCODONE (ROXICODONE) IR tablet 5 mg, Q4H PRN    polyethylene glycol (MIRALAX) packet 17 g, Daily    senna-docusate sodium (SENOKOT S) 8.6-50 mg per tablet 2 tablet, BID    Administrative Statements   Today, Patient Was Seen By: Reji Naik MD  I have spent a total time of 32 minutes in caring for this patient on the day of the visit/encounter including Diagnostic results, Impressions, and Communicating with other healthcare professionals .    **Please Note: This note may have been constructed using a voice recognition system.**

## 2024-10-25 NOTE — ASSESSMENT & PLAN NOTE
Lab Results   Component Value Date    HGBA1C 9.8 (H) 08/28/2024       Recent Labs     10/24/24  1605 10/24/24  2059 10/25/24  0847 10/25/24  1116   POCGLU 196* 248* 154* 252*       Blood Sugar Average: Last 72 hrs:  (P) 168.0921556631823902  Uncontrolled diabetes mellitus type 2 A1c 9.8  Accu-Cheks reviewed  Continue Lantus increased to 20 units daily, continue Humalog 5 units 3 times daily with meals  Outpatient endocrinology follow-up  Patient declines insulin therapy at discharge  He would like to resume Mounjaro at discharge  Monitor Accu-Cheks  Avoid hypoglycemia

## 2024-10-25 NOTE — ASSESSMENT & PLAN NOTE
Blood pressures reviewed  Continue budipine 5 mg p.o. daily  Presently HCTZ olmesartan on hold  Monitor blood pressures  Avoid hypotension

## 2024-10-25 NOTE — ASSESSMENT & PLAN NOTE
Baseline creatinine 1.2  CT showed lymphadenopathy partially obstructing L ureter  Likely secondary to hydronephrosis and decreased oral intake  Status post left nephrostomy placement by IR 10/19  Kidney function improving  Discussed with nephrology  Avoid nephrotoxins  Monitor kidney function

## 2024-10-25 NOTE — CASE MANAGEMENT
Case Management Discharge Planning Note    Patient name Alejandro Adames  Location [facility] 909/[facility] 909-01 MRN [id number]  : 1955 Date 10/25/2024       Current Admission Date: 10/18/2024  Current Admission Diagnosis:Other hydronephrosis   Patient Active Problem List    Diagnosis Date Noted Date Diagnosed    Renal cell carcinoma of left kidney (HCC) 10/24/2024     Polyuria 10/21/2024     Retroperitoneal mass 10/19/2024     Other hydronephrosis 10/18/2024     Acute renal failure (ARF) (HCC) 10/18/2024     Hyponatremia 10/18/2024     Lung nodule 10/18/2024     Patellofemoral pain syndrome of both knees 2023     Gait disorder 2023     Sebaceous cyst mid back 10/17/2022     COVID-19 2021     Anemia 2021     Intrinsic eczema 2020     SK (seborrheic keratosis) 2020     Malignant neoplasm of prostate (HCC) 2019     Type 2 diabetes mellitus with kidney complication, without long-term current use of insulin ([facility])      Actinic keratosis 2019     Idiopathic angioedema 2015     Essential hypertension 2012     Mixed hyperlipidemia 2012     Herpes simplex type 1 infection 05/10/2012       LOS (days): 7  Geometric Mean LOS (GMLOS) (days): 2.5  Days to GMLOS:-4.1     OBJECTIVE:  Risk of Unplanned Readmission Score: 14.81         Current admission status: Inpatient   Preferred Pharmacy:   [id number] - [place] - [address]  [address]  [place] [address]  Phone: [phone] Fax: [phone]    [facility] - [place], [place] - [address]  [address]  [place] [address]  Phone: [phone] Fax: [phone]    Primary Care Provider: Wayne Uriarte Jr, MD    Primary Insurance: MEDICARE  Secondary Insurance: [facility]    DISCHARGE DETAILS:    Discharge planning discussed with:: Pt  Freedom of Choice: Yes  Comments - Freedom of Choice: Pt chose [facility] for [facility]  CM contacted  family/caregiver?: No- see comments (Pt declined)  Were Treatment Team discharge recommendations reviewed with patient/caregiver?: Yes  Did patient/caregiver verbalize understanding of patient care needs?: Yes  Were patient/caregiver advised of the risks associated with not following Treatment Team discharge recommendations?: Yes              DME Referral Provided  Referral made for DME?: No    Other Referral/Resources/Interventions Provided:  Interventions: C  Referral Comments: Pt to return home and follow-up with [facility]         Treatment Team Recommendation: Home with Home Health Care  Discharge Destination Plan:: Home with Home Health Care                                IMM Given (Date):: 10/25/24  IMM Given to:: Patient                            IMM reviewed with patient, patient agrees with discharge determination.

## 2024-10-25 NOTE — ASSESSMENT & PLAN NOTE
Etiology thought to be multifactorial due to component of obstructive uropathy +/- contrast associated nephropathy +/- previous NSAID use  Baseline creatinine 1.0-1.2  Creatinine on admission 2.43 on 10/18/2024  Peak creatinine 2.73 on 10/20/2024  Creatinine yesterday 1.75 increased after discontinuation of IV fluids  Creatinine today 1.67  Polyuria has been improving  Okay to stop IV fluids today  Trend BMP

## 2024-10-26 ENCOUNTER — APPOINTMENT (INPATIENT)
Dept: RADIOLOGY | Facility: HOSPITAL | Age: 69
DRG: 824 | End: 2024-10-26
Payer: MEDICARE

## 2024-10-26 LAB
ANION GAP SERPL CALCULATED.3IONS-SCNC: 9 MMOL/L (ref 4–13)
BUN SERPL-MCNC: 29 MG/DL (ref 5–25)
CALCIUM SERPL-MCNC: 8.9 MG/DL (ref 8.4–10.2)
CHLORIDE SERPL-SCNC: 107 MMOL/L (ref 96–108)
CO2 SERPL-SCNC: 23 MMOL/L (ref 21–32)
CREAT SERPL-MCNC: 1.49 MG/DL (ref 0.6–1.3)
GFR SERPL CREATININE-BSD FRML MDRD: 46 ML/MIN/1.73SQ M
GLUCOSE SERPL-MCNC: 130 MG/DL (ref 65–140)
GLUCOSE SERPL-MCNC: 139 MG/DL (ref 65–140)
GLUCOSE SERPL-MCNC: 140 MG/DL (ref 65–140)
GLUCOSE SERPL-MCNC: 292 MG/DL (ref 65–140)
GLUCOSE SERPL-MCNC: 297 MG/DL (ref 65–140)
POTASSIUM SERPL-SCNC: 4 MMOL/L (ref 3.5–5.3)
SODIUM SERPL-SCNC: 139 MMOL/L (ref 135–147)

## 2024-10-26 PROCEDURE — 80048 BASIC METABOLIC PNL TOTAL CA: CPT | Performed by: INTERNAL MEDICINE

## 2024-10-26 PROCEDURE — 88112 CYTOPATH CELL ENHANCE TECH: CPT | Performed by: PATHOLOGY

## 2024-10-26 PROCEDURE — 99232 SBSQ HOSP IP/OBS MODERATE 35: CPT | Performed by: INTERNAL MEDICINE

## 2024-10-26 PROCEDURE — 82948 REAGENT STRIP/BLOOD GLUCOSE: CPT

## 2024-10-26 PROCEDURE — 99232 SBSQ HOSP IP/OBS MODERATE 35: CPT | Performed by: UROLOGY

## 2024-10-26 PROCEDURE — 74183 MRI ABD W/O CNTR FLWD CNTR: CPT

## 2024-10-26 PROCEDURE — A9585 GADOBUTROL INJECTION: HCPCS | Performed by: INTERNAL MEDICINE

## 2024-10-26 RX ORDER — LIDOCAINE 40 MG/G
CREAM TOPICAL DAILY PRN
Status: DISCONTINUED | OUTPATIENT
Start: 2024-10-26 | End: 2024-10-27 | Stop reason: HOSPADM

## 2024-10-26 RX ORDER — DOCOSANOL 100 MG/G
CREAM TOPICAL
Status: DISCONTINUED | OUTPATIENT
Start: 2024-10-26 | End: 2024-10-27 | Stop reason: HOSPADM

## 2024-10-26 RX ORDER — GADOBUTROL 604.72 MG/ML
7 INJECTION INTRAVENOUS
Status: COMPLETED | OUTPATIENT
Start: 2024-10-26 | End: 2024-10-26

## 2024-10-26 RX ADMIN — INSULIN LISPRO 5 UNITS: 100 INJECTION, SOLUTION INTRAVENOUS; SUBCUTANEOUS at 08:19

## 2024-10-26 RX ADMIN — HEPARIN SODIUM 5000 UNITS: 5000 INJECTION INTRAVENOUS; SUBCUTANEOUS at 04:38

## 2024-10-26 RX ADMIN — DOCOSANOL 1 APPLICATION: 100 CREAM TOPICAL at 11:37

## 2024-10-26 RX ADMIN — Medication 2.5 MG: at 21:45

## 2024-10-26 RX ADMIN — DOCOSANOL: 100 CREAM TOPICAL at 19:38

## 2024-10-26 RX ADMIN — INSULIN LISPRO 5 UNITS: 100 INJECTION, SOLUTION INTRAVENOUS; SUBCUTANEOUS at 17:22

## 2024-10-26 RX ADMIN — SENNOSIDES AND DOCUSATE SODIUM 2 TABLET: 50; 8.6 TABLET ORAL at 17:22

## 2024-10-26 RX ADMIN — AMLODIPINE BESYLATE 5 MG: 5 TABLET ORAL at 08:16

## 2024-10-26 RX ADMIN — POLYETHYLENE GLYCOL 3350 17 G: 17 POWDER, FOR SOLUTION ORAL at 08:17

## 2024-10-26 RX ADMIN — SENNOSIDES AND DOCUSATE SODIUM 2 TABLET: 50; 8.6 TABLET ORAL at 08:17

## 2024-10-26 RX ADMIN — HEPARIN SODIUM 5000 UNITS: 5000 INJECTION INTRAVENOUS; SUBCUTANEOUS at 13:31

## 2024-10-26 RX ADMIN — INSULIN LISPRO 3 UNITS: 100 INJECTION, SOLUTION INTRAVENOUS; SUBCUTANEOUS at 21:46

## 2024-10-26 RX ADMIN — HEPARIN SODIUM 5000 UNITS: 5000 INJECTION INTRAVENOUS; SUBCUTANEOUS at 21:46

## 2024-10-26 RX ADMIN — OXYCODONE HYDROCHLORIDE 5 MG: 5 TABLET ORAL at 11:40

## 2024-10-26 RX ADMIN — ATORVASTATIN CALCIUM 40 MG: 40 TABLET, FILM COATED ORAL at 08:16

## 2024-10-26 RX ADMIN — INSULIN LISPRO 5 UNITS: 100 INJECTION, SOLUTION INTRAVENOUS; SUBCUTANEOUS at 11:35

## 2024-10-26 RX ADMIN — FOLIC ACID 1 MG: 1 TABLET ORAL at 08:16

## 2024-10-26 RX ADMIN — INSULIN LISPRO 4 UNITS: 100 INJECTION, SOLUTION INTRAVENOUS; SUBCUTANEOUS at 11:35

## 2024-10-26 RX ADMIN — INSULIN GLARGINE 20 UNITS: 100 INJECTION, SOLUTION SUBCUTANEOUS at 21:45

## 2024-10-26 RX ADMIN — GADOBUTROL 7 ML: 604.72 INJECTION INTRAVENOUS at 21:22

## 2024-10-26 RX ADMIN — DOCOSANOL: 100 CREAM TOPICAL at 22:28

## 2024-10-26 RX ADMIN — DOCOSANOL: 100 CREAM TOPICAL at 14:06

## 2024-10-26 NOTE — PROGRESS NOTES
Progress Note - Nephrology   Name: Alejandro Adames 69 y.o. male I MRN: [id number]  Unit/Bed#: [facility] 909-01 I Date of Admission: 10/18/2024   Date of Service: 10/26/2024 I Hospital Day: 8    Assessment & Plan  Acute renal failure (ARF) (HCC)  Etiology thought to be multifactorial due to component of obstructive uropathy +/- contrast associated nephropathy +/- previous NSAID use  Baseline creatinine 1.0-1.2  Creatinine on admission 2.43 on 10/18/2024  Peak creatinine 2.73 on 10/20/2024  Creatinine yesterday 1.75 increased after discontinuation of IV fluids  Creatinine today 1.67  Polyuria has been improving  Without IV fluids renal function has remained stable  Hyponatremia  This is currently resolved  Sodium level has further improved  Other hydronephrosis  Left PCN placement given obstructive retroperitoneal mass  Management per urology  Retroperitoneal mass  Biopsy results (prelim renal primary)  Management per oncology  Malignant neoplasm of prostate (HCC)  Noted enlarged left pelvic and in the retroperitoneum nodes  Biopsy results (prelim renal primary)  Essential hypertension  Blood pressure currently acceptable  Current Rx: Amlodipine 5 mg daily  Continue current dose  Type 2 diabetes mellitus with kidney complication, without long-term current use of insulin (HCC)  Management per primary team  Polyuria  This is due to postobstructive diuresis, management as above    Please contact the SecureChat role for the Nephrology service with any questions/concerns.    Subjective     Patient was seen today.  He is offering no acute complaints.  He is sore on the chair, his percutaneous nephrostomy tube is draining    Objective :  Temp:  [98 °F (36.7 °C)-98.5 °F (36.9 °C)] 98.5 °F (36.9 °C)  HR:  [77-80] 80  BP: (140-147)/(80-86) 140/86  Resp:  [14-18] 14  SpO2:  [96 %] 96 %  O2 Device: None (Room air)    Current Weight: Weight - Scale: 79.4 kg (175 lb)  First Weight: Weight - Scale: 79.4 kg (175 lb)  I/O         10/24  0701  10/25 0700 10/25 0701  10/26 0700 10/26 0701  10/27 0700    P.O.  1605     I.V. (mL/kg) 1448.8 (18.2)      Other 10      Total Intake(mL/kg) 1458.8 (18.4) 1605 (20.2)     Urine (mL/kg/hr) 3925 (2.1) 3925 (2.1)     Stool  0     Total Output 3925 3925     Net -2466.3 -2320            Unmeasured Stool Occurrence  0 x           Physical Exam  Vitals and nursing note reviewed.   Constitutional:       General: He is not in acute distress.     Appearance: He is well-developed.   HENT:      Head: Normocephalic and atraumatic.   Eyes:      Conjunctiva/sclera: Conjunctivae normal.   Cardiovascular:      Rate and Rhythm: Normal rate and regular rhythm.      Heart sounds: No murmur heard.  Pulmonary:      Effort: Pulmonary effort is normal. No respiratory distress.      Breath sounds: Normal breath sounds.   Abdominal:      Palpations: Abdomen is soft.      Tenderness: There is no abdominal tenderness.   Musculoskeletal:         General: No swelling.      Cervical back: Neck supple.   Skin:     General: Skin is warm and dry.      Capillary Refill: Capillary refill takes less than 2 seconds.   Neurological:      Mental Status: He is alert.   Psychiatric:         Mood and Affect: Mood normal.       Medications:    Current Facility-Administered Medications:     acetaminophen (TYLENOL) tablet 650 mg, 650 mg, Oral, Q4H PRN, Dirk Guerrero DO, 650 mg at 10/24/24 2257    amLODIPine (NORVASC) tablet 5 mg, 5 mg, Oral, Daily, Dirk Guerrero DO, 5 mg at 10/26/24 0816    atorvastatin (LIPITOR) tablet 40 mg, 40 mg, Oral, Daily, Zeyad Trevino DO, 40 mg at 10/26/24 0816    ceFAZolin (ANCEF) IVPB (premix in dextrose) 2,000 mg 50 mL, 2,000 mg, Intravenous, Once, Yovani Gaxiola MD    docosanol (ABREVA) 10 % cream, , Topical, 5x Daily, Reji Naik MD, 1 Application at 10/26/24 1137    folic acid (FOLVITE) tablet 1 mg, 1 mg, Oral, Daily, Dirk Guerrero DO, 1 mg at 10/26/24 5477    heparin (porcine) subcutaneous injection  5,000 Units, 5,000 Units, Subcutaneous, Q8H CHONG, Dirk Guerrero DO, 5,000 Units at 10/26/24 0438    HYDROmorphone HCl (DILAUDID) injection 0.2 mg, 0.2 mg, Intravenous, Q4H PRN, Zeyad Trevino DO, 0.2 mg at 10/18/24 2242    insulin glargine (LANTUS) subcutaneous injection 20 Units 0.2 mL, 20 Units, Subcutaneous, HS, Reji Naik MD, 20 Units at 10/25/24 2126    insulin lispro (HumALOG/ADMELOG) 100 units/mL subcutaneous injection 1-5 Units, 1-5 Units, Subcutaneous, HS, Zeyad Trevino DO, 3 Units at 10/25/24 2126    insulin lispro (HumALOG/ADMELOG) 100 units/mL subcutaneous injection 1-6 Units, 1-6 Units, Subcutaneous, TID AC, 4 Units at 10/26/24 1135 **AND** Fingerstick Glucose (POCT), , , TID AC, Zeyad Trevino DO    insulin lispro (HumALOG/ADMELOG) 100 units/mL subcutaneous injection 5 Units, 5 Units, Subcutaneous, TID With Meals, Dirk Guerrero DO, 5 Units at 10/26/24 1135    lidocaine (LMX) 4 % cream, , Topical, Daily PRN, Reji Naik MD    ondansetron (ZOFRAN) injection 4 mg, 4 mg, Intravenous, Q6H PRN, Zeyad Trevino DO    oxyCODONE (ROXICODONE) split tablet 2.5 mg, 2.5 mg, Oral, Q4H PRN, 2.5 mg at 10/25/24 2129 **OR** oxyCODONE (ROXICODONE) IR tablet 5 mg, 5 mg, Oral, Q4H PRN, Zeyad Trevino DO, 5 mg at 10/26/24 1140    polyethylene glycol (MIRALAX) packet 17 g, 17 g, Oral, Daily, Dirk Guerrero DO, 17 g at 10/26/24 0817    senna-docusate sodium (SENOKOT S) 8.6-50 mg per tablet 2 tablet, 2 tablet, Oral, BID, Dirk Reichoud, , 2 tablet at 10/26/24 0817      Lab Results: I have reviewed the following results:  Results from last 7 days   Lab Units 10/26/24  0437 10/25/24  0922 10/24/24  0559 10/23/24  0451 10/22/24  0534 10/21/24  0947 10/21/24  0604 10/20/24  0518   WBC Thousand/uL  --   --   --   --   --   --  6.84 6.43   HEMOGLOBIN g/dL  --   --   --   --   --   --  10.2* 9.9*   HEMATOCRIT %  --   --   --   --   --   --  32.9* 32.0*   PLATELETS Thousands/uL  --   --   --   --   --   --   "438* 415*   POTASSIUM mmol/L 4.0 4.0 3.8 3.9 3.8 4.1  --  4.3   CHLORIDE mmol/L 107 104 106 105 104 102  --  101   CO2 mmol/L 23 23 24 24 25 26  --  25   BUN mg/dL 29* 30* 31* 31* 35* 38*  --  41*   CREATININE mg/dL 1.49* 1.67* 1.75* 1.70* 1.78* 2.22*  --  2.73*   CALCIUM mg/dL 8.9 8.5 8.7 8.3* 8.3* 8.8  --  8.6   MAGNESIUM mg/dL  --   --   --   --   --  2.1  --  1.8*       Administrative Statements     Portions of the record may have been created with voice recognition software. Occasional wrong word or \"sound a like\" substitutions may have occurred due to the inherent limitations of voice recognition software. Read the chart carefully and recognize, using context, where substitutions have occurred.If you have any questions, please contact the dictating provider.  "

## 2024-10-26 NOTE — ASSESSMENT & PLAN NOTE
Lab Results   Component Value Date    HGBA1C 9.8 (H) 08/28/2024       Recent Labs     10/25/24  1616 10/25/24  2040 10/26/24  0748 10/26/24  1119   POCGLU 185* 278* 130 292*       Blood Sugar Average: Last 72 hrs:  (P) 188.1766099083418293  Uncontrolled diabetes mellitus type 2 A1c 9.8  Accu-Cheks reviewed  Continue Lantus 20 initially, Humalog 5 units 3 times daily with meals  Monitor Accu-Cheks  Outpatient endocrinology follow-up

## 2024-10-26 NOTE — PROGRESS NOTES
UROLOGY PROGRESS NOTE   Patient Identifiers: Alejandro Adames (MRN [id number])  Date of Service: 10/26/2024    Subjective:     Afebrile, vital signs stable    Objective:     VITALS:    Vitals:    10/26/24 1459   BP: 138/83   Pulse: 79   Resp: 15   Temp: 98 °F (36.7 °C)   SpO2: 98%       INS & OUTS:  I/O last 24 hours:  In: 1605 [P.O.:1605]  Out: 3925 [Urine:3925]    LABS:  Lab Results   Component Value Date    HGB 10.2 (L) 10/21/2024    HCT 32.9 (L) 10/21/2024    WBC 6.84 10/21/2024     (H) 10/21/2024   ]    Lab Results   Component Value Date    K 4.0 10/26/2024     10/26/2024    CO2 23 10/26/2024    BUN 29 (H) 10/26/2024    CREATININE 1.49 (H) 10/26/2024    CALCIUM 8.9 10/26/2024   ]    INPATIENT MEDS:    Current Facility-Administered Medications:     acetaminophen (TYLENOL) tablet 650 mg, 650 mg, Oral, Q4H PRN, Dirk Guerrero DO, 650 mg at 10/24/24 2257    amLODIPine (NORVASC) tablet 5 mg, 5 mg, Oral, Daily, Dirk Guerrero DO, 5 mg at 10/26/24 0816    atorvastatin (LIPITOR) tablet 40 mg, 40 mg, Oral, Daily, Zeyad Trevino DO, 40 mg at 10/26/24 0816    ceFAZolin (ANCEF) IVPB (premix in dextrose) 2,000 mg 50 mL, 2,000 mg, Intravenous, Once, Yovani Gaxiola MD    docosanol (ABREVA) 10 % cream, , Topical, 5x Daily, Reji Naik MD, Given at 10/26/24 1406    folic acid (FOLVITE) tablet 1 mg, 1 mg, Oral, Daily, Dirk Guerrero DO, 1 mg at 10/26/24 0816    heparin (porcine) subcutaneous injection 5,000 Units, 5,000 Units, Subcutaneous, Q8H CHONG, Dirk Guerrero DO, 5,000 Units at 10/26/24 1331    HYDROmorphone HCl (DILAUDID) injection 0.2 mg, 0.2 mg, Intravenous, Q4H PRN, Zeyad Trevino DO, 0.2 mg at 10/18/24 2242    insulin glargine (LANTUS) subcutaneous injection 20 Units 0.2 mL, 20 Units, Subcutaneous, HS, Reji Naik MD, 20 Units at 10/25/24 2126    insulin lispro (HumALOG/ADMELOG) 100 units/mL subcutaneous injection 1-5 Units, 1-5 Units, Subcutaneous, HS, Zeyad Trevino DO, 3  Units at 10/25/24 2126    insulin lispro (HumALOG/ADMELOG) 100 units/mL subcutaneous injection 1-6 Units, 1-6 Units, Subcutaneous, TID AC, 4 Units at 10/26/24 1135 **AND** Fingerstick Glucose (POCT), , , TID AC, Zeyad Trevino DO    insulin lispro (HumALOG/ADMELOG) 100 units/mL subcutaneous injection 5 Units, 5 Units, Subcutaneous, TID With Meals, Dirk Guerrero DO, 5 Units at 10/26/24 1135    lidocaine (LMX) 4 % cream, , Topical, Daily PRN, Reji Naik MD    ondansetron (ZOFRAN) injection 4 mg, 4 mg, Intravenous, Q6H PRN, Zeyad Trevino DO    oxyCODONE (ROXICODONE) split tablet 2.5 mg, 2.5 mg, Oral, Q4H PRN, 2.5 mg at 10/25/24 2129 **OR** oxyCODONE (ROXICODONE) IR tablet 5 mg, 5 mg, Oral, Q4H PRN, Zeyad Trevino DO, 5 mg at 10/26/24 1140    polyethylene glycol (MIRALAX) packet 17 g, 17 g, Oral, Daily, Dirk Guerrero DO, 17 g at 10/26/24 0817    senna-docusate sodium (SENOKOT S) 8.6-50 mg per tablet 2 tablet, 2 tablet, Oral, BID, Dirk Guerrero DO, 2 tablet at 10/26/24 0817      Physical Exam:     GEN: no acute distress    RESP: breathing comfortably with no accessory muscle use    ABD: soft, non-tender, non-distended   EXT: no significant peripheral edema   Left nephrostomy tube draining to gravity          Assessment:   69-year-old male with prostate cancer    Follows with Zay Santoyo, status post XRT in 2018    PSA 0.355 on 10/19/2024    CT abdomen pelvis with contrast 10/18/2024: Moderate left hydronephrosis with peripelvic/proximal periureteral inflammatory stranding secondary to obstructing large left retroperitoneal toribio conglomerate; enlarged left pelvic and other retroperitoneal nodes; findings consistent with metastasis; 3 mm sclerotic lesions in right iliac body and right femoral head; rim calcified anterior right midpole renal pelvic cyst    CT chest with contrast 10/18/2024 was negative for malignant findings    IR placed left PCN on 10/19/2024    IR performed left inguinal lymph node biopsy  on 10/19/2024.  This came back showing metastatic carcinoma most consistent with renal origin.    Urology asked to reevaluate due to lymph node biopsy findings      Remains afebrile, vital signs stable    Labs 10/26/2024: Creatinine 1.49    Plan:   -Maintain left nephrostomy tube to gravity drainage  -Agree with obtaining MRI.  Based on findings would consider ureteroscopy with biopsy versus IR percutaneous biopsy of the calcified right renal pelvic cyst.  If MRI cannot be done in a timely fashion, please discharge the patient by 10/27/2024 if no additional barriers to discharge.  We can always get the MRI in the outpatient setting.  -Please obtain urine cytology while inpatient if possible

## 2024-10-26 NOTE — ASSESSMENT & PLAN NOTE
Baseline creatinine 1.2  CT showed lymphadenopathy partially obstructing L ureter  Likely secondary to hydronephrosis and decreased oral intake  Status post left nephrostomy placement by IR 10/19  Kidney function improving  Nephrology following  Avoid nephrotoxins  Monitor kidney function

## 2024-10-26 NOTE — PROGRESS NOTES
Progress Note - Hospitalist   Name: Alejandro Adames 69 y.o. male I MRN: [id number]  Unit[facility]Bed[facility] 9[facility]-01 I Date of Admission: 10/18/2024   Date of Service: 10/26/2024 I Hospital Day: 8    Assessment & Plan  Other hydronephrosis  The patient started having left flank pain 1.5 weeks ago. He saw his urologist outpatient  for left flank pain and a CT was done which shows left hydronephrosis, retroperitoneal lymphadenopathy, and new sclerotic lesions concerning for metastatic disease. The patient was instructed by urologist to come to ED to be admitted for possible L ureter stent placement.  Status post left PCN placement 10/19/2024 per IR  Urology inputs noted  Supportive cares    Retroperitoneal mass  CT scan with peripelvic/proximal periureteral inflammatory stranding secondary to obstructing large left retroperitoneal toribio conglomerate. Enlarged left pelvic and other retroperitoneal nodes   Findings consistent with metastasis. 3 mm sclerotic lesions in the right iliac body and right femoral head may represent bone islands versus sclerotic mets.     Patient with underlying history of prostate cancer  IR consulted for biopsy, status post left inguinal lymph node biopsy on 10/19  Biopsy reported -metastatic carcinoma most suggestive of renal origin  Given the biopsy result findings and oncology plans for further imaging and workup noted  Oncology following  Acute renal failure (ARF) (HCC)  Baseline creatinine 1.2  CT showed lymphadenopathy partially obstructing L ureter  Likely secondary to hydronephrosis and decreased oral intake  Status post left nephrostomy placement by IR 10/19  Kidney function improving  Nephrology following  Avoid nephrotoxins  Monitor kidney function    Hyponatremia  POA  Suspect multifactorial  Resolved  Anemia  Anemia panel consistent with anemia of chronic disease and folate deficiency  Continue folic acid supplementation  Monitor hemoglobin  Essential hypertension  Blood pressures reviewed,  acceptable  Continue amlodipine 5 mg p.o. daily  Presently HCTZ and olmesartan on hold  Monitor blood pressures  Avoid hypotension    Type 2 diabetes mellitus with kidney complication, without long-term current use of insulin (HCC)  Lab Results   Component Value Date    HGBA1C 9.8 (H) 08/28/2024       Recent Labs     10/25/24  1616 10/25/24  2040 10/26/24  0748 10/26/24  1119   POCGLU 185* 278* 130 292*       Blood Sugar Average: Last 72 hrs:  (P) 188.7724544226755599  Uncontrolled diabetes mellitus type 2 A1c 9.8  Accu-Cheks reviewed  Continue Lantus 20 initially, Humalog 5 units 3 times daily with meals  Monitor Accu-Cheks  Outpatient endocrinology follow-up        Malignant neoplasm of prostate (HCC)  Diagnosed in 2019 and underwent biopsy of the prostate and s/p XRT  PSA 0.355  Urology inputs noted  Lung nodule  3 mm left lower lobe lung nodule noted on visible portion of lung on CT abdomen/pelvis  Chest CT scan, negative for malignancy, per radiology, the 3 mm left lower lobe nodule is of doubtful significance.     VTE Pharmacologic Prophylaxis: VTE Score: 7 High Risk (Score >/= 5) - Pharmacological DVT Prophylaxis Ordered: heparin. Sequential Compression Devices Ordered.    Mobility:   Basic Mobility Inpatient Raw Score: 24  JH-HLM Goal: 8: Walk 250 feet or more  JH-HLM Achieved: 8: Walk 250 feet ot more  JH-HLM Goal achieved. Continue to encourage appropriate mobility.    Patient Centered Rounds: I performed bedside rounds with nursing staff today.   Discussions with Specialists or Other Care Team Provider: Case management    Education and Discussions with Family / Patient:  Discussed with the patient, reports he is keeping his family updated.     Current Length of Stay: 8 day(s)  Current Patient Status: Inpatient   Certification Statement: The patient will continue to require additional inpatient hospital stay due to as outlined  Discharge Plan:  Oncology plans for inpatient imaging studies noted    Code  Status: Level 3 - DNAR and DNI    Subjective     Comfortably sitting up in chair  Reports cold sore on upper lip  Reports some flank pain left side stiffness in his left hip  Encourage out of bed into a chair  Encourage ambulation as able    Objective :  Temp:  [98 °F (36.7 °C)-98.5 °F (36.9 °C)] 98.5 °F (36.9 °C)  HR:  [77-80] 80  BP: (140-147)/(80-86) 140/86  Resp:  [14-18] 14  SpO2:  [96 %] 96 %  O2 Device: None (Room air)    Body mass index is 25.11 kg/m².     Input and Output Summary (last 24 hours):     Intake/Output Summary (Last 24 hours) at 10/26/2024 1239  Last data filed at 10/26/2024 0600  Gross per 24 hour   Intake 1605 ml   Output 3925 ml   Net -2320 ml       Physical Exam    Comfortably sitting up in chair  Neck supple  Lungs diminished breath sounds bases  Vesicular breath sounds  Heart sounds S1-S2 noted  Abdominal soft  Awake follows commands  Left lower extremity edema noted  No rash      Lines/Drains:  Lines/Drains/Airways       Active Status       Name Placement date Placement time Site Days    Nephrostomy Left 10 Fr. 10/19/24  1004  Left  7                            Lab Results: I have reviewed the following results:   Results from last 7 days   Lab Units 10/21/24  0604   WBC Thousand/uL 6.84   HEMOGLOBIN g/dL 10.2*   HEMATOCRIT % 32.9*   PLATELETS Thousands/uL 438*   SEGS PCT % 75   LYMPHO PCT % 12*   MONO PCT % 10   EOS PCT % 2     Results from last 7 days   Lab Units 10/26/24  0437   SODIUM mmol/L 139   POTASSIUM mmol/L 4.0   CHLORIDE mmol/L 107   CO2 mmol/L 23   BUN mg/dL 29*   CREATININE mg/dL 1.49*   ANION GAP mmol/L 9   CALCIUM mg/dL 8.9   GLUCOSE RANDOM mg/dL 139         Results from last 7 days   Lab Units 10/26/24  1119 10/26/24  0748 10/25/24  2040 10/25/24  1616 10/25/24  1116 10/25/24  0847 10/24/24  2059 10/24/24  1605 10/24/24  1110 10/24/24  0701 10/23/24  2109 10/23/24  1624   POC GLUCOSE mg/dl 292* 130 278* 185* 252* 154* 248* 196* 209* 82 181* 102               Recent  Cultures (last 7 days):         Imaging Results Review: I personally reviewed the following image studies/reports in PACS and discussed pertinent findings with Radiology: procedure reports. My interpretation of the radiology images/reports is:  .  Other Study Results Review: No additional pertinent studies reviewed.    Last 24 Hours Medication List:     Current Facility-Administered Medications:     acetaminophen (TYLENOL) tablet 650 mg, Q4H PRN    amLODIPine (NORVASC) tablet 5 mg, Daily    atorvastatin (LIPITOR) tablet 40 mg, Daily    ceFAZolin (ANCEF) IVPB (premix in dextrose) 2,000 mg 50 mL, Once    docosanol (ABREVA) 10 % cream, 5x Daily    folic acid (FOLVITE) tablet 1 mg, Daily    heparin (porcine) subcutaneous injection 5,000 Units, Q8H CHONG    HYDROmorphone HCl (DILAUDID) injection 0.2 mg, Q4H PRN    insulin glargine (LANTUS) subcutaneous injection 20 Units 0.2 mL, HS    insulin lispro (HumALOG/ADMELOG) 100 units/mL subcutaneous injection 1-5 Units, HS    insulin lispro (HumALOG/ADMELOG) 100 units/mL subcutaneous injection 1-6 Units, TID AC **AND** Fingerstick Glucose (POCT), TID AC    insulin lispro (HumALOG/ADMELOG) 100 units/mL subcutaneous injection 5 Units, TID With Meals    lidocaine (LMX) 4 % cream, Daily PRN    ondansetron (ZOFRAN) injection 4 mg, Q6H PRN    oxyCODONE (ROXICODONE) split tablet 2.5 mg, Q4H PRN **OR** oxyCODONE (ROXICODONE) IR tablet 5 mg, Q4H PRN    polyethylene glycol (MIRALAX) packet 17 g, Daily    senna-docusate sodium (SENOKOT S) 8.6-50 mg per tablet 2 tablet, BID    Administrative Statements   Today, Patient Was Seen By: Reji Naik MD  I have spent a total time of 31 minutes in caring for this patient on the day of the visit/encounter including Diagnostic results and Impressions.    **Please Note: This note may have been constructed using a voice recognition system.**

## 2024-10-26 NOTE — PLAN OF CARE
Problem: PAIN - ADULT  Goal: Verbalizes/displays adequate comfort level or baseline comfort level  Description: Interventions:  - Encourage patient to monitor pain and request assistance  - Assess pain using appropriate pain scale  - Administer analgesics based on type and severity of pain and evaluate response  - Implement non-pharmacological measures as appropriate and evaluate response  - Consider cultural and social influences on pain and pain management  - Notify physician/advanced practitioner if interventions unsuccessful or patient reports new pain  Outcome: Progressing     Problem: INFECTION - ADULT  Goal: Absence or prevention of progression during hospitalization  Description: INTERVENTIONS:  - Assess and monitor for signs and symptoms of infection  - Monitor lab/diagnostic results  - Monitor all insertion sites, i.e. indwelling lines, tubes, and drains  - Monitor endotracheal if appropriate and nasal secretions for changes in amount and color  - [place] appropriate cooling/warming therapies per order  - Administer medications as ordered  - Instruct and encourage patient and family to use good hand hygiene technique  - Identify and instruct in appropriate isolation precautions for identified infection/condition  Outcome: Progressing     Problem: SAFETY ADULT  Goal: Patient will remain free of falls  Description: INTERVENTIONS:  - Educate patient/family on patient safety including physical limitations  - Instruct patient to call for assistance with activity   - Consult OT/PT to assist with strengthening/mobility   - Keep Call bell within reach  - Keep bed low and locked with side rails adjusted as appropriate  - Keep care items and personal belongings within reach  - Initiate and maintain comfort rounds  - Make Fall Risk Sign visible to staff  - Offer Toileting every  Hours, in advance of need  - Initiate/Maintain alarm  - Obtain necessary fall risk management equipment:  - Apply yellow socks and  bracelet for high fall risk patients  - Consider moving patient to room near nurses station  Outcome: Progressing  Goal: Maintain or return to baseline ADL function  Description: INTERVENTIONS:  -  Assess patient's ability to carry out ADLs; assess patient's baseline for ADL function and identify physical deficits which impact ability to perform ADLs (bathing, care of mouth/teeth, toileting, grooming, dressing, etc.)  - Assess/evaluate cause of self-care deficits   - Assess range of motion  - Assess patient's mobility; develop plan if impaired  - Assess patient's need for assistive devices and provide as appropriate  - Encourage maximum independence but intervene and supervise when necessary  - Involve family in performance of ADLs  - Assess for home care needs following discharge   - Consider OT consult to assist with ADL evaluation and planning for discharge  - Provide patient education as appropriate  Outcome: Progressing     Problem: DISCHARGE PLANNING  Goal: Discharge to home or other facility with appropriate resources  Description: INTERVENTIONS:  - Identify barriers to discharge w/patient and caregiver  - Arrange for needed discharge resources and transportation as appropriate  - Identify discharge learning needs (meds, wound care, etc.)  - Arrange for interpretive services to assist at discharge as needed  - Refer to Case Management Department for coordinating discharge planning if the patient needs post-hospital services based on physician/advanced practitioner order or complex needs related to functional status, cognitive ability, or social support system  Outcome: Progressing     Problem: Knowledge Deficit  Goal: Patient/family/caregiver demonstrates understanding of disease process, treatment plan, medications, and discharge instructions  Description: Complete learning assessment and assess knowledge base.  Interventions:  - Provide teaching at level of understanding  - Provide teaching via preferred  learning methods  Outcome: Progressing     Problem: GENITOURINARY - ADULT  Goal: Maintains or returns to baseline urinary function  Description: INTERVENTIONS:  - Assess urinary function  - Encourage oral fluids to ensure adequate hydration if ordered  - Administer IV fluids as ordered to ensure adequate hydration  - Administer ordered medications as needed  - Offer frequent toileting  - Follow urinary retention protocol if ordered  Outcome: Progressing     Problem: Nutrition/Hydration-ADULT  Goal: Nutrient/Hydration intake appropriate for improving, restoring or maintaining nutritional needs  Description: Monitor and assess patient's nutrition/hydration status for malnutrition. Collaborate with interdisciplinary team and initiate plan and interventions as ordered.  Monitor patient's weight and dietary intake as ordered or per policy. Utilize nutrition screening tool and intervene as necessary. Determine patient's food preferences and provide high-protein, high-caloric foods as appropriate.     INTERVENTIONS:  - Monitor oral intake, urinary output, labs, and treatment plans  - Assess nutrition and hydration status and recommend course of action  - Evaluate amount of meals eaten  - Assist patient with eating if necessary   - Allow adequate time for meals  - Recommend/ encourage appropriate diets, oral nutritional supplements, and vitamin/mineral supplements  - Order, calculate, and assess calorie counts as needed  - Recommend, monitor, and adjust tube feedings and TPN/PPN based on assessed needs  - Assess need for intravenous fluids  - Provide specific nutrition/hydration education as appropriate  - Include patient/family/caregiver in decisions related to nutrition  Outcome: Progressing

## 2024-10-26 NOTE — ASSESSMENT & PLAN NOTE
Blood pressures reviewed, acceptable  Continue amlodipine 5 mg p.o. daily  Presently HCTZ and olmesartan on hold  Monitor blood pressures  Avoid hypotension

## 2024-10-26 NOTE — ASSESSMENT & PLAN NOTE
Etiology thought to be multifactorial due to component of obstructive uropathy +/- contrast associated nephropathy +/- previous NSAID use  Baseline creatinine 1.0-1.2  Creatinine on admission 2.43 on 10/18/2024  Peak creatinine 2.73 on 10/20/2024  Creatinine yesterday 1.75 increased after discontinuation of IV fluids  Creatinine today 1.67  Polyuria has been improving  Without IV fluids renal function has remained stable

## 2024-10-27 VITALS
WEIGHT: 175 LBS | SYSTOLIC BLOOD PRESSURE: 135 MMHG | TEMPERATURE: 97.7 F | DIASTOLIC BLOOD PRESSURE: 73 MMHG | HEART RATE: 96 BPM | RESPIRATION RATE: 16 BRPM | BODY MASS INDEX: 25.05 KG/M2 | HEIGHT: 70 IN | OXYGEN SATURATION: 96 %

## 2024-10-27 LAB
ANION GAP SERPL CALCULATED.3IONS-SCNC: 9 MMOL/L (ref 4–13)
BUN SERPL-MCNC: 27 MG/DL (ref 5–25)
CALCIUM SERPL-MCNC: 9 MG/DL (ref 8.4–10.2)
CHLORIDE SERPL-SCNC: 105 MMOL/L (ref 96–108)
CO2 SERPL-SCNC: 25 MMOL/L (ref 21–32)
CREAT SERPL-MCNC: 1.4 MG/DL (ref 0.6–1.3)
GFR SERPL CREATININE-BSD FRML MDRD: 50 ML/MIN/1.73SQ M
GLUCOSE SERPL-MCNC: 253 MG/DL (ref 65–140)
GLUCOSE SERPL-MCNC: 79 MG/DL (ref 65–140)
GLUCOSE SERPL-MCNC: 82 MG/DL (ref 65–140)
POTASSIUM SERPL-SCNC: 3.8 MMOL/L (ref 3.5–5.3)
SODIUM SERPL-SCNC: 139 MMOL/L (ref 135–147)

## 2024-10-27 PROCEDURE — 80048 BASIC METABOLIC PNL TOTAL CA: CPT | Performed by: INTERNAL MEDICINE

## 2024-10-27 PROCEDURE — 82948 REAGENT STRIP/BLOOD GLUCOSE: CPT

## 2024-10-27 PROCEDURE — 99239 HOSP IP/OBS DSCHRG MGMT >30: CPT | Performed by: INTERNAL MEDICINE

## 2024-10-27 RX ORDER — OXYCODONE HYDROCHLORIDE 5 MG/1
2.5 TABLET ORAL EVERY 8 HOURS PRN
Qty: 10 TABLET | Refills: 0 | Status: SHIPPED | OUTPATIENT
Start: 2024-10-27

## 2024-10-27 RX ORDER — AMLODIPINE BESYLATE 5 MG/1
5 TABLET ORAL DAILY
Qty: 30 TABLET | Refills: 0 | Status: SHIPPED | OUTPATIENT
Start: 2024-10-28 | End: 2024-11-27

## 2024-10-27 RX ORDER — FOLIC ACID 1 MG/1
1 TABLET ORAL DAILY
Qty: 30 TABLET | Refills: 0 | Status: SHIPPED | OUTPATIENT
Start: 2024-10-28 | End: 2024-11-27

## 2024-10-27 RX ORDER — AMOXICILLIN 250 MG
1 CAPSULE ORAL 2 TIMES DAILY
Qty: 60 TABLET | Refills: 0 | Status: SHIPPED | OUTPATIENT
Start: 2024-10-27 | End: 2024-11-05

## 2024-10-27 RX ORDER — POLYETHYLENE GLYCOL 3350 17 G/17G
17 POWDER, FOR SOLUTION ORAL DAILY
Qty: 510 G | Refills: 0 | Status: SHIPPED | OUTPATIENT
Start: 2024-10-28 | End: 2024-11-05

## 2024-10-27 RX ADMIN — ATORVASTATIN CALCIUM 40 MG: 40 TABLET, FILM COATED ORAL at 08:42

## 2024-10-27 RX ADMIN — INSULIN LISPRO 3 UNITS: 100 INJECTION, SOLUTION INTRAVENOUS; SUBCUTANEOUS at 12:22

## 2024-10-27 RX ADMIN — INSULIN LISPRO 5 UNITS: 100 INJECTION, SOLUTION INTRAVENOUS; SUBCUTANEOUS at 12:23

## 2024-10-27 RX ADMIN — DOCOSANOL: 100 CREAM TOPICAL at 05:13

## 2024-10-27 RX ADMIN — HEPARIN SODIUM 5000 UNITS: 5000 INJECTION INTRAVENOUS; SUBCUTANEOUS at 05:10

## 2024-10-27 RX ADMIN — AMLODIPINE BESYLATE 5 MG: 5 TABLET ORAL at 08:50

## 2024-10-27 RX ADMIN — FOLIC ACID 1 MG: 1 TABLET ORAL at 08:42

## 2024-10-27 RX ADMIN — SENNOSIDES AND DOCUSATE SODIUM 2 TABLET: 50; 8.6 TABLET ORAL at 08:42

## 2024-10-27 RX ADMIN — DOCOSANOL 1 APPLICATION: 100 CREAM TOPICAL at 10:21

## 2024-10-27 RX ADMIN — INSULIN LISPRO 5 UNITS: 100 INJECTION, SOLUTION INTRAVENOUS; SUBCUTANEOUS at 08:44

## 2024-10-27 NOTE — ASSESSMENT & PLAN NOTE
Anemia panel consistent with anemia of chronic disease and folate deficiency  Continue folic acid supplementation  Outpatient follow-up

## 2024-10-27 NOTE — ASSESSMENT & PLAN NOTE
CT scan with peripelvic/proximal periureteral inflammatory stranding secondary to obstructing large left retroperitoneal toribio conglomerate. Enlarged left pelvic and other retroperitoneal nodes   Findings consistent with metastasis. 3 mm sclerotic lesions in the right iliac body and right femoral head may represent bone islands versus sclerotic mets.     Patient with underlying history of prostate cancer  IR consulted for biopsy, status post left inguinal lymph node biopsy on 10/19  Biopsy reported -metastatic carcinoma most suggestive of renal origin  MRI abdomen completed  Outpatient follow-up with oncology

## 2024-10-27 NOTE — ASSESSMENT & PLAN NOTE
The patient started having left flank pain 1.5 weeks ago. He saw his urologist outpatient  for left flank pain and a CT was done which shows left hydronephrosis, retroperitoneal lymphadenopathy, and new sclerotic lesions concerning for metastatic disease. The patient was instructed by urologist to come to ED to be admitted for possible L ureter stent placement.  Status post left PCN placement 10/19/2024 per IR  PCN care  Outpatient urology/IR follow-up

## 2024-10-27 NOTE — PLAN OF CARE
Problem: PAIN - ADULT  Goal: Verbalizes/displays adequate comfort level or baseline comfort level  Description: Interventions:  - Encourage patient to monitor pain and request assistance  - Assess pain using appropriate pain scale  - Administer analgesics based on type and severity of pain and evaluate response  - Implement non-pharmacological measures as appropriate and evaluate response  - Consider cultural and social influences on pain and pain management  - Notify physician/advanced practitioner if interventions unsuccessful or patient reports new pain  Outcome: Progressing     Problem: INFECTION - ADULT  Goal: Absence or prevention of progression during hospitalization  Description: INTERVENTIONS:  - Assess and monitor for signs and symptoms of infection  - Monitor lab/diagnostic results  - Monitor all insertion sites, i.e. indwelling lines, tubes, and drains  - Monitor endotracheal if appropriate and nasal secretions for changes in amount and color  - [place] appropriate cooling/warming therapies per order  - Administer medications as ordered  - Instruct and encourage patient and family to use good hand hygiene technique  - Identify and instruct in appropriate isolation precautions for identified infection/condition  Outcome: Progressing     Problem: SAFETY ADULT  Goal: Patient will remain free of falls  Description: INTERVENTIONS:  - Educate patient/family on patient safety including physical limitations  - Instruct patient to call for assistance with activity   - Consult OT/PT to assist with strengthening/mobility   - Keep Call bell within reach  - Keep bed low and locked with side rails adjusted as appropriate  - Keep care items and personal belongings within reach  - Initiate and maintain comfort rounds  - Make Fall Risk Sign visible to staff  - Offer Toileting every  Hours, in advance of need  - Initiate/Maintain alarm  - Obtain necessary fall risk management equipment:  - Apply yellow socks and  bracelet for high fall risk patients  - Consider moving patient to room near nurses station  Outcome: Progressing  Goal: Maintain or return to baseline ADL function  Description: INTERVENTIONS:  -  Assess patient's ability to carry out ADLs; assess patient's baseline for ADL function and identify physical deficits which impact ability to perform ADLs (bathing, care of mouth/teeth, toileting, grooming, dressing, etc.)  - Assess/evaluate cause of self-care deficits   - Assess range of motion  - Assess patient's mobility; develop plan if impaired  - Assess patient's need for assistive devices and provide as appropriate  - Encourage maximum independence but intervene and supervise when necessary  - Involve family in performance of ADLs  - Assess for home care needs following discharge   - Consider OT consult to assist with ADL evaluation and planning for discharge  - Provide patient education as appropriate  Outcome: Progressing     Problem: DISCHARGE PLANNING  Goal: Discharge to home or other facility with appropriate resources  Description: INTERVENTIONS:  - Identify barriers to discharge w/patient and caregiver  - Arrange for needed discharge resources and transportation as appropriate  - Identify discharge learning needs (meds, wound care, etc.)  - Arrange for interpretive services to assist at discharge as needed  - Refer to Case Management Department for coordinating discharge planning if the patient needs post-hospital services based on physician/advanced practitioner order or complex needs related to functional status, cognitive ability, or social support system  Outcome: Progressing     Problem: Knowledge Deficit  Goal: Patient/family/caregiver demonstrates understanding of disease process, treatment plan, medications, and discharge instructions  Description: Complete learning assessment and assess knowledge base.  Interventions:  - Provide teaching at level of understanding  - Provide teaching via preferred  learning methods  Outcome: Progressing     Problem: GENITOURINARY - ADULT  Goal: Maintains or returns to baseline urinary function  Description: INTERVENTIONS:  - Assess urinary function  - Encourage oral fluids to ensure adequate hydration if ordered  - Administer IV fluids as ordered to ensure adequate hydration  - Administer ordered medications as needed  - Offer frequent toileting  - Follow urinary retention protocol if ordered  Outcome: Progressing     Problem: Nutrition/Hydration-ADULT  Goal: Nutrient/Hydration intake appropriate for improving, restoring or maintaining nutritional needs  Description: Monitor and assess patient's nutrition/hydration status for malnutrition. Collaborate with interdisciplinary team and initiate plan and interventions as ordered.  Monitor patient's weight and dietary intake as ordered or per policy. Utilize nutrition screening tool and intervene as necessary. Determine patient's food preferences and provide high-protein, high-caloric foods as appropriate.     INTERVENTIONS:  - Monitor oral intake, urinary output, labs, and treatment plans  - Assess nutrition and hydration status and recommend course of action  - Evaluate amount of meals eaten  - Assist patient with eating if necessary   - Allow adequate time for meals  - Recommend/ encourage appropriate diets, oral nutritional supplements, and vitamin/mineral supplements  - Order, calculate, and assess calorie counts as needed  - Recommend, monitor, and adjust tube feedings and TPN/PPN based on assessed needs  - Assess need for intravenous fluids  - Provide specific nutrition/hydration education as appropriate  - Include patient/family/caregiver in decisions related to nutrition  Outcome: Progressing

## 2024-10-27 NOTE — DISCHARGE SUMMARY
Discharge Summary - Hospitalist   Name: Alejandro Adames 69 y.o. male I MRN: [id number]  [facility] I Date of Admission: 10/18/2024   Date of Service: 10/27/2024 I Hospital Day: 9     Assessment & Plan  Other hydronephrosis  The patient started having left flank pain 1.5 weeks ago. He saw his urologist outpatient  for left flank pain and a CT was done which shows left hydronephrosis, retroperitoneal lymphadenopathy, and new sclerotic lesions concerning for metastatic disease. The patient was instructed by urologist to come to ED to be admitted for possible L ureter stent placement.  Status post left PCN placement 10/19/2024 per IR  PCN care  Outpatient urology/IR follow-up    Retroperitoneal mass  CT scan with peripelvic/proximal periureteral inflammatory stranding secondary to obstructing large left retroperitoneal toribio conglomerate. Enlarged left pelvic and other retroperitoneal nodes   Findings consistent with metastasis. 3 mm sclerotic lesions in the right iliac body and right femoral head may represent bone islands versus sclerotic mets.     Patient with underlying history of prostate cancer  IR consulted for biopsy, status post left inguinal lymph node biopsy on 10/19  Biopsy reported -metastatic carcinoma most suggestive of renal origin  MRI abdomen completed  Outpatient follow-up with oncology    Acute renal failure (ARF) (HCC)  Baseline creatinine 1.2  CT showed lymphadenopathy partially obstructing L ureter  Likely secondary to hydronephrosis and decreased oral intake  Status post left nephrostomy placement by IR 10/19  Kidney function improved  Nephrology inputs noted  Outpatient follow-up  Hyponatremia  POA  Suspect multifactorial  Resolved  Anemia  Anemia panel consistent with anemia of chronic disease and folate deficiency  Continue folic acid supplementation  Outpatient follow-up  Essential hypertension  Blood pressures reviewed, acceptable  Continue amlodipine 5 mg p.o. daily at  discharge  Continue to hold HCTZ on losartan at discharge  Ambulatory blood pressure monitoring and outpatient follow-up recommended    Type 2 diabetes mellitus with kidney complication, without long-term current use of insulin (HCC)  Lab Results   Component Value Date    HGBA1C 9.8 (H) 08/28/2024       Recent Labs     10/26/24  1119 10/26/24  1656 10/26/24  2049 10/27/24  0730   POCGLU 292* 140 297* 82       Blood Sugar Average: Last 72 hrs:  (P) 195.0655437710041185  Uncontrolled diabetes mellitus type 2 A1c 9.8  Accu-Cheks reviewed  He will resume Mounjaro and metformin at discharge  Discussed insulin therapy at discharge.  Patient is reluctant.  He is agreeable to outpatient endocrinology follow-up  Contact information for endocrinology placed in discharge instructions          Malignant neoplasm of prostate (HCC)  Diagnosed in 2019 and underwent biopsy of the prostate and s/p XRT  PSA 0.355  Urology inputs noted  Outpatient follow-up  Lung nodule  3 mm left lower lobe lung nodule noted on visible portion of lung on CT abdomen/pelvis  Chest CT scan, negative for malignancy, per radiology, the 3 mm left lower lobe nodule is of doubtful significance.            Discharge Summary - [facility]    Patient Information: Alejandro Adames 69 y.o. male MRN: [id number]  Unit/Bed#: [facility] 909-01 Encounter: [id number]    Discharging Physician / Practitioner: Reji Naik MD  PCP: Wayne Uriarte Jr, MD  Admission Date: 10/18/2024  Discharge Date: 10/27/24    Disposition:     Home    Reason for Admission: Left flank pain    Discharge Diagnoses:     Principal Problem:    Other hydronephrosis  Active Problems:    Acute renal failure (ARF) (HCC)    Essential hypertension    Type 2 diabetes mellitus with kidney complication, without long-term current use of insulin (HCC)    Malignant neoplasm of prostate (HCC)    Anemia    Hyponatremia    Lung nodule    Retroperitoneal mass    Polyuria  Resolved  Problems:    * No resolved hospital problems. *      Consultations During Hospital Stay:  Nephrology  Oncology  IR  Urology    Procedures Performed:     CT abdomen pelvis  Moderate left hydronephrosis with peripelvic/proximal periureteral inflammatory stranding secondary to obstructing large left retroperitoneal toribio conglomerate.  Enlarged left pelvic and other retroperitoneal nodes as described.  Findings consistent with metastasis, noting history of prostate cancer.  No other primary source of malignancy identified.     3 mm left lower lobe nodule.  CT of the chest recommended for full evaluation of the lung fields in setting of metastatic disease.     3 mm sclerotic lesions in the right iliac body and right femoral head may represent bone islands versus sclerotic mets.  Correlation with any prior imaging advised.      CT chest  Nothing to suggest malignancy in the chest. The 3 mm left lower lobe nodule is of doubtful significance     Procedure  1. Successful placement of a left percutaneous nephrostomy catheter.  2. Ultrasound-guided biopsy of left external iliac lymph node.     Plan:  Return in 2 to 3 weeks for conversion of percutaneous nephrostomy catheter to percutaneous nephroureteral stent.    Procedure  1. Successful placement of a left percutaneous nephrostomy catheter.  2. Ultrasound-guided biopsy of left external iliac lymph node.    MRI abdomen with without contrast results pending        Hospital Course:     Alejandro Adames is a 69 y.o. male patient who originally presented to the hospital on 10/18/2024 due to left flank pain.  Patient with history of prostate cancer presented with left flank pain.  He underwent imaging studies which revealed left hydronephrosis retroperitoneal mass.  He was also noted to have acute kidney injury.  Seen in consultation with urology nephrology IR.  He is status post PCN placement left side.  He also underwent lymph node biopsy left retroperitoneal space.    His  "kidney function closely monitor and received IV fluid with improvement.  HCTZ ARB on hold at discharge.  He will continue with amlodipine for her hypertension.    Lymph node biopsy revealed features suggestive of metastatic carcinoma suggestive of renal origin.  Given these findings he underwent MRI abdomen results are pending at the time of discharge.  He has appointment with oncology tomorrow.    Patient with uncontrolled diabetes received insulin therapy inpatient.  He would like to continue with Mounjaro and metformin at discharge.  Insulin therapy discussed however patient is reluctant.  He is agreeable to outpatient endocrinology follow-up, contact information placed in discharge structures.    Patient is clinically symptomatically improved hemodynamically stable and is deemed ready for discharge today.  Kindly review the chart for details.    Condition at Discharge: fair     Discharge Day Visit / Exam:     Subjective:      Comfortably sitting up in chair  Reports feeling okay  Agreeable to discharge plan    Vitals: Blood Pressure: 135/73 (10/27/24 0847)  Pulse: 96 (10/27/24 0847)  Temperature: 97.7 °F (36.5 °C) (10/26/24 2152)  Temp Source: Oral (10/26/24 2152)  Respirations: 16 (10/26/24 2152)  Height: 5' 10\" (177.8 cm) (10/18/24 2103)  Weight - Scale: 79.4 kg (175 lb) (10/18/24 2103)  SpO2: 96 % (10/27/24 0847)  Exam:   Physical Exam    Comfortably in chair  Neck supple  Lungs diminished breath sounds at bases  Vesicular breath sounds  Heart sounds S1-S2 noted  Abdomen soft  Awake follows commands  Left lower extremity edema noted  No rash      Discharge instructions/Information to patient and family:   See after visit summary for information provided to patient and family.      Discharge plan discussed with nephrology, urology, oncology  Outpatient follow-up with oncology urology and nephrology IR primary care physician  Discharge plan discussed with the patient, protocol family reports he is keeping them " updated    Provisions for Follow-Up Care:  See after visit summary for information related to follow-up care and any pertinent home health orders.      Planned Readmission: no     Discharge Statement:  I spent 44 minutes discharging the patient. This time was spent on the day of discharge. I had direct contact with the patient on the day of discharge. Greater than 50% of the total time was spent examining patient, answering all patient questions, arranging and discussing plan of care with patient as well as directly providing post-discharge instructions.  Additional time then spent on discharge activities.    Discharge Medications:  See after visit summary for reconciled discharge medications provided to patient and family.      ** Please Note: This note has been constructed using a voice recognition system **

## 2024-10-27 NOTE — ASSESSMENT & PLAN NOTE
Lab Results   Component Value Date    HGBA1C 9.8 (H) 08/28/2024       Recent Labs     10/26/24  1119 10/26/24  1656 10/26/24  2049 10/27/24  0730   POCGLU 292* 140 297* 82       Blood Sugar Average: Last 72 hrs:  (P) [id number]  Uncontrolled diabetes mellitus type 2 A1c 9.8  Accu-Cheks reviewed  He will resume Mounjaro and metformin at discharge  Discussed insulin therapy at discharge.  Patient is reluctant.  He is agreeable to outpatient endocrinology follow-up  Contact information for endocrinology placed in discharge instructions

## 2024-10-27 NOTE — ASSESSMENT & PLAN NOTE
Diagnosed in 2019 and underwent biopsy of the prostate and s/p XRT  PSA 0.355  Urology inputs noted  Outpatient follow-up

## 2024-10-27 NOTE — ASSESSMENT & PLAN NOTE
Baseline creatinine 1.2  CT showed lymphadenopathy partially obstructing L ureter  Likely secondary to hydronephrosis and decreased oral intake  Status post left nephrostomy placement by IR 10/19  Kidney function improved  Nephrology inputs noted  Outpatient follow-up

## 2024-10-27 NOTE — ASSESSMENT & PLAN NOTE
Blood pressures reviewed, acceptable  Continue amlodipine 5 mg p.o. daily at discharge  Continue to hold HCTZ on losartan at discharge  Ambulatory blood pressure monitoring and outpatient follow-up recommended

## 2024-10-28 ENCOUNTER — TRANSITIONAL CARE MANAGEMENT (OUTPATIENT)
Dept: FAMILY MEDICINE CLINIC | Facility: CLINIC | Age: 69
End: 2024-10-28

## 2024-10-28 ENCOUNTER — TELEPHONE (OUTPATIENT)
Age: 69
End: 2024-10-28

## 2024-10-28 ENCOUNTER — PATIENT OUTREACH (OUTPATIENT)
Dept: HEMATOLOGY ONCOLOGY | Facility: CLINIC | Age: 69
End: 2024-10-28

## 2024-10-28 ENCOUNTER — TELEPHONE (OUTPATIENT)
Dept: NEPHROLOGY | Facility: CLINIC | Age: 69
End: 2024-10-28

## 2024-10-28 DIAGNOSIS — I10 ESSENTIAL HYPERTENSION: ICD-10-CM

## 2024-10-28 DIAGNOSIS — N18.2 TYPE 2 DIABETES MELLITUS WITH STAGE 2 CHRONIC KIDNEY DISEASE, WITHOUT LONG-TERM CURRENT USE OF INSULIN  (HCC): ICD-10-CM

## 2024-10-28 DIAGNOSIS — C64.2 RENAL CELL CARCINOMA OF LEFT KIDNEY (HCC): Primary | ICD-10-CM

## 2024-10-28 DIAGNOSIS — E78.2 MIXED HYPERLIPIDEMIA: ICD-10-CM

## 2024-10-28 DIAGNOSIS — E11.22 TYPE 2 DIABETES MELLITUS WITH STAGE 2 CHRONIC KIDNEY DISEASE, WITHOUT LONG-TERM CURRENT USE OF INSULIN  (HCC): ICD-10-CM

## 2024-10-28 NOTE — TELEPHONE ENCOUNTER
Pt calling to schedule TCM appt, discharged from hospital yesterday 10/27. Please call back at [phone].   Thank you.

## 2024-10-28 NOTE — TELEPHONE ENCOUNTER
10/28 12:15pm: PT called back, confirmed he will be in for his TCM on 11/5/24 at 3:15pm w/PCP. Rescheduled his Pharmacy appt from 11/4 to 11/5 so that PT doesn’t have to come into the office on two separate days, per PT request.

## 2024-10-28 NOTE — TELEPHONE ENCOUNTER
----- Message from Mikaela Martinez PA-C sent at 10/28/2024 10:40 AM EDT -----  Patient d/c from [facility]. Please schedule hospital follow up and repeat BMP in 1 week

## 2024-10-28 NOTE — PROGRESS NOTES
Outreach to pt and left voicemail introducing myself and role as Nurse Navigator to assist with coordination of cancer care, preparation for upcoming appointment, be a point of contact prior to oncology consult,  provide support and connect with available resources.  Provided contact information, reviewed upcoming appts and requested call back.    Future Appointments   Date Time Provider Department Center   11/1/2024 11:00 AM Isaak Castellon MD HEM ONC [facility]   11/4/2024  3:00 PM Sharron Gray, Pharmacist [place]  [facility]   12/24/2024  3:45 PM MD Claudia Valdovinos Jr.  PCP [facility]

## 2024-10-28 NOTE — TELEPHONE ENCOUNTER
Called pt and LVM requesting a call back to schedule a HFU appt and to advise to complete BMP in 1 week.

## 2024-10-28 NOTE — TELEPHONE ENCOUNTER
Pt called and scheduled HFU appt on 12/17 at 9:30am with Dr Mott in the AO. Pt was advised to complete BMP next week. He asked if we could send lab to [facility] on [facility] in [place]. Lab slip faxed to [phone]. Pt was added to waitlist for sooner availability.

## 2024-10-30 PROCEDURE — 88112 CYTOPATH CELL ENHANCE TECH: CPT | Performed by: PATHOLOGY

## 2024-11-01 ENCOUNTER — TELEPHONE (OUTPATIENT)
Dept: HEMATOLOGY ONCOLOGY | Facility: CLINIC | Age: 69
End: 2024-11-01

## 2024-11-01 ENCOUNTER — DOCUMENTATION (OUTPATIENT)
Dept: HEMATOLOGY ONCOLOGY | Facility: CLINIC | Age: 69
End: 2024-11-01

## 2024-11-01 ENCOUNTER — TELEPHONE (OUTPATIENT)
Age: 69
End: 2024-11-01

## 2024-11-01 ENCOUNTER — DOCUMENTATION (OUTPATIENT)
Age: 69
End: 2024-11-01

## 2024-11-01 ENCOUNTER — OFFICE VISIT (OUTPATIENT)
Dept: HEMATOLOGY ONCOLOGY | Facility: CLINIC | Age: 69
End: 2024-11-01
Payer: MEDICARE

## 2024-11-01 VITALS
OXYGEN SATURATION: 99 % | TEMPERATURE: 97.4 F | SYSTOLIC BLOOD PRESSURE: 128 MMHG | DIASTOLIC BLOOD PRESSURE: 78 MMHG | HEIGHT: 70 IN | BODY MASS INDEX: 23.19 KG/M2 | RESPIRATION RATE: 16 BRPM | WEIGHT: 162 LBS | HEART RATE: 98 BPM

## 2024-11-01 DIAGNOSIS — C61 MALIGNANT NEOPLASM OF PROSTATE (HCC): ICD-10-CM

## 2024-11-01 DIAGNOSIS — T45.1X5A CHEMOTHERAPY INDUCED NAUSEA AND VOMITING: ICD-10-CM

## 2024-11-01 DIAGNOSIS — C64.2 RENAL CELL CARCINOMA OF LEFT KIDNEY (HCC): Primary | ICD-10-CM

## 2024-11-01 DIAGNOSIS — R11.2 CHEMOTHERAPY INDUCED NAUSEA AND VOMITING: ICD-10-CM

## 2024-11-01 PROCEDURE — 99215 OFFICE O/P EST HI 40 MIN: CPT | Performed by: INTERNAL MEDICINE

## 2024-11-01 PROCEDURE — G2211 COMPLEX E/M VISIT ADD ON: HCPCS | Performed by: INTERNAL MEDICINE

## 2024-11-01 RX ORDER — ONDANSETRON 8 MG/1
8 TABLET, ORALLY DISINTEGRATING ORAL EVERY 8 HOURS PRN
Qty: 20 TABLET | Refills: 1 | Status: SHIPPED | OUTPATIENT
Start: 2024-11-01

## 2024-11-01 RX ORDER — LENVATINIB 10 MG/1
20 CAPSULE ORAL DAILY
Qty: 30 EACH | Refills: 5 | Status: SHIPPED | OUTPATIENT
Start: 2024-11-01

## 2024-11-01 NOTE — PROGRESS NOTES
Received request from clinical for patient to start on Lenvima 20mg daily. Auth has been submitted and approved     BIN:       [id number]  PCN:      [id number]  ID:          [id number]  GRP:      [id number]        Approval letter is scanned into chart.

## 2024-11-01 NOTE — PROGRESS NOTES
This writer called [employer] at [phone] and spoke with Adryan.   The goal is to enroll this PT in [facility].      ID is [id number].  PT was successfully enrolled.     BIN [id number]  PCN [id number]  [facility] [id number]  ID [id number]  Effective Dates 10/2/24-10/1/25  Award Amount $10,000    Radha Adam MPH  Phone: [phone]  Email: [email or web address]

## 2024-11-01 NOTE — TELEPHONE ENCOUNTER
Pt is scheduled for 12/2/24 for 12:30 pm with Dr. Roberts at the [place] location. Pt only wants to be seen at the [place] location. DT stated schedule pt in 2 weeks which current appt is out of guidelines. Pt can be reached at [phone]. Thank you.

## 2024-11-01 NOTE — PROGRESS NOTES
Reviewed [employer] printout for Lenvatinib and Keytruda. Reviewed possible side effects with lab recommendations. Reviewed oral chemotherapy process and infusion information.    Reviewed office handouts with office number.    Reviewed purpose of Guardant. Kit reviewed and signed by Dr. Castellon. Kit given to patient with lab script as patient goes to [facility].     Consent obtained. Copy given to patient and uploaded into patient chart.     Email sent to Oral chemotherapy team for Lenvima 20 mg Daily.

## 2024-11-01 NOTE — Clinical Note
Dominick Mccormackedgard Augustin has been diagnosed with metastatic kidney cancer and I think it would be best to hold the Mounjaro he's on now for his DM as I don't want him losing more weight/impaired appetite.  Isaak

## 2024-11-01 NOTE — PROGRESS NOTES
Received request from clinical for patient to start on Lenvima 20mg daily.     Auth has been submitted via cover my meds and this was approved.        BIN:       [id number]  PCN:      [id number]  ID:          [id number]  GRP:      [id number]

## 2024-11-04 ENCOUNTER — DOCUMENTATION (OUTPATIENT)
Dept: HEMATOLOGY ONCOLOGY | Facility: CLINIC | Age: 69
End: 2024-11-04

## 2024-11-04 NOTE — PROGRESS NOTES
In-basket message received from Dr. Castellon to add patient to the urology MD[facility] on 11/5/2024. Chart reviewed and prep completed.

## 2024-11-04 NOTE — TELEPHONE ENCOUNTER
Patient returning call, stated he wanted to stay in [facility] and to have an afternoon appt. He would not change the appt unless it met those requirements.

## 2024-11-04 NOTE — TELEPHONE ENCOUNTER
Left message for pt to see if we can get pt in sooner. There seems to a slot open on 11/7 if pt returns call. Please schedule for sooner date.

## 2024-11-05 ENCOUNTER — CLINICAL SUPPORT (OUTPATIENT)
Dept: FAMILY MEDICINE CLINIC | Facility: CLINIC | Age: 69
End: 2024-11-05
Payer: MEDICARE

## 2024-11-05 ENCOUNTER — DOCUMENTATION (OUTPATIENT)
Dept: HEMATOLOGY ONCOLOGY | Facility: CLINIC | Age: 69
End: 2024-11-05

## 2024-11-05 ENCOUNTER — OFFICE VISIT (OUTPATIENT)
Dept: FAMILY MEDICINE CLINIC | Facility: CLINIC | Age: 69
End: 2024-11-05
Payer: MEDICARE

## 2024-11-05 VITALS
HEART RATE: 70 BPM | WEIGHT: 162.8 LBS | SYSTOLIC BLOOD PRESSURE: 134 MMHG | DIASTOLIC BLOOD PRESSURE: 76 MMHG | BODY MASS INDEX: 23.36 KG/M2 | OXYGEN SATURATION: 98 %

## 2024-11-05 DIAGNOSIS — C61 MALIGNANT NEOPLASM OF PROSTATE (HCC): ICD-10-CM

## 2024-11-05 DIAGNOSIS — N13.39 OTHER HYDRONEPHROSIS: ICD-10-CM

## 2024-11-05 DIAGNOSIS — I10 ESSENTIAL HYPERTENSION: ICD-10-CM

## 2024-11-05 DIAGNOSIS — R19.00 RETROPERITONEAL MASS: ICD-10-CM

## 2024-11-05 DIAGNOSIS — N18.2 TYPE 2 DIABETES MELLITUS WITH STAGE 2 CHRONIC KIDNEY DISEASE, WITHOUT LONG-TERM CURRENT USE OF INSULIN  (HCC): ICD-10-CM

## 2024-11-05 DIAGNOSIS — E11.22 TYPE 2 DIABETES MELLITUS WITH STAGE 2 CHRONIC KIDNEY DISEASE, WITHOUT LONG-TERM CURRENT USE OF INSULIN  (HCC): Primary | ICD-10-CM

## 2024-11-05 DIAGNOSIS — E11.22 TYPE 2 DIABETES MELLITUS WITH STAGE 2 CHRONIC KIDNEY DISEASE, WITHOUT LONG-TERM CURRENT USE OF INSULIN  (HCC): ICD-10-CM

## 2024-11-05 DIAGNOSIS — C64.2 RENAL CELL CARCINOMA OF LEFT KIDNEY (HCC): Primary | ICD-10-CM

## 2024-11-05 DIAGNOSIS — N17.8 ACUTE RENAL FAILURE WITH OTHER SPECIFIED PATHOLOGICAL LESION IN KIDNEY (HCC): ICD-10-CM

## 2024-11-05 DIAGNOSIS — N18.2 TYPE 2 DIABETES MELLITUS WITH STAGE 2 CHRONIC KIDNEY DISEASE, WITHOUT LONG-TERM CURRENT USE OF INSULIN  (HCC): Primary | ICD-10-CM

## 2024-11-05 LAB
BUN SERPL-MCNC: 26 MG/DL (ref 7–25)
BUN/CREAT SERPL: 18 (CALC) (ref 6–22)
CALCIUM SERPL-MCNC: 9.7 MG/DL (ref 8.6–10.3)
CHLORIDE SERPL-SCNC: 100 MMOL/L (ref 98–110)
CO2 SERPL-SCNC: 25 MMOL/L (ref 20–32)
CREAT SERPL-MCNC: 1.43 MG/DL (ref 0.7–1.28)
GFR/BSA.PRED SERPLBLD CYS-BASED-ARV: 53 ML/MIN/1.73M2
GLUCOSE SERPL-MCNC: 215 MG/DL (ref 65–139)
POTASSIUM SERPL-SCNC: 4.3 MMOL/L (ref 3.5–5.3)
SODIUM SERPL-SCNC: 135 MMOL/L (ref 135–146)

## 2024-11-05 PROCEDURE — PBNCHG PB NO CHARGE PLACEHOLDER: Performed by: PHARMACIST

## 2024-11-05 PROCEDURE — 99495 TRANSJ CARE MGMT MOD F2F 14D: CPT | Performed by: FAMILY MEDICINE

## 2024-11-05 RX ORDER — AMOXICILLIN 250 MG
1 CAPSULE ORAL 2 TIMES DAILY PRN
Status: SHIPPED
Start: 2024-11-05 | End: 2024-12-05

## 2024-11-05 NOTE — ASSESSMENT & PLAN NOTE
Lab Results   Component Value Date    HGBA1C 9.8 (H) 08/28/2024   He has previously done well on Trulicity which I am going to have him restart.

## 2024-11-05 NOTE — PROGRESS NOTES
Oncology Navigator Note: Multidisciplinary Urology Case Review 11/5/24    Presenting physician:  Cande Castro       Diagnosis: Alejandro Adames is a 69 y.o. male who was presented at the Urology Oncology Multidisciplinary Tumor Conference today. Patient presents with stage II prostate cancer and recently found to have retroperitoneal mass which was biopsy proven to be metastatic renal cell carcinoma.  Presented for treatment planning.    Radiology Review:       [x] CT  [x] MRI  [] PET    Pathology Review:        4/12/19 - prostate   10/19/24 - Inguinal LN     Recommendations of Group:  Recommend urology follow up ASAP with Dr. Castro       Future Appointments   Date Time Provider Department [place]   11/5/2024  2:00 PM Mariajose Brown [facility] PCP [place]   11/5/2024  3:15 PM Wayne Tellez Jr., MD [facility] PCP [place]   11/7/2024 12:00 PM [facility]   11/7/2024  2:30 PM AL NM 1 [facility]   11/19/2024 10:30 AM AL IR 1 AL IR [facility]   11/26/2024  8:30 AM Rj Rees MD URO [facility]   11/26/2024 12:45 PM Isaak Castellon MD HEM ONC ALL Practice-Onc   12/17/2024  9:30 AM Ancelmo Mott MD NEPH [facility]   12/24/2024  3:45 PM Wayne Tellez Jr., MD [facility] PCP [place]   12/31/2024  1:00 PM Isaak Castellon MD HEM ONC ALL Practice-Onc   1/24/2025 12:00 PM AL CT 1 AL CT [facility]   1/28/2025  1:30 PM Isaak Castellon MD HEM ONC ALL Practice-Onc   2/25/2025  1:00 PM Isaak Castellon MD HEM ONC ALL Practice-Onc   3/25/2025  1:15 PM Isaak Castellon MD HEM ONC [facility] Practice-Onc            Patient was discussed at the Multidisciplinary Urology Case review       NCCN guidelines were available for review.    The final treatment plan will be left to the discretion of the patient and the treating physician.     DISCLAIMERS:  TO THE TREATING PHYSICIAN:  This conference is a meeting of clinicians from various specialty areas who evaluate and  discuss patients for whom a multidisciplinary treatment approach is being considered. Please note that the above opinion was a consensus of the conference attendees and is intended only to assist in quality care of the discussed patient.  The responsibility for follow up on the input given during the conference, along with any final decisions regarding plan of care, is that of the patient and the patient's provider. Accordingly, appointments have only been recommended based on this information and have NOT been scheduled unless otherwise noted.      TO THE PATIENT:  This summary is a brief record of major aspects of your cancer treatment. You may choose to share a copy with any of your doctors or nurses. However, this is not a detailed or comprehensive record of your care.

## 2024-11-05 NOTE — ASSESSMENT & PLAN NOTE
Lab Results   Component Value Date    HGBA1C 9.8 (H) 08/28/2024   Most recent A1c above goal   Fasting blood sugar average above goal, improved since last appointment despite holding Mounjaro.   Keytruda can increase blood sugar readings; potential Lenvima can cause weight loss.   Patient previously tolerated Ozempic and has 1 month supply at home.      Medications:  Mounjaro: patient to stop taking  Ozempic: patient to start taking on 11/17 for one month; will reassess blood sugar readings on Ozempic monotherapy prior to patient picking up new Ozempic supply from the pharmacy. Patient aware he may require insulin for diabetes management   Home Monitoring:   BLOOD SUGAR TESTING  Please test your blood sugar:  1 Times per day.  When to test your blood sugar:   Before Breakfast  Target Blood sugar range: Before Meals: , 2 hours after meals: < 210  Call if your blood sugar is less than 60

## 2024-11-05 NOTE — ASSESSMENT & PLAN NOTE
Creatinine has improved since his nephrostomy tube and has now stabilized around 1.4.  Repeat labs within 2 weeks.

## 2024-11-05 NOTE — H&P (VIEW-ONLY)
Transition of Care Visit  Name: Alejandro Adames      : 1955      MRN: [id number]  Encounter Provider: Wayne Uriarte Jr, MD  Encounter Date: 2024   Encounter department: [facility] PRIMARY CARE    Assessment & Plan  Renal cell carcinoma of left kidney (HCC)  He is status post biopsy of left pelvic and retroperitoneal lymph nodes.  This does appear to be of renal origin.  Continue close follow-up with oncology.  He is having some pain from his nephrostomy tube so I did reach out to urology to see if they would be able to see him to further assess this.  The tube itself looks intact and the site is free from drainage.       Acute renal failure with other specified pathological lesion in kidney (HCC)  Creatinine has improved since his nephrostomy tube and has now stabilized around 1.4.  Repeat labs within 2 weeks.         Retroperitoneal mass  Continue close follow-up with oncology in light of likely metastatic renal cell cancer.  Orders:    senna-docusate sodium (SENOKOT S) 8.6-50 mg per tablet; Take 1 tablet by mouth 2 (two) times a day as needed for constipation    Essential hypertension  Well-controlled at this time.       Type 2 diabetes mellitus with stage 2 chronic kidney disease, without long-term current use of insulin  (HCC)    Lab Results   Component Value Date    HGBA1C 9.8 (H) 2024   He has previously done well on Trulicity which I am going to have him restart.         Other hydronephrosis  This is improved significantly after his nephrostomy tube.       Malignant neoplasm of prostate (HCC)  Continue follow-up with nephrology.            History of Present Illness     Transitional Care Management Review:   Alejandro Adames is a 69 y.o. male here for TCM follow up.     During the TCM phone call patient stated:  TCM Call       Date and time call was made  10/28/2024  9:55 AM    Hospital care reviewed  Records reviewed    Patient was hospitialized at  [facility]     Date of Admission  10/18/24    Date of discharge  10/27/24    Diagnosis  JULIO    Disposition  Home    Were the patients medications reviewed and updated  No    Current Symptoms  None          TCM Call       Post hospital issues  None    Should patient be enrolled in anticoag monitoring?  No    Scheduled for follow up?  Yes  Dr Uriarte 11.5.24    Did you obtain your prescribed medications  Yes    Do you need help managing your prescriptions or medications  No    Is transportation to your appointment needed  No    I have advised the patient to call PCP with any new or worsening symptoms  Merle W RMA          Patient presents today for TCM-hospital discharge summary is as follows:    presented to the hospital on 10/18/2024 due to left flank pain.  Patient with history of prostate cancer presented with left flank pain.  He underwent imaging studies which revealed left hydronephrosis retroperitoneal mass.  He was also noted to have acute kidney injury.  Seen in consultation with urology nephrology IR.  He is status post PCN placement left side.  He also underwent lymph node biopsy left retroperitoneal space.     His kidney function closely monitor and received IV fluid with improvement.  HCTZ ARB on hold at discharge.  He will continue with amlodipine for her hypertension.     Lymph node biopsy revealed features suggestive of metastatic carcinoma suggestive of renal origin.  Given these findings he underwent MRI abdomen results are pending at the time of discharge.  He has appointment with oncology tomorrow.     Patient with uncontrolled diabetes received insulin therapy inpatient.  He would like to continue with Mounjaro and metformin at discharge.  Insulin therapy discussed however patient is reluctant.  He is agreeable to outpatient endocrinology follow-up, contact information placed in discharge structures.     The patient notes today that he is having some discomfort of his left nephrostomy tube.  He has some pain  with movement.  He denies any fever or chills.  He is having no abdominal pain.  He denies nausea or vomiting.      Review of Systems   Constitutional:  Negative for appetite change, chills, fatigue, fever and unexpected weight change.   HENT:  Negative for trouble swallowing.    Eyes:  Negative for visual disturbance.   Respiratory:  Negative for cough, chest tightness, shortness of breath and wheezing.    Cardiovascular:  Negative for chest pain, palpitations and leg swelling.   Gastrointestinal:  Negative for abdominal distention, abdominal pain, blood in stool, constipation and diarrhea.   Endocrine: Negative for polyuria.   Genitourinary:  Negative for difficulty urinating and flank pain.   Musculoskeletal:  Negative for arthralgias and myalgias.   Skin:  Negative for rash.   Neurological:  Negative for dizziness and light-headedness.   Hematological:  Negative for adenopathy. Does not bruise/bleed easily.   Psychiatric/Behavioral:  Negative for dysphoric mood and sleep disturbance. The patient is not nervous/anxious.      Objective     /76   Pulse 70   Wt 73.8 kg (162 lb 12.8 oz)   SpO2 98%   BMI 23.36 kg/m²     Physical Exam  Constitutional:       General: He is not in acute distress.     Appearance: Normal appearance. He is well-developed. He is not diaphoretic.   HENT:      Head: Normocephalic.      Right Ear: External ear normal.      Left Ear: External ear normal.      Nose: Nose normal.   Eyes:      General:         Right eye: No discharge.         Left eye: No discharge.      Conjunctiva/sclera: Conjunctivae normal.      Pupils: Pupils are equal, round, and reactive to light.   Neck:      Thyroid: No thyromegaly.      Trachea: No tracheal deviation.   Cardiovascular:      Rate and Rhythm: Normal rate and regular rhythm.      Heart sounds: Normal heart sounds. No murmur heard.     No friction rub.   Pulmonary:      Effort: Pulmonary effort is normal. No respiratory distress.      Breath sounds:  Normal breath sounds. No wheezing.   Chest:      Chest wall: No tenderness.   Abdominal:      General: There is no distension.      Palpations: There is no mass.      Tenderness: There is no abdominal tenderness. There is no guarding or rebound.      Hernia: No hernia is present.   Musculoskeletal:         General: No swelling or deformity.      Cervical back: Normal range of motion.      Right lower leg: No edema.      Left lower leg: No edema.   Skin:     Findings: No erythema or rash.   Neurological:      General: No focal deficit present.      Mental Status: He is alert.      Cranial Nerves: No cranial nerve deficit.      Coordination: Coordination normal.   Psychiatric:         Thought Content: Thought content normal.     Medications have been reviewed by provider in current encounter

## 2024-11-05 NOTE — PROGRESS NOTES
I'm happy to see him  Clinical team: this ayden is supposed to see FT on 11/26  Any chance I can see him sooner at [facility] office?  He needs to get his CT and PET scan done before he sees me

## 2024-11-05 NOTE — PROGRESS NOTES
Oh I see  Heidar, do you think there is any chance of a PET lighting up for a kidney lesion? Pretty low I imagine? Is it worth getting one (other than bone scan) before he sees me?

## 2024-11-05 NOTE — PROGRESS NOTES
[facility] Clinical Pharmacy Services  Sharron Gray, Pharmacist    Assessment/ Plan     Seen prior to PCP appointment   Assessment & Plan  Type 2 diabetes mellitus with stage 2 chronic kidney disease, without long-term current use of insulin  ([facility])    Lab Results   Component Value Date    HGBA1C 9.8 (H) 08/28/2024   Most recent A1c above goal   Fasting blood sugar average above goal, improved since last appointment despite holding Mounjaro.   Keytruda can increase blood sugar readings; potential Lenvima can cause weight loss.   Patient previously tolerated Ozempic and has 1 month supply at home.      Medications:  Mounjaro: patient to stop taking  Ozempic: patient to start taking on 11/17 for one month; will reassess blood sugar readings on Ozempic monotherapy prior to patient picking up new Ozempic supply from the pharmacy. Patient aware he may require insulin for diabetes management   Home Monitoring:   BLOOD SUGAR TESTING  Please test your blood sugar:  1 Times per day.  When to test your blood sugar:   Before Breakfast  Target Blood sugar range: Before Meals: , 2 hours after meals: < 210  Call if your blood sugar is less than 60         Follow-up: 12/2024    Subjective   HPI    Medication Adherence/ Tolerability/ Cost:  amLODIPine  atorvastatin  Celecoxib: no longer taking, limited efficacy  folic acid  Lenvima (20 MG Daily Dose) Cppk: has not started yet  metFORMIN  Mounjaro: last dose on 10/27  ondansetron  OneTouch Delica Lancets 33G Misc  OneTouch Verio Strp  oxyCODONE: has not been using, taking acetaminophen   polyethylene glycol  senna-docusate sodium  sodium chloride (PF)  Triamcinolone    Will be starting Keytrude on 11/14    Review of Systems   Constitutional:  Positive for appetite change (decreased).        While on the Mounjaro 10mg dose, patient noted that he had significant decrease in appetite and change in bowel habits; given that he took first Mounjaro dose prior to hospitalization,  patient is uncertain if signs/symptoms were related to Mounjaro or other etiology. Given severity of signs/symptoms and how the signs/symptoms have improved since he has been off, patient would prefer not to retrial Mounjaro   Gastrointestinal:  Positive for constipation.        2. Lifestyle:   Will be going to [facility] on 12/7 for 1 week  Feels appetite is slightly improving    3. Home monitoring devices  Glucometer: Yes, Brand: OneTouch Verio    Objective       Blood Sugar Readings  The patient is currently checking blood glucose 1 times per day. Patient reports with SMBG logs.    Date AM Post-Breakfast Lunch Post-Lunch Dinner Post-Dinner Comments   11/5 159         11/4 209         10/31 163         10/30 169         10/28 129                                                                               Avg                ASCVD Risk:  The 10-year ASCVD risk score (Adelaida MENDOZA, et al., 2019) is: 33%    Values used to calculate the score:      Age: 69 years      Sex: Male      Is Non- : No      Diabetic: Yes      Tobacco smoker: No      Systolic Blood Pressure: 128 mmHg      Is BP treated: Yes      HDL Cholesterol: 48 mg/dL      Total Cholesterol: 154 mg/dL     Vitals:  There were no vitals filed for this visit.    Eye Exam:    Lab Results   Component Value Date    LEFTDIABRET None 01/31/2023    RIGHTDIABRET None 01/31/2023       Labs:  Lab Results   Component Value Date    SODIUM 135 11/04/2024    K 4.3 11/04/2024    EGFR 53 (L) 11/04/2024    CREATININE 1.43 (H) 11/04/2024    [id number] 606 10/19/2024       Lab Results   Component Value Date    HGBA1C 9.8 (H) 08/28/2024    HGBA1C 10.4 (H) 02/16/2024    HGBA1C 8.5 (H) 10/23/2023         Pharmacist Tracking Tool     Pharmacist Tracking Tool  Reason For Outreach: Embedded Pharmacist  Demographics:  Intervention Method: In Person  Type of Intervention: Follow-Up  Topics Addressed: Diabetes and Transitions of Care  Pharmacologic Interventions:  Medication Initiation, Medication Discontinuation, and Med Rec  Non-Pharmacologic Interventions: Care coordination  Time:  Direct Patient Care:  15  mins  Care Coordination:  10  mins  Recommendation Recipient: Patient/Caregiver  Outcome: Accepted

## 2024-11-05 NOTE — ASSESSMENT & PLAN NOTE
Continue close follow-up with oncology in light of likely metastatic renal cell cancer.  Orders:    senna-docusate sodium (SENOKOT S) 8.6-50 mg per tablet; Take 1 tablet by mouth 2 (two) times a day as needed for constipation

## 2024-11-05 NOTE — PROGRESS NOTES
Transition of Care Visit  Name: Alejandro Adames      : 1955      MRN: [id number]  Encounter Provider: Wayne Uriarte Jr, MD  Encounter Date: 2024   Encounter department: [facility]    Assessment & Plan  Renal cell carcinoma of left kidney (HCC)  He is status post biopsy of left pelvic and retroperitoneal lymph nodes.  This does appear to be of renal origin.  Continue close follow-up with oncology.  He is having some pain from his nephrostomy tube so I did reach out to urology to see if they would be able to see him to further assess this.  The tube itself looks intact and the site is free from drainage.       Acute renal failure with other specified pathological lesion in kidney (HCC)  Creatinine has improved since his nephrostomy tube and has now stabilized around 1.4.  Repeat labs within 2 weeks.         Retroperitoneal mass  Continue close follow-up with oncology in light of likely metastatic renal cell cancer.  Orders:    senna-docusate sodium (SENOKOT S) 8.6-50 mg per tablet; Take 1 tablet by mouth 2 (two) times a day as needed for constipation    Essential hypertension  Well-controlled at this time.       Type 2 diabetes mellitus with stage 2 chronic kidney disease, without long-term current use of insulin  (HCC)    Lab Results   Component Value Date    HGBA1C 9.8 (H) 2024   He has previously done well on Trulicity which I am going to have him restart.         Other hydronephrosis  This is improved significantly after his nephrostomy tube.       Malignant neoplasm of prostate (HCC)  Continue follow-up with nephrology.            History of Present Illness     Transitional Care Management Review:   Alejandro Adames is a 69 y.o. male here for TCM follow up.     During the TCM phone call patient stated:  TCM Call       Date and time call was made  10/28/2024  9:55 AM    Hospital care reviewed  Records reviewed    Patient was hospitialized at  [facility]     Date of Admission  10/18/24    Date of discharge  10/27/24    Diagnosis  JULIO    Disposition  Home    Were the patients medications reviewed and updated  No    Current Symptoms  None          TCM Call       Post hospital issues  None    Should patient be enrolled in anticoag monitoring?  No    Scheduled for follow up?  Yes  Dr Uriarte 11.5.24    Did you obtain your prescribed medications  Yes    Do you need help managing your prescriptions or medications  No    Is transportation to your appointment needed  No    I have advised the patient to call PCP with any new or worsening symptoms  Merle W RMA          Patient presents today for TCM-hospital discharge summary is as follows:    presented to the hospital on 10/18/2024 due to left flank pain.  Patient with history of prostate cancer presented with left flank pain.  He underwent imaging studies which revealed left hydronephrosis retroperitoneal mass.  He was also noted to have acute kidney injury.  Seen in consultation with urology nephrology IR.  He is status post PCN placement left side.  He also underwent lymph node biopsy left retroperitoneal space.     His kidney function closely monitor and received IV fluid with improvement.  HCTZ ARB on hold at discharge.  He will continue with amlodipine for her hypertension.     Lymph node biopsy revealed features suggestive of metastatic carcinoma suggestive of renal origin.  Given these findings he underwent MRI abdomen results are pending at the time of discharge.  He has appointment with oncology tomorrow.     Patient with uncontrolled diabetes received insulin therapy inpatient.  He would like to continue with Mounjaro and metformin at discharge.  Insulin therapy discussed however patient is reluctant.  He is agreeable to outpatient endocrinology follow-up, contact information placed in discharge structures.     The patient notes today that he is having some discomfort of his left nephrostomy tube.  He has some pain  with movement.  He denies any fever or chills.  He is having no abdominal pain.  He denies nausea or vomiting.      Review of Systems   Constitutional:  Negative for appetite change, chills, fatigue, fever and unexpected weight change.   HENT:  Negative for trouble swallowing.    Eyes:  Negative for visual disturbance.   Respiratory:  Negative for cough, chest tightness, shortness of breath and wheezing.    Cardiovascular:  Negative for chest pain, palpitations and leg swelling.   Gastrointestinal:  Negative for abdominal distention, abdominal pain, blood in stool, constipation and diarrhea.   Endocrine: Negative for polyuria.   Genitourinary:  Negative for difficulty urinating and flank pain.   Musculoskeletal:  Negative for arthralgias and myalgias.   Skin:  Negative for rash.   Neurological:  Negative for dizziness and light-headedness.   Hematological:  Negative for adenopathy. Does not bruise/bleed easily.   Psychiatric/Behavioral:  Negative for dysphoric mood and sleep disturbance. The patient is not nervous/anxious.      Objective     /76   Pulse 70   Wt 73.8 kg (162 lb 12.8 oz)   SpO2 98%   BMI 23.36 kg/m²     Physical Exam  Constitutional:       General: He is not in acute distress.     Appearance: Normal appearance. He is well-developed. He is not diaphoretic.   HENT:      Head: Normocephalic.      Right Ear: External ear normal.      Left Ear: External ear normal.      Nose: Nose normal.   Eyes:      General:         Right eye: No discharge.         Left eye: No discharge.      Conjunctiva/sclera: Conjunctivae normal.      Pupils: Pupils are equal, round, and reactive to light.   Neck:      Thyroid: No thyromegaly.      Trachea: No tracheal deviation.   Cardiovascular:      Rate and Rhythm: Normal rate and regular rhythm.      Heart sounds: Normal heart sounds. No murmur heard.     No friction rub.   Pulmonary:      Effort: Pulmonary effort is normal. No respiratory distress.      Breath sounds:  Normal breath sounds. No wheezing.   Chest:      Chest wall: No tenderness.   Abdominal:      General: There is no distension.      Palpations: There is no mass.      Tenderness: There is no abdominal tenderness. There is no guarding or rebound.      Hernia: No hernia is present.   Musculoskeletal:         General: No swelling or deformity.      Cervical back: Normal range of motion.      Right lower leg: No edema.      Left lower leg: No edema.   Skin:     Findings: No erythema or rash.   Neurological:      General: No focal deficit present.      Mental Status: He is alert.      Cranial Nerves: No cranial nerve deficit.      Coordination: Coordination normal.   Psychiatric:         Thought Content: Thought content normal.     Medications have been reviewed by provider in current encounter

## 2024-11-05 NOTE — ASSESSMENT & PLAN NOTE
He is status post biopsy of left pelvic and retroperitoneal lymph nodes.  This does appear to be of renal origin.  Continue close follow-up with oncology.  He is having some pain from his nephrostomy tube so I did reach out to urology to see if they would be able to see him to further assess this.  The tube itself looks intact and the site is free from drainage.

## 2024-11-07 ENCOUNTER — HOSPITAL ENCOUNTER (OUTPATIENT)
Dept: NUCLEAR MEDICINE | Facility: HOSPITAL | Age: 69
Discharge: HOME | End: 2024-11-07
Attending: INTERNAL MEDICINE
Payer: MEDICARE

## 2024-11-07 ENCOUNTER — TELEPHONE (OUTPATIENT)
Dept: UROLOGY | Facility: AMBULATORY SURGERY CENTER | Age: 69
End: 2024-11-07

## 2024-11-07 DIAGNOSIS — C64.2 RENAL CELL CARCINOMA OF LEFT KIDNEY (HCC): ICD-10-CM

## 2024-11-07 PROCEDURE — 78306 BONE IMAGING WHOLE BODY: CPT

## 2024-11-07 PROCEDURE — A9503 TC99M MEDRONATE: HCPCS

## 2024-11-07 RX ORDER — SODIUM CHLORIDE 9 MG/ML
20 INJECTION, SOLUTION INTRAVENOUS ONCE
Status: CANCELLED | OUTPATIENT
Start: 2024-11-14

## 2024-11-07 NOTE — TELEPHONE ENCOUNTER
----- Message from Karime WILKINS sent at 11/7/2024  8:58 AM EST -----  FYI on timing... Patient has already had MRI of abdomen and the bone scan is being completed today.  ----- Message -----  From: Rj Rees MD  Sent: 11/7/2024   8:51 AM EST  To: Karime Medrano RN; #    Chely,    Can we get Charli in with AP to look at the NT site.  I am sure this is just the stitch and there may not be much we can do.  I have not seen Charli since 2019.  He was presented at [facility] this past week.  He apparently has met LN in the [place] that are RCCa, but he doesn't have a renal mass?!?!  He is seeing [facility] and is known to [facility].  I should see him ASAP after his scans.  Thanks every one.    Rj  ----- Message -----  From: Wayne Tellez Jr., MD  Sent: 11/5/2024   3:32 PM EST  To: MD Flaquito Raymond.  I hope all is well.  I saw Charli today.  He is doing relatively well but having a lot of discomfort around his nephrostomy tube.  It looks okay to me but it seems like the stitch is irritating him.  Is this something your team could take a look at, or would he have to go back to IR for this?    I was thinking maybe someone could see him a bit sooner than his currently scheduled date if possible    Thank you,    Gerard

## 2024-11-07 NOTE — TELEPHONE ENCOUNTER
Called and spoke with patient. He states that his site feels better now that he is applying neosporin to the area and that he does not wish to come in at this time.

## 2024-11-08 ENCOUNTER — TELEPHONE (OUTPATIENT)
Dept: HEMATOLOGY ONCOLOGY | Facility: CLINIC | Age: 69
End: 2024-11-08

## 2024-11-08 DIAGNOSIS — C61 PROSTATE CANCER (HCC): Primary | Chronic | ICD-10-CM

## 2024-11-08 DIAGNOSIS — C79.51 METASTATIC RENAL CELL CARCINOMA TO BONE (HCC): Chronic | ICD-10-CM

## 2024-11-08 DIAGNOSIS — C64.9 METASTATIC RENAL CELL CARCINOMA TO BONE (HCC): Chronic | ICD-10-CM

## 2024-11-08 RX ORDER — SODIUM CHLORIDE 9 MG/ML
20 INJECTION, SOLUTION INTRAVENOUS ONCE
Status: CANCELLED | OUTPATIENT
Start: 2024-11-14

## 2024-11-11 ENCOUNTER — TELEPHONE (OUTPATIENT)
Dept: RADIOLOGY | Facility: HOSPITAL | Age: 69
End: 2024-11-11

## 2024-11-11 RX ORDER — SODIUM CHLORIDE 9 MG/ML
75 INJECTION, SOLUTION INTRAVENOUS CONTINUOUS
Status: CANCELLED | OUTPATIENT
Start: 2024-11-11

## 2024-11-13 ENCOUNTER — TELEPHONE (OUTPATIENT)
Dept: INFUSION CENTER | Facility: CLINIC | Age: 69
End: 2024-11-13

## 2024-11-13 ENCOUNTER — TELEPHONE (OUTPATIENT)
Dept: HEMATOLOGY ONCOLOGY | Facility: CLINIC | Age: 69
End: 2024-11-13

## 2024-11-13 NOTE — TELEPHONE ENCOUNTER
Called patient to confirm appointment for 11/14/24 at 11:00 for Keytruda and Zometa. Discussed location of the infusion center, projected length of appointment, visitor policy, and availability of snacks, drinks, and WiFi. Patient states that he had labs drawn at [facility] this week. Will attempt to have results faxed to infusion center. All questions answered.

## 2024-11-14 ENCOUNTER — TELEPHONE (OUTPATIENT)
Age: 69
End: 2024-11-14

## 2024-11-14 ENCOUNTER — TELEPHONE (OUTPATIENT)
Dept: HEMATOLOGY ONCOLOGY | Facility: CLINIC | Age: 69
End: 2024-11-14

## 2024-11-14 ENCOUNTER — HOSPITAL ENCOUNTER (OUTPATIENT)
Dept: INFUSION CENTER | Facility: CLINIC | Age: 69
Discharge: HOME | End: 2024-11-14
Payer: MEDICARE

## 2024-11-14 VITALS
WEIGHT: 164.24 LBS | RESPIRATION RATE: 18 BRPM | SYSTOLIC BLOOD PRESSURE: 149 MMHG | TEMPERATURE: 97.7 F | BODY MASS INDEX: 23.51 KG/M2 | HEIGHT: 70 IN | DIASTOLIC BLOOD PRESSURE: 82 MMHG | HEART RATE: 84 BPM

## 2024-11-14 DIAGNOSIS — C64.2 RENAL CELL CARCINOMA OF LEFT KIDNEY (HCC): ICD-10-CM

## 2024-11-14 DIAGNOSIS — C79.51 METASTATIC RENAL CELL CARCINOMA TO BONE (HCC): Primary | ICD-10-CM

## 2024-11-14 DIAGNOSIS — C64.9 METASTATIC RENAL CELL CARCINOMA TO BONE (HCC): Primary | ICD-10-CM

## 2024-11-14 PROCEDURE — 96367 TX/PROPH/DG ADDL SEQ IV INF: CPT

## 2024-11-14 PROCEDURE — 96413 CHEMO IV INFUSION 1 HR: CPT

## 2024-11-14 RX ORDER — SODIUM CHLORIDE 9 MG/ML
20 INJECTION, SOLUTION INTRAVENOUS ONCE
OUTPATIENT
Start: 2025-02-06

## 2024-11-14 RX ORDER — SODIUM CHLORIDE 9 MG/ML
20 INJECTION, SOLUTION INTRAVENOUS ONCE
Status: COMPLETED | OUTPATIENT
Start: 2024-11-14 | End: 2024-11-14

## 2024-11-14 RX ADMIN — ZOLEDRONIC ACID 3.5 MG: 4 INJECTION, SOLUTION, CONCENTRATE INTRAVENOUS at 11:48

## 2024-11-14 RX ADMIN — SODIUM CHLORIDE 20 ML/HR: 0.9 INJECTION, SOLUTION INTRAVENOUS at 12:42

## 2024-11-14 RX ADMIN — SODIUM CHLORIDE 400 MG: 9 INJECTION, SOLUTION INTRAVENOUS at 12:41

## 2024-11-14 RX ADMIN — SODIUM CHLORIDE 20 ML/HR: 9 INJECTION, SOLUTION INTRAVENOUS at 11:48

## 2024-11-14 NOTE — TELEPHONE ENCOUNTER
Guardant results obtained and uploaded for provider review.  Treatment due date D1C1: 11/14  Appointment with Dr. Castellon: 11/26

## 2024-11-14 NOTE — TELEPHONE ENCOUNTER
Received call from Amie with [facility] stating that order #[id number] was received but it does not appear that it was signed by Dr. Tellez.  They will be faxing another copy of the order for Dr. Tellez to sign and fax back to [facility].

## 2024-11-14 NOTE — PROGRESS NOTES
Alejandro Alejandrolai presented for his first zoledronic acid and Keytruda therapies. Mr. Adames tolerated both treatments well with no complications.      Alejandro Adames is aware of future appt on 12/26/24 @ 1100.     AVS printed and given to Alejandro Rosangela.

## 2024-11-14 NOTE — TELEPHONE ENCOUNTER
11/14 4:30pm: Spoke w/[facility], let her know that Home Health Order #[id number] was signed and dated by PCP, Dr Kurtz. Per Ayala: I am not sure what that is about but I will notate in the patient's account that you called, and that it was signed by the doctor.

## 2024-11-15 NOTE — PROGRESS NOTES
Per Dr. Castellon at this time, patient is to continue to hold the Keytruda. Message sent to patient

## 2024-11-18 NOTE — PROGRESS NOTES
Hematology/Oncology Outpatient Office Note    Date of Service: 2024    [facility] [address]  [place] [address]  [phone]    Reason for Consultation:   Chief Complaint   Patient presents with    Follow-up       Cancer Stage at diagnosis: IV    Referral Physician: No ref. provider found    Primary Care Physician:  Wayne Uriarte Jr, MD     Nickname: Charli    Lives with his brother, Gordy Ellis ECO     Today's ECOG: 3-4    Goals and Barriers:  Current Goal: Minimize effects of disease burden, extend life.   Barriers to accomplishing this: malaise    Patient's Capacity to Self Care:  none    ASSESSMENT & PLAN      Diagnosis ICD-10-CM Associated Orders   1. Metastatic renal cell carcinoma to bone (HCC)  C79.51 Ambulatory Referral to Oncology Genetics    C64.9       2. Prostate cancer (HCC)  C61             This is a 69 y.o. c PMHx notable for DM, HTN, prostate cancer under good control, being seen in consultation for metastatic RCC      Discussion of decision making  Oncology history updated, accordingly, during this visit  Goals of care/patient communication  I discussed with the patient the clinical course leading up to their cancer diagnosis. I reviewed relevant office notes, imaging reports and pathology result as well.  I told the patient that this is a case of incurable disease and what this means. We discussed that the goal of anti-cancer therapy is to provide best quality of life, extend overall survival, and progression free survival as shown in clinical trials. We also discussed that there might be a point when the cancer will no longer respond to this anti-neoplastic therapy. As a result, we also discussed the role of the palliative care team being introduced early in the treatment course. We will be making this referral  I explained the risks/benefits of the proposed cancer therapy: Lenvima + Keytruda and after  discussion including understanding risks of possible life-threatening complications and therapy-related malignancy development, informed consent for blood products and treatment has been signed and obtained.  TNM/Staging At Diagnosis  Cancer Staging   Malignant neoplasm of prostate (HCC)  Staging form: Prostate, AJCC 8th Edition  - Clinical: Stage IIB (cT1c, cN0, cM0, PSA: 4.9, Grade Group: 2) - Signed by Isaak Castellon MD on 10/24/2024  Prostate specific antigen (PSA) range: Less than 10  Kayley score: 7  Histologic grading system: 5 grade system    Renal cell carcinoma of left kidney (HCC)  Staging form: Kidney, AJCC 8th Edition  - Clinical: Stage IV (cTX, cN1, cM1) - Signed by Isaak Castellon MD on 10/24/2024    Disease Features/Tumor Markers/Genetics  Tumor Marker: PSA  1/3/2019: 5.6  4/8/2019: 4.9  10/17/2022: 1.3  10/19/2024: 0.355  Notable Path Features:   10/19/2024 inguinal LN bx: retroperitoneal bx is positive this is most compatible with renal primary   Guardant NGS: 11/4/2024: TMB 12 muts/Mb, VHL mutation (can use Belzutifan), MSI stable  Treatment: Lenvima + Keytruda  Other Supportive care:  Treatment Team Members  Surgeon  Rad Onc  Palliative  [facility]: Dr. Connors  Labs  10/24/2024: creat 1.75  11/12/2024: creat 1.42  Diagnostics  10/18/2024 CT Chest w/o c: Nothing to suggest malignancy in the chest. The 3 mm left lower lobe nodule is of doubtful significance.  Abd/pelv w/c: Moderate left hydronephrosis with peripelvic/proximal periureteral inflammatory stranding secondary to obstructing large left retroperitoneal toribio conglomerate. Conglomerate of left retroperitoneal nodes measuring approximately 6.5 x 5 x 13 cm (2:106)   Enlarged left pelvic and other retroperitoneal nodes as described  3 mm sclerotic lesions in the right iliac body and right femoral head  10/26/2024 MRI Abd w/wo c: No renal cortical mass is identified bilaterally. Mild urothelial thickening and enhancement within the left renal  pelvis without measurable lesion.   11/5/2024  Tumor board discussion. Recommend urology follow up ASAP with Dr. Castro. Treat systemically  11/7/2024: NM Bone scan: No focal tracer activity characteristic of osseous metastatic disease.    Discussion of decision making    I personally reviewed the following lab results, the image studies, pathology, other specialty/physicians consult notes and recommendations, and outside medical records. I had a lengthy discussion with the patient and shared the work-up findings. We discussed the diagnosis and management plan as below. I spent 43 minutes reviewing the records (labs, clinician notes, outside records, medical history, ordering medicine/tests/procedures, monitoring of anti-neoplastic toxicities, interpreting the imaging/labs previously done) and coordination of care as well as direct time with the patient today, of which greater than 50% of the time was spent in counseling and coordination of care with the patient/family.      Plan/Labs  Tumor board discussion being pursued for consensus on the plan going forward  F/u Urologist at [facility] for prostate cancer monitoring   F/u for Palliative care for advanced disease  Zometa 4 mg q12 weeks  Marinol 5mg BID ordered for appetite stimulation  Oncology genetics counselor referral*  [facility] NGS to assess for actionable biomarkers  Coordination with PCP to see if he can come off of Alejo ELLIOTT NGS needs to be sent off the lymph node bx and has been requested  PT referral for overall poor performance status  Restaging CT CAP w/c in 3 months   We discussed Lenvima 20 mg daily + Keytruda 400 mg q6 weeks which can be used for both clear cell and non clear cell indication as the histology is not clear to which subtype we are dealing with; holding off on Lenvima until we discuss his case in tumor board next week    Follow Up: q4 weeks for 3 months, then q12 weeks scheduled thru 3/25/2025    All questions were answered to the  patient's satisfaction during this encounter. The patient knows the contact information for our office and knows to reach out for any relevant concerns related to this encounter. They are to call for any temperature 100.4 or higher, new symptoms including but not restricted to shaking chills, decreased appetite, nausea, vomiting, diarrhea, increased fatigue, shortness of breath or chest pain, confusion, and not feeling the strength to come to the clinic. For all other listed problems and medical diagnosis in their chart - they are managed by PCP and/or other specialists, which the patient acknowledges. Thank you very much for your consultation and making us a part of this patient's care. We are continuing to follow closely with you. Please do not hesitate to reach out to me with any additional questions or concerns.    Isaak Castellon MD  Hematology & Medical Oncology Staff Physician             Disclaimer: This document was prepared using [employer] Direct technology. If a word or phrase is confusing, or does not make sense, this is likely due to recognition error which was not discovered during this clinician's review. If you believe an error has occurred, please contact me through [facility] for amena?cation.      ONCOLOGY HISTORY OF PRESENT ILLNESS        Oncology History   Prostate cancer (HCC)   6/18/2019 Initial Diagnosis    Malignant neoplasm of prostate (HCC)     10/24/2024 -  Cancer Staged    Staging form: Prostate, AJCC 8th Edition  - Clinical: Stage IIB (cT1c, cN0, cM0, PSA: 4.9, Grade Group: 2) - Signed by Isaak Castellon MD on 10/24/2024  Prostate specific antigen (PSA) range: Less than 10  Whitmer score: 7  Histologic grading system: 5 grade system       Metastatic renal cell carcinoma to bone (HCC)   10/24/2024 Initial Diagnosis    Renal cell carcinoma of left kidney (HCC)     10/24/2024 -  Cancer Staged    Staging form: Kidney, AJCC 8th Edition  - Clinical: Stage IV (cTX, cN1,  cM1) - Signed by Isaak Castellon MD on 10/24/2024       11/14/2024 -  Chemotherapy    alteplase (CATHFLO), 2 mg, Intracatheter, Every 1 Minute as needed, 1 of 6 cycles  pembrolizumab (KEYTRUDA) IVPB, 400 mg, Intravenous, Once, 1 of 6 cycles  Administration: 400 mg (11/14/2024)       History of prostate adenocarcinoma localized intermediate-favorable risk adenocarcinoma of the prostate (Stage II - cT1c, cN0, cM0, [place] Score: 3+4=7, PSA: 4.9)  s/p SBRT (Administered 3,700-Gy over 5-fractions - Completed on September 18th, 2019)    SUBJECTIVE  (INTERVAL HISTORY)      Clotting History None   Bleeding History None   Cancer History Prostate s/p above treatment, RCC   Family Cancer History None   H/O Blood/Plt Transfusion None   Tobacco/etoh/drug abuse 2 PPD x 20 years, quit at age 49, no etoh abuse or rec drug use           Retired Electric  (retired)     Pain: L hip and mid lower abd when leaning forward    Getting quite weak, losing weight send has a poor appetite.  Thinks he might need to be in a rehab center soon    I have reviewed the relevant past medical, surgical, social and family history. I have also reviewed allergies and medications for this patient.    Review of Systems  Baseline weight: 175 lbs    Lost 13 lbs while on Mounjaro, since 7/2024, appetite has been decreased.    Denies F/C, N/V, SOB, CP, LH, HA, rash, itching, gen weakness, melena, hematuria, hematochezia, falls.        A 10-point review of system was performed, pertinent positive and negative were detailed as above. Otherwise, the 10-point review of system was negative.      Past Medical History:   Diagnosis Date    Cutaneous skin tags 1/2/2019    Diabetes mellitus (HCC)     Hypertension        Past Surgical History:   Procedure Laterality Date    IR BIOPSY INGUINAL LYMPH NODE  10/19/2024    IR NEPHROSTOMY TO NEPHROURETERAL STENT  11/19/2024    IR NEPHROSTOMY TUBE PLACEMENT  10/19/2024       Family History   Problem Relation Age of  Onset    No Known Problems Brother     Stroke Mother        Social History     Socioeconomic History    Marital status: Single     Spouse name: Not on file    Number of children: Not on file    Years of education: Not on file    Highest education level: Not on file   Occupational History    Occupation: Work history - travels a lot   Tobacco Use    Smoking status: Former    Smokeless tobacco: Never   Vaping Use    Vaping status: Never Used   Substance and Sexual Activity    Alcohol use: Yes     Alcohol/week: 2.0 - 3.0 standard drinks of alcohol     Types: 2 - 3 Cans of beer per week     Comment: social    Drug use: No    Sexual activity: Not on file   Other Topics Concern    Not on file   Social History Narrative    Engages in travel abroad    Living independently alone'     Social Drivers of Health     Financial Resource Strain: Not on file   Food Insecurity: Patient Unable To Answer (10/22/2024)    Nursing - Inadequate Food Risk Classification     Worried About Running Out of Food in the Last Year: Never true     Ran Out of Food in the Last Year: Never true     Ran Out of Food in the Last Year: 97   Transportation Needs: Patient Unable To Answer (10/22/2024)    Nursing - Transportation Risk Classification     Lack of Transportation: Not on file     Lack of Transportation: 97   Physical Activity: Not on file   Stress: Not on file   Social Connections: Not on file   Intimate Partner Violence: Patient Unable To Answer (10/22/2024)    Nursing IPS     Feels Physically and Emotionally Safe: Not on file     Physically Hurt by Someone: Not on file     Humiliated or Emotionally Abused by Someone: Not on file     Physically Hurt by Someone: 97     Hurt or Threatened by Someone: 97   Housing Stability: Patient Unable To Answer (10/22/2024)    Nursing: Inadequate Housing Risk Classification     Has Housing: Not on file     Worried About Losing Housing: Not on file     Unable to Get Utilities: Not on file     Unable to Pay for  Housing in the Last Year: 97     Has Housin       Allergies   Allergen Reactions    Captopril Cough    Crestor [Rosuvastatin] Diarrhea    Enalapril Cough       Current Outpatient Medications   Medication Sig Dispense Refill    amLODIPine (NORVASC) 10 mg tablet Take 1 tablet (10 mg total) by mouth daily 30 tablet 4    atorvastatin (LIPITOR) 40 mg tablet TAKE 1 TABLET BY MOUTH EVERY  tablet 1    folic acid (FOLVITE) 1 mg tablet Take 1 tablet (1 mg total) by mouth daily 30 tablet 0    glucose blood (OneTouch Verio) test strip Check blood sugars once daily. Please substitute with appropriate alternative as covered by patient's insurance. Dx: E11.65 100 each 3    metFORMIN (GLUCOPHAGE) 1000 MG tablet Take 1,000 mg by mouth 2 (two) times a day with meals      OneTouch Delica Lancets 33G MISC Check blood sugars once daily. Please substitute with appropriate alternative as covered by patient's insurance. Dx: E11.65 100 each 3    semaglutide, 2 mg/dose, (Ozempic, 2 MG/DOSE,) 8 mg/ mL injection pen Inject 2 mg under the skin every 7 days      senna-docusate sodium (SENOKOT S) 8.6-50 mg per tablet Take 1 tablet by mouth 2 (two) times a day as needed for constipation      sodium chloride, PF, 0.9 % 10 mL by Intracatheter route daily Intracatheter flushing daily. May substitute prefilled syringe with normal saline 10 mL vials, 10 mL syringes, and 18 g blunt needles 300 mL 3    triamcinolone (KENALOG) 0.1 % cream APPLY TOPICALLY TO THE AFFECTED AREA TWICE DAILY 30 g 0    Lenvatinib, 20 MG Daily Dose, (Lenvima, 20 MG Daily Dose,) 2 x 10 MG CPPK Take 20 mg by mouth daily (Patient not taking: Reported on 2024) 30 each 5    ondansetron (ZOFRAN-ODT) 8 mg disintegrating tablet Take 1 tablet (8 mg total) by mouth every 8 (eight) hours as needed for nausea (Patient not taking: Reported on 2024) 20 tablet 1    oxyCODONE (ROXICODONE) 5 immediate release tablet Take 0.5 tablets (2.5 mg total) by mouth every 8 (eight)  hours as needed for severe pain Max Daily Amount: 7.5 mg (Patient not taking: Reported on 11/21/2024) 10 tablet 0     No current facility-administered medications for this visit.       (Not in a hospital admission)      Objective:     24 Hour Vitals Assessment:     Vitals:    11/26/24 1243   BP: 132/70   Pulse: 102   Resp: 18   Temp: (!) 97.1 °F (36.2 °C)   SpO2: 99%         PHYSICIAN EXAM:    General: Appearance: alert, cooperative, no distress.  HEENT: Normocephalic, atraumatic. No scleral icterus. conjunctivae clear. EOMI.  Chest: No tenderness to palpation. No open wound noted.  Lungs: Clear to auscultation bilaterally, Respirations unlabored.  Cardiac: Regular rate, +S1and S2  Abdomen: Soft, non-tender, non-distended. Bowel sounds are normal.   Extremities:  No edema, cyanosis, clubbing.  Skin: Skin color, turgor are normal. No rashes.  Lymphatics: no palpable supra-cervical, axillary, or inguinal adenopathy  Neurologic: Awake, Alert, and oriented, no gross focal deficits noted b/l.       DATA REVIEW:    Pathology Result:    Final Diagnosis   Date Value Ref Range Status   10/26/2024   Final    A. Urine, Renal, Left, ThinPrep:  Atypical urothelial cells (AUC) - see comment.  Benign squamous cells and mixed inflammatory cells.    Satisfactory for evaluation.    Comment:  The above diagnostic category is from the recently published book, The Sandra System for Reporting Urinary Cytology, and is in keeping with the ongoing effort for utilization of standardized diagnostic terminology in urine cytology.*    *The Sandra System for Reporting Urinary Cytology. Jennifer Blanca, Caitlyn Kerr, Grant Aguila; 2016.     Interpretation performed at [facility] [address] [place] [address] [place] [address]      10/19/2024   Final    A. Lymph node, inguinal, left, biopsy:  -   Metastatic carcinoma most suggestive of renal origin, see comment.     Comment: The patient's history of prostate cancer with new onset acute  renal failure and retroperitoneal mass are noted. As per radiology there are no clear masses identified. The current sample is positive for metastatic carcinoma. The morphologic and immunohistochemical findings are most consistent with renal origin. However, in the absence of a clear mass lesion, further clinical and radiographic work-up is advised.    Intradepartmental consultation is in agreement (IK/CV).  Dr. Castellon is notified of the findings by Dr. Hines via [employer] Secure Chat on 10/24/24.     04/12/2019   Final    A. Prostate, right peripheral zone, needle core biopsy:  - Benign prostate glands and stroma.    B. Prostate, right central zone, needle core biopsy:  - Prostatic adenocarcinoma, [place] grade 3 + 3 = 6/10 (Grade group 1), involving one of two cores and 5% of the involved core.  - Focal high-grade prostatic intraepithelial neoplasia (HGPIN).  - Perineural invasion is absent.    C. Prostate, left peripheral zone, needle core biopsy:  - Prostatic adenocarcinoma, [place] grade 3 + 4 = 7 (>95% pattern 3 and <5% pattern 4, Grade group 2), involving one of 4 cores and 20% of the tissue.  - Prostatic adenocarcinoma, [place] grade 3 + 3 = 6/10 (Grade group 1), involving two of four cores and 5%, 10% of involved cores.  - Perineural invasion is absent.    D. Prostate, left central zone, needle core biopsy:  - Benign prostate glands and stroma.    Comment: Immunohistochemistry for prostate triple stain multiplex (,P63,[id number]) was preformed ob blocks A2, B1, C1, and C2 supporting the above diagnosis.    Intradepartmental consultation is in agreement.  Interpretation performed at [facility], [address] [place] [id number]            Image Results:   Image result are reviewed and documented in Hematology/Oncology history    IR nephrostomy to nephroureteral stent  Narrative: IR NEPHROSTOMY TO NEPHROURETERAL STENT    Clinical History: N13.39: Other hydronephrosis  R19.00: Intra-abdominal and pelvic  "swelling, mass and lump, unspecified site    Fluoroscopy time: 5.5 min. ( 124 mGy )    Contrast: 12 mL of iohexol (OMNIPAQUE)    Sedation time: with anesthesia    Procedure: The existing 10 [place] nephrostomy catheter and site were prepped and draped in the usual sterile fashion and local anesthesia obtained with a 1% lidocaine solution. Contrast injection demonstrates appropriate positioning.    The catheter was transected and removed over a Bentson wire. A Champagne 1 catheter was advanced into the renal pelvis. The catheter and wire were successfully advanced into the bladder.    Over an Amplatz wire, a 10 [place] by 26 cm nephroureterostomy catheter was inserted with the distal loop in the bladder and the proximal loop within the renal pelvis. Contrast injection confirmed proper positioning. The catheter was secured in place with   0 Prolene suture and capped for internal drainage.    The patient tolerated the procedure well and left the department in stable condition.  Impression: Impression:    Conversion of the nephrostomy to a nephroureterostomy.    Workstation performed: [id number]      LABS:  Lab data are reviewed and documented in HemOn history.       Lab Results   Component Value Date    HGB 10.2 (L) 10/21/2024    HCT 32.9 (L) 10/21/2024    MCV 94 10/21/2024     (H) 10/21/2024    WBC 6.84 10/21/2024    NRBC 0 10/21/2024     Lab Results   Component Value Date    K 4.3 11/04/2024     11/04/2024    CO2 25 11/04/2024    BUN 26 (H) 11/04/2024    CREATININE 1.43 (H) 11/04/2024    CALCIUM 9.7 11/04/2024    AST 15 10/18/2024    ALT 23 10/18/2024    ALKPHOS 142 (H) 10/18/2024    EGFR 53 (L) 11/04/2024       Lab Results   Component Value Date    IRON 20 (L) 10/19/2024    TIBC 196 (L) 10/19/2024    FERRITIN 494 (H) 10/19/2024    FERRITIN 183 10/17/2022    FERRITIN 174 12/23/2021       Lab Results   Component Value Date    QTCHRZEU88 606 10/19/2024       No results for input(s): \"WBC\", \"CREAT\", \"PLT\" in " the last 72 hours.    By:  Isaak Castellon MD, 11/26/2024, 12:51 PM

## 2024-11-19 ENCOUNTER — PATIENT OUTREACH (OUTPATIENT)
Dept: CASE MANAGEMENT | Facility: OTHER | Age: 69
End: 2024-11-19

## 2024-11-19 ENCOUNTER — ANESTHESIA (OUTPATIENT)
Dept: RADIOLOGY | Facility: HOSPITAL | Age: 69
End: 2024-11-19
Payer: MEDICARE

## 2024-11-19 ENCOUNTER — HOSPITAL ENCOUNTER (OUTPATIENT)
Dept: RADIOLOGY | Facility: HOSPITAL | Age: 69
Discharge: HOME | End: 2024-11-19
Attending: STUDENT IN AN ORGANIZED HEALTH CARE EDUCATION/TRAINING PROGRAM
Payer: MEDICARE

## 2024-11-19 ENCOUNTER — TELEPHONE (OUTPATIENT)
Dept: NEPHROLOGY | Facility: CLINIC | Age: 69
End: 2024-11-19

## 2024-11-19 ENCOUNTER — ANESTHESIA EVENT (OUTPATIENT)
Dept: RADIOLOGY | Facility: HOSPITAL | Age: 69
End: 2024-11-19
Payer: MEDICARE

## 2024-11-19 VITALS
OXYGEN SATURATION: 96 % | HEART RATE: 89 BPM | TEMPERATURE: 97.8 F | WEIGHT: 163.36 LBS | HEIGHT: 70 IN | BODY MASS INDEX: 23.39 KG/M2 | RESPIRATION RATE: 18 BRPM | SYSTOLIC BLOOD PRESSURE: 156 MMHG | DIASTOLIC BLOOD PRESSURE: 73 MMHG

## 2024-11-19 DIAGNOSIS — Z71.89 COORDINATION OF COMPLEX CARE: ICD-10-CM

## 2024-11-19 DIAGNOSIS — R73.09 HEMOGLOBIN A1C GREATER THAN 9%, INDICATING POOR DIABETIC CONTROL: Primary | ICD-10-CM

## 2024-11-19 DIAGNOSIS — R19.00 RETROPERITONEAL MASS: ICD-10-CM

## 2024-11-19 DIAGNOSIS — N13.39 OTHER HYDRONEPHROSIS: Primary | ICD-10-CM

## 2024-11-19 LAB
GLUCOSE SERPL-MCNC: 332 MG/DL (ref 65–140)
GLUCOSE SERPL-MCNC: 334 MG/DL (ref 65–140)
GLUCOSE SERPL-MCNC: 366 MG/DL (ref 65–140)

## 2024-11-19 PROCEDURE — C1769 GUIDE WIRE: HCPCS

## 2024-11-19 PROCEDURE — C2617 STENT, NON-COR, TEM W/O DEL: HCPCS

## 2024-11-19 PROCEDURE — 50434 CONVERT NEPHROSTOMY CATHETER: CPT

## 2024-11-19 PROCEDURE — 50434 CONVERT NEPHROSTOMY CATHETER: CPT | Performed by: RADIOLOGY

## 2024-11-19 PROCEDURE — 82948 REAGENT STRIP/BLOOD GLUCOSE: CPT

## 2024-11-19 RX ORDER — SODIUM CHLORIDE 9 MG/ML
75 INJECTION, SOLUTION INTRAVENOUS CONTINUOUS
Status: DISCONTINUED | OUTPATIENT
Start: 2024-11-19 | End: 2024-11-20 | Stop reason: HOSPADM

## 2024-11-19 RX ORDER — FENTANYL CITRATE 50 UG/ML
INJECTION, SOLUTION INTRAMUSCULAR; INTRAVENOUS AS NEEDED
Status: DISCONTINUED | OUTPATIENT
Start: 2024-11-19 | End: 2024-11-19

## 2024-11-19 RX ORDER — MIDAZOLAM HYDROCHLORIDE 2 MG/2ML
INJECTION, SOLUTION INTRAMUSCULAR; INTRAVENOUS AS NEEDED
Status: DISCONTINUED | OUTPATIENT
Start: 2024-11-19 | End: 2024-11-19

## 2024-11-19 RX ORDER — PROPOFOL 10 MG/ML
INJECTION, EMULSION INTRAVENOUS CONTINUOUS PRN
Status: DISCONTINUED | OUTPATIENT
Start: 2024-11-19 | End: 2024-11-19

## 2024-11-19 RX ORDER — FENTANYL CITRATE/PF 50 MCG/ML
25 SYRINGE (ML) INJECTION
Refills: 0 | Status: DISCONTINUED | OUTPATIENT
Start: 2024-11-19 | End: 2024-11-19 | Stop reason: HOSPADM

## 2024-11-19 RX ORDER — LIDOCAINE HYDROCHLORIDE 20 MG/ML
INJECTION, SOLUTION EPIDURAL; INFILTRATION; INTRACAUDAL; PERINEURAL AS NEEDED
Status: DISCONTINUED | OUTPATIENT
Start: 2024-11-19 | End: 2024-11-19

## 2024-11-19 RX ADMIN — MIDAZOLAM 1 MG: 1 INJECTION INTRAMUSCULAR; INTRAVENOUS at 10:56

## 2024-11-19 RX ADMIN — INSULIN HUMAN 6 UNITS: 100 INJECTION, SOLUTION PARENTERAL at 12:03

## 2024-11-19 RX ADMIN — SODIUM CHLORIDE: 0.9 INJECTION, SOLUTION INTRAVENOUS at 11:17

## 2024-11-19 RX ADMIN — SODIUM CHLORIDE 75 ML/HR: 0.9 INJECTION, SOLUTION INTRAVENOUS at 09:36

## 2024-11-19 RX ADMIN — IOHEXOL 12 ML: 350 INJECTION, SOLUTION INTRAVENOUS at 11:26

## 2024-11-19 RX ADMIN — LIDOCAINE HYDROCHLORIDE 50 MG: 20 INJECTION, SOLUTION EPIDURAL; INFILTRATION; INTRACAUDAL at 10:56

## 2024-11-19 RX ADMIN — FENTANYL CITRATE 25 MCG: 50 INJECTION INTRAMUSCULAR; INTRAVENOUS at 11:15

## 2024-11-19 RX ADMIN — PROPOFOL 70 MCG/KG/MIN: 10 INJECTION, EMULSION INTRAVENOUS at 10:56

## 2024-11-19 RX ADMIN — FENTANYL CITRATE 25 MCG: 50 INJECTION INTRAMUSCULAR; INTRAVENOUS at 11:10

## 2024-11-19 RX ADMIN — MIDAZOLAM 1 MG: 1 INJECTION INTRAMUSCULAR; INTRAVENOUS at 11:01

## 2024-11-19 RX ADMIN — PROPOFOL 30 MG: 10 INJECTION, EMULSION INTRAVENOUS at 11:10

## 2024-11-19 RX ADMIN — FENTANYL CITRATE 25 MCG: 50 INJECTION INTRAMUSCULAR; INTRAVENOUS at 10:56

## 2024-11-19 RX ADMIN — PROPOFOL 30 MG: 10 INJECTION, EMULSION INTRAVENOUS at 11:13

## 2024-11-19 NOTE — DISCHARGE INSTRUCTIONS
PCNU capped. Standard drainage bag provided in case of fever, chills, nausea, vomiting, flank pain. If any of these occur, contact IR and be prepared to attach bag provided      Nephrostomy Tube Care     WHAT YOU NEED TO KNOW:   A nephrostomy tube is a catheter (thin plastic tube) that is inserted through your skin and into your kidney. The nephrostomy tube drains urine from your kidney into a collecting bag outside your body. You may need a nephrostomy tube when something is blocking the normal flow of urine. A nephrostomy tube may be used for a short or a long period of time. The nephrostomy tube comes out of your back, so you will need someone to help care for your nephrostomy tube.        DISCHARGE INSTRUCTIONS:      How to clean the skin around the nephrostomy tube and change the bandage:  Since the nephrostomy tube comes out of your back, you will not be able to care for it by yourself. Ask someone to follow the general directions below to check and care for your nephrostomy tube.   Gather the items you will need.          Disposable (single use) under-pad, and a clean washcloth  Plain soap, warm water, and new medical gloves  Sterile gauze bandages  Clear adhesive dressing or medical tape  Skin barrier  Protective skin film  Trash bag  Remove the old bandage, and check the tube entry site.    Have the patient lie on his side with the nephrostomy tube entry site facing up. Place the under-pad where it will catch drainage as you are working with the nephrostomy tube.   Wash your hands with soap and water. Put on new medical gloves.  Gently remove the old bandage, without pulling on the tube. Do this by holding the skin beside the tube with one hand. With the other hand, gently remove sticky tape and the skin barrier by pulling in the same direction as hair growth. Do not touch the side of the bandage that is placed over or around the tube. Throw the bandage and skin barrier away in a trash bag.  Look for signs  of infection, such as skin redness and swelling. Report any skin changes to healthcare providers.  Clean the tube entry site.    Hold the tube in place to keep it from being pulled out while you are cleaning around it.  You will need to clean the area twice. For the first cleaning, wet a new gauze bandage with soap and water.  Begin at the entry site of the tube. Wipe the skin in circles, moving away from the entry site. Remove blood and any other material with the gauze. Do this as often as needed. Use a new gauze bandage each time you clean the area, moving away from the entry site.   For the second cleaning, wet a new gauze bandage with water. Begin at the entry site of the tube. Wipe the skin in circles, moving away from the entry site. Use a new gauze bandage each time you clean the area, moving away from the entry site.   Gently pat the skin with a clean washcloth to dry it.    Apply the skin barrier and bandages.    Roll up a bandage to make it thick, and place it under  the place where the tube enters the skin. Place it to support the tube, and stop it from kinking or bending. Tape the bandage in place, and apply more bandages if directed by a healthcare provider.   Bring the tubing forward to the front and tape it to the skin. Do not stretch the tube tight, because this may pull the nephrostomy tube out.  How often to change the bandage.  Change the bandage around the tube, every other day. If your bandages  get dirty or wet, change them right away, and as often as needed. If your nephrostomy tube is to be used for a long period of time, the tube needs to be changed every 2 to 3 months. Healthcare providers will tell you when you need to make an appointment to have your tube changed.     How to care for the urine drainage bag:   Ask if you need to measure and write down how much urine is in the bag before you empty it. Drain urine out of the drainage bag when it is ½ to ? full. Open the spout at the bottom  of the bag to empty the urine into the toilet.   You may need to detach the drainage bag from the nephrostomy tube to change it.. If so, attach a new drainage bag tightly to the nephrostomy tube.     How to prevent problems with your nephrostomy tube:   Change bandages, directed.  This helps to prevent infection. Throw away or clean your drainage bag as directed by your healthcare provider.    Wipe the connecting ends of the drainage bag with alcohol before you reconnect the bag to the tube.  This helps prevent infection.     Keep the tube taped to your skin and connected to a drainage bag placed below the level of your kidneys.  This helps prevent urine from backing up into your kidneys. You may wear a small drainage bag strapped to your leg to let you move around more easily.    Check the catheter to be sure it is in place after you change your clothes or do other activities.  Do not wear tight clothing over the tube. Place the tubing over your thigh rather than under it when you are sitting down. Be sure that nothing is pulling on the nephrostomy tube when you move around.    Change positions if you see little or no urine in your drainage bag.  Check to see if the urine tube is twisted or bent. Be sure that you are not sitting or lying on the tube. If there are no kinks and there is little or no urine in the drainage bag, tell your healthcare provider.    Flush out the tube as directed. Some tubes get flushed one time a day with 10 mls of NSS You will be given a prescription for the flushes.  To flush the nephrostomy tube, clean both connections with alcohol swap. Twist off the drainage bag tube and twist the saline syringe into the nephrostomy tube and flush briskly. Remove the syringe and twist the drainage bag tube back into the nephrostomy tube.  Keep the site covered while you shower.  Tape a piece of clear adhesive plastic over the dressing to keep it dry while you shower. Do not take tub baths.    Contact  Interventional Radiology at [phone] (ALETHA PATIENTS: Contact Interventional Radiology at [phone]) (RENATO PATIENTS: Contact Interventional Radiology at [phone]) if:  The skin around the nephrostomy tube is red, swollen, itches, or has a rash.   You have a fever greater than 101 or chills.  You have lower back or hip pain.  There are changes in how your urine looks or smells.  You have little or no urine draining from the nephrostomy tube.   You have nausea and are vomiting.  The black helen on your tube has moved, or the tube is longer than when it was put in.   You have questions or concerns about your condition or care.  The nephrostomy tube comes out completely.   There is blood, pus, or a bad smell coming from the place where the tube enters your skin.  Urine is leaking around the tube.        The following pharmacies carry the flush syringes.       [facility]                     [facility]                         [facility] [address]                     [address] [facility]                    [phone]  [place]                       [place]  Phone [phone]            Phone [phone]                 [facility]                                                                                                   [phone]  [facility]             [facility]                             [address] [place], [place] [address] [facility]   [place] [address]                                 [phone]  Phone [phone]            Phone [phone]    [facility]                                                                         [facility] [phone]  Aniya Bernal.  [place]   Phone [phone]

## 2024-11-19 NOTE — BRIEF OP NOTE (RAD/CATH)
INTERVENTIONAL RADIOLOGY PROCEDURE NOTE    Date: 11/19/2024    Procedure:   Procedure Summary       Date: 11/19/24 Room / Location: [facility] Interventional Radiology    Anesthesia Start: 1048 Anesthesia Stop:     Procedure: IR NEPHROSTOMY TO NEPHROURETERAL STENT Diagnosis:       Other hydronephrosis      Retroperitoneal mass      (Malignant hydronephrosis)    Scheduled Providers:  Responsible Provider: Nikia Engel DO    Anesthesia Type: general ASA Status: 3            Preoperative diagnosis:   1. Other hydronephrosis    2. Retroperitoneal mass         Postoperative diagnosis: Same.    Surgeon: Nakul Lua MD     Assistant: None. No qualified resident was available.    Blood loss: none    Specimens: none     Findings: 10 Fr left PCN converted to 10 Fr x 26 cm PCNU    Complications: None immediate.    Anesthesia: local and MAC sedation

## 2024-11-19 NOTE — ANESTHESIA POSTPROCEDURE EVALUATION
Post-Op Assessment Note    CV Status:  Stable  Pain Score: 0    Pain management: adequate       Mental Status:  Alert and awake   Hydration Status:  Euvolemic   PONV Controlled:  Controlled   Airway Patency:  Patent     Post Op Vitals Reviewed: Yes    No anethesia notable event occurred.    Staff: Anesthesiologist           Last Filed PACU Vitals:  Vitals Value Taken Time   Temp 98.1 °F (36.7 °C) 11/19/24 1130   Pulse 88 11/19/24 1225   /86 11/19/24 1215   Resp 26 11/19/24 1225   SpO2 96 % 11/19/24 1224   Vitals shown include unfiled device data.    Modified Jose:  Activity: 2 (11/19/2024 12:15 PM)  Respiration: 2 (11/19/2024 12:15 PM)  Circulation: 2 (11/19/2024 12:15 PM)  Consciousness: 2 (11/19/2024 12:15 PM)  Oxygen Saturation: 2 (11/19/2024 12:15 PM)  Modified Jose Score: 10 (11/19/2024 12:15 PM)

## 2024-11-19 NOTE — ANESTHESIA POSTPROCEDURE EVALUATION
Post-Op Assessment Note    CV Status:  Stable  Pain Score: 0    Pain management: adequate       Mental Status:  Alert and awake   Hydration Status:  Euvolemic   PONV Controlled:  Controlled   Airway Patency:  Patent     Post Op Vitals Reviewed: Yes    No anethesia notable event occurred.    Staff: CRNA           Last Filed PACU Vitals:  Vitals Value Taken Time   Temp     Pulse 94 11/19/24 1133   /70 11/19/24 1130   Resp 31 11/19/24 1133   SpO2 96 % 11/19/24 1133   Vitals shown include unfiled device data.    Modified Jose:  No data recorded

## 2024-11-19 NOTE — TELEPHONE ENCOUNTER
Pt is on the wait list for a sooner HFU appt in the AO. Called pt and LVM stating there are openings in the AO for sooner HFU appt on 11/20 and 11/21 with Dr Ellison. Advised pt to call office back if he would like to reschedule.

## 2024-11-19 NOTE — SEDATION DOCUMENTATION
PCNU capped. Standard drainage bag provided in case of fever, chills, nausea, vomiting, flank pain. If any of these occur, contact IR and be prepared to attach bag provided

## 2024-11-19 NOTE — INTERVAL H&P NOTE
"Update: (This section must be completed if the H&P was completed greater than 24 hrs to procedure or admission)    H&P reviewed. After examining the patient, I find no changed to the H&P since it had been written.    /72   Pulse (!) 111   Temp 97.7 °F (36.5 °C) (Temporal)   Resp 16   Ht 5' 10\" (1.778 m)   Wt 74.1 kg (163 lb 5.8 oz)   SpO2 95%   BMI 23.44 kg/m²     Plan for internalization of the left PCN to PCNU    Patient re-evaluated. Accept as history and physical.    Nakul Lua MD/November 19, 2024/10:53 AM  "

## 2024-11-19 NOTE — PROGRESS NOTES
In basket message patient identified for diabetic program.   Alejandro had nephrostomy tube procedure done today will schedule outreach for tomorrow 11/20/24

## 2024-11-20 ENCOUNTER — PATIENT OUTREACH (OUTPATIENT)
Dept: CASE MANAGEMENT | Facility: OTHER | Age: 69
End: 2024-11-20

## 2024-11-20 ENCOUNTER — PATIENT OUTREACH (OUTPATIENT)
Dept: HEMATOLOGY ONCOLOGY | Facility: CLINIC | Age: 69
End: 2024-11-20

## 2024-11-20 ENCOUNTER — TELEPHONE (OUTPATIENT)
Dept: NUTRITION | Facility: CLINIC | Age: 69
End: 2024-11-20

## 2024-11-20 DIAGNOSIS — C64.9 METASTATIC RENAL CELL CARCINOMA TO BONE (HCC): Primary | Chronic | ICD-10-CM

## 2024-11-20 DIAGNOSIS — C79.51 METASTATIC RENAL CELL CARCINOMA TO BONE (HCC): Primary | Chronic | ICD-10-CM

## 2024-11-20 DIAGNOSIS — C61 PROSTATE CANCER (HCC): Chronic | ICD-10-CM

## 2024-11-20 NOTE — PROGRESS NOTES
I reached out and spoke with Alejandro now that consults have been completed with the oncology teams to review for any barriers to care and offer supportive services as needed. Distress Thermometer completed at this time. Patient scored 1/10. Referral to SW placed. I reviewed and updated the members assigned to the care team in [facility].   He knows the members of the care team as well as how and when to contact them with any needs.   He verbalizes managing the schedules well.   He is currently able to drive and denies any transportation needs.    He is already established with Palliative Care.   He states that he is struggling with eating and drinking. Malnutrition screening tool completed. He does meet parameters for auto referral to Oncology Registered Dietician.      Patient does not smoke.   He states he is well supported by family and friends.  Community support groups discussed including the [facility] Support [facility] of the [facility]. Patient declined information at this time.   He feels he has adequate insurance coverage and denies any financial concerns at this time.     He is not sure what to do with the Lenvatinib, he was told to not take this but he did receive the medication. He did mention that after he eats he gets nauseous. He does not have an appetite. He stated that he can't drink boost or Glucerna drinks. Referral placed for nutrition.  He did mention that is constipated. He has a bowel movement about every 3-4 days. He said he is on colace. He said he is going to start Miralax.    Based on individual needs I will follow up in about 4 weeks. I have provided my direct contact information and welcome them to contact me if needs as discussed above change. He was appreciative for the call.

## 2024-11-20 NOTE — PROGRESS NOTES
Spoke with Alejandro, explained I was calling to see if he would like to work with myself in regards to his diabetes. I can see he was to make appointment with endocrinology but that has not occurred yet. Alejandro was not aware he should have done so When I listed their locations he said no they are too far away.  He is working with Sharron Gray the pharmacist. Next appointment is 12/18/24  He did call to speak with her yesterday but she is out of office right now  Admits he did not take blood sugar today he knows readings are elevated but he has been off Ozempic since October.  He was to restart on Sunday 11/17/24 but was to hold medication for nephrostomy procedure yesterday. He is only taking metformin right now.   Continues to work with [facility]  Alejandro ask I check with Dr Tellez about restarting Ozempic will do so plus I will message Sharron Gray about calling Alejandro when she is back in office.   He open to phone call next week

## 2024-11-20 NOTE — PROGRESS NOTES
Per Dr. Castellon patient to continue to hold until seen in office on 11/26. Message sent to patient

## 2024-11-21 ENCOUNTER — PATIENT OUTREACH (OUTPATIENT)
Dept: CASE MANAGEMENT | Facility: OTHER | Age: 69
End: 2024-11-21

## 2024-11-21 ENCOUNTER — OFFICE VISIT (OUTPATIENT)
Dept: NEPHROLOGY | Facility: CLINIC | Age: 69
End: 2024-11-21
Payer: MEDICARE

## 2024-11-21 VITALS
WEIGHT: 163 LBS | HEIGHT: 70 IN | HEART RATE: 98 BPM | SYSTOLIC BLOOD PRESSURE: 140 MMHG | BODY MASS INDEX: 23.34 KG/M2 | DIASTOLIC BLOOD PRESSURE: 74 MMHG

## 2024-11-21 DIAGNOSIS — I10 ESSENTIAL HYPERTENSION: ICD-10-CM

## 2024-11-21 DIAGNOSIS — R19.00 RETROPERITONEAL MASS: ICD-10-CM

## 2024-11-21 DIAGNOSIS — N13.30 HYDRONEPHROSIS OF LEFT KIDNEY: ICD-10-CM

## 2024-11-21 DIAGNOSIS — N17.9 AKI (ACUTE KIDNEY INJURY) (HCC): Primary | ICD-10-CM

## 2024-11-21 PROCEDURE — 99214 OFFICE O/P EST MOD 30 MIN: CPT | Performed by: INTERNAL MEDICINE

## 2024-11-21 RX ORDER — AMLODIPINE BESYLATE 10 MG/1
10 TABLET ORAL DAILY
Qty: 30 TABLET | Refills: 4 | Status: SHIPPED | OUTPATIENT
Start: 2024-11-21 | End: 2025-04-20

## 2024-11-21 NOTE — PROGRESS NOTES
NEPHROLOGY OUTPATIENT PROGRESS NOTE   Alejandro Adames 69 y.o. male MRN: [id number]  DATE: 11/21/2024  Reason for visit:   Chief Complaint   Patient presents with    Follow-up    Acute Renal Failure     ASSESSMENT and PLAN:  Assessment & Plan  JULIO (acute kidney injury) (HCC)  JULIO, baseline creatinine 1.1-1.2  -JULIO suspected to be multifactorial in the setting of obstructive nephropathy, use of contrast, prior use of NSAIDs  -Peak creatinine 2.7 now seems to have overall improved and plateaued with most recent 1.4 on 11/4/2024  -UA in October 2024 showed 1+ proteinuria, no hematuria.  -MRI findings as below.  -Currently remains on Keytruda which was recently started, plan noted for eventual starting lenvatinib (VEGFi)  -Continue to closely monitor renal function, repeat BMP next month and again before next visit.  Hydronephrosis of left kidney  -During recent hospitalization, CT scan showed moderate left hydronephrosis in October 2024.  -This was thought to be due to obstructing large left retroperitoneal toribio mass  -Status post initial left PCN which was eventually converted to PCNU on 11/19/2024.  -Follows with urology  Essential hypertension  -Blood pressure remains slightly higher in the office today.  -He has upper arm BP machine but does not check BP on regular basis.  -Increase amlodipine from 5 mg to 10 mg daily.  Retroperitoneal mass  -Recent CT scan in October 2024 shows large left retroperitoneal toribio mass, enlarged left pelvic and other retroperitoneal nodes consistent with metastasis  -MRI abdomen showed no renal cortical mass, showed mild urothelial thickening and enhancement within left renal pelvis without measurable lesion.  -Bone scan shows no obvious bone metastasis.  -Now remains on Keytruda and plan noted to eventually start lenvatinib, closely follows with hematology.  -This was suspected to be primary left kidney renal cell carcinoma    Diagnoses and all orders for this visit:    JULIO (acute  kidney injury) (HCC)  -     Albumin / creatinine urine ratio; Future  -     Basic metabolic panel; Future  -     Albumin / creatinine urine ratio; Future  -     Phosphorus; Future  -     PTH, intact; Future  -     Comprehensive metabolic panel; Future  -     CBC and differential; Future    Hydronephrosis of left kidney  -     Albumin / creatinine urine ratio; Future  -     Basic metabolic panel; Future  -     Albumin / creatinine urine ratio; Future  -     Phosphorus; Future  -     PTH, intact; Future  -     Comprehensive metabolic panel; Future  -     CBC and differential; Future    Essential hypertension  -     amLODIPine (NORVASC) 10 mg tablet; Take 1 tablet (10 mg total) by mouth daily  -     Albumin / creatinine urine ratio; Future  -     Basic metabolic panel; Future  -     Albumin / creatinine urine ratio; Future  -     Phosphorus; Future  -     PTH, intact; Future  -     Comprehensive metabolic panel; Future  -     CBC and differential; Future    Retroperitoneal mass          SUBJECTIVE / HPI:  Patient is 69-year-old male with significant medical issues of prostate carcinoma about 5 years ago, hypertension for 20 years, diabetes for 10 years, comes for posthospitalization follow-up.  Baseline creatinine seems to be 1.1-1.2.  He was recently hospitalized in October 2024 with JULIO suspected to be obstructive nephropathy, contrast related with peak creatinine 2.7 now improving to most recent 1.4.    He was also found to have left-sided hydronephrosis requiring left-sided PCN which was recently converted to PCNU.  Was found to have retroperitoneal toribio mass, status post biopsy suggestive of primary renal carcinoma.  Now follows with hematology, undergoing Keytruda and plan noted to eventually start lenvatinib.    He denies any nausea or vomiting.  Feels overall very tired.  Has decreased appetite.  Blood pressure slightly higher in the office today.  Does not check BP at home.    REVIEW OF SYSTEMS:  More than 10  "point review of systems were obtained and discussed in length with the patient. Complete review of systems were negative / unremarkable except mentioned above.     PHYSICAL EXAM:  Vitals:    11/21/24 1059 11/21/24 1124   BP: 150/70 140/74   BP Location: Left arm    Patient Position: Sitting    Cuff Size: Standard    Pulse: 98    Weight: 73.9 kg (163 lb)    Height: 5' 10\" (1.778 m)      Body mass index is 23.39 kg/m².    Physical Exam  Vitals reviewed.   Constitutional:       Appearance: He is well-developed.   HENT:      Head: Normocephalic and atraumatic.      Right Ear: External ear normal.      Left Ear: External ear normal.   Eyes:      General: No scleral icterus.     Conjunctiva/sclera: Conjunctivae normal.   Cardiovascular:      Comments: Minimal edema in lower legs  Pulmonary:      Effort: Pulmonary effort is normal. No respiratory distress.      Breath sounds: Normal breath sounds. No wheezing or rales.   Abdominal:      General: Bowel sounds are normal. There is no distension.      Palpations: Abdomen is soft.      Tenderness: There is no abdominal tenderness.   Musculoskeletal:         General: No deformity.   Lymphadenopathy:      Cervical: No cervical adenopathy.   Skin:     Findings: No rash.   Neurological:      Mental Status: He is alert and oriented to person, place, and time.   Psychiatric:         Behavior: Behavior normal.         PAST MEDICAL HISTORY:  Past Medical History:   Diagnosis Date    Cutaneous skin tags 1/2/2019    Diabetes mellitus (HCC)     Hypertension        PAST SURGICAL HISTORY:  Past Surgical History:   Procedure Laterality Date    IR BIOPSY INGUINAL LYMPH NODE  10/19/2024    IR NEPHROSTOMY TO NEPHROURETERAL STENT  11/19/2024    IR NEPHROSTOMY TUBE PLACEMENT  10/19/2024       SOCIAL HISTORY:  Social History     Substance and Sexual Activity   Alcohol Use Yes    Alcohol/week: 2.0 - 3.0 standard drinks of alcohol    Types: 2 - 3 Cans of beer per week    Comment: social     Social " History     Substance and Sexual Activity   Drug Use No     Social History     Tobacco Use   Smoking Status Former   Smokeless Tobacco Never       FAMILY HISTORY:  Family History   Problem Relation Age of Onset    No Known Problems Brother     Stroke Mother        MEDICATIONS:    Current Outpatient Medications:     amLODIPine (NORVASC) 10 mg tablet, Take 1 tablet (10 mg total) by mouth daily, Disp: 30 tablet, Rfl: 4    atorvastatin (LIPITOR) 40 mg tablet, TAKE 1 TABLET BY MOUTH EVERY DAY, Disp: 100 tablet, Rfl: 1    folic acid (FOLVITE) 1 mg tablet, Take 1 tablet (1 mg total) by mouth daily, Disp: 30 tablet, Rfl: 0    glucose blood (OneTouch Verio) test strip, Check blood sugars once daily. Please substitute with appropriate alternative as covered by patient's insurance. Dx: E11.65, Disp: 100 each, Rfl: 3    metFORMIN (GLUCOPHAGE) 1000 MG tablet, Take 1,000 mg by mouth 2 (two) times a day with meals, Disp: , Rfl:     OneTouch Delica Lancets 33G MISC, Check blood sugars once daily. Please substitute with appropriate alternative as covered by patient's insurance. Dx: E11.65, Disp: 100 each, Rfl: 3    semaglutide, 2 mg/dose, (Ozempic, 2 MG/DOSE,) 8 mg/ mL injection pen, Inject 2 mg under the skin every 7 days, Disp: , Rfl:     senna-docusate sodium (SENOKOT S) 8.6-50 mg per tablet, Take 1 tablet by mouth 2 (two) times a day as needed for constipation, Disp: , Rfl:     sodium chloride, PF, 0.9 %, 10 mL by Intracatheter route daily Intracatheter flushing daily. May substitute prefilled syringe with normal saline 10 mL vials, 10 mL syringes, and 18 g blunt needles, Disp: 300 mL, Rfl: 3    triamcinolone (KENALOG) 0.1 % cream, APPLY TOPICALLY TO THE AFFECTED AREA TWICE DAILY, Disp: 30 g, Rfl: 0    Lenvatinib, 20 MG Daily Dose, (Lenvima, 20 MG Daily Dose,) 2 x 10 MG CPPK, Take 20 mg by mouth daily (Patient not taking: Reported on 11/21/2024), Disp: 30 each, Rfl: 5    ondansetron (ZOFRAN-ODT) 8 mg disintegrating tablet, Take  1 tablet (8 mg total) by mouth every 8 (eight) hours as needed for nausea (Patient not taking: Reported on 11/21/2024), Disp: 20 tablet, Rfl: 1    oxyCODONE (ROXICODONE) 5 immediate release tablet, Take 0.5 tablets (2.5 mg total) by mouth every 8 (eight) hours as needed for severe pain Max Daily Amount: 7.5 mg (Patient not taking: Reported on 11/21/2024), Disp: 10 tablet, Rfl: 0    Lab Results:   Creatinine 1.4

## 2024-11-21 NOTE — ASSESSMENT & PLAN NOTE
-During recent hospitalization, CT scan showed moderate left hydronephrosis in October 2024.  -This was thought to be due to obstructing large left retroperitoneal toribio mass  -Status post initial left PCN which was eventually converted to PCNU on 11/19/2024.  -Follows with urology

## 2024-11-21 NOTE — ASSESSMENT & PLAN NOTE
-Recent CT scan in October 2024 shows large left retroperitoneal toribio mass, enlarged left pelvic and other retroperitoneal nodes consistent with metastasis  -MRI abdomen showed no renal cortical mass, showed mild urothelial thickening and enhancement within left renal pelvis without measurable lesion.  -Bone scan shows no obvious bone metastasis.  -Now remains on Keytruda and plan noted to eventually start lenvatinib, closely follows with hematology.  -This was suspected to be primary left kidney renal cell carcinoma

## 2024-11-21 NOTE — ASSESSMENT & PLAN NOTE
JULIO, baseline creatinine 1.1-1.2  -JULIO suspected to be multifactorial in the setting of obstructive nephropathy, use of contrast, prior use of NSAIDs  -Peak creatinine 2.7 now seems to have overall improved and plateaued with most recent 1.4 on 11/4/2024  -UA in October 2024 showed 1+ proteinuria, no hematuria.  -MRI findings as below.  -Currently remains on Keytruda which was recently started, plan noted for eventual starting lenvatinib (VEGFi)  -Continue to closely monitor renal function, repeat BMP next month and again before next visit.

## 2024-11-21 NOTE — ASSESSMENT & PLAN NOTE
-Blood pressure remains slightly higher in the office today.  -He has upper arm BP machine but does not check BP on regular basis.  -Increase amlodipine from 5 mg to 10 mg daily.

## 2024-11-21 NOTE — PROGRESS NOTES
OSW received referral for patient Lost of interest in normal activities, very tired, no appetite. OSW will outreach to assess/address needs.

## 2024-11-21 NOTE — PROGRESS NOTES
OSW placed call to patient. Patient reported that he is getting ready to leave for an appointment. OSW will attempt call at a later date.

## 2024-11-25 ENCOUNTER — NUTRITION (OUTPATIENT)
Dept: NUTRITION | Facility: CLINIC | Age: 69
End: 2024-11-25

## 2024-11-25 ENCOUNTER — PATIENT OUTREACH (OUTPATIENT)
Dept: CASE MANAGEMENT | Facility: OTHER | Age: 69
End: 2024-11-25

## 2024-11-25 DIAGNOSIS — Z71.3 NUTRITIONAL COUNSELING: Primary | ICD-10-CM

## 2024-11-25 NOTE — PATIENT INSTRUCTIONS
Nutrition Rx & Recommendations:  Diet: high calorie/high protein, CHO controlled  Small, frequent meals/snacks may be easier to tolerate than 3 large daily meals.  Aim for 5-6 small meals per day (every 2-3 hours).  Include protein at all meals/snacks.  Include a variety of foods (as tolerated/allowed by diet).  Incorporate physical activity as able/allowed.  Stay hydrated by sipping fluids of choice/tolerance throughout the day.  Liquid nutrition may be better tolerated than solids at times.  Alter food choices and eating patterns to accommodate changing needs.  Refer to Eating Hints book for other meal/snack ideas and symptom management.  Follow proper oral care; Try baking soda/salt water rinse recipe (mix 3/4 tsp salt + 1 tsp baking soda + 1 qt water; rinse with plain water after using) in Eating Hints book (pg 18).  Brush your teeth before/after meals & before bed.  For lack of taste: Practice good oral care. Blend fresh fruits into shakes, ice cream or yogurt.  Eat frozen fruits: grapes, chopped cantaloupe, watermelon.  Select fresh vegetables (may be more appealing than canned/frozen).  Add extra flavor to foods: experiment with spices, salt & sweeteners, extra oil/butter, acidic foods; marinate meats (see pages 29-30 in your Eating Hints book).  For appetite loss: try powdered or liquid nutrition supplements; eating by the clock every 2-3 hours; set a timer to remind yourself to eat, keep snacks nearby; add extra protein & calories to your diet; drink liquids in between meals, choose liquids that add calories; eat a bedtime snack; eat soft, cool or frozen foods; eat a larger meal when you feel well & rested; only sip small amounts of liquids during meals (see pages 10-12 in your Eating Hints book).  For constipation: drink plenty of fluids (>64 oz/day); drink hot liquids; eat high-fiber foods as tolerated (whole grains, beans, peas, nuts, seeds, fruits, vegetables, etc); increase physical activity as  tolerated.  Avoid increasing fiber intake too quickly, add fiber into your diet slowly; keep a record of your bowel movements (see pages 13-14 in you Eating Hints book).  For nausea/vomiting: try plain/bland foods; try lemon/lime flavors; eat 5-6 small meals/day (except when vomiting); do not skip meals/snacks; choose appealing foods; sip only small amounts of liquids during meals; stay hydrated in between meals; eat foods/drinks that are room temperature; eat dry toast/crackers (see pages  21-22 and 31-32 in your Eating Hints book).  Weigh yourself regularly. If you notice weight loss, make an effort to increase your daily food/calorie intake. If you continue to notice loss after these efforts, reach out to your dietitian to establish a plan to stabilize weight.  Try boost soothe- aim for at least 3 daily  Try milkshakes/smoothies from recipe packet  Aim to eat/drink something with calories every 2-3 hours throughout the day  Choose liquids with calories: whole milk, Fairlife milk (higher protein/lactose-free milk), chocolate milk, drinkable yogurt, kefir, diluted juice, bone broth (higher protein broth), creamy soups, popsicles or greek yogurt based popsicles for more protein, milkshakes, smoothies, oral nutrition supplements (Ensure, Boost, etc.), gelatin/Jello, etc.    Follow Up Plan: in ~2 weeks with Kathe Carson  Recommend Referral to Other Providers: none at this time

## 2024-11-25 NOTE — PROGRESS NOTES
"Outpatient Oncology Nutrition Consultation   Type of Consult: Initial Consult  Care Location: Telephone Call    Reason for referral: Received notification by  [facility] Mayra SULTANA  on 11/20/24 that pt has triggered for oncology nutrition care (reason for referral: Malnutrition Screening Tool (MST) Triggers: scored a 2 indicating 2-13# (0.9-6 kg) recent wt loss and is eating poorly due to a decreased appetite. (Date of MST: 11/20/24), Ambulatory referral for nutrition services for oncology, and \"Pt states lost 10 lbs in a few weeks, no appetite. He eats maybe twice a day. No energy\" ).     Nutrition Assessment:   Oncology Diagnosis & Treatments:   Hx prostate cancer dx in 2019- s/p RT at [facility]  Renal cell carcinoma dx 10/2024  Started Keytruda 11/14/24   Oncology History   Prostate cancer (HCC)   6/18/2019 Initial Diagnosis    Malignant neoplasm of prostate (HCC)     10/24/2024 -  Cancer Staged    Staging form: Prostate, AJCC 8th Edition  - Clinical: Stage IIB (cT1c, cN0, cM0, PSA: 4.9, Grade Group: 2) - Signed by Isaak Castellon MD on 10/24/2024  Prostate specific antigen (PSA) range: Less than 10  Kayley score: 7  Histologic grading system: 5 grade system       Metastatic renal cell carcinoma to bone (HCC)   10/24/2024 Initial Diagnosis    Renal cell carcinoma of left kidney (HCC)     10/24/2024 -  Cancer Staged    Staging form: Kidney, AJCC 8th Edition  - Clinical: Stage IV (cTX, cN1, cM1) - Signed by Isaak Castellon MD on 10/24/2024       11/14/2024 -  Chemotherapy    alteplase (CATHFLO), 2 mg, Intracatheter, Every 1 Minute as needed, 1 of 6 cycles  pembrolizumab (KEYTRUDA) IVPB, 400 mg, Intravenous, Once, 1 of 6 cycles  Administration: 400 mg (11/14/2024)       Past Medical & Surgical Hx:   Patient Active Problem List   Diagnosis    Herpes simplex type 1 infection    Mixed hyperlipidemia    Essential hypertension    Idiopathic angioedema    Actinic keratosis    Type 2 diabetes mellitus with kidney " complication, without long-term current use of insulin (HCC)    Prostate cancer (HCC)    Intrinsic eczema    SK (seborrheic keratosis)    Anemia    COVID-19    Sebaceous cyst mid back    Patellofemoral pain syndrome of both knees    Gait disorder    Hydronephrosis of left kidney    JULIO (acute kidney injury) (HCC)    Hyponatremia    Lung nodule    Retroperitoneal mass    Polyuria    Metastatic renal cell carcinoma to bone (HCC)     Past Medical History:   Diagnosis Date    Cutaneous skin tags 1/2/2019    Diabetes mellitus (HCC)     Hypertension      Past Surgical History:   Procedure Laterality Date    IR BIOPSY INGUINAL LYMPH NODE  10/19/2024    IR NEPHROSTOMY TO NEPHROURETERAL STENT  11/19/2024    IR NEPHROSTOMY TUBE PLACEMENT  10/19/2024       Review of Medications:   Vitamins, Supplements and Herbals: No, pt denies taking supplements    Current Outpatient Medications:     amLODIPine (NORVASC) 10 mg tablet, Take 1 tablet (10 mg total) by mouth daily, Disp: 30 tablet, Rfl: 4    atorvastatin (LIPITOR) 40 mg tablet, TAKE 1 TABLET BY MOUTH EVERY DAY, Disp: 100 tablet, Rfl: 1    folic acid (FOLVITE) 1 mg tablet, Take 1 tablet (1 mg total) by mouth daily, Disp: 30 tablet, Rfl: 0    glucose blood (OneTouch Verio) test strip, Check blood sugars once daily. Please substitute with appropriate alternative as covered by patient's insurance. Dx: E11.65, Disp: 100 each, Rfl: 3    Lenvatinib, 20 MG Daily Dose, (Lenvima, 20 MG Daily Dose,) 2 x 10 MG CPPK, Take 20 mg by mouth daily (Patient not taking: Reported on 11/21/2024), Disp: 30 each, Rfl: 5    metFORMIN (GLUCOPHAGE) 1000 MG tablet, Take 1,000 mg by mouth 2 (two) times a day with meals, Disp: , Rfl:     ondansetron (ZOFRAN-ODT) 8 mg disintegrating tablet, Take 1 tablet (8 mg total) by mouth every 8 (eight) hours as needed for nausea (Patient not taking: Reported on 11/21/2024), Disp: 20 tablet, Rfl: 1    OneTouch Delica Lancets 33G MISC, Check blood sugars once daily.  "Please substitute with appropriate alternative as covered by patient's insurance. Dx: E11.65, Disp: 100 each, Rfl: 3    oxyCODONE (ROXICODONE) 5 immediate release tablet, Take 0.5 tablets (2.5 mg total) by mouth every 8 (eight) hours as needed for severe pain Max Daily Amount: 7.5 mg (Patient not taking: Reported on 11/21/2024), Disp: 10 tablet, Rfl: 0    semaglutide, 2 mg/dose, (Ozempic, 2 MG/DOSE,) 8 mg/ mL injection pen, Inject 2 mg under the skin every 7 days, Disp: , Rfl:     senna-docusate sodium (SENOKOT S) 8.6-50 mg per tablet, Take 1 tablet by mouth 2 (two) times a day as needed for constipation, Disp: , Rfl:     sodium chloride, PF, 0.9 %, 10 mL by Intracatheter route daily Intracatheter flushing daily. May substitute prefilled syringe with normal saline 10 mL vials, 10 mL syringes, and 18 g blunt needles, Disp: 300 mL, Rfl: 3    triamcinolone (KENALOG) 0.1 % cream, APPLY TOPICALLY TO THE AFFECTED AREA TWICE DAILY, Disp: 30 g, Rfl: 0    Most Recent Lab Results:   Lab Results   Component Value Date    WBC 6.84 10/21/2024    NEUTROABS 5.04 10/21/2024    IRON 20 (L) 10/19/2024    TIBC 196 (L) 10/19/2024    FERRITIN 494 (H) 10/19/2024    CHOLESTEROL 154 08/28/2024    TRIG 88 08/28/2024    HDL 48 08/28/2024    LDLCALC 88 08/28/2024    ALT 23 10/18/2024    AST 15 10/18/2024    ALB 3.9 10/18/2024    SODIUM 135 11/04/2024    SODIUM 139 10/27/2024    K 4.3 11/04/2024    K 3.8 10/27/2024     11/04/2024    BUN 26 (H) 11/04/2024    BUN 27 (H) 10/27/2024    CREATININE 1.43 (H) 11/04/2024    CREATININE 1.40 (H) 10/27/2024    EGFR 53 (L) 11/04/2024    POCGLU 332 (H) 11/19/2024    GLUC 215 (H) 11/04/2024    HGBA1C 9.8 (H) 08/28/2024    HGBA1C 10.4 (H) 02/16/2024    HGBA1C 8.5 (H) 10/23/2023    CALCIUM 9.7 11/04/2024    FOLATE 4.6 (L) 10/19/2024    MG 2.1 10/21/2024       Anthropometric Measurements:   Height: 70\"  Ht Readings from Last 1 Encounters:   11/21/24 5' 10\" (1.778 m)     Wt Readings from Last 20 " Encounters:   11/21/24 73.9 kg (163 lb)   11/19/24 74.1 kg (163 lb 5.8 oz)   11/14/24 74.5 kg (164 lb 3.9 oz)   11/05/24 73.8 kg (162 lb 12.8 oz)   11/01/24 73.5 kg (162 lb)   10/18/24 79.4 kg (175 lb)   10/25/24 79.4 kg (175 lb)   10/09/24 79.4 kg (175 lb)   09/25/24 76.2 kg (168 lb)   09/09/24 76.8 kg (169 lb 6.4 oz)   04/22/24 81 kg (178 lb 9.6 oz)   01/10/24 82.9 kg (182 lb 12.8 oz)   11/22/23 83.6 kg (184 lb 3.2 oz)   07/12/23 84.1 kg (185 lb 6.4 oz)   04/17/23 85.7 kg (189 lb)   04/12/23 85.7 kg (189 lb)   03/30/23 85.3 kg (188 lb)   03/17/23 85.3 kg (188 lb)   03/16/23 85.3 kg (188 lb)   03/07/23 85.8 kg (189 lb 3.2 oz)       Weight History:   Usual Weight: 175#  Home Scale: does not weigh self at home    Oncology Nutrition-Anthropometrics      Flowsheet Row Nutrition from 11/25/2024 in [facility]   Patient age (years): 69 years   Patient (male) height (in): 70 in   Current weight (lbs): 163 lbs   Current weight to be used for anthropometric calculations (kg) 74.1 kg   BMI: 23.4   IBW male 166 lb   IBW (kg) male 75.5 kg   IBW % (male) 98.2 %   Adjusted BW (male): 165.3 lbs   Adjusted BW in kg (male): 75.1 kg   % weight change after 1 week: -3.7 %   Weight change after 1 week (lbs) -6.3 lbs   % weight change after 1 month: -6.9 %   Weight change after 1 month (lbs) -12 lbs   % weight change after 1 year: -11.5 %   Weight change after 1 year (lbs) -21.2 lbs            Nutrition-Focused Physical Findings: n/a due to telephone call    Food/Nutrition-Related History & Client/Social History:    Current Nutrition Impact Symptoms:  [x] Nausea -certain smells of food, also when brushing teeth [x] Reduced Appetite very poor, not hungry. Very poor over the last 1-2 weeks, worse over the last week [] Acid Reflux    [x] Vomiting 3-4 times recently. Usually after brushing teeth [x] Unintended Wt Loss  [] Malabsorption    [] Diarrhea  [] Unintended Wt Gain  [] Dumping Syndrome    [x]  Constipation -2 Bms today, was constipated prior to today. Taking sekokot and miralax [] Thick Mucous/Secretions  [] Abdominal Pain    [x] Dysgeusia (Altered Taste)  [] Xerostomia (Dry Mouth)  [] Gas    [x] Dysosmia (Altered Smell) -smell of foods has been nauseating [] Thrush  [] Difficulty Chewing    [] Oral Mucositis (Sore Mouth)  [x] Fatigue  [x] Hyperglycemia -hasn't been able to check BG recently. Recently started on medication this past weekend (ozempic)  Lab Results   Component Value Date    HGBA1C 9.8 (H) 2024      [] Odynophagia  [] Esophagitis  [] Other:    [] Dysphagia  [] Early Satiety  [] No Problems Eating      Food Allergies & Intolerances: no    Current Diet: Regular Diet, No Restrictions  Nutrition Route: PO  Activity level: ADLs, very fatigued so activity is limited    24 Hr Diet Recall:   Breakfast: nothing  Snack: nothing  Lunch: nothing  Snack: nothing  Dinner: nothing  Snack: nothing    Beverages: water (sips throughout the day)  Supplements:   None-dislikes Ensure/Boost      Oncology Nutrition-Estimated Needs      Flowsheet Row Nutrition from 2024 in [facility]   Weight type used Actual weight   Weight in kilograms (kg) used for estimated needs 74.1 kg   Energy needs formula:  35-40 kcal/kg   Energy needs based on 35 kcal/k kcal   Energy needs based on 40 kcal/k kcal   Protein needs formula: 1.5-2 g/kg   Protein needs based on 1.5 g/kg 111 g   Protein needs based on 2 g/kg 148 g   Fluid needs formula: 30-35 mL/kg   Fluid needs based on 30 mL/kg 2223 mL   Fluid needs in ounces 75 oz   Fluid needs based on 35 mL/kg 2594 mL   Fluid needs in ounces 88 oz             Discussion & Intervention:   Alejandro was evaluated today for an initial RD consultation regarding unintended weight loss and poor po intake.  Alejandro is planned to undergo tx for renal cell carcinoma .  Spoke with Alejandro via phone today. He started on Keytruda a couple  weeks ago. He reports poor appetite and weight loss, which has worsened over the last 1-2 weeks. He states the last time he ate something was Saturday - he ate 1 meal which consisted of a hamburger and mashed potatoes. Prior to a week ago, 1-2 meals/day was normal for him but this has significantly decreased. He started on Ozempic over the weekend for his diabetes per his family physician. Discussed that Ozempic can cause weight loss and a suppressed appetite, and recommended. I recommended he check with his family physician if there are any other medications that can be tried that wouldn't effect his appetite as much. We discussed ways to increase calorie/protein intake. He does not like Ensure/Boost shakes, but was willing to try a clear liquid option as he feels he tolerates these better. Discussed trying Boost Soothe as these are higher in calories than some others, and have a lower amount of sugar. He was agreeable to try. I also recommended other liquids with calories as it seems it's easier for him to get down liquids than solids. He had to cut our visit short but I discussed Kathecarlos Carson will be reaching out to schedule a follow up visit.    Reviewed 24 hour recall, which revealed an inadequate po intake, and discussed ways to increase kcal, protein, and fluid intakes and optimize nutrient intake.  Also reviewed the importance of wt management throughout the tx process and the role of a high kcal/ high protein diet in managing wt and overall health.  Based on today's assessment, discussion included: MNT for: nausea, dysgeusia, dysosmia, wt loss, decreased appetite, fatigue, calorie/protein needs, how to modify foods for anticipated nutrition impact symptoms pt may experience during CA tx, practicing proper oral care, a high kcal/protein diet & food choices to include at all meals & snacks (Examples of high kcal foods: cheese, full-fat dairy products, nut butter, plant-based fats, coconut oil/milk,  avocado, butter, cream soup, etc. Examples of high protein foods: eggs, chicken, fish, beans/legumes, nuts/nut butters, bone broth, etc.) , spreading carbohydrate intake throughout the day & counting carbohydrates for adequate glycemic control, fortifying foods for added kcal and protein (examples include: adding cheese to foods such as eggs, mashed potatoes, casseroles, etc.; Making oatmeal with whole milk rather than water; Making fortified mashed potatoes with cream, butter, dry milk powder, plain Greek yogurt, and cheese.), adequate hydration & fluid choices, sipping on calorie containing beverages (examples include: adding whole milk or cream to coffee, oral nutrition supplements, juice, electrolyte replacement beverages, milk, etc.), eating smaller more frequent meals every 2-3 hours (5-6 small meals/day), eating when feeling most hungry, utilizing oral nutrition supplements, and adding extra fat to foods while cooking such as butter, plant-based oil, coconut oil/milk, avocado, nut butters, etc..   Moving forward, Alejandro was encouraged to increase kcal, protein, and fluid intakes .  Materials Provided: all e-mailed to pt (@ [email or web address]'): , [facility] Eating Hints book, High-Calorie, High-Protein Diet handout, [facility] Oncology Nutrition Milkshake & Smoothie Recipes, and Commercial Nutrition Supplements handout  All questions and concerns addressed during today’s visit.  Alejandro has RD contact information.    Nutrition Diagnosis:   Inadequate Energy Intake related to physiological causes, disease state and treatment related issues as evidenced by food recall, wt loss and discussion with pt and/or family.  Increased Nutrient Needs (kcal & pro) related to increased demand for nutrients and disease state as evidenced by cancer dx and pt undergoing tx for cancer.  Patient has clinical indicators (or ASPEN criteria) consistent with severe protein-calorie malnutrition in the context of Chronic Illness  as evidenced by >5% wt loss in 1 month and </=75% energy intake vs. Estimated needs for >/=1 month.  Monitoring & Evaluation:   Goals:  weight maintenance/stabilization  adequate nutrition impact symptom management  pt to meet >/=75% estimated nutrition needs daily    Progress Towards Goals: Initiated    Nutrition Rx & Recommendations:  Diet: high calorie/high protein, CHO controlled  Small, frequent meals/snacks may be easier to tolerate than 3 large daily meals.  Aim for 5-6 small meals per day (every 2-3 hours).  Include protein at all meals/snacks.  Include a variety of foods (as tolerated/allowed by diet).  Incorporate physical activity as able/allowed.  Stay hydrated by sipping fluids of choice/tolerance throughout the day.  Liquid nutrition may be better tolerated than solids at times.  Alter food choices and eating patterns to accommodate changing needs.  Refer to Eating Hints book for other meal/snack ideas and symptom management.  Follow proper oral care; Try baking soda/salt water rinse recipe (mix 3/4 tsp salt + 1 tsp baking soda + 1 qt water; rinse with plain water after using) in Eating Hints book (pg 18).  Brush your teeth before/after meals & before bed.  For lack of taste: Practice good oral care. Blend fresh fruits into shakes, ice cream or yogurt.  Eat frozen fruits: grapes, chopped cantaloupe, watermelon.  Select fresh vegetables (may be more appealing than canned/frozen).  Add extra flavor to foods: experiment with spices, salt & sweeteners, extra oil/butter, acidic foods; marinate meats (see pages 29-30 in your Eating Hints book).  For appetite loss: try powdered or liquid nutrition supplements; eating by the clock every 2-3 hours; set a timer to remind yourself to eat, keep snacks nearby; add extra protein & calories to your diet; drink liquids in between meals, choose liquids that add calories; eat a bedtime snack; eat soft, cool or frozen foods; eat a larger meal when you feel well & rested;  only sip small amounts of liquids during meals (see pages 10-12 in your Eating Hints book).  For constipation: drink plenty of fluids (>64 oz/day); drink hot liquids; eat high-fiber foods as tolerated (whole grains, beans, peas, nuts, seeds, fruits, vegetables, etc); increase physical activity as tolerated.  Avoid increasing fiber intake too quickly, add fiber into your diet slowly; keep a record of your bowel movements (see pages 13-14 in you Eating Hints book).  For nausea/vomiting: try plain/bland foods; try lemon/lime flavors; eat 5-6 small meals/day (except when vomiting); do not skip meals/snacks; choose appealing foods; sip only small amounts of liquids during meals; stay hydrated in between meals; eat foods/drinks that are room temperature; eat dry toast/crackers (see pages  21-22 and 31-32 in your Eating Hints book).  Weigh yourself regularly. If you notice weight loss, make an effort to increase your daily food/calorie intake. If you continue to notice loss after these efforts, reach out to your dietitian to establish a plan to stabilize weight.  Try boost soothe- aim for at least 3 daily  Try milkshakes/smoothies from recipe packet  Aim to eat/drink something with calories every 2-3 hours throughout the day  Choose liquids with calories: whole milk, Fairlife milk (higher protein/lactose-free milk), chocolate milk, drinkable yogurt, kefir, diluted juice, bone broth (higher protein broth), creamy soups, popsicles or greek yogurt based popsicles for more protein, milkshakes, smoothies, oral nutrition supplements (Ensure, Boost, etc.), gelatin/Jello, etc.    Follow Up Plan:  in ~2 weeks with Kathe Carson  Recommend Referral to Other Providers: none at this time

## 2024-11-26 ENCOUNTER — TELEPHONE (OUTPATIENT)
Dept: UROLOGY | Facility: AMBULATORY SURGERY CENTER | Age: 69
End: 2024-11-26

## 2024-11-26 ENCOUNTER — PATIENT OUTREACH (OUTPATIENT)
Dept: CASE MANAGEMENT | Facility: OTHER | Age: 69
End: 2024-11-26

## 2024-11-26 ENCOUNTER — OFFICE VISIT (OUTPATIENT)
Dept: HEMATOLOGY ONCOLOGY | Facility: CLINIC | Age: 69
End: 2024-11-26
Payer: MEDICARE

## 2024-11-26 ENCOUNTER — OFFICE VISIT (OUTPATIENT)
Dept: UROLOGY | Facility: AMBULATORY SURGERY CENTER | Age: 69
End: 2024-11-26
Payer: MEDICARE

## 2024-11-26 ENCOUNTER — DOCUMENTATION (OUTPATIENT)
Dept: HEMATOLOGY ONCOLOGY | Facility: CLINIC | Age: 69
End: 2024-11-26

## 2024-11-26 ENCOUNTER — TELEPHONE (OUTPATIENT)
Dept: NUTRITION | Facility: CLINIC | Age: 69
End: 2024-11-26

## 2024-11-26 VITALS
SYSTOLIC BLOOD PRESSURE: 132 MMHG | HEART RATE: 102 BPM | RESPIRATION RATE: 18 BRPM | OXYGEN SATURATION: 99 % | DIASTOLIC BLOOD PRESSURE: 70 MMHG | TEMPERATURE: 97.1 F | HEIGHT: 70 IN | BODY MASS INDEX: 22.55 KG/M2 | WEIGHT: 157.5 LBS

## 2024-11-26 VITALS
BODY MASS INDEX: 22.19 KG/M2 | DIASTOLIC BLOOD PRESSURE: 76 MMHG | SYSTOLIC BLOOD PRESSURE: 154 MMHG | HEIGHT: 70 IN | HEART RATE: 121 BPM | OXYGEN SATURATION: 99 % | WEIGHT: 155 LBS

## 2024-11-26 DIAGNOSIS — C64.2 RENAL CELL CARCINOMA OF LEFT KIDNEY (HCC): Primary | ICD-10-CM

## 2024-11-26 DIAGNOSIS — C79.51 METASTATIC RENAL CELL CARCINOMA TO BONE (HCC): ICD-10-CM

## 2024-11-26 DIAGNOSIS — C64.2 RENAL CELL CARCINOMA OF LEFT KIDNEY (HCC): ICD-10-CM

## 2024-11-26 DIAGNOSIS — C61 PROSTATE CANCER (HCC): ICD-10-CM

## 2024-11-26 DIAGNOSIS — C61 MALIGNANT NEOPLASM OF PROSTATE (HCC): ICD-10-CM

## 2024-11-26 DIAGNOSIS — C64.9 METASTATIC RENAL CELL CARCINOMA TO BONE (HCC): Primary | Chronic | ICD-10-CM

## 2024-11-26 DIAGNOSIS — N13.30 HYDRONEPHROSIS OF LEFT KIDNEY: Primary | ICD-10-CM

## 2024-11-26 DIAGNOSIS — C79.51 METASTATIC RENAL CELL CARCINOMA TO BONE (HCC): Primary | Chronic | ICD-10-CM

## 2024-11-26 DIAGNOSIS — C64.9 METASTATIC RENAL CELL CARCINOMA TO BONE (HCC): ICD-10-CM

## 2024-11-26 DIAGNOSIS — C61 PROSTATE CANCER (HCC): Chronic | ICD-10-CM

## 2024-11-26 PROCEDURE — 99214 OFFICE O/P EST MOD 30 MIN: CPT | Performed by: UROLOGY

## 2024-11-26 PROCEDURE — G2211 COMPLEX E/M VISIT ADD ON: HCPCS | Performed by: INTERNAL MEDICINE

## 2024-11-26 PROCEDURE — 99215 OFFICE O/P EST HI 40 MIN: CPT | Performed by: INTERNAL MEDICINE

## 2024-11-26 RX ORDER — DRONABINOL 5 MG/1
5 CAPSULE ORAL
Qty: 60 CAPSULE | Refills: 1 | Status: SHIPPED | OUTPATIENT
Start: 2024-11-26

## 2024-11-26 NOTE — PROGRESS NOTES
In-basket message received from Deniz Medrano RN [facility]. Chart reviewed and prep completed.

## 2024-11-26 NOTE — TELEPHONE ENCOUNTER
Contacted Alejandro to introduce self and transfer oncology nutrition care from Oncology RD Sara Sanchez to this RD as Sara will be out of the office in the coming months.     Spoke with Alejandro and offered to schedule oncology nutrition follow up with this RD. Follow up scheduled for 12/12/24 11am. Pt has RD contact info.

## 2024-11-26 NOTE — TELEPHONE ENCOUNTER
LVM to let PT know that his apt was changed to 1/28/25 at 10:45 with Dr. Rees at the [facility].

## 2024-11-26 NOTE — LETTER
2024     Wayne Tellez Jr., MD  [address]   [address]  [facility] [address]    Patient: Alejandro Adames   YOB: 1955   Date of Visit: 2024       Dear Dr. Tellez:    Thank you for referring Alejandro Adames to me for evaluation. Below are my notes for this consultation.    If you have questions, please do not hesitate to call me. I look forward to following your patient along with you.         Sincerely,        Rj Rees MD        CC: MD Karime Mahoney, MADAN Castro, DO Rj Rees MD  2024 10:15 AM  Sign when Signing Visit  Name: Alejandro Adames      : 1955      MRN: [id number]  Encounter Provider: Rj Rees MD  Encounter Date: 2024   Encounter department: [facility] UROLOGY [place]  :  Assessment & Plan  Hydronephrosis of left kidney    Orders:  •  Ambulatory Referral to Interventional Radiology; Future    Renal cell carcinoma of left kidney (HCC)       Impression: History of prostate cancer, status post radiation, left hydronephrosis status post left nephro ureteral stent, retroperitoneal adenopathy, inguinal adenopathy, status post CT-guided needle biopsy concerning for metastatic renal cell carcinoma in the face of normal bilateral kidneys on imaging    Plan: I am concerned with Alejandro's overall appearance.  He is cachectic and experiencing weight loss.  He is pale and weak.  His PSA level is 0.3 and I feel that his clinical picture is unlikely to represent recurrent/metastatic prostate cancer.  In addition, he recently had a left inguinal lymph node biopsy which returned suspicious for metastatic renal cell carcinoma, however, both kidneys appear normal on both CT and MRI.  The patient is currently receiving IV Keytruda by Dr. Castellon.  I am unsure if an additional biopsy of the retroperitoneal mass will add clinically significant information.  The patient  "should be presented at urology multidisciplinary cancer conference.  Lastly, I recommended the patient return to interventional radiology for internalization of his capped left nephroureteral stent.  As the patient has had the left stent For some time, I recommend internalization without a safety nephrostomy tube.  Patient will return in follow-up after his case is reviewed at urology cancer conference.  Prostate cancer (HCC)             History of Present Illness   Alejandro Adames is a 69 y.o. male with history of prostate cancer [place] 3+4=7 s/p radiation completed in September 2019. Pt with low PSAs on surveillance. Last PSA 0.355.     He was seen in the office 9/25/24 with left testicle pain. US was ordered and completed 10/7/2024 which showed abnormal enlarged left inguinal lymph nodes 4.2cm.     Hospitalized 10/18: with flank pain, hydro  CT abdomen pelvis 10/18/2024:  showed mod left hydro, large left obstructing retroperitoneal toribio conglomerate. Enlarged left pelvic nodes 2.6 and 2 cm.   3mm LLL nodule, 3mm sclerotic lesions in right iliac body and right femoral head.    Given toribio conglomerate pt underwent inguinal node biopsy which was positive for RCC.   Nephrostomy tube placement in IR 10/19/2024.    MRI abd 10/26/2024 with no identifiable renal mass bilaterally.    Pt was seen by Dr. Castellon 11/1/24 for cancer staging (RCC left kidney and malignant prostate Ca). Repeat CT CAP ordered for restaging in 3 months.   Pt started on Keytruda 6 weeks. He has a follow up appt with Dr. Espinal today.   Pt here to discuss L NU to internal JJ and removal of NT/NU.       Review of Systems    Pt reports decreased appetite, N/V. Has not eaten in a few days. Dehydrated with increase thirst. Frequency.      Objective   /76 (BP Location: Left arm, Patient Position: Sitting, Cuff Size: Standard)   Pulse (!) 121   Ht 5' 10\" (1.778 m)   Wt 70.3 kg (155 lb)   SpO2 99%   BMI 22.24 kg/m²     Physical Exam  Pt " ill- appearing today. Pale and malnourished. Appears weak with slow, unsteady gait.     Results  Lab Results   Component Value Date    PSA 0.355 10/19/2024    PSA 1.3 10/17/2022    PSA 4.9 (H) 04/08/2019     Lab Results   Component Value Date    CALCIUM 9.7 11/04/2024    K 4.3 11/04/2024    CO2 25 11/04/2024     11/04/2024    BUN 26 (H) 11/04/2024    CREATININE 1.43 (H) 11/04/2024     Lab Results   Component Value Date    WBC 6.84 10/21/2024    HGB 10.2 (L) 10/21/2024    HCT 32.9 (L) 10/21/2024    MCV 94 10/21/2024     (H) 10/21/2024       Office Urine Dip  No results found for this or any previous visit (from the past hour).]

## 2024-11-26 NOTE — PROGRESS NOTES
Name: Alejandro Adames      : 1955      MRN: [id number]  Encounter Provider: Rj Rees MD  Encounter Date: 2024   Encounter department: [facility] UROLOGY [place]  :  Assessment & Plan  Hydronephrosis of left kidney    Orders:    Ambulatory Referral to Interventional Radiology; Future    Renal cell carcinoma of left kidney (HCC)       Impression: History of prostate cancer, status post radiation, left hydronephrosis status post left nephro ureteral stent, retroperitoneal adenopathy, inguinal adenopathy, status post CT-guided needle biopsy concerning for metastatic renal cell carcinoma in the face of normal bilateral kidneys on imaging    Plan: I am concerned with Alejandro's overall appearance.  He is cachectic and experiencing weight loss.  He is pale and weak.  His PSA level is 0.3 and I feel that his clinical picture is unlikely to represent recurrent/metastatic prostate cancer.  In addition, he recently had a left inguinal lymph node biopsy which returned suspicious for metastatic renal cell carcinoma, however, both kidneys appear normal on both CT and MRI.  The patient is currently receiving IV Keytruda by Dr. Castellon.  I am unsure if an additional biopsy of the retroperitoneal mass will add clinically significant information.  The patient should be presented at urology multidisciplinary cancer conference.  Lastly, I recommended the patient return to interventional radiology for internalization of his capped left nephroureteral stent.  As the patient has had the left stent For some time, I recommend internalization without a safety nephrostomy tube.  Patient will return in follow-up after his case is reviewed at urology cancer conference.  Prostate cancer (HCC)             History of Present Illness   Alejandro Adames is a 69 y.o. male with history of prostate cancer [place] 3+4=7 s/p radiation completed in 2019. Pt with low PSAs on surveillance. Last PSA 0.355.  "    He was seen in the office 9/25/24 with left testicle pain. US was ordered and completed 10/7/2024 which showed abnormal enlarged left inguinal lymph nodes 4.2cm.     Hospitalized 10/18: with flank pain, hydro  CT abdomen pelvis 10/18/2024:  showed mod left hydro, large left obstructing retroperitoneal toribio conglomerate. Enlarged left pelvic nodes 2.6 and 2 cm.   3mm LLL nodule, 3mm sclerotic lesions in right iliac body and right femoral head.    Given toribio conglomerate pt underwent inguinal node biopsy which was positive for RCC.   Nephrostomy tube placement in IR 10/19/2024.    MRI abd 10/26/2024 with no identifiable renal mass bilaterally.    Pt was seen by Dr. Castellon 11/1/24 for cancer staging (RCC left kidney and malignant prostate Ca). Repeat CT CAP ordered for restaging in 3 months.   Pt started on Keytruda 6 weeks. He has a follow up appt with Dr. Espinal today.   Pt here to discuss L NU to internal JJ and removal of NT/NU.       Review of Systems    Pt reports decreased appetite, N/V. Has not eaten in a few days. Dehydrated with increase thirst. Frequency.      Objective   /76 (BP Location: Left arm, Patient Position: Sitting, Cuff Size: Standard)   Pulse (!) 121   Ht 5' 10\" (1.778 m)   Wt 70.3 kg (155 lb)   SpO2 99%   BMI 22.24 kg/m²     Physical Exam  Pt ill- appearing today. Pale and malnourished. Appears weak with slow, unsteady gait.     Results  Lab Results   Component Value Date    PSA 0.355 10/19/2024    PSA 1.3 10/17/2022    PSA 4.9 (H) 04/08/2019     Lab Results   Component Value Date    CALCIUM 9.7 11/04/2024    K 4.3 11/04/2024    CO2 25 11/04/2024     11/04/2024    BUN 26 (H) 11/04/2024    CREATININE 1.43 (H) 11/04/2024     Lab Results   Component Value Date    WBC 6.84 10/21/2024    HGB 10.2 (L) 10/21/2024    HCT 32.9 (L) 10/21/2024    MCV 94 10/21/2024     (H) 10/21/2024       Office Urine Dip  No results found for this or any previous visit (from the past " hour).]

## 2024-11-27 ENCOUNTER — APPOINTMENT (EMERGENCY)
Dept: RADIOLOGY | Facility: HOSPITAL | Age: 69
DRG: 682 | End: 2024-11-27
Payer: MEDICARE

## 2024-11-27 ENCOUNTER — HOSPITAL ENCOUNTER (INPATIENT)
Facility: HOSPITAL | Age: 69
LOS: 6 days | Discharge: NURSING, REHAB OR LONG-TERM CARE | DRG: 682 | End: 2024-12-03
Attending: EMERGENCY MEDICINE | Admitting: INTERNAL MEDICINE
Payer: MEDICARE

## 2024-11-27 ENCOUNTER — PATIENT OUTREACH (OUTPATIENT)
Dept: CASE MANAGEMENT | Facility: OTHER | Age: 69
End: 2024-11-27

## 2024-11-27 ENCOUNTER — PREP FOR PROCEDURE (OUTPATIENT)
Dept: INTERVENTIONAL RADIOLOGY/VASCULAR | Facility: CLINIC | Age: 69
End: 2024-11-27

## 2024-11-27 DIAGNOSIS — C61 MALIGNANT NEOPLASM OF PROSTATE (HCC): Primary | ICD-10-CM

## 2024-11-27 DIAGNOSIS — I10 ESSENTIAL HYPERTENSION: ICD-10-CM

## 2024-11-27 DIAGNOSIS — C64.9 METASTATIC RENAL CELL CARCINOMA (HCC): ICD-10-CM

## 2024-11-27 DIAGNOSIS — E11.10 DKA (DIABETIC KETOACIDOSIS) (HCC): Primary | ICD-10-CM

## 2024-11-27 DIAGNOSIS — R19.00 RETROPERITONEAL MASS: ICD-10-CM

## 2024-11-27 DIAGNOSIS — N18.2 TYPE 2 DIABETES MELLITUS WITH STAGE 2 CHRONIC KIDNEY DISEASE, WITHOUT LONG-TERM CURRENT USE OF INSULIN  (HCC): ICD-10-CM

## 2024-11-27 DIAGNOSIS — C64.2 RENAL CELL CARCINOMA OF LEFT KIDNEY (HCC): ICD-10-CM

## 2024-11-27 DIAGNOSIS — E11.22 TYPE 2 DIABETES MELLITUS WITH STAGE 2 CHRONIC KIDNEY DISEASE, WITHOUT LONG-TERM CURRENT USE OF INSULIN  (HCC): ICD-10-CM

## 2024-11-27 LAB
2HR DELTA HS TROPONIN: 1 NG/L
4HR DELTA HS TROPONIN: 2 NG/L
ALBUMIN SERPL BCG-MCNC: 3.3 G/DL (ref 3.5–5)
ALP SERPL-CCNC: 163 U/L (ref 34–104)
ALT SERPL W P-5'-P-CCNC: 13 U/L (ref 7–52)
ANION GAP SERPL CALCULATED.3IONS-SCNC: 12 MMOL/L (ref 4–13)
ANION GAP SERPL CALCULATED.3IONS-SCNC: 24 MMOL/L (ref 4–13)
ANION GAP SERPL CALCULATED.3IONS-SCNC: 26 MMOL/L (ref 4–13)
ANION GAP SERPL CALCULATED.3IONS-SCNC: 7 MMOL/L (ref 4–13)
AST SERPL W P-5'-P-CCNC: 13 U/L (ref 13–39)
ATRIAL RATE: 92 BPM
ATRIAL RATE: 94 BPM
B-OH-BUTYR SERPL-MCNC: 8.85 MMOL/L (ref 0.02–0.27)
BASE EX.OXY STD BLDV CALC-SCNC: 54.9 % (ref 60–80)
BASE EXCESS BLDV CALC-SCNC: -17.1 MMOL/L
BASOPHILS # BLD MANUAL: 0 THOUSAND/UL (ref 0–0.1)
BASOPHILS NFR MAR MANUAL: 0 % (ref 0–1)
BILIRUB SERPL-MCNC: 0.31 MG/DL (ref 0.2–1)
BUN SERPL-MCNC: 42 MG/DL (ref 5–25)
BUN SERPL-MCNC: 50 MG/DL (ref 5–25)
BUN SERPL-MCNC: 55 MG/DL (ref 5–25)
BUN SERPL-MCNC: 57 MG/DL (ref 5–25)
BURR CELLS BLD QL SMEAR: PRESENT
CALCIUM ALBUM COR SERPL-MCNC: 10.5 MG/DL (ref 8.3–10.1)
CALCIUM SERPL-MCNC: 7.9 MG/DL (ref 8.4–10.2)
CALCIUM SERPL-MCNC: 9 MG/DL (ref 8.4–10.2)
CALCIUM SERPL-MCNC: 9.1 MG/DL (ref 8.4–10.2)
CALCIUM SERPL-MCNC: 9.9 MG/DL (ref 8.4–10.2)
CARDIAC TROPONIN I PNL SERPL HS: 17 NG/L (ref ?–50)
CARDIAC TROPONIN I PNL SERPL HS: 18 NG/L (ref ?–50)
CARDIAC TROPONIN I PNL SERPL HS: 19 NG/L (ref ?–50)
CHLORIDE SERPL-SCNC: 100 MMOL/L (ref 96–108)
CHLORIDE SERPL-SCNC: 105 MMOL/L (ref 96–108)
CHLORIDE SERPL-SCNC: 106 MMOL/L (ref 96–108)
CHLORIDE SERPL-SCNC: 94 MMOL/L (ref 96–108)
CO2 SERPL-SCNC: 15 MMOL/L (ref 21–32)
CO2 SERPL-SCNC: 17 MMOL/L (ref 21–32)
CO2 SERPL-SCNC: 6 MMOL/L (ref 21–32)
CO2 SERPL-SCNC: 9 MMOL/L (ref 21–32)
CREAT SERPL-MCNC: 1.51 MG/DL (ref 0.6–1.3)
CREAT SERPL-MCNC: 1.67 MG/DL (ref 0.6–1.3)
CREAT SERPL-MCNC: 1.9 MG/DL (ref 0.6–1.3)
CREAT SERPL-MCNC: 2.05 MG/DL (ref 0.6–1.3)
EOSINOPHIL # BLD MANUAL: 0 THOUSAND/UL (ref 0–0.4)
EOSINOPHIL NFR BLD MANUAL: 0 % (ref 0–6)
ERYTHROCYTE [DISTWIDTH] IN BLOOD BY AUTOMATED COUNT: 14.7 % (ref 11.6–15.1)
GFR SERPL CREATININE-BSD FRML MDRD: 31 ML/MIN/1.73SQ M
GFR SERPL CREATININE-BSD FRML MDRD: 34 ML/MIN/1.73SQ M
GFR SERPL CREATININE-BSD FRML MDRD: 40 ML/MIN/1.73SQ M
GFR SERPL CREATININE-BSD FRML MDRD: 46 ML/MIN/1.73SQ M
GLUCOSE SERPL-MCNC: 109 MG/DL (ref 65–140)
GLUCOSE SERPL-MCNC: 113 MG/DL (ref 65–140)
GLUCOSE SERPL-MCNC: 127 MG/DL (ref 65–140)
GLUCOSE SERPL-MCNC: 127 MG/DL (ref 65–140)
GLUCOSE SERPL-MCNC: 134 MG/DL (ref 65–140)
GLUCOSE SERPL-MCNC: 147 MG/DL (ref 65–140)
GLUCOSE SERPL-MCNC: 149 MG/DL (ref 65–140)
GLUCOSE SERPL-MCNC: 150 MG/DL (ref 65–140)
GLUCOSE SERPL-MCNC: 165 MG/DL (ref 65–140)
GLUCOSE SERPL-MCNC: 187 MG/DL (ref 65–140)
GLUCOSE SERPL-MCNC: 221 MG/DL (ref 65–140)
GLUCOSE SERPL-MCNC: 308 MG/DL (ref 65–140)
GLUCOSE SERPL-MCNC: 371 MG/DL (ref 65–140)
GLUCOSE SERPL-MCNC: 386 MG/DL (ref 65–140)
GLUCOSE SERPL-MCNC: 414 MG/DL (ref 65–140)
GLUCOSE SERPL-MCNC: 431 MG/DL (ref 65–140)
GLUCOSE SERPL-MCNC: 474 MG/DL (ref 65–140)
GLUCOSE SERPL-MCNC: 500 MG/DL (ref 65–140)
GLUCOSE SERPL-MCNC: 97 MG/DL (ref 65–140)
GLUCOSE SERPL-MCNC: 98 MG/DL (ref 65–140)
HCO3 BLDV-SCNC: 9.5 MMOL/L (ref 24–30)
HCT VFR BLD AUTO: 36.4 % (ref 36.5–49.3)
HGB BLD-MCNC: 11.4 G/DL (ref 12–17)
LACTATE SERPL-SCNC: 1 MMOL/L (ref 0.5–2)
LIPASE SERPL-CCNC: 56 U/L (ref 11–82)
LYMPHOCYTES # BLD AUTO: 0.39 THOUSAND/UL (ref 0.6–4.47)
LYMPHOCYTES # BLD AUTO: 2 % (ref 14–44)
MAGNESIUM SERPL-MCNC: 1.8 MG/DL (ref 1.9–2.7)
MAGNESIUM SERPL-MCNC: 2.1 MG/DL (ref 1.9–2.7)
MAGNESIUM SERPL-MCNC: 2.2 MG/DL (ref 1.9–2.7)
MAGNESIUM SERPL-MCNC: 2.3 MG/DL (ref 1.9–2.7)
MCH RBC QN AUTO: 28.1 PG (ref 26.8–34.3)
MCHC RBC AUTO-ENTMCNC: 31.3 G/DL (ref 31.4–37.4)
MCV RBC AUTO: 90 FL (ref 82–98)
MONOCYTES # BLD AUTO: 0 THOUSAND/UL (ref 0–1.22)
MONOCYTES NFR BLD: 0 % (ref 4–12)
NEUTROPHILS # BLD MANUAL: 19 THOUSAND/UL (ref 1.85–7.62)
NEUTS BAND NFR BLD MANUAL: 6 % (ref 0–8)
NEUTS SEG NFR BLD AUTO: 92 % (ref 43–75)
O2 CT BLDV-SCNC: 9.5 ML/DL
P AXIS: 69 DEGREES
P AXIS: 80 DEGREES
PCO2 BLDV: 25.6 MM HG (ref 42–50)
PH BLDV: 7.19 [PH] (ref 7.3–7.4)
PHOSPHATE SERPL-MCNC: 2.5 MG/DL (ref 2.3–4.1)
PHOSPHATE SERPL-MCNC: 4.6 MG/DL (ref 2.3–4.1)
PHOSPHATE SERPL-MCNC: <1 MG/DL (ref 2.3–4.1)
PLATELET # BLD AUTO: 543 THOUSANDS/UL (ref 149–390)
PLATELET BLD QL SMEAR: ABNORMAL
PMV BLD AUTO: 9.4 FL (ref 8.9–12.7)
PO2 BLDV: 36.4 MM HG (ref 35–45)
POTASSIUM SERPL-SCNC: 4.5 MMOL/L (ref 3.5–5.3)
POTASSIUM SERPL-SCNC: 4.5 MMOL/L (ref 3.5–5.3)
POTASSIUM SERPL-SCNC: 4.7 MMOL/L (ref 3.5–5.3)
POTASSIUM SERPL-SCNC: 5.2 MMOL/L (ref 3.5–5.3)
PR INTERVAL: 132 MS
PR INTERVAL: 136 MS
PROT SERPL-MCNC: 8.2 G/DL (ref 6.4–8.4)
QRS AXIS: 60 DEGREES
QRS AXIS: 72 DEGREES
QRSD INTERVAL: 84 MS
QRSD INTERVAL: 88 MS
QT INTERVAL: 340 MS
QT INTERVAL: 376 MS
QTC INTERVAL: 425 MS
QTC INTERVAL: 464 MS
RBC # BLD AUTO: 4.05 MILLION/UL (ref 3.88–5.62)
RBC MORPH BLD: PRESENT
SODIUM SERPL-SCNC: 129 MMOL/L (ref 135–147)
SODIUM SERPL-SCNC: 130 MMOL/L (ref 135–147)
SODIUM SERPL-SCNC: 130 MMOL/L (ref 135–147)
SODIUM SERPL-SCNC: 132 MMOL/L (ref 135–147)
T WAVE AXIS: 80 DEGREES
T WAVE AXIS: 96 DEGREES
VENTRICULAR RATE: 92 BPM
VENTRICULAR RATE: 94 BPM
WBC # BLD AUTO: 19.39 THOUSAND/UL (ref 4.31–10.16)

## 2024-11-27 PROCEDURE — 82805 BLOOD GASES W/O2 SATURATION: CPT

## 2024-11-27 PROCEDURE — 82948 REAGENT STRIP/BLOOD GLUCOSE: CPT

## 2024-11-27 PROCEDURE — 84484 ASSAY OF TROPONIN QUANT: CPT

## 2024-11-27 PROCEDURE — 83605 ASSAY OF LACTIC ACID: CPT

## 2024-11-27 PROCEDURE — 83519 RIA NONANTIBODY: CPT

## 2024-11-27 PROCEDURE — NC001 PR NO CHARGE: Performed by: INTERNAL MEDICINE

## 2024-11-27 PROCEDURE — 82010 KETONE BODYS QUAN: CPT

## 2024-11-27 PROCEDURE — 83735 ASSAY OF MAGNESIUM: CPT

## 2024-11-27 PROCEDURE — 85027 COMPLETE CBC AUTOMATED: CPT

## 2024-11-27 PROCEDURE — 93010 ELECTROCARDIOGRAM REPORT: CPT | Performed by: STUDENT IN AN ORGANIZED HEALTH CARE EDUCATION/TRAINING PROGRAM

## 2024-11-27 PROCEDURE — 99291 CRITICAL CARE FIRST HOUR: CPT

## 2024-11-27 PROCEDURE — 71045 X-RAY EXAM CHEST 1 VIEW: CPT

## 2024-11-27 PROCEDURE — 99223 1ST HOSP IP/OBS HIGH 75: CPT | Performed by: INTERNAL MEDICINE

## 2024-11-27 PROCEDURE — 80053 COMPREHEN METABOLIC PANEL: CPT

## 2024-11-27 PROCEDURE — 83735 ASSAY OF MAGNESIUM: CPT | Performed by: NURSE PRACTITIONER

## 2024-11-27 PROCEDURE — 99291 CRITICAL CARE FIRST HOUR: CPT | Performed by: EMERGENCY MEDICINE

## 2024-11-27 PROCEDURE — 85007 BL SMEAR W/DIFF WBC COUNT: CPT

## 2024-11-27 PROCEDURE — 36415 COLL VENOUS BLD VENIPUNCTURE: CPT

## 2024-11-27 PROCEDURE — 83690 ASSAY OF LIPASE: CPT

## 2024-11-27 PROCEDURE — 80048 BASIC METABOLIC PNL TOTAL CA: CPT | Performed by: NURSE PRACTITIONER

## 2024-11-27 PROCEDURE — 84100 ASSAY OF PHOSPHORUS: CPT | Performed by: NURSE PRACTITIONER

## 2024-11-27 PROCEDURE — 86337 INSULIN ANTIBODIES: CPT

## 2024-11-27 PROCEDURE — 96360 HYDRATION IV INFUSION INIT: CPT

## 2024-11-27 PROCEDURE — 93005 ELECTROCARDIOGRAM TRACING: CPT

## 2024-11-27 RX ORDER — INSULIN LISPRO 100 [IU]/ML
2-12 INJECTION, SOLUTION INTRAVENOUS; SUBCUTANEOUS
Status: DISCONTINUED | OUTPATIENT
Start: 2024-11-27 | End: 2024-12-03 | Stop reason: HOSPADM

## 2024-11-27 RX ORDER — METRONIDAZOLE 500 MG/100ML
500 INJECTION, SOLUTION INTRAVENOUS EVERY 8 HOURS
Status: DISCONTINUED | OUTPATIENT
Start: 2024-11-27 | End: 2024-11-28

## 2024-11-27 RX ORDER — SODIUM CHLORIDE, SODIUM LACTATE, POTASSIUM CHLORIDE, CALCIUM CHLORIDE 600; 310; 30; 20 MG/100ML; MG/100ML; MG/100ML; MG/100ML
250 INJECTION, SOLUTION INTRAVENOUS CONTINUOUS
Status: DISPENSED | OUTPATIENT
Start: 2024-11-27 | End: 2024-11-27

## 2024-11-27 RX ORDER — FOLIC ACID 1 MG/1
1 TABLET ORAL DAILY
Status: DISCONTINUED | OUTPATIENT
Start: 2024-11-27 | End: 2024-12-03 | Stop reason: HOSPADM

## 2024-11-27 RX ORDER — HEPARIN SODIUM 5000 [USP'U]/ML
5000 INJECTION, SOLUTION INTRAVENOUS; SUBCUTANEOUS EVERY 8 HOURS SCHEDULED
Status: DISCONTINUED | OUTPATIENT
Start: 2024-11-27 | End: 2024-12-03 | Stop reason: HOSPADM

## 2024-11-27 RX ORDER — AMOXICILLIN 250 MG
1 CAPSULE ORAL 2 TIMES DAILY PRN
Status: DISCONTINUED | OUTPATIENT
Start: 2024-11-27 | End: 2024-12-01

## 2024-11-27 RX ORDER — AMLODIPINE BESYLATE 10 MG/1
10 TABLET ORAL DAILY
Status: DISCONTINUED | OUTPATIENT
Start: 2024-11-27 | End: 2024-11-29

## 2024-11-27 RX ORDER — DRONABINOL 2.5 MG/1
5 CAPSULE ORAL
Status: DISCONTINUED | OUTPATIENT
Start: 2024-11-27 | End: 2024-12-03 | Stop reason: HOSPADM

## 2024-11-27 RX ORDER — ATORVASTATIN CALCIUM 40 MG/1
40 TABLET, FILM COATED ORAL
Status: DISCONTINUED | OUTPATIENT
Start: 2024-11-27 | End: 2024-12-03 | Stop reason: HOSPADM

## 2024-11-27 RX ORDER — POTASSIUM CHLORIDE 14.9 MG/ML
20 INJECTION INTRAVENOUS ONCE
Status: COMPLETED | OUTPATIENT
Start: 2024-11-27 | End: 2024-11-27

## 2024-11-27 RX ORDER — ACETAMINOPHEN 325 MG/1
975 TABLET ORAL EVERY 6 HOURS PRN
Status: DISCONTINUED | OUTPATIENT
Start: 2024-11-27 | End: 2024-12-03 | Stop reason: HOSPADM

## 2024-11-27 RX ORDER — INSULIN GLARGINE 100 [IU]/ML
20 INJECTION, SOLUTION SUBCUTANEOUS
Status: DISCONTINUED | OUTPATIENT
Start: 2024-11-27 | End: 2024-11-28

## 2024-11-27 RX ORDER — HEPARIN SODIUM 1000 [USP'U]/ML
5000 INJECTION, SOLUTION INTRAVENOUS; SUBCUTANEOUS EVERY 8 HOURS
Status: DISCONTINUED | OUTPATIENT
Start: 2024-11-27 | End: 2024-11-27

## 2024-11-27 RX ORDER — MAGNESIUM SULFATE HEPTAHYDRATE 40 MG/ML
2 INJECTION, SOLUTION INTRAVENOUS ONCE
Status: COMPLETED | OUTPATIENT
Start: 2024-11-27 | End: 2024-11-28

## 2024-11-27 RX ORDER — INSULIN LISPRO 100 [IU]/ML
1-5 INJECTION, SOLUTION INTRAVENOUS; SUBCUTANEOUS
Status: DISCONTINUED | OUTPATIENT
Start: 2024-11-27 | End: 2024-12-03 | Stop reason: HOSPADM

## 2024-11-27 RX ORDER — CALCIUM GLUCONATE 20 MG/ML
1 INJECTION, SOLUTION INTRAVENOUS ONCE
Status: COMPLETED | OUTPATIENT
Start: 2024-11-27 | End: 2024-11-28

## 2024-11-27 RX ORDER — SODIUM CHLORIDE, SODIUM LACTATE, POTASSIUM CHLORIDE, CALCIUM CHLORIDE 600; 310; 30; 20 MG/100ML; MG/100ML; MG/100ML; MG/100ML
500 INJECTION, SOLUTION INTRAVENOUS CONTINUOUS
Status: DISPENSED | OUTPATIENT
Start: 2024-11-27 | End: 2024-11-27

## 2024-11-27 RX ORDER — DEXTROSE MONOHYDRATE AND SODIUM CHLORIDE 5; .45 G/100ML; G/100ML
250 INJECTION, SOLUTION INTRAVENOUS CONTINUOUS
Status: DISCONTINUED | OUTPATIENT
Start: 2024-11-27 | End: 2024-11-27

## 2024-11-27 RX ORDER — CHLORHEXIDINE GLUCONATE ORAL RINSE 1.2 MG/ML
15 SOLUTION DENTAL EVERY 12 HOURS SCHEDULED
Status: DISCONTINUED | OUTPATIENT
Start: 2024-11-27 | End: 2024-12-03 | Stop reason: HOSPADM

## 2024-11-27 RX ORDER — POTASSIUM CHLORIDE 14.9 MG/ML
20 INJECTION INTRAVENOUS ONCE
Status: COMPLETED | OUTPATIENT
Start: 2024-11-27 | End: 2024-11-28

## 2024-11-27 RX ORDER — MAGNESIUM HYDROXIDE/ALUMINUM HYDROXICE/SIMETHICONE 120; 1200; 1200 MG/30ML; MG/30ML; MG/30ML
30 SUSPENSION ORAL EVERY 4 HOURS PRN
Status: DISCONTINUED | OUTPATIENT
Start: 2024-11-27 | End: 2024-12-03 | Stop reason: HOSPADM

## 2024-11-27 RX ADMIN — MAGNESIUM SULFATE HEPTAHYDRATE 2 G: 40 INJECTION, SOLUTION INTRAVENOUS at 11:23

## 2024-11-27 RX ADMIN — SODIUM PHOSPHATE, MONOBASIC, MONOHYDRATE AND SODIUM PHOSPHATE, DIBASIC, ANHYDROUS 30 MMOL: 142; 276 INJECTION, SOLUTION INTRAVENOUS at 16:26

## 2024-11-27 RX ADMIN — POTASSIUM CHLORIDE 20 MEQ: 14.9 INJECTION, SOLUTION INTRAVENOUS at 11:24

## 2024-11-27 RX ADMIN — SODIUM CHLORIDE 1000 ML: 0.9 INJECTION, SOLUTION INTRAVENOUS at 00:43

## 2024-11-27 RX ADMIN — SODIUM CHLORIDE 6.9 UNITS/HR: 9 INJECTION, SOLUTION INTRAVENOUS at 02:28

## 2024-11-27 RX ADMIN — INSULIN LISPRO 1 UNITS: 100 INJECTION, SOLUTION INTRAVENOUS; SUBCUTANEOUS at 22:27

## 2024-11-27 RX ADMIN — SODIUM CHLORIDE, SODIUM LACTATE, POTASSIUM CHLORIDE, AND CALCIUM CHLORIDE 250 ML/HR: .6; .31; .03; .02 INJECTION, SOLUTION INTRAVENOUS at 11:39

## 2024-11-27 RX ADMIN — SODIUM CHLORIDE, SODIUM LACTATE, POTASSIUM CHLORIDE, AND CALCIUM CHLORIDE 1000 ML: .6; .31; .03; .02 INJECTION, SOLUTION INTRAVENOUS at 11:24

## 2024-11-27 RX ADMIN — SODIUM CHLORIDE 27.6 UNITS/HR: 9 INJECTION, SOLUTION INTRAVENOUS at 07:49

## 2024-11-27 RX ADMIN — INSULIN GLARGINE 20 UNITS: 100 INJECTION, SOLUTION SUBCUTANEOUS at 22:27

## 2024-11-27 RX ADMIN — SODIUM CHLORIDE, SODIUM LACTATE, POTASSIUM CHLORIDE, AND CALCIUM CHLORIDE 250 ML/HR: .6; .31; .03; .02 INJECTION, SOLUTION INTRAVENOUS at 07:49

## 2024-11-27 RX ADMIN — METRONIDAZOLE 500 MG: 500 INJECTION, SOLUTION INTRAVENOUS at 19:44

## 2024-11-27 RX ADMIN — DEXTROSE AND SODIUM CHLORIDE 250 ML/HR: 5; .45 INJECTION, SOLUTION INTRAVENOUS at 14:54

## 2024-11-27 RX ADMIN — METRONIDAZOLE 500 MG: 500 INJECTION, SOLUTION INTRAVENOUS at 12:30

## 2024-11-27 RX ADMIN — AMLODIPINE BESYLATE 10 MG: 10 TABLET ORAL at 08:49

## 2024-11-27 RX ADMIN — SODIUM CHLORIDE, SODIUM LACTATE, POTASSIUM CHLORIDE, AND CALCIUM CHLORIDE 500 ML/HR: .6; .31; .03; .02 INJECTION, SOLUTION INTRAVENOUS at 04:39

## 2024-11-27 RX ADMIN — DRONABINOL 5 MG: 2.5 CAPSULE ORAL at 16:18

## 2024-11-27 RX ADMIN — INSULIN HUMAN 6.85 UNITS: 100 INJECTION, SOLUTION PARENTERAL at 02:27

## 2024-11-27 RX ADMIN — HEPARIN SODIUM 5000 UNITS: 5000 INJECTION INTRAVENOUS; SUBCUTANEOUS at 22:28

## 2024-11-27 RX ADMIN — FOLIC ACID 1 MG: 1 TABLET ORAL at 08:49

## 2024-11-27 RX ADMIN — DRONABINOL 5 MG: 2.5 CAPSULE ORAL at 08:49

## 2024-11-27 RX ADMIN — CHLORHEXIDINE GLUCONATE 15 ML: 1.2 RINSE ORAL at 20:00

## 2024-11-27 RX ADMIN — DEXTROSE AND SODIUM CHLORIDE 250 ML/HR: 5; .45 INJECTION, SOLUTION INTRAVENOUS at 06:31

## 2024-11-27 RX ADMIN — ATORVASTATIN CALCIUM 40 MG: 40 TABLET, FILM COATED ORAL at 16:17

## 2024-11-27 RX ADMIN — HEPARIN SODIUM 5000 UNITS: 5000 INJECTION INTRAVENOUS; SUBCUTANEOUS at 14:39

## 2024-11-27 RX ADMIN — CHLORHEXIDINE GLUCONATE 15 ML: 1.2 RINSE ORAL at 08:49

## 2024-11-27 RX ADMIN — SODIUM CHLORIDE, SODIUM LACTATE, POTASSIUM CHLORIDE, AND CALCIUM CHLORIDE 500 ML/HR: .6; .31; .03; .02 INJECTION, SOLUTION INTRAVENOUS at 02:13

## 2024-11-27 RX ADMIN — POTASSIUM CHLORIDE 20 MEQ: 14.9 INJECTION, SOLUTION INTRAVENOUS at 05:35

## 2024-11-27 RX ADMIN — CALCIUM GLUCONATE 1 G: 20 INJECTION, SOLUTION INTRAVENOUS at 16:17

## 2024-11-27 RX ADMIN — SODIUM CHLORIDE, SODIUM LACTATE, POTASSIUM CHLORIDE, AND CALCIUM CHLORIDE 250 ML/HR: .6; .31; .03; .02 INJECTION, SOLUTION INTRAVENOUS at 06:21

## 2024-11-27 RX ADMIN — DEXTROSE AND SODIUM CHLORIDE 250 ML/HR: 5; .45 INJECTION, SOLUTION INTRAVENOUS at 10:39

## 2024-11-27 NOTE — H&P
H&P - Critical Care/ICU   Name: Alejandro Adames 69 y.o. male I MRN: [id number]  Unit/Bed#: ED-14 I Date of Admission: 11/27/2024   Date of Service: 11/27/2024 I Hospital Day: 0       Assessment & Plan  DKA (diabetic ketoacidosis) (HCC)  Lab Results   Component Value Date    HGBA1C 9.8 (H) 08/28/2024       Recent Labs     11/27/24  0016   POCGLU 474*       Blood Sugar Average: Last 72 hrs:    The patient presented with DKA as evidenced by his elevated blood sugar, beta-hydroxybutyrate and presence of acidosis  We will continue him on the DKA protocol  We will replete his electrolytes as needed  Once his acidosis is corrected and his gap is closed we can consider a transition to basal-bolus insulin  He may benefit from an endocrinology consult as it appears as if his oncologist was considering discontinuing his Ozempic due to his appetite suppression    JULIO (acute kidney injury) (HCC)  Likely related to volume depletion in the setting of his DKA  We will monitor his renal indices and urine output closely  Metastatic renal cell carcinoma to bone (HCC)  Recently diagnosed with Renal Cell Cancer  Followed by heme-Onc  On Keytruda for prostate cancer- followed at [facility]  Will continue his Marinol for appetite stimulation once we can resume a PO diet  Essential hypertension  Currently not on any medications for BP control as an outpatient  Our goal will be to maintain a MAP > 65  Type 2 diabetes mellitus with kidney complication, without long-term current use of insulin (HCC)  Lab Results   Component Value Date    HGBA1C 9.8 (H) 08/28/2024       Recent Labs     11/27/24  0016   POCGLU 474*       Blood Sugar Average: Last 72 hrs:    He is on Ozempic and metformin as an outpatient    Disposition: Stepdown Level 1    History of Present Illness   Alejandro Adames is a 69 y.o. who presents with a complaint of nausea, vomiting and an elevated blood sugar. He was found to be in DKA. He denies fever or chills. He denies abdominal  pain but has had nausea and vomiting which began yesterday    History obtained from chart review and the patient.  Review of Systems: Review of Systems   All other systems reviewed and are negative.      Historical Information   Past Medical History:  1/2/2019: Cutaneous skin tags  No date: Diabetes mellitus (HCC)  No date: Hypertension Past Surgical History:  10/19/2024: IR BIOPSY INGUINAL LYMPH NODE  11/19/2024: IR NEPHROSTOMY TO NEPHROURETERAL STENT  10/19/2024: IR NEPHROSTOMY TUBE PLACEMENT   Current Outpatient Medications   Medication Instructions    amLODIPine (NORVASC) 10 mg, Oral, Daily    atorvastatin (LIPITOR) 40 mg, Oral, Daily    BD PosiFlush 0.9 % SOLN 10 mL, Intracatheter, Daily    dronabinol (MARINOL) 5 mg, Oral, 2 times daily before meals    folic acid (FOLVITE) 1 mg, Oral, Daily    glucose blood (OneTouch Verio) test strip Check blood sugars once daily. Please substitute with appropriate alternative as covered by patient's insurance. Dx: E11.65    Lenvima (20 MG Daily Dose) 20 mg, Oral, Daily    metFORMIN (GLUCOPHAGE) 1,000 mg, Oral, 2 times daily with meals    ondansetron (ZOFRAN-ODT) 8 mg, Oral, Every 8 hours PRN    OneTouch Delica Lancets 33G MISC Check blood sugars once daily. Please substitute with appropriate alternative as covered by patient's insurance. Dx: E11.65    oxyCODONE (ROXICODONE) 2.5 mg, Oral, Every 8 hours PRN    semaglutide (2 mg/dose) (OZEMPIC (2 MG/DOSE)) 2 mg, Subcutaneous, Every 7 days    senna-docusate sodium (SENOKOT S) 8.6-50 mg per tablet 1 tablet, Oral, 2 times daily PRN    sodium chloride, PF, 0.9 % 10 mL, Intracatheter, Daily, Intracatheter flushing daily. May substitute prefilled syringe with normal saline 10 mL vials, 10 mL syringes, and 18 g blunt needles    triamcinolone (KENALOG) 0.1 % cream APPLY TOPICALLY TO THE AFFECTED AREA TWICE DAILY    Allergies   Allergen Reactions    Captopril Cough    Crestor [Rosuvastatin] Diarrhea    Enalapril Cough      Social History      Tobacco Use    Smoking status: Former    Smokeless tobacco: Never   Vaping Use    Vaping status: Never Used   Substance Use Topics    Alcohol use: Yes     Alcohol/week: 2.0 - 3.0 standard drinks of alcohol     Types: 2 - 3 Cans of beer per week     Comment: social    Drug use: No    Family History   Problem Relation Age of Onset    No Known Problems Brother     Stroke Mother           Objective :                   Vitals I/O      Most Recent Min/Max in 24hrs   Temp 97.6 °F (36.4 °C) Temp  Min: 97.6 °F (36.4 °C)  Max: 97.6 °F (36.4 °C)   Pulse 98 Pulse  Min: 98  Max: 98   Resp 20 Resp  Min: 20  Max: 20   /89 BP  Min: 166/89  Max: 166/89   O2 Sat 99 % SpO2  Min: 99 %  Max: 99 %    No intake or output data in the 24 hours ending 11/27/24 0206    No diet orders on file    Invasive Monitoring           Physical Exam   Physical Exam  Eyes:      Pupils: Pupils are equal, round, and reactive to light.   Skin:     General: Skin is warm and dry.      Coloration: Skin is pale.   HENT:      Head: Normocephalic.      Mouth/Throat:      Mouth: Mucous membranes are dry.   Cardiovascular:      Rate and Rhythm: Normal rate.   Musculoskeletal:         General: No swelling.   Abdominal:      Palpations: Abdomen is soft.      Tenderness: There is no abdominal tenderness.   Constitutional:       Appearance: He is ill-appearing.   Pulmonary:      Effort: Pulmonary effort is normal.      Breath sounds: Normal breath sounds.   Neurological:      General: No focal deficit present.      Mental Status: He is alert.      Motor: gross motor function is at baseline for patient.          Diagnostic Studies      Lab Results: I have reviewed the following results:     Medications:  Scheduled PRN   insulin regular, 0.1 Units/kg, Once          Continuous    insulin regular (HumuLIN R,NovoLIN R) 1 Units/mL in sodium chloride 0.9 % 100 mL infusion, 0.1-30 Units/hr  lactated ringers, 500 mL/hr   Followed by  lactated ringers, 250 mL/hr          Labs:   CBC    Recent Labs     11/27/24 0042   WBC 19.39*   HGB 11.4*   HCT 36.4*   *     BMP    Recent Labs     11/27/24 0042   SODIUM 129*   K 5.2   CL 94*   CO2 9*   AGAP 26*   BUN 57*   CREATININE 2.05*   CALCIUM 9.9       Coags    No recent results     Additional Electrolytes  Recent Labs     11/27/24 0042   MG 2.3          Blood Gas    No recent results  Recent Labs     11/27/24 0104   PHVEN 7.189*   JII5MIR 25.6*   PO2VEN 36.4   KME8LEE 9.5*   BEVEN -17.1   W3TEOJD 54.9*    LFTs  Recent Labs     11/27/24 0042   ALT 13   AST 13   ALKPHOS 163*   ALB 3.3*   TBILI 0.31       Infectious  No recent results  Glucose  Recent Labs     11/27/24 0042   GLUC 500*

## 2024-11-27 NOTE — CONSULTS
Consultation - Endocrinology   Name: Alejandro Adames 69 y.o. male I MRN: [id number]  Unit/Bed#: ICU 07 I Date of Admission: 11/27/2024   Date of Service: 11/27/2024 I Hospital Day: 0  Inpatient consult to Endocrinology  Consult performed by: Sydni Bose MD  Consult ordered by: Ike Stein Jr., DO        Physician Requesting Evaluation: Silvino Groves DO   Reason for Evaluation / Principal Problem:      Assessment & Plan  DKA (diabetic ketoacidosis) (HCC)  Lab Results   Component Value Date    HGBA1C 9.8 (H) 08/28/2024       Recent Labs     11/27/24  0824 11/27/24  0927 11/27/24  1031 11/27/24  1132   POCGLU 113 98 127 165*       Blood Sugar Average: Last 72 hrs:  (P) 272.2    Type 2 diabetes mellitus with kidney complication, without long-term current use of insulin (HCC)  Lab Results   Component Value Date    HGBA1C 9.8 (H) 08/28/2024       Recent Labs     11/27/24  0824 11/27/24  0927 11/27/24  1031 11/27/24  1132   POCGLU 113 98 127 165*       Currently being treated for DKA. Given exposure to pembrolizumab, he has risk for autoimmune diabetes. This is especially concerning given the DKA. Please check PAULA 65, IA2, and insulin antibodies. When ready to transition off the insulin infusion, can use a weight based dosing strategy of 0.3-0.4units per KG given CKD. Recommend stopping all other diabetes agents for now.  References in the chart are made to prior use of metformin and Trulicity, but other GLPs also referenced.    JULIO (acute kidney injury) (HCC)    I have discussed the above management plan in detail with the primary service.     History of Present Illness   Alejandro Adames is a 69 y.o. male seen in consultation for DKA. He has a known history of T2DM but also renal cell carcinoma on pembrolizumab therapy (1st dose 11/2024).    He is currently lethargic, and much of the history is obtained from the chart.   T2DM previously managed by his PCP, Dr. Tellez.   Of note, was admitted in Oct 2024  to [facility]. Per d/c summary, includin therapy was discussed but he was reluctant to use this.   Symptoms prior to the current admission include nausea , vomiting, and unintentional weight loss. He denies blurry vision. Appetite has been poor.  On admission, serum glucose was 500, serum CO2 9, venous pH 7.189, BOHB 8.85,   Currently on DKA protocol and IV insulin.       Review of Systems   Constitutional:  Positive for appetite change and unexpected weight change.   Eyes:  Negative for visual disturbance.   Neurological:  Positive for weakness.   ROS limited by patient's somnolence    I have reviewed the patient's PMH, PSH, Social History, Family History, Meds, and Allergies  Historical Information   Past Medical History:   Diagnosis Date    Cutaneous skin tags 1/2/2019    Diabetes mellitus (HCC)     Hypertension      Past Surgical History:   Procedure Laterality Date    IR BIOPSY INGUINAL LYMPH NODE  10/19/2024    IR NEPHROSTOMY TO NEPHROURETERAL STENT  11/19/2024    IR NEPHROSTOMY TUBE PLACEMENT  10/19/2024     Social History     Tobacco Use    Smoking status: Former    Smokeless tobacco: Never   Vaping Use    Vaping status: Never Used   Substance and Sexual Activity    Alcohol use: Yes     Alcohol/week: 2.0 - 3.0 standard drinks of alcohol     Types: 2 - 3 Cans of beer per week     Comment: social    Drug use: No    Sexual activity: Not Currently     E-Cigarette/Vaping    E-Cigarette Use Never User      E-Cigarette/Vaping Substances     Family History   Problem Relation Age of Onset    No Known Problems Brother     Stroke Mother        Objective :  Temp:  [97.6 °F (36.4 °C)-100.2 °F (37.9 °C)] 100.2 °F (37.9 °C)  HR:  [] 115  BP: (151-174)/(68-89) 153/68  Resp:  [18-30] 25  SpO2:  [96 %-100 %] 97 %  O2 Device: None (Room air)    Physical Exam  Constitutional:       Appearance: He is ill-appearing.   HENT:      Head: Normocephalic and atraumatic.      Nose: Nose normal.      Mouth/Throat:      Mouth:  Mucous membranes are dry.   Eyes:      General: Lids are normal.   Pulmonary:      Effort: Pulmonary effort is normal.   Abdominal:      General: Abdomen is flat.   Skin:     General: Skin is warm and dry.   Neurological:      Mental Status: He is lethargic.      Motor: No tremor.   Psychiatric:         Behavior: Behavior is cooperative.             Lab Results: I have reviewed the following results:  Lab Results   Component Value Date    HGBA1C 9.8 (H) 08/28/2024     Lab Results   Component Value Date    SODIUM 132 (L) 11/27/2024    K 4.5 11/27/2024     11/27/2024    CO2 15 (L) 11/27/2024    BUN 50 (H) 11/27/2024    CREATININE 1.67 (H) 11/27/2024    GLUC 97 11/27/2024    CALCIUM 9.0 11/27/2024                 Administrative Statements

## 2024-11-27 NOTE — ED PROVIDER NOTES
Time reflects when diagnosis was documented in both MDM as applicable and the Disposition within this note       Time User Action Codes Description Comment    11/27/2024  1:39 AM Ancelmo Baxter Add [E11.10] DKA (diabetic ketoacidosis) (HCC)     11/27/2024  1:46 AM Ancelmo Baxter Add [C64.9] Metastatic renal cell carcinoma (HCC)           ED Disposition       ED Disposition   Admit    Condition   Stable    Date/Time   Wed Nov 27, 2024  1:46 AM    Comment   Case was discussed with ICU and the patient's admission status was agreed to be Admission Status: inpatient status to the service of Dr. Pierre .               Assessment & Plan       Medical Decision Making  Concern for worsening metastatic disease versus failure to thrive versus infection versus DKA versus HHS.  Will obtain labs to look for evidence of such.  Will also give fluids at this time.    Labs concerning for DKA will treat with insulin and fluids.  Additionally he has an JULIO which is going to be treated with fluids already.  Contacted ICU for admission.  Patient accepted to the ICU.    Amount and/or Complexity of Data Reviewed  External Data Reviewed: labs, radiology, ECG and notes.  Labs: ordered. Decision-making details documented in ED Course.  Radiology: ordered.    Risk  OTC drugs.  Prescription drug management.  Decision regarding hospitalization.        ED Course as of 11/27/24 0147   Wed Nov 27, 2024   0051 WBC(!): 19.39   0051 Hemoglobin(!): 11.4   0119 pH, Can(!!): 7.189   0131 Beta- Hydroxybutyrate(!): 8.85   0136 Creatinine(!): 2.05   0136 ANION GAP(!): 26   0136 GLUCOSE(!!): 500       Medications   lactated ringers infusion (has no administration in time range)     Followed by   lactated ringers infusion (has no administration in time range)   insulin regular (HumuLIN R,NovoLIN R) injection 6.85 Units (has no administration in time range)   insulin regular (HumuLIN R,NovoLIN R) 1 Units/mL in sodium chloride 0.9 % 100 mL infusion (has no  administration in time range)   sodium chloride 0.9 % bolus 1,000 mL (1,000 mL Intravenous New Bag 11/27/24 0043)       ED Risk Strat Scores                           SBIRT 20yo+      Flowsheet Row Most Recent Value   Initial Alcohol Screen: US AUDIT-C     1. How often do you have a drink containing alcohol? 0 Filed at: 11/27/2024 0016   2. How many drinks containing alcohol do you have on a typical day you are drinking?  0 Filed at: 11/27/2024 0016   3a. Male UNDER 65: How often do you have five or more drinks on one occasion? 0 Filed at: 11/27/2024 0016   3b. FEMALE Any Age, or MALE 65+: How often do you have 4 or more drinks on one occassion? 0 Filed at: 11/27/2024 0016   Audit-C Score 0 Filed at: 11/27/2024 0016   KRISTEN: How many times in the past year have you...    Used an illegal drug or used a prescription medication for non-medical reasons? Never Filed at: 11/27/2024 0016                            History of Present Illness       Chief Complaint   Patient presents with    Weakness - Generalized     Pt arriving via EMS. Reports an inability to eat, fatigue, chest discomfort, ambulatory dysfunction and sugars above 500. Pt reports having Ozempic dose on Saturday, after not taking it for a couple of months.        Past Medical History:   Diagnosis Date    Cutaneous skin tags 1/2/2019    Diabetes mellitus (HCC)     Hypertension       Past Surgical History:   Procedure Laterality Date    IR BIOPSY INGUINAL LYMPH NODE  10/19/2024    IR NEPHROSTOMY TO NEPHROURETERAL STENT  11/19/2024    IR NEPHROSTOMY TUBE PLACEMENT  10/19/2024      Family History   Problem Relation Age of Onset    No Known Problems Brother     Stroke Mother       Social History     Tobacco Use    Smoking status: Former    Smokeless tobacco: Never   Vaping Use    Vaping status: Never Used   Substance Use Topics    Alcohol use: Yes     Alcohol/week: 2.0 - 3.0 standard drinks of alcohol     Types: 2 - 3 Cans of beer per week     Comment: social     Drug use: No      E-Cigarette/Vaping    E-Cigarette Use Never User       E-Cigarette/Vaping Substances      I have reviewed and agree with the history as documented.     69-year-old male past medical history of metastatic renal cell carcinoma, diabetes, hypertension presenting to the emergency department for feeling generally unwell.  Patient states that he has been feeling generally tired and unwell especially in the last week.  He states that he is getting Keytruda injections as chemotherapy.  He states that lately he has been so fatigued he cannot go up the stairs can drink water does not feel like eating or drinking because everything tastes awful.  He states that he has been losing weight for the last several months.  He denies any pain just generalized weakness and fatigue.  He states that he does not have any headache, dizziness, chest pain, shortness of breath, nausea, vomiting, diarrhea.  States that he has also been taking Ozempic to control his diabetes does not take insulin.  States that he has not been eating or drinking really for the last several days and is unable to check his blood sugar at home because his fingers are so cold he is not able to get enough blood to run through the fingerstick glucometer.  Patient denies any recent travel or sick contacts.  Denies any changes to his medication.        Review of Systems        Objective       ED Triage Vitals   Temperature Pulse Blood Pressure Respirations SpO2 Patient Position - Orthostatic VS   11/27/24 0041 11/27/24 0014 11/27/24 0014 11/27/24 0014 11/27/24 0014 11/27/24 0014   97.6 °F (36.4 °C) 98 166/89 20 99 % Sitting      Temp Source Heart Rate Source BP Location FiO2 (%) Pain Score    11/27/24 0041 11/27/24 0014 11/27/24 0014 -- --    Oral Monitor Right arm        Vitals      Date and Time Temp Pulse SpO2 Resp BP Pain Score FACES Pain Rating User   11/27/24 0041 97.6 °F (36.4 °C) -- -- -- -- -- -- AS   11/27/24 0014 -- 98 99 % 20 166/89 -- --  AS            Physical Exam  Vitals and nursing note reviewed.   Constitutional:       Appearance: He is well-developed. He is ill-appearing.      Comments: Cachectic   HENT:      Head: Normocephalic and atraumatic.      Nose: Nose normal.      Mouth/Throat:      Mouth: Mucous membranes are dry.      Pharynx: No oropharyngeal exudate or posterior oropharyngeal erythema.   Eyes:      Extraocular Movements: Extraocular movements intact.      Conjunctiva/sclera: Conjunctivae normal.      Pupils: Pupils are equal, round, and reactive to light.   Cardiovascular:      Rate and Rhythm: Regular rhythm. Tachycardia present.      Pulses: Normal pulses.      Heart sounds: Normal heart sounds.   Pulmonary:      Effort: Pulmonary effort is normal.      Breath sounds: Normal breath sounds.   Abdominal:      General: Bowel sounds are normal. There is no distension.      Palpations: Abdomen is soft.      Tenderness: There is no abdominal tenderness. There is no guarding or rebound.   Musculoskeletal:         General: Normal range of motion.      Cervical back: Normal range of motion and neck supple.   Skin:     General: Skin is warm and dry.      Capillary Refill: Capillary refill takes less than 2 seconds.   Neurological:      General: No focal deficit present.      Mental Status: He is alert and oriented to person, place, and time.      Cranial Nerves: No cranial nerve deficit.         Results Reviewed       Procedure Component Value Units Date/Time    HS Troponin 0hr (reflex protocol) [[id number]]     Lab Status: No result Specimen: Blood     Comprehensive metabolic panel [[id number]]  (Abnormal) Collected: 11/27/24 0042    Lab Status: Final result Specimen: Blood from Arm, Right Updated: 11/27/24 0132     Sodium 129 mmol/L      Potassium 5.2 mmol/L      Chloride 94 mmol/L      CO2 9 mmol/L      ANION GAP 26 mmol/L      BUN 57 mg/dL      Creatinine 2.05 mg/dL      Glucose 500 mg/dL      Calcium 9.9 mg/dL      Corrected Calcium  10.5 mg/dL      AST 13 U/L      ALT 13 U/L      Alkaline Phosphatase 163 U/L      Total Protein 8.2 g/dL      Albumin 3.3 g/dL      Total Bilirubin 0.31 mg/dL      eGFR 31 ml/min/1.73sq m     Narrative:      [employer] guidelines for Chronic Kidney Disease (CKD):     Stage 1 with normal or high GFR (GFR > 90 mL/min/1.73 square meters)    Stage 2 Mild CKD (GFR = 60-89 mL/min/1.73 square meters)    Stage 3A Moderate CKD (GFR = 45-59 mL/min/1.73 square meters)    Stage 3B Moderate CKD (GFR = 30-44 mL/min/1.73 square meters)    Stage 4 Severe CKD (GFR = 15-29 mL/min/1.73 square meters)    Stage 5 End Stage CKD (GFR <15 mL/min/1.73 square meters)  Note: GFR calculation is accurate only with a steady state creatinine    Lipase [[id number]]  (Normal) Collected: 11/27/24 0042    Lab Status: Final result Specimen: Blood from Arm, Right Updated: 11/27/24 0129     Lipase 56 u/L     Magnesium [[id number]]  (Normal) Collected: 11/27/24 0042    Lab Status: Final result Specimen: Blood from Arm, Right Updated: 11/27/24 0129     Magnesium 2.3 mg/dL     Beta Hydroxybutyrate [[id number]]  (Abnormal) Collected: 11/27/24 0042    Lab Status: Final result Specimen: Blood from Arm, Right Updated: 11/27/24 0129     Beta- Hydroxybutyrate 8.85 mmol/L     Blood gas, venous [[id number]]  (Abnormal) Collected: 11/27/24 0104    Lab Status: Final result Specimen: Blood from Arm, Right Updated: 11/27/24 0113     pH, Can 7.189     pCO2, Can 25.6 mm Hg      pO2, Can 36.4 mm Hg      HCO3, Can 9.5 mmol/L      Base Excess, Can -17.1 mmol/L      O2 Content, Can 9.5 ml/dL      O2 HGB, VENOUS 54.9 %     Lactic acid, plasma (w/reflex if result > 2.0) [[id number]]  (Normal) Collected: 11/27/24 0042    Lab Status: Final result Specimen: Blood from Arm, Right Updated: 11/27/24 0111     LACTIC ACID 1.0 mmol/L     Narrative:      Result may be elevated if tourniquet was used during collection.    CBC and differential [[id number]]  (Abnormal)  Collected: 11/27/24 0042    Lab Status: Final result Specimen: Blood from Arm, Right Updated: 11/27/24 0050     WBC 19.39 Thousand/uL      RBC 4.05 Million/uL      Hemoglobin 11.4 g/dL      Hematocrit 36.4 %      MCV 90 fL      MCH 28.1 pg      MCHC 31.3 g/dL      RDW 14.7 %      MPV 9.4 fL      Platelets 543 Thousands/uL     Narrative:      This is an appended report.  These results have been appended to a previously verified report.    Manual Differential(PHLEBS Do Not Order) [[id number]] Collected: 11/27/24 0042    Lab Status: In process Specimen: Blood from Arm, Right Updated: 11/27/24 0050    Fingerstick Glucose (POCT) [[id number]]  (Abnormal) Collected: 11/27/24 0016    Lab Status: Final result Specimen: Blood Updated: 11/27/24 0017     POC Glucose 474 mg/dl             XR chest 1 view portable    (Results Pending)       Procedures    ED Medication and Procedure Management   Prior to Admission Medications   Prescriptions Last Dose Informant Patient Reported? Taking?   BD PosiFlush 0.9 % SOLN  Self Yes No   Sig: 10 mL by Intracatheter route daily   Lenvatinib, 20 MG Daily Dose, (Lenvima, 20 MG Daily Dose,) 2 x 10 MG CPPK  Self No No   Sig: Take 20 mg by mouth daily   Patient not taking: Reported on 11/21/2024   OneTouch Delica Lancets 33G MISC  Self No No   Sig: Check blood sugars once daily. Please substitute with appropriate alternative as covered by patient's insurance. Dx: E11.65   amLODIPine (NORVASC) 10 mg tablet  Self No No   Sig: Take 1 tablet (10 mg total) by mouth daily   atorvastatin (LIPITOR) 40 mg tablet  Self No No   Sig: TAKE 1 TABLET BY MOUTH EVERY DAY   dronabinol (MARINOL) 5 MG capsule   No No   Sig: Take 1 capsule (5 mg total) by mouth 2 (two) times a day before meals   folic acid (FOLVITE) 1 mg tablet  Self No No   Sig: Take 1 tablet (1 mg total) by mouth daily   glucose blood (OneTouch Verio) test strip  Self No No   Sig: Check blood sugars once daily. Please substitute with appropriate  alternative as covered by patient's insurance. Dx: E11.65   metFORMIN (GLUCOPHAGE) 1000 MG tablet  Self Yes No   Sig: Take 1,000 mg by mouth 2 (two) times a day with meals   ondansetron (ZOFRAN-ODT) 8 mg disintegrating tablet  Self No No   Sig: Take 1 tablet (8 mg total) by mouth every 8 (eight) hours as needed for nausea   Patient not taking: Reported on 11/21/2024   oxyCODONE (ROXICODONE) 5 immediate release tablet  Self No No   Sig: Take 0.5 tablets (2.5 mg total) by mouth every 8 (eight) hours as needed for severe pain Max Daily Amount: 7.5 mg   Patient not taking: Reported on 11/21/2024   semaglutide, 2 mg/dose, (Ozempic, 2 MG/DOSE,) 8 mg/ mL injection pen  Self Yes No   Sig: Inject 2 mg under the skin every 7 days   senna-docusate sodium (SENOKOT S) 8.6-50 mg per tablet  Self No No   Sig: Take 1 tablet by mouth 2 (two) times a day as needed for constipation   sodium chloride, PF, 0.9 %  Self No No   Sig: 10 mL by Intracatheter route daily Intracatheter flushing daily. May substitute prefilled syringe with normal saline 10 mL vials, 10 mL syringes, and 18 g blunt needles   triamcinolone (KENALOG) 0.1 % cream  Self No No   Sig: APPLY TOPICALLY TO THE AFFECTED AREA TWICE DAILY      Facility-Administered Medications: None     Patient's Medications   Discharge Prescriptions    No medications on file     No discharge procedures on file.  ED SEPSIS DOCUMENTATION   Time reflects when diagnosis was documented in both MDM as applicable and the Disposition within this note       Time User Action Codes Description Comment    11/27/2024  1:39 AM Ancelmo Baxter [E11.10] DKA (diabetic ketoacidosis) (HCC)     11/27/2024  1:46 AM Ancelmo Baxter [C64.9] Metastatic renal cell carcinoma (HCC)                  Ancelmo Baxter MD  11/27/24 0147

## 2024-11-27 NOTE — ASSESSMENT & PLAN NOTE
Currently not on any medications for BP control as an outpatient  Our goal will be to maintain a MAP > 65

## 2024-11-27 NOTE — ASSESSMENT & PLAN NOTE
Recently diagnosed with Renal Cell Cancer  Followed by heme-Onc  On Keytruda for prostate cancer- followed at [facility]  Will continue his Marinol for appetite stimulation once we can resume a PO diet

## 2024-11-27 NOTE — ASSESSMENT & PLAN NOTE
Likely related to volume depletion in the setting of his DKA  We will monitor his renal indices and urine output closely

## 2024-11-27 NOTE — ASSESSMENT & PLAN NOTE
Lab Results   Component Value Date    HGBA1C 9.8 (H) 08/28/2024       Recent Labs     11/27/24  0016   POCGLU 474*       Blood Sugar Average: Last 72 hrs:    The patient presented with DKA as evidenced by his elevated blood sugar, beta-hydroxybutyrate and presence of acidosis  We will continue him on the DKA protocol  We will replete his electrolytes as needed  Once his acidosis is corrected and his gap is closed we can consider a transition to basal-bolus insulin  He may benefit from an endocrinology consult as it appears as if his oncologist was considering discontinuing his Ozempic due to his appetite suppression

## 2024-11-27 NOTE — PLAN OF CARE
Problem: PAIN - ADULT  Goal: Verbalizes/displays adequate comfort level or baseline comfort level  Description: Interventions:  - Encourage patient to monitor pain and request assistance  - Assess pain using appropriate pain scale  - Administer analgesics based on type and severity of pain and evaluate response  - Implement non-pharmacological measures as appropriate and evaluate response  - Consider cultural and social influences on pain and pain management  - Notify physician/advanced practitioner if interventions unsuccessful or patient reports new pain  Outcome: Progressing     Problem: INFECTION - ADULT  Goal: Absence or prevention of progression during hospitalization  Description: INTERVENTIONS:  - Assess and monitor for signs and symptoms of infection  - Monitor lab/diagnostic results  - Monitor all insertion sites, i.e. indwelling lines, tubes, and drains  - Monitor endotracheal if appropriate and nasal secretions for changes in amount and color  - [place] appropriate cooling/warming therapies per order  - Administer medications as ordered  - Instruct and encourage patient and family to use good hand hygiene technique  - Identify and instruct in appropriate isolation precautions for identified infection/condition  Outcome: Progressing  Goal: Absence of fever/infection during neutropenic period  Description: INTERVENTIONS:  - Monitor WBC    Outcome: Progressing     Problem: SAFETY ADULT  Goal: Patient will remain free of falls  Description: INTERVENTIONS:  - Educate patient/family on patient safety including physical limitations  - Instruct patient to call for assistance with activity   - Consult OT/PT to assist with strengthening/mobility   - Keep Call bell within reach  - Keep bed low and locked with side rails adjusted as appropriate  - Keep care items and personal belongings within reach  - Initiate and maintain comfort rounds  - Make Fall Risk Sign visible to staff  - Offer Toileting every 2 Hours,  in advance of need  - Initiate/Maintain bed alarm  - Obtain necessary fall risk management equipment:   - Apply yellow socks and bracelet for high fall risk patients  - Consider moving patient to room near nurses station  Outcome: Progressing  Goal: Maintain or return to baseline ADL function  Description: INTERVENTIONS:  -  Assess patient's ability to carry out ADLs; assess patient's baseline for ADL function and identify physical deficits which impact ability to perform ADLs (bathing, care of mouth/teeth, toileting, grooming, dressing, etc.)  - Assess/evaluate cause of self-care deficits   - Assess range of motion  - Assess patient's mobility; develop plan if impaired  - Assess patient's need for assistive devices and provide as appropriate  - Encourage maximum independence but intervene and supervise when necessary  - Involve family in performance of ADLs  - Assess for home care needs following discharge   - Consider OT consult to assist with ADL evaluation and planning for discharge  - Provide patient education as appropriate  Outcome: Progressing  Goal: Maintains/Returns to pre admission functional level  Description: INTERVENTIONS:  - Perform AM-PAC 6 Click Basic Mobility/ Daily Activity assessment daily.  - Set and communicate daily mobility goal to care team and patient/family/caregiver.   - Collaborate with rehabilitation services on mobility goals if consulted  - Perform Range of Motion 3 times a day.  - Reposition patient every 2 hours.  - Dangle patient 3 times a day  - Stand patient 3 times a day  - Ambulate patient 3 times a day  - Out of bed to chair 3 times a day   - Out of bed for meals 3 times a day  - Out of bed for toileting  - Record patient progress and toleration of activity level   Outcome: Progressing     Problem: DISCHARGE PLANNING  Goal: Discharge to home or other facility with appropriate resources  Description: INTERVENTIONS:  - Identify barriers to discharge w/patient and caregiver  -  Arrange for needed discharge resources and transportation as appropriate  - Identify discharge learning needs (meds, wound care, etc.)  - Arrange for interpretive services to assist at discharge as needed  - Refer to Case Management Department for coordinating discharge planning if the patient needs post-hospital services based on physician/advanced practitioner order or complex needs related to functional status, cognitive ability, or social support system  Outcome: Progressing     Problem: Knowledge Deficit  Goal: Patient/family/caregiver demonstrates understanding of disease process, treatment plan, medications, and discharge instructions  Description: Complete learning assessment and assess knowledge base.  Interventions:  - Provide teaching at level of understanding  - Provide teaching via preferred learning methods  Outcome: Progressing     Problem: Nutrition/Hydration-ADULT  Goal: Nutrient/Hydration intake appropriate for improving, restoring or maintaining nutritional needs  Description: Monitor and assess patient's nutrition/hydration status for malnutrition. Collaborate with interdisciplinary team and initiate plan and interventions as ordered.  Monitor patient's weight and dietary intake as ordered or per policy. Utilize nutrition screening tool and intervene as necessary. Determine patient's food preferences and provide high-protein, high-caloric foods as appropriate.     INTERVENTIONS:  - Monitor oral intake, urinary output, labs, and treatment plans  - Assess nutrition and hydration status and recommend course of action  - Evaluate amount of meals eaten  - Assist patient with eating if necessary   - Allow adequate time for meals  - Recommend/ encourage appropriate diets, oral nutritional supplements, and vitamin/mineral supplements  - Order, calculate, and assess calorie counts as needed  - Recommend, monitor, and adjust tube feedings and TPN/PPN based on assessed needs  - Assess need for intravenous  fluids  - Provide specific nutrition/hydration education as appropriate  - Include patient/family/caregiver in decisions related to nutrition  Outcome: Progressing     Problem: NEUROSENSORY - ADULT  Goal: Achieves stable or improved neurological status  Description: INTERVENTIONS  - Monitor and report changes in neurological status  - Monitor vital signs such as temperature, blood pressure, glucose, and any other labs ordered   - Initiate measures to prevent increased intracranial pressure  - Monitor for seizure activity and implement precautions if appropriate      Outcome: Progressing  Goal: Remains free of injury related to seizures activity  Description: INTERVENTIONS  - Maintain airway, patient safety  and administer oxygen as ordered  - Monitor patient for seizure activity, document and report duration and description of seizure to physician/advanced practitioner  - If seizure occurs,  ensure patient safety during seizure  - Reorient patient post seizure  - Seizure pads on all 4 side rails  - Instruct patient/family to notify RN of any seizure activity including if an aura is experienced  - Instruct patient/family to call for assistance with activity based on nursing assessment  - Administer anti-seizure medications if ordered    Outcome: Progressing  Goal: Achieves maximal functionality and self care  Description: INTERVENTIONS  - Monitor swallowing and airway patency with patient fatigue and changes in neurological status  - Encourage and assist patient to increase activity and self care.   - Encourage visually impaired, hearing impaired and aphasic patients to use assistive/communication devices  Outcome: Progressing     Problem: CARDIOVASCULAR - ADULT  Goal: Maintains optimal cardiac output and hemodynamic stability  Description: INTERVENTIONS:  - Monitor I/O, vital signs and rhythm  - Monitor for S/S and trends of decreased cardiac output  - Administer and titrate ordered vasoactive medications to  optimize hemodynamic stability  - Assess quality of pulses, skin color and temperature  - Assess for signs of decreased coronary artery perfusion  - Instruct patient to report change in severity of symptoms  Outcome: Progressing  Goal: Absence of cardiac dysrhythmias or at baseline rhythm  Description: INTERVENTIONS:  - Continuous cardiac monitoring, vital signs, obtain 12 lead EKG if ordered  - Administer antiarrhythmic and heart rate control medications as ordered  - Monitor electrolytes and administer replacement therapy as ordered  Outcome: Progressing     Problem: RESPIRATORY - ADULT  Goal: Achieves optimal ventilation and oxygenation  Description: INTERVENTIONS:  - Assess for changes in respiratory status  - Assess for changes in mentation and behavior  - Position to facilitate oxygenation and minimize respiratory effort  - Oxygen administered by appropriate delivery if ordered  - Initiate smoking cessation education as indicated  - Encourage broncho-pulmonary hygiene including cough, deep breathe, Incentive Spirometry  - Assess the need for suctioning and aspirate as needed  - Assess and instruct to report SOB or any respiratory difficulty  - Respiratory Therapy support as indicated  Outcome: Progressing     Problem: GASTROINTESTINAL - ADULT  Goal: Minimal or absence of nausea and/or vomiting  Description: INTERVENTIONS:  - Administer IV fluids if ordered to ensure adequate hydration  - Maintain NPO status until nausea and vomiting are resolved  - Nasogastric tube if ordered  - Administer ordered antiemetic medications as needed  - Provide nonpharmacologic comfort measures as appropriate  - Advance diet as tolerated, if ordered  - Consider nutrition services referral to assist patient with adequate nutrition and appropriate food choices  Outcome: Progressing  Goal: Maintains or returns to baseline bowel function  Description: INTERVENTIONS:  - Assess bowel function  - Encourage oral fluids to ensure adequate  hydration  - Administer IV fluids if ordered to ensure adequate hydration  - Administer ordered medications as needed  - Encourage mobilization and activity  - Consider nutritional services referral to assist patient with adequate nutrition and appropriate food choices  Outcome: Progressing  Goal: Maintains adequate nutritional intake  Description: INTERVENTIONS:  - Monitor percentage of each meal consumed  - Identify factors contributing to decreased intake, treat as appropriate  - Assist with meals as needed  - Monitor I&O, weight, and lab values if indicated  - Obtain nutrition services referral as needed  Outcome: Progressing  Goal: Establish and maintain optimal ostomy function  Description: INTERVENTIONS:  - Assess bowel function  - Encourage oral fluids to ensure adequate hydration  - Administer IV fluids if ordered to ensure adequate hydration   - Administer ordered medications as needed  - Encourage mobilization and activity  - Nutrition services referral to assist patient with appropriate food choices  - Assess stoma site  - Consider wound care consult   Outcome: Progressing  Goal: Oral mucous membranes remain intact  Description: INTERVENTIONS  - Assess oral mucosa and hygiene practices  - Implement preventative oral hygiene regimen  - Implement oral medicated treatments as ordered  - Initiate Nutrition services referral as needed  Outcome: Progressing     Problem: GENITOURINARY - ADULT  Goal: Maintains or returns to baseline urinary function  Description: INTERVENTIONS:  - Assess urinary function  - Encourage oral fluids to ensure adequate hydration if ordered  - Administer IV fluids as ordered to ensure adequate hydration  - Administer ordered medications as needed  - Offer frequent toileting  - Follow urinary retention protocol if ordered  Outcome: Progressing  Goal: Absence of urinary retention  Description: INTERVENTIONS:  - Assess patient’s ability to void and empty bladder  - Monitor I/O  - Bladder  scan as needed  - Discuss with physician/AP medications to alleviate retention as needed  - Discuss catheterization for long term situations as appropriate  Outcome: Progressing  Goal: Urinary catheter remains patent  Description: INTERVENTIONS:  - Assess patency of urinary catheter  - If patient has a chronic cummings, consider changing catheter if non-functioning  - Follow guidelines for intermittent irrigation of non-functioning urinary catheter  Outcome: Progressing     Problem: METABOLIC, FLUID AND ELECTROLYTES - ADULT  Goal: Electrolytes maintained within normal limits  Description: INTERVENTIONS:  - Monitor labs and assess patient for signs and symptoms of electrolyte imbalances  - Administer electrolyte replacement as ordered  - Monitor response to electrolyte replacements, including repeat lab results as appropriate  - Instruct patient on fluid and nutrition as appropriate  Outcome: Progressing  Goal: Fluid balance maintained  Description: INTERVENTIONS:  - Monitor labs   - Monitor I/O and WT  - Instruct patient on fluid and nutrition as appropriate  - Assess for signs & symptoms of volume excess or deficit  Outcome: Progressing  Goal: Glucose maintained within target range  Description: INTERVENTIONS:  - Monitor Blood Glucose as ordered  - Assess for signs and symptoms of hyperglycemia and hypoglycemia  - Administer ordered medications to maintain glucose within target range  - Assess nutritional intake and initiate nutrition service referral as needed  Outcome: Progressing     Problem: SKIN/TISSUE INTEGRITY - ADULT  Goal: Skin Integrity remains intact(Skin Breakdown Prevention)  Description: Assess:  -Perform Adán assessment   -Clean and moisturize skin   -Inspect skin when repositioning, toileting, and assisting with ADLS  -Assess under medical devices   -Assess extremities for adequate circulation and sensation     Bed Management:  -Have minimal linens on bed & keep smooth, unwrinkled  -Change linens as  needed when moist or perspiring  -Avoid sitting or lying in one position for more than 2 hours while in bed  -Keep HOB at 30 degrees     Toileting:  -Offer bedside commode  -Assess for incontinence   -Use incontinent care products after each incontinent episode     Activity:  -Mobilize patient 3 times a day  -Encourage activity and walks on unit  -Encourage or provide ROM exercises   -Turn and reposition patient every 2 Hours  -Use appropriate equipment to lift or move patient in bed  -Instruct/ Assist with weight shifting   -Consider limitation of chair time 2 hour intervals    Skin Care:  -Avoid use of baby powder, tape, friction and shearing, hot water or constrictive clothing  -Relieve pressure over bony prominences  -Do not massage red bony areas    Next Steps:  -Teach patient strategies to minimize risks  -Consider consults to  interdisciplinary teams   Outcome: Progressing  Goal: Incision(s), wounds(s) or drain site(s) healing without S/S of infection  Description: INTERVENTIONS  - Assess and document dressing, incision, wound bed, drain sites and surrounding tissue  - Provide patient and family education  - Perform skin care/dressing changes   Outcome: Progressing  Goal: Pressure injury heals and does not worsen  Description: Interventions:  - Implement low air loss mattress or specialty surface (Criteria met)  - Apply silicone foam dressing  - Instruct/assist with weight shifting every   - Limit chair time to 2 hour intervals  - Use special pressure reducing interventions  - Apply fecal or urinary incontinence containment device   - Perform passive or active ROM   - Turn and reposition patient & offload bony prominences   - Utilize friction reducing device or surface for transfers   - Consider consults to  interdisciplinary teams   - Use incontinent care products after each incontinent episode   - Consider nutrition services referral as needed  Outcome: Progressing     Problem: HEMATOLOGIC - ADULT  Goal:  Maintains hematologic stability  Description: INTERVENTIONS  - Assess for signs and symptoms of bleeding or hemorrhage  - Monitor labs  - Administer supportive blood products/factors as ordered and appropriate  Outcome: Progressing     Problem: MUSCULOSKELETAL - ADULT  Goal: Maintain or return mobility to safest level of function  Description: INTERVENTIONS:  - Assess patient's ability to carry out ADLs; assess patient's baseline for ADL function and identify physical deficits which impact ability to perform ADLs (bathing, care of mouth/teeth, toileting, grooming, dressing, etc.)  - Assess/evaluate cause of self-care deficits   - Assess range of motion  - Assess patient's mobility  - Assess patient's need for assistive devices and provide as appropriate  - Encourage maximum independence but intervene and supervise when necessary  - Involve family in performance of ADLs  - Assess for home care needs following discharge   - Consider OT consult to assist with ADL evaluation and planning for discharge  - Provide patient education as appropriate  Outcome: Progressing  Goal: Maintain proper alignment of affected body part  Description: INTERVENTIONS:  - Support, maintain and protect limb and body alignment  - Provide patient/ family with appropriate education  Outcome: Progressing

## 2024-11-27 NOTE — ASSESSMENT & PLAN NOTE
Lab Results   Component Value Date    HGBA1C 9.8 (H) 08/28/2024       Recent Labs     11/27/24  0824 11/27/24  0927 11/27/24  1031 11/27/24  1132   POCGLU 113 [id number] 165*       Currently being treated for DKA. Given exposure to pembrolizumab, he has risk for autoimmune diabetes. This is especially concerning given the DKA. Please check PAULA 65, IA2, and insulin antibodies. When ready to transition off the insulin infusion, can use a weight based dosing strategy of 0.3-0.4units per KG given CKD. Recommend stopping all other diabetes agents for now.  References in the chart are made to prior use of metformin and Trulicity, but other GLPs also referenced.

## 2024-11-27 NOTE — ED ATTENDING ATTESTATION
11/27/2024  IIke Jr, DO, saw and evaluated the patient. I have discussed the patient with the resident/non-physician practitioner and agree with the resident's/non-physician practitioner's findings, Plan of Care, and MDM as documented in the resident's/non-physician practitioner's note, except where noted. All available labs and Radiology studies were reviewed.  I was present for key portions of any procedure(s) performed by the resident/non-physician practitioner and I was immediately available to provide assistance.       At this point I agree with the current assessment done in the Emergency Department.  I have conducted an independent evaluation of this patient a history and physical is as follows:    Final Diagnosis:  1. DKA (diabetic ketoacidosis) (HCC)    2. Metastatic renal cell carcinoma (HCC)            MDM       Presentation concerning for DKA, HHS or other complication from elevated blood sugar.  Has had nausea and vomiting.  Possible underlying dehydration.    Will obtain DKA workup  Start hydration with IV fluids  Further treatment based on lab results    Leukocytosis noted but no clinical evidence of infection at this time.  JULIO.  Likely related to dehydration and volume depletion with his hyperglycemia    Positive beta hydroxybutyric acid, hyperglycemia and elevated anion gap are all concerning for DKA.  Will initiate insulin drip and admit to the ICU.        Lab Results:   Abnormal Labs Reviewed   CBC AND DIFFERENTIAL - Abnormal; Notable for the following components:       Result Value    WBC 19.39 (*)     Hemoglobin 11.4 (*)     Hematocrit 36.4 (*)     MCHC 31.3 (*)     Platelets 543 (*)     All other components within normal limits    Narrative:     This is an appended report.  These results have been appended to a previously verified report.   COMPREHENSIVE METABOLIC PANEL - Abnormal; Notable for the following components:    Sodium 129 (*)     Chloride 94 (*)     CO2 9 (*)      ANION GAP 26 (*)     BUN 57 (*)     Creatinine 2.05 (*)     Glucose 500 (*)     Corrected Calcium 10.5 (*)     Alkaline Phosphatase 163 (*)     Albumin 3.3 (*)     All other components within normal limits    Narrative:     [employer] guidelines for Chronic Kidney Disease (CKD):     Stage 1 with normal or high GFR (GFR > 90 mL/min/1.73 square meters)    Stage 2 Mild CKD (GFR = 60-89 mL/min/1.73 square meters)    Stage 3A Moderate CKD (GFR = 45-59 mL/min/1.73 square meters)    Stage 3B Moderate CKD (GFR = 30-44 mL/min/1.73 square meters)    Stage 4 Severe CKD (GFR = 15-29 mL/min/1.73 square meters)    Stage 5 End Stage CKD (GFR <15 mL/min/1.73 square meters)  Note: GFR calculation is accurate only with a steady state creatinine   BETA HYDROXYBUTYRATE - Abnormal; Notable for the following components:    Beta- Hydroxybutyrate 8.85 (*)     All other components within normal limits   BLOOD GAS, VENOUS - Abnormal; Notable for the following components:    pH, Can 7.189 (*)     pCO2, Can 25.6 (*)     HCO3, Can 9.5 (*)     O2 HGB, VENOUS 54.9 (*)     All other components within normal limits   BASIC METABOLIC PANEL - Abnormal; Notable for the following components:    Sodium 130 (*)     CO2 6 (*)     ANION GAP 24 (*)     BUN 55 (*)     Creatinine 1.90 (*)     Glucose 386 (*)     All other components within normal limits    Narrative:     National [employer] guidelines for Chronic Kidney Disease (CKD):     Stage 1 with normal or high GFR (GFR > 90 mL/min/1.73 square meters)    Stage 2 Mild CKD (GFR = 60-89 mL/min/1.73 square meters)    Stage 3A Moderate CKD (GFR = 45-59 mL/min/1.73 square meters)    Stage 3B Moderate CKD (GFR = 30-44 mL/min/1.73 square meters)    Stage 4 Severe CKD (GFR = 15-29 mL/min/1.73 square meters)    Stage 5 End Stage CKD (GFR <15 mL/min/1.73 square meters)  Note: GFR calculation is accurate only with a steady state creatinine   PHOSPHORUS - Abnormal; Notable for  the following components:    Phosphorus 4.6 (*)     All other components within normal limits   POCT GLUCOSE - Abnormal; Notable for the following components:    POC Glucose 474 (*)     All other components within normal limits   POCT GLUCOSE - Abnormal; Notable for the following components:    POC Glucose 431 (*)     All other components within normal limits   POCT GLUCOSE - Abnormal; Notable for the following components:    POC Glucose 414 (*)     All other components within normal limits   POCT GLUCOSE - Abnormal; Notable for the following components:    POC Glucose 371 (*)     All other components within normal limits   POCT GLUCOSE - Abnormal; Notable for the following components:    POC Glucose 308 (*)     All other components within normal limits   POCT GLUCOSE - Abnormal; Notable for the following components:    POC Glucose 221 (*)     All other components within normal limits   MANUAL DIFFERENTIAL(PHLEBS DO NOT ORDER) - Abnormal; Notable for the following components:    Segmented % 92 (*)     Lymphocytes % 2 (*)     Monocytes % 0 (*)     Absolute Neutrophils 19.00 (*)     Absolute Lymphocytes 0.39 (*)     Platelet Estimate Increased (*)     All other components within normal limits     Lab Results: I have personally reviewed pertinent lab results.    Imaging:   XR chest 1 view portable    (Results Pending)     I have personally reviewed pertinent reports.    EKG, Pathology, and Other Studies: I have personally reviewed pertinent films in PACS    Clinical Impression:    Final diagnoses:   DKA (diabetic ketoacidosis) (HCC)   Metastatic renal cell carcinoma (HCC)         Disposition    admitted to the hospital           New Prescriptions:    Current Discharge Medication List               Follow-up Instructions:    No follow-up provider specified.        History of Present Illness   Alejandro Adames is a 69 y.o. male who presents with Weakness - Generalized (Pt arriving via EMS. Reports an inability to eat,  "fatigue, chest discomfort, ambulatory dysfunction and sugars above 500. Pt reports having Ozempic dose on Saturday, after not taking it for a couple of months. )    has a past medical history of Cutaneous skin tags (1/2/2019), Diabetes mellitus (HCC), and Hypertension..         Objective     Vitals:    11/27/24 0041 11/27/24 0220 11/27/24 0400 11/27/24 0423   BP:  (!) 174/86 167/84    BP Location:  Left arm Left arm    Pulse:  91 97 94   Resp:  20 18    Temp: 97.6 °F (36.4 °C)  97.7 °F (36.5 °C)    TempSrc: Oral  Oral    SpO2:  100% 99%    Weight:   68.7 kg (151 lb 7.3 oz)    Height:   5' 10\" (1.778 m)      Body mass index is 21.73 kg/m².    Intake/Output Summary (Last 24 hours) at 11/27/2024 0638  Last data filed at 11/27/2024 0207  Gross per 24 hour   Intake 1000 ml   Output --   Net 1000 ml     Invasive Devices       Peripheral Intravenous Line  Duration             Peripheral IV 11/27/24 Dorsal (posterior);Right Hand <1 day    Peripheral IV 11/27/24 Left Antecubital <1 day    Peripheral IV 11/27/24 Right Antecubital <1 day              Drain  Duration             Percutaneous Nephroureteral Tube (PCNU) Left 1 10 Fr 26 Cm 7 days                    ED Course         Critical Care Time  CriticalCare Time    Date/Time: 11/27/2024 6:18 AM    Performed by: Ike Stein Jr., DO  Authorized by: Ike tSein Jr., DO    Critical care provider statement:     Critical care time (minutes):  40    Critical care time was exclusive of:  Separately billable procedures and treating other patients and teaching time    Critical care was necessary to treat or prevent imminent or life-threatening deterioration of the following conditions:  Endocrine crisis    Critical care was time spent personally by me on the following activities:  Blood draw for specimens, obtaining history from patient or surrogate, development of treatment plan with patient or surrogate, discussions with consultants, evaluation of patient's response to " treatment, examination of patient, interpretation of cardiac output measurements, ordering and performing treatments and interventions, ordering and review of laboratory studies, ordering and review of radiographic studies, review of old charts and re-evaluation of patient's condition    I assumed direction of critical care for this patient from another provider in my specialty: no

## 2024-11-27 NOTE — ASSESSMENT & PLAN NOTE
Lab Results   Component Value Date    HGBA1C 9.8 (H) 08/28/2024       Recent Labs     11/27/24  0824 11/27/24  0927 11/27/24  1031 11/27/24  1132   POCGLU 113 98 127 165*       Blood Sugar Average: Last 72 hrs:  (P) 272.2

## 2024-11-27 NOTE — ASSESSMENT & PLAN NOTE
Lab Results   Component Value Date    HGBA1C 9.8 (H) 08/28/2024       Recent Labs     11/27/24  0016   POCGLU 474*       Blood Sugar Average: Last 72 hrs:    He is on Ozempic and metformin as an outpatient

## 2024-11-27 NOTE — PLAN OF CARE
Problem: PAIN - ADULT  Goal: Verbalizes/displays adequate comfort level or baseline comfort level  Description: Interventions:  - Encourage patient to monitor pain and request assistance  - Assess pain using appropriate pain scale  - Administer analgesics based on type and severity of pain and evaluate response  - Implement non-pharmacological measures as appropriate and evaluate response  - Consider cultural and social influences on pain and pain management  - Notify physician/advanced practitioner if interventions unsuccessful or patient reports new pain  Outcome: Progressing     Problem: INFECTION - ADULT  Goal: Absence or prevention of progression during hospitalization  Description: INTERVENTIONS:  - Assess and monitor for signs and symptoms of infection  - Monitor lab/diagnostic results  - Monitor all insertion sites, i.e. indwelling lines, tubes, and drains  - Monitor endotracheal if appropriate and nasal secretions for changes in amount and color  - Makinen appropriate cooling/warming therapies per order  - Administer medications as ordered  - Instruct and encourage patient and family to use good hand hygiene technique  - Identify and instruct in appropriate isolation precautions for identified infection/condition  Outcome: Progressing     Problem: SAFETY ADULT  Goal: Patient will remain free of falls  Description: INTERVENTIONS:  - Educate patient/family on patient safety including physical limitations  - Instruct patient to call for assistance with activity   - Consult OT/PT to assist with strengthening/mobility   - Keep Call bell within reach  - Keep bed low and locked with side rails adjusted as appropriate  - Keep care items and personal belongings within reach  - Initiate and maintain comfort rounds  - Make Fall Risk Sign visible to staff  - Offer Toileting every  Hours, in advance of need  - Initiate/Maintain alarm  - Obtain necessary fall risk management equipment:   - Apply yellow socks and  bracelet for high fall risk patients  - Consider moving patient to room near nurses station  Outcome: Progressing  Goal: Maintain or return to baseline ADL function  Description: INTERVENTIONS:  -  Assess patient's ability to carry out ADLs; assess patient's baseline for ADL function and identify physical deficits which impact ability to perform ADLs (bathing, care of mouth/teeth, toileting, grooming, dressing, etc.)  - Assess/evaluate cause of self-care deficits   - Assess range of motion  - Assess patient's mobility; develop plan if impaired  - Assess patient's need for assistive devices and provide as appropriate  - Encourage maximum independence but intervene and supervise when necessary  - Involve family in performance of ADLs  - Assess for home care needs following discharge   - Consider OT consult to assist with ADL evaluation and planning for discharge  - Provide patient education as appropriate  Outcome: Progressing  Goal: Maintains/Returns to pre admission functional level  Description: INTERVENTIONS:  - Perform AM-PAC 6 Click Basic Mobility/ Daily Activity assessment daily.  - Set and communicate daily mobility goal to care team and patient/family/caregiver.   - Collaborate with rehabilitation services on mobility goals if consulted  - Perform Range of Motion  times a day.  - Reposition patient every  hours.  - Dangle patient  times a day  - Stand patient  times a day  - Ambulate patient  times a day  - Out of bed to chair  times a day   - Out of bed for meals  times a day  - Out of bed for toileting  - Record patient progress and toleration of activity level   Outcome: Progressing     Problem: DISCHARGE PLANNING  Goal: Discharge to home or other facility with appropriate resources  Description: INTERVENTIONS:  - Identify barriers to discharge w/patient and caregiver  - Arrange for needed discharge resources and transportation as appropriate  - Identify discharge learning needs (meds, wound care, etc.)  -  Arrange for interpretive services to assist at discharge as needed  - Refer to Case Management Department for coordinating discharge planning if the patient needs post-hospital services based on physician/advanced practitioner order or complex needs related to functional status, cognitive ability, or social support system  Outcome: Progressing     Problem: Knowledge Deficit  Goal: Patient/family/caregiver demonstrates understanding of disease process, treatment plan, medications, and discharge instructions  Description: Complete learning assessment and assess knowledge base.  Interventions:  - Provide teaching at level of understanding  - Provide teaching via preferred learning methods  Outcome: Progressing     Problem: Nutrition/Hydration-ADULT  Goal: Nutrient/Hydration intake appropriate for improving, restoring or maintaining nutritional needs  Description: Monitor and assess patient's nutrition/hydration status for malnutrition. Collaborate with interdisciplinary team and initiate plan and interventions as ordered.  Monitor patient's weight and dietary intake as ordered or per policy. Utilize nutrition screening tool and intervene as necessary. Determine patient's food preferences and provide high-protein, high-caloric foods as appropriate.     INTERVENTIONS:  - Monitor oral intake, urinary output, labs, and treatment plans  - Assess nutrition and hydration status and recommend course of action  - Evaluate amount of meals eaten  - Assist patient with eating if necessary   - Allow adequate time for meals  - Recommend/ encourage appropriate diets, oral nutritional supplements, and vitamin/mineral supplements  - Order, calculate, and assess calorie counts as needed  - Recommend, monitor, and adjust tube feedings and TPN/PPN based on assessed needs  - Assess need for intravenous fluids  - Provide specific nutrition/hydration education as appropriate  - Include patient/family/caregiver in decisions related to  nutrition  Outcome: Progressing     Problem: NEUROSENSORY - ADULT  Goal: Achieves stable or improved neurological status  Description: INTERVENTIONS  - Monitor and report changes in neurological status  - Monitor vital signs such as temperature, blood pressure, glucose, and any other labs ordered   - Initiate measures to prevent increased intracranial pressure  - Monitor for seizure activity and implement precautions if appropriate      Outcome: Progressing  Goal: Remains free of injury related to seizures activity  Description: INTERVENTIONS  - Maintain airway, patient safety  and administer oxygen as ordered  - Monitor patient for seizure activity, document and report duration and description of seizure to physician/advanced practitioner  - If seizure occurs,  ensure patient safety during seizure  - Reorient patient post seizure  - Seizure pads on all 4 side rails  - Instruct patient/family to notify RN of any seizure activity including if an aura is experienced  - Instruct patient/family to call for assistance with activity based on nursing assessment  - Administer anti-seizure medications if ordered    Outcome: Progressing  Goal: Achieves maximal functionality and self care  Description: INTERVENTIONS  - Monitor swallowing and airway patency with patient fatigue and changes in neurological status  - Encourage and assist patient to increase activity and self care.   - Encourage visually impaired, hearing impaired and aphasic patients to use assistive/communication devices  Outcome: Progressing     Problem: CARDIOVASCULAR - ADULT  Goal: Maintains optimal cardiac output and hemodynamic stability  Description: INTERVENTIONS:  - Monitor I/O, vital signs and rhythm  - Monitor for S/S and trends of decreased cardiac output  - Administer and titrate ordered vasoactive medications to optimize hemodynamic stability  - Assess quality of pulses, skin color and temperature  - Assess for signs of decreased coronary artery  perfusion  - Instruct patient to report change in severity of symptoms  Outcome: Progressing  Goal: Absence of cardiac dysrhythmias or at baseline rhythm  Description: INTERVENTIONS:  - Continuous cardiac monitoring, vital signs, obtain 12 lead EKG if ordered  - Administer antiarrhythmic and heart rate control medications as ordered  - Monitor electrolytes and administer replacement therapy as ordered  Outcome: Progressing     Problem: RESPIRATORY - ADULT  Goal: Achieves optimal ventilation and oxygenation  Description: INTERVENTIONS:  - Assess for changes in respiratory status  - Assess for changes in mentation and behavior  - Position to facilitate oxygenation and minimize respiratory effort  - Oxygen administered by appropriate delivery if ordered  - Initiate smoking cessation education as indicated  - Encourage broncho-pulmonary hygiene including cough, deep breathe, Incentive Spirometry  - Assess the need for suctioning and aspirate as needed  - Assess and instruct to report SOB or any respiratory difficulty  - Respiratory Therapy support as indicated  Outcome: Progressing     Problem: GASTROINTESTINAL - ADULT  Goal: Minimal or absence of nausea and/or vomiting  Description: INTERVENTIONS:  - Administer IV fluids if ordered to ensure adequate hydration  - Maintain NPO status until nausea and vomiting are resolved  - Nasogastric tube if ordered  - Administer ordered antiemetic medications as needed  - Provide nonpharmacologic comfort measures as appropriate  - Advance diet as tolerated, if ordered  - Consider nutrition services referral to assist patient with adequate nutrition and appropriate food choices  Outcome: Progressing  Goal: Maintains or returns to baseline bowel function  Description: INTERVENTIONS:  - Assess bowel function  - Encourage oral fluids to ensure adequate hydration  - Administer IV fluids if ordered to ensure adequate hydration  - Administer ordered medications as needed  - Encourage  mobilization and activity  - Consider nutritional services referral to assist patient with adequate nutrition and appropriate food choices  Outcome: Progressing  Goal: Maintains adequate nutritional intake  Description: INTERVENTIONS:  - Monitor percentage of each meal consumed  - Identify factors contributing to decreased intake, treat as appropriate  - Assist with meals as needed  - Monitor I&O, weight, and lab values if indicated  - Obtain nutrition services referral as needed  Outcome: Progressing  Goal: Establish and maintain optimal ostomy function  Description: INTERVENTIONS:  - Assess bowel function  - Encourage oral fluids to ensure adequate hydration  - Administer IV fluids if ordered to ensure adequate hydration   - Administer ordered medications as needed  - Encourage mobilization and activity  - Nutrition services referral to assist patient with appropriate food choices  - Assess stoma site  - Consider wound care consult   Outcome: Progressing  Goal: Oral mucous membranes remain intact  Description: INTERVENTIONS  - Assess oral mucosa and hygiene practices  - Implement preventative oral hygiene regimen  - Implement oral medicated treatments as ordered  - Initiate Nutrition services referral as needed  Outcome: Progressing     Problem: GENITOURINARY - ADULT  Goal: Maintains or returns to baseline urinary function  Description: INTERVENTIONS:  - Assess urinary function  - Encourage oral fluids to ensure adequate hydration if ordered  - Administer IV fluids as ordered to ensure adequate hydration  - Administer ordered medications as needed  - Offer frequent toileting  - Follow urinary retention protocol if ordered  Outcome: Progressing  Goal: Absence of urinary retention  Description: INTERVENTIONS:  - Assess patient’s ability to void and empty bladder  - Monitor I/O  - Bladder scan as needed  - Discuss with physician/AP medications to alleviate retention as needed  - Discuss catheterization for long term  situations as appropriate  Outcome: Progressing  Goal: Urinary catheter remains patent  Description: INTERVENTIONS:  - Assess patency of urinary catheter  - If patient has a chronic cummings, consider changing catheter if non-functioning  - Follow guidelines for intermittent irrigation of non-functioning urinary catheter  Outcome: Progressing     Problem: METABOLIC, FLUID AND ELECTROLYTES - ADULT  Goal: Electrolytes maintained within normal limits  Description: INTERVENTIONS:  - Monitor labs and assess patient for signs and symptoms of electrolyte imbalances  - Administer electrolyte replacement as ordered  - Monitor response to electrolyte replacements, including repeat lab results as appropriate  - Instruct patient on fluid and nutrition as appropriate  Outcome: Progressing  Goal: Fluid balance maintained  Description: INTERVENTIONS:  - Monitor labs   - Monitor I/O and WT  - Instruct patient on fluid and nutrition as appropriate  - Assess for signs & symptoms of volume excess or deficit  Outcome: Progressing  Goal: Glucose maintained within target range  Description: INTERVENTIONS:  - Monitor Blood Glucose as ordered  - Assess for signs and symptoms of hyperglycemia and hypoglycemia  - Administer ordered medications to maintain glucose within target range  - Assess nutritional intake and initiate nutrition service referral as needed  Outcome: Progressing     Problem: SKIN/TISSUE INTEGRITY - ADULT  Goal: Skin Integrity remains intact(Skin Breakdown Prevention)  Description: Assess:  -Perform Adán assessment every   -Clean and moisturize skin every   -Inspect skin when repositioning, toileting, and assisting with ADLS  -Assess under medical devices such as  every   -Assess extremities for adequate circulation and sensation     Bed Management:  -Have minimal linens on bed & keep smooth, unwrinkled  -Change linens as needed when moist or perspiring  -Avoid sitting or lying in one position for more than  hours while in  bed  -Keep HOB at degrees     Toileting:  -Offer bedside commode  -Assess for incontinence every   -Use incontinent care products after each incontinent episode such as     Activity:  -Mobilize patient  times a day  -Encourage activity and walks on unit  -Encourage or provide ROM exercises   -Turn and reposition patient every  Hours  -Use appropriate equipment to lift or move patient in bed  -Instruct/ Assist with weight shifting every  when out of bed in chair  -Consider limitation of chair time  hour intervals    Skin Care:  -Avoid use of baby powder, tape, friction and shearing, hot water or constrictive clothing  -Relieve pressure over bony prominences using   -Do not massage red bony areas    Next Steps:  -Teach patient strategies to minimize risks such as    -Consider consults to  interdisciplinary teams such as   Outcome: Progressing  Goal: Incision(s), wounds(s) or drain site(s) healing without S/S of infection  Description: INTERVENTIONS  - Assess and document dressing, incision, wound bed, drain sites and surrounding tissue  - Provide patient and family education  - Perform skin care/dressing changes every   Outcome: Progressing  Goal: Pressure injury heals and does not worsen  Description: Interventions:  - Implement low air loss mattress or specialty surface (Criteria met)  - Apply silicone foam dressing  - Instruct/assist with weight shifting every  minutes when in chair   - Limit chair time to  hour intervals  - Use special pressure reducing interventions such as  when in chair   - Apply fecal or urinary incontinence containment device   - Perform passive or active ROM every   - Turn and reposition patient & offload bony prominences every  hours   - Utilize friction reducing device or surface for transfers   - Consider consults to  interdisciplinary teams such as   - Use incontinent care products after each incontinent episode such as   - Consider nutrition services referral as needed  Outcome:  Progressing     Problem: HEMATOLOGIC - ADULT  Goal: Maintains hematologic stability  Description: INTERVENTIONS  - Assess for signs and symptoms of bleeding or hemorrhage  - Monitor labs  - Administer supportive blood products/factors as ordered and appropriate  Outcome: Progressing     Problem: MUSCULOSKELETAL - ADULT  Goal: Maintain or return mobility to safest level of function  Description: INTERVENTIONS:  - Assess patient's ability to carry out ADLs; assess patient's baseline for ADL function and identify physical deficits which impact ability to perform ADLs (bathing, care of mouth/teeth, toileting, grooming, dressing, etc.)  - Assess/evaluate cause of self-care deficits   - Assess range of motion  - Assess patient's mobility  - Assess patient's need for assistive devices and provide as appropriate  - Encourage maximum independence but intervene and supervise when necessary  - Involve family in performance of ADLs  - Assess for home care needs following discharge   - Consider OT consult to assist with ADL evaluation and planning for discharge  - Provide patient education as appropriate  Outcome: Progressing  Goal: Maintain proper alignment of affected body part  Description: INTERVENTIONS:  - Support, maintain and protect limb and body alignment  - Provide patient/ family with appropriate education  Outcome: Progressing     Problem: Prexisting or High Potential for Compromised Skin Integrity  Goal: Skin integrity is maintained or improved  Description: INTERVENTIONS:  - Identify patients at risk for skin breakdown  - Assess and monitor skin integrity  - Assess and monitor nutrition and hydration status  - Monitor labs   - Assess for incontinence   - Turn and reposition patient  - Assist with mobility/ambulation  - Relieve pressure over bony prominences  - Avoid friction and shearing  - Provide appropriate hygiene as needed including keeping skin clean and dry  - Evaluate need for skin moisturizer/barrier  cream  - Collaborate with interdisciplinary team   - Patient/family teaching  - Consider wound care consult   Outcome: Progressing

## 2024-11-27 NOTE — ACP (ADVANCE CARE PLANNING)
"Spoke to pt. He is refusing all food due to no appetite. I discussed role of NGT or PEG tube and pt is adamantly against these.  This is decision is consistent with his living will.     I discussed the \"big picture\" with pt. If he is not able to eat and he already has severe malnutrition in the setting of metastatic cancer, this is not sustainable.  I introduced the idea of hospice and the implications of being on hospice.  The patient did not make any decisions but excepted this information.  I encouraged him to think about it and discuss it with his brother.    I reviewed pt living will in chart and he is noted as DNR. I discussed this with his brother, Gordy, and brother states he is aware of this wish and would like to update the chart to reflect DNR status.    I called the patient's brother and discussed.  He reports the patient has not been doing well overall.  He reports he is concerned about the patient's significant weight loss.  He is also concerned that this is not sustainable.  I introduced the idea of hospice and he expressed understanding.  I reviewed the patient's living will with the brother over the phone.    When the brother comes to visit tomorrow, I encouraged him to discuss with the doctor taking care of his brother so they can review goals of care.    "

## 2024-11-27 NOTE — CASE MANAGEMENT
Case Management Assessment & Discharge Planning Note    Patient name Alejandro Adames  Location ICU 07/ICU 07 MRN [id number]  : 1955 Date 2024       Current Admission Date: 2024  Current Admission Diagnosis:DKA (diabetic ketoacidosis) (HCC)   Patient Active Problem List    Diagnosis Date Noted Date Diagnosed    DKA (diabetic ketoacidosis) (HCC) 2024     Metastatic renal cell carcinoma to bone (HCC) 10/24/2024     Polyuria 10/21/2024     Retroperitoneal mass 10/19/2024     Hydronephrosis of left kidney 10/18/2024     JULIO (acute kidney injury) (HCC) 10/18/2024     Hyponatremia 10/18/2024     Lung nodule 10/18/2024     Patellofemoral pain syndrome of both knees 2023     Gait disorder 2023     Sebaceous cyst mid back 10/17/2022     COVID-19 2021     Anemia 2021     Intrinsic eczema 2020     SK (seborrheic keratosis) 2020     Prostate cancer (HCC) 2019     Type 2 diabetes mellitus with kidney complication, without long-term current use of insulin (HCC)      Actinic keratosis 2019     Idiopathic angioedema 2015     Essential hypertension 2012     Mixed hyperlipidemia 2012     Herpes simplex type 1 infection 05/10/2012       LOS (days): 0  Geometric Mean LOS (GMLOS) (days):   Days to GMLOS:     OBJECTIVE:    Risk of Unplanned Readmission Score: 27.38         Current admission status: Inpatient       Preferred Pharmacy:   [id number] [place] - [address] [facility]  [address] [place] [address] [place] [address]  Phone: [phone] Fax: [phone]    [facility] - [place] - [address]  [address]  [place] [address]  Phone: [phone] Fax: [phone]    [facility] - [place] - [address]  [address]  [address]  [place] [address]  Phone: [phone] Fax: [phone]    Primary Care Provider: Wayne Uriarte Jr, MD    Primary  Insurance: MEDICARE  Secondary Insurance: [facility] BLUE [facility]    ASSESSMENT:  Active Health Care Proxies       Gordy Adames Health Care Representative - Other   Primary Phone: [phone] (Home)                 Advance Directives  Does patient have a Health Care POA?: Yes (copy is not on chart)  Does patient have Advance Directives?: Yes  Primary Contact: Gordy Morgan brother) [phone]         Readmission Root Cause  30 Day Readmission: No    Patient Information  Admitted from:: Home  Mental Status: Alert  During Assessment patient was accompanied by: Not accompanied during assessment  Assessment information provided by:: Patient (chart review)  Primary Caregiver: Family  Caregiver's Name:: Gordy Adames ( brother) [phone]  Caregiver's Relationship to Patient:: Family Member  Caregiver's Telephone Number:: [phone]  Support Systems: Self, Family members  County of Residence: [place]  What [facility] do you live in?: [place]  Type of Current Residence: [address] [place] [address] home  Upon entering residence, is there a bedroom on the main floor (no further steps)?: No  A bedroom is located on the following floor levels of residence (select all that apply):: 2nd Floor  Number of steps to 2nd floor from main floor: One Flight  Living Arrangements: Lives w/ Family members  Is patient a ?: No    Activities of Daily Living Prior to Admission  Functional Status: Independent  Completes ADLs independently?: Yes  Ambulates independently?: Yes  Does patient use assisted devices?: No  Does patient currently own DME?: No  Does patient have a history of Outpatient Therapy (PT/OT)?: Yes  Does the patient have a history of Short-Term Rehab?: No  Does patient have a history of HHC?: No  Does patient currently have HHC?: No    Current Home Health Care  Type of Current Home Care Services: Nurse visit    Patient Information Continued  Income Source: SSI/SSD  Does patient have prescription coverage?: Yes  Does patient receive  dialysis treatments?: No  Does patient have a history of substance abuse?: No  Does patient have a history of Mental Health Diagnosis?: No         Means of Transportation  Means of Transport to Appts:: Drives Self      Social Determinants of Health (SDOH)      Flowsheet Row Most Recent Value   Housing Stability    In the last 12 months, was there a time when you were not able to pay the mortgage or rent on time? N   In the past 12 months, how many times have you moved where you were living? 0   At any time in the past 12 months, were you homeless or living in a shelter (including now)? N   Transportation Needs    In the past 12 months, has lack of transportation kept you from medical appointments or from getting medications? no   In the past 12 months, has lack of transportation kept you from meetings, work, or from getting things needed for daily living? No   Food Insecurity    Within the past 12 months, you worried that your food would run out before you got the money to buy more. Never true   Within the past 12 months, the food you bought just didn't last and you didn't have money to get more. Never true   Utilities    In the past 12 months has the electric, gas, oil, or water company threatened to shut off services in your home? No            DISCHARGE DETAILS:    Discharge planning discussed with:: patient  Freedom of Choice: Yes  Comments - Freedom of Choice: Home with OP follow up VS [facility]  CM contacted family/caregiver?: No- see comments  Were Treatment Team discharge recommendations reviewed with patient/caregiver?:  (no recommendations at this time)          Contacts  Patient Contacts: Gordy Adames ( brother) [phone]  Relationship to Patient:: Family  Contact Method: Phone  Phone Number: [phone]  Reason/Outcome: Continuity of Care, Emergency Contact, Discharge Planning       Would you like to participate in our [facility] service program?  : No - Declined       Discharge Destination Plan::  Home, Home with Home Health Care        Additional Comments: CM met with the patient at the bedside for discharge planning. CM introduced self and reviewed role. Patient is awake and alert but dozes off during conversation. A portion of the information provided in addition to chart review with patient confirmation. CM department will continue to follow the patient through hospital discharge.

## 2024-11-27 NOTE — ED NOTES
Provider at bedside.     [facility]  11/27/24 0022

## 2024-11-28 PROBLEM — E43 SEVERE PROTEIN-CALORIE MALNUTRITION (HCC): Status: ACTIVE | Noted: 2024-11-28

## 2024-11-28 LAB
ANION GAP SERPL CALCULATED.3IONS-SCNC: 11 MMOL/L (ref 4–13)
ANION GAP SERPL CALCULATED.3IONS-SCNC: 11 MMOL/L (ref 4–13)
ANION GAP SERPL CALCULATED.3IONS-SCNC: 12 MMOL/L (ref 4–13)
ANION GAP SERPL CALCULATED.3IONS-SCNC: 12 MMOL/L (ref 4–13)
BUN SERPL-MCNC: 39 MG/DL (ref 5–25)
BUN SERPL-MCNC: 41 MG/DL (ref 5–25)
CALCIUM SERPL-MCNC: 7.6 MG/DL (ref 8.4–10.2)
CALCIUM SERPL-MCNC: 8 MG/DL (ref 8.4–10.2)
CALCIUM SERPL-MCNC: 8.3 MG/DL (ref 8.4–10.2)
CALCIUM SERPL-MCNC: 8.3 MG/DL (ref 8.4–10.2)
CHLORIDE SERPL-SCNC: 102 MMOL/L (ref 96–108)
CHLORIDE SERPL-SCNC: 103 MMOL/L (ref 96–108)
CHLORIDE SERPL-SCNC: 103 MMOL/L (ref 96–108)
CHLORIDE SERPL-SCNC: 104 MMOL/L (ref 96–108)
CO2 SERPL-SCNC: 14 MMOL/L (ref 21–32)
CO2 SERPL-SCNC: 17 MMOL/L (ref 21–32)
CREAT SERPL-MCNC: 1.48 MG/DL (ref 0.6–1.3)
CREAT SERPL-MCNC: 1.49 MG/DL (ref 0.6–1.3)
CREAT SERPL-MCNC: 1.51 MG/DL (ref 0.6–1.3)
CREAT SERPL-MCNC: 1.52 MG/DL (ref 0.6–1.3)
GFR SERPL CREATININE-BSD FRML MDRD: 45 ML/MIN/1.73SQ M
GFR SERPL CREATININE-BSD FRML MDRD: 46 ML/MIN/1.73SQ M
GFR SERPL CREATININE-BSD FRML MDRD: 46 ML/MIN/1.73SQ M
GFR SERPL CREATININE-BSD FRML MDRD: 47 ML/MIN/1.73SQ M
GLUCOSE SERPL-MCNC: 148 MG/DL (ref 65–140)
GLUCOSE SERPL-MCNC: 244 MG/DL (ref 65–140)
GLUCOSE SERPL-MCNC: 244 MG/DL (ref 65–140)
GLUCOSE SERPL-MCNC: 250 MG/DL (ref 65–140)
GLUCOSE SERPL-MCNC: 284 MG/DL (ref 65–140)
GLUCOSE SERPL-MCNC: 287 MG/DL (ref 65–140)
GLUCOSE SERPL-MCNC: 300 MG/DL (ref 65–140)
GLUCOSE SERPL-MCNC: 358 MG/DL (ref 65–140)
POTASSIUM SERPL-SCNC: 3.5 MMOL/L (ref 3.5–5.3)
POTASSIUM SERPL-SCNC: 3.5 MMOL/L (ref 3.5–5.3)
POTASSIUM SERPL-SCNC: 3.6 MMOL/L (ref 3.5–5.3)
POTASSIUM SERPL-SCNC: 3.8 MMOL/L (ref 3.5–5.3)
SODIUM SERPL-SCNC: 129 MMOL/L (ref 135–147)
SODIUM SERPL-SCNC: 131 MMOL/L (ref 135–147)
SODIUM SERPL-SCNC: 131 MMOL/L (ref 135–147)
SODIUM SERPL-SCNC: 132 MMOL/L (ref 135–147)

## 2024-11-28 PROCEDURE — 99233 SBSQ HOSP IP/OBS HIGH 50: CPT | Performed by: STUDENT IN AN ORGANIZED HEALTH CARE EDUCATION/TRAINING PROGRAM

## 2024-11-28 PROCEDURE — 82948 REAGENT STRIP/BLOOD GLUCOSE: CPT

## 2024-11-28 PROCEDURE — 80048 BASIC METABOLIC PNL TOTAL CA: CPT | Performed by: NURSE PRACTITIONER

## 2024-11-28 RX ORDER — INSULIN LISPRO 100 [IU]/ML
8 INJECTION, SOLUTION INTRAVENOUS; SUBCUTANEOUS
Status: DISCONTINUED | OUTPATIENT
Start: 2024-11-28 | End: 2024-12-03

## 2024-11-28 RX ORDER — INSULIN GLARGINE 100 [IU]/ML
10 INJECTION, SOLUTION SUBCUTANEOUS ONCE
Status: COMPLETED | OUTPATIENT
Start: 2024-11-28 | End: 2024-11-28

## 2024-11-28 RX ORDER — INSULIN GLARGINE 100 [IU]/ML
25 INJECTION, SOLUTION SUBCUTANEOUS
Status: DISCONTINUED | OUTPATIENT
Start: 2024-11-28 | End: 2024-12-02

## 2024-11-28 RX ADMIN — INSULIN GLARGINE 10 UNITS: 100 INJECTION, SOLUTION SUBCUTANEOUS at 11:31

## 2024-11-28 RX ADMIN — CHLORHEXIDINE GLUCONATE 15 ML: 1.2 RINSE ORAL at 21:42

## 2024-11-28 RX ADMIN — DRONABINOL 5 MG: 2.5 CAPSULE ORAL at 16:37

## 2024-11-28 RX ADMIN — FOLIC ACID 1 MG: 1 TABLET ORAL at 07:58

## 2024-11-28 RX ADMIN — CHLORHEXIDINE GLUCONATE 15 ML: 1.2 RINSE ORAL at 09:09

## 2024-11-28 RX ADMIN — INSULIN LISPRO 4 UNITS: 100 INJECTION, SOLUTION INTRAVENOUS; SUBCUTANEOUS at 16:37

## 2024-11-28 RX ADMIN — INSULIN LISPRO 8 UNITS: 100 INJECTION, SOLUTION INTRAVENOUS; SUBCUTANEOUS at 16:37

## 2024-11-28 RX ADMIN — HEPARIN SODIUM 5000 UNITS: 5000 INJECTION INTRAVENOUS; SUBCUTANEOUS at 16:37

## 2024-11-28 RX ADMIN — HEPARIN SODIUM 5000 UNITS: 5000 INJECTION INTRAVENOUS; SUBCUTANEOUS at 05:17

## 2024-11-28 RX ADMIN — HEPARIN SODIUM 5000 UNITS: 5000 INJECTION INTRAVENOUS; SUBCUTANEOUS at 21:42

## 2024-11-28 RX ADMIN — INSULIN LISPRO 8 UNITS: 100 INJECTION, SOLUTION INTRAVENOUS; SUBCUTANEOUS at 07:28

## 2024-11-28 RX ADMIN — ACETAMINOPHEN 975 MG: 325 TABLET, FILM COATED ORAL at 21:45

## 2024-11-28 RX ADMIN — ACETAMINOPHEN 975 MG: 325 TABLET, FILM COATED ORAL at 00:14

## 2024-11-28 RX ADMIN — ACETAMINOPHEN 975 MG: 325 TABLET, FILM COATED ORAL at 07:58

## 2024-11-28 RX ADMIN — DRONABINOL 5 MG: 2.5 CAPSULE ORAL at 09:09

## 2024-11-28 RX ADMIN — INSULIN GLARGINE 25 UNITS: 100 INJECTION, SOLUTION SUBCUTANEOUS at 21:42

## 2024-11-28 RX ADMIN — INSULIN LISPRO 8 UNITS: 100 INJECTION, SOLUTION INTRAVENOUS; SUBCUTANEOUS at 11:31

## 2024-11-28 RX ADMIN — ALUMINUM HYDROXIDE, MAGNESIUM HYDROXIDE, AND DIMETHICONE 30 ML: 200; 20; 200 SUSPENSION ORAL at 21:45

## 2024-11-28 RX ADMIN — INSULIN LISPRO 4 UNITS: 100 INJECTION, SOLUTION INTRAVENOUS; SUBCUTANEOUS at 11:31

## 2024-11-28 RX ADMIN — ATORVASTATIN CALCIUM 40 MG: 40 TABLET, FILM COATED ORAL at 16:37

## 2024-11-28 RX ADMIN — AMLODIPINE BESYLATE 10 MG: 10 TABLET ORAL at 07:58

## 2024-11-28 RX ADMIN — METRONIDAZOLE 500 MG: 500 INJECTION, SOLUTION INTRAVENOUS at 02:57

## 2024-11-28 NOTE — ASSESSMENT & PLAN NOTE
Lab Results   Component Value Date    HGBA1C 9.8 (H) 08/28/2024     Recent Labs     11/27/24  1616 11/27/24  2042 11/28/24  0725 11/28/24  1119   POCGLU 109 187* 300* 244*     Blood Sugar Average: Last 72 hrs:  (P) [id number]    69-year-old male with PMH of type 2 diabetes, metastatic renal cell carcinoma, prostate cancer, HTN and CKD presenting with nausea vomiting and found to be in DKA  Initially admitted under ICU care on 11/27, transferred to [facility] same day  Did require insulin drip, transition to basal bolus once and gap resolved  Appetite improving, titrating basal bolus insulin  Had previously been hesitant regarding starting insulin, on metformin and Ozempic PTA  Will need insulin on discharge  Endocrine suspect possibly worsening diabetes in setting of Keytruda

## 2024-11-28 NOTE — PLAN OF CARE
Problem: PAIN - ADULT  Goal: Verbalizes/displays adequate comfort level or baseline comfort level  Description: Interventions:  - Encourage patient to monitor pain and request assistance  - Assess pain using appropriate pain scale  - Administer analgesics based on type and severity of pain and evaluate response  - Implement non-pharmacological measures as appropriate and evaluate response  - Consider cultural and social influences on pain and pain management  - Notify physician/advanced practitioner if interventions unsuccessful or patient reports new pain  Outcome: Progressing     Problem: INFECTION - ADULT  Goal: Absence or prevention of progression during hospitalization  Description: INTERVENTIONS:  - Assess and monitor for signs and symptoms of infection  - Monitor lab/diagnostic results  - Monitor all insertion sites, i.e. indwelling lines, tubes, and drains  - Monitor endotracheal if appropriate and nasal secretions for changes in amount and color  - [place] appropriate cooling/warming therapies per order  - Administer medications as ordered  - Instruct and encourage patient and family to use good hand hygiene technique  - Identify and instruct in appropriate isolation precautions for identified infection/condition  Outcome: Progressing     Problem: SAFETY ADULT  Goal: Patient will remain free of falls  Description: INTERVENTIONS:  - Educate patient/family on patient safety including physical limitations  - Instruct patient to call for assistance with activity   - Consult OT/PT to assist with strengthening/mobility   - Keep Call bell within reach  - Keep bed low and locked with side rails adjusted as appropriate  - Keep care items and personal belongings within reach  - Initiate and maintain comfort rounds  - Make Fall Risk Sign visible to staff  - Offer Toileting every 2 Hours, in advance of need  - Initiate/Maintain bed alarm  - Apply yellow socks and bracelet for high fall risk patients  - Consider  moving patient to room near nurses station  Outcome: Progressing  Goal: Maintain or return to baseline ADL function  Description: INTERVENTIONS:  -  Assess patient's ability to carry out ADLs; assess patient's baseline for ADL function and identify physical deficits which impact ability to perform ADLs (bathing, care of mouth/teeth, toileting, grooming, dressing, etc.)  - Assess/evaluate cause of self-care deficits   - Assess range of motion  - Assess patient's mobility; develop plan if impaired  - Assess patient's need for assistive devices and provide as appropriate  - Encourage maximum independence but intervene and supervise when necessary  - Involve family in performance of ADLs  - Assess for home care needs following discharge   - Consider OT consult to assist with ADL evaluation and planning for discharge  - Provide patient education as appropriate  Outcome: Progressing  Goal: Maintains/Returns to pre admission functional level  Description: INTERVENTIONS:  - Perform AM-PAC 6 Click Basic Mobility/ Daily Activity assessment daily.  - Set and communicate daily mobility goal to care team and patient/family/caregiver.   - Collaborate with rehabilitation services on mobility goals if consulted  - Perform Range of Motion 3 times a day.  - Reposition patient every 2 hours.  - Dangle patient 3 times a day  - Stand patient 3 times a day  - Ambulate patient 3 times a day  - Out of bed to chair 3 times a day   - Out of bed for meals 3 times a day  - Out of bed for toileting  - Record patient progress and toleration of activity level   Outcome: Progressing     Problem: DISCHARGE PLANNING  Goal: Discharge to home or other facility with appropriate resources  Description: INTERVENTIONS:  - Identify barriers to discharge w/patient and caregiver  - Arrange for needed discharge resources and transportation as appropriate  - Identify discharge learning needs (meds, wound care, etc.)  - Arrange for interpretive services to  assist at discharge as needed  - Refer to Case Management Department for coordinating discharge planning if the patient needs post-hospital services based on physician/advanced practitioner order or complex needs related to functional status, cognitive ability, or social support system  Outcome: Progressing     Problem: Knowledge Deficit  Goal: Patient/family/caregiver demonstrates understanding of disease process, treatment plan, medications, and discharge instructions  Description: Complete learning assessment and assess knowledge base.  Interventions:  - Provide teaching at level of understanding  - Provide teaching via preferred learning methods  Outcome: Progressing     Problem: Nutrition/Hydration-ADULT  Goal: Nutrient/Hydration intake appropriate for improving, restoring or maintaining nutritional needs  Description: Monitor and assess patient's nutrition/hydration status for malnutrition. Collaborate with interdisciplinary team and initiate plan and interventions as ordered.  Monitor patient's weight and dietary intake as ordered or per policy. Utilize nutrition screening tool and intervene as necessary. Determine patient's food preferences and provide high-protein, high-caloric foods as appropriate.     INTERVENTIONS:  - Monitor oral intake, urinary output, labs, and treatment plans  - Assess nutrition and hydration status and recommend course of action  - Evaluate amount of meals eaten  - Assist patient with eating if necessary   - Allow adequate time for meals  - Recommend/ encourage appropriate diets, oral nutritional supplements, and vitamin/mineral supplements  - Order, calculate, and assess calorie counts as needed  - Recommend, monitor, and adjust tube feedings and TPN/PPN based on assessed needs  - Assess need for intravenous fluids  - Provide specific nutrition/hydration education as appropriate  - Include patient/family/caregiver in decisions related to nutrition  Outcome: Progressing     Problem:  NEUROSENSORY - ADULT  Goal: Achieves stable or improved neurological status  Description: INTERVENTIONS  - Monitor and report changes in neurological status  - Monitor vital signs such as temperature, blood pressure, glucose, and any other labs ordered   - Initiate measures to prevent increased intracranial pressure  - Monitor for seizure activity and implement precautions if appropriate      Outcome: Progressing  Goal: Remains free of injury related to seizures activity  Description: INTERVENTIONS  - Maintain airway, patient safety  and administer oxygen as ordered  - Monitor patient for seizure activity, document and report duration and description of seizure to physician/advanced practitioner  - If seizure occurs,  ensure patient safety during seizure  - Reorient patient post seizure  - Seizure pads on all 4 side rails  - Instruct patient/family to notify RN of any seizure activity including if an aura is experienced  - Instruct patient/family to call for assistance with activity based on nursing assessment  - Administer anti-seizure medications if ordered    Outcome: Progressing  Goal: Achieves maximal functionality and self care  Description: INTERVENTIONS  - Monitor swallowing and airway patency with patient fatigue and changes in neurological status  - Encourage and assist patient to increase activity and self care.   - Encourage visually impaired, hearing impaired and aphasic patients to use assistive/communication devices  Outcome: Progressing     Problem: CARDIOVASCULAR - ADULT  Goal: Maintains optimal cardiac output and hemodynamic stability  Description: INTERVENTIONS:  - Monitor I/O, vital signs and rhythm  - Monitor for S/S and trends of decreased cardiac output  - Administer and titrate ordered vasoactive medications to optimize hemodynamic stability  - Assess quality of pulses, skin color and temperature  - Assess for signs of decreased coronary artery perfusion  - Instruct patient to report change  in severity of symptoms  Outcome: Progressing  Goal: Absence of cardiac dysrhythmias or at baseline rhythm  Description: INTERVENTIONS:  - Continuous cardiac monitoring, vital signs, obtain 12 lead EKG if ordered  - Administer antiarrhythmic and heart rate control medications as ordered  - Monitor electrolytes and administer replacement therapy as ordered  Outcome: Progressing     Problem: RESPIRATORY - ADULT  Goal: Achieves optimal ventilation and oxygenation  Description: INTERVENTIONS:  - Assess for changes in respiratory status  - Assess for changes in mentation and behavior  - Position to facilitate oxygenation and minimize respiratory effort  - Oxygen administered by appropriate delivery if ordered  - Initiate smoking cessation education as indicated  - Encourage broncho-pulmonary hygiene including cough, deep breathe, Incentive Spirometry  - Assess the need for suctioning and aspirate as needed  - Assess and instruct to report SOB or any respiratory difficulty  - Respiratory Therapy support as indicated  Outcome: Progressing     Problem: GASTROINTESTINAL - ADULT  Goal: Minimal or absence of nausea and/or vomiting  Description: INTERVENTIONS:  - Administer IV fluids if ordered to ensure adequate hydration  - Maintain NPO status until nausea and vomiting are resolved  - Nasogastric tube if ordered  - Administer ordered antiemetic medications as needed  - Provide nonpharmacologic comfort measures as appropriate  - Advance diet as tolerated, if ordered  - Consider nutrition services referral to assist patient with adequate nutrition and appropriate food choices  Outcome: Progressing  Goal: Maintains or returns to baseline bowel function  Description: INTERVENTIONS:  - Assess bowel function  - Encourage oral fluids to ensure adequate hydration  - Administer IV fluids if ordered to ensure adequate hydration  - Administer ordered medications as needed  - Encourage mobilization and activity  - Consider nutritional  services referral to assist patient with adequate nutrition and appropriate food choices  Outcome: Progressing  Goal: Maintains adequate nutritional intake  Description: INTERVENTIONS:  - Monitor percentage of each meal consumed  - Identify factors contributing to decreased intake, treat as appropriate  - Assist with meals as needed  - Monitor I&O, weight, and lab values if indicated  - Obtain nutrition services referral as needed  Outcome: Progressing  Goal: Establish and maintain optimal ostomy function  Description: INTERVENTIONS:  - Assess bowel function  - Encourage oral fluids to ensure adequate hydration  - Administer IV fluids if ordered to ensure adequate hydration   - Administer ordered medications as needed  - Encourage mobilization and activity  - Nutrition services referral to assist patient with appropriate food choices  - Assess stoma site  - Consider wound care consult   Outcome: Progressing  Goal: Oral mucous membranes remain intact  Description: INTERVENTIONS  - Assess oral mucosa and hygiene practices  - Implement preventative oral hygiene regimen  - Implement oral medicated treatments as ordered  - Initiate Nutrition services referral as needed  Outcome: Progressing     Problem: GENITOURINARY - ADULT  Goal: Maintains or returns to baseline urinary function  Description: INTERVENTIONS:  - Assess urinary function  - Encourage oral fluids to ensure adequate hydration if ordered  - Administer IV fluids as ordered to ensure adequate hydration  - Administer ordered medications as needed  - Offer frequent toileting  - Follow urinary retention protocol if ordered  Outcome: Progressing  Goal: Absence of urinary retention  Description: INTERVENTIONS:  - Assess patient’s ability to void and empty bladder  - Monitor I/O  - Bladder scan as needed  - Discuss with physician/AP medications to alleviate retention as needed  - Discuss catheterization for long term situations as appropriate  Outcome:  Progressing  Goal: Urinary catheter remains patent  Description: INTERVENTIONS:  - Assess patency of urinary catheter  - If patient has a chronic cummings, consider changing catheter if non-functioning  - Follow guidelines for intermittent irrigation of non-functioning urinary catheter  Outcome: Progressing     Problem: METABOLIC, FLUID AND ELECTROLYTES - ADULT  Goal: Electrolytes maintained within normal limits  Description: INTERVENTIONS:  - Monitor labs and assess patient for signs and symptoms of electrolyte imbalances  - Administer electrolyte replacement as ordered  - Monitor response to electrolyte replacements, including repeat lab results as appropriate  - Instruct patient on fluid and nutrition as appropriate  Outcome: Progressing  Goal: Fluid balance maintained  Description: INTERVENTIONS:  - Monitor labs   - Monitor I/O and WT  - Instruct patient on fluid and nutrition as appropriate  - Assess for signs & symptoms of volume excess or deficit  Outcome: Progressing  Goal: Glucose maintained within target range  Description: INTERVENTIONS:  - Monitor Blood Glucose as ordered  - Assess for signs and symptoms of hyperglycemia and hypoglycemia  - Administer ordered medications to maintain glucose within target range  - Assess nutritional intake and initiate nutrition service referral as needed  Outcome: Progressing     Problem: SKIN/TISSUE INTEGRITY - ADULT  Goal: Skin Integrity remains intact(Skin Breakdown Prevention)  Description: Assess:  -Perform Adán assessment every shift  -Inspect skin when repositioning, toileting, and assisting with ADLS  -Assess extremities for adequate circulation and sensation     Bed Management:  -Have minimal linens on bed & keep smooth, unwrinkled  -Change linens as needed when moist or perspiring  -Avoid sitting or lying in one position for more than 2 hours while in bed    Toileting:  -Offer bedside commode    Activity:  -Mobilize patient 3 times a day  -Encourage activity and  walks on unit  -Encourage or provide ROM exercises   -Turn and reposition patient every 2 Hours  -Use appropriate equipment to lift or move patient in bed    Skin Care:  -Avoid use of baby powder, tape, friction and shearing, hot water or constrictive clothing  -Do not massage red bony areas      Outcome: Progressing  Goal: Incision(s), wounds(s) or drain site(s) healing without S/S of infection  Description: INTERVENTIONS  - Assess and document dressing, incision, wound bed, drain sites and surrounding tissue  - Provide patient and family education  Outcome: Progressing  Goal: Pressure injury heals and does not worsen  Description: Interventions:  - Implement low air loss mattress or specialty surface (Criteria met)  - Apply silicone foam dressing  - Instruct/assist with weight shifting every 30 minutes when in chair   - Limit chair time to 2 hour intervals  - Use special pressure reducing interventions such as offloading cushion when in chair   - Apply fecal or urinary incontinence containment device   - Turn and reposition patient & offload bony prominences every 2 hours   - Utilize friction reducing device or surface for transfers   - Consider nutrition services referral as needed  Outcome: Progressing     Problem: HEMATOLOGIC - ADULT  Goal: Maintains hematologic stability  Description: INTERVENTIONS  - Assess for signs and symptoms of bleeding or hemorrhage  - Monitor labs  - Administer supportive blood products/factors as ordered and appropriate  Outcome: Progressing     Problem: MUSCULOSKELETAL - ADULT  Goal: Maintain or return mobility to safest level of function  Description: INTERVENTIONS:  - Assess patient's ability to carry out ADLs; assess patient's baseline for ADL function and identify physical deficits which impact ability to perform ADLs (bathing, care of mouth/teeth, toileting, grooming, dressing, etc.)  - Assess/evaluate cause of self-care deficits   - Assess range of motion  - Assess patient's  mobility  - Assess patient's need for assistive devices and provide as appropriate  - Encourage maximum independence but intervene and supervise when necessary  - Involve family in performance of ADLs  - Assess for home care needs following discharge   - Consider OT consult to assist with ADL evaluation and planning for discharge  - Provide patient education as appropriate  Outcome: Progressing  Goal: Maintain proper alignment of affected body part  Description: INTERVENTIONS:  - Support, maintain and protect limb and body alignment  - Provide patient/ family with appropriate education  Outcome: Progressing     Problem: Prexisting or High Potential for Compromised Skin Integrity  Goal: Skin integrity is maintained or improved  Description: INTERVENTIONS:  - Identify patients at risk for skin breakdown  - Assess and monitor skin integrity  - Assess and monitor nutrition and hydration status  - Monitor labs   - Assess for incontinence   - Turn and reposition patient  - Assist with mobility/ambulation  - Relieve pressure over bony prominences  - Avoid friction and shearing  - Provide appropriate hygiene as needed including keeping skin clean and dry  - Evaluate need for skin moisturizer/barrier cream  - Collaborate with interdisciplinary team   - Patient/family teaching  - Consider wound care consult   Outcome: Progressing

## 2024-11-28 NOTE — PROGRESS NOTES
Progress Note - Hospitalist   Name: Alejandro Adames 69 y.o. male I MRN: [id number]  Unit/Bed#: E5 -01 I Date of Admission: 11/27/2024   Date of Service: 11/28/2024 I Hospital Day: 1     Assessment & Plan  DKA (diabetic ketoacidosis) ([facility])  Lab Results   Component Value Date    HGBA1C 9.8 (H) 08/28/2024     Recent Labs     11/27/24  1616 11/27/24  2042 11/28/24  0725 11/28/24  1119   POCGLU 109 187* 300* 244*     Blood Sugar Average: Last 72 hrs:  (P) 228.9689274069560202    69-year-old male with PMH of type 2 diabetes, metastatic renal cell carcinoma, prostate cancer, HTN and CKD presenting with nausea vomiting and found to be in DKA  Initially admitted under ICU care on 11/27, transferred to [facility] same day  Did require insulin drip, transition to basal bolus once and gap resolved  Appetite improving, titrating basal bolus insulin  Had previously been hesitant regarding starting insulin, on metformin and Ozempic PTA  Will need insulin on discharge  Endocrine suspect possibly worsening diabetes in setting of Keytruda    Essential hypertension  Continue amlodipine 10 mg daily  Blood pressure currently controlled  Type 2 diabetes mellitus with kidney complication, without long-term current use of insulin ([facility])  Lab Results   Component Value Date    HGBA1C 9.8 (H) 08/28/2024       Recent Labs     11/27/24  1616 11/27/24  2042 11/28/24  0725 11/28/24  1119   POCGLU 109 187* 300* 244*       Blood Sugar Average: Last 72 hrs:  (P) 228.2705145405240584    Previously on metformin, Ozempic  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  Increase Lantus to 25 units, Humalog 8 units 3 times daily with meals  Will need insulin prescription and education on discharge  Follow outpatient with endocrinology  JULIO (acute kidney injury) ([facility])  Renal functions improved, near baseline  Treated with IV fluids  Metastatic renal cell carcinoma to  bone (HCC)  Following with oncology  Currently on Keytruda and Lenvima every 6 weeks  Continue follow-up with urology at [facility]  Severe protein-calorie malnutrition (HCC)  Malnutrition Findings:   Adult Malnutrition type: Chronic illness  Adult Degree of Malnutrition: Other severe protein calorie malnutrition  Malnutrition Characteristics: Inadequate energy, Weight loss    360 Statement: Severe protein calorie malnutrition in context of chronic illness r/t poor appetite, inadequate PO intake, increased needs for cancer as evidenced by energy intake less than 75% compared to estimated needs>1 month, 14% wt loss x 6 months; Treated with PO diet and oral supplements    BMI Findings:     Body mass index is 21.73 kg/m².     Had discussion with patient, brother regarding poor appetite, renal cell cancer and concerns for failure to thrive.  States that he overall feels better today, agreeable continue Marinol.  Had discussion regarding goals of care, feeding tube.  Currently declining hospice.  Able to tolerate breakfast and lunch today.    VTE Pharmacologic Prophylaxis:   Pharmacologic: Heparin  Mechanical VTE Prophylaxis in Place: Yes    Current Length of Stay: 1 day(s)    Current Patient Status: Inpatient   Certification Statement: The patient will continue to require additional inpatient hospital stay due to PT/OT evaluation, monitor BG    Discharge Plan: pending    Code Status: Level 3 - DNAR and DNI      Subjective:   No events overnight. Reports appetite improving. Tolerating diet. Called and updated brother on phone.    Objective:     Vitals:   Temp (24hrs), Av.1 °F (37.3 °C), Min:97.6 °F (36.4 °C), Max:100.5 °F (38.1 °C)    Temp:  [97.6 °F (36.4 °C)-100.5 °F (38.1 °C)] 97.6 °F (36.4 °C)  HR:  [] 85  Resp:  [16-29] 16  BP: (125-145)/(62-73) 125/73  SpO2:  [94 %-98 %] 96 %  Body mass index is 21.73 kg/m².     Input and Output Summary (last 24 hours):       Intake/Output Summary (Last 24 hours) at  11/28/2024 1505  Last data filed at 11/28/2024 1220  Gross per 24 hour   Intake 2352.91 ml   Output 1825 ml   Net 527.91 ml       Physical Exam:     Physical Exam  Vitals and nursing note reviewed.   Constitutional:       Appearance: Normal appearance. He is obese.   HENT:      Head: Normocephalic.   Eyes:      Conjunctiva/sclera: Conjunctivae normal.   Cardiovascular:      Rate and Rhythm: Normal rate.   Pulmonary:      Effort: Pulmonary effort is normal.      Breath sounds: No wheezing.   Abdominal:      General: Bowel sounds are normal. There is no distension.      Palpations: Abdomen is soft.   Musculoskeletal:         General: No swelling.      Right lower leg: No edema.      Left lower leg: No edema.   Skin:     General: Skin is warm and dry.   Neurological:      General: No focal deficit present.      Mental Status: He is alert. Mental status is at baseline.         Additional Data:     Labs:    Results from last 7 days   Lab Units 11/27/24  0042   WBC Thousand/uL 19.39*   HEMOGLOBIN g/dL 11.4*   HEMATOCRIT % 36.4*   PLATELETS Thousands/uL 543*   BANDS PCT % 6   LYMPHO PCT % 2*   MONO PCT % 0*   EOS PCT % 0     Results from last 7 days   Lab Units 11/28/24  0922 11/27/24  0418 11/27/24  0042   SODIUM mmol/L 131*  131*   < > 129*   POTASSIUM mmol/L 3.5  3.6   < > 5.2   CHLORIDE mmol/L 102  103   < > 94*   CO2 mmol/L 17*  17*   < > 9*   BUN mg/dL 39*  39*   < > 57*   CREATININE mg/dL 1.51*  1.48*   < > 2.05*   ANION GAP mmol/L 12  11   < > 26*   CALCIUM mg/dL 8.3*  8.3*   < > 9.9   ALBUMIN g/dL  --   --  3.3*   TOTAL BILIRUBIN mg/dL  --   --  0.31   ALK PHOS U/L  --   --  163*   ALT U/L  --   --  13   AST U/L  --   --  13   GLUCOSE RANDOM mg/dL 284*  287*   < > 500*    < > = values in this interval not displayed.         Results from last 7 days   Lab Units 11/28/24  1119 11/28/24  0725 11/27/24  2042 11/27/24  1616 11/27/24  1512 11/27/24  1442 11/27/24  1333 11/27/24  1223 11/27/24  1132  11/27/24  1031 11/27/24  0927 11/27/24  0824   POC GLUCOSE mg/dl 244* 300* 187* 109 127 149* 147* 134 165* 127 98 113         Results from last 7 days   Lab Units 11/27/24  0042   LACTIC ACID mmol/L 1.0       * I Have Reviewed All Lab Data Listed Above.  * Additional Pertinent Lab Tests Reviewed: All Labs For Current Hospital Admission Reviewed    Mobility:  Basic Mobility Inpatient Raw Score: 14  -[facility] Goal: 4: Move to chair/commode  -[facility] Achieved: 4: Move to chair/commode    Lines:     Invasive Devices       Peripheral Intravenous Line  Duration             Peripheral IV 11/27/24 Dorsal (posterior);Right Hand 1 day    Peripheral IV 11/27/24 Left Antecubital 1 day    Peripheral IV 11/27/24 Left;Upper;Ventral (anterior) Arm 1 day    Peripheral IV 11/27/24 Right Antecubital 1 day              Drain  Duration             Percutaneous Nephroureteral Tube (PCNU) Left 1 10 Fr 26 Cm 9 days                       Imaging:    Imaging Reports Reviewed Today Include:     XR chest 1 view portable  Result Date: 11/27/2024  Impression: No acute cardiopulmonary disease. Workstation performed: [id number]     Recent Cultures (last 7 days):           Last 24 Hours Medication List:   Current Facility-Administered Medications   Medication Dose Route Frequency Provider Last Rate    acetaminophen  975 mg Oral Q6H PRN JEREMIAH Lilly      aluminum-magnesium hydroxide-simethicone  30 mL Oral Q4H PRN JEREMIAH Lilly      amLODIPine  10 mg Oral Daily JEREMIAH Lilly      atorvastatin  40 mg Oral Daily With Dinner JEREMIAH Lilly      chlorhexidine  15 mL Mouth/Throat Q12H [facility] JEREMIAH Lilly      dronabinol  5 mg Oral BID AC JEREMIAH Lilly      folic acid  1 mg Oral Daily JEREMIAH Lilly      heparin (porcine)  5,000 Units Subcutaneous Q8H [facility] JEREMIAH Lilly      insulin glargine  25 Units Subcutaneous HS Johnathan Lira MD      insulin lispro  1-5 Units Subcutaneous HS Bud CHICAS  JEREMIAH Zamudio      insulin lispro  2-12 Units Subcutaneous TID AC JEREMIAH Lilly      insulin lispro  8 Units Subcutaneous TID With Meals Johnathan Lira MD      senna-docusate sodium  1 tablet Oral BID PRN JEREMIAH Lilly          Today, Patient Was Seen By: Johnathan Lira MD    ** Please Note: Dictation voice to text software may have been used in the creation of this document. **

## 2024-11-28 NOTE — ASSESSMENT & PLAN NOTE
Following with oncology  Currently on Keytruda and Lenvima every 6 weeks  Continue follow-up with urology at [facility]

## 2024-11-28 NOTE — MALNUTRITION/BMI
This medical record reflects one or more clinical indicators suggestive of malnutrition and/or morbid obesity.    Malnutrition Findings:   Adult Malnutrition type: Chronic illness  Adult Degree of Malnutrition: Other severe protein calorie malnutrition  Malnutrition Characteristics: Inadequate energy, Weight loss                360 Statement: Severe protein calorie malnutrition in context of chronic illness r/t poor appetite, inadequate PO intake, increased needs for cancer as evidenced by energy intake less than 75% compared to estimated needs>1 month, 14% wt loss x 6 months; Treated with PO diet and oral supplements    BMI Findings:           Body mass index is 21.73 kg/m².     See Nutrition note dated 11/28/24 for additional details.  Completed nutrition assessment is viewable in the nutrition documentation.

## 2024-11-28 NOTE — ASSESSMENT & PLAN NOTE
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  Increase Lantus to 25 units, Humalog 8 units 3 times daily with meals  Will need insulin prescription and education on discharge  Follow outpatient with endocrinology

## 2024-11-28 NOTE — ASSESSMENT & PLAN NOTE
Malnutrition Findings:   Adult Malnutrition type: Chronic illness  Adult Degree of Malnutrition: Other severe protein calorie malnutrition  Malnutrition Characteristics: Inadequate energy, Weight loss    360 Statement: Severe protein calorie malnutrition in context of chronic illness r/t poor appetite, inadequate PO intake, increased needs for cancer as evidenced by energy intake less than 75% compared to estimated needs>1 month, 14% wt loss x 6 months; Treated with PO diet and oral supplements    BMI Findings:     Body mass index is 21.73 kg/m².     Had discussion with patient, brother regarding poor appetite, renal cell cancer and concerns for failure to thrive.  States that he overall feels better today, agreeable continue Marinol.  Had discussion regarding goals of care, feeding tube.  Currently declining hospice.  Able to tolerate breakfast and lunch today.

## 2024-11-28 NOTE — PLAN OF CARE
Problem: PAIN - ADULT  Goal: Verbalizes/displays adequate comfort level or baseline comfort level  Description: Interventions:  - Encourage patient to monitor pain and request assistance  - Assess pain using appropriate pain scale  - Administer analgesics based on type and severity of pain and evaluate response  - Implement non-pharmacological measures as appropriate and evaluate response  - Consider cultural and social influences on pain and pain management  - Notify physician/advanced practitioner if interventions unsuccessful or patient reports new pain  Outcome: Progressing     Problem: INFECTION - ADULT  Goal: Absence or prevention of progression during hospitalization  Description: INTERVENTIONS:  - Assess and monitor for signs and symptoms of infection  - Monitor lab/diagnostic results  - Monitor all insertion sites, i.e. indwelling lines, tubes, and drains  - Monitor endotracheal if appropriate and nasal secretions for changes in amount and color  - [place] appropriate cooling/warming therapies per order  - Administer medications as ordered  - Instruct and encourage patient and family to use good hand hygiene technique  - Identify and instruct in appropriate isolation precautions for identified infection/condition  Outcome: Progressing     Problem: SAFETY ADULT  Goal: Patient will remain free of falls  Description: INTERVENTIONS:  - Educate patient/family on patient safety including physical limitations  - Instruct patient to call for assistance with activity   - Consult OT/PT to assist with strengthening/mobility   - Keep Call bell within reach  - Keep bed low and locked with side rails adjusted as appropriate  - Keep care items and personal belongings within reach  - Initiate and maintain comfort rounds  - Make Fall Risk Sign visible to staff  - Offer Toileting every 2 Hours, in advance of need  - Initiate/Maintain bed alarm  - Apply yellow socks and bracelet for high fall risk patients  - Consider  moving patient to room near nurses station  Outcome: Progressing  Goal: Maintain or return to baseline ADL function  Description: INTERVENTIONS:  -  Assess patient's ability to carry out ADLs; assess patient's baseline for ADL function and identify physical deficits which impact ability to perform ADLs (bathing, care of mouth/teeth, toileting, grooming, dressing, etc.)  - Assess/evaluate cause of self-care deficits   - Assess range of motion  - Assess patient's mobility; develop plan if impaired  - Assess patient's need for assistive devices and provide as appropriate  - Encourage maximum independence but intervene and supervise when necessary  - Involve family in performance of ADLs  - Assess for home care needs following discharge   - Consider OT consult to assist with ADL evaluation and planning for discharge  - Provide patient education as appropriate  Outcome: Progressing  Goal: Maintains/Returns to pre admission functional level  Description: INTERVENTIONS:  - Perform AM-PAC 6 Click Basic Mobility/ Daily Activity assessment daily.  - Set and communicate daily mobility goal to care team and patient/family/caregiver.   - Collaborate with rehabilitation services on mobility goals if consulted  - Perform Range of Motion 3 times a day.  - Reposition patient every 2 hours.  - Dangle patient 3 times a day  - Stand patient 3 times a day  - Ambulate patient 3 times a day  - Out of bed to chair 3 times a day   - Out of bed for meals 3 times a day  - Out of bed for toileting  - Record patient progress and toleration of activity level   Outcome: Progressing     Problem: DISCHARGE PLANNING  Goal: Discharge to home or other facility with appropriate resources  Description: INTERVENTIONS:  - Identify barriers to discharge w/patient and caregiver  - Arrange for needed discharge resources and transportation as appropriate  - Identify discharge learning needs (meds, wound care, etc.)  - Arrange for interpretive services to  assist at discharge as needed  - Refer to Case Management Department for coordinating discharge planning if the patient needs post-hospital services based on physician/advanced practitioner order or complex needs related to functional status, cognitive ability, or social support system  Outcome: Progressing     Problem: Knowledge Deficit  Goal: Patient/family/caregiver demonstrates understanding of disease process, treatment plan, medications, and discharge instructions  Description: Complete learning assessment and assess knowledge base.  Interventions:  - Provide teaching at level of understanding  - Provide teaching via preferred learning methods  Outcome: Progressing     Problem: Nutrition/Hydration-ADULT  Goal: Nutrient/Hydration intake appropriate for improving, restoring or maintaining nutritional needs  Description: Monitor and assess patient's nutrition/hydration status for malnutrition. Collaborate with interdisciplinary team and initiate plan and interventions as ordered.  Monitor patient's weight and dietary intake as ordered or per policy. Utilize nutrition screening tool and intervene as necessary. Determine patient's food preferences and provide high-protein, high-caloric foods as appropriate.     INTERVENTIONS:  - Monitor oral intake, urinary output, labs, and treatment plans  - Assess nutrition and hydration status and recommend course of action  - Evaluate amount of meals eaten  - Assist patient with eating if necessary   - Allow adequate time for meals  - Recommend/ encourage appropriate diets, oral nutritional supplements, and vitamin/mineral supplements  - Order, calculate, and assess calorie counts as needed  - Recommend, monitor, and adjust tube feedings and TPN/PPN based on assessed needs  - Assess need for intravenous fluids  - Provide specific nutrition/hydration education as appropriate  - Include patient/family/caregiver in decisions related to nutrition  Outcome: Progressing     Problem:  NEUROSENSORY - ADULT  Goal: Achieves stable or improved neurological status  Description: INTERVENTIONS  - Monitor and report changes in neurological status  - Monitor vital signs such as temperature, blood pressure, glucose, and any other labs ordered   - Initiate measures to prevent increased intracranial pressure  - Monitor for seizure activity and implement precautions if appropriate      Outcome: Progressing  Goal: Remains free of injury related to seizures activity  Description: INTERVENTIONS  - Maintain airway, patient safety  and administer oxygen as ordered  - Monitor patient for seizure activity, document and report duration and description of seizure to physician/advanced practitioner  - If seizure occurs,  ensure patient safety during seizure  - Reorient patient post seizure  - Seizure pads on all 4 side rails  - Instruct patient/family to notify RN of any seizure activity including if an aura is experienced  - Instruct patient/family to call for assistance with activity based on nursing assessment  - Administer anti-seizure medications if ordered    Outcome: Progressing  Goal: Achieves maximal functionality and self care  Description: INTERVENTIONS  - Monitor swallowing and airway patency with patient fatigue and changes in neurological status  - Encourage and assist patient to increase activity and self care.   - Encourage visually impaired, hearing impaired and aphasic patients to use assistive/communication devices  Outcome: Progressing     Problem: CARDIOVASCULAR - ADULT  Goal: Maintains optimal cardiac output and hemodynamic stability  Description: INTERVENTIONS:  - Monitor I/O, vital signs and rhythm  - Monitor for S/S and trends of decreased cardiac output  - Administer and titrate ordered vasoactive medications to optimize hemodynamic stability  - Assess quality of pulses, skin color and temperature  - Assess for signs of decreased coronary artery perfusion  - Instruct patient to report change  in severity of symptoms  Outcome: Progressing  Goal: Absence of cardiac dysrhythmias or at baseline rhythm  Description: INTERVENTIONS:  - Continuous cardiac monitoring, vital signs, obtain 12 lead EKG if ordered  - Administer antiarrhythmic and heart rate control medications as ordered  - Monitor electrolytes and administer replacement therapy as ordered  Outcome: Progressing     Problem: RESPIRATORY - ADULT  Goal: Achieves optimal ventilation and oxygenation  Description: INTERVENTIONS:  - Assess for changes in respiratory status  - Assess for changes in mentation and behavior  - Position to facilitate oxygenation and minimize respiratory effort  - Oxygen administered by appropriate delivery if ordered  - Initiate smoking cessation education as indicated  - Encourage broncho-pulmonary hygiene including cough, deep breathe, Incentive Spirometry  - Assess the need for suctioning and aspirate as needed  - Assess and instruct to report SOB or any respiratory difficulty  - Respiratory Therapy support as indicated  Outcome: Progressing     Problem: GASTROINTESTINAL - ADULT  Goal: Minimal or absence of nausea and/or vomiting  Description: INTERVENTIONS:  - Administer IV fluids if ordered to ensure adequate hydration  - Maintain NPO status until nausea and vomiting are resolved  - Nasogastric tube if ordered  - Administer ordered antiemetic medications as needed  - Provide nonpharmacologic comfort measures as appropriate  - Advance diet as tolerated, if ordered  - Consider nutrition services referral to assist patient with adequate nutrition and appropriate food choices  Outcome: Progressing  Goal: Maintains or returns to baseline bowel function  Description: INTERVENTIONS:  - Assess bowel function  - Encourage oral fluids to ensure adequate hydration  - Administer IV fluids if ordered to ensure adequate hydration  - Administer ordered medications as needed  - Encourage mobilization and activity  - Consider nutritional  services referral to assist patient with adequate nutrition and appropriate food choices  Outcome: Progressing  Goal: Maintains adequate nutritional intake  Description: INTERVENTIONS:  - Monitor percentage of each meal consumed  - Identify factors contributing to decreased intake, treat as appropriate  - Assist with meals as needed  - Monitor I&O, weight, and lab values if indicated  - Obtain nutrition services referral as needed  Outcome: Progressing  Goal: Establish and maintain optimal ostomy function  Description: INTERVENTIONS:  - Assess bowel function  - Encourage oral fluids to ensure adequate hydration  - Administer IV fluids if ordered to ensure adequate hydration   - Administer ordered medications as needed  - Encourage mobilization and activity  - Nutrition services referral to assist patient with appropriate food choices  - Assess stoma site  - Consider wound care consult   Outcome: Progressing  Goal: Oral mucous membranes remain intact  Description: INTERVENTIONS  - Assess oral mucosa and hygiene practices  - Implement preventative oral hygiene regimen  - Implement oral medicated treatments as ordered  - Initiate Nutrition services referral as needed  Outcome: Progressing     Problem: GENITOURINARY - ADULT  Goal: Maintains or returns to baseline urinary function  Description: INTERVENTIONS:  - Assess urinary function  - Encourage oral fluids to ensure adequate hydration if ordered  - Administer IV fluids as ordered to ensure adequate hydration  - Administer ordered medications as needed  - Offer frequent toileting  - Follow urinary retention protocol if ordered  Outcome: Progressing  Goal: Absence of urinary retention  Description: INTERVENTIONS:  - Assess patient’s ability to void and empty bladder  - Monitor I/O  - Bladder scan as needed  - Discuss with physician/AP medications to alleviate retention as needed  - Discuss catheterization for long term situations as appropriate  Outcome:  Progressing  Goal: Urinary catheter remains patent  Description: INTERVENTIONS:  - Assess patency of urinary catheter  - If patient has a chronic cummings, consider changing catheter if non-functioning  - Follow guidelines for intermittent irrigation of non-functioning urinary catheter  Outcome: Progressing     Problem: METABOLIC, FLUID AND ELECTROLYTES - ADULT  Goal: Electrolytes maintained within normal limits  Description: INTERVENTIONS:  - Monitor labs and assess patient for signs and symptoms of electrolyte imbalances  - Administer electrolyte replacement as ordered  - Monitor response to electrolyte replacements, including repeat lab results as appropriate  - Instruct patient on fluid and nutrition as appropriate  Outcome: Progressing  Goal: Fluid balance maintained  Description: INTERVENTIONS:  - Monitor labs   - Monitor I/O and WT  - Instruct patient on fluid and nutrition as appropriate  - Assess for signs & symptoms of volume excess or deficit  Outcome: Progressing  Goal: Glucose maintained within target range  Description: INTERVENTIONS:  - Monitor Blood Glucose as ordered  - Assess for signs and symptoms of hyperglycemia and hypoglycemia  - Administer ordered medications to maintain glucose within target range  - Assess nutritional intake and initiate nutrition service referral as needed  Outcome: Progressing     Problem: SKIN/TISSUE INTEGRITY - ADULT  Goal: Skin Integrity remains intact(Skin Breakdown Prevention)  Description: Assess:  -Perform [place] assessment every shift  -Inspect skin when repositioning, toileting, and assisting with ADLS  -Assess extremities for adequate circulation and sensation     Bed Management:  -Have minimal linens on bed & keep smooth, unwrinkled  -Change linens as needed when moist or perspiring  -Avoid sitting or lying in one position for more than 2 hours while in bed    Toileting:  -Offer bedside commode    Activity:  -Mobilize patient 3 times a day  -Encourage activity and  walks on unit  -Encourage or provide ROM exercises   -Turn and reposition patient every 2 Hours  -Use appropriate equipment to lift or move patient in bed    Skin Care:  -Avoid use of baby powder, tape, friction and shearing, hot water or constrictive clothing  -Do not massage red bony areas      Outcome: Progressing  Goal: Incision(s), wounds(s) or drain site(s) healing without S/S of infection  Description: INTERVENTIONS  - Assess and document dressing, incision, wound bed, drain sites and surrounding tissue  - Provide patient and family education  Outcome: Progressing  Goal: Pressure injury heals and does not worsen  Description: Interventions:  - Implement low air loss mattress or specialty surface (Criteria met)  - Apply silicone foam dressing  - Instruct/assist with weight shifting every 30 minutes when in chair   - Limit chair time to 2 hour intervals  - Use special pressure reducing interventions such as offloading cushion when in chair   - Apply fecal or urinary incontinence containment device   - Turn and reposition patient & offload bony prominences every 2 hours   - Utilize friction reducing device or surface for transfers   - Consider nutrition services referral as needed  Outcome: Progressing     Problem: HEMATOLOGIC - ADULT  Goal: Maintains hematologic stability  Description: INTERVENTIONS  - Assess for signs and symptoms of bleeding or hemorrhage  - Monitor labs  - Administer supportive blood products/factors as ordered and appropriate  Outcome: Progressing     Problem: MUSCULOSKELETAL - ADULT  Goal: Maintain or return mobility to safest level of function  Description: INTERVENTIONS:  - Assess patient's ability to carry out ADLs; assess patient's baseline for ADL function and identify physical deficits which impact ability to perform ADLs (bathing, care of mouth/teeth, toileting, grooming, dressing, etc.)  - Assess/evaluate cause of self-care deficits   - Assess range of motion  - Assess patient's  mobility  - Assess patient's need for assistive devices and provide as appropriate  - Encourage maximum independence but intervene and supervise when necessary  - Involve family in performance of ADLs  - Assess for home care needs following discharge   - Consider OT consult to assist with ADL evaluation and planning for discharge  - Provide patient education as appropriate  Outcome: Progressing  Goal: Maintain proper alignment of affected body part  Description: INTERVENTIONS:  - Support, maintain and protect limb and body alignment  - Provide patient/ family with appropriate education  Outcome: Progressing     Problem: Prexisting or High Potential for Compromised Skin Integrity  Goal: Skin integrity is maintained or improved  Description: INTERVENTIONS:  - Identify patients at risk for skin breakdown  - Assess and monitor skin integrity  - Assess and monitor nutrition and hydration status  - Monitor labs   - Assess for incontinence   - Turn and reposition patient  - Assist with mobility/ambulation  - Relieve pressure over bony prominences  - Avoid friction and shearing  - Provide appropriate hygiene as needed including keeping skin clean and dry  - Evaluate need for skin moisturizer/barrier cream  - Collaborate with interdisciplinary team   - Patient/family teaching  - Consider wound care consult   Outcome: Progressing

## 2024-11-29 LAB
ANION GAP SERPL CALCULATED.3IONS-SCNC: 10 MMOL/L (ref 4–13)
ANION GAP SERPL CALCULATED.3IONS-SCNC: 7 MMOL/L (ref 4–13)
ANION GAP SERPL CALCULATED.3IONS-SCNC: 8 MMOL/L (ref 4–13)
BUN SERPL-MCNC: 39 MG/DL (ref 5–25)
BUN SERPL-MCNC: 40 MG/DL (ref 5–25)
BUN SERPL-MCNC: 41 MG/DL (ref 5–25)
CALCIUM SERPL-MCNC: 7.4 MG/DL (ref 8.4–10.2)
CALCIUM SERPL-MCNC: 7.5 MG/DL (ref 8.4–10.2)
CALCIUM SERPL-MCNC: 7.5 MG/DL (ref 8.4–10.2)
CHLORIDE SERPL-SCNC: 105 MMOL/L (ref 96–108)
CHLORIDE SERPL-SCNC: 106 MMOL/L (ref 96–108)
CHLORIDE SERPL-SCNC: 106 MMOL/L (ref 96–108)
CO2 SERPL-SCNC: 17 MMOL/L (ref 21–32)
CO2 SERPL-SCNC: 18 MMOL/L (ref 21–32)
CO2 SERPL-SCNC: 20 MMOL/L (ref 21–32)
CREAT SERPL-MCNC: 1.58 MG/DL (ref 0.6–1.3)
CREAT SERPL-MCNC: 1.61 MG/DL (ref 0.6–1.3)
CREAT SERPL-MCNC: 1.65 MG/DL (ref 0.6–1.3)
ERYTHROCYTE [DISTWIDTH] IN BLOOD BY AUTOMATED COUNT: 14.4 % (ref 11.6–15.1)
GFR SERPL CREATININE-BSD FRML MDRD: 41 ML/MIN/1.73SQ M
GFR SERPL CREATININE-BSD FRML MDRD: 42 ML/MIN/1.73SQ M
GFR SERPL CREATININE-BSD FRML MDRD: 43 ML/MIN/1.73SQ M
GLUCOSE SERPL-MCNC: 101 MG/DL (ref 65–140)
GLUCOSE SERPL-MCNC: 140 MG/DL (ref 65–140)
GLUCOSE SERPL-MCNC: 154 MG/DL (ref 65–140)
GLUCOSE SERPL-MCNC: 183 MG/DL (ref 65–140)
GLUCOSE SERPL-MCNC: 201 MG/DL (ref 65–140)
GLUCOSE SERPL-MCNC: 214 MG/DL (ref 65–140)
GLUCOSE SERPL-MCNC: 237 MG/DL (ref 65–140)
HCT VFR BLD AUTO: 30.7 % (ref 36.5–49.3)
HGB BLD-MCNC: 10 G/DL (ref 12–17)
MCH RBC QN AUTO: 27.6 PG (ref 26.8–34.3)
MCHC RBC AUTO-ENTMCNC: 32.6 G/DL (ref 31.4–37.4)
MCV RBC AUTO: 85 FL (ref 82–98)
PLATELET # BLD AUTO: 204 THOUSANDS/UL (ref 149–390)
PMV BLD AUTO: 10.1 FL (ref 8.9–12.7)
POTASSIUM SERPL-SCNC: 3.2 MMOL/L (ref 3.5–5.3)
POTASSIUM SERPL-SCNC: 3.5 MMOL/L (ref 3.5–5.3)
POTASSIUM SERPL-SCNC: 3.7 MMOL/L (ref 3.5–5.3)
RBC # BLD AUTO: 3.62 MILLION/UL (ref 3.88–5.62)
SODIUM SERPL-SCNC: 132 MMOL/L (ref 135–147)
SODIUM SERPL-SCNC: 132 MMOL/L (ref 135–147)
SODIUM SERPL-SCNC: 133 MMOL/L (ref 135–147)
WBC # BLD AUTO: 14.58 THOUSAND/UL (ref 4.31–10.16)

## 2024-11-29 PROCEDURE — 80048 BASIC METABOLIC PNL TOTAL CA: CPT | Performed by: NURSE PRACTITIONER

## 2024-11-29 PROCEDURE — 97166 OT EVAL MOD COMPLEX 45 MIN: CPT

## 2024-11-29 PROCEDURE — 85027 COMPLETE CBC AUTOMATED: CPT | Performed by: STUDENT IN AN ORGANIZED HEALTH CARE EDUCATION/TRAINING PROGRAM

## 2024-11-29 PROCEDURE — 99232 SBSQ HOSP IP/OBS MODERATE 35: CPT | Performed by: INTERNAL MEDICINE

## 2024-11-29 PROCEDURE — 97163 PT EVAL HIGH COMPLEX 45 MIN: CPT

## 2024-11-29 PROCEDURE — 82948 REAGENT STRIP/BLOOD GLUCOSE: CPT

## 2024-11-29 RX ORDER — AMLODIPINE BESYLATE 5 MG/1
5 TABLET ORAL DAILY
Status: DISCONTINUED | OUTPATIENT
Start: 2024-11-30 | End: 2024-12-03 | Stop reason: HOSPADM

## 2024-11-29 RX ORDER — POTASSIUM CHLORIDE 1500 MG/1
40 TABLET, EXTENDED RELEASE ORAL ONCE
Status: COMPLETED | OUTPATIENT
Start: 2024-11-29 | End: 2024-11-29

## 2024-11-29 RX ADMIN — HEPARIN SODIUM 5000 UNITS: 5000 INJECTION INTRAVENOUS; SUBCUTANEOUS at 21:15

## 2024-11-29 RX ADMIN — ACETAMINOPHEN 975 MG: 325 TABLET, FILM COATED ORAL at 06:26

## 2024-11-29 RX ADMIN — FOLIC ACID 1 MG: 1 TABLET ORAL at 09:34

## 2024-11-29 RX ADMIN — INSULIN LISPRO 1 UNITS: 100 INJECTION, SOLUTION INTRAVENOUS; SUBCUTANEOUS at 21:11

## 2024-11-29 RX ADMIN — INSULIN GLARGINE 25 UNITS: 100 INJECTION, SOLUTION SUBCUTANEOUS at 21:11

## 2024-11-29 RX ADMIN — INSULIN LISPRO 2 UNITS: 100 INJECTION, SOLUTION INTRAVENOUS; SUBCUTANEOUS at 16:47

## 2024-11-29 RX ADMIN — DRONABINOL 5 MG: 2.5 CAPSULE ORAL at 06:23

## 2024-11-29 RX ADMIN — INSULIN LISPRO 2 UNITS: 100 INJECTION, SOLUTION INTRAVENOUS; SUBCUTANEOUS at 11:25

## 2024-11-29 RX ADMIN — HEPARIN SODIUM 5000 UNITS: 5000 INJECTION INTRAVENOUS; SUBCUTANEOUS at 14:08

## 2024-11-29 RX ADMIN — DRONABINOL 5 MG: 2.5 CAPSULE ORAL at 16:47

## 2024-11-29 RX ADMIN — HEPARIN SODIUM 5000 UNITS: 5000 INJECTION INTRAVENOUS; SUBCUTANEOUS at 06:26

## 2024-11-29 RX ADMIN — POTASSIUM CHLORIDE 40 MEQ: 1500 TABLET, EXTENDED RELEASE ORAL at 06:23

## 2024-11-29 RX ADMIN — INSULIN LISPRO 8 UNITS: 100 INJECTION, SOLUTION INTRAVENOUS; SUBCUTANEOUS at 16:47

## 2024-11-29 RX ADMIN — CHLORHEXIDINE GLUCONATE 15 ML: 1.2 RINSE ORAL at 09:34

## 2024-11-29 RX ADMIN — ACETAMINOPHEN 975 MG: 325 TABLET, FILM COATED ORAL at 12:31

## 2024-11-29 RX ADMIN — ATORVASTATIN CALCIUM 40 MG: 40 TABLET, FILM COATED ORAL at 16:47

## 2024-11-29 RX ADMIN — CHLORHEXIDINE GLUCONATE 15 ML: 1.2 RINSE ORAL at 21:15

## 2024-11-29 RX ADMIN — INSULIN LISPRO 8 UNITS: 100 INJECTION, SOLUTION INTRAVENOUS; SUBCUTANEOUS at 11:25

## 2024-11-29 NOTE — OCCUPATIONAL THERAPY NOTE
Occupational Therapy Evaluation     Patient Name: Alejandro Adames  Today's Date: 11/29/2024  Problem List  Principal Problem:    DKA (diabetic ketoacidosis) (HCC)  Active Problems:    Essential hypertension    Type 2 diabetes mellitus with kidney complication, without long-term current use of insulin (HCC)    JULIO (acute kidney injury) (HCC)    Metastatic renal cell carcinoma to bone (HCC)    Severe protein-calorie malnutrition (HCC)    Past Medical History  Past Medical History:   Diagnosis Date    Cutaneous skin tags 1/2/2019    Diabetes mellitus (HCC)     Hypertension      Past Surgical History  Past Surgical History:   Procedure Laterality Date    IR BIOPSY INGUINAL LYMPH NODE  10/19/2024    IR NEPHROSTOMY TO NEPHROURETERAL STENT  11/19/2024    IR NEPHROSTOMY TUBE PLACEMENT  10/19/2024      11/29/24 0905   OT Last Visit   OT Visit Date 11/29/24   Note Type   Note type Evaluation   Pain Assessment   Pain Assessment Tool FLACC   Pain Location/Orientation Location: Generalized   Pain Rating: FLACC (Rest) - Face 1   Pain Rating: FLACC (Rest) - Legs 0   Pain Rating: FLACC (Rest) - Activity 1   Pain Rating: FLACC (Rest) - Cry 1   Pain Rating: FLACC (Rest) - Consolability 1   Score: FLACC (Rest) 4   Pain Rating: FLACC (Activity) - Face 1   Pain Rating: FLACC (Activity) - Legs 0   Pain Rating: FLACC (Activity) - Activity 1   Pain Rating: FLACC (Activity) - Cry 1   Pain Rating: FLACC (Activity) - Consolability 1   Score: FLACC (Activity) 4   Restrictions/Precautions   Weight Bearing Precautions Per Order No   Other Precautions Fall Risk;Pain;Bed Alarm;Chair Alarm   Home Living   Type of Home House   Home Layout Two level;Bed/bath upstairs   Bathroom Shower/Tub Tub/shower unit   Bathroom Toilet Raised   Bathroom Equipment Grab bars in shower   Home Equipment   (denies)   Additional Comments Pt resides with brother in a 2SH with 2-3 ROSALINDA with rail.   Prior Function   Level of [place] Independent with  "ADLs;Independent with functional mobility;Independent with IADLS   Lives With Family  (brother)   Receives Help From Family   IADLs Independent with driving;Independent with meal prep;Independent with medication management  (Splits home tasks with brother)   Falls in the last 6 months 0   Vocational Retired  ()   Comments PTA, Pt reports being I with ADLs/functional mobility with no AD/splits IADL tasks/ +. Pt reports increasing weakness and difficulty doing tasks PTA.   Lifestyle   Autonomy I with ADLs/functional mobility with no AD/splits IADL tasks/ +   Reciprocal Relationships Supportive brother   Service to Others Retired-    Intrinsic Gratification Will continue to assess- Pt reports nothing at this time. Pt reports that he used to like shooting/hunting   Subjective   Subjective \"I am weak.\"   ADL   Where Assessed Edge of bed   Eating Assistance 5  Supervision/Setup   Grooming Assistance 5  Supervision/Setup   Grooming Deficit Setup;Wash/dry hands   UB Bathing Assistance 4  Minimal Assistance   LB Bathing Assistance 4  Minimal Assistance   UB Dressing Assistance 4  Minimal Assistance   LB Dressing Assistance 4  Minimal Assistance   Toileting Assistance  4  Minimal Assistance   Toileting Deficit Setup;Increased time to complete  (standing with RW)   Functional Assistance 4  Minimal Assistance   Functional Deficit Increased time to complete;Supervision/safety;Setup   Bed Mobility   Supine to Sit 4  Minimal assistance   Additional items Assist x 1;Increased time required;LE management;Verbal cues   Sit to Supine Unable to assess   Additional Comments Pt greeted supine in bed.   Transfers   Sit to Stand 4  Minimal assistance   Additional items Assist x 1;Increased time required;Verbal cues   Stand to Sit 4  Minimal assistance   Additional items Assist x 1;Increased time required;Verbal cues   Additional Comments with RW   Functional Mobility   Functional Mobility 4 "  Minimal assistance   Additional Comments Min AX1 with RW- to/from bathroom (Within room distances). Limited endurance.   Additional items Rolling walker   Balance   Static Sitting Good   Dynamic Sitting Fair   Static Standing Fair -   Dynamic Standing Poor +   Ambulatory Poor +   Activity Tolerance   Activity Tolerance Patient limited by fatigue   Medical Staff Made Aware RN cleared/updated. Portions of tx completed with DIANA Patiño 2* to Pt's medical complexity and decreased endurance.   Nurse Made Aware RN cleared/updated.   RUE Assessment   RUE Assessment WFL  (grossly 4/5)   LUE Assessment   LUE Assessment WFL  (grossly 4/5)   Hand Function   Gross Motor Coordination Functional   Fine Motor Coordination Functional   Sensation   Light Touch No apparent deficits   Psychosocial   Psychosocial (WDL) WDL   Cognition   Overall Cognitive Status WFL   Arousal/Participation Alert;Cooperative   Attention Attends with cues to redirect   Orientation Level Oriented to person;Oriented to place;Oriented to time   Memory Within functional limits   Following Commands Follows one step commands without difficulty   Comments Pt cooperative during OT session, however, with flat affect. Pt aware of functional deficits at this time.   Assessment   Limitation Decreased UE ROM;Decreased UE strength;Decreased ADL status;Decreased Safe judgement during ADL;Decreased cognition;Decreased endurance;Decreased self-care trans;Decreased high-level ADLs   Prognosis Fair   Assessment Pt is a 69 y.o. male who presented to [facility] on [facility] with N/V and elevated blood sugar. Pt with diagnosis of DKA, JULIO, metastatic renal cell carcinoma to bone, HTN, and DM II. Pt  has a past medical history of Cutaneous skin tags (1/2/2019), Diabetes mellitus (HCC), and Hypertension. Pt greeted bedside for OT evaluation on 11/29/24. Pt resides with brother in a 2SH with 2-3 ROSALINDA with rail. PTA, Pt reports being I with ADLs/functional mobility with no AD/splits  IADL tasks/ +. Pt reports increasing weakness and difficulty doing tasks PTA. Pt demonstrating the following occupational performance levels: Min A with UB ADLs, Min A with LB ADLs, Min A with bed mobility, Min A with functional transfers, and Min A with functional mobility with walker.Limitations that impact functional performance include decreased ADL status, UE ROM, UE strength, safe judgement during ADLs, cognition, endurance, self care transfers, and high level ADLs. Occupational performance areas to address ADL retraining, functional transfer training, UE strengthening/ROM, endurance training, cognitive reorientation, Pt/caregiver education, equipment evaluation/education, compensatory technique education, energy conservation, and activity engagement . Pt would benefit from continued skilled OT services while in hospital to maximize independence with ADLs. Will continue to follow Pt's progress. Pt would benefit from level II resources (moderate intensity) upon DC to maximize safety and independence with ADLs and functional tasks of choice.   Goals   Patient Goals To get better   LTG Time Frame 10-14   Long Term Goal #1 See goals listed below.   Plan   Treatment Interventions ADL retraining;Functional transfer training;Endurance training;UE strengthening/ROM;Cognitive reorientation;Patient/family training;Equipment evaluation/education;Compensatory technique education;Activityengagement;Energy conservation   Goal Expiration Date 12/13/24   OT Frequency 3-5x/wk   Discharge Recommendation   Rehab Resource Intensity Level, OT II (Moderate Resource Intensity)   Additional Comments  The patient's raw score on the -PAC Daily Activity Inpatient Short Form is 19. A raw score of greater than or equal to 19 suggests the patient may benefit from discharge to home. Please refer to the recommendation of the Occupational Therapist for safe discharge planning.   AM-PAC Daily Activity Inpatient   Lower Body Dressing 3    Bathing 3   Toileting 3   Upper Body Dressing 3   Grooming 3   Eating 4   Daily Activity Raw Score 19   Daily Activity Standardized Score (Calc for Raw Score >=11) 40.22   AM-PAC Applied Cognition Inpatient   Following a Speech/Presentation 4   Understanding Ordinary Conversation 4   Taking Medications 4   Remembering Where Things Are Placed or Put Away 4   Remembering List of 4-5 Errands 4   Taking Care of Complicated Tasks 4   Applied Cognition Raw Score 24   Applied Cognition Standardized Score 62.21   End of Consult   Education Provided Yes   Patient Position at End of Consult Bedside chair;Bed/Chair alarm activated;All needs within reach   Nurse Communication Nurse aware of consult       GOALS:  Pt will complete UB ADLs with I in order to maximize participation with ADLs.   Pt will complete LB ADLs with I in order to maximize safety with ADLs.   Pt will complete toileting routine (transfer, hygiene, and clothing management) with I in order to return to prior level of function.  Pt will complete bed mobility with I in order to maximize participation with ADLs.   Pt will complete functional transfers at I level in order to increase participation with ADLs.  Pt will increase dynamic standing balance to F+ in order to increase safety with ADLs.  Pt will increase standing tolerance x10 min in order to increase participation with ADLs.   Pt will complete functional mobility with AD PRN for item retrieval task at Mar[place] level in order to increase participation with ADLs.  Pt will complete IADL tasks/simulation of IADLs tasks with I in order to return to PLOF.  Pt will demonstrate G energy conservation techniques with ADLs/IADLs in order to reduce the risk of falls.  Pt will be attentive 100% of the time for ongoing functional/formal cognitive assessment to assist with safe dc planning prn.      Lelia Laguerre MS, OTR/L

## 2024-11-29 NOTE — CASE MANAGEMENT
Case Management Discharge Planning Note    Patient name Alejandro Adames  [facility]  /[facility] -* MRN [id number]  : 1955 Date 2024       Current Admission Date: 2024  Current Admission Diagnosis:DKA (diabetic ketoacidosis) (HCC)   Patient Active Problem List    Diagnosis Date Noted Date Diagnosed    Severe protein-calorie malnutrition (HCC) 2024     DKA (diabetic ketoacidosis) (HCC) 2024     Metastatic renal cell carcinoma to bone (HCC) 10/24/2024     Polyuria 10/21/2024     Retroperitoneal mass 10/19/2024     Hydronephrosis of left kidney 10/18/2024     JULIO (acute kidney injury) (HCC) 10/18/2024     Hyponatremia 10/18/2024     Lung nodule 10/18/2024     Patellofemoral pain syndrome of both knees 2023     Gait disorder 2023     Sebaceous cyst mid back 10/17/2022     COVID-19 2021     Anemia 2021     Intrinsic eczema 2020     SK (seborrheic keratosis) 2020     Prostate cancer (HCC) 2019     Type 2 diabetes mellitus with kidney complication, without long-term current use of insulin ([facility])      Actinic keratosis 2019     Idiopathic angioedema 2015     Essential hypertension 2012     Mixed hyperlipidemia 2012     Herpes simplex type 1 infection 05/10/2012       LOS (days): 2  Geometric Mean LOS (GMLOS) (days): 4.4  Days to GMLOS:1.8     OBJECTIVE:  Risk of Unplanned Readmission Score: 31.56         Current admission status: Inpatient   Preferred Pharmacy:   [id number] - [place] - [address]  [address] [place] [address]  Phone: [phone] Fax: [phone]    [facility] - [place], [place] - [address]  [address]  [place] [address]  Phone: [phone] Fax: [phone]    [facility] - [place] [facility] [address]  [place] [address]  Phone: [phone] Fax: [phone]    Primary  Care Provider: Wayne Uriarte Jr, MD    Primary Insurance: MEDICARE  Secondary Insurance: [facility]    DISCHARGE DETAILS:    Discharge planning discussed with:: patient  Freedom of Choice: Yes  Comments - Freedom of Choice: patient says she will choose from accepting facility list  CM contacted family/caregiver?: No- see comments (patient refused)     Treatment Team Recommendation: Short Term Rehab  Discharge Destination Plan:: Short Term Rehab  Transport at Discharge : Stretcher van, Wheelchair van        Additional Comments: Patient is agreeable to STR, patient will chosse from accepting facility list when one is available.    Referrals made in [place].

## 2024-11-29 NOTE — PHYSICAL THERAPY NOTE
PT EVALUATION 08:45-09:05    69 y.o.    [id number]    Weakness generalized [R53.1]  DKA (diabetic ketoacidosis) (HCC) [E11.10]  Metastatic renal cell carcinoma (HCC) [C64.9]    Past Medical History:   Diagnosis Date    Cutaneous skin tags 1/2/2019    Diabetes mellitus (HCC)     Hypertension          Past Surgical History:   Procedure Laterality Date    IR BIOPSY INGUINAL LYMPH NODE  10/19/2024    IR NEPHROSTOMY TO NEPHROURETERAL STENT  11/19/2024    IR NEPHROSTOMY TUBE PLACEMENT  10/19/2024        11/29/24 0845   PT Last Visit   PT Visit Date 11/29/24   Note Type   Note type Evaluation   Pain Assessment   Pain Location/Orientation Location: Generalized   Pain Rating: FLACC (Rest) - Face 1   Pain Rating: FLACC (Rest) - Legs 0   Pain Rating: FLACC (Rest) - Activity 1   Pain Rating: FLACC (Rest) - Cry 1   Pain Rating: FLACC (Rest) - Consolability 1   Score: FLACC (Rest) 4   Restrictions/Precautions   Weight Bearing Precautions Per Order No   Other Precautions Fall Risk;Pain;Multiple lines;Chair Alarm;Bed Alarm  (Masimo)   Home Living   Type of Home House   Home Layout Two level;Bed/bath upstairs  (2-3 ROSALINDA with rail.)   Bathroom Shower/Tub Tub/shower unit   Bathroom Toilet Raised  (comfort height.)   Bathroom Equipment Grab bars in shower   Bathroom Accessibility Accessible   Home Equipment Other (Comment)  (none)   Additional Comments resides with brother in 2 story home.   Prior Function   Level of [place] Independent with ADLs;Independent with functional mobility;Independent with IADLS   Lives With Family  (brother)   Receives Help From Family   IADLs Independent with driving;Independent with meal prep;Independent with medication management  (shares household tasks with brother)   Falls in the last 6 months 0   Vocational Retired  ()   Comments reports functional decline noted over last 3 weeks.  Typically shares housework with brother but of late brother performing more. Independent without  "AD use prior to admission but weakness reported. Stairs were getting difficult.   General   Additional Pertinent History Pt is 70 y/o male presents with DKA, JULIO. PT consulted.   Family/Caregiver Present No   Cognition   Overall Cognitive Status WFL   Arousal/Participation Alert   Orientation Level Oriented to person;Oriented to place;Oriented to time   Following Commands Follows one step commands without difficulty   Comments flat affect.   Subjective   Subjective \"I don't know how I will go home like this\"   RUE Assessment   RUE Assessment WFL  (as observed with functional reach and grasp)   LUE Assessment   LUE Assessment WFL  (as observed with functional reach and grasp)   RLE Assessment   RLE Assessment X  (grossly 3+/5, hip flexion 3-/5)   LLE Assessment   LLE Assessment X  (grossly 3+/5, hip flexion 3-/5)   Light Touch   RLE Light Touch Grossly intact   LLE Light Touch Grossly intact   Bed Mobility   Supine to Sit 4  Minimal assistance   Additional items Assist x 1;Increased time required;LE management;Verbal cues;Bedrails;HOB elevated   Additional Comments Increased time to complete transitions. Initial BP EOB 99/61.  p 5 minutes 102/57.   Transfers   Sit to Stand 4  Minimal assistance   Additional items Assist x 1;Increased time required;Verbal cues   Stand to Sit 4  Minimal assistance   Additional items Assist x 1;Increased time required;Verbal cues   Additional Comments Cues for hand placement. Increased time to complete.   Ambulation/Elevation   Gait pattern Improper Weight shift;Decreased foot clearance;Inconsistent lacie;Short stride;Excessively slow   Gait Assistance 4  Minimal assist   Additional items Assist x 1;Verbal cues;Tactile cues   Assistive Device Rolling walker   Distance AMb with RW 15'x1, 5'x1, then 25'x1.  GAit slowed, cues to remain inside ANDRIY of RW.   Ambulation/Elevation Additional Comments Gait slowed, decreased foot clearance, step legnth. Cues to remain inside ANDRIY of RW. "   Balance   Static Sitting Good   Dynamic Sitting Fair   Static Standing Fair -   Dynamic Standing Poor +   Ambulatory Poor +   Endurance Deficit   Endurance Deficit Yes   Endurance Deficit Description faituge, weakness.   Activity Tolerance   Activity Tolerance Patient limited by fatigue   Medical Staff Made Aware NurseLena.  Ashly:  Pt seen for co-evaluation/treatment with skilled Occupational Therapist 2* clinically unstable/unpredictable presentation, medical complexity, fall risk, functional/physical limitations, impaired functional balance, decreased safety awareness, limited activity tolerance which is decline from PLOF and may impact overall functional mobility/mobility safety.   Nurse Made Aware yes-updated on level of assistance with RW.   Assessment   Prognosis Fair   Problem List Decreased strength;Decreased endurance;Decreased mobility;Impaired balance;Decreased skin integrity;Pain   Assessment Alejandro is a 69-year-old male with PMH of type 2 diabetes, metastatic renal cell carcinoma, prostate cancer, HTN, severe malnutrition and CKD presenting with nausea vomiting and found to be in DKA, JULIO. PT consulted. Activity as tolerated.  Prior to admission resides with brother in 2 story home.  Bed and bath on second floor. Ambulates without AD use prior to admission. Notes decline in function over last 3 weeks. Increased difficulty with stairs and decreased ability to perform IADLS.  Currently presents with decreased strength, activity tolerance, impaired posture, functional mobility, balance and locomotion requiring more restrictive AD use. Vito needed for bed mobility, transfers and ambulation with RW support.  Gait slowed, decreased foot clearance and step length. Limited tolerance to distances.  BP initially EOB 99/61 and p 5 minutes /57.  Slight dizziness with transitions. Encouraged AP when seated EOB.  Given impairments with functional decline noted will benefit from skilled PT in order  to progress and optimize functional outcomes with return to independence.  The patient's AM-PAC Basic Mobility Inpatient Short Form Raw Score is 17. A Raw score of less than or equal to 16 suggests the patient may benefit from discharge to post-acute rehabilitation services. Please also refer to the recommendation of the Physical Therapist for safe discharge planning. At this time, level II resource intensity is recommended.  PT will follow and progress per POC to achieve mobility goals.   Barriers to Discharge Inaccessible home environment   Barriers to Discharge Comments stairs.   Goals   Patient Goals get better   STG Expiration Date 12/09/24   Short Term Goal #1 10 days: 1).  Pt will perform bed mobility with Marlen demonstrating appropriate technique 100% of the time in order to improve function.2)  Perform all transfers with Marlen demonstrating safe and appropriate technique 100% of the time in order to improve ability to negotiate safely in home environment.3) Amb with least restrictive AD > 150'x1 with mod I in order to demonstrate ability to negotiate in home environment.4)  Improve overall strength and balance 1/2 grade in order to optimize ability to perform functional tasks and reduce fall risk.5) Increase activity tolerance to 45 minutes in order to improve endurance to functional tasks.6)  Negotiate stairs using most appropriate technique and S in order to be able to negotiate safely in home environment. 7) PT for ongoing patient and family/caregiver education, DME needs and d/c planning in order to promote highest level of function in least restrictive environment.   Plan   Treatment/Interventions Functional transfer training;LE strengthening/ROM;Elevations;Therapeutic exercise;Endurance training;Patient/family training;Equipment eval/education;Bed mobility;Gait training;Compensatory technique education;Continued evaluation;Spoke to nursing;OT   PT Frequency 3-5x/wk   Discharge Recommendation   Rehab  Resource Intensity Level, PT II (Moderate Resource Intensity)   Equipment Recommended Walker   AM-PAC Basic Mobility Inpatient   Turning in Flat Bed Without Bedrails 3   Lying on Back to Sitting on Edge of Flat Bed Without Bedrails 3   Moving Bed to Chair 3   Standing Up From Chair Using Arms 3   Walk in Room 3   Climb 3-5 Stairs With Railing 2   Basic Mobility Inpatient Raw Score 17   Basic Mobility Standardized Score 39.67   [facility] Highest Level Of Mobility   -HL Goal 5: Stand one or more mins   -HLM Achieved 7: Walk 25 feet or more   End of Consult   Patient Position at End of Consult Bedside chair;Bed/Chair alarm activated;All needs within reach     Hx/personal factors: ROSALINDA home environment, steps within home environment, difficulty performing IADLs, fall risk , functional decline , increased reliance on DME , and new use of AD for functional transfers/mobility, comorbidities    Examination:decreased strength , decreased balance, decreased activity tolerance, gait deviations, increased pain, Fluctuating vitals, impaired posture, and decreased cardiovascular endurance.     Clinical: unpredictable Ongoing medical status, Trending/abnormal lab values, Risk for falls, bed/chair alarm, Continuous monitoring, Pain management, Decreased activity tolerance compared to baseline, More restrictive AD use than baseline, Decline from PLOF., and hypotension.     Complexity: high           Kaylyn Brannon, PT

## 2024-11-29 NOTE — PHYSICAL THERAPY NOTE
69-year-old male with PMH of type 2 diabetes, metastatic renal cell carcinoma, prostate cancer, HTN, severe malnutrition and CKD presenting with nausea vomiting and found to be in DKA   JULIO. PT consulted.       99/61 EOB    102/57 EOB.     09:05

## 2024-11-29 NOTE — PLAN OF CARE
Problem: PAIN - ADULT  Goal: Verbalizes/displays adequate comfort level or baseline comfort level  Description: Interventions:  - Encourage patient to monitor pain and request assistance  - Assess pain using appropriate pain scale  - Administer analgesics based on type and severity of pain and evaluate response  - Implement non-pharmacological measures as appropriate and evaluate response  - Consider cultural and social influences on pain and pain management  - Notify physician/advanced practitioner if interventions unsuccessful or patient reports new pain  Outcome: Progressing     Problem: INFECTION - ADULT  Goal: Absence or prevention of progression during hospitalization  Description: INTERVENTIONS:  - Assess and monitor for signs and symptoms of infection  - Monitor lab/diagnostic results  - Monitor all insertion sites, i.e. indwelling lines, tubes, and drains  - Monitor endotracheal if appropriate and nasal secretions for changes in amount and color  - [place] appropriate cooling/warming therapies per order  - Administer medications as ordered  - Instruct and encourage patient and family to use good hand hygiene technique  - Identify and instruct in appropriate isolation precautions for identified infection/condition  Outcome: Progressing     Problem: SAFETY ADULT  Goal: Patient will remain free of falls  Description: INTERVENTIONS:  - Educate patient/family on patient safety including physical limitations  - Instruct patient to call for assistance with activity   - Consult OT/PT to assist with strengthening/mobility   - Keep Call bell within reach  - Keep bed low and locked with side rails adjusted as appropriate  - Keep care items and personal belongings within reach  - Initiate and maintain comfort rounds  - Make Fall Risk Sign visible to staff  - Offer Toileting every 2 Hours, in advance of need  - Initiate/Maintain bed alarm  - Apply yellow socks and bracelet for high fall risk patients  - Consider  moving patient to room near nurses station  Outcome: Progressing  Goal: Maintain or return to baseline ADL function  Description: INTERVENTIONS:  -  Assess patient's ability to carry out ADLs; assess patient's baseline for ADL function and identify physical deficits which impact ability to perform ADLs (bathing, care of mouth/teeth, toileting, grooming, dressing, etc.)  - Assess/evaluate cause of self-care deficits   - Assess range of motion  - Assess patient's mobility; develop plan if impaired  - Assess patient's need for assistive devices and provide as appropriate  - Encourage maximum independence but intervene and supervise when necessary  - Involve family in performance of ADLs  - Assess for home care needs following discharge   - Consider OT consult to assist with ADL evaluation and planning for discharge  - Provide patient education as appropriate  Outcome: Progressing  Goal: Maintains/Returns to pre admission functional level  Description: INTERVENTIONS:  - Perform AM-PAC 6 Click Basic Mobility/ Daily Activity assessment daily.  - Set and communicate daily mobility goal to care team and patient/family/caregiver.   - Collaborate with rehabilitation services on mobility goals if consulted  - Perform Range of Motion 3 times a day.  - Reposition patient every 2 hours.  - Dangle patient 3 times a day  - Stand patient 3 times a day  - Ambulate patient 3 times a day  - Out of bed to chair 3 times a day   - Out of bed for meals 3 times a day  - Out of bed for toileting  - Record patient progress and toleration of activity level   Outcome: Progressing     Problem: DISCHARGE PLANNING  Goal: Discharge to home or other facility with appropriate resources  Description: INTERVENTIONS:  - Identify barriers to discharge w/patient and caregiver  - Arrange for needed discharge resources and transportation as appropriate  - Identify discharge learning needs (meds, wound care, etc.)  - Arrange for interpretive services to  assist at discharge as needed  - Refer to Case Management Department for coordinating discharge planning if the patient needs post-hospital services based on physician/advanced practitioner order or complex needs related to functional status, cognitive ability, or social support system  Outcome: Progressing     Problem: Knowledge Deficit  Goal: Patient/family/caregiver demonstrates understanding of disease process, treatment plan, medications, and discharge instructions  Description: Complete learning assessment and assess knowledge base.  Interventions:  - Provide teaching at level of understanding  - Provide teaching via preferred learning methods  Outcome: Progressing     Problem: Nutrition/Hydration-ADULT  Goal: Nutrient/Hydration intake appropriate for improving, restoring or maintaining nutritional needs  Description: Monitor and assess patient's nutrition/hydration status for malnutrition. Collaborate with interdisciplinary team and initiate plan and interventions as ordered.  Monitor patient's weight and dietary intake as ordered or per policy. Utilize nutrition screening tool and intervene as necessary. Determine patient's food preferences and provide high-protein, high-caloric foods as appropriate.     INTERVENTIONS:  - Monitor oral intake, urinary output, labs, and treatment plans  - Assess nutrition and hydration status and recommend course of action  - Evaluate amount of meals eaten  - Assist patient with eating if necessary   - Allow adequate time for meals  - Recommend/ encourage appropriate diets, oral nutritional supplements, and vitamin/mineral supplements  - Order, calculate, and assess calorie counts as needed  - Recommend, monitor, and adjust tube feedings and TPN/PPN based on assessed needs  - Assess need for intravenous fluids  - Provide specific nutrition/hydration education as appropriate  - Include patient/family/caregiver in decisions related to nutrition  Outcome: Progressing     Problem:  NEUROSENSORY - ADULT  Goal: Achieves stable or improved neurological status  Description: INTERVENTIONS  - Monitor and report changes in neurological status  - Monitor vital signs such as temperature, blood pressure, glucose, and any other labs ordered   - Initiate measures to prevent increased intracranial pressure  - Monitor for seizure activity and implement precautions if appropriate      Outcome: Progressing  Goal: Remains free of injury related to seizures activity  Description: INTERVENTIONS  - Maintain airway, patient safety  and administer oxygen as ordered  - Monitor patient for seizure activity, document and report duration and description of seizure to physician/advanced practitioner  - If seizure occurs,  ensure patient safety during seizure  - Reorient patient post seizure  - Seizure pads on all 4 side rails  - Instruct patient/family to notify RN of any seizure activity including if an aura is experienced  - Instruct patient/family to call for assistance with activity based on nursing assessment  - Administer anti-seizure medications if ordered    Outcome: Progressing  Goal: Achieves maximal functionality and self care  Description: INTERVENTIONS  - Monitor swallowing and airway patency with patient fatigue and changes in neurological status  - Encourage and assist patient to increase activity and self care.   - Encourage visually impaired, hearing impaired and aphasic patients to use assistive/communication devices  Outcome: Progressing     Problem: CARDIOVASCULAR - ADULT  Goal: Maintains optimal cardiac output and hemodynamic stability  Description: INTERVENTIONS:  - Monitor I/O, vital signs and rhythm  - Monitor for S/S and trends of decreased cardiac output  - Administer and titrate ordered vasoactive medications to optimize hemodynamic stability  - Assess quality of pulses, skin color and temperature  - Assess for signs of decreased coronary artery perfusion  - Instruct patient to report change  in severity of symptoms  Outcome: Progressing  Goal: Absence of cardiac dysrhythmias or at baseline rhythm  Description: INTERVENTIONS:  - Continuous cardiac monitoring, vital signs, obtain 12 lead EKG if ordered  - Administer antiarrhythmic and heart rate control medications as ordered  - Monitor electrolytes and administer replacement therapy as ordered  Outcome: Progressing     Problem: RESPIRATORY - ADULT  Goal: Achieves optimal ventilation and oxygenation  Description: INTERVENTIONS:  - Assess for changes in respiratory status  - Assess for changes in mentation and behavior  - Position to facilitate oxygenation and minimize respiratory effort  - Oxygen administered by appropriate delivery if ordered  - Initiate smoking cessation education as indicated  - Encourage broncho-pulmonary hygiene including cough, deep breathe, Incentive Spirometry  - Assess the need for suctioning and aspirate as needed  - Assess and instruct to report SOB or any respiratory difficulty  - Respiratory Therapy support as indicated  Outcome: Progressing     Problem: GASTROINTESTINAL - ADULT  Goal: Minimal or absence of nausea and/or vomiting  Description: INTERVENTIONS:  - Administer IV fluids if ordered to ensure adequate hydration  - Maintain NPO status until nausea and vomiting are resolved  - Nasogastric tube if ordered  - Administer ordered antiemetic medications as needed  - Provide nonpharmacologic comfort measures as appropriate  - Advance diet as tolerated, if ordered  - Consider nutrition services referral to assist patient with adequate nutrition and appropriate food choices  Outcome: Progressing  Goal: Maintains or returns to baseline bowel function  Description: INTERVENTIONS:  - Assess bowel function  - Encourage oral fluids to ensure adequate hydration  - Administer IV fluids if ordered to ensure adequate hydration  - Administer ordered medications as needed  - Encourage mobilization and activity  - Consider nutritional  services referral to assist patient with adequate nutrition and appropriate food choices  Outcome: Progressing  Goal: Maintains adequate nutritional intake  Description: INTERVENTIONS:  - Monitor percentage of each meal consumed  - Identify factors contributing to decreased intake, treat as appropriate  - Assist with meals as needed  - Monitor I&O, weight, and lab values if indicated  - Obtain nutrition services referral as needed  Outcome: Progressing  Goal: Establish and maintain optimal ostomy function  Description: INTERVENTIONS:  - Assess bowel function  - Encourage oral fluids to ensure adequate hydration  - Administer IV fluids if ordered to ensure adequate hydration   - Administer ordered medications as needed  - Encourage mobilization and activity  - Nutrition services referral to assist patient with appropriate food choices  - Assess stoma site  - Consider wound care consult   Outcome: Progressing  Goal: Oral mucous membranes remain intact  Description: INTERVENTIONS  - Assess oral mucosa and hygiene practices  - Implement preventative oral hygiene regimen  - Implement oral medicated treatments as ordered  - Initiate Nutrition services referral as needed  Outcome: Progressing     Problem: GENITOURINARY - ADULT  Goal: Maintains or returns to baseline urinary function  Description: INTERVENTIONS:  - Assess urinary function  - Encourage oral fluids to ensure adequate hydration if ordered  - Administer IV fluids as ordered to ensure adequate hydration  - Administer ordered medications as needed  - Offer frequent toileting  - Follow urinary retention protocol if ordered  Outcome: Progressing  Goal: Absence of urinary retention  Description: INTERVENTIONS:  - Assess patient’s ability to void and empty bladder  - Monitor I/O  - Bladder scan as needed  - Discuss with physician/AP medications to alleviate retention as needed  - Discuss catheterization for long term situations as appropriate  Outcome:  Progressing  Goal: Urinary catheter remains patent  Description: INTERVENTIONS:  - Assess patency of urinary catheter  - If patient has a chronic cummings, consider changing catheter if non-functioning  - Follow guidelines for intermittent irrigation of non-functioning urinary catheter  Outcome: Progressing     Problem: METABOLIC, FLUID AND ELECTROLYTES - ADULT  Goal: Electrolytes maintained within normal limits  Description: INTERVENTIONS:  - Monitor labs and assess patient for signs and symptoms of electrolyte imbalances  - Administer electrolyte replacement as ordered  - Monitor response to electrolyte replacements, including repeat lab results as appropriate  - Instruct patient on fluid and nutrition as appropriate  Outcome: Progressing  Goal: Fluid balance maintained  Description: INTERVENTIONS:  - Monitor labs   - Monitor I/O and WT  - Instruct patient on fluid and nutrition as appropriate  - Assess for signs & symptoms of volume excess or deficit  Outcome: Progressing  Goal: Glucose maintained within target range  Description: INTERVENTIONS:  - Monitor Blood Glucose as ordered  - Assess for signs and symptoms of hyperglycemia and hypoglycemia  - Administer ordered medications to maintain glucose within target range  - Assess nutritional intake and initiate nutrition service referral as needed  Outcome: Progressing     Problem: SKIN/TISSUE INTEGRITY - ADULT  Goal: Skin Integrity remains intact(Skin Breakdown Prevention)  Description: Assess:  -Perform Adán assessment every shift  -Inspect skin when repositioning, toileting, and assisting with ADLS  -Assess extremities for adequate circulation and sensation     Bed Management:  -Have minimal linens on bed & keep smooth, unwrinkled  -Change linens as needed when moist or perspiring  -Avoid sitting or lying in one position for more than 2 hours while in bed    Toileting:  -Offer bedside commode    Activity:  -Mobilize patient 3 times a day  -Encourage activity and  walks on unit  -Encourage or provide ROM exercises   -Turn and reposition patient every 2 Hours  -Use appropriate equipment to lift or move patient in bed    Skin Care:  -Avoid use of baby powder, tape, friction and shearing, hot water or constrictive clothing  -Do not massage red bony areas      Outcome: Progressing  Goal: Incision(s), wounds(s) or drain site(s) healing without S/S of infection  Description: INTERVENTIONS  - Assess and document dressing, incision, wound bed, drain sites and surrounding tissue  - Provide patient and family education  Outcome: Progressing  Goal: Pressure injury heals and does not worsen  Description: Interventions:  - Implement low air loss mattress or specialty surface (Criteria met)  - Apply silicone foam dressing  - Instruct/assist with weight shifting every 30 minutes when in chair   - Limit chair time to 2 hour intervals  - Use special pressure reducing interventions such as offloading cushion when in chair   - Apply fecal or urinary incontinence containment device   - Turn and reposition patient & offload bony prominences every 2 hours   - Utilize friction reducing device or surface for transfers   - Consider nutrition services referral as needed  Outcome: Progressing     Problem: HEMATOLOGIC - ADULT  Goal: Maintains hematologic stability  Description: INTERVENTIONS  - Assess for signs and symptoms of bleeding or hemorrhage  - Monitor labs  - Administer supportive blood products/factors as ordered and appropriate  Outcome: Progressing     Problem: MUSCULOSKELETAL - ADULT  Goal: Maintain or return mobility to safest level of function  Description: INTERVENTIONS:  - Assess patient's ability to carry out ADLs; assess patient's baseline for ADL function and identify physical deficits which impact ability to perform ADLs (bathing, care of mouth/teeth, toileting, grooming, dressing, etc.)  - Assess/evaluate cause of self-care deficits   - Assess range of motion  - Assess patient's  mobility  - Assess patient's need for assistive devices and provide as appropriate  - Encourage maximum independence but intervene and supervise when necessary  - Involve family in performance of ADLs  - Assess for home care needs following discharge   - Consider OT consult to assist with ADL evaluation and planning for discharge  - Provide patient education as appropriate  Outcome: Progressing  Goal: Maintain proper alignment of affected body part  Description: INTERVENTIONS:  - Support, maintain and protect limb and body alignment  - Provide patient/ family with appropriate education  Outcome: Progressing     Problem: Prexisting or High Potential for Compromised Skin Integrity  Goal: Skin integrity is maintained or improved  Description: INTERVENTIONS:  - Identify patients at risk for skin breakdown  - Assess and monitor skin integrity  - Assess and monitor nutrition and hydration status  - Monitor labs   - Assess for incontinence   - Turn and reposition patient  - Assist with mobility/ambulation  - Relieve pressure over bony prominences  - Avoid friction and shearing  - Provide appropriate hygiene as needed including keeping skin clean and dry  - Evaluate need for skin moisturizer/barrier cream  - Collaborate with interdisciplinary team   - Patient/family teaching  - Consider wound care consult   Outcome: Progressing

## 2024-11-29 NOTE — PLAN OF CARE
Problem: PAIN - ADULT  Goal: Verbalizes/displays adequate comfort level or baseline comfort level  Description: Interventions:  - Encourage patient to monitor pain and request assistance  - Assess pain using appropriate pain scale  - Administer analgesics based on type and severity of pain and evaluate response  - Implement non-pharmacological measures as appropriate and evaluate response  - Consider cultural and social influences on pain and pain management  - Notify physician/advanced practitioner if interventions unsuccessful or patient reports new pain  Outcome: Progressing     Problem: INFECTION - ADULT  Goal: Absence or prevention of progression during hospitalization  Description: INTERVENTIONS:  - Assess and monitor for signs and symptoms of infection  - Monitor lab/diagnostic results  - Monitor all insertion sites, i.e. indwelling lines, tubes, and drains  - Monitor endotracheal if appropriate and nasal secretions for changes in amount and color  - [place] appropriate cooling/warming therapies per order  - Administer medications as ordered  - Instruct and encourage patient and family to use good hand hygiene technique  - Identify and instruct in appropriate isolation precautions for identified infection/condition  Outcome: Progressing     Problem: SAFETY ADULT  Goal: Patient will remain free of falls  Description: INTERVENTIONS:  - Educate patient/family on patient safety including physical limitations  - Instruct patient to call for assistance with activity   - Consult OT/PT to assist with strengthening/mobility   - Keep Call bell within reach  - Keep bed low and locked with side rails adjusted as appropriate  - Keep care items and personal belongings within reach  - Initiate and maintain comfort rounds  - Make Fall Risk Sign visible to staff  - Offer Toileting every 2 Hours, in advance of need  - Initiate/Maintain bed alarm  - Apply yellow socks and bracelet for high fall risk patients  - Consider  moving patient to room near nurses station  Outcome: Progressing  Goal: Maintain or return to baseline ADL function  Description: INTERVENTIONS:  -  Assess patient's ability to carry out ADLs; assess patient's baseline for ADL function and identify physical deficits which impact ability to perform ADLs (bathing, care of mouth/teeth, toileting, grooming, dressing, etc.)  - Assess/evaluate cause of self-care deficits   - Assess range of motion  - Assess patient's mobility; develop plan if impaired  - Assess patient's need for assistive devices and provide as appropriate  - Encourage maximum independence but intervene and supervise when necessary  - Involve family in performance of ADLs  - Assess for home care needs following discharge   - Consider OT consult to assist with ADL evaluation and planning for discharge  - Provide patient education as appropriate  Outcome: Progressing  Goal: Maintains/Returns to pre admission functional level  Description: INTERVENTIONS:  - Perform AM-PAC 6 Click Basic Mobility/ Daily Activity assessment daily.  - Set and communicate daily mobility goal to care team and patient/family/caregiver.   - Collaborate with rehabilitation services on mobility goals if consulted  - Perform Range of Motion 3 times a day.  - Reposition patient every 2 hours.  - Dangle patient 3 times a day  - Stand patient 3 times a day  - Ambulate patient 3 times a day  - Out of bed to chair 3 times a day   - Out of bed for meals 3 times a day  - Out of bed for toileting  - Record patient progress and toleration of activity level   Outcome: Progressing     Problem: DISCHARGE PLANNING  Goal: Discharge to home or other facility with appropriate resources  Description: INTERVENTIONS:  - Identify barriers to discharge w/patient and caregiver  - Arrange for needed discharge resources and transportation as appropriate  - Identify discharge learning needs (meds, wound care, etc.)  - Arrange for interpretive services to  assist at discharge as needed  - Refer to Case Management Department for coordinating discharge planning if the patient needs post-hospital services based on physician/advanced practitioner order or complex needs related to functional status, cognitive ability, or social support system  Outcome: Progressing     Problem: Knowledge Deficit  Goal: Patient/family/caregiver demonstrates understanding of disease process, treatment plan, medications, and discharge instructions  Description: Complete learning assessment and assess knowledge base.  Interventions:  - Provide teaching at level of understanding  - Provide teaching via preferred learning methods  Outcome: Progressing     Problem: Nutrition/Hydration-ADULT  Goal: Nutrient/Hydration intake appropriate for improving, restoring or maintaining nutritional needs  Description: Monitor and assess patient's nutrition/hydration status for malnutrition. Collaborate with interdisciplinary team and initiate plan and interventions as ordered.  Monitor patient's weight and dietary intake as ordered or per policy. Utilize nutrition screening tool and intervene as necessary. Determine patient's food preferences and provide high-protein, high-caloric foods as appropriate.     INTERVENTIONS:  - Monitor oral intake, urinary output, labs, and treatment plans  - Assess nutrition and hydration status and recommend course of action  - Evaluate amount of meals eaten  - Assist patient with eating if necessary   - Allow adequate time for meals  - Recommend/ encourage appropriate diets, oral nutritional supplements, and vitamin/mineral supplements  - Order, calculate, and assess calorie counts as needed  - Recommend, monitor, and adjust tube feedings and TPN/PPN based on assessed needs  - Assess need for intravenous fluids  - Provide specific nutrition/hydration education as appropriate  - Include patient/family/caregiver in decisions related to nutrition  Outcome: Progressing     Problem:  NEUROSENSORY - ADULT  Goal: Achieves stable or improved neurological status  Description: INTERVENTIONS  - Monitor and report changes in neurological status  - Monitor vital signs such as temperature, blood pressure, glucose, and any other labs ordered   - Initiate measures to prevent increased intracranial pressure  - Monitor for seizure activity and implement precautions if appropriate      Outcome: Progressing  Goal: Remains free of injury related to seizures activity  Description: INTERVENTIONS  - Maintain airway, patient safety  and administer oxygen as ordered  - Monitor patient for seizure activity, document and report duration and description of seizure to physician/advanced practitioner  - If seizure occurs,  ensure patient safety during seizure  - Reorient patient post seizure  - Seizure pads on all 4 side rails  - Instruct patient/family to notify RN of any seizure activity including if an aura is experienced  - Instruct patient/family to call for assistance with activity based on nursing assessment  - Administer anti-seizure medications if ordered    Outcome: Progressing  Goal: Achieves maximal functionality and self care  Description: INTERVENTIONS  - Monitor swallowing and airway patency with patient fatigue and changes in neurological status  - Encourage and assist patient to increase activity and self care.   - Encourage visually impaired, hearing impaired and aphasic patients to use assistive/communication devices  Outcome: Progressing     Problem: CARDIOVASCULAR - ADULT  Goal: Maintains optimal cardiac output and hemodynamic stability  Description: INTERVENTIONS:  - Monitor I/O, vital signs and rhythm  - Monitor for S/S and trends of decreased cardiac output  - Administer and titrate ordered vasoactive medications to optimize hemodynamic stability  - Assess quality of pulses, skin color and temperature  - Assess for signs of decreased coronary artery perfusion  - Instruct patient to report change  in severity of symptoms  Outcome: Progressing  Goal: Absence of cardiac dysrhythmias or at baseline rhythm  Description: INTERVENTIONS:  - Continuous cardiac monitoring, vital signs, obtain 12 lead EKG if ordered  - Administer antiarrhythmic and heart rate control medications as ordered  - Monitor electrolytes and administer replacement therapy as ordered  Outcome: Progressing     Problem: RESPIRATORY - ADULT  Goal: Achieves optimal ventilation and oxygenation  Description: INTERVENTIONS:  - Assess for changes in respiratory status  - Assess for changes in mentation and behavior  - Position to facilitate oxygenation and minimize respiratory effort  - Oxygen administered by appropriate delivery if ordered  - Initiate smoking cessation education as indicated  - Encourage broncho-pulmonary hygiene including cough, deep breathe, Incentive Spirometry  - Assess the need for suctioning and aspirate as needed  - Assess and instruct to report SOB or any respiratory difficulty  - Respiratory Therapy support as indicated  Outcome: Progressing     Problem: GASTROINTESTINAL - ADULT  Goal: Minimal or absence of nausea and/or vomiting  Description: INTERVENTIONS:  - Administer IV fluids if ordered to ensure adequate hydration  - Maintain NPO status until nausea and vomiting are resolved  - Nasogastric tube if ordered  - Administer ordered antiemetic medications as needed  - Provide nonpharmacologic comfort measures as appropriate  - Advance diet as tolerated, if ordered  - Consider nutrition services referral to assist patient with adequate nutrition and appropriate food choices  Outcome: Progressing  Goal: Maintains or returns to baseline bowel function  Description: INTERVENTIONS:  - Assess bowel function  - Encourage oral fluids to ensure adequate hydration  - Administer IV fluids if ordered to ensure adequate hydration  - Administer ordered medications as needed  - Encourage mobilization and activity  - Consider nutritional  services referral to assist patient with adequate nutrition and appropriate food choices  Outcome: Progressing  Goal: Maintains adequate nutritional intake  Description: INTERVENTIONS:  - Monitor percentage of each meal consumed  - Identify factors contributing to decreased intake, treat as appropriate  - Assist with meals as needed  - Monitor I&O, weight, and lab values if indicated  - Obtain nutrition services referral as needed  Outcome: Progressing  Goal: Establish and maintain optimal ostomy function  Description: INTERVENTIONS:  - Assess bowel function  - Encourage oral fluids to ensure adequate hydration  - Administer IV fluids if ordered to ensure adequate hydration   - Administer ordered medications as needed  - Encourage mobilization and activity  - Nutrition services referral to assist patient with appropriate food choices  - Assess stoma site  - Consider wound care consult   Outcome: Progressing  Goal: Oral mucous membranes remain intact  Description: INTERVENTIONS  - Assess oral mucosa and hygiene practices  - Implement preventative oral hygiene regimen  - Implement oral medicated treatments as ordered  - Initiate Nutrition services referral as needed  Outcome: Progressing     Problem: GENITOURINARY - ADULT  Goal: Maintains or returns to baseline urinary function  Description: INTERVENTIONS:  - Assess urinary function  - Encourage oral fluids to ensure adequate hydration if ordered  - Administer IV fluids as ordered to ensure adequate hydration  - Administer ordered medications as needed  - Offer frequent toileting  - Follow urinary retention protocol if ordered  Outcome: Progressing  Goal: Absence of urinary retention  Description: INTERVENTIONS:  - Assess patient’s ability to void and empty bladder  - Monitor I/O  - Bladder scan as needed  - Discuss with physician/AP medications to alleviate retention as needed  - Discuss catheterization for long term situations as appropriate  Outcome:  Progressing  Goal: Urinary catheter remains patent  Description: INTERVENTIONS:  - Assess patency of urinary catheter  - If patient has a chronic cummings, consider changing catheter if non-functioning  - Follow guidelines for intermittent irrigation of non-functioning urinary catheter  Outcome: Progressing     Problem: METABOLIC, FLUID AND ELECTROLYTES - ADULT  Goal: Electrolytes maintained within normal limits  Description: INTERVENTIONS:  - Monitor labs and assess patient for signs and symptoms of electrolyte imbalances  - Administer electrolyte replacement as ordered  - Monitor response to electrolyte replacements, including repeat lab results as appropriate  - Instruct patient on fluid and nutrition as appropriate  Outcome: Progressing  Goal: Fluid balance maintained  Description: INTERVENTIONS:  - Monitor labs   - Monitor I/O and WT  - Instruct patient on fluid and nutrition as appropriate  - Assess for signs & symptoms of volume excess or deficit  Outcome: Progressing  Goal: Glucose maintained within target range  Description: INTERVENTIONS:  - Monitor Blood Glucose as ordered  - Assess for signs and symptoms of hyperglycemia and hypoglycemia  - Administer ordered medications to maintain glucose within target range  - Assess nutritional intake and initiate nutrition service referral as needed  Outcome: Progressing     Problem: SKIN/TISSUE INTEGRITY - ADULT  Goal: Skin Integrity remains intact(Skin Breakdown Prevention)  Description: Assess:  -Perform Adán assessment every shift  -Inspect skin when repositioning, toileting, and assisting with ADLS  -Assess extremities for adequate circulation and sensation     Bed Management:  -Have minimal linens on bed & keep smooth, unwrinkled  -Change linens as needed when moist or perspiring  -Avoid sitting or lying in one position for more than 2 hours while in bed    Toileting:  -Offer bedside commode    Activity:  -Mobilize patient 3 times a day  -Encourage activity and  walks on unit  -Encourage or provide ROM exercises   -Turn and reposition patient every 2 Hours  -Use appropriate equipment to lift or move patient in bed    Skin Care:  -Avoid use of baby powder, tape, friction and shearing, hot water or constrictive clothing  -Do not massage red bony areas      Outcome: Progressing  Goal: Incision(s), wounds(s) or drain site(s) healing without S/S of infection  Description: INTERVENTIONS  - Assess and document dressing, incision, wound bed, drain sites and surrounding tissue  - Provide patient and family education  Outcome: Progressing  Goal: Pressure injury heals and does not worsen  Description: Interventions:  - Implement low air loss mattress or specialty surface (Criteria met)  - Apply silicone foam dressing  - Instruct/assist with weight shifting every 30 minutes when in chair   - Limit chair time to 2 hour intervals  - Use special pressure reducing interventions such as offloading cushion when in chair   - Apply fecal or urinary incontinence containment device   - Turn and reposition patient & offload bony prominences every 2 hours   - Utilize friction reducing device or surface for transfers   - Consider nutrition services referral as needed  Outcome: Progressing     Problem: HEMATOLOGIC - ADULT  Goal: Maintains hematologic stability  Description: INTERVENTIONS  - Assess for signs and symptoms of bleeding or hemorrhage  - Monitor labs  - Administer supportive blood products/factors as ordered and appropriate  Outcome: Progressing     Problem: MUSCULOSKELETAL - ADULT  Goal: Maintain or return mobility to safest level of function  Description: INTERVENTIONS:  - Assess patient's ability to carry out ADLs; assess patient's baseline for ADL function and identify physical deficits which impact ability to perform ADLs (bathing, care of mouth/teeth, toileting, grooming, dressing, etc.)  - Assess/evaluate cause of self-care deficits   - Assess range of motion  - Assess patient's  mobility  - Assess patient's need for assistive devices and provide as appropriate  - Encourage maximum independence but intervene and supervise when necessary  - Involve family in performance of ADLs  - Assess for home care needs following discharge   - Consider OT consult to assist with ADL evaluation and planning for discharge  - Provide patient education as appropriate  Outcome: Progressing  Goal: Maintain proper alignment of affected body part  Description: INTERVENTIONS:  - Support, maintain and protect limb and body alignment  - Provide patient/ family with appropriate education  Outcome: Progressing     Problem: Prexisting or High Potential for Compromised Skin Integrity  Goal: Skin integrity is maintained or improved  Description: INTERVENTIONS:  - Identify patients at risk for skin breakdown  - Assess and monitor skin integrity  - Assess and monitor nutrition and hydration status  - Monitor labs   - Assess for incontinence   - Turn and reposition patient  - Assist with mobility/ambulation  - Relieve pressure over bony prominences  - Avoid friction and shearing  - Provide appropriate hygiene as needed including keeping skin clean and dry  - Evaluate need for skin moisturizer/barrier cream  - Collaborate with interdisciplinary team   - Patient/family teaching  - Consider wound care consult   Outcome: Progressing

## 2024-11-29 NOTE — PLAN OF CARE
Problem: PHYSICAL THERAPY ADULT  Goal: Performs mobility at highest level of function for planned discharge setting.  See evaluation for individualized goals.  Description: Treatment/Interventions: Functional transfer training, LE strengthening/ROM, Elevations, Therapeutic exercise, Endurance training, Patient/family training, Equipment eval/education, Bed mobility, Gait training, Compensatory technique education, Continued evaluation, Spoke to nursing, OT  Equipment Recommended: Walker       See flowsheet documentation for full assessment, interventions and recommendations.  Outcome: Progressing  Note: Prognosis: Fair  Problem List: Decreased strength, Decreased endurance, Decreased mobility, Impaired balance, Decreased skin integrity, Pain  Assessment: Alejandro is a 69-year-old male with PMH of type 2 diabetes, metastatic renal cell carcinoma, prostate cancer, HTN, severe malnutrition and CKD presenting with nausea vomiting and found to be in DKA, JULIO. PT consulted. Activity as tolerated.  Prior to admission resides with brother in 2 story home.  Bed and bath on second floor. Ambulates without AD use prior to admission. Notes decline in function over last 3 weeks. Increased difficulty with stairs and decreased ability to perform IADLS.  Currently presents with decreased strength, activity tolerance, impaired posture, functional mobility, balance and locomotion requiring more restrictive AD use. Vito needed for bed mobility, transfers and ambulation with RW support.  Gait slowed, decreased foot clearance and step length. Limited tolerance to distances.  BP initially EOB 99/61 and p 5 minutes /57.  Slight dizziness with transitions. Encouraged AP when seated EOB.  Given impairments with functional decline noted will benefit from skilled PT in order to progress and optimize functional outcomes with return to independence.  The patient's AM-PAC Basic Mobility Inpatient Short Form Raw Score is 17. A Raw score  of less than or equal to 16 suggests the patient may benefit from discharge to post-acute rehabilitation services. Please also refer to the recommendation of the Physical Therapist for safe discharge planning. At this time, level II resource intensity is recommended.  PT will follow and progress per POC to achieve mobility goals.  Barriers to Discharge: Inaccessible home environment  Barriers to Discharge Comments: stairs.  Rehab Resource Intensity Level, PT: II (Moderate Resource Intensity)    See flowsheet documentation for full assessment.

## 2024-11-29 NOTE — PROGRESS NOTES
"  Progress Note - Alejandro Adames 69 y.o. male MRN: [id number]    Unit/Bed#: E5 -01 Encounter: [id number]    Assessment/Plan:    DKA    POA and now resolved, AM glucose 101 continue insulin Lantus and NovoLog    Diabetes   poor control with erratic intake, stressed consistency with carbohydrates, continue Lantus with NovoLog, ADA diet and sliding scale    Hyponatremia   chronic hyponatremia    Ambulatory dysfunction ongoing PT recommending rehab    JULIO/CKD3   appears baseline 1.2, creatinine now stabilizing 1.6, currently 1.61 continue to monitor    Severe protein calorie malnutrition encourage caloric intake with Glucerna    Metastatic renal cell carcinoma continue on going therapy infusions every 6 weeks with oncology    Dyslipidemia   continue statin for LDL control    Hypertension   trending lower decrease amlodipine to 5 mg      Subjective:   Weak, no chest pain no nausea vomiting no fevers or chills no shortness of breath erratic appetite      Objective:     Vitals: Blood pressure 102/57, pulse (!) 108, temperature (!) 97.4 °F (36.3 °C), temperature source Oral, resp. rate 16, height 5' 10\" (1.778 m), weight 68.7 kg (151 lb 7.3 oz), SpO2 94%.,Body mass index is 21.73 kg/m².      Results from last 7 days   Lab Units 11/29/24  0550   WBC Thousand/uL 14.58*   HEMOGLOBIN g/dL 10.0*   HEMATOCRIT % 30.7*   PLATELETS Thousands/uL 204     Results from last 7 days   Lab Units 11/29/24  0933 11/27/24  0418 11/27/24  0042   POTASSIUM mmol/L 3.5   < > 5.2   CHLORIDE mmol/L 106   < > 94*   CO2 mmol/L 18*   < > 9*   BUN mg/dL 39*   < > 57*   CREATININE mg/dL 1.61*   < > 2.05*   CALCIUM mg/dL 7.5*   < > 9.9   ALK PHOS U/L  --   --  163*   ALT U/L  --   --  13   AST U/L  --   --  13    < > = values in this interval not displayed.       Scheduled Meds:  Current Facility-Administered Medications   Medication Dose Route Frequency Provider Last Rate    acetaminophen  975 mg Oral Q6H PRN JEREMIAH Lilly      " aluminum-magnesium hydroxide-simethicone  30 mL Oral Q4H PRN JEREMIAH Lilly      amLODIPine  10 mg Oral Daily JEREMIAH Lilly      atorvastatin  40 mg Oral Daily With Dinner JEREMIAH Lilly      chlorhexidine  15 mL Mouth[facility]Throat Q12H [facility]EREMIAH      dronabinol  5 mg Oral BID AC JEREMIAH Lilly      folic acid  1 mg Oral Daily JEREMIAH Lilly      heparin (porcine)  5,000 Units Subcutaneous Q8H [facility] JEREMIAH Lilly      insulin glargine  25 Units Subcutaneous HS Johnathan Lira MD      insulin lispro  1-5 Units Subcutaneous HS JEREMIAH Lilly      insulin lispro  2-12 Units Subcutaneous TID AC JEREMIAH Lilly      insulin lispro  8 Units Subcutaneous TID With Meals Johnathan Lira MD      senna-docusate sodium  1 tablet Oral BID PRN JEREMIAH Lilly         Continuous Infusions:         Physical exam:  General appearance: Alert no distress interaction appropriate  Head/Eyes: Nonicteric pale EOMI  Neck: Supple  Lungs: CTA bilateral no wheezing rhonchi or rales  Heart: normal S1 S2 regular  Abdomen: Soft nontender with bowel sounds  Extremities: no edema  Skin: no rash    Invasive Devices       Peripheral Intravenous Line  Duration             Peripheral IV 11/27/24 Dorsal (posterior);Right Hand 2 days    Peripheral IV 11/27/24 Left Antecubital 2 days    Peripheral IV 11/27/24 Right Antecubital 2 days    Peripheral IV 11/27/24 Left;Upper;Ventral (anterior) Arm 1 day              Drain  Duration             Percutaneous Nephroureteral Tube (PCNU) Left 1 10 Fr 26 Cm 10 days                      VTE Pharmacologic Prophylaxis: Heparin      Counseling / Coordination of Care  Total floor / unit time spent today 30  minutes.  Greater than 50% of total time was spent with the patient and / or family counseling and / or coordination of care.  A description of the counseling / coordination of care: Discussed with brother.

## 2024-11-29 NOTE — PLAN OF CARE
Problem: OCCUPATIONAL THERAPY ADULT  Goal: Performs self-care activities at highest level of function for planned discharge setting.  See evaluation for individualized goals.  Description: Treatment Interventions: ADL retraining, Functional transfer training, Endurance training, UE strengthening/ROM, Cognitive reorientation, Patient/family training, Equipment evaluation/education, Compensatory technique education, Activityengagement, Energy conservation          See flowsheet documentation for full assessment, interventions and recommendations.   Note: Limitation: Decreased UE ROM, Decreased UE strength, Decreased ADL status, Decreased Safe judgement during ADL, Decreased cognition, Decreased endurance, Decreased self-care trans, Decreased high-level ADLs  Prognosis: Fair  Assessment: Pt is a 69 y.o. male who presented to [facility] on 11/27/2024 with N/V and elevated blood sugar. Pt with diagnosis of DKA, JULIO, metastatic renal cell carcinoma to bone, HTN, and DM II. Pt  has a past medical history of Cutaneous skin tags (1/2/2019), Diabetes mellitus (HCC), and Hypertension. Pt greeted bedside for OT evaluation on 11/29/24. Pt resides with brother in a 2SH with 2-3 ROSALINDA with rail. PTA, Pt reports being I with ADLs/functional mobility with no AD/splits IADL tasks/ +. Pt reports increasing weakness and difficulty doing tasks PTA. Pt demonstrating the following occupational performance levels: Min A with UB ADLs, Min A with LB ADLs, Min A with bed mobility, Min A with functional transfers, and Min A with functional mobility with walker.Limitations that impact functional performance include decreased ADL status, UE ROM, UE strength, safe judgement during ADLs, cognition, endurance, self care transfers, and high level ADLs. Occupational performance areas to address ADL retraining, functional transfer training, UE strengthening/ROM, endurance training, cognitive reorientation, Pt/caregiver education, equipment  evaluation/education, compensatory technique education, energy conservation, and activity engagement . Pt would benefit from continued skilled OT services while in hospital to maximize independence with ADLs. Will continue to follow Pt's progress. Pt would benefit from level II resources (moderate intensity) upon DC to maximize safety and independence with ADLs and functional tasks of choice.     Rehab Resource Intensity Level, OT: II (Moderate Resource Intensity)

## 2024-11-30 LAB
ANION GAP SERPL CALCULATED.3IONS-SCNC: 10 MMOL/L (ref 4–13)
ANION GAP SERPL CALCULATED.3IONS-SCNC: 8 MMOL/L (ref 4–13)
ANION GAP SERPL CALCULATED.3IONS-SCNC: 9 MMOL/L (ref 4–13)
BUN SERPL-MCNC: 36 MG/DL (ref 5–25)
BUN SERPL-MCNC: 39 MG/DL (ref 5–25)
BUN SERPL-MCNC: 39 MG/DL (ref 5–25)
CALCIUM SERPL-MCNC: 7.4 MG/DL (ref 8.4–10.2)
CALCIUM SERPL-MCNC: 7.9 MG/DL (ref 8.4–10.2)
CALCIUM SERPL-MCNC: 8 MG/DL (ref 8.4–10.2)
CHLORIDE SERPL-SCNC: 105 MMOL/L (ref 96–108)
CHLORIDE SERPL-SCNC: 105 MMOL/L (ref 96–108)
CHLORIDE SERPL-SCNC: 106 MMOL/L (ref 96–108)
CO2 SERPL-SCNC: 20 MMOL/L (ref 21–32)
CO2 SERPL-SCNC: 20 MMOL/L (ref 21–32)
CO2 SERPL-SCNC: 21 MMOL/L (ref 21–32)
CREAT SERPL-MCNC: 1.64 MG/DL (ref 0.6–1.3)
CREAT SERPL-MCNC: 1.67 MG/DL (ref 0.6–1.3)
CREAT SERPL-MCNC: 1.7 MG/DL (ref 0.6–1.3)
GFR SERPL CREATININE-BSD FRML MDRD: 39 ML/MIN/1.73SQ M
GFR SERPL CREATININE-BSD FRML MDRD: 40 ML/MIN/1.73SQ M
GFR SERPL CREATININE-BSD FRML MDRD: 41 ML/MIN/1.73SQ M
GLUCOSE SERPL-MCNC: 122 MG/DL (ref 65–140)
GLUCOSE SERPL-MCNC: 135 MG/DL (ref 65–140)
GLUCOSE SERPL-MCNC: 154 MG/DL (ref 65–140)
GLUCOSE SERPL-MCNC: 167 MG/DL (ref 65–140)
GLUCOSE SERPL-MCNC: 193 MG/DL (ref 65–140)
GLUCOSE SERPL-MCNC: 230 MG/DL (ref 65–140)
GLUCOSE SERPL-MCNC: 89 MG/DL (ref 65–140)
POTASSIUM SERPL-SCNC: 3.5 MMOL/L (ref 3.5–5.3)
POTASSIUM SERPL-SCNC: 3.5 MMOL/L (ref 3.5–5.3)
POTASSIUM SERPL-SCNC: 3.7 MMOL/L (ref 3.5–5.3)
SODIUM SERPL-SCNC: 134 MMOL/L (ref 135–147)
SODIUM SERPL-SCNC: 135 MMOL/L (ref 135–147)
SODIUM SERPL-SCNC: 135 MMOL/L (ref 135–147)

## 2024-11-30 PROCEDURE — 80048 BASIC METABOLIC PNL TOTAL CA: CPT | Performed by: NURSE PRACTITIONER

## 2024-11-30 PROCEDURE — 82948 REAGENT STRIP/BLOOD GLUCOSE: CPT

## 2024-11-30 PROCEDURE — 99232 SBSQ HOSP IP/OBS MODERATE 35: CPT | Performed by: INTERNAL MEDICINE

## 2024-11-30 RX ADMIN — HEPARIN SODIUM 5000 UNITS: 5000 INJECTION INTRAVENOUS; SUBCUTANEOUS at 05:30

## 2024-11-30 RX ADMIN — INSULIN LISPRO 8 UNITS: 100 INJECTION, SOLUTION INTRAVENOUS; SUBCUTANEOUS at 12:47

## 2024-11-30 RX ADMIN — HEPARIN SODIUM 5000 UNITS: 5000 INJECTION INTRAVENOUS; SUBCUTANEOUS at 14:20

## 2024-11-30 RX ADMIN — DRONABINOL 5 MG: 2.5 CAPSULE ORAL at 17:03

## 2024-11-30 RX ADMIN — INSULIN GLARGINE 25 UNITS: 100 INJECTION, SOLUTION SUBCUTANEOUS at 21:20

## 2024-11-30 RX ADMIN — INSULIN LISPRO 4 UNITS: 100 INJECTION, SOLUTION INTRAVENOUS; SUBCUTANEOUS at 12:46

## 2024-11-30 RX ADMIN — CHLORHEXIDINE GLUCONATE 15 ML: 1.2 RINSE ORAL at 21:19

## 2024-11-30 RX ADMIN — HEPARIN SODIUM 5000 UNITS: 5000 INJECTION INTRAVENOUS; SUBCUTANEOUS at 21:26

## 2024-11-30 RX ADMIN — ATORVASTATIN CALCIUM 40 MG: 40 TABLET, FILM COATED ORAL at 17:03

## 2024-11-30 RX ADMIN — DRONABINOL 5 MG: 2.5 CAPSULE ORAL at 05:27

## 2024-11-30 RX ADMIN — AMLODIPINE BESYLATE 5 MG: 5 TABLET ORAL at 07:47

## 2024-11-30 RX ADMIN — ACETAMINOPHEN 975 MG: 325 TABLET, FILM COATED ORAL at 21:28

## 2024-11-30 RX ADMIN — ACETAMINOPHEN 975 MG: 325 TABLET, FILM COATED ORAL at 07:47

## 2024-11-30 RX ADMIN — FOLIC ACID 1 MG: 1 TABLET ORAL at 07:47

## 2024-11-30 RX ADMIN — INSULIN LISPRO 1 UNITS: 100 INJECTION, SOLUTION INTRAVENOUS; SUBCUTANEOUS at 21:26

## 2024-11-30 NOTE — PLAN OF CARE
Problem: PAIN - ADULT  Goal: Verbalizes/displays adequate comfort level or baseline comfort level  Description: Interventions:  - Encourage patient to monitor pain and request assistance  - Assess pain using appropriate pain scale  - Administer analgesics based on type and severity of pain and evaluate response  - Implement non-pharmacological measures as appropriate and evaluate response  - Consider cultural and social influences on pain and pain management  - Notify physician/advanced practitioner if interventions unsuccessful or patient reports new pain  Outcome: Progressing     Problem: INFECTION - ADULT  Goal: Absence or prevention of progression during hospitalization  Description: INTERVENTIONS:  - Assess and monitor for signs and symptoms of infection  - Monitor lab/diagnostic results  - Monitor all insertion sites, i.e. indwelling lines, tubes, and drains  - Monitor endotracheal if appropriate and nasal secretions for changes in amount and color  - [place] appropriate cooling/warming therapies per order  - Administer medications as ordered  - Instruct and encourage patient and family to use good hand hygiene technique  - Identify and instruct in appropriate isolation precautions for identified infection/condition  Outcome: Progressing     Problem: SAFETY ADULT  Goal: Patient will remain free of falls  Description: INTERVENTIONS:  - Educate patient/family on patient safety including physical limitations  - Instruct patient to call for assistance with activity   - Consult OT/PT to assist with strengthening/mobility   - Keep Call bell within reach  - Keep bed low and locked with side rails adjusted as appropriate  - Keep care items and personal belongings within reach  - Initiate and maintain comfort rounds  - Make Fall Risk Sign visible to staff  - Offer Toileting every 2 Hours, in advance of need  - Initiate/Maintain bed alarm  - Apply yellow socks and bracelet for high fall risk patients  - Consider  moving patient to room near nurses station  Outcome: Progressing  Goal: Maintain or return to baseline ADL function  Description: INTERVENTIONS:  -  Assess patient's ability to carry out ADLs; assess patient's baseline for ADL function and identify physical deficits which impact ability to perform ADLs (bathing, care of mouth/teeth, toileting, grooming, dressing, etc.)  - Assess/evaluate cause of self-care deficits   - Assess range of motion  - Assess patient's mobility; develop plan if impaired  - Assess patient's need for assistive devices and provide as appropriate  - Encourage maximum independence but intervene and supervise when necessary  - Involve family in performance of ADLs  - Assess for home care needs following discharge   - Consider OT consult to assist with ADL evaluation and planning for discharge  - Provide patient education as appropriate  Outcome: Progressing  Goal: Maintains/Returns to pre admission functional level  Description: INTERVENTIONS:  - Perform AM-PAC 6 Click Basic Mobility/ Daily Activity assessment daily.  - Set and communicate daily mobility goal to care team and patient/family/caregiver.   - Collaborate with rehabilitation services on mobility goals if consulted  - Perform Range of Motion 3 times a day.  - Reposition patient every 2 hours.  - Dangle patient 3 times a day  - Stand patient 3 times a day  - Ambulate patient 3 times a day  - Out of bed to chair 3 times a day   - Out of bed for meals 3 times a day  - Out of bed for toileting  - Record patient progress and toleration of activity level   Outcome: Progressing     Problem: DISCHARGE PLANNING  Goal: Discharge to home or other facility with appropriate resources  Description: INTERVENTIONS:  - Identify barriers to discharge w/patient and caregiver  - Arrange for needed discharge resources and transportation as appropriate  - Identify discharge learning needs (meds, wound care, etc.)  - Arrange for interpretive services to  assist at discharge as needed  - Refer to Case Management Department for coordinating discharge planning if the patient needs post-hospital services based on physician/advanced practitioner order or complex needs related to functional status, cognitive ability, or social support system  Outcome: Progressing     Problem: Knowledge Deficit  Goal: Patient/family/caregiver demonstrates understanding of disease process, treatment plan, medications, and discharge instructions  Description: Complete learning assessment and assess knowledge base.  Interventions:  - Provide teaching at level of understanding  - Provide teaching via preferred learning methods  Outcome: Progressing     Problem: Nutrition/Hydration-ADULT  Goal: Nutrient/Hydration intake appropriate for improving, restoring or maintaining nutritional needs  Description: Monitor and assess patient's nutrition/hydration status for malnutrition. Collaborate with interdisciplinary team and initiate plan and interventions as ordered.  Monitor patient's weight and dietary intake as ordered or per policy. Utilize nutrition screening tool and intervene as necessary. Determine patient's food preferences and provide high-protein, high-caloric foods as appropriate.     INTERVENTIONS:  - Monitor oral intake, urinary output, labs, and treatment plans  - Assess nutrition and hydration status and recommend course of action  - Evaluate amount of meals eaten  - Assist patient with eating if necessary   - Allow adequate time for meals  - Recommend/ encourage appropriate diets, oral nutritional supplements, and vitamin/mineral supplements  - Order, calculate, and assess calorie counts as needed  - Recommend, monitor, and adjust tube feedings and TPN/PPN based on assessed needs  - Assess need for intravenous fluids  - Provide specific nutrition/hydration education as appropriate  - Include patient/family/caregiver in decisions related to nutrition  Outcome: Progressing     Problem:  NEUROSENSORY - ADULT  Goal: Achieves stable or improved neurological status  Description: INTERVENTIONS  - Monitor and report changes in neurological status  - Monitor vital signs such as temperature, blood pressure, glucose, and any other labs ordered   - Initiate measures to prevent increased intracranial pressure  - Monitor for seizure activity and implement precautions if appropriate      Outcome: Progressing  Goal: Remains free of injury related to seizures activity  Description: INTERVENTIONS  - Maintain airway, patient safety  and administer oxygen as ordered  - Monitor patient for seizure activity, document and report duration and description of seizure to physician/advanced practitioner  - If seizure occurs,  ensure patient safety during seizure  - Reorient patient post seizure  - Seizure pads on all 4 side rails  - Instruct patient/family to notify RN of any seizure activity including if an aura is experienced  - Instruct patient/family to call for assistance with activity based on nursing assessment  - Administer anti-seizure medications if ordered    Outcome: Progressing  Goal: Achieves maximal functionality and self care  Description: INTERVENTIONS  - Monitor swallowing and airway patency with patient fatigue and changes in neurological status  - Encourage and assist patient to increase activity and self care.   - Encourage visually impaired, hearing impaired and aphasic patients to use assistive/communication devices  Outcome: Progressing     Problem: CARDIOVASCULAR - ADULT  Goal: Maintains optimal cardiac output and hemodynamic stability  Description: INTERVENTIONS:  - Monitor I/O, vital signs and rhythm  - Monitor for S/S and trends of decreased cardiac output  - Administer and titrate ordered vasoactive medications to optimize hemodynamic stability  - Assess quality of pulses, skin color and temperature  - Assess for signs of decreased coronary artery perfusion  - Instruct patient to report change  in severity of symptoms  Outcome: Progressing  Goal: Absence of cardiac dysrhythmias or at baseline rhythm  Description: INTERVENTIONS:  - Continuous cardiac monitoring, vital signs, obtain 12 lead EKG if ordered  - Administer antiarrhythmic and heart rate control medications as ordered  - Monitor electrolytes and administer replacement therapy as ordered  Outcome: Progressing     Problem: RESPIRATORY - ADULT  Goal: Achieves optimal ventilation and oxygenation  Description: INTERVENTIONS:  - Assess for changes in respiratory status  - Assess for changes in mentation and behavior  - Position to facilitate oxygenation and minimize respiratory effort  - Oxygen administered by appropriate delivery if ordered  - Initiate smoking cessation education as indicated  - Encourage broncho-pulmonary hygiene including cough, deep breathe, Incentive Spirometry  - Assess the need for suctioning and aspirate as needed  - Assess and instruct to report SOB or any respiratory difficulty  - Respiratory Therapy support as indicated  Outcome: Progressing     Problem: GASTROINTESTINAL - ADULT  Goal: Minimal or absence of nausea and/or vomiting  Description: INTERVENTIONS:  - Administer IV fluids if ordered to ensure adequate hydration  - Maintain NPO status until nausea and vomiting are resolved  - Nasogastric tube if ordered  - Administer ordered antiemetic medications as needed  - Provide nonpharmacologic comfort measures as appropriate  - Advance diet as tolerated, if ordered  - Consider nutrition services referral to assist patient with adequate nutrition and appropriate food choices  Outcome: Progressing  Goal: Maintains or returns to baseline bowel function  Description: INTERVENTIONS:  - Assess bowel function  - Encourage oral fluids to ensure adequate hydration  - Administer IV fluids if ordered to ensure adequate hydration  - Administer ordered medications as needed  - Encourage mobilization and activity  - Consider nutritional  services referral to assist patient with adequate nutrition and appropriate food choices  Outcome: Progressing  Goal: Maintains adequate nutritional intake  Description: INTERVENTIONS:  - Monitor percentage of each meal consumed  - Identify factors contributing to decreased intake, treat as appropriate  - Assist with meals as needed  - Monitor I&O, weight, and lab values if indicated  - Obtain nutrition services referral as needed  Outcome: Progressing  Goal: Establish and maintain optimal ostomy function  Description: INTERVENTIONS:  - Assess bowel function  - Encourage oral fluids to ensure adequate hydration  - Administer IV fluids if ordered to ensure adequate hydration   - Administer ordered medications as needed  - Encourage mobilization and activity  - Nutrition services referral to assist patient with appropriate food choices  - Assess stoma site  - Consider wound care consult   Outcome: Progressing  Goal: Oral mucous membranes remain intact  Description: INTERVENTIONS  - Assess oral mucosa and hygiene practices  - Implement preventative oral hygiene regimen  - Implement oral medicated treatments as ordered  - Initiate Nutrition services referral as needed  Outcome: Progressing     Problem: GENITOURINARY - ADULT  Goal: Maintains or returns to baseline urinary function  Description: INTERVENTIONS:  - Assess urinary function  - Encourage oral fluids to ensure adequate hydration if ordered  - Administer IV fluids as ordered to ensure adequate hydration  - Administer ordered medications as needed  - Offer frequent toileting  - Follow urinary retention protocol if ordered  Outcome: Progressing  Goal: Absence of urinary retention  Description: INTERVENTIONS:  - Assess patient’s ability to void and empty bladder  - Monitor I/O  - Bladder scan as needed  - Discuss with physician/AP medications to alleviate retention as needed  - Discuss catheterization for long term situations as appropriate  Outcome:  Progressing  Goal: Urinary catheter remains patent  Description: INTERVENTIONS:  - Assess patency of urinary catheter  - If patient has a chronic cummings, consider changing catheter if non-functioning  - Follow guidelines for intermittent irrigation of non-functioning urinary catheter  Outcome: Progressing     Problem: METABOLIC, FLUID AND ELECTROLYTES - ADULT  Goal: Electrolytes maintained within normal limits  Description: INTERVENTIONS:  - Monitor labs and assess patient for signs and symptoms of electrolyte imbalances  - Administer electrolyte replacement as ordered  - Monitor response to electrolyte replacements, including repeat lab results as appropriate  - Instruct patient on fluid and nutrition as appropriate  Outcome: Progressing  Goal: Fluid balance maintained  Description: INTERVENTIONS:  - Monitor labs   - Monitor I/O and WT  - Instruct patient on fluid and nutrition as appropriate  - Assess for signs & symptoms of volume excess or deficit  Outcome: Progressing  Goal: Glucose maintained within target range  Description: INTERVENTIONS:  - Monitor Blood Glucose as ordered  - Assess for signs and symptoms of hyperglycemia and hypoglycemia  - Administer ordered medications to maintain glucose within target range  - Assess nutritional intake and initiate nutrition service referral as needed  Outcome: Progressing     Problem: SKIN/TISSUE INTEGRITY - ADULT  Goal: Skin Integrity remains intact(Skin Breakdown Prevention)  Description: Assess:  -Perform Ad[place] assessment every shift  -Inspect skin when repositioning, toileting, and assisting with ADLS  -Assess extremities for adequate circulation and sensation     Bed Management:  -Have minimal linens on bed & keep smooth, unwrinkled  -Change linens as needed when moist or perspiring  -Avoid sitting or lying in one position for more than 2 hours while in bed    Toileting:  -Offer bedside commode    Activity:  -Mobilize patient 3 times a day  -Encourage activity and  walks on unit  -Encourage or provide ROM exercises   -Turn and reposition patient every 2 Hours  -Use appropriate equipment to lift or move patient in bed    Skin Care:  -Avoid use of baby powder, tape, friction and shearing, hot water or constrictive clothing  -Do not massage red bony areas      Outcome: Progressing  Goal: Incision(s), wounds(s) or drain site(s) healing without S/S of infection  Description: INTERVENTIONS  - Assess and document dressing, incision, wound bed, drain sites and surrounding tissue  - Provide patient and family education  Outcome: Progressing  Goal: Pressure injury heals and does not worsen  Description: Interventions:  - Implement low air loss mattress or specialty surface (Criteria met)  - Apply silicone foam dressing  - Instruct/assist with weight shifting every 30 minutes when in chair   - Limit chair time to 2 hour intervals  - Use special pressure reducing interventions such as offloading cushion when in chair   - Apply fecal or urinary incontinence containment device   - Turn and reposition patient & offload bony prominences every 2 hours   - Utilize friction reducing device or surface for transfers   - Consider nutrition services referral as needed  Outcome: Progressing     Problem: HEMATOLOGIC - ADULT  Goal: Maintains hematologic stability  Description: INTERVENTIONS  - Assess for signs and symptoms of bleeding or hemorrhage  - Monitor labs  - Administer supportive blood products/factors as ordered and appropriate  Outcome: Progressing     Problem: MUSCULOSKELETAL - ADULT  Goal: Maintain or return mobility to safest level of function  Description: INTERVENTIONS:  - Assess patient's ability to carry out ADLs; assess patient's baseline for ADL function and identify physical deficits which impact ability to perform ADLs (bathing, care of mouth/teeth, toileting, grooming, dressing, etc.)  - Assess/evaluate cause of self-care deficits   - Assess range of motion  - Assess patient's  mobility  - Assess patient's need for assistive devices and provide as appropriate  - Encourage maximum independence but intervene and supervise when necessary  - Involve family in performance of ADLs  - Assess for home care needs following discharge   - Consider OT consult to assist with ADL evaluation and planning for discharge  - Provide patient education as appropriate  Outcome: Progressing  Goal: Maintain proper alignment of affected body part  Description: INTERVENTIONS:  - Support, maintain and protect limb and body alignment  - Provide patient/ family with appropriate education  Outcome: Progressing     Problem: Prexisting or High Potential for Comprom  Problem: PAIN - ADULT  Goal: Verbalizes/displays adequate comfort level or baseline comfort level  Description: Interventions:  - Encourage patient to monitor pain and request assistance  - Assess pain using appropriate pain scale  - Administer analgesics based on type and severity of pain and evaluate response  - Implement non-pharmacological measures as appropriate and evaluate response  - Consider cultural and social influences on pain and pain management  - Notify physician/advanced practitioner if interventions unsuccessful or patient reports new pain  11/30/2024 0741 by Lena Soriano RN  Outcome: Progressing  11/30/2024 0741 by Lena Soriano RN  Outcome: Progressing     Problem: INFECTION - ADULT  Goal: Absence or prevention of progression during hospitalization  Description: INTERVENTIONS:  - Assess and monitor for signs and symptoms of infection  - Monitor lab/diagnostic results  - Monitor all insertion sites, i.e. indwelling lines, tubes, and drains  - Monitor endotracheal if appropriate and nasal secretions for changes in amount and color  - [place] appropriate cooling/warming therapies per order  - Administer medications as ordered  - Instruct and encourage patient and family to use good hand hygiene technique  - Identify and instruct in appropriate  isolation precautions for identified infection/condition  11/30/2024 0741 by Lena Soriano RN  Outcome: Progressing  11/30/2024 0741 by Lena Soriano RN  Outcome: Progressing     Problem: SAFETY ADULT  Goal: Patient will remain free of falls  Description: INTERVENTIONS:  - Educate patient/family on patient safety including physical limitations  - Instruct patient to call for assistance with activity   - Consult OT/PT to assist with strengthening/mobility   - Keep Call bell within reach  - Keep bed low and locked with side rails adjusted as appropriate  - Keep care items and personal belongings within reach  - Initiate and maintain comfort rounds  - Make Fall Risk Sign visible to staff  - Offer Toileting every 2 Hours, in advance of need  - Initiate/Maintain bed alarm  - Apply yellow socks and bracelet for high fall risk patients  - Consider moving patient to room near nurses station  11/30/2024 0741 by Lena Soriano RN  Outcome: Progressing  11/30/2024 0741 by Lena Soriano RN  Outcome: Progressing  Goal: Maintain or return to baseline ADL function  Description: INTERVENTIONS:  -  Assess patient's ability to carry out ADLs; assess patient's baseline for ADL function and identify physical deficits which impact ability to perform ADLs (bathing, care of mouth/teeth, toileting, grooming, dressing, etc.)  - Assess/evaluate cause of self-care deficits   - Assess range of motion  - Assess patient's mobility; develop plan if impaired  - Assess patient's need for assistive devices and provide as appropriate  - Encourage maximum independence but intervene and supervise when necessary  - Involve family in performance of ADLs  - Assess for home care needs following discharge   - Consider OT consult to assist with ADL evaluation and planning for discharge  - Provide patient education as appropriate  11/30/2024 0741 by Lena Soriano RN  Outcome: Progressing  11/30/2024 0741 by Lena Soriano RN  Outcome: Progressing  Goal: Maintains/Returns  to pre admission functional level  Description: INTERVENTIONS:  - Perform AM-PAC 6 Click Basic Mobility/ Daily Activity assessment daily.  - Set and communicate daily mobility goal to care team and patient/family/caregiver.   - Collaborate with rehabilitation services on mobility goals if consulted  - Perform Range of Motion 3 times a day.  - Reposition patient every 2 hours.  - Dangle patient 3 times a day  - Stand patient 3 times a day  - Ambulate patient 3 times a day  - Out of bed to chair 3 times a day   - Out of bed for meals 3 times a day  - Out of bed for toileting  - Record patient progress and toleration of activity level   11/30/2024 0741 by Lena Soriano RN  Outcome: Progressing  11/30/2024 0741 by Lena Soriano RN  Outcome: Progressing     Problem: DISCHARGE PLANNING  Goal: Discharge to home or other facility with appropriate resources  Description: INTERVENTIONS:  - Identify barriers to discharge w/patient and caregiver  - Arrange for needed discharge resources and transportation as appropriate  - Identify discharge learning needs (meds, wound care, etc.)  - Arrange for interpretive services to assist at discharge as needed  - Refer to Case Management Department for coordinating discharge planning if the patient needs post-hospital services based on physician/advanced practitioner order or complex needs related to functional status, cognitive ability, or social support system  11/30/2024 0741 by Lena Soriano RN  Outcome: Progressing  11/30/2024 0741 by Lena Soriano RN  Outcome: Progressing     Problem: Knowledge Deficit  Goal: Patient/family/caregiver demonstrates understanding of disease process, treatment plan, medications, and discharge instructions  Description: Complete learning assessment and assess knowledge base.  Interventions:  - Provide teaching at level of understanding  - Provide teaching via preferred learning methods  11/30/2024 0741 by Lena Soriano RN  Outcome: Progressing  11/30/2024 0741 by  Lena Soriano RN  Outcome: Progressing     Problem: Nutrition/Hydration-ADULT  Goal: Nutrient/Hydration intake appropriate for improving, restoring or maintaining nutritional needs  Description: Monitor and assess patient's nutrition/hydration status for malnutrition. Collaborate with interdisciplinary team and initiate plan and interventions as ordered.  Monitor patient's weight and dietary intake as ordered or per policy. Utilize nutrition screening tool and intervene as necessary. Determine patient's food preferences and provide high-protein, high-caloric foods as appropriate.     INTERVENTIONS:  - Monitor oral intake, urinary output, labs, and treatment plans  - Assess nutrition and hydration status and recommend course of action  - Evaluate amount of meals eaten  - Assist patient with eating if necessary   - Allow adequate time for meals  - Recommend/ encourage appropriate diets, oral nutritional supplements, and vitamin/mineral supplements  - Order, calculate, and assess calorie counts as needed  - Recommend, monitor, and adjust tube feedings and TPN/PPN based on assessed needs  - Assess need for intravenous fluids  - Provide specific nutrition/hydration education as appropriate  - Include patient/family/caregiver in decisions related to nutrition  11/30/2024 0741 by Lena Soriano RN  Outcome: Progressing  11/30/2024 0741 by Lena Soriano RN  Outcome: Progressing     Problem: NEUROSENSORY - ADULT  Goal: Achieves stable or improved neurological status  Description: INTERVENTIONS  - Monitor and report changes in neurological status  - Monitor vital signs such as temperature, blood pressure, glucose, and any other labs ordered   - Initiate measures to prevent increased intracranial pressure  - Monitor for seizure activity and implement precautions if appropriate      11/30/2024 0741 by Lena Soriano RN  Outcome: Progressing  11/30/2024 0741 by Lena Soriano RN  Outcome: Progressing  Goal: Remains free of injury related to  seizures activity  Description: INTERVENTIONS  - Maintain airway, patient safety  and administer oxygen as ordered  - Monitor patient for seizure activity, document and report duration and description of seizure to physician/advanced practitioner  - If seizure occurs,  ensure patient safety during seizure  - Reorient patient post seizure  - Seizure pads on all 4 side rails  - Instruct patient/family to notify RN of any seizure activity including if an aura is experienced  - Instruct patient/family to call for assistance with activity based on nursing assessment  - Administer anti-seizure medications if ordered    11/30/2024 0741 by Lena Soriano RN  Outcome: Progressing  11/30/2024 0741 by Lena Soriano RN  Outcome: Progressing  Goal: Achieves maximal functionality and self care  Description: INTERVENTIONS  - Monitor swallowing and airway patency with patient fatigue and changes in neurological status  - Encourage and assist patient to increase activity and self care.   - Encourage visually impaired, hearing impaired and aphasic patients to use assistive/communication devices  11/30/2024 0741 by Lena Soriano RN  Outcome: Progressing  11/30/2024 0741 by Lena Soriano RN  Outcome: Progressing     Problem: CARDIOVASCULAR - ADULT  Goal: Maintains optimal cardiac output and hemodynamic stability  Description: INTERVENTIONS:  - Monitor I/O, vital signs and rhythm  - Monitor for S/S and trends of decreased cardiac output  - Administer and titrate ordered vasoactive medications to optimize hemodynamic stability  - Assess quality of pulses, skin color and temperature  - Assess for signs of decreased coronary artery perfusion  - Instruct patient to report change in severity of symptoms  11/30/2024 0741 by Lena Soriano RN  Outcome: Progressing  11/30/2024 0741 by Lena Soriano RN  Outcome: Progressing  Goal: Absence of cardiac dysrhythmias or at baseline rhythm  Description: INTERVENTIONS:  - Continuous cardiac monitoring, vital signs,  obtain 12 lead EKG if ordered  - Administer antiarrhythmic and heart rate control medications as ordered  - Monitor electrolytes and administer replacement therapy as ordered  11/30/2024 0741 by Lena Soriano RN  Outcome: Progressing  11/30/2024 0741 by Lena Soriano RN  Outcome: Progressing     Problem: RESPIRATORY - ADULT  Goal: Achieves optimal ventilation and oxygenation  Description: INTERVENTIONS:  - Assess for changes in respiratory status  - Assess for changes in mentation and behavior  - Position to facilitate oxygenation and minimize respiratory effort  - Oxygen administered by appropriate delivery if ordered  - Initiate smoking cessation education as indicated  - Encourage broncho-pulmonary hygiene including cough, deep breathe, Incentive Spirometry  - Assess the need for suctioning and aspirate as needed  - Assess and instruct to report SOB or any respiratory difficulty  - Respiratory Therapy support as indicated  11/30/2024 0741 by Lena Soriano RN  Outcome: Progressing  11/30/2024 0741 by Lena Soriano RN  Outcome: Progressing     Problem: GASTROINTESTINAL - ADULT  Goal: Minimal or absence of nausea and/or vomiting  Description: INTERVENTIONS:  - Administer IV fluids if ordered to ensure adequate hydration  - Maintain NPO status until nausea and vomiting are resolved  - Nasogastric tube if ordered  - Administer ordered antiemetic medications as needed  - Provide nonpharmacologic comfort measures as appropriate  - Advance diet as tolerated, if ordered  - Consider nutrition services referral to assist patient with adequate nutrition and appropriate food choices  11/30/2024 0741 by Lena Soriano RN  Outcome: Progressing  11/30/2024 0741 by Lena Soriano RN  Outcome: Progressing  Goal: Maintains or returns to baseline bowel function  Description: INTERVENTIONS:  - Assess bowel function  - Encourage oral fluids to ensure adequate hydration  - Administer IV fluids if ordered to ensure adequate hydration  - Administer  ordered medications as needed  - Encourage mobilization and activity  - Consider nutritional services referral to assist patient with adequate nutrition and appropriate food choices  11/30/2024 0741 by Lena Soriano RN  Outcome: Progressing  11/30/2024 0741 by Lena Soriano RN  Outcome: Progressing  Goal: Maintains adequate nutritional intake  Description: INTERVENTIONS:  - Monitor percentage of each meal consumed  - Identify factors contributing to decreased intake, treat as appropriate  - Assist with meals as needed  - Monitor I&O, weight, and lab values if indicated  - Obtain nutrition services referral as needed  11/30/2024 0741 by Lena Soriano RN  Outcome: Progressing  11/30/2024 0741 by Lena Soriano RN  Outcome: Progressing  Goal: Establish and maintain optimal ostomy function  Description: INTERVENTIONS:  - Assess bowel function  - Encourage oral fluids to ensure adequate hydration  - Administer IV fluids if ordered to ensure adequate hydration   - Administer ordered medications as needed  - Encourage mobilization and activity  - Nutrition services referral to assist patient with appropriate food choices  - Assess stoma site  - Consider wound care consult   11/30/2024 0741 by Lena Soriano RN  Outcome: Progressing  11/30/2024 0741 by Lena Soriano RN  Outcome: Progressing  Goal: Oral mucous membranes remain intact  Description: INTERVENTIONS  - Assess oral mucosa and hygiene practices  - Implement preventative oral hygiene regimen  - Implement oral medicated treatments as ordered  - Initiate Nutrition services referral as needed  11/30/2024 0741 by Lena Soriano RN  Outcome: Progressing  11/30/2024 0741 by Lena Soriano RN  Outcome: Progressing     Problem: GENITOURINARY - ADULT  Goal: Maintains or returns to baseline urinary function  Description: INTERVENTIONS:  - Assess urinary function  - Encourage oral fluids to ensure adequate hydration if ordered  - Administer IV fluids as ordered to ensure adequate hydration  -  Administer ordered medications as needed  - Offer frequent toileting  - Follow urinary retention protocol if ordered  11/30/2024 0741 by Lena Soriano RN  Outcome: Progressing  11/30/2024 0741 by Lena Soriano RN  Outcome: Progressing  Goal: Absence of urinary retention  Description: INTERVENTIONS:  - Assess patient’s ability to void and empty bladder  - Monitor I/O  - Bladder scan as needed  - Discuss with physician/AP medications to alleviate retention as needed  - Discuss catheterization for long term situations as appropriate  11/30/2024 0741 by Lena Soriano RN  Outcome: Progressing  11/30/2024 0741 by Lena Soriano RN  Outcome: Progressing  Goal: Urinary catheter remains patent  Description: INTERVENTIONS:  - Assess patency of urinary catheter  - If patient has a chronic cummings, consider changing catheter if non-functioning  - Follow guidelines for intermittent irrigation of non-functioning urinary catheter  11/30/2024 0741 by Lena Soriano RN  Outcome: Progressing  11/30/2024 0741 by Lena Soriano RN  Outcome: Progressing     Problem: METABOLIC, FLUID AND ELECTROLYTES - ADULT  Goal: Electrolytes maintained within normal limits  Description: INTERVENTIONS:  - Monitor labs and assess patient for signs and symptoms of electrolyte imbalances  - Administer electrolyte replacement as ordered  - Monitor response to electrolyte replacements, including repeat lab results as appropriate  - Instruct patient on fluid and nutrition as appropriate  11/30/2024 0741 by Lena Soriano RN  Outcome: Progressing  11/30/2024 0741 by Lena Soriano RN  Outcome: Progressing  Goal: Fluid balance maintained  Description: INTERVENTIONS:  - Monitor labs   - Monitor I/O and WT  - Instruct patient on fluid and nutrition as appropriate  - Assess for signs & symptoms of volume excess or deficit  11/30/2024 0741 by Lena Soriano RN  Outcome: Progressing  11/30/2024 0741 by Lena Soriano RN  Outcome: Progressing  Goal: Glucose maintained within target  range  Description: INTERVENTIONS:  - Monitor Blood Glucose as ordered  - Assess for signs and symptoms of hyperglycemia and hypoglycemia  - Administer ordered medications to maintain glucose within target range  - Assess nutritional intake and initiate nutrition service referral as needed  11/30/2024 0741 by Lena Soriano RN  Outcome: Progressing  11/30/2024 0741 by Lena Soriano RN  Outcome: Progressing     Problem: SKIN/TISSUE INTEGRITY - ADULT  Goal: Skin Integrity remains intact(Skin Breakdown Prevention)  Description: Assess:  -Perform Adán assessment every shift  -Inspect skin when repositioning, toileting, and assisting with ADLS  -Assess extremities for adequate circulation and sensation     Bed Management:  -Have minimal linens on bed & keep smooth, unwrinkled  -Change linens as needed when moist or perspiring  -Avoid sitting or lying in one position for more than 2 hours while in bed    Toileting:  -Offer bedside commode    Activity:  -Mobilize patient 3 times a day  -Encourage activity and walks on unit  -Encourage or provide ROM exercises   -Turn and reposition patient every 2 Hours  -Use appropriate equipment to lift or move patient in bed    Skin Care:  -Avoid use of baby powder, tape, friction and shearing, hot water or constrictive clothing  -Do not massage red bony areas      11/30/2024 0741 by Lena Soriano RN  Outcome: Progressing  11/30/2024 0741 by Lena Soriano RN  Outcome: Progressing  Goal: Incision(s), wounds(s) or drain site(s) healing without S/S of infection  Description: INTERVENTIONS  - Assess and document dressing, incision, wound bed, drain sites and surrounding tissue  - Provide patient and family education  11/30/2024 0741 by Lena Soriano RN  Outcome: Progressing  11/30/2024 0741 by Lena Soriano RN  Outcome: Progressing  Goal: Pressure injury heals and does not worsen  Description: Interventions:  - Implement low air loss mattress or specialty surface (Criteria met)  - Apply silicone foam  dressing  - Instruct/assist with weight shifting every 30 minutes when in chair   - Limit chair time to 2 hour intervals  - Use special pressure reducing interventions such as offloading cushion when in chair   - Apply fecal or urinary incontinence containment device   - Turn and reposition patient & offload bony prominences every 2 hours   - Utilize friction reducing device or surface for transfers   - Consider nutrition services referral as needed  11/30/2024 0741 by Lena Soriano RN  Outcome: Progressing  11/30/2024 0741 by Lena Soriano RN  Outcome: Progressing     Problem: HEMATOLOGIC - ADULT  Goal: Maintains hematologic stability  Description: INTERVENTIONS  - Assess for signs and symptoms of bleeding or hemorrhage  - Monitor labs  - Administer supportive blood products/factors as ordered and appropriate  11/30/2024 0741 by Lena Soriano RN  Outcome: Progressing  11/30/2024 0741 by Lena Soriano RN  Outcome: Progressing     Problem: MUSCULOSKELETAL - ADULT  Goal: Maintain or return mobility to safest level of function  Description: INTERVENTIONS:  - Assess patient's ability to carry out ADLs; assess patient's baseline for ADL function and identify physical deficits which impact ability to perform ADLs (bathing, care of mouth/teeth, toileting, grooming, dressing, etc.)  - Assess/evaluate cause of self-care deficits   - Assess range of motion  - Assess patient's mobility  - Assess patient's need for assistive devices and provide as appropriate  - Encourage maximum independence but intervene and supervise when necessary  - Involve family in performance of ADLs  - Assess for home care needs following discharge   - Consider OT consult to assist with ADL evaluation and planning for discharge  - Provide patient education as appropriate  11/30/2024 0741 by Lena Soriano RN  Outcome: Progressing  11/30/2024 0741 by Lena Soriano RN  Outcome: Progressing  Goal: Maintain proper alignment of affected body part  Description:  INTERVENTIONS:  - Support, maintain and protect limb and body alignment  - Provide patient/ family with appropriate education  11/30/2024 0741 by Lena Soriano RN  Outcome: Progressing  11/30/2024 0741 by Lena Soriano RN  Outcome: Progressing     Problem: Prexisting or High Potential for Compromised Skin Integrity  Goal: Skin integrity is maintained or improved  Description: INTERVENTIONS:  - Identify patients at risk for skin breakdown  - Assess and monitor skin integrity  - Assess and monitor nutrition and hydration status  - Monitor labs   - Assess for incontinence   - Turn and reposition patient  - Assist with mobility/ambulation  - Relieve pressure over bony prominences  - Avoid friction and shearing  - Provide appropriate hygiene as needed including keeping skin clean and dry  - Evaluate need for skin moisturizer/barrier cream  - Collaborate with interdisciplinary team   - Patient/family teaching  - Consider wound care consult   11/30/2024 0741 by Lena Soriano RN  Outcome: Progressing  11/30/2024 0741 by Lena Soriano RN  Outcome: Progressing   sed Skin Integrity  Goal: Skin integrity is maintained or improved  Description: INTERVENTIONS:  - Identify patients at risk for skin breakdown  - Assess and monitor skin integrity  - Assess and monitor nutrition and hydration status  - Monitor labs   - Assess for incontinence   - Turn and reposition patient  - Assist with mobility/ambulation  - Relieve pressure over bony prominences  - Avoid friction and shearing  - Provide appropriate hygiene as needed including keeping skin clean and dry  - Evaluate need for skin moisturizer/barrier cream  - Collaborate with interdisciplinary team   - Patient/family teaching  - Consider wound care consult   Outcome: Progressing

## 2024-11-30 NOTE — PROGRESS NOTES
"  Progress Note - Alejandro Adames 69 y.o. male MRN: [id number]    Unit/Bed#: E5 -01 Encounter: [id number]    Assessment/Plan:    DKA      POA and now resolved on Lantus and NovoLog    Diabetes     poorly controlled with erratic intake, continue Lantus with NovoLog, stressed consistency with carbohydrate, continue sliding scale and ADA diet    Ambulatory dysfunction   working towards rehab    Hyponatremia     Chronic sodium 134 stable    JULIO/CKD 3     creatinine now stable at 1.64, possible new baseline    Metastatic renal cell carcinoma  continue ongoing therapy infusions every 6 weeks follows with oncology    Severe protein calorie malnutrition encourage caloric intake with Glucerna    Dyslipidemia     continue statin for LDL control    Hypertension     controlled with amlodipine    Subjective:   Still weak with poor appetite, no chest pain no shortness of breath no nausea vomiting no fevers      Objective:     Vitals: Blood pressure 118/73, pulse 94, temperature 98.6 °F (37 °C), resp. rate 18, height 5' 10\" (1.778 m), weight 68.7 kg (151 lb 7.3 oz), SpO2 97%.,Body mass index is 21.73 kg/m².      Results from last 7 days   Lab Units 11/29/24  0550   WBC Thousand/uL 14.58*   HEMOGLOBIN g/dL 10.0*   HEMATOCRIT % 30.7*   PLATELETS Thousands/uL 204     Results from last 7 days   Lab Units 11/30/24  0851 11/27/24  0418 11/27/24  0042   POTASSIUM mmol/L 3.5   < > 5.2   CHLORIDE mmol/L 106   < > 94*   CO2 mmol/L 20*   < > 9*   BUN mg/dL 36*   < > 57*   CREATININE mg/dL 1.64*   < > 2.05*   CALCIUM mg/dL 7.4*   < > 9.9   ALK PHOS U/L  --   --  163*   ALT U/L  --   --  13   AST U/L  --   --  13    < > = values in this interval not displayed.       Scheduled Meds:  Current Facility-Administered Medications   Medication Dose Route Frequency Provider Last Rate    acetaminophen  975 mg Oral Q6H PRN JEREMIAH Lilly      aluminum-magnesium hydroxide-simethicone  30 mL Oral Q4H PRN JEREMIAH Lilly      amLODIPine "  5 mg Oral Daily Bhanu Middleton DO      atorvastatin  40 mg Oral Daily With Dinner JEREMIAH Lilly      chlorhexidine  15 mL Mouth/Throat Q12H [facility] JEREMIAH Lilly      dronabinol  5 mg Oral BID AC JEREMIAH Lilly      folic acid  1 mg Oral Daily JEREMIAH Lilly      heparin (porcine)  5,000 Units Subcutaneous Q8H [facility] JEREMIAH Lilly      insulin glargine  25 Units Subcutaneous HS Johnathan Lira MD      insulin lispro  1-5 Units Subcutaneous HS JEREMIAH Lilly      insulin lispro  2-12 Units Subcutaneous TID AC JEREMIAH Lilly      insulin lispro  8 Units Subcutaneous TID With Meals Johnathan Lira MD      senna-docusate sodium  1 tablet Oral BID PRN JEREMIAH Lilly         Continuous Infusions:         Physical exam:  General appearance: Alert no distress frail and elderly  Head/Eyes: Nonicteric pale EOMI  Neck: Supple  Lungs: Decreased BS bilateral no wheezing rhonchi or rales  Heart: normal S1 S2 regular  Abdomen: Soft nontender with bowel sounds  Extremities: no edema  Skin: no rash    Invasive Devices       Peripheral Intravenous Line  Duration             Peripheral IV 11/27/24 Dorsal (posterior);Right Hand 3 days    Peripheral IV 11/27/24 Right Antecubital 3 days    Peripheral IV 11/27/24 Left;Upper;Ventral (anterior) Arm 2 days              Drain  Duration             Percutaneous Nephroureteral Tube (PCNU) Left 1 10 Fr 26 Cm 11 days                      VTE Pharmacologic Prophylaxis: Heparin      Counseling / Coordination of Care  Total floor / unit time spent today 30  minutes.  Greater than 50% of total time was spent with the patient and / or family counseling and / or coordination of care.  A description of the counseling / coordination of care: .

## 2024-11-30 NOTE — PLAN OF CARE
Problem: PAIN - ADULT  Goal: Verbalizes/displays adequate comfort level or baseline comfort level  Description: Interventions:  - Encourage patient to monitor pain and request assistance  - Assess pain using appropriate pain scale  - Administer analgesics based on type and severity of pain and evaluate response  - Implement non-pharmacological measures as appropriate and evaluate response  - Consider cultural and social influences on pain and pain management  - Notify physician/advanced practitioner if interventions unsuccessful or patient reports new pain  Outcome: Progressing     Problem: INFECTION - ADULT  Goal: Absence or prevention of progression during hospitalization  Description: INTERVENTIONS:  - Assess and monitor for signs and symptoms of infection  - Monitor lab/diagnostic results  - Monitor all insertion sites, i.e. indwelling lines, tubes, and drains  - Monitor endotracheal if appropriate and nasal secretions for changes in amount and color  - [place] appropriate cooling/warming therapies per order  - Administer medications as ordered  - Instruct and encourage patient and family to use good hand hygiene technique  - Identify and instruct in appropriate isolation precautions for identified infection/condition  Outcome: Progressing     Problem: SAFETY ADULT  Goal: Patient will remain free of falls  Description: INTERVENTIONS:  - Educate patient/family on patient safety including physical limitations  - Instruct patient to call for assistance with activity   - Consult OT/PT to assist with strengthening/mobility   - Keep Call bell within reach  - Keep bed low and locked with side rails adjusted as appropriate  - Keep care items and personal belongings within reach  - Initiate and maintain comfort rounds  - Make Fall Risk Sign visible to staff  - Offer Toileting every 2 Hours, in advance of need  - Initiate/Maintain bed alarm  - Apply yellow socks and bracelet for high fall risk patients  - Consider  moving patient to room near nurses station  Outcome: Progressing  Goal: Maintain or return to baseline ADL function  Description: INTERVENTIONS:  -  Assess patient's ability to carry out ADLs; assess patient's baseline for ADL function and identify physical deficits which impact ability to perform ADLs (bathing, care of mouth/teeth, toileting, grooming, dressing, etc.)  - Assess/evaluate cause of self-care deficits   - Assess range of motion  - Assess patient's mobility; develop plan if impaired  - Assess patient's need for assistive devices and provide as appropriate  - Encourage maximum independence but intervene and supervise when necessary  - Involve family in performance of ADLs  - Assess for home care needs following discharge   - Consider OT consult to assist with ADL evaluation and planning for discharge  - Provide patient education as appropriate  Outcome: Progressing  Goal: Maintains/Returns to pre admission functional level  Description: INTERVENTIONS:  - Perform AM-PAC 6 Click Basic Mobility/ Daily Activity assessment daily.  - Set and communicate daily mobility goal to care team and patient/family/caregiver.   - Collaborate with rehabilitation services on mobility goals if consulted  - Perform Range of Motion 3 times a day.  - Reposition patient every 2 hours.  - Dangle patient 3 times a day  - Stand patient 3 times a day  - Ambulate patient 3 times a day  - Out of bed to chair 3 times a day   - Out of bed for meals 3 times a day  - Out of bed for toileting  - Record patient progress and toleration of activity level   Outcome: Progressing     Problem: DISCHARGE PLANNING  Goal: Discharge to home or other facility with appropriate resources  Description: INTERVENTIONS:  - Identify barriers to discharge w/patient and caregiver  - Arrange for needed discharge resources and transportation as appropriate  - Identify discharge learning needs (meds, wound care, etc.)  - Arrange for interpretive services to  assist at discharge as needed  - Refer to Case Management Department for coordinating discharge planning if the patient needs post-hospital services based on physician/advanced practitioner order or complex needs related to functional status, cognitive ability, or social support system  Outcome: Progressing     Problem: Knowledge Deficit  Goal: Patient/family/caregiver demonstrates understanding of disease process, treatment plan, medications, and discharge instructions  Description: Complete learning assessment and assess knowledge base.  Interventions:  - Provide teaching at level of understanding  - Provide teaching via preferred learning methods  Outcome: Progressing     Problem: Nutrition/Hydration-ADULT  Goal: Nutrient/Hydration intake appropriate for improving, restoring or maintaining nutritional needs  Description: Monitor and assess patient's nutrition/hydration status for malnutrition. Collaborate with interdisciplinary team and initiate plan and interventions as ordered.  Monitor patient's weight and dietary intake as ordered or per policy. Utilize nutrition screening tool and intervene as necessary. Determine patient's food preferences and provide high-protein, high-caloric foods as appropriate.     INTERVENTIONS:  - Monitor oral intake, urinary output, labs, and treatment plans  - Assess nutrition and hydration status and recommend course of action  - Evaluate amount of meals eaten  - Assist patient with eating if necessary   - Allow adequate time for meals  - Recommend/ encourage appropriate diets, oral nutritional supplements, and vitamin/mineral supplements  - Order, calculate, and assess calorie counts as needed  - Recommend, monitor, and adjust tube feedings and TPN/PPN based on assessed needs  - Assess need for intravenous fluids  - Provide specific nutrition/hydration education as appropriate  - Include patient/family/caregiver in decisions related to nutrition  Outcome: Progressing     Problem:  NEUROSENSORY - ADULT  Goal: Achieves stable or improved neurological status  Description: INTERVENTIONS  - Monitor and report changes in neurological status  - Monitor vital signs such as temperature, blood pressure, glucose, and any other labs ordered   - Initiate measures to prevent increased intracranial pressure  - Monitor for seizure activity and implement precautions if appropriate      Outcome: Progressing  Goal: Remains free of injury related to seizures activity  Description: INTERVENTIONS  - Maintain airway, patient safety  and administer oxygen as ordered  - Monitor patient for seizure activity, document and report duration and description of seizure to physician/advanced practitioner  - If seizure occurs,  ensure patient safety during seizure  - Reorient patient post seizure  - Seizure pads on all 4 side rails  - Instruct patient/family to notify RN of any seizure activity including if an aura is experienced  - Instruct patient/family to call for assistance with activity based on nursing assessment  - Administer anti-seizure medications if ordered    Outcome: Progressing  Goal: Achieves maximal functionality and self care  Description: INTERVENTIONS  - Monitor swallowing and airway patency with patient fatigue and changes in neurological status  - Encourage and assist patient to increase activity and self care.   - Encourage visually impaired, hearing impaired and aphasic patients to use assistive/communication devices  Outcome: Progressing     Problem: CARDIOVASCULAR - ADULT  Goal: Maintains optimal cardiac output and hemodynamic stability  Description: INTERVENTIONS:  - Monitor I/O, vital signs and rhythm  - Monitor for S/S and trends of decreased cardiac output  - Administer and titrate ordered vasoactive medications to optimize hemodynamic stability  - Assess quality of pulses, skin color and temperature  - Assess for signs of decreased coronary artery perfusion  - Instruct patient to report change  in severity of symptoms  Outcome: Progressing  Goal: Absence of cardiac dysrhythmias or at baseline rhythm  Description: INTERVENTIONS:  - Continuous cardiac monitoring, vital signs, obtain 12 lead EKG if ordered  - Administer antiarrhythmic and heart rate control medications as ordered  - Monitor electrolytes and administer replacement therapy as ordered  Outcome: Progressing     Problem: RESPIRATORY - ADULT  Goal: Achieves optimal ventilation and oxygenation  Description: INTERVENTIONS:  - Assess for changes in respiratory status  - Assess for changes in mentation and behavior  - Position to facilitate oxygenation and minimize respiratory effort  - Oxygen administered by appropriate delivery if ordered  - Initiate smoking cessation education as indicated  - Encourage broncho-pulmonary hygiene including cough, deep breathe, Incentive Spirometry  - Assess the need for suctioning and aspirate as needed  - Assess and instruct to report SOB or any respiratory difficulty  - Respiratory Therapy support as indicated  Outcome: Progressing     Problem: GASTROINTESTINAL - ADULT  Goal: Minimal or absence of nausea and/or vomiting  Description: INTERVENTIONS:  - Administer IV fluids if ordered to ensure adequate hydration  - Maintain NPO status until nausea and vomiting are resolved  - Nasogastric tube if ordered  - Administer ordered antiemetic medications as needed  - Provide nonpharmacologic comfort measures as appropriate  - Advance diet as tolerated, if ordered  - Consider nutrition services referral to assist patient with adequate nutrition and appropriate food choices  Outcome: Progressing  Goal: Maintains or returns to baseline bowel function  Description: INTERVENTIONS:  - Assess bowel function  - Encourage oral fluids to ensure adequate hydration  - Administer IV fluids if ordered to ensure adequate hydration  - Administer ordered medications as needed  - Encourage mobilization and activity  - Consider nutritional  services referral to assist patient with adequate nutrition and appropriate food choices  Outcome: Progressing  Goal: Maintains adequate nutritional intake  Description: INTERVENTIONS:  - Monitor percentage of each meal consumed  - Identify factors contributing to decreased intake, treat as appropriate  - Assist with meals as needed  - Monitor I&O, weight, and lab values if indicated  - Obtain nutrition services referral as needed  Outcome: Progressing  Goal: Establish and maintain optimal ostomy function  Description: INTERVENTIONS:  - Assess bowel function  - Encourage oral fluids to ensure adequate hydration  - Administer IV fluids if ordered to ensure adequate hydration   - Administer ordered medications as needed  - Encourage mobilization and activity  - Nutrition services referral to assist patient with appropriate food choices  - Assess stoma site  - Consider wound care consult   Outcome: Progressing  Goal: Oral mucous membranes remain intact  Description: INTERVENTIONS  - Assess oral mucosa and hygiene practices  - Implement preventative oral hygiene regimen  - Implement oral medicated treatments as ordered  - Initiate Nutrition services referral as needed  Outcome: Progressing     Problem: GENITOURINARY - ADULT  Goal: Maintains or returns to baseline urinary function  Description: INTERVENTIONS:  - Assess urinary function  - Encourage oral fluids to ensure adequate hydration if ordered  - Administer IV fluids as ordered to ensure adequate hydration  - Administer ordered medications as needed  - Offer frequent toileting  - Follow urinary retention protocol if ordered  Outcome: Progressing  Goal: Absence of urinary retention  Description: INTERVENTIONS:  - Assess patient’s ability to void and empty bladder  - Monitor I/O  - Bladder scan as needed  - Discuss with physician/AP medications to alleviate retention as needed  - Discuss catheterization for long term situations as appropriate  Outcome:  Progressing  Goal: Urinary catheter remains patent  Description: INTERVENTIONS:  - Assess patency of urinary catheter  - If patient has a chronic cummings, consider changing catheter if non-functioning  - Follow guidelines for intermittent irrigation of non-functioning urinary catheter  Outcome: Progressing     Problem: METABOLIC, FLUID AND ELECTROLYTES - ADULT  Goal: Electrolytes maintained within normal limits  Description: INTERVENTIONS:  - Monitor labs and assess patient for signs and symptoms of electrolyte imbalances  - Administer electrolyte replacement as ordered  - Monitor response to electrolyte replacements, including repeat lab results as appropriate  - Instruct patient on fluid and nutrition as appropriate  Outcome: Progressing  Goal: Fluid balance maintained  Description: INTERVENTIONS:  - Monitor labs   - Monitor I/O and WT  - Instruct patient on fluid and nutrition as appropriate  - Assess for signs & symptoms of volume excess or deficit  Outcome: Progressing  Goal: Glucose maintained within target range  Description: INTERVENTIONS:  - Monitor Blood Glucose as ordered  - Assess for signs and symptoms of hyperglycemia and hypoglycemia  - Administer ordered medications to maintain glucose within target range  - Assess nutritional intake and initiate nutrition service referral as needed  Outcome: Progressing     Problem: SKIN/TISSUE INTEGRITY - ADULT  Goal: Skin Integrity remains intact(Skin Breakdown Prevention)  Description: Assess:  -Perform Adán assessment every shift  -Inspect skin when repositioning, toileting, and assisting with ADLS  -Assess extremities for adequate circulation and sensation     Bed Management:  -Have minimal linens on bed & keep smooth, unwrinkled  -Change linens as needed when moist or perspiring  -Avoid sitting or lying in one position for more than 2 hours while in bed    Toileting:  -Offer bedside commode    Activity:  -Mobilize patient 3 times a day  -Encourage activity and  walks on unit  -Encourage or provide ROM exercises   -Turn and reposition patient every 2 Hours  -Use appropriate equipment to lift or move patient in bed    Skin Care:  -Avoid use of baby powder, tape, friction and shearing, hot water or constrictive clothing  -Do not massage red bony areas      Outcome: Progressing  Goal: Incision(s), wounds(s) or drain site(s) healing without S/S of infection  Description: INTERVENTIONS  - Assess and document dressing, incision, wound bed, drain sites and surrounding tissue  - Provide patient and family education  Outcome: Progressing  Goal: Pressure injury heals and does not worsen  Description: Interventions:  - Implement low air loss mattress or specialty surface (Criteria met)  - Apply silicone foam dressing  - Instruct/assist with weight shifting every 30 minutes when in chair   - Limit chair time to 2 hour intervals  - Use special pressure reducing interventions such as offloading cushion when in chair   - Apply fecal or urinary incontinence containment device   - Turn and reposition patient & offload bony prominences every 2 hours   - Utilize friction reducing device or surface for transfers   - Consider nutrition services referral as needed  Outcome: Progressing     Problem: HEMATOLOGIC - ADULT  Goal: Maintains hematologic stability  Description: INTERVENTIONS  - Assess for signs and symptoms of bleeding or hemorrhage  - Monitor labs  - Administer supportive blood products/factors as ordered and appropriate  Outcome: Progressing     Problem: MUSCULOSKELETAL - ADULT  Goal: Maintain or return mobility to safest level of function  Description: INTERVENTIONS:  - Assess patient's ability to carry out ADLs; assess patient's baseline for ADL function and identify physical deficits which impact ability to perform ADLs (bathing, care of mouth/teeth, toileting, grooming, dressing, etc.)  - Assess/evaluate cause of self-care deficits   - Assess range of motion  - Assess patient's  mobility  - Assess patient's need for assistive devices and provide as appropriate  - Encourage maximum independence but intervene and supervise when necessary  - Involve family in performance of ADLs  - Assess for home care needs following discharge   - Consider OT consult to assist with ADL evaluation and planning for discharge  - Provide patient education as appropriate  Outcome: Progressing  Goal: Maintain proper alignment of affected body part  Description: INTERVENTIONS:  - Support, maintain and protect limb and body alignment  - Provide patient/ family with appropriate education  Outcome: Progressing     Problem: Prexisting or High Potential for Compromised Skin Integrity  Goal: Skin integrity is maintained or improved  Description: INTERVENTIONS:  - Identify patients at risk for skin breakdown  - Assess and monitor skin integrity  - Assess and monitor nutrition and hydration status  - Monitor labs   - Assess for incontinence   - Turn and reposition patient  - Assist with mobility/ambulation  - Relieve pressure over bony prominences  - Avoid friction and shearing  - Provide appropriate hygiene as needed including keeping skin clean and dry  - Evaluate need for skin moisturizer/barrier cream  - Collaborate with interdisciplinary team   - Patient/family teaching  - Consider wound care consult   Outcome: Progressing

## 2024-12-01 LAB
ANION GAP SERPL CALCULATED.3IONS-SCNC: 8 MMOL/L (ref 4–13)
BUN SERPL-MCNC: 37 MG/DL (ref 5–25)
CALCIUM SERPL-MCNC: 7.4 MG/DL (ref 8.4–10.2)
CHLORIDE SERPL-SCNC: 106 MMOL/L (ref 96–108)
CO2 SERPL-SCNC: 22 MMOL/L (ref 21–32)
CREAT SERPL-MCNC: 1.63 MG/DL (ref 0.6–1.3)
GFR SERPL CREATININE-BSD FRML MDRD: 42 ML/MIN/1.73SQ M
GLUCOSE SERPL-MCNC: 102 MG/DL (ref 65–140)
GLUCOSE SERPL-MCNC: 215 MG/DL (ref 65–140)
GLUCOSE SERPL-MCNC: 55 MG/DL (ref 65–140)
GLUCOSE SERPL-MCNC: 78 MG/DL (ref 65–140)
GLUCOSE SERPL-MCNC: 89 MG/DL (ref 65–140)
POTASSIUM SERPL-SCNC: 3.5 MMOL/L (ref 3.5–5.3)
SODIUM SERPL-SCNC: 136 MMOL/L (ref 135–147)

## 2024-12-01 PROCEDURE — 82948 REAGENT STRIP/BLOOD GLUCOSE: CPT

## 2024-12-01 PROCEDURE — 99232 SBSQ HOSP IP/OBS MODERATE 35: CPT | Performed by: INTERNAL MEDICINE

## 2024-12-01 PROCEDURE — 80048 BASIC METABOLIC PNL TOTAL CA: CPT | Performed by: PHYSICIAN ASSISTANT

## 2024-12-01 RX ORDER — AMOXICILLIN 250 MG
1 CAPSULE ORAL 2 TIMES DAILY
Status: DISCONTINUED | OUTPATIENT
Start: 2024-12-01 | End: 2024-12-01

## 2024-12-01 RX ORDER — LACTULOSE 10 G/15ML
20 SOLUTION ORAL 2 TIMES DAILY
Status: DISCONTINUED | OUTPATIENT
Start: 2024-12-01 | End: 2024-12-03 | Stop reason: HOSPADM

## 2024-12-01 RX ORDER — SORBITOL SOLUTION 70 %
30 SOLUTION, ORAL MISCELLANEOUS 2 TIMES DAILY
Status: DISCONTINUED | OUTPATIENT
Start: 2024-12-01 | End: 2024-12-01 | Stop reason: RX

## 2024-12-01 RX ORDER — AMOXICILLIN 250 MG
1 CAPSULE ORAL 2 TIMES DAILY
Status: DISCONTINUED | OUTPATIENT
Start: 2024-12-01 | End: 2024-12-03 | Stop reason: HOSPADM

## 2024-12-01 RX ADMIN — HEPARIN SODIUM 5000 UNITS: 5000 INJECTION INTRAVENOUS; SUBCUTANEOUS at 05:48

## 2024-12-01 RX ADMIN — FOLIC ACID 1 MG: 1 TABLET ORAL at 08:57

## 2024-12-01 RX ADMIN — INSULIN LISPRO 8 UNITS: 100 INJECTION, SOLUTION INTRAVENOUS; SUBCUTANEOUS at 08:54

## 2024-12-01 RX ADMIN — INSULIN LISPRO 1 UNITS: 100 INJECTION, SOLUTION INTRAVENOUS; SUBCUTANEOUS at 21:59

## 2024-12-01 RX ADMIN — LACTULOSE 20 G: 20 SOLUTION ORAL at 11:52

## 2024-12-01 RX ADMIN — INSULIN LISPRO 8 UNITS: 100 INJECTION, SOLUTION INTRAVENOUS; SUBCUTANEOUS at 16:35

## 2024-12-01 RX ADMIN — DRONABINOL 5 MG: 2.5 CAPSULE ORAL at 16:34

## 2024-12-01 RX ADMIN — SENNOSIDES AND DOCUSATE SODIUM 1 TABLET: 50; 8.6 TABLET ORAL at 17:49

## 2024-12-01 RX ADMIN — HEPARIN SODIUM 5000 UNITS: 5000 INJECTION INTRAVENOUS; SUBCUTANEOUS at 21:54

## 2024-12-01 RX ADMIN — ACETAMINOPHEN 975 MG: 325 TABLET, FILM COATED ORAL at 09:02

## 2024-12-01 RX ADMIN — CHLORHEXIDINE GLUCONATE 15 ML: 1.2 RINSE ORAL at 21:55

## 2024-12-01 RX ADMIN — INSULIN GLARGINE 25 UNITS: 100 INJECTION, SOLUTION SUBCUTANEOUS at 21:54

## 2024-12-01 RX ADMIN — AMLODIPINE BESYLATE 5 MG: 5 TABLET ORAL at 08:58

## 2024-12-01 RX ADMIN — SENNOSIDES AND DOCUSATE SODIUM 1 TABLET: 50; 8.6 TABLET ORAL at 11:52

## 2024-12-01 RX ADMIN — ATORVASTATIN CALCIUM 40 MG: 40 TABLET, FILM COATED ORAL at 16:35

## 2024-12-01 RX ADMIN — LACTULOSE 20 G: 20 SOLUTION ORAL at 17:49

## 2024-12-01 RX ADMIN — DRONABINOL 5 MG: 2.5 CAPSULE ORAL at 08:57

## 2024-12-01 RX ADMIN — INSULIN LISPRO 8 UNITS: 100 INJECTION, SOLUTION INTRAVENOUS; SUBCUTANEOUS at 12:00

## 2024-12-01 RX ADMIN — CHLORHEXIDINE GLUCONATE 15 ML: 1.2 RINSE ORAL at 08:58

## 2024-12-01 RX ADMIN — HEPARIN SODIUM 5000 UNITS: 5000 INJECTION INTRAVENOUS; SUBCUTANEOUS at 13:55

## 2024-12-01 NOTE — PLAN OF CARE
Problem: PAIN - ADULT  Goal: Verbalizes/displays adequate comfort level or baseline comfort level  Description: Interventions:  - Encourage patient to monitor pain and request assistance  - Assess pain using appropriate pain scale  - Administer analgesics based on type and severity of pain and evaluate response  - Implement non-pharmacological measures as appropriate and evaluate response  - Consider cultural and social influences on pain and pain management  - Notify physician/advanced practitioner if interventions unsuccessful or patient reports new pain  Outcome: Progressing     Problem: INFECTION - ADULT  Goal: Absence or prevention of progression during hospitalization  Description: INTERVENTIONS:  - Assess and monitor for signs and symptoms of infection  - Monitor lab/diagnostic results  - Monitor all insertion sites, i.e. indwelling lines, tubes, and drains  - Monitor endotracheal if appropriate and nasal secretions for changes in amount and color  - [place] appropriate cooling/warming therapies per order  - Administer medications as ordered  - Instruct and encourage patient and family to use good hand hygiene technique  - Identify and instruct in appropriate isolation precautions for identified infection/condition  Outcome: Progressing     Problem: SAFETY ADULT  Goal: Patient will remain free of falls  Description: INTERVENTIONS:  - Educate patient/family on patient safety including physical limitations  - Instruct patient to call for assistance with activity   - Consult OT/PT to assist with strengthening/mobility   - Keep Call bell within reach  - Keep bed low and locked with side rails adjusted as appropriate  - Keep care items and personal belongings within reach  - Initiate and maintain comfort rounds  - Make Fall Risk Sign visible to staff  - Offer Toileting every 2 Hours, in advance of need  - Initiate/Maintain bed alarm  - Apply yellow socks and bracelet for high fall risk patients  - Consider  moving patient to room near nurses station  Outcome: Progressing  Goal: Maintain or return to baseline ADL function  Description: INTERVENTIONS:  -  Assess patient's ability to carry out ADLs; assess patient's baseline for ADL function and identify physical deficits which impact ability to perform ADLs (bathing, care of mouth/teeth, toileting, grooming, dressing, etc.)  - Assess/evaluate cause of self-care deficits   - Assess range of motion  - Assess patient's mobility; develop plan if impaired  - Assess patient's need for assistive devices and provide as appropriate  - Encourage maximum independence but intervene and supervise when necessary  - Involve family in performance of ADLs  - Assess for home care needs following discharge   - Consider OT consult to assist with ADL evaluation and planning for discharge  - Provide patient education as appropriate  Outcome: Progressing  Goal: Maintains/Returns to pre admission functional level  Description: INTERVENTIONS:  - Perform AM-PAC 6 Click Basic Mobility/ Daily Activity assessment daily.  - Set and communicate daily mobility goal to care team and patient/family/caregiver.   - Collaborate with rehabilitation services on mobility goals if consulted  - Perform Range of Motion 3 times a day.  - Reposition patient every 2 hours.  - Dangle patient 3 times a day  - Stand patient 3 times a day  - Ambulate patient 3 times a day  - Out of bed to chair 3 times a day   - Out of bed for meals 3 times a day  - Out of bed for toileting  - Record patient progress and toleration of activity level   Outcome: Progressing     Problem: DISCHARGE PLANNING  Goal: Discharge to home or other facility with appropriate resources  Description: INTERVENTIONS:  - Identify barriers to discharge w/patient and caregiver  - Arrange for needed discharge resources and transportation as appropriate  - Identify discharge learning needs (meds, wound care, etc.)  - Arrange for interpretive services to  assist at discharge as needed  - Refer to Case Management Department for coordinating discharge planning if the patient needs post-hospital services based on physician/advanced practitioner order or complex needs related to functional status, cognitive ability, or social support system  Outcome: Progressing     Problem: Knowledge Deficit  Goal: Patient/family/caregiver demonstrates understanding of disease process, treatment plan, medications, and discharge instructions  Description: Complete learning assessment and assess knowledge base.  Interventions:  - Provide teaching at level of understanding  - Provide teaching via preferred learning methods  Outcome: Progressing     Problem: Nutrition/Hydration-ADULT  Goal: Nutrient/Hydration intake appropriate for improving, restoring or maintaining nutritional needs  Description: Monitor and assess patient's nutrition/hydration status for malnutrition. Collaborate with interdisciplinary team and initiate plan and interventions as ordered.  Monitor patient's weight and dietary intake as ordered or per policy. Utilize nutrition screening tool and intervene as necessary. Determine patient's food preferences and provide high-protein, high-caloric foods as appropriate.     INTERVENTIONS:  - Monitor oral intake, urinary output, labs, and treatment plans  - Assess nutrition and hydration status and recommend course of action  - Evaluate amount of meals eaten  - Assist patient with eating if necessary   - Allow adequate time for meals  - Recommend/ encourage appropriate diets, oral nutritional supplements, and vitamin/mineral supplements  - Order, calculate, and assess calorie counts as needed  - Recommend, monitor, and adjust tube feedings and TPN/PPN based on assessed needs  - Assess need for intravenous fluids  - Provide specific nutrition/hydration education as appropriate  - Include patient/family/caregiver in decisions related to nutrition  Outcome: Progressing     Problem:  NEUROSENSORY - ADULT  Goal: Achieves stable or improved neurological status  Description: INTERVENTIONS  - Monitor and report changes in neurological status  - Monitor vital signs such as temperature, blood pressure, glucose, and any other labs ordered   - Initiate measures to prevent increased intracranial pressure  - Monitor for seizure activity and implement precautions if appropriate      Outcome: Progressing  Goal: Remains free of injury related to seizures activity  Description: INTERVENTIONS  - Maintain airway, patient safety  and administer oxygen as ordered  - Monitor patient for seizure activity, document and report duration and description of seizure to physician/advanced practitioner  - If seizure occurs,  ensure patient safety during seizure  - Reorient patient post seizure  - Seizure pads on all 4 side rails  - Instruct patient/family to notify RN of any seizure activity including if an aura is experienced  - Instruct patient/family to call for assistance with activity based on nursing assessment  - Administer anti-seizure medications if ordered    Outcome: Progressing  Goal: Achieves maximal functionality and self care  Description: INTERVENTIONS  - Monitor swallowing and airway patency with patient fatigue and changes in neurological status  - Encourage and assist patient to increase activity and self care.   - Encourage visually impaired, hearing impaired and aphasic patients to use assistive/communication devices  Outcome: Progressing     Problem: CARDIOVASCULAR - ADULT  Goal: Maintains optimal cardiac output and hemodynamic stability  Description: INTERVENTIONS:  - Monitor I/O, vital signs and rhythm  - Monitor for S/S and trends of decreased cardiac output  - Administer and titrate ordered vasoactive medications to optimize hemodynamic stability  - Assess quality of pulses, skin color and temperature  - Assess for signs of decreased coronary artery perfusion  - Instruct patient to report change  in severity of symptoms  Outcome: Progressing  Goal: Absence of cardiac dysrhythmias or at baseline rhythm  Description: INTERVENTIONS:  - Continuous cardiac monitoring, vital signs, obtain 12 lead EKG if ordered  - Administer antiarrhythmic and heart rate control medications as ordered  - Monitor electrolytes and administer replacement therapy as ordered  Outcome: Progressing     Problem: RESPIRATORY - ADULT  Goal: Achieves optimal ventilation and oxygenation  Description: INTERVENTIONS:  - Assess for changes in respiratory status  - Assess for changes in mentation and behavior  - Position to facilitate oxygenation and minimize respiratory effort  - Oxygen administered by appropriate delivery if ordered  - Initiate smoking cessation education as indicated  - Encourage broncho-pulmonary hygiene including cough, deep breathe, Incentive Spirometry  - Assess the need for suctioning and aspirate as needed  - Assess and instruct to report SOB or any respiratory difficulty  - Respiratory Therapy support as indicated  Outcome: Progressing     Problem: GASTROINTESTINAL - ADULT  Goal: Minimal or absence of nausea and/or vomiting  Description: INTERVENTIONS:  - Administer IV fluids if ordered to ensure adequate hydration  - Maintain NPO status until nausea and vomiting are resolved  - Nasogastric tube if ordered  - Administer ordered antiemetic medications as needed  - Provide nonpharmacologic comfort measures as appropriate  - Advance diet as tolerated, if ordered  - Consider nutrition services referral to assist patient with adequate nutrition and appropriate food choices  Outcome: Progressing  Goal: Maintains or returns to baseline bowel function  Description: INTERVENTIONS:  - Assess bowel function  - Encourage oral fluids to ensure adequate hydration  - Administer IV fluids if ordered to ensure adequate hydration  - Administer ordered medications as needed  - Encourage mobilization and activity  - Consider nutritional  services referral to assist patient with adequate nutrition and appropriate food choices  Outcome: Progressing  Goal: Maintains adequate nutritional intake  Description: INTERVENTIONS:  - Monitor percentage of each meal consumed  - Identify factors contributing to decreased intake, treat as appropriate  - Assist with meals as needed  - Monitor I&O, weight, and lab values if indicated  - Obtain nutrition services referral as needed  Outcome: Progressing  Goal: Establish and maintain optimal ostomy function  Description: INTERVENTIONS:  - Assess bowel function  - Encourage oral fluids to ensure adequate hydration  - Administer IV fluids if ordered to ensure adequate hydration   - Administer ordered medications as needed  - Encourage mobilization and activity  - Nutrition services referral to assist patient with appropriate food choices  - Assess stoma site  - Consider wound care consult   Outcome: Progressing  Goal: Oral mucous membranes remain intact  Description: INTERVENTIONS  - Assess oral mucosa and hygiene practices  - Implement preventative oral hygiene regimen  - Implement oral medicated treatments as ordered  - Initiate Nutrition services referral as needed  Outcome: Progressing     Problem: GENITOURINARY - ADULT  Goal: Maintains or returns to baseline urinary function  Description: INTERVENTIONS:  - Assess urinary function  - Encourage oral fluids to ensure adequate hydration if ordered  - Administer IV fluids as ordered to ensure adequate hydration  - Administer ordered medications as needed  - Offer frequent toileting  - Follow urinary retention protocol if ordered  Outcome: Progressing  Goal: Absence of urinary retention  Description: INTERVENTIONS:  - Assess patient’s ability to void and empty bladder  - Monitor I/O  - Bladder scan as needed  - Discuss with physician/AP medications to alleviate retention as needed  - Discuss catheterization for long term situations as appropriate  Outcome:  Progressing  Goal: Urinary catheter remains patent  Description: INTERVENTIONS:  - Assess patency of urinary catheter  - If patient has a chronic cummings, consider changing catheter if non-functioning  - Follow guidelines for intermittent irrigation of non-functioning urinary catheter  Outcome: Progressing     Problem: METABOLIC, FLUID AND ELECTROLYTES - ADULT  Goal: Electrolytes maintained within normal limits  Description: INTERVENTIONS:  - Monitor labs and assess patient for signs and symptoms of electrolyte imbalances  - Administer electrolyte replacement as ordered  - Monitor response to electrolyte replacements, including repeat lab results as appropriate  - Instruct patient on fluid and nutrition as appropriate  Outcome: Progressing  Goal: Fluid balance maintained  Description: INTERVENTIONS:  - Monitor labs   - Monitor I/O and WT  - Instruct patient on fluid and nutrition as appropriate  - Assess for signs & symptoms of volume excess or deficit  Outcome: Progressing  Goal: Glucose maintained within target range  Description: INTERVENTIONS:  - Monitor Blood Glucose as ordered  - Assess for signs and symptoms of hyperglycemia and hypoglycemia  - Administer ordered medications to maintain glucose within target range  - Assess nutritional intake and initiate nutrition service referral as needed  Outcome: Progressing     Problem: SKIN/TISSUE INTEGRITY - ADULT  Goal: Skin Integrity remains intact(Skin Breakdown Prevention)  Description: Assess:  -Perform Adán assessment every shift  -Inspect skin when repositioning, toileting, and assisting with ADLS  -Assess extremities for adequate circulation and sensation     Bed Management:  -Have minimal linens on bed & keep smooth, unwrinkled  -Change linens as needed when moist or perspiring  -Avoid sitting or lying in one position for more than 2 hours while in bed    Toileting:  -Offer bedside commode    Activity:  -Mobilize patient 3 times a day  -Encourage activity and  walks on unit  -Encourage or provide ROM exercises   -Turn and reposition patient every 2 Hours  -Use appropriate equipment to lift or move patient in bed    Skin Care:  -Avoid use of baby powder, tape, friction and shearing, hot water or constrictive clothing  -Do not massage red bony areas      Outcome: Progressing  Goal: Incision(s), wounds(s) or drain site(s) healing without S/S of infection  Description: INTERVENTIONS  - Assess and document dressing, incision, wound bed, drain sites and surrounding tissue  - Provide patient and family education  Outcome: Progressing  Goal: Pressure injury heals and does not worsen  Description: Interventions:  - Implement low air loss mattress or specialty surface (Criteria met)  - Apply silicone foam dressing  - Instruct/assist with weight shifting every 30 minutes when in chair   - Limit chair time to 2 hour intervals  - Use special pressure reducing interventions such as offloading cushion when in chair   - Apply fecal or urinary incontinence containment device   - Turn and reposition patient & offload bony prominences every 2 hours   - Utilize friction reducing device or surface for transfers   - Consider nutrition services referral as needed  Outcome: Progressing     Problem: HEMATOLOGIC - ADULT  Goal: Maintains hematologic stability  Description: INTERVENTIONS  - Assess for signs and symptoms of bleeding or hemorrhage  - Monitor labs  - Administer supportive blood products/factors as ordered and appropriate  Outcome: Progressing     Problem: MUSCULOSKELETAL - ADULT  Goal: Maintain or return mobility to safest level of function  Description: INTERVENTIONS:  - Assess patient's ability to carry out ADLs; assess patient's baseline for ADL function and identify physical deficits which impact ability to perform ADLs (bathing, care of mouth/teeth, toileting, grooming, dressing, etc.)  - Assess/evaluate cause of self-care deficits   - Assess range of motion  - Assess patient's  mobility  - Assess patient's need for assistive devices and provide as appropriate  - Encourage maximum independence but intervene and supervise when necessary  - Involve family in performance of ADLs  - Assess for home care needs following discharge   - Consider OT consult to assist with ADL evaluation and planning for discharge  - Provide patient education as appropriate  Outcome: Progressing  Goal: Maintain proper alignment of affected body part  Description: INTERVENTIONS:  - Support, maintain and protect limb and body alignment  - Provide patient/ family with appropriate education  Outcome: Progressing     Problem: Prexisting or High Potential for Compromised Skin Integrity  Goal: Skin integrity is maintained or improved  Description: INTERVENTIONS:  - Identify patients at risk for skin breakdown  - Assess and monitor skin integrity  - Assess and monitor nutrition and hydration status  - Monitor labs   - Assess for incontinence   - Turn and reposition patient  - Assist with mobility/ambulation  - Relieve pressure over bony prominences  - Avoid friction and shearing  - Provide appropriate hygiene as needed including keeping skin clean and dry  - Evaluate need for skin moisturizer/barrier cream  - Collaborate with interdisciplinary team   - Patient/family teaching  - Consider wound care consult   Outcome: Progressing

## 2024-12-01 NOTE — CASE MANAGEMENT
Case Management Discharge Planning Note    Patient name Alejandro Adames  [facility]  /E[facility] -* MRN [id number]  : 1955 Date 2024       Current Admission Date: 2024  Current Admission Diagnosis:DKA (diabetic ketoacidosis) (HCC)   Patient Active Problem List    Diagnosis Date Noted Date Diagnosed    Severe protein-calorie malnutrition (HCC) 2024     DKA (diabetic ketoacidosis) (HCC) 2024     Metastatic renal cell carcinoma to bone (HCC) 10/24/2024     Polyuria 10/21/2024     Retroperitoneal mass 10/19/2024     Hydronephrosis of left kidney 10/18/2024     JULIO (acute kidney injury) (HCC) 10/18/2024     Hyponatremia 10/18/2024     Lung nodule 10/18/2024     Patellofemoral pain syndrome of both knees 2023     Gait disorder 2023     Sebaceous cyst mid back 10/17/2022     COVID-19 2021     Anemia 2021     Intrinsic eczema 2020     SK (seborrheic keratosis) 2020     Prostate cancer (HCC) 2019     Type 2 diabetes mellitus with kidney complication, without long-term current use of insulin ([facility])      Actinic keratosis 2019     Idiopathic angioedema 2015     Essential hypertension 2012     Mixed hyperlipidemia 2012     Herpes simplex type 1 infection 05/10/2012       LOS (days): 4  Geometric Mean LOS (GMLOS) (days): 4.4  Days to GMLOS:0     OBJECTIVE:  Risk of Unplanned Readmission Score: 28.38         Current admission status: Inpatient   Preferred Pharmacy:   [id number] [place] - [address]  [address] [place] [address]  Phone: [phone] Fax: [phone]    [facility] - [place] - [address]  [address]  [place] [address]  Phone: [phone] Fax: [phone]    [facility] - [place] [address]  [address]  [place] [address]  Phone: [phone] Fax: [phone]    Primary  Care Provider: Wayne Uriarte Jr, MD    Primary Insurance: MEDICARE  Secondary Insurance: [facility]    DISCHARGE DETAILS:                                                                                                 Additional Comments: Met with patient at bedside, provided STR (subacute list) and reviewed accepting facilities.  Patient was not agreeable to reserve any of the currently accepting facilities, instead stated he wished to go to [facility].  Reviewed subacute v. acute level of care.  Left subacute STR list with patient and placed referral to [facility].  Assigned CM to follow.

## 2024-12-01 NOTE — PROGRESS NOTES
"  Progress Note - Alejandro Adames 69 y.o. male MRN: [id number]    Unit/Bed#: E5 -01 Encounter: [id number]    Assessment/Plan:    DKA    POA but now resolved, diabetes controlled with Lantus and Humalog    Diabetes   poorly controlled with erratic intake last 24-hour glucose , continue Lantus and Humalog, continue ADA diet and sliding scale    Ambulatory dysfunction continue PT, rehab plan    Chronic hyponatremia sodium stable 134    JULIO/CKD 4   creatinine has been stable at 1.63    Metastatic renal cell  Carcinoma, receiving infusions every 6-week directed by oncology    Severe protein calorie malnutrition encourage caloric intake with Glucerna    Dyslipidemia   continue statin for LDL control    Hypertension   controlled with amlodipine    Subjective:   Appetite is improving, still weak, no chest pain no shortness of breath no nausea vomiting no fevers chills    Objective:     Vitals: Blood pressure 141/74, pulse 94, temperature (!) 97.3 °F (36.3 °C), temperature source Oral, resp. rate 16, height 5' 10\" (1.778 m), weight 68.7 kg (151 lb 7.3 oz), SpO2 98%.,Body mass index is 21.73 kg/m².      Results from last 7 days   Lab Units 11/29/24  0550   WBC Thousand/uL 14.58*   HEMOGLOBIN g/dL 10.0*   HEMATOCRIT % 30.7*   PLATELETS Thousands/uL 204     Results from last 7 days   Lab Units 12/01/24  0532 11/27/24  0418 11/27/24  0042   POTASSIUM mmol/L 3.5   < > 5.2   CHLORIDE mmol/L 106   < > 94*   CO2 mmol/L 22   < > 9*   BUN mg/dL 37*   < > 57*   CREATININE mg/dL 1.63*   < > 2.05*   CALCIUM mg/dL 7.4*   < > 9.9   ALK PHOS U/L  --   --  163*   ALT U/L  --   --  13   AST U/L  --   --  13    < > = values in this interval not displayed.       Scheduled Meds:  Current Facility-Administered Medications   Medication Dose Route Frequency Provider Last Rate    acetaminophen  975 mg Oral Q6H PRN JEREMIAH Lilly      aluminum-magnesium hydroxide-simethicone  30 mL Oral Q4H PRN JEREMIAH Lilly      " amLODIPine  5 mg Oral Daily Bhanu Middleton DO      atorvastatin  40 mg Oral Daily With Dinner JEREMIAH Lilly      chlorhexidine  15 mL Mouth/Throat Q12H [facility] JEREMIAH Lilly      dronabinol  5 mg Oral BID AC JEREMIAH Lilly      folic acid  1 mg Oral Daily JEREMIAH Lilly      heparin (porcine)  5,000 Units Subcutaneous Q8H CHONG JEREMIAH Lilly      insulin glargine  25 Units Subcutaneous HS Johnathan Lira MD      insulin lispro  1-5 Units Subcutaneous HS JEREMIAH Lilly      insulin lispro  2-12 Units Subcutaneous TID AC JEREMIAH Lilly      insulin lispro  8 Units Subcutaneous TID With Meals Johnathan Lira MD      senna-docusate sodium  1 tablet Oral BID PRN JEREMIAH Lilly         Continuous Infusions:         Physical exam:  General appearance: Alert no distress interaction appropriate weak frail  Head/Eyes: nonicteric EOMI  Neck: Supple  Lungs: Decreased BS bilateral no wheezing rhonchi or rales  Heart: normal S1 S2 regular  Abdomen: Soft nontender with bowel sounds  Extremities: Trace edema  Skin: no rash    Invasive Devices       Peripheral Intravenous Line  Duration             Peripheral IV 11/27/24 Dorsal (posterior);Right Hand 4 days    Peripheral IV 11/27/24 Right Antecubital 4 days    Peripheral IV 11/27/24 Left;Upper;Ventral (anterior) Arm 3 days              Drain  Duration             Percutaneous Nephroureteral Tube (PCNU) Left 1 10 Fr 26 Cm 11 days                      VTE Pharmacologic Prophylaxis: Heparin        Counseling / Coordination of Care  Total floor / unit time spent today 30  minutes.  Greater than 50% of total time was spent with the patient and / or family counseling and / or coordination of care.  A description of the counseling / coordination of care: .

## 2024-12-01 NOTE — PLAN OF CARE
Problem: PAIN - ADULT  Goal: Verbalizes/displays adequate comfort level or baseline comfort level  Description: Interventions:  - Encourage patient to monitor pain and request assistance  - Assess pain using appropriate pain scale  - Administer analgesics based on type and severity of pain and evaluate response  - Implement non-pharmacological measures as appropriate and evaluate response  - Consider cultural and social influences on pain and pain management  - Notify physician/advanced practitioner if interventions unsuccessful or patient reports new pain  Outcome: Progressing     Problem: INFECTION - ADULT  Goal: Absence or prevention of progression during hospitalization  Description: INTERVENTIONS:  - Assess and monitor for signs and symptoms of infection  - Monitor lab/diagnostic results  - Monitor all insertion sites, i.e. indwelling lines, tubes, and drains  - Monitor endotracheal if appropriate and nasal secretions for changes in amount and color  - [place] appropriate cooling/warming therapies per order  - Administer medications as ordered  - Instruct and encourage patient and family to use good hand hygiene technique  - Identify and instruct in appropriate isolation precautions for identified infection/condition  Outcome: Progressing     Problem: SAFETY ADULT  Goal: Patient will remain free of falls  Description: INTERVENTIONS:  - Educate patient/family on patient safety including physical limitations  - Instruct patient to call for assistance with activity   - Consult OT/PT to assist with strengthening/mobility   - Keep Call bell within reach  - Keep bed low and locked with side rails adjusted as appropriate  - Keep care items and personal belongings within reach  - Initiate and maintain comfort rounds  - Make Fall Risk Sign visible to staff  - Offer Toileting every 2 Hours, in advance of need  - Initiate/Maintain bed alarm  - Apply yellow socks and bracelet for high fall risk patients  - Consider  moving patient to room near nurses station  Outcome: Progressing     Problem: DISCHARGE PLANNING  Goal: Discharge to home or other facility with appropriate resources  Description: INTERVENTIONS:  - Identify barriers to discharge w/patient and caregiver  - Arrange for needed discharge resources and transportation as appropriate  - Identify discharge learning needs (meds, wound care, etc.)  - Arrange for interpretive services to assist at discharge as needed  - Refer to Case Management Department for coordinating discharge planning if the patient needs post-hospital services based on physician/advanced practitioner order or complex needs related to functional status, cognitive ability, or social support system  Outcome: Progressing     Problem: Knowledge Deficit  Goal: Patient/family/caregiver demonstrates understanding of disease process, treatment plan, medications, and discharge instructions  Description: Complete learning assessment and assess knowledge base.  Interventions:  - Provide teaching at level of understanding  - Provide teaching via preferred learning methods  Outcome: Progressing     Problem: Nutrition/Hydration-ADULT  Goal: Nutrient/Hydration intake appropriate for improving, restoring or maintaining nutritional needs  Description: Monitor and assess patient's nutrition/hydration status for malnutrition. Collaborate with interdisciplinary team and initiate plan and interventions as ordered.  Monitor patient's weight and dietary intake as ordered or per policy. Utilize nutrition screening tool and intervene as necessary. Determine patient's food preferences and provide high-protein, high-caloric foods as appropriate.     INTERVENTIONS:  - Monitor oral intake, urinary output, labs, and treatment plans  - Assess nutrition and hydration status and recommend course of action  - Evaluate amount of meals eaten  - Assist patient with eating if necessary   - Allow adequate time for meals  - Recommend/  encourage appropriate diets, oral nutritional supplements, and vitamin/mineral supplements  - Order, calculate, and assess calorie counts as needed  - Recommend, monitor, and adjust tube feedings and TPN/PPN based on assessed needs  - Assess need for intravenous fluids  - Provide specific nutrition/hydration education as appropriate  - Include patient/family/caregiver in decisions related to nutrition  Outcome: Progressing     Problem: NEUROSENSORY - ADULT  Goal: Achieves stable or improved neurological status  Description: INTERVENTIONS  - Monitor and report changes in neurological status  - Monitor vital signs such as temperature, blood pressure, glucose, and any other labs ordered   - Initiate measures to prevent increased intracranial pressure  - Monitor for seizure activity and implement precautions if appropriate      Outcome: Progressing  Goal: Remains free of injury related to seizures activity  Description: INTERVENTIONS  - Maintain airway, patient safety  and administer oxygen as ordered  - Monitor patient for seizure activity, document and report duration and description of seizure to physician/advanced practitioner  - If seizure occurs,  ensure patient safety during seizure  - Reorient patient post seizure  - Seizure pads on all 4 side rails  - Instruct patient/family to notify RN of any seizure activity including if an aura is experienced  - Instruct patient/family to call for assistance with activity based on nursing assessment  - Administer anti-seizure medications if ordered    Outcome: Progressing  Goal: Achieves maximal functionality and self care  Description: INTERVENTIONS  - Monitor swallowing and airway patency with patient fatigue and changes in neurological status  - Encourage and assist patient to increase activity and self care.   - Encourage visually impaired, hearing impaired and aphasic patients to use assistive/communication devices  Outcome: Progressing     Problem: RESPIRATORY -  ADULT  Goal: Achieves optimal ventilation and oxygenation  Description: INTERVENTIONS:  - Assess for changes in respiratory status  - Assess for changes in mentation and behavior  - Position to facilitate oxygenation and minimize respiratory effort  - Oxygen administered by appropriate delivery if ordered  - Initiate smoking cessation education as indicated  - Encourage broncho-pulmonary hygiene including cough, deep breathe, Incentive Spirometry  - Assess the need for suctioning and aspirate as needed  - Assess and instruct to report SOB or any respiratory difficulty  - Respiratory Therapy support as indicated  Outcome: Progressing     Problem: GASTROINTESTINAL - ADULT  Goal: Minimal or absence of nausea and/or vomiting  Description: INTERVENTIONS:  - Administer IV fluids if ordered to ensure adequate hydration  - Maintain NPO status until nausea and vomiting are resolved  - Nasogastric tube if ordered  - Administer ordered antiemetic medications as needed  - Provide nonpharmacologic comfort measures as appropriate  - Advance diet as tolerated, if ordered  - Consider nutrition services referral to assist patient with adequate nutrition and appropriate food choices  Outcome: Progressing  Goal: Maintains or returns to baseline bowel function  Description: INTERVENTIONS:  - Assess bowel function  - Encourage oral fluids to ensure adequate hydration  - Administer IV fluids if ordered to ensure adequate hydration  - Administer ordered medications as needed  - Encourage mobilization and activity  - Consider nutritional services referral to assist patient with adequate nutrition and appropriate food choices  Outcome: Progressing  Goal: Maintains adequate nutritional intake  Description: INTERVENTIONS:  - Monitor percentage of each meal consumed  - Identify factors contributing to decreased intake, treat as appropriate  - Assist with meals as needed  - Monitor I&O, weight, and lab values if indicated  - Obtain nutrition  services referral as needed  Outcome: Progressing  Goal: Establish and maintain optimal ostomy function  Description: INTERVENTIONS:  - Assess bowel function  - Encourage oral fluids to ensure adequate hydration  - Administer IV fluids if ordered to ensure adequate hydration   - Administer ordered medications as needed  - Encourage mobilization and activity  - Nutrition services referral to assist patient with appropriate food choices  - Assess stoma site  - Consider wound care consult   Outcome: Progressing  Goal: Oral mucous membranes remain intact  Description: INTERVENTIONS  - Assess oral mucosa and hygiene practices  - Implement preventative oral hygiene regimen  - Implement oral medicated treatments as ordered  - Initiate Nutrition services referral as needed  Outcome: Progressing     Problem: CARDIOVASCULAR - ADULT  Goal: Maintains optimal cardiac output and hemodynamic stability  Description: INTERVENTIONS:  - Monitor I/O, vital signs and rhythm  - Monitor for S/S and trends of decreased cardiac output  - Administer and titrate ordered vasoactive medications to optimize hemodynamic stability  - Assess quality of pulses, skin color and temperature  - Assess for signs of decreased coronary artery perfusion  - Instruct patient to report change in severity of symptoms  Outcome: Progressing  Goal: Absence of cardiac dysrhythmias or at baseline rhythm  Description: INTERVENTIONS:  - Continuous cardiac monitoring, vital signs, obtain 12 lead EKG if ordered  - Administer antiarrhythmic and heart rate control medications as ordered  - Monitor electrolytes and administer replacement therapy as ordered  Outcome: Progressing     Problem: GENITOURINARY - ADULT  Goal: Maintains or returns to baseline urinary function  Description: INTERVENTIONS:  - Assess urinary function  - Encourage oral fluids to ensure adequate hydration if ordered  - Administer IV fluids as ordered to ensure adequate hydration  - Administer ordered  medications as needed  - Offer frequent toileting  - Follow urinary retention protocol if ordered  Outcome: Progressing  Goal: Absence of urinary retention  Description: INTERVENTIONS:  - Assess patient’s ability to void and empty bladder  - Monitor I/O  - Bladder scan as needed  - Discuss with physician/AP medications to alleviate retention as needed  - Discuss catheterization for long term situations as appropriate  Outcome: Progressing  Goal: Urinary catheter remains patent  Description: INTERVENTIONS:  - Assess patency of urinary catheter  - If patient has a chronic cummings, consider changing catheter if non-functioning  - Follow guidelines for intermittent irrigation of non-functioning urinary catheter  Outcome: Progressing     Problem: METABOLIC, FLUID AND ELECTROLYTES - ADULT  Goal: Electrolytes maintained within normal limits  Description: INTERVENTIONS:  - Monitor labs and assess patient for signs and symptoms of electrolyte imbalances  - Administer electrolyte replacement as ordered  - Monitor response to electrolyte replacements, including repeat lab results as appropriate  - Instruct patient on fluid and nutrition as appropriate  Outcome: Progressing  Goal: Fluid balance maintained  Description: INTERVENTIONS:  - Monitor labs   - Monitor I/O and WT  - Instruct patient on fluid and nutrition as appropriate  - Assess for signs & symptoms of volume excess or deficit  Outcome: Progressing  Goal: Glucose maintained within target range  Description: INTERVENTIONS:  - Monitor Blood Glucose as ordered  - Assess for signs and symptoms of hyperglycemia and hypoglycemia  - Administer ordered medications to maintain glucose within target range  - Assess nutritional intake and initiate nutrition service referral as needed  Outcome: Progressing     Problem: SKIN/TISSUE INTEGRITY - ADULT  Goal: Skin Integrity remains intact(Skin Breakdown Prevention)  Description: Assess:  -Perform Adán assessment every shift  -Inspect  skin when repositioning, toileting, and assisting with ADLS  -Assess extremities for adequate circulation and sensation     Bed Management:  -Have minimal linens on bed & keep smooth, unwrinkled  -Change linens as needed when moist or perspiring  -Avoid sitting or lying in one position for more than 2 hours while in bed    Toileting:  -Offer bedside commode    Activity:  -Mobilize patient 3 times a day  -Encourage activity and walks on unit  -Encourage or provide ROM exercises   -Turn and reposition patient every 2 Hours  -Use appropriate equipment to lift or move patient in bed    Skin Care:  -Avoid use of baby powder, tape, friction and shearing, hot water or constrictive clothing  -Do not massage red bony areas      Outcome: Progressing  Goal: Incision(s), wounds(s) or drain site(s) healing without S/S of infection  Description: INTERVENTIONS  - Assess and document dressing, incision, wound bed, drain sites and surrounding tissue  - Provide patient and family education  Outcome: Progressing  Goal: Pressure injury heals and does not worsen  Description: Interventions:  - Implement low air loss mattress or specialty surface (Criteria met)  - Apply silicone foam dressing  - Instruct/assist with weight shifting every 30 minutes when in chair   - Limit chair time to 2 hour intervals  - Use special pressure reducing interventions such as offloading cushion when in chair   - Apply fecal or urinary incontinence containment device   - Turn and reposition patient & offload bony prominences every 2 hours   - Utilize friction reducing device or surface for transfers   - Consider nutrition services referral as needed  Outcome: Progressing     Problem: HEMATOLOGIC - ADULT  Goal: Maintains hematologic stability  Description: INTERVENTIONS  - Assess for signs and symptoms of bleeding or hemorrhage  - Monitor labs  - Administer supportive blood products/factors as ordered and appropriate  Outcome: Progressing

## 2024-12-02 ENCOUNTER — APPOINTMENT (INPATIENT)
Dept: NON INVASIVE DIAGNOSTICS | Facility: HOSPITAL | Age: 69
DRG: 682 | End: 2024-12-02
Payer: MEDICARE

## 2024-12-02 ENCOUNTER — DOCUMENTATION (OUTPATIENT)
Dept: HEMATOLOGY ONCOLOGY | Facility: CLINIC | Age: 69
End: 2024-12-02

## 2024-12-02 PROBLEM — M79.89 LEFT LEG SWELLING: Status: ACTIVE | Noted: 2024-12-02

## 2024-12-02 LAB
GLUCOSE SERPL-MCNC: 101 MG/DL (ref 65–140)
GLUCOSE SERPL-MCNC: 126 MG/DL (ref 65–140)
GLUCOSE SERPL-MCNC: 64 MG/DL (ref 65–140)
GLUCOSE SERPL-MCNC: 72 MG/DL (ref 65–140)
GLUCOSE SERPL-MCNC: 99 MG/DL (ref 65–140)

## 2024-12-02 PROCEDURE — 82948 REAGENT STRIP/BLOOD GLUCOSE: CPT

## 2024-12-02 PROCEDURE — 97535 SELF CARE MNGMENT TRAINING: CPT

## 2024-12-02 PROCEDURE — 93971 EXTREMITY STUDY: CPT | Performed by: SURGERY

## 2024-12-02 PROCEDURE — 97110 THERAPEUTIC EXERCISES: CPT

## 2024-12-02 PROCEDURE — 97116 GAIT TRAINING THERAPY: CPT

## 2024-12-02 PROCEDURE — 93971 EXTREMITY STUDY: CPT

## 2024-12-02 PROCEDURE — 97530 THERAPEUTIC ACTIVITIES: CPT

## 2024-12-02 PROCEDURE — 99232 SBSQ HOSP IP/OBS MODERATE 35: CPT | Performed by: INTERNAL MEDICINE

## 2024-12-02 RX ORDER — INSULIN GLARGINE 100 [IU]/ML
20 INJECTION, SOLUTION SUBCUTANEOUS
Status: DISCONTINUED | OUTPATIENT
Start: 2024-12-02 | End: 2024-12-03

## 2024-12-02 RX ADMIN — SENNOSIDES AND DOCUSATE SODIUM 1 TABLET: 50; 8.6 TABLET ORAL at 08:44

## 2024-12-02 RX ADMIN — AMLODIPINE BESYLATE 5 MG: 5 TABLET ORAL at 08:44

## 2024-12-02 RX ADMIN — HEPARIN SODIUM 5000 UNITS: 5000 INJECTION INTRAVENOUS; SUBCUTANEOUS at 14:31

## 2024-12-02 RX ADMIN — CHLORHEXIDINE GLUCONATE 15 ML: 1.2 RINSE ORAL at 22:25

## 2024-12-02 RX ADMIN — DRONABINOL 5 MG: 2.5 CAPSULE ORAL at 07:54

## 2024-12-02 RX ADMIN — INSULIN LISPRO 8 UNITS: 100 INJECTION, SOLUTION INTRAVENOUS; SUBCUTANEOUS at 12:17

## 2024-12-02 RX ADMIN — INSULIN GLARGINE 20 UNITS: 100 INJECTION, SOLUTION SUBCUTANEOUS at 22:25

## 2024-12-02 RX ADMIN — ATORVASTATIN CALCIUM 40 MG: 40 TABLET, FILM COATED ORAL at 16:37

## 2024-12-02 RX ADMIN — HEPARIN SODIUM 5000 UNITS: 5000 INJECTION INTRAVENOUS; SUBCUTANEOUS at 22:25

## 2024-12-02 RX ADMIN — INSULIN LISPRO 8 UNITS: 100 INJECTION, SOLUTION INTRAVENOUS; SUBCUTANEOUS at 18:06

## 2024-12-02 RX ADMIN — LACTULOSE 20 G: 20 SOLUTION ORAL at 08:44

## 2024-12-02 RX ADMIN — HEPARIN SODIUM 5000 UNITS: 5000 INJECTION INTRAVENOUS; SUBCUTANEOUS at 06:37

## 2024-12-02 RX ADMIN — LACTULOSE 20 G: 20 SOLUTION ORAL at 18:06

## 2024-12-02 RX ADMIN — FOLIC ACID 1 MG: 1 TABLET ORAL at 08:44

## 2024-12-02 RX ADMIN — ACETAMINOPHEN 975 MG: 325 TABLET, FILM COATED ORAL at 12:17

## 2024-12-02 RX ADMIN — ACETAMINOPHEN 975 MG: 325 TABLET, FILM COATED ORAL at 18:08

## 2024-12-02 RX ADMIN — INSULIN LISPRO 8 UNITS: 100 INJECTION, SOLUTION INTRAVENOUS; SUBCUTANEOUS at 08:44

## 2024-12-02 RX ADMIN — SENNOSIDES AND DOCUSATE SODIUM 1 TABLET: 50; 8.6 TABLET ORAL at 18:06

## 2024-12-02 RX ADMIN — ACETAMINOPHEN 975 MG: 325 TABLET, FILM COATED ORAL at 03:54

## 2024-12-02 RX ADMIN — DRONABINOL 5 MG: 2.5 CAPSULE ORAL at 16:37

## 2024-12-02 NOTE — PHYSICAL THERAPY NOTE
"   Physical Therapy Treatment Session:       12/02/24 1030   PT Last Visit   PT Visit Date 12/02/24   Note Type   Note Type Treatment for insurance authorization   Pain Assessment   Pain Assessment Tool 0-10   Pain Score 4   Pain Location/Orientation Location: [facility]   Hospital Pain Intervention(s) Repositioned;Ambulation/increased activity;Elevated;Emotional support;Rest   Restrictions/Precautions   Other Precautions Impulsive;Chair Alarm;Bed Alarm;Multiple lines;Telemetry;Fall Risk;Pain   General   Chart Reviewed Yes   Family/Caregiver Present No   Cognition   Overall Cognitive Status Impaired   Arousal/Participation Cooperative;Responsive   Attention Difficulty attending to directions   Orientation Level Oriented X4   Following Commands Follows one step commands with increased time or repetition   Comments 2* buttocks pain   Subjective   Subjective pt sitting in recliner resting comfortably; pt willing and agreeable to work with PT and to participate in therapy intervention; \"I just need more time than others\".   Bed Mobility   Sit to Supine 5  Supervision   Additional items Assist x 1;HOB elevated;Bedrails;Increased time required;Verbal cues   Transfers   Sit to Stand 5  Supervision   Additional items Assist x 1;Armrests;Increased time required;Verbal cues   Stand to Sit 5  Supervision   Additional items Assist x 1;Bedrails;Increased time required;Verbal cues   Ambulation/Elevation   Gait pattern Poor UE support;Antalgic;Narrow ANDRIY;Forward Flexion;Shuffling;Inconsistent lacie;Foward flexed;Short stride;Ataxia   Gait Assistance 5  Supervision   Additional items Assist x 1;Verbal cues   Assistive Device Rolling walker   Distance 100 feet with use of RW on tile/hardwood gama; limited ambulation distance due to fatigue and request to  return to hospital room   Balance   Static Sitting Fair   Dynamic Sitting Fair   Static Standing Fair   Dynamic Standing Fair   Ambulatory Fair   Endurance Deficit   Endurance " Deficit Yes   Endurance Deficit Description weakness,fatigues easily, pain   Activity Tolerance   Activity Tolerance Patient limited by fatigue;Patient limited by pain  (fair)   Medical Staff Made Aware request from CM to see pt for PT tx session   Nurse Made Aware yes   Exercises   Hamstring Sets Standing;AROM;Bilateral  (with support of RW in front, x12 reps)   Hip Flexion Standing;AROM;Bilateral  (with support of RW in front, x12 reps)   Hip Abduction Standing;AROM;Bilateral  (with support of RW in front, x12 reps)   Hip Adduction Standing;AROM;Bilateral  (with support of RW in front, x12 reps)   Ankle Pumps Standing;AROM;Bilateral  (heel/toe raises; with support of RW in front, x12 reps)   Squat Standing;AROM;Bilateral  (with support of RW in front, x12 reps)   Marching Standing;AROM;Bilateral  (with support of RW in front, x12 reps)   Assessment   Prognosis Fair   Problem List Decreased strength;Decreased endurance;Impaired balance;Decreased mobility;Decreased cognition;Decreased safety awareness;Impaired judgement;Decreased skin integrity;Pain   Assessment Pt able to perform sit->supine, sit<->stand transfers needing S level of A. Pt able to ambulate 100 feet with use of RW needing S level of A. Pt fatigues easily and reports buttocks pain during static sit and dynamic stand. Pt able to perform and complete BLE ther ex HEP in static stand with support of RW in front. (+)dec cognition with inc reminders of number of reps for BLE ther ex HEP and for demonstration of BLE ther ex HEP. Pt requested to use BR prior to mobility. Pt able to static stand while performing urination needing S level of A lasting 3-4 mins and able to static stand needing S level of A while performing hand hygiene for 1-2 mins. Pt would cont to benefit from skilled inpt PT services to maximize functional independence and to dec caregiver burden upon being DC from the hospital.   Barriers to Discharge Inaccessible home environment  (stairs)    Goals   Patient Goals to return to bed due to buttocks pain   STG Expiration Date 12/09/24   Plan   Treatment/Interventions Functional transfer training;LE strengthening/ROM;Therapeutic exercise;Endurance training;Patient/family training;Equipment eval/education;Bed mobility;Gait training;Continued evaluation;Spoke to nursing;Spoke to case management   Progress Slow progress, decreased activity tolerance   PT Frequency 3-5x/wk   Discharge Recommendation   Rehab Resource Intensity Level, PT II (Moderate Resource Intensity)   Equipment Recommended Walker   AM-PAC Basic Mobility Inpatient   Turning in Flat Bed Without Bedrails 3   Lying on Back to Sitting on Edge of Flat Bed Without Bedrails 3   Moving Bed to Chair 3   Standing Up From Chair Using Arms 3   Walk in Room 3   Climb 3-5 Stairs With Railing 2   Basic Mobility Inpatient Raw Score 17   Basic Mobility Standardized Score 39.67   [facility] Highest Level Of Mobility   -HLM Goal 5: Stand one or more mins   -HLM Achieved 7: Walk 25 feet or more

## 2024-12-02 NOTE — PROGRESS NOTES
Progress Note - Hospitalist   Name: Alejandro Adames 69 y.o. male I MRN: [id number]  Unit/Bed#: E5 -01 I Date of Admission: 11/27/2024   Date of Service: 12/2/2024 I Hospital Day: 5    Assessment & Plan  DKA (diabetic ketoacidosis) ([facility])  Lab Results   Component Value Date    HGBA1C 9.8 (H) 08/28/2024     Recent Labs     12/01/24  1603 12/01/24  2126 12/02/24  0652 12/02/24  0752   POCGLU 89 215* 64* 101     Blood Sugar Average: Last 72 hrs:  (P) 138    69-year-old male with PMH of type 2 diabetes, metastatic renal cell carcinoma, prostate cancer, HTN and CKD presenting with nausea vomiting and found to be in DKA  Initially admitted under ICU care on 11/27, transferred to [facility] same day  Did require insulin drip, transitioned to basal bolus once and gap resolved  Endocrine suspect possibly worsening diabetes in setting of Keytruda  DKA resolved  See plan below    Type 2 diabetes mellitus with kidney complication, without long-term current use of insulin ([facility])  Lab Results   Component Value Date    HGBA1C 9.8 (H) 08/28/2024       Recent Labs     12/01/24  1603 12/01/24  2126 12/02/24  0652 12/02/24  0752   POCGLU 89 215* 64* 101       Blood Sugar Average: Last 72 hrs:  (P) 138    Previously on metformin, Ozempic  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  PAULA, insulin antibodies pending  Seen by endocrinology, appreciate recommendations  Currently on Lantus 25 units at bedtime and Humalog 8 units 3 times daily with sliding scale  Blood sugar low this morning, decrease Lantus to 20 units at bedtime, continue Humalog 8 units 3 times daily with sliding scale  Monitor BP  Hypoglycemia protocol  Metastatic renal cell carcinoma to bone (HCC)  Stage IV metastatic renal cell carcinoma, following with oncology  History of prostate cancer s/p radiation  In October 2024 found to have a retroperitoneal mass, with no adenopathy underwent left inguinal adenopathy biopsy concerning for metastatic renal  cell carcinoma  Had left-sided hydronephrosis, s/p PCN which was converted to PCN U on 11/19/2024  Currently on Keytruda and Lenvima every 6 weeks  Continue follow-up with urology at [facility]  Continue Marinol  Left leg swelling  Reports left leg swelling since he was diagnosed with cancer  Suspect in the setting of left inguinal adenopathy  Will order venous duplex  JULIO (acute kidney injury) (HCC)  Renal functions improved, near baseline  Treated with IV fluids  Essential hypertension  Outpatient regimen is amlodipine 10 mg daily, currently on amlodipine 5 mg daily  BP stable  Severe protein-calorie malnutrition (HCC)  Malnutrition Findings:   Adult Malnutrition type: Chronic illness  Adult Degree of Malnutrition: Other severe protein calorie malnutrition  Malnutrition Characteristics: Inadequate energy, Weight loss    360 Statement: Severe protein calorie malnutrition in context of chronic illness r/t poor appetite, inadequate PO intake, increased needs for cancer as evidenced by energy intake less than 75% compared to estimated needs>1 month, 14% wt loss x 6 months; Treated with PO diet and oral supplements    BMI Findings:     Body mass index is 21.73 kg/m².         VTE Pharmacologic Prophylaxis: VTE Score: 2 heparin chemical DVT prophylaxis    Mobility:   Basic Mobility Inpatient Raw Score: 18  JH-HLM Goal: 6: Walk 10 steps or more  JH-HLM Achieved: 7: Walk 25 feet or more  JH-HLM Goal NOT achieved. Continue with multidisciplinary rounding and encourage appropriate mobility to improve upon JH-HLM goals.    Patient Centered Rounds: I performed bedside rounds with nursing staff today.   Discussions with Specialists or Other Care Team Provider: Nursing, case management    Education and Discussions with Family / Patient: Patient declined call to .     Current Length of Stay: 5 day(s)  Current Patient Status: Inpatient   Certification Statement: The patient will continue to require additional  inpatient hospital stay due to left lower extremity duplex  Discharge Plan: Anticipate discharge later today or tomorrow to rehab facility.    Code Status: Level 3 - DNAR and DNI    Subjective   Patient was seen evaluated bedside.  He is feeling weak, complaining of sore back    Objective :  Temp:  [97.8 °F (36.6 °C)-99 °F (37.2 °C)] 97.8 °F (36.6 °C)  HR:  [86-95] 86  BP: (105-127)/(62-73) 127/73  Resp:  [18] 18  SpO2:  [98 %-99 %] 98 %  O2 Device: None (Room air)    Body mass index is 21.73 kg/m².     Input and Output Summary (last 24 hours):     Intake/Output Summary (Last 24 hours) at 12/2/2024 0953  Last data filed at 12/2/2024 0835  Gross per 24 hour   Intake 1200 ml   Output 1450 ml   Net -250 ml       Physical Exam  Constitutional:       General: He is not in acute distress.  HENT:      Head: Atraumatic.   Cardiovascular:      Rate and Rhythm: Normal rate and regular rhythm.      Heart sounds: No murmur heard.  Pulmonary:      Effort: Pulmonary effort is normal. No respiratory distress.      Breath sounds: Normal breath sounds. No wheezing.   Abdominal:      General: Bowel sounds are normal. There is no distension.      Palpations: Abdomen is soft.      Tenderness: There is no abdominal tenderness.   Musculoskeletal:      Cervical back: Neck supple.      Comments: Left leg 1+ pitting edema   Neurological:      General: No focal deficit present.      Mental Status: He is alert.   Psychiatric:         Mood and Affect: Mood normal.           Lines/Drains:  Lines/Drains/Airways       Active Status       Name Placement date Placement time Site Days    Percutaneous Nephroureteral Tube (PCNU) Left 1 10 Fr 26 Cm 11/19/24  1117  Left  12                            Lab Results: I have reviewed the following results:   Results from last 7 days   Lab Units 11/29/24  0550 11/27/24  0042   WBC Thousand/uL 14.58* 19.39*   HEMOGLOBIN g/dL 10.0* 11.4*   HEMATOCRIT % 30.7* 36.4*   PLATELETS Thousands/uL 204 543*   BANDS PCT %   --  6   LYMPHO PCT %  --  2*   MONO PCT %  --  0*   EOS PCT %  --  0     Results from last 7 days   Lab Units 12/01/24  0532 11/27/24  0418 11/27/24  0042   SODIUM mmol/L 136   < > 129*   POTASSIUM mmol/L 3.5   < > 5.2   CHLORIDE mmol/L 106   < > 94*   CO2 mmol/L 22   < > 9*   BUN mg/dL 37*   < > 57*   CREATININE mg/dL 1.63*   < > 2.05*   ANION GAP mmol/L 8   < > 26*   CALCIUM mg/dL 7.4*   < > 9.9   ALBUMIN g/dL  --   --  3.3*   TOTAL BILIRUBIN mg/dL  --   --  0.31   ALK PHOS U/L  --   --  163*   ALT U/L  --   --  13   AST U/L  --   --  13   GLUCOSE RANDOM mg/dL 55*   < > 500*    < > = values in this interval not displayed.         Results from last 7 days   Lab Units 12/02/24  0752 12/02/24  0652 12/01/24  2126 12/01/24  1603 12/01/24  1103 12/01/24  0653 11/30/24  2026 11/30/24  1600 11/30/24  1117 11/30/24  0738 11/29/24  2106 11/29/24  1556   POC GLUCOSE mg/dl 101 64* 215* 89 102 78 167* 122 230* 89 154* 183*         Results from last 7 days   Lab Units 11/27/24  0042   LACTIC ACID mmol/L 1.0       Recent Cultures (last 7 days):         Imaging Results Review: I reviewed radiology reports from this admission including: chest xray.  Other Study Results Review: No additional pertinent studies reviewed.    Last 24 Hours Medication List:     Current Facility-Administered Medications:     acetaminophen (TYLENOL) tablet 975 mg, Q6H PRN    aluminum-magnesium hydroxide-simethicone (MAALOX) oral suspension 30 mL, Q4H PRN    amLODIPine (NORVASC) tablet 5 mg, Daily    atorvastatin (LIPITOR) tablet 40 mg, Daily With Dinner    chlorhexidine (PERIDEX) 0.12 % oral rinse 15 mL, Q12H CHONG    dronabinol (MARINOL) capsule 5 mg, BID AC    folic acid (FOLVITE) tablet 1 mg, Daily    heparin (porcine) subcutaneous injection 5,000 Units, Q8H CHONG    insulin glargine (LANTUS) subcutaneous injection 20 Units 0.2 mL, HS    insulin lispro (HumALOG/ADMELOG) 100 units/mL subcutaneous injection 1-5 Units, HS    insulin lispro (HumALOG/ADMELOG)  100 units/mL subcutaneous injection 2-12 Units, TID AC **AND** Fingerstick Glucose (POCT), TID AC    insulin lispro (HumALOG/ADMELOG) 100 units/mL subcutaneous injection 8 Units, TID With Meals    lactulose (CHRONULAC) oral solution 20 g, BID    senna-docusate sodium (SENOKOT S) 8.6-50 mg per tablet 1 tablet, BID    Administrative Statements   Today, Patient Was Seen By: Marilou Chen MD      **Please Note: This note may have been constructed using a voice recognition system.**

## 2024-12-02 NOTE — PLAN OF CARE
Problem: OCCUPATIONAL THERAPY ADULT  Goal: Performs self-care activities at highest level of function for planned discharge setting.  See evaluation for individualized goals.  Description: Treatment Interventions: ADL retraining, Functional transfer training, Endurance training, UE strengthening/ROM, Cognitive reorientation, Patient/family training, Equipment evaluation/education, Compensatory technique education, Activityengagement, Energy conservation          See flowsheet documentation for full assessment, interventions and recommendations.   Outcome: Progressing  Note: Limitation: Decreased UE ROM, Decreased UE strength, Decreased ADL status, Decreased Safe judgement during ADL, Decreased cognition, Decreased endurance, Decreased self-care trans, Decreased high-level ADLs  Prognosis: Fair  Assessment: Pt seen for skilled OT tx this date. Tx focused on improving strength, activity tolerance and balance, safety awareness to increase independence with self care tasks. Pt tolerated session well. Pt was limited by weakness. Greeted in supine and agreeable. Pt performed UB dressing/ bathing with supervision, LB dressing / bathing supervision , Toileting supervision,  bed mobility Marlen, transfers supervision, mobility with RW supervision. Pt demonstrated fair - standing balance during functional tasks, no LOB noted.Pt demonstrated ability to safely and appropriately attend to all tasks during session. Pt required minimal verbal cuing during session to safely complete tasks.   Current OT DC recommendations for pt is level 2 vs 3 resources.     Rehab Resource Intensity Level, OT: II (Moderate Resource Intensity) (level 2 vs 3 resources.)

## 2024-12-02 NOTE — CASE MANAGEMENT
Case Management Discharge Planning Note    Patient name Alejandro Adames  Location [facility]  /E5 -* MRN [id number]  : 1955 Date 2024       Current Admission Date: 2024  Current Admission Diagnosis:DKA (diabetic ketoacidosis) (HCC)   Patient Active Problem List    Diagnosis Date Noted Date Diagnosed    Left leg swelling 2024     Severe protein-calorie malnutrition (HCC) 2024     DKA (diabetic ketoacidosis) (HCC) 2024     Metastatic renal cell carcinoma to bone (HCC) 10/24/2024     Polyuria 10/21/2024     Retroperitoneal mass 10/19/2024     Hydronephrosis of left kidney 10/18/2024     JULIO (acute kidney injury) (HCC) 10/18/2024     Hyponatremia 10/18/2024     Lung nodule 10/18/2024     Patellofemoral pain syndrome of both knees 2023     Gait disorder 2023     Sebaceous cyst mid back 10/17/2022     COVID-19 2021     Anemia 2021     Intrinsic eczema 2020     SK (seborrheic keratosis) 2020     Prostate cancer ([facility]) 2019     Type 2 diabetes mellitus with kidney complication, without long-term current use of insulin ([facility])      Actinic keratosis 2019     Idiopathic angioedema 2015     Essential hypertension 2012     Mixed hyperlipidemia 2012     Herpes simplex type 1 infection 05/10/2012       LOS (days): 5  Geometric Mean LOS (GMLOS) (days): 4.4  Days to GMLOS:-1.1     OBJECTIVE:  Risk of Unplanned Readmission Score: 26.87         Current admission status: Inpatient   Preferred Pharmacy:   [id number] [place] - [address]  [address]  [place] [address]  Phone: [phone] Fax: [phone]    [facility] [place] - [address]  [address]  [place] [address]  Phone: [phone] Fax: [phone]    [facility] - [place], [place] - [address]  [address]  [address]  [place] [address]  Phone:  [phone] Fax: [phone]    Primary Care Provider: Wayne Uriarte Jr, MD    Primary Insurance: MEDICARE  Secondary Insurance: [facility]    DISCHARGE DETAILS:    Discharge planning discussed with:: patient  Freedom of Choice: Yes  Comments - Freedom of Choice: patient chose [facility]     Other Referral/Resources/Interventions Provided:  Interventions: Short Term Rehab    Treatment Team Recommendation: Short Term Rehab  Discharge Destination Plan:: Short Term Rehab     IMM Given (Date):: 12/02/24  IMM Given to:: Patient     Additional Comments: [facility] no longer available, patient too functional for acute rehab. Patient now has chosen [facility].

## 2024-12-02 NOTE — ASSESSMENT & PLAN NOTE
Malnutrition Findings:   Adult Malnutrition type: Chronic illness  Adult Degree of Malnutrition: Other severe protein calorie malnutrition  Malnutrition Characteristics: Inadequate energy, Weight loss    360 Statement: Severe protein calorie malnutrition in context of chronic illness r/t poor appetite, inadequate PO intake, increased needs for cancer as evidenced by energy intake less than 75% compared to estimated needs>1 month, 14% wt loss x 6 months; Treated with PO diet and oral supplements    BMI Findings:     Body mass index is 21.73 kg/m².

## 2024-12-02 NOTE — ASSESSMENT & PLAN NOTE
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

## 2024-12-02 NOTE — CASE MANAGEMENT
Case Management Discharge Planning Note    Patient name Alejandro Adames  Location [facility]  /E5 -* MRN [id number]  : 1955 Date 2024       Current Admission Date: 2024  Current Admission Diagnosis:DKA (diabetic ketoacidosis) (HCC)   Patient Active Problem List    Diagnosis Date Noted Date Diagnosed    Left leg swelling 2024     Severe protein-calorie malnutrition (HCC) 2024     DKA (diabetic ketoacidosis) (HCC) 2024     Metastatic renal cell carcinoma to bone (HCC) 10/24/2024     Polyuria 10/21/2024     Retroperitoneal mass 10/19/2024     Hydronephrosis of left kidney 10/18/2024     JULIO (acute kidney injury) (HCC) 10/18/2024     Hyponatremia 10/18/2024     Lung nodule 10/18/2024     Patellofemoral pain syndrome of both knees 2023     Gait disorder 2023     Sebaceous cyst mid back 10/17/2022     COVID-19 2021     Anemia 2021     Intrinsic eczema 2020     SK (seborrheic keratosis) 2020     Prostate cancer ([facility]) 2019     Type 2 diabetes mellitus with kidney complication, without long-term current use of insulin ([facility])      Actinic keratosis 2019     Idiopathic angioedema 2015     Essential hypertension 2012     Mixed hyperlipidemia 2012     Herpes simplex type 1 infection 05/10/2012       LOS (days): 5  Geometric Mean LOS (GMLOS) (days): 4.4  Days to GMLOS:-1     OBJECTIVE:  Risk of Unplanned Readmission Score: 26.81         Current admission status: Inpatient   Preferred Pharmacy:   [id number] [place] - [address]  [address]  [place] [address]  Phone: [phone] Fax: [phone]    [facility] - [place] - [address]  [address]  [place] [address]  Phone: [phone] Fax: [phone]    [facility] - [place], [place] - [address]  [address]  [address]  [place] [address]  Phone:  [phone] Fax: [phone]    Primary Care Provider: Wayne Uriarte Jr, MD    Primary Insurance: MEDICARE  Secondary Insurance: [facility]    DISCHARGE DETAILS:    Discharge planning discussed with:: patient  Freedom of Choice: Yes  Comments - Freedom of Choice: wants [facility], 1st choice  CM contacted family/caregiver?: No- see comments     Did patient/caregiver verbalize understanding of patient care needs?: N/A- going to facility       Contacts  Patient Contacts: Gordy Adames ( brother) [phone]  Relationship to Patient:: Family  Contact Method: Phone  Phone Number: [phone]  Reason/Outcome: Emergency Contact    Requested Home Health Care         Is the patient interested in HHC at discharge?: No    DME Referral Provided  Referral made for DME?: No    Other Referral/Resources/Interventions Provided:  Interventions: Short Term Rehab    Treatment Team Recommendation: Short Term Rehab  Discharge Destination Plan:: Acute Rehab, Short Term Rehab  Transport at Discharge : Stretcher van, Wheelchair van     Additional Comments: [facility] accepting, requested updated therapy notes. Patient pending medical clearance was advised tentative 24 hrs for discharge pending left leg doppler. CM will continue to follow.

## 2024-12-02 NOTE — NURSING NOTE
This nurse agrees with prior nurses assessment.  Patient resting comfortably in bed.  All comfort items and call bell within reach.  Bed alarm activated.

## 2024-12-02 NOTE — RESTORATIVE TECHNICIAN NOTE
Restorative Technician Note      Patient Name: Alejandro Adames     Restorative Tech Visit Date: 12/02/24  Note Type: Mobility  Patient Position Upon Consult: Supine  Activity Performed: Ambulated  Assistive Device: Roller walker  Patient Position at End of Consult: Bedside chair; All needs within reach; Bed/Chair alarm activated            ns

## 2024-12-02 NOTE — PLAN OF CARE
Problem: PAIN - ADULT  Goal: Verbalizes/displays adequate comfort level or baseline comfort level  Description: Interventions:  - Encourage patient to monitor pain and request assistance  - Assess pain using appropriate pain scale  - Administer analgesics based on type and severity of pain and evaluate response  - Implement non-pharmacological measures as appropriate and evaluate response  - Consider cultural and social influences on pain and pain management  - Notify physician/advanced practitioner if interventions unsuccessful or patient reports new pain  Outcome: Progressing     Problem: INFECTION - ADULT  Goal: Absence or prevention of progression during hospitalization  Description: INTERVENTIONS:  - Assess and monitor for signs and symptoms of infection  - Monitor lab/diagnostic results  - Monitor all insertion sites, i.e. indwelling lines, tubes, and drains  - Monitor endotracheal if appropriate and nasal secretions for changes in amount and color  - [place] appropriate cooling/warming therapies per order  - Administer medications as ordered  - Instruct and encourage patient and family to use good hand hygiene technique  - Identify and instruct in appropriate isolation precautions for identified infection/condition  Outcome: Progressing

## 2024-12-02 NOTE — OCCUPATIONAL THERAPY NOTE
Occupational Therapy Progress Note     Patient Name: Alejandro Adames  Today's Date: 12/2/2024  Problem List  Principal Problem:    DKA (diabetic ketoacidosis) (HCC)  Active Problems:    Essential hypertension    Type 2 diabetes mellitus with kidney complication, without long-term current use of insulin (HCC)    JULIO (acute kidney injury) (HCC)    Metastatic renal cell carcinoma to bone (HCC)    Severe protein-calorie malnutrition (HCC)    Left leg swelling          12/02/24 1148   OT Last Visit   OT Visit Date 12/02/24   Note Type   Note Type Treatment   Pain Assessment   Pain Assessment Tool 0-10   Pain Score 5   Pain Location/Orientation Location: Buttocks   Restrictions/Precautions   Weight Bearing Precautions Per Order No   Other Precautions Impulsive;Chair Alarm;Bed Alarm;Multiple lines;Telemetry;Fall Risk;Pain   ADL   Where Assessed Edge of bed   Eating Assistance 6  Modified independent   Grooming Assistance 5  Supervision/Setup   Grooming Deficit Setup;Verbal cueing;Supervision/safety;Increased time to complete   UB Bathing Assistance 5  Supervision/Setup   UB Bathing Deficit Setup;Verbal cueing;Supervision/safety;Increased time to complete   LB Bathing Assistance 5  Supervision/Setup   LB Bathing Deficit Setup;Verbal cueing;Supervision/safety;Increased time to complete   UB Dressing Assistance 5  Supervision/Setup   UB Dressing Deficit Verbal cueing;Supervision/safety;Increased time to complete   LB Dressing Assistance 5  Supervision/Setup   LB Dressing Deficit Setup;Verbal cueing;Supervision/safety;Increased time to complete   Toileting Assistance  5  Supervision/Setup   Toileting Deficit Setup;Increased time to complete;Verbal cueing;Supervison/safety   Functional Standing Tolerance   Time 1-2 min   Activity RW for support during mobility and self care tasks   Bed Mobility   Supine to Sit 6  Modified independent   Additional items HOB elevated;Bedrails;Increased time required   Sit to Supine 5   Supervision   Additional items Increased time required;Verbal cues;Bedrails;HOB elevated   Transfers   Sit to Stand 5  Supervision   Additional items Assist x 1;Increased time required;Verbal cues   Stand to Sit 5  Supervision   Additional items Assist x 1;Increased time required;Verbal cues;Armrests   Additional Comments cues for best tech.   Functional Mobility   Functional Mobility 5  Supervision   Additional Comments Ax1, RW, household distances.   Additional items Rolling walker   Cognition   Overall Cognitive Status Impaired   Arousal/Participation Cooperative;Responsive   Attention Difficulty attending to directions   Orientation Level Oriented X4   Memory Within functional limits   Following Commands Follows one step commands with increased time or repetition   Activity Tolerance   Activity Tolerance Patient tolerated treatment well   Medical Staff Made Aware RN   Assessment   Assessment Pt seen for skilled OT tx this date. Tx focused on improving strength, activity tolerance and balance, safety awareness to increase independence with self care tasks. Pt tolerated session well. Pt was limited by weakness. Greeted in supine and agreeable. Pt performed UB dressing/ bathing with supervision, LB dressing / bathing supervision , Toileting supervision,  bed mobility Marlen, transfers supervision, mobility with RW supervision. Pt demonstrated fair - standing balance during functional tasks, no LOB noted.Pt demonstrated ability to safely and appropriately attend to all tasks during session. Pt required minimal verbal cuing during session to safely complete tasks.   Current OT DC recommendations for pt is level 2 vs 3 resources.   Plan   Treatment Interventions ADL retraining;Functional transfer training;Endurance training;UE strengthening/ROM;Cognitive reorientation;Patient/family training;Equipment evaluation/education;Compensatory technique education;Activityengagement;Energy conservation   Goal Expiration Date  12/13/24   OT Treatment Day 1   OT Frequency 3-5x/wk   Discharge Recommendation   Rehab Resource Intensity Level, OT II (Moderate Resource Intensity)  (level 2 vs 3 resources.)   AM-PAC Daily Activity Inpatient   Lower Body Dressing 3   Bathing 3   Toileting 3   Upper Body Dressing 3   Grooming 4   Eating 4   Daily Activity Raw Score 20   Daily Activity Standardized Score (Calc for Raw Score >=11) 42.03   AM-PAC Applied Cognition Inpatient   Following a Speech/Presentation 4   Understanding Ordinary Conversation 4   Taking Medications 4   Remembering Where Things Are Placed or Put Away 3   Remembering List of 4-5 Errands 3   Taking Care of Complicated Tasks 3   Applied Cognition Raw Score 21   Applied Cognition Standardized Score 44.3   Serina Mckay, OT

## 2024-12-02 NOTE — PLAN OF CARE
Problem: PAIN - ADULT  Goal: Verbalizes/displays adequate comfort level or baseline comfort level  Description: Interventions:  - Encourage patient to monitor pain and request assistance  - Assess pain using appropriate pain scale  - Administer analgesics based on type and severity of pain and evaluate response  - Implement non-pharmacological measures as appropriate and evaluate response  - Consider cultural and social influences on pain and pain management  - Notify physician/advanced practitioner if interventions unsuccessful or patient reports new pain  Outcome: Progressing     Problem: INFECTION - ADULT  Goal: Absence or prevention of progression during hospitalization  Description: INTERVENTIONS:  - Assess and monitor for signs and symptoms of infection  - Monitor lab/diagnostic results  - Monitor all insertion sites, i.e. indwelling lines, tubes, and drains  - Monitor endotracheal if appropriate and nasal secretions for changes in amount and color  - Byron appropriate cooling/warming therapies per order  - Administer medications as ordered  - Instruct and encourage patient and family to use good hand hygiene technique  - Identify and instruct in appropriate isolation precautions for identified infection/condition  Outcome: Progressing     Problem: SAFETY ADULT  Goal: Patient will remain free of falls  Description: INTERVENTIONS:  - Educate patient/family on patient safety including physical limitations  - Instruct patient to call for assistance with activity   - Consult OT/PT to assist with strengthening/mobility   - Keep Call bell within reach  - Keep bed low and locked with side rails adjusted as appropriate  - Keep care items and personal belongings within reach  - Initiate and maintain comfort rounds  - Make Fall Risk Sign visible to staff  - Offer Toileting every 2 Hours, in advance of need  - Initiate/Maintain bed alarm  - Apply yellow socks and bracelet for high fall risk patients  - Consider  moving patient to room near nurses station  Outcome: Progressing  Goal: Maintain or return to baseline ADL function  Description: INTERVENTIONS:  -  Assess patient's ability to carry out ADLs; assess patient's baseline for ADL function and identify physical deficits which impact ability to perform ADLs (bathing, care of mouth/teeth, toileting, grooming, dressing, etc.)  - Assess/evaluate cause of self-care deficits   - Assess range of motion  - Assess patient's mobility; develop plan if impaired  - Assess patient's need for assistive devices and provide as appropriate  - Encourage maximum independence but intervene and supervise when necessary  - Involve family in performance of ADLs  - Assess for home care needs following discharge   - Consider OT consult to assist with ADL evaluation and planning for discharge  - Provide patient education as appropriate  Outcome: Progressing  Goal: Maintains/Returns to pre admission functional level  Description: INTERVENTIONS:  - Perform AM-PAC 6 Click Basic Mobility/ Daily Activity assessment daily.  - Set and communicate daily mobility goal to care team and patient/family/caregiver.   - Collaborate with rehabilitation services on mobility goals if consulted  - Perform Range of Motion 3 times a day.  - Reposition patient every 2 hours.  - Dangle patient 3 times a day  - Stand patient 3 times a day  - Ambulate patient 3 times a day  - Out of bed to chair 3 times a day   - Out of bed for meals 3 times a day  - Out of bed for toileting  - Record patient progress and toleration of activity level   Outcome: Progressing     Problem: DISCHARGE PLANNING  Goal: Discharge to home or other facility with appropriate resources  Description: INTERVENTIONS:  - Identify barriers to discharge w/patient and caregiver  - Arrange for needed discharge resources and transportation as appropriate  - Identify discharge learning needs (meds, wound care, etc.)  - Arrange for interpretive services to  assist at discharge as needed  - Refer to Case Management Department for coordinating discharge planning if the patient needs post-hospital services based on physician/advanced practitioner order or complex needs related to functional status, cognitive ability, or social support system  Outcome: Progressing     Problem: Knowledge Deficit  Goal: Patient/family/caregiver demonstrates understanding of disease process, treatment plan, medications, and discharge instructions  Description: Complete learning assessment and assess knowledge base.  Interventions:  - Provide teaching at level of understanding  - Provide teaching via preferred learning methods  Outcome: Progressing     Problem: Nutrition/Hydration-ADULT  Goal: Nutrient/Hydration intake appropriate for improving, restoring or maintaining nutritional needs  Description: Monitor and assess patient's nutrition/hydration status for malnutrition. Collaborate with interdisciplinary team and initiate plan and interventions as ordered.  Monitor patient's weight and dietary intake as ordered or per policy. Utilize nutrition screening tool and intervene as necessary. Determine patient's food preferences and provide high-protein, high-caloric foods as appropriate.     INTERVENTIONS:  - Monitor oral intake, urinary output, labs, and treatment plans  - Assess nutrition and hydration status and recommend course of action  - Evaluate amount of meals eaten  - Assist patient with eating if necessary   - Allow adequate time for meals  - Recommend/ encourage appropriate diets, oral nutritional supplements, and vitamin/mineral supplements  - Order, calculate, and assess calorie counts as needed  - Recommend, monitor, and adjust tube feedings and TPN/PPN based on assessed needs  - Assess need for intravenous fluids  - Provide specific nutrition/hydration education as appropriate  - Include patient/family/caregiver in decisions related to nutrition  Outcome: Progressing     Problem:  NEUROSENSORY - ADULT  Goal: Achieves stable or improved neurological status  Description: INTERVENTIONS  - Monitor and report changes in neurological status  - Monitor vital signs such as temperature, blood pressure, glucose, and any other labs ordered   - Initiate measures to prevent increased intracranial pressure  - Monitor for seizure activity and implement precautions if appropriate      Outcome: Progressing  Goal: Remains free of injury related to seizures activity  Description: INTERVENTIONS  - Maintain airway, patient safety  and administer oxygen as ordered  - Monitor patient for seizure activity, document and report duration and description of seizure to physician/advanced practitioner  - If seizure occurs,  ensure patient safety during seizure  - Reorient patient post seizure  - Seizure pads on all 4 side rails  - Instruct patient/family to notify RN of any seizure activity including if an aura is experienced  - Instruct patient/family to call for assistance with activity based on nursing assessment  - Administer anti-seizure medications if ordered    Outcome: Progressing  Goal: Achieves maximal functionality and self care  Description: INTERVENTIONS  - Monitor swallowing and airway patency with patient fatigue and changes in neurological status  - Encourage and assist patient to increase activity and self care.   - Encourage visually impaired, hearing impaired and aphasic patients to use assistive/communication devices  Outcome: Progressing     Problem: CARDIOVASCULAR - ADULT  Goal: Maintains optimal cardiac output and hemodynamic stability  Description: INTERVENTIONS:  - Monitor I/O, vital signs and rhythm  - Monitor for S/S and trends of decreased cardiac output  - Administer and titrate ordered vasoactive medications to optimize hemodynamic stability  - Assess quality of pulses, skin color and temperature  - Assess for signs of decreased coronary artery perfusion  - Instruct patient to report change  in severity of symptoms  Outcome: Progressing  Goal: Absence of cardiac dysrhythmias or at baseline rhythm  Description: INTERVENTIONS:  - Continuous cardiac monitoring, vital signs, obtain 12 lead EKG if ordered  - Administer antiarrhythmic and heart rate control medications as ordered  - Monitor electrolytes and administer replacement therapy as ordered  Outcome: Progressing     Problem: RESPIRATORY - ADULT  Goal: Achieves optimal ventilation and oxygenation  Description: INTERVENTIONS:  - Assess for changes in respiratory status  - Assess for changes in mentation and behavior  - Position to facilitate oxygenation and minimize respiratory effort  - Oxygen administered by appropriate delivery if ordered  - Initiate smoking cessation education as indicated  - Encourage broncho-pulmonary hygiene including cough, deep breathe, Incentive Spirometry  - Assess the need for suctioning and aspirate as needed  - Assess and instruct to report SOB or any respiratory difficulty  - Respiratory Therapy support as indicated  Outcome: Progressing     Problem: GASTROINTESTINAL - ADULT  Goal: Minimal or absence of nausea and/or vomiting  Description: INTERVENTIONS:  - Administer IV fluids if ordered to ensure adequate hydration  - Maintain NPO status until nausea and vomiting are resolved  - Nasogastric tube if ordered  - Administer ordered antiemetic medications as needed  - Provide nonpharmacologic comfort measures as appropriate  - Advance diet as tolerated, if ordered  - Consider nutrition services referral to assist patient with adequate nutrition and appropriate food choices  Outcome: Progressing  Goal: Maintains or returns to baseline bowel function  Description: INTERVENTIONS:  - Assess bowel function  - Encourage oral fluids to ensure adequate hydration  - Administer IV fluids if ordered to ensure adequate hydration  - Administer ordered medications as needed  - Encourage mobilization and activity  - Consider nutritional  services referral to assist patient with adequate nutrition and appropriate food choices  Outcome: Progressing  Goal: Maintains adequate nutritional intake  Description: INTERVENTIONS:  - Monitor percentage of each meal consumed  - Identify factors contributing to decreased intake, treat as appropriate  - Assist with meals as needed  - Monitor I&O, weight, and lab values if indicated  - Obtain nutrition services referral as needed  Outcome: Progressing  Goal: Establish and maintain optimal ostomy function  Description: INTERVENTIONS:  - Assess bowel function  - Encourage oral fluids to ensure adequate hydration  - Administer IV fluids if ordered to ensure adequate hydration   - Administer ordered medications as needed  - Encourage mobilization and activity  - Nutrition services referral to assist patient with appropriate food choices  - Assess stoma site  - Consider wound care consult   Outcome: Progressing  Goal: Oral mucous membranes remain intact  Description: INTERVENTIONS  - Assess oral mucosa and hygiene practices  - Implement preventative oral hygiene regimen  - Implement oral medicated treatments as ordered  - Initiate Nutrition services referral as needed  Outcome: Progressing     Problem: GENITOURINARY - ADULT  Goal: Maintains or returns to baseline urinary function  Description: INTERVENTIONS:  - Assess urinary function  - Encourage oral fluids to ensure adequate hydration if ordered  - Administer IV fluids as ordered to ensure adequate hydration  - Administer ordered medications as needed  - Offer frequent toileting  - Follow urinary retention protocol if ordered  Outcome: Progressing  Goal: Absence of urinary retention  Description: INTERVENTIONS:  - Assess patient’s ability to void and empty bladder  - Monitor I/O  - Bladder scan as needed  - Discuss with physician/AP medications to alleviate retention as needed  - Discuss catheterization for long term situations as appropriate  Outcome:  Progressing  Goal: Urinary catheter remains patent  Description: INTERVENTIONS:  - Assess patency of urinary catheter  - If patient has a chronic cummings, consider changing catheter if non-functioning  - Follow guidelines for intermittent irrigation of non-functioning urinary catheter  Outcome: Progressing     Problem: METABOLIC, FLUID AND ELECTROLYTES - ADULT  Goal: Electrolytes maintained within normal limits  Description: INTERVENTIONS:  - Monitor labs and assess patient for signs and symptoms of electrolyte imbalances  - Administer electrolyte replacement as ordered  - Monitor response to electrolyte replacements, including repeat lab results as appropriate  - Instruct patient on fluid and nutrition as appropriate  Outcome: Progressing  Goal: Fluid balance maintained  Description: INTERVENTIONS:  - Monitor labs   - Monitor I/O and WT  - Instruct patient on fluid and nutrition as appropriate  - Assess for signs & symptoms of volume excess or deficit  Outcome: Progressing  Goal: Glucose maintained within target range  Description: INTERVENTIONS:  - Monitor Blood Glucose as ordered  - Assess for signs and symptoms of hyperglycemia and hypoglycemia  - Administer ordered medications to maintain glucose within target range  - Assess nutritional intake and initiate nutrition service referral as needed  Outcome: Progressing     Problem: SKIN/TISSUE INTEGRITY - ADULT  Goal: Skin Integrity remains intact(Skin Breakdown Prevention)  Description: Assess:  -Perform Adán assessment every shift  -Inspect skin when repositioning, toileting, and assisting with ADLS  -Assess extremities for adequate circulation and sensation     Bed Management:  -Have minimal linens on bed & keep smooth, unwrinkled  -Change linens as needed when moist or perspiring  -Avoid sitting or lying in one position for more than 2 hours while in bed    Toileting:  -Offer bedside commode    Activity:  -Mobilize patient 3 times a day  -Encourage activity and  walks on unit  -Encourage or provide ROM exercises   -Turn and reposition patient every 2 Hours  -Use appropriate equipment to lift or move patient in bed    Skin Care:  -Avoid use of baby powder, tape, friction and shearing, hot water or constrictive clothing  -Do not massage red bony areas      Outcome: Progressing  Goal: Incision(s), wounds(s) or drain site(s) healing without S/S of infection  Description: INTERVENTIONS  - Assess and document dressing, incision, wound bed, drain sites and surrounding tissue  - Provide patient and family education  Outcome: Progressing  Goal: Pressure injury heals and does not worsen  Description: Interventions:  - Implement low air loss mattress or specialty surface (Criteria met)  - Apply silicone foam dressing  - Instruct/assist with weight shifting every 30 minutes when in chair   - Limit chair time to 2 hour intervals  - Use special pressure reducing interventions such as offloading cushion when in chair   - Apply fecal or urinary incontinence containment device   - Turn and reposition patient & offload bony prominences every 2 hours   - Utilize friction reducing device or surface for transfers   - Consider nutrition services referral as needed  Outcome: Progressing     Problem: HEMATOLOGIC - ADULT  Goal: Maintains hematologic stability  Description: INTERVENTIONS  - Assess for signs and symptoms of bleeding or hemorrhage  - Monitor labs  - Administer supportive blood products/factors as ordered and appropriate  Outcome: Progressing     Problem: MUSCULOSKELETAL - ADULT  Goal: Maintain or return mobility to safest level of function  Description: INTERVENTIONS:  - Assess patient's ability to carry out ADLs; assess patient's baseline for ADL function and identify physical deficits which impact ability to perform ADLs (bathing, care of mouth/teeth, toileting, grooming, dressing, etc.)  - Assess/evaluate cause of self-care deficits   - Assess range of motion  - Assess patient's  mobility  - Assess patient's need for assistive devices and provide as appropriate  - Encourage maximum independence but intervene and supervise when necessary  - Involve family in performance of ADLs  - Assess for home care needs following discharge   - Consider OT consult to assist with ADL evaluation and planning for discharge  - Provide patient education as appropriate  Outcome: Progressing  Goal: Maintain proper alignment of affected body part  Description: INTERVENTIONS:  - Support, maintain and protect limb and body alignment  - Provide patient/ family with appropriate education  Outcome: Progressing     Problem: Prexisting or High Potential for Compromised Skin Integrity  Goal: Skin integrity is maintained or improved  Description: INTERVENTIONS:  - Identify patients at risk for skin breakdown  - Assess and monitor skin integrity  - Assess and monitor nutrition and hydration status  - Monitor labs   - Assess for incontinence   - Turn and reposition patient  - Assist with mobility/ambulation  - Relieve pressure over bony prominences  - Avoid friction and shearing  - Provide appropriate hygiene as needed including keeping skin clean and dry  - Evaluate need for skin moisturizer/barrier cream  - Collaborate with interdisciplinary team   - Patient/family teaching  - Consider wound care consult   Outcome: Progressing

## 2024-12-02 NOTE — ASSESSMENT & PLAN NOTE
Lab Results   Component Value Date    HGBA1C 9.8 (H) 08/28/2024     Recent Labs     12/01/24  1603 12/01/24  2126 12/02/24  0652 12/02/24  0752   POCGLU 89 215* 64* 101     Blood Sugar Average: Last 72 hrs:  (P) 138    69-year-old male with PMH of type 2 diabetes, metastatic renal cell carcinoma, prostate cancer, HTN and CKD presenting with nausea vomiting and found to be in DKA  Initially admitted under ICU care on 11/27, transferred to [facility] same day  Did require insulin drip, transitioned to basal bolus once and gap resolved  Endocrine suspect possibly worsening diabetes in setting of Keytruda  DKA resolved  See plan below

## 2024-12-02 NOTE — ASSESSMENT & PLAN NOTE
Stage IV metastatic renal cell carcinoma, following with oncology  History of prostate cancer s/p radiation  In October 2024 found to have a retroperitoneal mass, with no adenopathy underwent left inguinal adenopathy biopsy concerning for metastatic renal cell carcinoma  Had left-sided hydronephrosis, s/p PCN which was converted to PCN U on 11/19/2024  Currently on Keytruda and Lenvima every 6 weeks  Continue follow-up with urology at [facility]  Continue Marinol

## 2024-12-02 NOTE — ASSESSMENT & PLAN NOTE
Reports left leg swelling since he was diagnosed with cancer  Suspect in the setting of left inguinal adenopathy  Will order venous duplex

## 2024-12-02 NOTE — PLAN OF CARE
Problem: PHYSICAL THERAPY ADULT  Goal: Performs mobility at highest level of function for planned discharge setting.  See evaluation for individualized goals.  Description: Treatment/Interventions: Functional transfer training, LE strengthening/ROM, Elevations, Therapeutic exercise, Endurance training, Patient/family training, Equipment eval/education, Bed mobility, Gait training, Compensatory technique education, Continued evaluation, Spoke to nursing, OT  Equipment Recommended: Walker       See flowsheet documentation for full assessment, interventions and recommendations.  Note: Prognosis: Fair  Problem List: Decreased strength, Decreased endurance, Impaired balance, Decreased mobility, Decreased cognition, Decreased safety awareness, Impaired judgement, Decreased skin integrity, Pain  Assessment: Pt able to perform sit->supine, sit<->stand transfers needing S level of A. Pt able to ambulate 100 feet with use of RW needing S level of A. Pt fatigues easily and reports buttocks pain during static sit and dynamic stand. Pt able to perform and complete BLE ther ex HEP in static stand with support of RW in front. (+)dec cognition with inc reminders of number of reps for BLE ther ex HEP and for demonstration of BLE ther ex HEP. Pt would cont to benefit from skilled inpt PT services to maximize functional independence and to dec caregiver burden upon being DC from the hospital.  Barriers to Discharge: Inaccessible home environment (stairs)  Barriers to Discharge Comments: stairs.  Rehab Resource Intensity Level, PT: II (Moderate Resource Intensity)    See flowsheet documentation for full assessment.

## 2024-12-03 ENCOUNTER — TRANSITIONAL CARE MANAGEMENT (OUTPATIENT)
Dept: FAMILY MEDICINE CLINIC | Facility: CLINIC | Age: 69
End: 2024-12-03

## 2024-12-03 ENCOUNTER — DOCUMENTATION (OUTPATIENT)
Dept: HEMATOLOGY ONCOLOGY | Facility: CLINIC | Age: 69
End: 2024-12-03

## 2024-12-03 ENCOUNTER — PATIENT OUTREACH (OUTPATIENT)
Dept: CASE MANAGEMENT | Facility: OTHER | Age: 69
End: 2024-12-03

## 2024-12-03 VITALS
SYSTOLIC BLOOD PRESSURE: 160 MMHG | HEART RATE: 100 BPM | WEIGHT: 151.46 LBS | TEMPERATURE: 98 F | HEIGHT: 70 IN | OXYGEN SATURATION: 98 % | BODY MASS INDEX: 21.68 KG/M2 | RESPIRATION RATE: 18 BRPM | DIASTOLIC BLOOD PRESSURE: 84 MMHG

## 2024-12-03 LAB
GLUCOSE SERPL-MCNC: 126 MG/DL (ref 65–140)
GLUCOSE SERPL-MCNC: 61 MG/DL (ref 65–140)
GLUCOSE SERPL-MCNC: 64 MG/DL (ref 65–140)

## 2024-12-03 PROCEDURE — 82948 REAGENT STRIP/BLOOD GLUCOSE: CPT

## 2024-12-03 PROCEDURE — 99239 HOSP IP/OBS DSCHRG MGMT >30: CPT | Performed by: INTERNAL MEDICINE

## 2024-12-03 RX ORDER — INSULIN GLARGINE 100 [IU]/ML
18 INJECTION, SOLUTION SUBCUTANEOUS
Qty: 10 ML | Refills: 0
Start: 2024-12-03

## 2024-12-03 RX ORDER — INSULIN LISPRO 100 [IU]/ML
6 INJECTION, SOLUTION INTRAVENOUS; SUBCUTANEOUS
Status: DISCONTINUED | OUTPATIENT
Start: 2024-12-03 | End: 2024-12-03 | Stop reason: HOSPADM

## 2024-12-03 RX ORDER — OXYCODONE HYDROCHLORIDE 5 MG/1
2.5 TABLET ORAL EVERY 8 HOURS PRN
Qty: 5 TABLET | Refills: 0 | Status: SHIPPED | OUTPATIENT
Start: 2024-12-03

## 2024-12-03 RX ORDER — AMLODIPINE BESYLATE 5 MG/1
5 TABLET ORAL DAILY
Qty: 30 TABLET | Refills: 0
Start: 2024-12-04

## 2024-12-03 RX ORDER — INSULIN LISPRO 100 [IU]/ML
6 INJECTION, SOLUTION INTRAVENOUS; SUBCUTANEOUS
Qty: 5.4 ML | Refills: 0
Start: 2024-12-03

## 2024-12-03 RX ORDER — INSULIN GLARGINE 100 [IU]/ML
18 INJECTION, SOLUTION SUBCUTANEOUS
Status: DISCONTINUED | OUTPATIENT
Start: 2024-12-03 | End: 2024-12-03 | Stop reason: HOSPADM

## 2024-12-03 RX ADMIN — INSULIN LISPRO 8 UNITS: 100 INJECTION, SOLUTION INTRAVENOUS; SUBCUTANEOUS at 08:40

## 2024-12-03 RX ADMIN — SENNOSIDES AND DOCUSATE SODIUM 1 TABLET: 50; 8.6 TABLET ORAL at 08:38

## 2024-12-03 RX ADMIN — FOLIC ACID 1 MG: 1 TABLET ORAL at 08:38

## 2024-12-03 RX ADMIN — AMLODIPINE BESYLATE 5 MG: 5 TABLET ORAL at 08:38

## 2024-12-03 RX ADMIN — HEPARIN SODIUM 5000 UNITS: 5000 INJECTION INTRAVENOUS; SUBCUTANEOUS at 05:11

## 2024-12-03 NOTE — ASSESSMENT & PLAN NOTE
Patient had JULIO on CKD stage III.  Since October baseline creatinine has been from 1.5-1.8  Creatinine now 1.63  Treated with IV fluids

## 2024-12-03 NOTE — ASSESSMENT & PLAN NOTE
Reports left leg swelling since he was diagnosed with cancer  Suspect in the setting of left inguinal adenopathy  Venous duplex negative for DVT

## 2024-12-03 NOTE — PLAN OF CARE
Problem: PAIN - ADULT  Goal: Verbalizes/displays adequate comfort level or baseline comfort level  Description: Interventions:  - Encourage patient to monitor pain and request assistance  - Assess pain using appropriate pain scale  - Administer analgesics based on type and severity of pain and evaluate response  - Implement non-pharmacological measures as appropriate and evaluate response  - Consider cultural and social influences on pain and pain management  - Notify physician/advanced practitioner if interventions unsuccessful or patient reports new pain  Outcome: Progressing     Problem: DISCHARGE PLANNING  Goal: Discharge to home or other facility with appropriate resources  Description: INTERVENTIONS:  - Identify barriers to discharge w/patient and caregiver  - Arrange for needed discharge resources and transportation as appropriate  - Identify discharge learning needs (meds, wound care, etc.)  - Arrange for interpretive services to assist at discharge as needed  - Refer to Case Management Department for coordinating discharge planning if the patient needs post-hospital services based on physician/advanced practitioner order or complex needs related to functional status, cognitive ability, or social support system  Outcome: Progressing     Problem: SKIN/TISSUE INTEGRITY - ADULT  Goal: Skin Integrity remains intact(Skin Breakdown Prevention)  Description: Assess:  -Perform Adán assessment every shift  -Inspect skin when repositioning, toileting, and assisting with ADLS  -Assess extremities for adequate circulation and sensation     Bed Management:  -Have minimal linens on bed & keep smooth, unwrinkled  -Change linens as needed when moist or perspiring  -Avoid sitting or lying in one position for more than 2 hours while in bed    Toileting:  -Offer bedside commode    Activity:  -Mobilize patient 3 times a day  -Encourage activity and walks on unit  -Encourage or provide ROM exercises   -Turn and reposition  patient every 2 Hours  -Use appropriate equipment to lift or move patient in bed    Skin Care:  -Avoid use of baby powder, tape, friction and shearing, hot water or constrictive clothing  -Do not massage red bony areas      Outcome: Progressing  Goal: Incision(s), wounds(s) or drain site(s) healing without S/S of infection  Description: INTERVENTIONS  - Assess and document dressing, incision, wound bed, drain sites and surrounding tissue  - Provide patient and family education  Outcome: Progressing  Goal: Pressure injury heals and does not worsen  Description: Interventions:  - Implement low air loss mattress or specialty surface (Criteria met)  - Apply silicone foam dressing  - Instruct/assist with weight shifting every 30 minutes when in chair   - Limit chair time to 2 hour intervals  - Use special pressure reducing interventions such as offloading cushion when in chair   - Apply fecal or urinary incontinence containment device   - Turn and reposition patient & offload bony prominences every 2 hours   - Utilize friction reducing device or surface for transfers   - Consider nutrition services referral as needed  Outcome: Progressing

## 2024-12-03 NOTE — ASSESSMENT & PLAN NOTE
Outpatient regimen is amlodipine 10 mg daily, currently on amlodipine 5 mg daily  BP stable on amlodipine 5 mg daily

## 2024-12-03 NOTE — ASSESSMENT & PLAN NOTE
Stage IV metastatic renal cell carcinoma, following with oncology  History of prostate cancer s/p radiation  In October 2024 found to have a retroperitoneal mass, with adenopathy underwent left inguinal node biopsy --concerning for metastatic renal cell carcinoma  Had left-sided hydronephrosis, s/p PCN which was converted to PCNU on 11/19/2024  Last seen by heme-onc 11/26, currently on Keytruda.  Possibly start Lenvima 20 mg daily plus Keytruda every 6 weeks, holding Lenvima until tumor board   Continue follow-up with urology at [facility]  Continue Marinol

## 2024-12-03 NOTE — ASSESSMENT & PLAN NOTE
Lab Results   Component Value Date    HGBA1C 9.8 (H) 08/28/2024     Recent Labs     12/02/24  1535 12/02/24  2122 12/03/24  0738 12/03/24  0837   POCGLU 72 99 64* 126     Blood Sugar Average: Last 72 hrs:  (P) 116.4567871910368728    69-year-old male with PMH of type 2 diabetes, metastatic renal cell carcinoma, prostate cancer, HTN and CKD presenting with nausea vomiting and found to be in DKA  Initially admitted under ICU care on 11/27, transferred to [facility] same day  Did require insulin drip, transitioned to basal bolus once and gap resolved  Endocrine suspect possibly worsening diabetes in setting of Keytruda  DKA resolved  See plan below

## 2024-12-03 NOTE — DISCHARGE SUMMARY
Discharge Summary - Hospitalist   Name: Alejandro Adames 69 y.o. male I MRN: [id number]  Unit/Bed#: E5 -01 I Date of Admission: 11/27/2024   Date of Service: 12/3/2024 I Hospital Day: 6     Assessment & Plan  DKA (diabetic ketoacidosis) ([facility])  Lab Results   Component Value Date    HGBA1C 9.8 (H) 08/28/2024     Recent Labs     12/02/24  1535 12/02/24  2122 12/03/24  0738 12/03/24  0837   POCGLU 72 99 64* 126     Blood Sugar Average: Last 72 hrs:  (P) 116.1388007585184326    69-year-old male with PMH of type 2 diabetes, metastatic renal cell carcinoma, prostate cancer, HTN and CKD presenting with nausea vomiting and found to be in DKA  Initially admitted under ICU care on 11/27, transferred to [facility] same day  Did require insulin drip, transitioned to basal bolus once and gap resolved  Endocrine suspect possibly worsening diabetes in setting of Keytruda  DKA resolved  See plan below    Type 2 diabetes mellitus with kidney complication, without long-term current use of insulin ([facility])  Lab Results   Component Value Date    HGBA1C 9.8 (H) 08/28/2024       Recent Labs     12/02/24  1535 12/02/24  2122 12/03/24  0738 12/03/24  0837   POCGLU 72 99 64* 126       Blood Sugar Average: Last 72 hrs:  (P) 116.3707234004226886    Previously on metformin, Ozempic  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  Check blood sugar 4 times a day and continue to adjust insulin at rehab  Metastatic renal cell carcinoma to bone (HCC)  Stage IV metastatic renal cell carcinoma, following with oncology  History of prostate cancer s/p radiation  In October 2024 found to have a retroperitoneal mass, with adenopathy underwent left inguinal node biopsy --concerning for metastatic  renal cell carcinoma  Had left-sided hydronephrosis, s/p PCN which was converted to PCNU on 11/19/2024  Last seen by heme-onc 11/26, currently on Keytruda.  Possibly start Lenvima 20 mg daily plus Keytruda every 6 weeks, holding Lenvima until tumor board   Continue follow-up with urology at [facility]  Continue Marinol  Left leg swelling  Reports left leg swelling since he was diagnosed with cancer  Suspect in the setting of left inguinal adenopathy  Venous duplex negative for DVT  JULIO (acute kidney injury) (HCC)  Patient had JULIO on CKD stage III.  Since October baseline creatinine has been from 1.5-1.8  Creatinine now 1.63  Treated with IV fluids  Essential hypertension  Outpatient regimen is amlodipine 10 mg daily, currently on amlodipine 5 mg daily  BP stable on amlodipine 5 mg daily  Severe protein-calorie malnutrition (HCC)  Malnutrition Findings:   Adult Malnutrition type: Chronic illness  Adult Degree of Malnutrition: Other severe protein calorie malnutrition  Malnutrition Characteristics: Inadequate energy, Weight loss    360 Statement: Severe protein calorie malnutrition in context of chronic illness r/t poor appetite, inadequate PO intake, increased needs for cancer as evidenced by energy intake less than 75% compared to estimated needs>1 month, 14% wt loss x 6 months; Treated with PO diet and oral supplements    BMI Findings:     Body mass index is 21.73 kg/m².          Medical Problems       Resolved Problems  Date Reviewed: 12/3/2024   None       Discharging Physician / Practitioner: Marilou Chen MD  PCP: Wayne Uriarte Jr, MD  Admission Date:   Admission Orders (From admission, onward)       Ordered        11/27/24 0147  INPATIENT ADMISSION  Once                          Discharge Date: 12/03/24    Consultations During Hospital Stay:  Endocrinology    Procedures Performed:   None    Significant Findings / Test Results:   Chest x-ray 11/27/2024  IMPRESSION:  No acute cardiopulmonary  disease.     Lower extremity venous duplex 12/2/2024  CONCLUSION:  Impression:  RIGHT LOWER LIMB LIMITED:  Evaluation shows no evidence of thrombus in the common femoral vein.  Doppler evaluation shows a normal response to augmentation maneuvers.     LEFT LOWER LIMB:  No evidence of acute or chronic deep vein thrombosis.  No evidence of superficial thrombophlebitis noted.  Doppler evaluation shows a normal response to augmentation maneuvers.  Popliteal, posterior tibial and anterior tibial arterial Doppler waveforms are  triphasic.    Incidental Findings:   none       Test Results Pending at Discharge (will require follow up):   PAULA and insulin antibody     Outpatient Tests Requested:  none    Complications:  none    Reason for Admission: DKA    Hospital Course:   Alejandro Adames is a 69 y.o. male patient who originally presented to the hospital on 11/27/2024 due to nausea vomiting and elevated blood sugars.  Found to be in DKA.  Required ICU admission with insulin drip.  Seen by endocrinology, suspect autoimmune diabetes from Keytruda.  PAULA and insulin antibodies are pending.  Patient was transitioned to Lantus and Humalog.  Currently on Lantus 18 units at bedtime and Humalog 6 units 3 times daily with meal.  Stop outpatient medications --Trulicity and metformin.  Check blood sugar 4 times daily and further insulin dosing per rehab.  Outpatient follow-up with endocrinology after leaving rehab.  Patient has metastatic renal cell carcinoma, currently treated with Keytruda.  Outpatient follow-up with heme-onc.  Please see assessment and plan above.  Patient will be discharged to rehab      Please see above list of diagnoses and related plan for additional information.     Condition at Discharge: fair    Discharge Day Visit / Exam:   Subjective: Feeling weak  Vitals: Blood Pressure: 160/84 (12/03/24 0739)  Pulse: 100 (12/03/24 0739)  Temperature: 98 °F (36.7 °C) (12/03/24 0739)  Temp Source: Oral (12/03/24  "1313)  Respirations: 18 (12/03/24 0738)  Height: 5' 10\" (177.8 cm) (11/27/24 0400)  Weight - Scale: 68.7 kg (151 lb 7.3 oz) (11/27/24 0400)  SpO2: 98 % (12/03/24 0739)  Physical Exam  Constitutional:       General: He is not in acute distress.     Comments: Chronically ill-appearing   HENT:      Head: Atraumatic.   Cardiovascular:      Rate and Rhythm: Normal rate and regular rhythm.      Heart sounds: No murmur heard.  Pulmonary:      Effort: Pulmonary effort is normal. No respiratory distress.      Breath sounds: Normal breath sounds. No wheezing.   Abdominal:      General: Bowel sounds are normal. There is no distension.      Palpations: Abdomen is soft.      Tenderness: There is no abdominal tenderness.   Musculoskeletal:      Cervical back: Neck supple.      Comments: 1+ left leg pitting edema   Skin:     General: Skin is warm and dry.   Neurological:      General: No focal deficit present.      Mental Status: He is alert.   Psychiatric:         Mood and Affect: Mood normal.          Discussion with Family: Patient declined call to .     Discharge instructions/Information to patient and family:   See after visit summary for information provided to patient and family.      Provisions for Follow-Up Care:  See after visit summary for information related to follow-up care and any pertinent home health orders.      Mobility at time of Discharge:   Basic Mobility Inpatient Raw Score: 17  JH-HLM Goal: 5: Stand one or more mins  JH-HLM Achieved: 5: Stand (1 or more minutes)  HLM Goal NOT achieved. Continue to encourage mobility in post discharge setting.     Disposition:   Other Skilled Nursing Facility at [facility]    Planned Readmission: no    Discharge Medications:  See after visit summary for reconciled discharge medications provided to patient and/or family.      Administrative Statements   Discharge Statement:  I have spent a total time of 40 minutes in caring for this patient on the " day of the visit/encounter. >30 minutes of time was spent on: Counseling / Coordination of care, Documenting in the medical record, and Reviewing / ordering tests, medicine, procedures  .    **Please Note: This note may have been constructed using a voice recognition system**

## 2024-12-03 NOTE — WOUND OSTOMY CARE
Alejandro Adames 69 y.o. male MRN: [id number]  Unit/Bed#: E5 -01 Encounter: [id number]      History and Present Illness:  Admitted 11/27/24. Presented to [facility] via EMS stated he is unable to eat, fatigue   PMH of metastatic reanl carinoma diabetes hypertension Receiving Keytruda injections as well as chemotherapy. Takes Ozempic for diabetes control /States he is unable to monitor his blood sugar at home by finger stick because his fingers are too cold.  Anticipated discharge today. Sacral wound noted on admission   Assessment Findings:   Seated out of bed in BR performing hygiene ADL at  sink while seated.  1)MURIEL see photo 11/27/24. Resolving erythema to bilateral buttocks. POA with unknown etiology erythema is resolving areas are scattered and decreased in sixe may have a pressure component though does not present as pressure injuries.    2)Bilateral knee wounds resolved. Open to air    Educated on need to position on side as much as he is able to. He has a line     Left lowe flank, encouraged to position on right and on left using wedges.      No induration, fluctuance, odor, warmth/temperature differences, redness, or purulence noted to the above noted wounds and skin areas assessed. New dressings applied per orders listed below. Patient tolerated well- no s/s of non-verbal pain or discomfort observed during the encounter. Bedside nurse aware of plan of care. See flow sheets for more detailed assessment findings.  Wound care will continue to follow, call or Secure Chat Message with questions.       Skin care plans:  1-Hydraguard/Silicone Cream to bilateral sacrum, buttock, and heels BID and PRN  2-Elevate heels to offload pressure.  3-Ehob cushion in chair when out of bed.  4-Moisturize skin daily with skin nourishing cream.  5-Turn/reposition q2h or when medically stable for pressure re-distribution on skin.   Wound 11/27/24 Sacrum (Active)   Wound Image    12/03/24 0830   Wound Description Jacey  red;Intact;Non-blanchable erythema 12/03/24 0857   Renae-wound Assessment Intact 12/03/24 0857   Wound Length (cm) 10 cm 12/03/24 0857   Wound Width (cm) 6 cm 12/03/24 0857   Wound Surface Area (cm^2) 60 cm^2 12/03/24 0857   Drainage Amount None 12/03/24 0857   Treatments Site care 12/03/24 0857   Dressing Foam, Silicon (eg. Allevyn, etc) 12/03/24 0857   Dressing Changed Reinforced 12/03/24 0857   Patient Tolerance Tolerated well 12/03/24 0857   Dressing Status Reinforced 12/03/24 0857     Wound Care will sign off  Call or Secure Chat  with any questions      Africa Stuart BSN RN CWON

## 2024-12-03 NOTE — PROGRESS NOTES
Oncology Navigator Note: Multidisciplinary Urology Case Review 12/3/2024    Presenting physician:  Dr. Negrita Castellon      Diagnosis: Alejandro Adames is a 69 y.o. male who was presented at the Urology Oncology Multidisciplinary Tumor Conference today. Patient presents with stage II prostate cancer and recently found to have retroperitoneal mass which was biopsy proven to be metastatic renal cell carcinoma.  Follow up scans show now evidence of renal involvement. Presented for treatment planning.      Radiology Review:       [] CT  [x] MRI  [] PET    Pathology Review:  10/19/24 left inguinal lymph node        Recommendations of Group:  Consider biopsy of retroperitoneal mass for assist of pathology differentiation       Future Appointments   Date Time Provider Department Center   12/12/2024 11:00 AM Kathe Carson RD Onc[facility]iet [facility]   12/18/2024  1:30 PM Mariajose Brown [facility] PCP [place]   12/24/2024 11:15 AM Wayne Tellez Jr., MD [facility] PCP [place]   12/26/2024 11:00 AM AL INF CHAIR 17 AL Infusion [facility]   12/31/2024  1:00 PM Isaak Castellon MD HEM ONC ALL Practice-Onc   1/24/2025 12:00 PM AL CT 1 AL CT AL HOSPITAL   1/28/2025 10:45 AM Rj Rees MD URO [facility]   1/28/2025  1:30 PM Isaak Castellon MD HEM ONC ALL Practice-Onc   2/6/2025 11:00 AM AL INF CHAIR 5 AL Infusion [facility]   2/20/2025 11:00 AM AL IR 1 AL IR AL HOSPITAL   2/25/2025  1:00 PM Isaak Castellon MD HEM ONC [facility] Practice-Onc   3/25/2025  1:15 PM Isaak Castellon MD HEM ONC [facility] Practice-Onc   5/29/2025  1:00 PM Scot Ellison MD NEPH [facility]            Patient was discussed at the Multidisciplinary Urology Case review       NCCN guidelines were available for review.    The final treatment plan will be left to the discretion of the patient and the treating physician.     DISCLAIMERS:  TO THE TREATING PHYSICIAN:  This conference is a meeting of clinicians from  various specialty areas who evaluate and discuss patients for whom a multidisciplinary treatment approach is being considered. Please note that the above opinion was a consensus of the conference attendees and is intended only to assist in quality care of the discussed patient.  The responsibility for follow up on the input given during the conference, along with any final decisions regarding plan of care, is that of the patient and the patient's provider. Accordingly, appointments have only been recommended based on this information and have NOT been scheduled unless otherwise noted.      TO THE PATIENT:  This summary is a brief record of major aspects of your cancer treatment. You may choose to share a copy with any of your doctors or nurses. However, this is not a detailed or comprehensive record of your care.

## 2024-12-03 NOTE — DISCHARGE INSTR - OTHER ORDERS
Skin care plans:  1-Hydraguard/Silicone Cream to bilateral sacrum, buttock, and heels BID and PRN  2-Elevate heels to offload pressure.  3-Ehob cushion in chair when out of bed.  4-Moisturize skin daily with skin nourishing cream.  5-Turn/reposition q2h or when medically stable for pressure re-distribution on skin.

## 2024-12-03 NOTE — ASSESSMENT & PLAN NOTE
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  Check blood sugar 4 times a day and continue to adjust insulin at rehab

## 2024-12-03 NOTE — PROGRESS NOTES
ADT in basket for discharge to [facility] Will hand off to Trina sherman for follow up.

## 2024-12-03 NOTE — CASE MANAGEMENT
Case Management Discharge Planning Note    Patient name Alejandro Adames  [facility]  /E5 -* MRN [id number]  : 1955 Date 12/3/2024       Current Admission Date: 2024  Current Admission Diagnosis:DKA (diabetic ketoacidosis) (HCC)   Patient Active Problem List    Diagnosis Date Noted Date Diagnosed    Left leg swelling 2024     Severe protein-calorie malnutrition (HCC) 2024     DKA (diabetic ketoacidosis) (HCC) 2024     Metastatic renal cell carcinoma to bone (HCC) 10/24/2024     Polyuria 10/21/2024     Retroperitoneal mass 10/19/2024     Hydronephrosis of left kidney 10/18/2024     JULIO (acute kidney injury) (HCC) 10/18/2024     Hyponatremia 10/18/2024     Lung nodule 10/18/2024     Patellofemoral pain syndrome of both knees 2023     Gait disorder 2023     Sebaceous cyst mid back 10/17/2022     COVID-19 2021     Anemia 2021     Intrinsic eczema 2020     SK (seborrheic keratosis) 2020     Prostate cancer ([facility]) 2019     Type 2 diabetes mellitus with kidney complication, without long-term current use of insulin ([facility])      Actinic keratosis 2019     Idiopathic angioedema 2015     Essential hypertension 2012     Mixed hyperlipidemia 2012     Herpes simplex type 1 infection 05/10/2012       LOS (days): 6  Geometric Mean LOS (GMLOS) (days): 4.4  Days to GMLOS:-2     OBJECTIVE:  Risk of Unplanned Readmission Score: 27.15         Current admission status: Inpatient   Preferred Pharmacy:   [id number] [place] - [address]  [address]  [place] [address]  Phone: [phone] Fax: [phone]    [facility] - [place] - [address]  [address]  [place] [address]  Phone: [phone] Fax: [phone]    [facility] - [place] - [address]  [address]  [address]  [place] [address]  Phone:  [phone] Fax: [phone]    Primary Care Provider: Wayne Uriarte Jr, MD    Primary Insurance: MEDICARE  Secondary Insurance: [facility]    DISCHARGE DETAILS:    Discharge planning discussed with:: patient  Freedom of Choice: Yes  Comments - Freedom of Choice: chose [facility] after [facility] could not accept  CM contacted family/caregiver?: No- see comments (patient refused)     Did patient/caregiver verbalize understanding of patient care needs?: N/A- going to facility       Contacts  Patient Contacts: Gordy Adames ( brother) [phone]  Relationship to Patient:: Family  Contact Method: Phone  Phone Number: [phone]  Reason/Outcome: Emergency Contact    Requested Home Health Care         Is the patient interested in HHC at discharge?: No    DME Referral Provided  Referral made for DME?: No    Other Referral/Resources/Interventions Provided:  Interventions: Short Term Rehab    Treatment Team Recommendation: Short Term Rehab  Discharge Destination Plan:: Short Term Rehab  Transport at Discharge : Wheelchair van     Number/Name of Dispatcher: THERESA [id number]     ETA of Transport (Date): 12/03/24  ETA of Transport (Time):  (12 noon requested)     IMM Given (Date):: 12/02/24  IMM Given to:: Patient     Additional Comments: CM was advised patient is medically clear, [facility] facility able to accept patient today. Patient agreeable to discharge plan.    Accepting Facility Name, City & State : [facility] [place]  Receiving Facility/Agency Phone Number: Report phone number: [phone]  Facility/Agency Fax Number: [facility] fax number: [phone]

## 2024-12-03 NOTE — NURSING NOTE
Attempted to call report to [facility] and got a response to leave a voicemail.  Voicemail left awaiting a call back.

## 2024-12-03 NOTE — PLAN OF CARE
Problem: PAIN - ADULT  Goal: Verbalizes/displays adequate comfort level or baseline comfort level  Description: Interventions:  - Encourage patient to monitor pain and request assistance  - Assess pain using appropriate pain scale  - Administer analgesics based on type and severity of pain and evaluate response  - Implement non-pharmacological measures as appropriate and evaluate response  - Consider cultural and social influences on pain and pain management  - Notify physician/advanced practitioner if interventions unsuccessful or patient reports new pain  Outcome: Progressing     Problem: INFECTION - ADULT  Goal: Absence or prevention of progression during hospitalization  Description: INTERVENTIONS:  - Assess and monitor for signs and symptoms of infection  - Monitor lab/diagnostic results  - Monitor all insertion sites, i.e. indwelling lines, tubes, and drains  - Monitor endotracheal if appropriate and nasal secretions for changes in amount and color  - Delta appropriate cooling/warming therapies per order  - Administer medications as ordered  - Instruct and encourage patient and family to use good hand hygiene technique  - Identify and instruct in appropriate isolation precautions for identified infection/condition  Outcome: Progressing     Problem: SAFETY ADULT  Goal: Patient will remain free of falls  Description: INTERVENTIONS:  - Educate patient/family on patient safety including physical limitations  - Instruct patient to call for assistance with activity   - Consult OT/PT to assist with strengthening/mobility   - Keep Call bell within reach  - Keep bed low and locked with side rails adjusted as appropriate  - Keep care items and personal belongings within reach  - Initiate and maintain comfort rounds  - Make Fall Risk Sign visible to staff  - Offer Toileting every 2 Hours, in advance of need  - Initiate/Maintain bed alarm  - Apply yellow socks and bracelet for high fall risk patients  - Consider  moving patient to room near nurses station  Outcome: Progressing  Goal: Maintain or return to baseline ADL function  Description: INTERVENTIONS:  -  Assess patient's ability to carry out ADLs; assess patient's baseline for ADL function and identify physical deficits which impact ability to perform ADLs (bathing, care of mouth/teeth, toileting, grooming, dressing, etc.)  - Assess/evaluate cause of self-care deficits   - Assess range of motion  - Assess patient's mobility; develop plan if impaired  - Assess patient's need for assistive devices and provide as appropriate  - Encourage maximum independence but intervene and supervise when necessary  - Involve family in performance of ADLs  - Assess for home care needs following discharge   - Consider OT consult to assist with ADL evaluation and planning for discharge  - Provide patient education as appropriate  Outcome: Progressing  Goal: Maintains/Returns to pre admission functional level  Description: INTERVENTIONS:  - Perform AM-PAC 6 Click Basic Mobility/ Daily Activity assessment daily.  - Set and communicate daily mobility goal to care team and patient/family/caregiver.   - Collaborate with rehabilitation services on mobility goals if consulted  - Perform Range of Motion 3 times a day.  - Reposition patient every 2 hours.  - Dangle patient 3 times a day  - Stand patient 3 times a day  - Ambulate patient 3 times a day  - Out of bed to chair 3 times a day   - Out of bed for meals 3 times a day  - Out of bed for toileting  - Record patient progress and toleration of activity level   Outcome: Progressing     Problem: DISCHARGE PLANNING  Goal: Discharge to home or other facility with appropriate resources  Description: INTERVENTIONS:  - Identify barriers to discharge w/patient and caregiver  - Arrange for needed discharge resources and transportation as appropriate  - Identify discharge learning needs (meds, wound care, etc.)  - Arrange for interpretive services to  assist at discharge as needed  - Refer to Case Management Department for coordinating discharge planning if the patient needs post-hospital services based on physician/advanced practitioner order or complex needs related to functional status, cognitive ability, or social support system  Outcome: Progressing     Problem: Knowledge Deficit  Goal: Patient/family/caregiver demonstrates understanding of disease process, treatment plan, medications, and discharge instructions  Description: Complete learning assessment and assess knowledge base.  Interventions:  - Provide teaching at level of understanding  - Provide teaching via preferred learning methods  Outcome: Progressing     Problem: Nutrition/Hydration-ADULT  Goal: Nutrient/Hydration intake appropriate for improving, restoring or maintaining nutritional needs  Description: Monitor and assess patient's nutrition/hydration status for malnutrition. Collaborate with interdisciplinary team and initiate plan and interventions as ordered.  Monitor patient's weight and dietary intake as ordered or per policy. Utilize nutrition screening tool and intervene as necessary. Determine patient's food preferences and provide high-protein, high-caloric foods as appropriate.     INTERVENTIONS:  - Monitor oral intake, urinary output, labs, and treatment plans  - Assess nutrition and hydration status and recommend course of action  - Evaluate amount of meals eaten  - Assist patient with eating if necessary   - Allow adequate time for meals  - Recommend/ encourage appropriate diets, oral nutritional supplements, and vitamin/mineral supplements  - Order, calculate, and assess calorie counts as needed  - Recommend, monitor, and adjust tube feedings and TPN/PPN based on assessed needs  - Assess need for intravenous fluids  - Provide specific nutrition/hydration education as appropriate  - Include patient/family/caregiver in decisions related to nutrition  Outcome: Progressing     Problem:  NEUROSENSORY - ADULT  Goal: Achieves stable or improved neurological status  Description: INTERVENTIONS  - Monitor and report changes in neurological status  - Monitor vital signs such as temperature, blood pressure, glucose, and any other labs ordered   - Initiate measures to prevent increased intracranial pressure  - Monitor for seizure activity and implement precautions if appropriate      Outcome: Progressing  Goal: Remains free of injury related to seizures activity  Description: INTERVENTIONS  - Maintain airway, patient safety  and administer oxygen as ordered  - Monitor patient for seizure activity, document and report duration and description of seizure to physician/advanced practitioner  - If seizure occurs,  ensure patient safety during seizure  - Reorient patient post seizure  - Seizure pads on all 4 side rails  - Instruct patient/family to notify RN of any seizure activity including if an aura is experienced  - Instruct patient/family to call for assistance with activity based on nursing assessment  - Administer anti-seizure medications if ordered    Outcome: Progressing  Goal: Achieves maximal functionality and self care  Description: INTERVENTIONS  - Monitor swallowing and airway patency with patient fatigue and changes in neurological status  - Encourage and assist patient to increase activity and self care.   - Encourage visually impaired, hearing impaired and aphasic patients to use assistive/communication devices  Outcome: Progressing     Problem: CARDIOVASCULAR - ADULT  Goal: Maintains optimal cardiac output and hemodynamic stability  Description: INTERVENTIONS:  - Monitor I/O, vital signs and rhythm  - Monitor for S/S and trends of decreased cardiac output  - Administer and titrate ordered vasoactive medications to optimize hemodynamic stability  - Assess quality of pulses, skin color and temperature  - Assess for signs of decreased coronary artery perfusion  - Instruct patient to report change  in severity of symptoms  Outcome: Progressing  Goal: Absence of cardiac dysrhythmias or at baseline rhythm  Description: INTERVENTIONS:  - Continuous cardiac monitoring, vital signs, obtain 12 lead EKG if ordered  - Administer antiarrhythmic and heart rate control medications as ordered  - Monitor electrolytes and administer replacement therapy as ordered  Outcome: Progressing     Problem: RESPIRATORY - ADULT  Goal: Achieves optimal ventilation and oxygenation  Description: INTERVENTIONS:  - Assess for changes in respiratory status  - Assess for changes in mentation and behavior  - Position to facilitate oxygenation and minimize respiratory effort  - Oxygen administered by appropriate delivery if ordered  - Initiate smoking cessation education as indicated  - Encourage broncho-pulmonary hygiene including cough, deep breathe, Incentive Spirometry  - Assess the need for suctioning and aspirate as needed  - Assess and instruct to report SOB or any respiratory difficulty  - Respiratory Therapy support as indicated  Outcome: Progressing     Problem: GASTROINTESTINAL - ADULT  Goal: Minimal or absence of nausea and/or vomiting  Description: INTERVENTIONS:  - Administer IV fluids if ordered to ensure adequate hydration  - Maintain NPO status until nausea and vomiting are resolved  - Nasogastric tube if ordered  - Administer ordered antiemetic medications as needed  - Provide nonpharmacologic comfort measures as appropriate  - Advance diet as tolerated, if ordered  - Consider nutrition services referral to assist patient with adequate nutrition and appropriate food choices  Outcome: Progressing  Goal: Maintains or returns to baseline bowel function  Description: INTERVENTIONS:  - Assess bowel function  - Encourage oral fluids to ensure adequate hydration  - Administer IV fluids if ordered to ensure adequate hydration  - Administer ordered medications as needed  - Encourage mobilization and activity  - Consider nutritional  services referral to assist patient with adequate nutrition and appropriate food choices  Outcome: Progressing  Goal: Maintains adequate nutritional intake  Description: INTERVENTIONS:  - Monitor percentage of each meal consumed  - Identify factors contributing to decreased intake, treat as appropriate  - Assist with meals as needed  - Monitor I&O, weight, and lab values if indicated  - Obtain nutrition services referral as needed  Outcome: Progressing  Goal: Establish and maintain optimal ostomy function  Description: INTERVENTIONS:  - Assess bowel function  - Encourage oral fluids to ensure adequate hydration  - Administer IV fluids if ordered to ensure adequate hydration   - Administer ordered medications as needed  - Encourage mobilization and activity  - Nutrition services referral to assist patient with appropriate food choices  - Assess stoma site  - Consider wound care consult   Outcome: Progressing  Goal: Oral mucous membranes remain intact  Description: INTERVENTIONS  - Assess oral mucosa and hygiene practices  - Implement preventative oral hygiene regimen  - Implement oral medicated treatments as ordered  - Initiate Nutrition services referral as needed  Outcome: Progressing     Problem: GENITOURINARY - ADULT  Goal: Maintains or returns to baseline urinary function  Description: INTERVENTIONS:  - Assess urinary function  - Encourage oral fluids to ensure adequate hydration if ordered  - Administer IV fluids as ordered to ensure adequate hydration  - Administer ordered medications as needed  - Offer frequent toileting  - Follow urinary retention protocol if ordered  Outcome: Progressing  Goal: Absence of urinary retention  Description: INTERVENTIONS:  - Assess patient’s ability to void and empty bladder  - Monitor I/O  - Bladder scan as needed  - Discuss with physician/AP medications to alleviate retention as needed  - Discuss catheterization for long term situations as appropriate  Outcome:  Progressing  Goal: Urinary catheter remains patent  Description: INTERVENTIONS:  - Assess patency of urinary catheter  - If patient has a chronic cummings, consider changing catheter if non-functioning  - Follow guidelines for intermittent irrigation of non-functioning urinary catheter  Outcome: Progressing     Problem: METABOLIC, FLUID AND ELECTROLYTES - ADULT  Goal: Electrolytes maintained within normal limits  Description: INTERVENTIONS:  - Monitor labs and assess patient for signs and symptoms of electrolyte imbalances  - Administer electrolyte replacement as ordered  - Monitor response to electrolyte replacements, including repeat lab results as appropriate  - Instruct patient on fluid and nutrition as appropriate  Outcome: Progressing  Goal: Fluid balance maintained  Description: INTERVENTIONS:  - Monitor labs   - Monitor I/O and WT  - Instruct patient on fluid and nutrition as appropriate  - Assess for signs & symptoms of volume excess or deficit  Outcome: Progressing  Goal: Glucose maintained within target range  Description: INTERVENTIONS:  - Monitor Blood Glucose as ordered  - Assess for signs and symptoms of hyperglycemia and hypoglycemia  - Administer ordered medications to maintain glucose within target range  - Assess nutritional intake and initiate nutrition service referral as needed  Outcome: Progressing     Problem: SKIN/TISSUE INTEGRITY - ADULT  Goal: Skin Integrity remains intact(Skin Breakdown Prevention)  Description: Assess:  -Perform Adán assessment every shift  -Inspect skin when repositioning, toileting, and assisting with ADLS  -Assess extremities for adequate circulation and sensation     Bed Management:  -Have minimal linens on bed & keep smooth, unwrinkled  -Change linens as needed when moist or perspiring  -Avoid sitting or lying in one position for more than 2 hours while in bed    Toileting:  -Offer bedside commode    Activity:  -Mobilize patient 3 times a day  -Encourage activity and  walks on unit  -Encourage or provide ROM exercises   -Turn and reposition patient every 2 Hours  -Use appropriate equipment to lift or move patient in bed    Skin Care:  -Avoid use of baby powder, tape, friction and shearing, hot water or constrictive clothing  -Do not massage red bony areas      Outcome: Progressing  Goal: Incision(s), wounds(s) or drain site(s) healing without S/S of infection  Description: INTERVENTIONS  - Assess and document dressing, incision, wound bed, drain sites and surrounding tissue  - Provide patient and family education  Outcome: Progressing  Goal: Pressure injury heals and does not worsen  Description: Interventions:  - Implement low air loss mattress or specialty surface (Criteria met)  - Apply silicone foam dressing  - Instruct/assist with weight shifting every 30 minutes when in chair   - Limit chair time to 2 hour intervals  - Use special pressure reducing interventions such as offloading cushion when in chair   - Apply fecal or urinary incontinence containment device   - Turn and reposition patient & offload bony prominences every 2 hours   - Utilize friction reducing device or surface for transfers   - Consider nutrition services referral as needed  Outcome: Progressing     Problem: HEMATOLOGIC - ADULT  Goal: Maintains hematologic stability  Description: INTERVENTIONS  - Assess for signs and symptoms of bleeding or hemorrhage  - Monitor labs  - Administer supportive blood products/factors as ordered and appropriate  Outcome: Progressing     Problem: MUSCULOSKELETAL - ADULT  Goal: Maintain or return mobility to safest level of function  Description: INTERVENTIONS:  - Assess patient's ability to carry out ADLs; assess patient's baseline for ADL function and identify physical deficits which impact ability to perform ADLs (bathing, care of mouth/teeth, toileting, grooming, dressing, etc.)  - Assess/evaluate cause of self-care deficits   - Assess range of motion  - Assess patient's  mobility  - Assess patient's need for assistive devices and provide as appropriate  - Encourage maximum independence but intervene and supervise when necessary  - Involve family in performance of ADLs  - Assess for home care needs following discharge   - Consider OT consult to assist with ADL evaluation and planning for discharge  - Provide patient education as appropriate  Outcome: Progressing  Goal: Maintain proper alignment of affected body part  Description: INTERVENTIONS:  - Support, maintain and protect limb and body alignment  - Provide patient/ family with appropriate education  Outcome: Progressing     Problem: Prexisting or High Potential for Compromised Skin Integrity  Goal: Skin integrity is maintained or improved  Description: INTERVENTIONS:  - Identify patients at risk for skin breakdown  - Assess and monitor skin integrity  - Assess and monitor nutrition and hydration status  - Monitor labs   - Assess for incontinence   - Turn and reposition patient  - Assist with mobility/ambulation  - Relieve pressure over bony prominences  - Avoid friction and shearing  - Provide appropriate hygiene as needed including keeping skin clean and dry  - Evaluate need for skin moisturizer/barrier cream  - Collaborate with interdisciplinary team   - Patient/family teaching  - Consider wound care consult   Outcome: Progressing

## 2024-12-04 ENCOUNTER — PATIENT OUTREACH (OUTPATIENT)
Dept: CASE MANAGEMENT | Facility: OTHER | Age: 69
End: 2024-12-04

## 2024-12-04 LAB — GAD65 AB SER-ACNC: 472.7 U/ML (ref 0–5)

## 2024-12-04 NOTE — PROGRESS NOTES
Matt received from OPCM the patient Admitted 11/27/24 to [facility]. Discharged 12/3/24 to [facility]. Email sent to facility to inform them I will be following the patient during their skilled stay.  This Admin Coordinator will continue to monitor via chart review.

## 2024-12-07 LAB
CARIS GENOMIC LOH - EXOME: NORMAL
CARIS HER2/NEU: NEGATIVE
CARIS HLA-A: NORMAL
CARIS HLA-B: NORMAL
CARIS HLA-C: NORMAL
CARIS MSI - EXOME: NORMAL
CARIS PD-L1 (SP142): NEGATIVE
CARIS TMB - EXOME: NORMAL

## 2024-12-09 ENCOUNTER — PATIENT OUTREACH (OUTPATIENT)
Dept: CASE MANAGEMENT | Facility: OTHER | Age: 69
End: 2024-12-09

## 2024-12-09 ENCOUNTER — TELEPHONE (OUTPATIENT)
Dept: HEMATOLOGY ONCOLOGY | Facility: CLINIC | Age: 69
End: 2024-12-09

## 2024-12-09 ENCOUNTER — RA CDI HCC (OUTPATIENT)
Dept: OTHER | Facility: HOSPITAL | Age: 69
End: 2024-12-09

## 2024-12-09 DIAGNOSIS — C64.9 METASTATIC RENAL CELL CARCINOMA TO BONE (HCC): Primary | ICD-10-CM

## 2024-12-09 DIAGNOSIS — C79.51 METASTATIC RENAL CELL CARCINOMA TO BONE (HCC): Primary | ICD-10-CM

## 2024-12-09 NOTE — TELEPHONE ENCOUNTER
"Per infusion team,   \"Hi IR just called and said patient has 2 procedures in IR on 12/26 which are very long. She said he will not be able to make his chemo appointment this day&doesn't think he will feel up to coming in on Friday 12/27. Just an FYI.  Is it ok to cancel his appointment for 12/26?\"     Per Dr. Castellon, \"Yes the appt on 12/26 can be cancelled/deferred at least a week\"    Plan updated and patient notified.  "

## 2024-12-09 NOTE — PROGRESS NOTES
Call placed to [facility] and spoke with [facility] who confirmed the patient is currently admitted to their facility for STR no LCD at this time. This Admin Coordinator will continue to monitor via chart review.

## 2024-12-10 ENCOUNTER — TELEPHONE (OUTPATIENT)
Age: 69
End: 2024-12-10

## 2024-12-10 NOTE — TELEPHONE ENCOUNTER
Tried to call patients brother Bruno back , left a message to call the office  to discuss what he is looking for and if the doctor would be able to look into it for him.

## 2024-12-10 NOTE — TELEPHONE ENCOUNTER
Patients brother called and  wanted to speak to the PCP about Charli.  He stated that his brother has stage 4 cancer and is currently in a rehab facility.  They will not give him any information. He is on the consent form and would like the PCP to call him with an update.  He can be reached at [phone].    Thank you

## 2024-12-11 LAB — INSULIN AB SER-ACNC: <5 UU/ML

## 2024-12-17 ENCOUNTER — TELEPHONE (OUTPATIENT)
Dept: RADIOLOGY | Facility: HOSPITAL | Age: 69
End: 2024-12-17

## 2024-12-19 ENCOUNTER — PATIENT OUTREACH (OUTPATIENT)
Dept: CASE MANAGEMENT | Facility: OTHER | Age: 69
End: 2024-12-19

## 2024-12-19 NOTE — PROGRESS NOTES
Chart review completed.  PC to facility requesting update on patient. NO LCD at this time.  This Admin Coordinator will continue to monitor via chart review throughout episode.    Quality 47: Advance Care Plan: Advance Care Planning discussed and documented; advance care plan or surrogate decision maker documented in the medical record. Quality 226: Preventive Care And Screening: Tobacco Use: Screening And Cessation Intervention: Patient screened for tobacco use and is an ex/non-smoker Detail Level: Detailed

## 2024-12-21 NOTE — PROGRESS NOTES
Hematology/Oncology Outpatient Office Note    Date of Service: 2024    [facility] [address]  [place] [address]  [phone]    Reason for Consultation:   Chief Complaint   Patient presents with    Follow-up       Cancer Stage at diagnosis: IV    Referral Physician: No ref. provider found    Primary Care Physician:  Wayne Uriarte Jr, MD     Nickname: Charli    Lives with his brother, Gordy Ellis ECO     Today's ECO-3    Goals and Barriers:  Current Goal: Minimize effects of disease burden, extend life.   Barriers to accomplishing this: malaise    Patient's Capacity to Self Care:  none    ASSESSMENT & PLAN      Diagnosis ICD-10-CM Associated Orders   1. Metastatic renal cell carcinoma to bone (HCC)  C79.51     C64.9       2. Prostate cancer (HCC)  C61             This is a 69 y.o. c PMHx notable for DM, HTN, prostate cancer under good control, being seen in consultation for metastatic RCC      Discussion of decision making  Oncology history updated, accordingly, during this visit  Goals of care/patient communication  I discussed with the patient the clinical course leading up to their cancer diagnosis. I reviewed relevant office notes, imaging reports and pathology result as well.  I told the patient that this is a case of incurable disease and what this means. We discussed that the goal of anti-cancer therapy is to provide best quality of life, extend overall survival, and progression free survival as shown in clinical trials. We also discussed that there might be a point when the cancer will no longer respond to this anti-neoplastic therapy. As a result, we also discussed the role of the palliative care team being introduced early in the treatment course. We will be making this referral  I explained the risks/benefits of the proposed cancer therapy: Lenvima + Keytruda and after discussion including understanding risks  of possible life-threatening complications and therapy-related malignancy development, informed consent for blood products and treatment has been signed and obtained.  TNM/Staging At Diagnosis  Cancer Staging   Malignant neoplasm of prostate (HCC)  Staging form: Prostate, AJCC 8th Edition  - Clinical: Stage IIB (cT1c, cN0, cM0, PSA: 4.9, Grade Group: 2) - Signed by Isaak Castellon MD on 10/24/2024  Prostate specific antigen (PSA) range: Less than 10  Kayley score: 7  Histologic grading system: 5 grade system    Renal cell carcinoma of left kidney (HCC)  Staging form: Kidney, AJCC 8th Edition  - Clinical: Stage IV (cTX, cN1, cM1) - Signed by Isaak Castellon MD on 10/24/2024    Disease Features/Tumor Markers/Genetics  Tumor Marker: PSA  1/3/2019: 5.6  4/8/2019: 4.9  10/17/2022: 1.3  10/19/2024: 0.355  Notable Path Features:   10/19/2024 inguinal LN bx: retroperitoneal bx is positive this is most compatible with renal primary   Guardant NGS: 11/4/2024: TMB 12 muts/Mb, VHL mutation (can use Belzutifan), MSI stable  CARIS NGS: VHL mutation   Treatment: Lenvima + Keytruda  Other Supportive care:  Treatment Team Members  Surgeon  Rad Onc  Palliative  [facility]: Dr. Connors  Labs  10/24/2024: creat 1.75  11/12/2024: creat 1.42  Diagnostics  10/18/2024 CT Chest w/o c: Nothing to suggest malignancy in the chest. The 3 mm left lower lobe nodule is of doubtful significance.  Abd/pelv w/c: Moderate left hydronephrosis with peripelvic/proximal periureteral inflammatory stranding secondary to obstructing large left retroperitoneal toribio conglomerate. Conglomerate of left retroperitoneal nodes measuring approximately 6.5 x 5 x 13 cm (2:106)   Enlarged left pelvic and other retroperitoneal nodes as described  3 mm sclerotic lesions in the right iliac body and right femoral head  10/26/2024 MRI Abd w/wo c: No renal cortical mass is identified bilaterally. Mild urothelial thickening and enhancement within the left renal pelvis without  measurable lesion.   11/5/2024  Tumor board discussion. Recommend urology follow up ASAP with Dr. Castro. Treat systemically  11/7/2024: NM Bone scan: No focal tracer activity characteristic of osseous metastatic disease.  12/3  tumor board: Consider biopsy of retroperitoneal mass for assist of pathology differentiation    Discussion of decision making    I personally reviewed the following lab results, the image studies, pathology, other specialty/physicians consult notes and recommendations, and outside medical records. I had a lengthy discussion with the patient and shared the work-up findings. We discussed the diagnosis and management plan as below. I spent 41 minutes reviewing the records (labs, clinician notes, outside records, medical history, ordering medicine/tests/procedures, monitoring of anti-neoplastic toxicities, interpreting the imaging/labs previously done) and coordination of care as well as direct time with the patient today, of which greater than 50% of the time was spent in counseling and coordination of care with the patient/family.      Plan/Labs  F/u Urologist at [facility] for prostate cancer monitoring   F/u for Palliative care for advanced disease  Zometa 4 mg q12 weeks  Marinol 5mg BID ordered for appetite stimulation  Oncology genetics counselor referral made previously  Coordination with PCP to see if he can come off of Mounjaro  PT referral for overall poor performance status  Restaging CT CAP w/c scheduled 1/24/2025   We discussed Lenvima 20 mg daily + Keytruda 400 mg q6 weeks which can be used for both clear cell and non clear cell indication as the histology is not clear to which subtype we are dealing with; holding off on Lenvima until we get his upcoming January biopsy back    Follow Up: q4 weeks for 3 months, then q12 weeks scheduled thru 3/25/2025    All questions were answered to the patient's satisfaction during this encounter. The patient knows the contact information for our  office and knows to reach out for any relevant concerns related to this encounter. They are to call for any temperature 100.4 or higher, new symptoms including but not restricted to shaking chills, decreased appetite, nausea, vomiting, diarrhea, increased fatigue, shortness of breath or chest pain, confusion, and not feeling the strength to come to the clinic. For all other listed problems and medical diagnosis in their chart - they are managed by PCP and/or other specialists, which the patient acknowledges. Thank you very much for your consultation and making us a part of this patient's care. We are continuing to follow closely with you. Please do not hesitate to reach out to me with any additional questions or concerns.    Isaak Castellon MD  Hematology & Medical Oncology Staff Physician             Disclaimer: This document was prepared using [employer] Direct technology. If a word or phrase is confusing, or does not make sense, this is likely due to recognition error which was not discovered during this clinician's review. If you believe an error has occurred, please contact me through [employer] service line for amena?cation.      ONCOLOGY HISTORY OF PRESENT ILLNESS        Oncology History   Prostate cancer (HCC)   6/18/2019 Initial Diagnosis    Malignant neoplasm of prostate (HCC)     10/24/2024 -  Cancer Staged    Staging form: Prostate, AJCC 8th Edition  - Clinical: Stage IIB (cT1c, cN0, cM0, PSA: 4.9, Grade Group: 2) - Signed by Isaak Castellon MD on 10/24/2024  Prostate specific antigen (PSA) range: Less than 10  [place] score: 7  Histologic grading system: 5 grade system       Metastatic renal cell carcinoma to bone (HCC)   10/24/2024 Initial Diagnosis    Renal cell carcinoma of left kidney (HCC)     10/24/2024 -  Cancer Staged    Staging form: Kidney, AJCC 8th Edition  - Clinical: Stage IV (cTX, cN1, cM1) - Signed by Isaak Castellon MD on 10/24/2024       11/14/2024 -  Chemotherapy     alteplase (CATHFLO), 2 mg, Intracatheter, Every 1 Minute as needed, 2 of 6 cycles  pembrolizumab (KEYTRUDA) IVPB, 400 mg, Intravenous, Once, 2 of 6 cycles  Administration: 400 mg (11/14/2024)       History of prostate adenocarcinoma localized intermediate-favorable risk adenocarcinoma of the prostate (Stage II - cT1c, cN0, cM0, [place] Score: 3+4=7, PSA: 4.9)  s/p SBRT (Administered 3,700-Gy over 5-fractions - Completed on September 18th, 2019)  11/27/2024: DKA admission    SUBJECTIVE  (INTERVAL HISTORY)      Clotting History None   Bleeding History None   Cancer History Prostate s/p above treatment, RCC   Family Cancer History None   H/O Blood/Plt Transfusion None   Tobacco/etoh/drug abuse 2 PPD x 20 years, quit at age 49, no etoh abuse or rec drug use           Retired Electric  (retired)     Pain: resolved      Improving appetite.  Less gen weakness.     I have reviewed the relevant past medical, surgical, social and family history. I have also reviewed allergies and medications for this patient.    Review of Systems  Baseline weight: 175 lbs    Lost 13 lbs while on Mounjaro, since 7/2024, this has since been stopped.    Denies F/C, N/V, SOB, CP, LH, HA, rash, itching, gen weakness, melena, hematuria, hematochezia, falls.        A 10-point review of system was performed, pertinent positive and negative were detailed as above. Otherwise, the 10-point review of system was negative.      Past Medical History:   Diagnosis Date    Cancer (HCC)     pt unsure of primary site poss. bone vs kidney    Cutaneous skin tags 01/02/2019    Diabetes mellitus (HCC)     Hypertension     Ureteral obstruction     L nephrostomy tube in place 12/26/204    internal placement otday 12/26/204    Uses walker     in rehab presently   12/26/2024       Past Surgical History:   Procedure Laterality Date    IR BIOPSY INGUINAL LYMPH NODE  10/19/2024    IR INTERNAL URETERAL STENT PLACEMENT  12/26/2024    IR NEPHROSTOMY TO NEPHROURETERAL  STENT  11/19/2024    IR NEPHROSTOMY TUBE PLACEMENT  10/19/2024       Family History   Problem Relation Age of Onset    No Known Problems Brother     Stroke Mother        Social History     Socioeconomic History    Marital status: Single     Spouse name: Not on file    Number of children: Not on file    Years of education: Not on file    Highest education level: Not on file   Occupational History    Occupation: Work history - travels a lot   Tobacco Use    Smoking status: Former    Smokeless tobacco: Never   Vaping Use    Vaping status: Never Used   Substance and Sexual Activity    Alcohol use: Yes     Alcohol/week: 2.0 - 3.0 standard drinks of alcohol     Types: 2 - 3 Cans of beer per week     Comment: social    Drug use: No    Sexual activity: Not Currently   Other Topics Concern    Not on file   Social History Narrative    Engages in travel abroad    Living independently alone'     Social Drivers of Health     Financial Resource Strain: Not on file   Food Insecurity: No Food Insecurity (12/24/2024)    Hunger Vital Sign     Worried About Running Out of Food in the Last Year: Never true     Ran Out of Food in the Last Year: Never true   Transportation Needs: No Transportation Needs (12/24/2024)    PRAPARE - Transportation     Lack of Transportation (Medical): No     Lack of Transportation (Non-Medical): No   Physical Activity: Not on file   Stress: Not on file   Social Connections: Not on file   Intimate Partner Violence: Unknown (11/27/2024)    Nursing IPS     Feels Physically and Emotionally Safe: Not on file     Physically Hurt by Someone: Not on file     Humiliated or Emotionally Abused by Someone: Not on file     Physically Hurt by Someone: 2     Hurt or Threatened by Someone: 2   Housing Stability: Low Risk  (12/24/2024)    Housing Stability Vital Sign     Unable to Pay for Housing in the Last Year: No     Number of Times Moved in the Last Year: 0     Homeless in the Last Year: No       Allergies   Allergen  Reactions    Captopril Cough    Crestor [Rosuvastatin] Diarrhea    Enalapril Cough       Current Outpatient Medications   Medication Sig Dispense Refill    amLODIPine (NORVASC) 5 mg tablet Take 1 tablet (5 mg total) by mouth daily 30 tablet 0    atorvastatin (LIPITOR) 40 mg tablet TAKE 1 TABLET BY MOUTH EVERY  tablet 1    cephalexin (KEFLEX) 500 mg capsule Take 1 capsule (500 mg total) by mouth every 8 (eight) hours for 7 days 21 capsule 0    Continuous Glucose Sensor (FreeStyle Ольга 3 Plus Sensor) MISC Use 1 each Every 15 days for blood sugar monitoring *receives through [email or web address]*      dronabinol (MARINOL) 5 MG capsule Take 1 capsule (5 mg total) by mouth 2 (two) times a day before meals 60 capsule 1    glucose blood (OneTouch Verio) test strip Check blood sugars once daily. Please substitute with appropriate alternative as covered by patient's insurance. Dx: E11.65 100 each 3    insulin glargine (LANTUS) 100 units/mL subcutaneous injection Inject 18 Units under the skin daily at bedtime 10 mL 0    insulin lispro (HumALOG/ADMELOG) 100 units/mL injection Inject 6 Units under the skin 3 (three) times a day with meals 5.4 mL 0    Insulin Pen Needle 32G X 4 MM MISC Use      menthol-zinc oxide (CALMOSPETINE) 0.44-20.625 % Apply topically 2 (two) times a day 113 g 3    OneTouch Delica Lancets 33G MISC Check blood sugars once daily. Please substitute with appropriate alternative as covered by patient's insurance. Dx: E11.65 100 each 3    oxyCODONE (ROXICODONE) 5 immediate release tablet Take 0.5 tablets (2.5 mg total) by mouth every 8 (eight) hours as needed for severe pain for up to 10 doses Max Daily Amount: 7.5 mg 5 tablet 0    triamcinolone (KENALOG) 0.1 % cream APPLY TOPICALLY TO THE AFFECTED AREA TWICE DAILY 30 g 0    acetaminophen (TYLENOL) 325 mg tablet Take 650 mg by mouth every 6 (six) hours as needed for mild pain      bisacodyl (DULCOLAX) 10 mg suppository Insert 10 mg into the rectum daily       folic acid (FOLVITE) 1 mg tablet Take 1 tablet (1 mg total) by mouth daily 30 tablet 0    Lenvatinib, 20 MG Daily Dose, (Lenvima, 20 MG Daily Dose,) 2 x 10 MG CPPK Take 20 mg by mouth daily (Patient not taking: Reported on 12/31/2024) 30 each 5    senna-docusate sodium (SENOKOT S) 8.6-50 mg per tablet Take 1 tablet by mouth 2 (two) times a day as needed for constipation      sodium chloride 0.9 % SOLN Inject 5 mL into a catheter in a vein every 8 (eight) hours       No current facility-administered medications for this visit.       (Not in a hospital admission)      Objective:     24 Hour Vitals Assessment:     Vitals:    12/31/24 1304   BP: 134/68   Pulse: (!) 110   Resp: 18   Temp: 97.6 °F (36.4 °C)   SpO2: 99%           PHYSICIAN EXAM:    General: Appearance: alert, cooperative, no distress.  HEENT: Normocephalic, atraumatic. No scleral icterus. conjunctivae clear. EOMI.  Chest: No tenderness to palpation. No open wound noted.  Lungs: Clear to auscultation bilaterally, Respirations unlabored.  Cardiac: Regular rate, +S1and S2  Abdomen: Soft, non-tender, non-distended. Bowel sounds are normal.   Extremities:  No edema, cyanosis, clubbing.  Skin: Skin color, turgor are normal. No rashes.  Lymphatics: no palpable supra-cervical, axillary, or inguinal adenopathy  Neurologic: Awake, Alert, and oriented, no gross focal deficits noted b/l.       DATA REVIEW:    Pathology Result:    Final Diagnosis   Date Value Ref Range Status   10/26/2024   Final    A. Urine, Renal, Left, ThinPrep:  Atypical urothelial cells (AUC) - see comment.  Benign squamous cells and mixed inflammatory cells.    Satisfactory for evaluation.    Comment:  The above diagnostic category is from the recently published book, The Sandra System for Reporting Urinary Cytology, and is in keeping with the ongoing effort for utilization of standardized diagnostic terminology in urine cytology.*    *The Sandra System for Reporting Urinary Cytology. Jennifer MCDOWELL  Caitlyn Blanca, Grant Aguila; 2016.     Interpretation performed at [facility] [address] [place] [address]      10/19/2024   Final    A. Lymph node, inguinal, left, biopsy:  -   Metastatic carcinoma most suggestive of renal origin, see comment.     Comment: The patient's history of prostate cancer with new onset acute renal failure and retroperitoneal mass are noted. As per radiology there are no clear masses identified. The current sample is positive for metastatic carcinoma. The morphologic and immunohistochemical findings are most consistent with renal origin. However, in the absence of a clear mass lesion, further clinical and radiographic work-up is advised.    Intradepartmental consultation is in agreement (IK/CV).  Dr. Castellon is notified of the findings by Dr. Hines via [employer] Secure Chat on 10/24/24.     04/12/2019   Final    A. Prostate, right peripheral zone, needle core biopsy:  - Benign prostate glands and stroma.    B. Prostate, right central zone, needle core biopsy:  - Prostatic adenocarcinoma, Kayley grade 3 + 3 = 6/10 (Grade group 1), involving one of two cores and 5% of the involved core.  - Focal high-grade prostatic intraepithelial neoplasia (HGPIN).  - Perineural invasion is absent.    C. Prostate, left peripheral zone, needle core biopsy:  - Prostatic adenocarcinoma, [place] grade 3 + 4 = 7 (>95% pattern 3 and <5% pattern 4, Grade group 2), involving one of 4 cores and 20% of the tissue.  - Prostatic adenocarcinoma, Kayley grade 3 + 3 = 6/10 (Grade group 1), involving two of four cores and 5%, 10% of involved cores.  - Perineural invasion is absent.    D. Prostate, left central zone, needle core biopsy:  - Benign prostate glands and stroma.    Comment: Immunohistochemistry for prostate triple stain multiplex (,P63,P504s) was preformed ob blocks A2, B1, C1, and C2 supporting the above diagnosis.    Intradepartmental consultation is in  agreement.  Interpretation performed at [facility], [address] [facility] [address]            Image Results:   Image result are reviewed and documented in Hematology/Oncology history    IR internal ureteral stent placement  Narrative: IR INTERNAL URETERAL STENT PLACEMENT    Indication: Retroperitoneal mass with left ureteral obstruction. Has percutaneous nephroureteral catheter in place which has been capped. Presents today for internalization.    Procedure: In the prone position under IV sedation, the left flank and existing catheter were prepped and draped in sterile fashion. 1% lidocaine was used for local anesthetic. Contrast nephrostogram was performed and fluoroscopic spot images were   obtained. The PCNU was removed over an Amplatz stiff wire. A 7 Mongolian sheath was advanced into the mid ureter. A ureterogram was performed. Second wire was placed into the ureter and the sheath was removed. Over the Amplatz wire, an 8 x 26 cm double-J   ureteral stent was advanced to the bladder and the proximal loop was formed in the renal pelvis. Follow-up nephrostogram was performed to ensure adequate internal drainage. The safety wire and stent deployment catheter were then both removed. A   Steri-Strip and dressing were applied over the previous access site.    Fluoroscopy time (min) : 4.1 MINUTES    Images: Multiple processed and unprocessed fluoroscopic images    Contrast (ml) : 12 cc Omnipaque 350    Sedation (min) : 20 minutes    Findings: Existing PCNU remains in satisfactory position. There is prompt internal drainage into the bladder. Distal ureteral stricture remains at the level of inferior SI joint and possibly more distally towards the UVJ. 8 Mongolian double-J ureteral stent   was placed with the distal loop in the bladder and proximal loop in the renal pelvis. There is prompt internal drainage through the stent and no safety catheter was placed.  Impression: Impression:  Distal left ureteral stricture at  "the level of the inferior SI joint    Successful conversion of PCNU to internal double-J ureteral stent with prompt internal drainage. Safety catheter was not placed.    Workstation performed: [id number]      LABS:  Lab data are reviewed and documented in HemOnc history.       Lab Results   Component Value Date    HGB 9.1 (L) 12/27/2024    HCT 28.2 (L) 12/27/2024    MCV 86.2 12/27/2024     (H) 12/27/2024    WBC 7.6 12/27/2024    NRBC 0 10/21/2024    BANDSPCT 6 11/27/2024     Lab Results   Component Value Date    K 4.0 12/27/2024     12/27/2024    CO2 25 12/27/2024    BUN 29 (H) 12/27/2024    CREATININE 1.43 (H) 12/27/2024    CALCIUM 8.5 (L) 12/27/2024    CORRECTEDCA 10.5 (H) 11/27/2024    AST 13 12/27/2024    ALT 13 12/27/2024    ALKPHOS 182 (H) 12/27/2024    EGFR 53 (L) 12/27/2024       Lab Results   Component Value Date    IRON 20 (L) 10/19/2024    TIBC 196 (L) 10/19/2024    FERRITIN 494 (H) 10/19/2024    FERRITIN 183 10/17/2022    FERRITIN 174 12/23/2021       Lab Results   Component Value Date    OEGLKEEB34 606 10/19/2024       No results for input(s): \"WBC\", \"CREAT\", \"PLT\" in the last 72 hours.    By:  Isaak Castellon MD, 12/31/2024, 1:16 PM                                  "

## 2024-12-24 ENCOUNTER — OFFICE VISIT (OUTPATIENT)
Dept: FAMILY MEDICINE CLINIC | Facility: CLINIC | Age: 69
End: 2024-12-24
Payer: MEDICARE

## 2024-12-24 VITALS
OXYGEN SATURATION: 99 % | SYSTOLIC BLOOD PRESSURE: 134 MMHG | WEIGHT: 154.6 LBS | DIASTOLIC BLOOD PRESSURE: 76 MMHG | BODY MASS INDEX: 22.13 KG/M2 | HEART RATE: 88 BPM | HEIGHT: 70 IN

## 2024-12-24 DIAGNOSIS — N18.2 TYPE 2 DIABETES MELLITUS WITH STAGE 2 CHRONIC KIDNEY DISEASE, WITHOUT LONG-TERM CURRENT USE OF INSULIN  (HCC): ICD-10-CM

## 2024-12-24 DIAGNOSIS — C61 PROSTATE CANCER (HCC): Chronic | ICD-10-CM

## 2024-12-24 DIAGNOSIS — L97.421 CHRONIC ULCER OF LEFT HEEL LIMITED TO BREAKDOWN OF SKIN (HCC): ICD-10-CM

## 2024-12-24 DIAGNOSIS — E11.22 TYPE 2 DIABETES MELLITUS WITH STAGE 2 CHRONIC KIDNEY DISEASE, WITHOUT LONG-TERM CURRENT USE OF INSULIN  (HCC): ICD-10-CM

## 2024-12-24 DIAGNOSIS — C79.51 METASTATIC RENAL CELL CARCINOMA TO BONE (HCC): Chronic | ICD-10-CM

## 2024-12-24 DIAGNOSIS — I10 ESSENTIAL HYPERTENSION: ICD-10-CM

## 2024-12-24 DIAGNOSIS — M79.89 LEFT LEG SWELLING: ICD-10-CM

## 2024-12-24 DIAGNOSIS — E11.22 TYPE 2 DIABETES MELLITUS WITH STAGE 2 CHRONIC KIDNEY DISEASE, WITHOUT LONG-TERM CURRENT USE OF INSULIN  (HCC): Primary | ICD-10-CM

## 2024-12-24 DIAGNOSIS — E43 SEVERE PROTEIN-CALORIE MALNUTRITION (HCC): ICD-10-CM

## 2024-12-24 DIAGNOSIS — N18.2 TYPE 2 DIABETES MELLITUS WITH STAGE 2 CHRONIC KIDNEY DISEASE, WITHOUT LONG-TERM CURRENT USE OF INSULIN  (HCC): Primary | ICD-10-CM

## 2024-12-24 DIAGNOSIS — D52.9 ANEMIA DUE TO FOLIC ACID DEFICIENCY, UNSPECIFIED DEFICIENCY TYPE: ICD-10-CM

## 2024-12-24 DIAGNOSIS — E87.1 HYPONATREMIA: ICD-10-CM

## 2024-12-24 DIAGNOSIS — E11.10 DIABETIC KETOACIDOSIS WITHOUT COMA ASSOCIATED WITH TYPE 2 DIABETES MELLITUS (HCC): Primary | ICD-10-CM

## 2024-12-24 DIAGNOSIS — C64.9 METASTATIC RENAL CELL CARCINOMA TO BONE (HCC): Chronic | ICD-10-CM

## 2024-12-24 DIAGNOSIS — L03.012 CELLULITIS OF FINGER OF LEFT HAND: ICD-10-CM

## 2024-12-24 DIAGNOSIS — N17.9 AKI (ACUTE KIDNEY INJURY) (HCC): ICD-10-CM

## 2024-12-24 LAB
DME PARACHUTE DELIVERY DATE REQUESTED: NORMAL
DME PARACHUTE ITEM DESCRIPTION: NORMAL
DME PARACHUTE ITEM DESCRIPTION: NORMAL
DME PARACHUTE ORDER STATUS: NORMAL
DME PARACHUTE SUPPLIER NAME: NORMAL
DME PARACHUTE SUPPLIER PHONE: NORMAL
SL AMB POCT HEMOGLOBIN AIC: 8.3 (ref ?–6.5)

## 2024-12-24 PROCEDURE — PBNCHG PB NO CHARGE PLACEHOLDER: Performed by: PHARMACIST

## 2024-12-24 PROCEDURE — 83036 HEMOGLOBIN GLYCOSYLATED A1C: CPT | Performed by: FAMILY MEDICINE

## 2024-12-24 PROCEDURE — G0439 PPPS, SUBSEQ VISIT: HCPCS | Performed by: FAMILY MEDICINE

## 2024-12-24 PROCEDURE — 99214 OFFICE O/P EST MOD 30 MIN: CPT | Performed by: FAMILY MEDICINE

## 2024-12-24 RX ORDER — HYDROCHLOROTHIAZIDE 12.5 MG/1
1 CAPSULE ORAL
COMMUNITY

## 2024-12-24 RX ORDER — SODIUM CHLORIDE 0.9 % (FLUSH) 0.9 %
5 SYRINGE (ML) INJECTION EVERY 8 HOURS SCHEDULED
COMMUNITY

## 2024-12-24 RX ORDER — CEPHALEXIN 500 MG/1
500 CAPSULE ORAL EVERY 8 HOURS SCHEDULED
Qty: 21 CAPSULE | Refills: 0 | Status: SHIPPED | OUTPATIENT
Start: 2024-12-24 | End: 2024-12-31

## 2024-12-24 RX ORDER — BISACODYL 10 MG
10 SUPPOSITORY, RECTAL RECTAL DAILY
COMMUNITY

## 2024-12-24 RX ORDER — ACETAMINOPHEN 325 MG/1
650 TABLET ORAL EVERY 6 HOURS PRN
COMMUNITY

## 2024-12-24 RX ORDER — MENTHOL AND ZINC OXIDE .44; 20.625 G/100G; G/100G
OINTMENT TOPICAL 2 TIMES DAILY
Qty: 113 G | Refills: 3 | Status: SHIPPED | OUTPATIENT
Start: 2024-12-24

## 2024-12-24 NOTE — ASSESSMENT & PLAN NOTE
A1c improved to 8.3.  He had an episode of DKA and was admitted from 11/27 - 12/3/2024.  We are going to look to get him on a CGM.  Currently he is on Lantus as well as Humalog.  He is apparently going to get some teaching at his nursing facility prior to discharge.  I did place an order for an A1c in 3 months.  Lab Results   Component Value Date    HGBA1C 8.3 (A) 12/24/2024       Orders:    IRIS Diabetic eye exam    POCT hemoglobin A1c    Hemoglobin A1C; Future    Comprehensive metabolic panel; Future    CBC and differential; Future

## 2024-12-24 NOTE — ASSESSMENT & PLAN NOTE
Lab Results   Component Value Date    HGBA1C 8.3 (A) 12/24/2024     Most recent A1c above goal but improved  Reported SMBG fluctuating; anticipate further fluctuations when patient is discharged from NH as patient's dietary habits may change.   Would benefit from cgm to closely monitor blood sugar readings     Medications:  Lantus: continue 18 units once daily  Humalog: continue 8 units in AM/6 units with lunch/supper - may also continue SS dosing  Home Monitoring: patient has Android phone, compatible with Ольга 3+. Orders sent for Ольга 3+ via [employer] to [employer]. Patient will see if he feels comfortable placing first sensor on his own when he receives supply otherwise we will review cgm education/placement during next appointment  Patient with upcoming CT scan end of Jan 2025.  Reviewed with patient potential blood sugar fluctuations when he returns home, voiced understanding.

## 2024-12-24 NOTE — ASSESSMENT & PLAN NOTE
JULIO seems to be resolving.  Labs been placed by oncology and will be followed closely.  Orders:    Comprehensive metabolic panel; Future    CBC and differential; Future

## 2024-12-24 NOTE — ASSESSMENT & PLAN NOTE
Left leg swelling persists.  He did have a negative venous duplex done just a few weeks ago so I will hold off on repeat at this time.

## 2024-12-24 NOTE — PROGRESS NOTES
[facility] Clinical Pharmacy Services  Sharron Gray, Pharmacist    Assessment/ Plan     Assessment & Plan  Type 2 diabetes mellitus with stage 2 chronic kidney disease, without long-term current use of insulin  (Formerly [facility])    Lab Results   Component Value Date    HGBA1C 8.3 (A) 12/24/2024     Most recent A1c above goal but improved  Reported SMBG fluctuating; anticipate further fluctuations when patient is discharged from NH as patient's dietary habits may change.   Would benefit from cgm to closely monitor blood sugar readings     Medications:  Lantus: continue 18 units once daily  Humalog: continue 8 units in AM/6 units with lunch/supper - may also continue SS dosing  Home Monitoring: patient has Android phone, compatible with Ольга 3+. Orders sent for Ольга 3+ via [employer] to [employer]. Patient will see if he feels comfortable placing first sensor on his own when he receives supply otherwise we will review cgm education/placement during next appointment  Patient with upcoming CT scan end of Jan 2025.  Reviewed with patient potential blood sugar fluctuations when he returns home, voiced understanding.            Follow-up: 2 weeks    Subjective   Diabetes  He presents for his follow-up diabetic visit. He has type 2 diabetes mellitus. Current diabetic treatment includes insulin injections (being admin by NH staff). He is compliant with treatment all of the time. His home blood glucose trend is fluctuating minimally.       Medication Adherence/ Tolerability/ Cost:   acetaminophen  amLODIPine  atorvastatin  bisacodyl  cephalexin  dronabinol  folic acid  insulin glargine: 18 units once daily  insulin lispro: 6 units once daily in Am; 8 units lunch, supper  SSI as follows:   150-200 - 2 units  210-250 - 3 units   251-300 - 4 units   301 - 350 - 5 units   351 - 400 - 6 units     2. Lifestyle:   Residing at NH, they are administering all medication's including insulin. They also perform self-monitored blood glucose  checks for patient.   Believes he will be discharged from NH tomorrow; will be living with brother and administering his own insulin  Reports limited appetite while at NH as he does not  like food there    3. Home monitoring devices  Glucometer: Yes, Brand: One Touch Verio  Continuous Glucose Monitor: No, Brand: NA    Objective       Blood Sugar Readings  The patient is currently checking blood glucose 4 times per day. Patient reports with SMBG logs. Ranges from 120-350    ASCVD Risk:  The 10-year ASCVD risk score (Adelaida MENDOZA, et al., 2019) is: 35.3%    Values used to calculate the score:      Age: 69 years      Sex: Male      Is Non- : No      Diabetic: Yes      Tobacco smoker: No      Systolic Blood Pressure: 134 mmHg      Is BP treated: Yes      HDL Cholesterol: 48 mg/dL      Total Cholesterol: 154 mg/dL     Vitals:  There were no vitals filed for this visit.    Eye Exam:    Lab Results   Component Value Date    LEFTDIABRET None 01/31/2023    RIGHTDIABRET None 01/31/2023       Labs:  Lab Results   Component Value Date    SODIUM 136 12/01/2024    K 3.5 12/01/2024    EGFR 42 12/01/2024    CREATININE 1.63 (H) 12/01/2024    AQIRIWMJ23 606 10/19/2024       Lab Results   Component Value Date    HGBA1C 8.3 (A) 12/24/2024    HGBA1C 9.8 (H) 08/28/2024    HGBA1C 10.4 (H) 02/16/2024     Pharmacist Tracking Tool     Pharmacist Tracking Tool  Reason For Outreach: Embedded Pharmacist  Demographics:  Intervention Method: In Person  Type of Intervention: Follow-Up  Topics Addressed: Diabetes  Pharmacologic Interventions: N/A  Non-Pharmacologic Interventions: Care coordination, Disease state education, and Personal CGM  Time:  Direct Patient Care:  25  mins  Care Coordination:  15  mins  Recommendation Recipient: Patient/Caregiver  Outcome: Accepted

## 2024-12-24 NOTE — ASSESSMENT & PLAN NOTE
He has a small stage I ulcer on the left heel.  This was redressed today with a Band-Aid.  Start Calmoseptine twice daily.  Contact me if this is getting any worse.  Orders:    menthol-zinc oxide (CALMOSPETINE) 0.44-20.625 %; Apply topically 2 (two) times a day

## 2024-12-24 NOTE — ASSESSMENT & PLAN NOTE
Continue close follow-up with oncology.  He has multiple sets of labs ordered.  I will follow along with those labs.

## 2024-12-24 NOTE — PROGRESS NOTES
Diabetic Foot Exam    Patient's shoes and socks removed.    Right Foot/Ankle   Right Foot Inspection  Skin Exam: skin normal, skin intact and dry skin. No warmth, no callus, no erythema, no maceration, no abnormal color, no pre-ulcer, no ulcer and no callus.     Toe Exam: ROM and strength within normal limits.     Sensory   Monofilament testing: intact    Vascular  Capillary refills: < 3 seconds  The right DP pulse is 2+. The right PT pulse is 2+.     Left Foot/Ankle  Left Foot Inspection  Skin Exam: skin normal, skin intact and dry skin. No warmth, no erythema, no maceration, normal color, no pre-ulcer, no ulcer and no callus.     Toe Exam: ROM and strength within normal limits.     Sensory   Monofilament testing: intact    Vascular  Capillary refills: < 3 seconds  The left DP pulse is 2+. The left PT pulse is 2+.     Assign Risk Category  No deformity present  No loss of protective sensation  No weak pulses  Risk: 0      Name: Alejandro Adames      : 1955      MRN: [id number]  Encounter Provider: Wayne Uriarte Jr, MD  Encounter Date: 2024   [facility]    Assessment & Plan  Diabetic ketoacidosis without coma associated with type 2 diabetes mellitus (HCC)    Lab Results   Component Value Date    HGBA1C 8.3 (A) 2024     Unfortunate, he did have DKA and was hospitalized just a month ago.  He seems to be doing much better per review of his glucose readings at his current nursing facility.  We are going to look to get him on a CGM  Orders:    Hemoglobin A1C; Future    Comprehensive metabolic panel; Future    CBC and differential; Future    Type 2 diabetes mellitus with stage 2 chronic kidney disease, without long-term current use of insulin  (HCC)  A1c improved to 8.3.  He had an episode of DKA and was admitted from  - 12/3/2024.  We are going to look to get him on a CGM.  Currently he is on Lantus as well as Humalog.  He is apparently going  to get some teaching at his nursing facility prior to discharge.  I did place an order for an A1c in 3 months.  Lab Results   Component Value Date    HGBA1C 8.3 (A) 12/24/2024       Orders:    IRIS Diabetic eye exam    POCT hemoglobin A1c    Hemoglobin A1C; Future    Comprehensive metabolic panel; Future    CBC and differential; Future    Essential hypertension  Blood pressure is well-controlled.       Metastatic renal cell carcinoma to bone (HCC)  Continue close follow-up with oncology.  He has multiple sets of labs ordered.  I will follow along with those labs.       JULIO (acute kidney injury) (HCC)  JULIO seems to be resolving.  Labs been placed by oncology and will be followed closely.  Orders:    Comprehensive metabolic panel; Future    CBC and differential; Future    Prostate cancer (HCC)  Continue close follow-up with urology as well as oncology.       Left leg swelling  Left leg swelling persists.  He did have a negative venous duplex done just a few weeks ago so I will hold off on repeat at this time.         Chronic ulcer of left heel limited to breakdown of skin (HCC)  He has a small stage I ulcer on the left heel.  This was redressed today with a Band-Aid.  Start Calmoseptine twice daily.  Contact me if this is getting any worse.  Orders:    menthol-zinc oxide (CALMOSPETINE) 0.44-20.625 %; Apply topically 2 (two) times a day    Anemia due to folic acid deficiency, unspecified deficiency type  Continue labs as outlined by oncology.       Hyponatremia  Labs are now being monitored closely through oncology.         Cellulitis of finger of left hand  He appears to have some persistent cellulitis of his left proximal thumb area dorsally.  I am going to place him on Keflex x 7 days.    Orders:    cephalexin (KEFLEX) 500 mg capsule; Take 1 capsule (500 mg total) by mouth every 8 (eight) hours for 7 days    Severe protein-calorie malnutrition (HCC)  He seems to be stable at this time.            Preventive health  issues were discussed with patient, and age appropriate screening tests were ordered as noted in patient's After Visit Summary. Personalized health advice and appropriate referrals for health education or preventive services given if needed, as noted in patient's After Visit Summary.    History of Present Illness     HPI patient presents today for follow-up for chronic health issues.  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  He was then again admitted 11/27/2024 - 12/3/2024.  This time, he was in DKA.  He presents today for follow-up.  Fortunately, he is feeling a good bit better.  He is concerned about a painful area on his left heel and some persistent left leg swelling.  He is following closely with oncology for treatment for his renal cell cancer.  He does still follow with urology locally for prostate cancer.  He had been seen previously by Zay Santoyo but has established with us.  Glucose readings upon review are up-and-down but seem to be in a pretty good range.  A1c today is 8.3.  He is concerned about a persistent painful red area of his left hand which was previously treated with doxycycline but appears to have been stopped on 12/16/2024.  He denies any fever chills.  Assessment persistent fatigue.    Patient Care Team:  Wayne Tellez Jr., MD as PCP - General (Family Medicine)  Yves Deluna (Optometry)  Sharron Gray, Pharmacist as Pharmacist (Pharmacy)  Isaak Castellon MD (Hematology and Oncology)  Radha Adam as  (Oncology)  Yassine Bridges as  (Oncology)  Michelle Ray RN as RN Care Manager  Sara Sanchez RD (Nutrition)  LUIS Medina as  Care Manager (Oncology)  Kathe Carson RD (Nutrition)  Trina Raymond as Outpatient Care Manager (Care Coordination)    Review of Systems  Medical History  Reviewed by provider this encounter:       Annual Wellness Visit Questionnaire   Alejandro is here for his Subsequent Wellness visit.     Health Risk Assessment:   Patient rates overall health as good. Patient feels that their physical health rating is same. Patient is satisfied with their life. Eyesight was rated as same. Hearing was rated as same. Patient feels that their emotional and mental health rating is same. Patients states they are never, rarely angry. Patient states they are never, rarely unusually tired/fatigued. Pain experienced in the last 7 days has been none. Patient states that he has experienced no weight loss or gain in last 6 months.     Depression Screening:   PHQ-2 Score: 0      Fall Risk Screening:   In the past year, patient has experienced: no history of falling in past year      Home Safety:  Patient has trouble with stairs inside or outside of their home. Patient has working smoke alarms and has working carbon monoxide detector. Home safety hazards include: none.     Nutrition:   Current diet is Regular and Diabetic. Not eating enough    Medications:   Patient is currently taking over-the-counter supplements. OTC medications include: see medication list. Patient is able to manage medications.     Activities of Daily Living (ADLs)/Instrumental Activities of Daily Living (IADLs):   Walk and transfer into and out of bed and chair?: Yes  Dress and groom yourself?: Yes    Bathe or shower yourself?: Yes    Feed yourself? Yes  Do your laundry/housekeeping?: Yes  Manage your money, pay your bills and track your expenses?: Yes  Make your own meals?: Yes    Do your own shopping?: Yes    Previous Hospitalizations:   Any hospitalizations or ED visits within the last 12 months?: Yes      Advance Care Planning:   Living will: Yes    Advanced directive: Yes      PREVENTIVE SCREENINGS      Cardiovascular Screening:    General: Screening Not Indicated and History Lipid Disorder      Diabetes Screening:      "General: Screening Not Indicated and History Diabetes      Colorectal Cancer Screening:     General: Screening Current      Prostate Cancer Screening:    General: History Prostate Cancer      Abdominal Aortic Aneurysm (AAA) Screening:    Risk factors include: age between 65-76 yo and tobacco use        Lung Cancer Screening:     General: Screening Not Indicated      Hepatitis C Screening:    General: Screening Current    Screening, Brief Intervention, and Referral to Treatment (SBIRT)    Screening  Typical number of drinks in a day: 0  Typical number of drinks in a week: 0  Interpretation: Low risk drinking behavior.    Single Item Drug Screening:  How often have you used an illegal drug (including marijuana) or a prescription medication for non-medical reasons in the past year? never    Single Item Drug Screen Score: 0  Interpretation: Negative screen for possible drug use disorder    Review of Current Opioid Use    Opioid Risk Tool (ORT) Interpretation: Complete Opioid Risk Tool (ORT)    Social Drivers of Health     Food Insecurity: No Food Insecurity (11/27/2024)    Hunger Vital Sign     Worried About Running Out of Food in the Last Year: Never true     Ran Out of Food in the Last Year: Never true   Transportation Needs: No Transportation Needs (11/27/2024)    PRAPARE - Transportation     Lack of Transportation (Medical): No     Lack of Transportation (Non-Medical): No   Housing Stability: Low Risk  (11/27/2024)    Housing Stability Vital Sign     Unable to Pay for Housing in the Last Year: No     Number of Times Moved in the Last Year: 0     Homeless in the Last Year: No   Utilities: Not At Risk (11/27/2024)    [facility] Utilities     Threatened with loss of utilities: No     No results found.    Objective   Ht 5' 9.5\" (1.765 m)   Wt 70.1 kg (154 lb 9.6 oz)   BMI 22.50 kg/m²     Physical Exam  Constitutional:       General: He is not in acute distress.     Appearance: Normal appearance. He is well-developed. He is " not diaphoretic.   HENT:      Head: Normocephalic.      Right Ear: External ear normal.      Left Ear: External ear normal.      Nose: Nose normal.   Eyes:      General:         Right eye: No discharge.         Left eye: No discharge.      Conjunctiva/sclera: Conjunctivae normal.      Pupils: Pupils are equal, round, and reactive to light.   Neck:      Thyroid: No thyromegaly.      Trachea: No tracheal deviation.   Cardiovascular:      Rate and Rhythm: Normal rate and regular rhythm.      Pulses: no weak pulses.           Dorsalis pedis pulses are 2+ on the right side and 2+ on the left side.        Posterior tibial pulses are 2+ on the right side and 2+ on the left side.      Heart sounds: Normal heart sounds. No murmur heard.     No friction rub.   Pulmonary:      Effort: Pulmonary effort is normal. No respiratory distress.      Breath sounds: Normal breath sounds. No wheezing.   Chest:      Chest wall: No tenderness.   Abdominal:      General: There is no distension.      Palpations: There is no mass.      Tenderness: There is no abdominal tenderness. There is no guarding or rebound.      Hernia: No hernia is present.   Musculoskeletal:         General: No swelling or deformity.      Cervical back: Normal range of motion.      Right lower leg: No edema.      Left lower leg: Edema (1+ edema of left lower extremity.) present.        Feet:    Feet:      Right foot:      Skin integrity: Dry skin present. No ulcer, skin breakdown, erythema, warmth or callus.      Left foot:      Skin integrity: Dry skin present. No ulcer, skin breakdown, erythema, warmth or callus.   Skin:     Findings: Erythema present. No rash.      Comments: He has an area that is about 4 x 5 cm that is erythematous and slightly raised and tender on the dorsal aspect of his left proximal thumb area.   Neurological:      General: No focal deficit present.      Mental Status: He is alert.      Cranial Nerves: No cranial nerve deficit.       Coordination: Coordination normal.   Psychiatric:         Thought Content: Thought content normal.

## 2024-12-24 NOTE — ASSESSMENT & PLAN NOTE
He appears to have some persistent cellulitis of his left proximal thumb area dorsally.  I am going to place him on Keflex x 7 days.    Orders:    cephalexin (KEFLEX) 500 mg capsule; Take 1 capsule (500 mg total) by mouth every 8 (eight) hours for 7 days

## 2024-12-24 NOTE — ASSESSMENT & PLAN NOTE
Lab Results   Component Value Date    HGBA1C 8.3 (A) 12/24/2024     Unfortunate, he did have DKA and was hospitalized just a month ago.  He seems to be doing much better per review of his glucose readings at his current nursing facility.  We are going to look to get him on a CGM  Orders:    Hemoglobin A1C; Future    Comprehensive metabolic panel; Future    CBC and differential; Future

## 2024-12-26 ENCOUNTER — HOSPITAL ENCOUNTER (OUTPATIENT)
Dept: RADIOLOGY | Facility: HOSPITAL | Age: 69
Discharge: HOME | End: 2024-12-26
Attending: STUDENT IN AN ORGANIZED HEALTH CARE EDUCATION/TRAINING PROGRAM
Payer: MEDICARE

## 2024-12-26 ENCOUNTER — HOSPITAL ENCOUNTER (OUTPATIENT)
Dept: RADIOLOGY | Facility: HOSPITAL | Age: 69
Discharge: HOME | End: 2024-12-26
Attending: RADIOLOGY
Payer: MEDICARE

## 2024-12-26 VITALS
BODY MASS INDEX: 22.06 KG/M2 | DIASTOLIC BLOOD PRESSURE: 63 MMHG | RESPIRATION RATE: 18 BRPM | WEIGHT: 154.1 LBS | HEIGHT: 70 IN | SYSTOLIC BLOOD PRESSURE: 133 MMHG | HEART RATE: 87 BPM | OXYGEN SATURATION: 96 % | TEMPERATURE: 97.6 F

## 2024-12-26 DIAGNOSIS — C61 MALIGNANT NEOPLASM OF PROSTATE (HCC): ICD-10-CM

## 2024-12-26 PROCEDURE — C2617 STENT, NON-COR, TEM W/O DEL: HCPCS

## 2024-12-26 PROCEDURE — 99152 MOD SED SAME PHYS/QHP 5/>YRS: CPT

## 2024-12-26 PROCEDURE — C1894 INTRO/SHEATH, NON-LASER: HCPCS

## 2024-12-26 PROCEDURE — 99153 MOD SED SAME PHYS/QHP EA: CPT

## 2024-12-26 PROCEDURE — C1769 GUIDE WIRE: HCPCS

## 2024-12-26 PROCEDURE — 99152 MOD SED SAME PHYS/QHP 5/>YRS: CPT | Performed by: RADIOLOGY

## 2024-12-26 PROCEDURE — C1887 CATHETER, GUIDING: HCPCS

## 2024-12-26 PROCEDURE — 50434 CONVERT NEPHROSTOMY CATHETER: CPT

## 2024-12-26 PROCEDURE — 50693 PLMT URETERAL STENT PRQ: CPT | Performed by: RADIOLOGY

## 2024-12-26 PROCEDURE — 50693 PLMT URETERAL STENT PRQ: CPT

## 2024-12-26 RX ORDER — SODIUM CHLORIDE 9 MG/ML
INJECTION, SOLUTION INTRAVENOUS
Status: COMPLETED | OUTPATIENT
Start: 2024-12-26 | End: 2024-12-26

## 2024-12-26 RX ORDER — FENTANYL CITRATE 50 UG/ML
INJECTION, SOLUTION INTRAMUSCULAR; INTRAVENOUS AS NEEDED
Status: COMPLETED | OUTPATIENT
Start: 2024-12-26 | End: 2024-12-26

## 2024-12-26 RX ORDER — SODIUM CHLORIDE 9 MG/ML
20 INJECTION, SOLUTION INTRAVENOUS ONCE
OUTPATIENT
Start: 2025-01-02

## 2024-12-26 RX ORDER — CEFAZOLIN SODIUM 1 G/3ML
INJECTION, POWDER, FOR SOLUTION INTRAMUSCULAR; INTRAVENOUS AS NEEDED
Status: COMPLETED | OUTPATIENT
Start: 2024-12-26 | End: 2024-12-26

## 2024-12-26 RX ORDER — ACETAMINOPHEN 325 MG/1
650 TABLET ORAL EVERY 4 HOURS PRN
Status: DISCONTINUED | OUTPATIENT
Start: 2024-12-26 | End: 2024-12-27 | Stop reason: HOSPADM

## 2024-12-26 RX ORDER — MIDAZOLAM HYDROCHLORIDE 2 MG/2ML
INJECTION, SOLUTION INTRAMUSCULAR; INTRAVENOUS AS NEEDED
Status: COMPLETED | OUTPATIENT
Start: 2024-12-26 | End: 2024-12-26

## 2024-12-26 RX ORDER — LIDOCAINE WITH 8.4% SOD BICARB 0.9%(10ML)
SYRINGE (ML) INJECTION AS NEEDED
Status: COMPLETED | OUTPATIENT
Start: 2024-12-26 | End: 2024-12-26

## 2024-12-26 RX ADMIN — SODIUM CHLORIDE 100 ML/HR: 0.9 INJECTION, SOLUTION INTRAVENOUS at 08:50

## 2024-12-26 RX ADMIN — IOHEXOL 12 ML: 350 INJECTION, SOLUTION INTRAVENOUS at 09:23

## 2024-12-26 RX ADMIN — FENTANYL CITRATE 50 MCG: 50 INJECTION INTRAMUSCULAR; INTRAVENOUS at 08:59

## 2024-12-26 RX ADMIN — MIDAZOLAM 1 MG: 1 INJECTION INTRAMUSCULAR; INTRAVENOUS at 08:59

## 2024-12-26 RX ADMIN — CEFAZOLIN 1000 MG: 1 INJECTION, POWDER, FOR SOLUTION INTRAMUSCULAR; INTRAVENOUS; PARENTERAL at 09:06

## 2024-12-26 RX ADMIN — Medication 10 ML: at 09:09

## 2024-12-26 NOTE — BRIEF OP NOTE (RAD/CATH)
INTERVENTIONAL RADIOLOGY PROCEDURE NOTE    Date: 12/26/2024    Procedure:   Procedure Summary       Date: 12/26/24 Room / Location: [facility] Interventional Radiology    Anesthesia Start:  Anesthesia Stop:     Procedure: IR INTERNAL URETERAL STENT PLACEMENT Diagnosis:       Malignant neoplasm of prostate (HCC)      (convert left NU to internal JJ)    Scheduled Providers:  Responsible Provider:     Anesthesia Type: Not recorded ASA Status: Not recorded            Preoperative diagnosis:   1. Malignant neoplasm of prostate (HCC)         Postoperative diagnosis: Same.    Surgeon: Jose Baker MD     Assistant: None. No qualified resident was available.    Blood loss: None none    Specimens: None    Findings: Successful conversion left PCNU to 8 x 26 cm double-J ureteral stent.  Prompt internal drainage after stent placement.  Persistent stricture of distal ureter.  Safety nephrostomy tube was not placed.    Complications: None immediate.    Anesthesia: conscious sedation

## 2024-12-26 NOTE — DISCHARGE INSTRUCTIONS
Procedural Sedation   WHAT YOU NEED TO KNOW:   Procedural sedation is medicine used during procedures to help you feel relaxed and calm. You will remember little to none of the procedure. After sedation you may feel tired, weak, or unsteady on your feet. You may also have trouble concentrating or short-term memory loss. These symptoms should go away in 24 hours or less.   DISCHARGE INSTRUCTIONS:   Call 911 or have someone else call for any of the following:   You have sudden trouble breathing.     You cannot be woken.     Contact Interventional Radiology at [phone]   ALETHA PATIENTS: Contact Interventional Radiology at [phone] RENATO PATIENTS: Contact Interventional Radiology at [phone]) if any of the following occur:      You have a severe headache or dizziness.     Your heart is beating faster than usual.    You have a fever or chills.     Your skin is itchy, swollen, or you have a rash.     You have nausea or are vomiting for more than 8 hours after the procedure.      You have questions or concerns about your condition or care.  Self-care:   Have someone stay with you for 24 hours. This person can drive you to errands and help you do things around the house. This person can also watch for problems.      Rest and do quiet activities for 24 hours. Do not exercise, ride a bike, or play sports. Stand up slowly to prevent dizziness and falls. Take short walks around the house with another person. Slowly return to your usual activities the next day.      Do not drive or use dangerous machines or tools for 24 hours. You may injure yourself or others. Examples include a lawnmower, saw, or drill. Do not return to work for 24 hours if you use dangerous machines or tools for work.      Do not make important decisions for 24 hours. For example, do not sign important papers or invest money.      Drink liquids as directed. Liquids help flush the sedation medicine out of your body. Ask how much liquid to drink  each day and which liquids are best for you.      Eat small, frequent meals to prevent nausea and vomiting. Start with clear liquids such as juice or broth. If you do not vomit after clear liquids, you can eat your usual foods.      Do not drink alcohol or take medicines that make you drowsy. This includes medicines that help you sleep and anxiety medicines. Ask your healthcare provider if it is safe for you to take pain medicine.  Follow up with your healthcare provider as directed: Write down your questions so you remember to ask them during your visits.

## 2024-12-26 NOTE — H&P
"Interventional Radiology  History and Physical 12/26/2024     Alejandro Adames   1955   [id number]    H&P reviewed. There have been no interval changes since the time the H&P was written.    /76   Pulse 102   Temp (!) 97 °F (36.1 °C) (Temporal)   Resp 17   Ht 5' 9.5\" (1.765 m)   Wt 69.9 kg (154 lb 1.6 oz)   SpO2 96%   BMI 22.43 kg/m²     Prior imaging was reviewed.  Patient presents today for to schedule procedures.  He has left ureteral obstruction and is status postplacement of percutaneous nephroureteral tube placed in mid October.  The tube has been capped and he has been tolerating capping well.  The plan today is to place a ureteral stent and if there is good internal drainage, forego placement of safety catheter.  We did discuss if for what ever reason the internal drainage does not appear to be adequate we may need to place a safety drain for short period of time.    Additionally, he is scheduled today for left inguinal node biopsy.  The order for this biopsy was placed on October 11 and he subsequently had an inguinal node biopsy on October 19 with a separate order.  This seems to be a duplicate order.  The biopsy showed metastatic carcinoma most consistent with renal primary. He does not have a renal mass and this was felt to be discordant.  In review of the chart, there was a recommendation for multidisciplinary tumor board to consider biopsy of his retroperitoneal mass on Dec 3 however I do not see any ambulatory consult for us to perform another biopsy on him subsequent to that.  I will need to clarify that today and told him that we would likely need to bring him back for this biopsy which would be done with CT guidance which was not arranged for today.     Informed written consent was obtained.    Jose Baker MD   "

## 2024-12-27 ENCOUNTER — PREP FOR PROCEDURE (OUTPATIENT)
Dept: INTERVENTIONAL RADIOLOGY/VASCULAR | Facility: CLINIC | Age: 69
End: 2024-12-27

## 2024-12-27 ENCOUNTER — PATIENT OUTREACH (OUTPATIENT)
Dept: HEMATOLOGY ONCOLOGY | Facility: CLINIC | Age: 69
End: 2024-12-27

## 2024-12-27 DIAGNOSIS — C61 MALIGNANT NEOPLASM OF PROSTATE (HCC): Primary | ICD-10-CM

## 2024-12-27 PROBLEM — E11.10 DKA (DIABETIC KETOACIDOSIS) (HCC): Status: RESOLVED | Noted: 2024-11-27 | Resolved: 2024-12-27

## 2024-12-27 NOTE — PROGRESS NOTES
I reached out and spoke with Alejandro to follow up and to review for any changes in barriers to care and offer supportive services as needed.    Barriers noted previously; nutrition.     Current barriers and interventions provided: none    Some fatigue    Bases on individual needs I will follow up with them in 5 weeks. I have provided my direct contact information and welcome them to contact me if their needs as discussed above change. They were appreciative for the call.

## 2024-12-28 LAB
ALBUMIN SERPL-MCNC: 3.2 G/DL (ref 3.6–5.1)
ALBUMIN/GLOB SERPL: 0.8 (CALC) (ref 1–2.5)
ALP SERPL-CCNC: 182 U/L (ref 35–144)
ALT SERPL-CCNC: 13 U/L (ref 9–46)
AST SERPL-CCNC: 13 U/L (ref 10–35)
BASOPHILS # BLD AUTO: 61 CELLS/UL (ref 0–200)
BASOPHILS NFR BLD AUTO: 0.8 %
BILIRUB SERPL-MCNC: 0.3 MG/DL (ref 0.2–1.2)
BUN SERPL-MCNC: 29 MG/DL (ref 7–25)
BUN/CREAT SERPL: 20 (CALC) (ref 6–22)
CALCIUM SERPL-MCNC: 8.5 MG/DL (ref 8.6–10.3)
CHLORIDE SERPL-SCNC: 101 MMOL/L (ref 98–110)
CO2 SERPL-SCNC: 25 MMOL/L (ref 20–32)
CREAT SERPL-MCNC: 1.43 MG/DL (ref 0.7–1.28)
EOSINOPHIL # BLD AUTO: 99 CELLS/UL (ref 15–500)
EOSINOPHIL NFR BLD AUTO: 1.3 %
ERYTHROCYTE [DISTWIDTH] IN BLOOD BY AUTOMATED COUNT: 15.9 % (ref 11–15)
GFR/BSA.PRED SERPLBLD CYS-BASED-ARV: 53 ML/MIN/1.73M2
GLOBULIN SER CALC-MCNC: 4 G/DL (CALC) (ref 1.9–3.7)
GLUCOSE SERPL-MCNC: 194 MG/DL (ref 65–139)
HCT VFR BLD AUTO: 28.2 % (ref 38.5–50)
HGB BLD-MCNC: 9.1 G/DL (ref 13.2–17.1)
LYMPHOCYTES # BLD AUTO: 859 CELLS/UL (ref 850–3900)
LYMPHOCYTES NFR BLD AUTO: 11.3 %
MCH RBC QN AUTO: 27.8 PG (ref 27–33)
MCHC RBC AUTO-ENTMCNC: 32.3 G/DL (ref 32–36)
MCV RBC AUTO: 86.2 FL (ref 80–100)
MONOCYTES # BLD AUTO: 562 CELLS/UL (ref 200–950)
MONOCYTES NFR BLD AUTO: 7.4 %
NEUTROPHILS # BLD AUTO: 6019 CELLS/UL (ref 1500–7800)
NEUTROPHILS NFR BLD AUTO: 79.2 %
PLATELET # BLD AUTO: 500 THOUSAND/UL (ref 140–400)
PMV BLD REES-ECKER: 9.5 FL (ref 7.5–12.5)
POTASSIUM SERPL-SCNC: 4 MMOL/L (ref 3.5–5.3)
PROT SERPL-MCNC: 7.2 G/DL (ref 6.1–8.1)
RBC # BLD AUTO: 3.27 MILLION/UL (ref 4.2–5.8)
SODIUM SERPL-SCNC: 136 MMOL/L (ref 135–146)
TSH SERPL-ACNC: 2.34 MIU/L (ref 0.4–4.5)
WBC # BLD AUTO: 7.6 THOUSAND/UL (ref 3.8–10.8)

## 2024-12-29 ENCOUNTER — PATIENT MESSAGE (OUTPATIENT)
Dept: FAMILY MEDICINE CLINIC | Facility: CLINIC | Age: 69
End: 2024-12-29

## 2024-12-31 ENCOUNTER — OFFICE VISIT (OUTPATIENT)
Dept: HEMATOLOGY ONCOLOGY | Facility: CLINIC | Age: 69
End: 2024-12-31
Payer: MEDICARE

## 2024-12-31 VITALS
WEIGHT: 150 LBS | OXYGEN SATURATION: 99 % | DIASTOLIC BLOOD PRESSURE: 68 MMHG | TEMPERATURE: 97.6 F | RESPIRATION RATE: 18 BRPM | HEIGHT: 70 IN | BODY MASS INDEX: 21.47 KG/M2 | SYSTOLIC BLOOD PRESSURE: 134 MMHG | HEART RATE: 110 BPM

## 2024-12-31 DIAGNOSIS — C64.9 METASTATIC RENAL CELL CARCINOMA TO BONE (HCC): Primary | Chronic | ICD-10-CM

## 2024-12-31 DIAGNOSIS — E11.22 TYPE 2 DIABETES MELLITUS WITH STAGE 2 CHRONIC KIDNEY DISEASE, WITHOUT LONG-TERM CURRENT USE OF INSULIN  (HCC): Primary | ICD-10-CM

## 2024-12-31 DIAGNOSIS — C79.51 METASTATIC RENAL CELL CARCINOMA TO BONE (HCC): Primary | Chronic | ICD-10-CM

## 2024-12-31 DIAGNOSIS — N18.2 TYPE 2 DIABETES MELLITUS WITH STAGE 2 CHRONIC KIDNEY DISEASE, WITHOUT LONG-TERM CURRENT USE OF INSULIN  (HCC): Primary | ICD-10-CM

## 2024-12-31 DIAGNOSIS — C61 PROSTATE CANCER (HCC): Chronic | ICD-10-CM

## 2024-12-31 PROCEDURE — G2211 COMPLEX E/M VISIT ADD ON: HCPCS | Performed by: INTERNAL MEDICINE

## 2024-12-31 PROCEDURE — 99215 OFFICE O/P EST HI 40 MIN: CPT | Performed by: INTERNAL MEDICINE

## 2024-12-31 NOTE — TELEPHONE ENCOUNTER
Pt was discharged from Rehab 12/27/2024. I schedule him a f/u.  Pt also needs a refill for needles as he is now in Insulin and the hospital did not give him any. He did get a handful from his pharmacy but only has 4 left. Script for 32G X 5/32

## 2024-12-31 NOTE — TELEPHONE ENCOUNTER
Patient states he has only one pen needle left. None were ordered upon d/c from the hospital. Please advise as patient will not have pen needles to dispense his insulin tomorrow.

## 2025-01-01 ENCOUNTER — HOME CARE VISIT (OUTPATIENT)
Dept: HOME HEALTH SERVICES | Facility: HOME HEALTHCARE | Age: 70
End: 2025-01-01
Payer: MEDICARE

## 2025-01-01 ENCOUNTER — APPOINTMENT (INPATIENT)
Dept: RADIOLOGY | Facility: HOSPITAL | Age: 70
DRG: 312 | End: 2025-01-01
Payer: MEDICARE

## 2025-01-01 ENCOUNTER — TELEPHONE (OUTPATIENT)
Dept: HEMATOLOGY ONCOLOGY | Facility: CLINIC | Age: 70
End: 2025-01-01

## 2025-01-01 ENCOUNTER — HOSPICE ADMISSION (OUTPATIENT)
Dept: HOME HOSPICE | Facility: HOSPICE | Age: 70
End: 2025-01-01
Payer: MEDICARE

## 2025-01-01 ENCOUNTER — APPOINTMENT (EMERGENCY)
Dept: RADIOLOGY | Facility: HOSPITAL | Age: 70
DRG: 312 | End: 2025-01-01
Payer: MEDICARE

## 2025-01-01 ENCOUNTER — HOSPITAL ENCOUNTER (INPATIENT)
Facility: HOSPITAL | Age: 70
LOS: 5 days | Discharge: NURSING, REHAB OR LONG-TERM CARE | DRG: 312 | End: 2025-08-06
Attending: EMERGENCY MEDICINE | Admitting: INTERNAL MEDICINE
Payer: MEDICARE

## 2025-01-02 ENCOUNTER — HOSPITAL ENCOUNTER (OUTPATIENT)
Dept: INFUSION CENTER | Facility: CLINIC | Age: 70
Discharge: HOME | End: 2025-01-02
Payer: MEDICARE

## 2025-01-02 ENCOUNTER — PATIENT OUTREACH (OUTPATIENT)
Dept: FAMILY MEDICINE CLINIC | Facility: CLINIC | Age: 70
End: 2025-01-02

## 2025-01-02 DIAGNOSIS — C79.51 METASTATIC RENAL CELL CARCINOMA TO BONE (HCC): Primary | ICD-10-CM

## 2025-01-02 DIAGNOSIS — C64.9 METASTATIC RENAL CELL CARCINOMA TO BONE (HCC): Primary | ICD-10-CM

## 2025-01-02 LAB
DME PARACHUTE DELIVERY DATE ACTUAL: NORMAL
DME PARACHUTE DELIVERY DATE EXPECTED: NORMAL
DME PARACHUTE DELIVERY DATE REQUESTED: NORMAL
DME PARACHUTE ITEM DESCRIPTION: NORMAL
DME PARACHUTE ITEM DESCRIPTION: NORMAL
DME PARACHUTE ORDER STATUS: NORMAL
DME PARACHUTE SUPPLIER NAME: NORMAL
DME PARACHUTE SUPPLIER PHONE: NORMAL

## 2025-01-02 PROCEDURE — 96413 CHEMO IV INFUSION 1 HR: CPT

## 2025-01-02 RX ORDER — SODIUM CHLORIDE 9 MG/ML
20 INJECTION, SOLUTION INTRAVENOUS ONCE
Status: COMPLETED | OUTPATIENT
Start: 2025-01-02 | End: 2025-01-02

## 2025-01-02 RX ADMIN — SODIUM CHLORIDE 400 MG: 9 INJECTION, SOLUTION INTRAVENOUS at 15:02

## 2025-01-02 RX ADMIN — SODIUM CHLORIDE 20 ML/HR: 0.9 INJECTION, SOLUTION INTRAVENOUS at 14:52

## 2025-01-02 NOTE — PROGRESS NOTES
Pt arrived to unit without complaint, tolerated treatment without incident. Pt declined AVS, aware of next appt 2/13 1300

## 2025-01-03 ENCOUNTER — PATIENT OUTREACH (OUTPATIENT)
Dept: CASE MANAGEMENT | Facility: OTHER | Age: 70
End: 2025-01-03

## 2025-01-03 DIAGNOSIS — I10 ESSENTIAL HYPERTENSION: ICD-10-CM

## 2025-01-03 RX ORDER — AMLODIPINE BESYLATE 5 MG/1
5 TABLET ORAL DAILY
Qty: 100 TABLET | Refills: 3 | Status: SHIPPED | OUTPATIENT
Start: 2025-01-03

## 2025-01-03 NOTE — PROGRESS NOTES
In basket hand off from Trina Raymond who followed patient during rehab stay.  Spoke with Alejandro gonzalez he is working with [facility] health nurse and PT   No questions or concerns about health conditions medications or appointments   Declines further outreaches.   A1C as of 12/24/24 is 8.3

## 2025-01-03 NOTE — TELEPHONE ENCOUNTER
Reason for call: Was prescribed at the hospital, moving forward, please send to PCP.   [x] Refill   [] Prior Auth  [] Other:     Office: [facility]   [x] PCP/Provider - Wayne Tellez Jr   [] Specialty/Provider -     Medication: amlodipine     Dose/Frequency: 5 mg/ daily     Quantity: 30 day supply     Pharmacy: [facility] in [place]     Does the patient have enough for 3 days?   [] Yes   [x] No - Send as HP to POD- patient is out completely.

## 2025-01-05 NOTE — PROGRESS NOTES
E11.65  Z79.4   HCC coding opportunities          Chart Reviewed number of suggestions sent to Provider: 2     Patients Insurance     Medicare Insurance: Medicare           Observation goals  PRIOR TO DISCHARGE       Comments: -diagnostic tests and consults completed and resulted- not met  -vital signs normal or at patient baseline- met  -tolerating oral intake to maintain hydration- partially met  Nurse to notify provider when observation goals have been met and patient is ready for discharge.

## 2025-01-06 ENCOUNTER — OFFICE VISIT (OUTPATIENT)
Dept: FAMILY MEDICINE CLINIC | Facility: CLINIC | Age: 70
End: 2025-01-06
Payer: MEDICARE

## 2025-01-06 ENCOUNTER — PATIENT OUTREACH (OUTPATIENT)
Dept: HEMATOLOGY ONCOLOGY | Facility: CLINIC | Age: 70
End: 2025-01-06

## 2025-01-06 VITALS
BODY MASS INDEX: 21.53 KG/M2 | WEIGHT: 150.4 LBS | HEIGHT: 70 IN | DIASTOLIC BLOOD PRESSURE: 84 MMHG | OXYGEN SATURATION: 98 % | SYSTOLIC BLOOD PRESSURE: 122 MMHG | HEART RATE: 94 BPM

## 2025-01-06 DIAGNOSIS — N18.2 TYPE 2 DIABETES MELLITUS WITH STAGE 2 CHRONIC KIDNEY DISEASE, WITHOUT LONG-TERM CURRENT USE OF INSULIN  (HCC): ICD-10-CM

## 2025-01-06 DIAGNOSIS — R74.8 LIVER ENZYME ELEVATION: ICD-10-CM

## 2025-01-06 DIAGNOSIS — I10 ESSENTIAL HYPERTENSION: ICD-10-CM

## 2025-01-06 DIAGNOSIS — E11.22 TYPE 2 DIABETES MELLITUS WITH STAGE 3A CHRONIC KIDNEY DISEASE, WITHOUT LONG-TERM CURRENT USE OF INSULIN (HCC): Primary | ICD-10-CM

## 2025-01-06 DIAGNOSIS — D52.9 ANEMIA DUE TO FOLIC ACID DEFICIENCY, UNSPECIFIED DEFICIENCY TYPE: ICD-10-CM

## 2025-01-06 DIAGNOSIS — R35.0 URINARY FREQUENCY: Primary | ICD-10-CM

## 2025-01-06 DIAGNOSIS — Z93.6 OTHER ARTIFICIAL OPENINGS OF URINARY TRACT STATUS (HCC): ICD-10-CM

## 2025-01-06 DIAGNOSIS — E11.22 TYPE 2 DIABETES MELLITUS WITH STAGE 3A CHRONIC KIDNEY DISEASE, WITHOUT LONG-TERM CURRENT USE OF INSULIN (HCC): ICD-10-CM

## 2025-01-06 DIAGNOSIS — N18.31 TYPE 2 DIABETES MELLITUS WITH STAGE 3A CHRONIC KIDNEY DISEASE, WITHOUT LONG-TERM CURRENT USE OF INSULIN (HCC): Primary | ICD-10-CM

## 2025-01-06 DIAGNOSIS — E43 SEVERE PROTEIN-CALORIE MALNUTRITION (HCC): ICD-10-CM

## 2025-01-06 DIAGNOSIS — E11.22 TYPE 2 DIABETES MELLITUS WITH STAGE 2 CHRONIC KIDNEY DISEASE, WITHOUT LONG-TERM CURRENT USE OF INSULIN  (HCC): ICD-10-CM

## 2025-01-06 DIAGNOSIS — Z99.89 USES WALKER: ICD-10-CM

## 2025-01-06 DIAGNOSIS — Z13.0 SCREENING FOR DEFICIENCY ANEMIA: Primary | ICD-10-CM

## 2025-01-06 DIAGNOSIS — N18.31 TYPE 2 DIABETES MELLITUS WITH STAGE 3A CHRONIC KIDNEY DISEASE, WITHOUT LONG-TERM CURRENT USE OF INSULIN (HCC): ICD-10-CM

## 2025-01-06 DIAGNOSIS — L97.421 CHRONIC ULCER OF LEFT HEEL LIMITED TO BREAKDOWN OF SKIN (HCC): ICD-10-CM

## 2025-01-06 PROCEDURE — G2211 COMPLEX E/M VISIT ADD ON: HCPCS | Performed by: INTERNAL MEDICINE

## 2025-01-06 PROCEDURE — PBNCHG PB NO CHARGE PLACEHOLDER: Performed by: PHARMACIST

## 2025-01-06 PROCEDURE — 99214 OFFICE O/P EST MOD 30 MIN: CPT | Performed by: INTERNAL MEDICINE

## 2025-01-06 RX ORDER — INSULIN LISPRO 100 [IU]/ML
6 INJECTION, SOLUTION INTRAVENOUS; SUBCUTANEOUS
Qty: 15 ML | Refills: 3 | Status: SHIPPED | OUTPATIENT
Start: 2025-01-06

## 2025-01-06 RX ORDER — PEN NEEDLE, DIABETIC 31 GX5/16"
NEEDLE, DISPOSABLE MISCELLANEOUS
Qty: 100 EACH | Refills: 1 | Status: SHIPPED | OUTPATIENT
Start: 2025-01-06

## 2025-01-06 NOTE — PROGRESS NOTES
I reached out and spoke with Alejandro to follow up and to review for any changes in barriers to care and offer supportive services as needed.    Barriers noted previously; none.     Current barriers and interventions provided: none    He states that he has to urinate every hour. He also has a nephroureteral stent appt n 2/20/25 that he was unsure if he needs.     Bases on individual needs I will follow up with them in 5 weeks. I have provided my direct contact information and welcome them to contact me if their needs as discussed above change. They were appreciative for the call.

## 2025-01-06 NOTE — PROGRESS NOTES
[facility] Clinical Pharmacy Services  Sharron Gray, Pharmacist      Assessment/ Plan     Assessment & Plan  Type 2 diabetes mellitus with stage 3a chronic kidney disease, without long-term current use of insulin ([facility])    Lab Results   Component Value Date    HGBA1C 8.3 (A) 12/24/2024     Most recent A1c above goal  SMBG during the day fluctuates, patient uncertain if pre/post meals     Medications:  Basaglar: 18 units once daily  Humalog: continue 6 units twice daily with food - patient to take before oatmeal and before supper  Home Monitoring:   [employer] Ольга 3+ CGM successfully placed on patient   Skin cleaned with alcohol  Unscrew applicator; peel off sensor  Combine applicator/sensor by lining up the 2 lines and push until it clicks  Sensor inserted and started   Insertion Site: right abdomen  Communication with phone verified  Patient instructions reviewed:   Sensor is waterproof for showering and swimming  Remove for MRI, CT  Remove if redness, bleeding, swelling, discomfort at site  Replace sensor in 15 days   Glucometer verification: confirm CGM reading if needed  LibreView: provided information on syncing LibreView with PCP practice      Orders:    insulin glargine (LANTUS SOLOSTAR) 100 units/mL injection pen; Inject 18 Units under the skin daily    insulin lispro (HumaLOG) 100 units/mL injection pen; Inject 6 Units under the skin 3 (three) times a day with meals    Alcohol Swabs (Alcohol Prep) PADS; Use to prep skin prior to placing blood sugar monitor    Type 2 diabetes mellitus with stage 2 chronic kidney disease, without long-term current use of insulin  ([facility])    Lab Results   Component Value Date    HGBA1C 8.3 (A) 12/24/2024       Orders:    Insulin Pen Needle 32G X 4 MM MISC; Use 3 (three) times a day    Uses walker           Follow-up: will review cgm data later this week and send patient Your [employer] Message with further     Subjective   HPI    Medication Adherence/ Tolerability/ Cost:  Patient  "denies side effects, no issues with cost, 0 missed doses in last two week  insulin glargine: 18 units bedtime   insulin lispro: 8 units once in morning; also takes 6 units twice daily. Takes 8 units + 6 units in the morning. Received 3 pens when he was discharged from rehab as he had 3 \"separate\" orders for insulin  uncertain name but reports it is a dark blue pen  Morning dose will sometimes be before or after eating, depending on when he remembers  Evening dose will vary based on when he remembers dose - no correlation to meal time      2. Lifestyle:   Has be discharged from rehab, now living at home  Eating oatmeal for breakfast everyday with frozen cherries  Eats 2-3 meals per day  May have sandwich 90 min after oatmeal     3. Home monitoring devices  Glucometer: Yes, Brand: OneTouch  Continuous Glucose Monitor: Yes, Brand: Ольга 3+ - brought in for placement/education     Hypoglycemia: The patient had 0 episodes/symptoms of hypoglycemia.     Objective       Blood Sugar Readings  The patient is currently checking blood glucose 1 times per day. Patient reports with SMBG logs.    Date Morning Evening   1-6 116    1-5  248   1-4 234    1-3 127    1-2  245   1-1  152                                 Avg       ASCVD Risk:  The 10-year ASCVD risk score (Adelaida DK, et al., 2019) is: 35.3%    Values used to calculate the score:      Age: 69 years      Sex: Male      Is Non- : No      Diabetic: Yes      Tobacco smoker: No      Systolic Blood Pressure: 134 mmHg      Is BP treated: Yes      HDL Cholesterol: 48 mg/dL      Total Cholesterol: 154 mg/dL     Vitals:  There were no vitals filed for this visit.    Eye Exam:    Lab Results   Component Value Date    LEFTDIABRET None 01/31/2023    RIGHTDIABRET None 01/31/2023       Labs:  Lab Results   Component Value Date    SODIUM 136 12/27/2024    K 4.0 12/27/2024    EGFR 53 (L) 12/27/2024    CREATININE 1.43 (H) 12/27/2024    [id number] 606 10/19/2024 "       Lab Results   Component Value Date    HGBA1C 8.3 (A) 12/24/2024    HGBA1C 9.8 (H) 08/28/2024    HGBA1C 10.4 (H) 02/16/2024         Pharmacist Tracking Tool     Pharmacist Tracking Tool  Reason For Outreach: Embedded Pharmacist  Demographics:  Intervention Method: In Person  Type of Intervention: Follow-Up  Topics Addressed: Diabetes  Pharmacologic Interventions: Dose or Frequency Adjusted  Non-Pharmacologic Interventions: Care coordination, Disease state education, and Personal CGM  Time:  Direct Patient Care:  40  mins  Care Coordination:  10  mins  Recommendation Recipient: Patient/Caregiver  Outcome: Accepted

## 2025-01-06 NOTE — ASSESSMENT & PLAN NOTE
Lab Results   Component Value Date    HGBA1C 8.3 (A) 12/24/2024     Most recent A1c above goal  SMBG during the day fluctuates, patient uncertain if pre/post meals     Medications:  Basaglar: 18 units once daily  Humalog: continue 6 units twice daily with food - patient to take before oatmeal and before supper  Home Monitoring:   [employer] Ольга 3+ CGM successfully placed on patient   Skin cleaned with alcohol  Unscrew applicator; peel off sensor  Combine applicator/sensor by lining up the 2 lines and push until it clicks  Sensor inserted and started   Insertion Site: right abdomen  Communication with phone verified  Patient instructions reviewed:   Sensor is waterproof for showering and swimming  Remove for MRI, CT  Remove if redness, bleeding, swelling, discomfort at site  Replace sensor in 15 days   Glucometer verification: confirm CGM reading if needed  LibreView: provided information on syncing LibreView with PCP practice      Orders:    insulin glargine (LANTUS SOLOSTAR) 100 units/mL injection pen; Inject 18 Units under the skin daily    insulin lispro (HumaLOG) 100 units/mL injection pen; Inject 6 Units under the skin 3 (three) times a day with meals    Alcohol Swabs (Alcohol Prep) PADS; Use to prep skin prior to placing blood sugar monitor

## 2025-01-06 NOTE — ASSESSMENT & PLAN NOTE
Continue follow-up with oncology.  His hemoglobin dropped recently.  Will recheck again in 6 weeks.  Patient denies any blood in his stool or urine.    Orders:    CBC and differential; Future    Iron, TIBC and Ferritin Panel; Future    Folate; Future    Vitamin B12; Future    Methylmalonic acid, serum; Future

## 2025-01-06 NOTE — ASSESSMENT & PLAN NOTE
Continue to follow-up with clinical pharmacy, hemoglobin A1c is improving.  Continue current meds.  Lab Results   Component Value Date    HGBA1C 8.3 (A) 12/24/2024

## 2025-01-06 NOTE — PROGRESS NOTES
Name: Alejandro Adames      : 1955      MRN: [id number]  Encounter Provider: Sergio Cardenas MD  Encounter Date: 2025   Encounter department: [facility] PRIMARY [facility]    Assessment & Plan  Screening for deficiency anemia         Anemia due to folic acid deficiency, unspecified deficiency type  Continue follow-up with oncology.  His hemoglobin dropped recently.  Will recheck again in 6 weeks.  Patient denies any blood in his stool or urine.    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         Essential hypertension  Blood pressure is very well-controlled on current meds.  Continue the same.         Type 2 diabetes mellitus with stage 3a chronic kidney disease, without long-term current use of insulin (HCC)  Continue to follow-up with clinical pharmacy, hemoglobin A1c is improving.  Continue current meds.  Lab Results   Component Value Date    HGBA1C 8.3 (A) 2024                 History of Present Illness     Patient came today for follow-up on his chronic medical problems and to get form to be filled for permanent placard.      Review of Systems   Constitutional:  Negative for chills, fatigue and fever.   Respiratory:  Negative for cough, chest tightness and shortness of breath.    Cardiovascular:  Negative for chest pain, palpitations and leg swelling.   Genitourinary:  Negative for difficulty urinating and dysuria.   Skin:  Negative for rash.   Neurological:  Negative for dizziness, weakness, numbness and headaches.     Past Medical History:   Diagnosis Date    Cancer (HCC)      pt unsure of primary site poss. bone vs kidney    Cutaneous skin tags 01/02/2019    Diabetes mellitus (HCC)     Hypertension     Ureteral obstruction     L nephrostomy tube in place 12/26/204    internal placement otday 12/26/204    Uses walker     in rehab presently   12/26/2024     Past Surgical History:   Procedure Laterality Date    IR BIOPSY INGUINAL LYMPH NODE  10/19/2024    IR INTERNAL URETERAL STENT PLACEMENT  12/26/2024    IR NEPHROSTOMY TO NEPHROURETERAL STENT  11/19/2024    IR NEPHROSTOMY TUBE PLACEMENT  10/19/2024     Family History   Problem Relation Age of Onset    No Known Problems Brother     Stroke Mother      Social History     Tobacco Use    Smoking status: Former    Smokeless tobacco: Never   Vaping Use    Vaping status: Never Used   Substance and Sexual Activity    Alcohol use: Yes     Alcohol/week: 2.0 - 3.0 standard drinks of alcohol     Types: 2 - 3 Cans of beer per week     Comment: social    Drug use: No    Sexual activity: Not Currently     Current Outpatient Medications on File Prior to Visit   Medication Sig    Alcohol Swabs (Alcohol Prep) PADS Use to prep skin prior to placing blood sugar monitor    amLODIPine (NORVASC) 5 mg tablet Take 1 tablet (5 mg total) by mouth daily    atorvastatin (LIPITOR) 40 mg tablet TAKE 1 TABLET BY MOUTH EVERY DAY    Continuous Glucose Sensor (FreeStyle Ольга 3 Plus Sensor) MISC Use 1 each Every 15 days for blood sugar monitoring *receives through [email or web address]*    folic acid (FOLVITE) 1 mg tablet Take 1 tablet (1 mg total) by mouth daily    glucose blood (OneTouch Verio) test strip Check blood sugars once daily. Please substitute with appropriate alternative as covered by patient's insurance. Dx: E11.65    Insulin Pen Needle 32G X 4 MM MISC Use 3 (three) times a day    menthol-zinc oxide (CALMOSPETINE) 0.44-20.625 % Apply topically 2 (two) times a day    OneTouch Delica Lancets 33G MISC Check blood sugars once daily. Please substitute with appropriate  alternative as covered by patient's insurance. Dx: E11.65    oxyCODONE (ROXICODONE) 5 immediate release tablet Take 0.5 tablets (2.5 mg total) by mouth every 8 (eight) hours as needed for severe pain for up to 10 doses Max Daily Amount: 7.5 mg    dronabinol (MARINOL) 5 MG capsule Take 1 capsule (5 mg total) by mouth 2 (two) times a day before meals    insulin glargine (LANTUS SOLOSTAR) 100 units/mL injection pen Inject 18 Units under the skin daily    insulin lispro (HumaLOG) 100 units/mL injection pen Inject 6 Units under the skin 3 (three) times a day with meals    Lenvatinib, 20 MG Daily Dose, (Lenvima, 20 MG Daily Dose,) 2 x 10 MG CPPK Take 20 mg by mouth daily (Patient not taking: Reported on 12/31/2024)    senna-docusate sodium (SENOKOT S) 8.6-50 mg per tablet Take 1 tablet by mouth 2 (two) times a day as needed for constipation    triamcinolone (KENALOG) 0.1 % cream APPLY TOPICALLY TO THE AFFECTED AREA TWICE DAILY (Patient not taking: Reported on 1/6/2025)    [DISCONTINUED] acetaminophen (TYLENOL) 325 mg tablet Take 650 mg by mouth every 6 (six) hours as needed for mild pain    [DISCONTINUED] bisacodyl (DULCOLAX) 10 mg suppository Insert 10 mg into the rectum daily    [DISCONTINUED] insulin glargine (LANTUS) 100 units/mL subcutaneous injection Inject 18 Units under the skin daily at bedtime    [DISCONTINUED] insulin lispro (HumALOG/ADMELOG) 100 units/mL injection Inject 6 Units under the skin 3 (three) times a day with meals    [DISCONTINUED] Insulin Pen Needle 32G X 4 MM MISC Use 5 (five) times a day    [DISCONTINUED] sodium chloride 0.9 % SOLN Inject 5 mL into a catheter in a vein every 8 (eight) hours     Allergies   Allergen Reactions    Captopril Cough    Crestor [Rosuvastatin] Diarrhea    Enalapril Cough     Immunization History   Administered Date(s) Administered    COVID-19 MODERNA VACC 0.5 ML IM 03/18/2021, 04/15/2021    Hep A, adult 04/24/2015, 10/29/2015, 03/07/2017    Hep A, ped/adol, 2 dose  "03/07/2017    INFLUENZA 01/02/2019, 10/17/2022    Influenza Quadrivalent Preservative Free 3 years and older IM 10/03/2014    Influenza Split High Dose Preservative Free IM 10/09/2024    Influenza, high dose seasonal 0.7 mL 02/10/2021, 12/20/2021, 10/17/2022, 11/28/2023    Influenza, recombinant, quadrivalent,injectable, preservative free 01/02/2019, 02/19/2020    Pneumococcal Conjugate Vaccine 20-valent (Pcv20), Polysace 06/20/2022    Pneumococcal Polysaccharide PPV23 07/08/2019    Tdap 02/11/1997, 01/24/2021    Zoster 07/24/2017    Zoster Vaccine Recombinant 01/14/2019, 03/20/2019     Objective   /84   Pulse 94   Ht 5' 9.5\" (1.765 m)   Wt 68.2 kg (150 lb 6.4 oz)   SpO2 98%   BMI 21.89 kg/m²     Physical Exam  Constitutional:       General: He is not in acute distress.     Appearance: He is not toxic-appearing.   Cardiovascular:      Heart sounds: No murmur heard.     No gallop.   Pulmonary:      Effort: No respiratory distress.      Breath sounds: No wheezing or rales.         "

## 2025-01-07 ENCOUNTER — TELEPHONE (OUTPATIENT)
Dept: FAMILY MEDICINE CLINIC | Facility: CLINIC | Age: 70
End: 2025-01-07

## 2025-01-07 ENCOUNTER — TELEPHONE (OUTPATIENT)
Dept: HEMATOLOGY ONCOLOGY | Facility: CLINIC | Age: 70
End: 2025-01-07

## 2025-01-07 NOTE — TELEPHONE ENCOUNTER
Call out to patient in regard to lab scripts. Recommended to reach out to office to let us know what [facility] location is utilized to ensure that lab scripts get to that location. Left vm with call back number.

## 2025-01-07 NOTE — TELEPHONE ENCOUNTER
Reason for call:   [x] Prior Auth  [] Other:     Caller:  [] Patient  [x] Pharmacy  Name: [facility]   Address: [address]   Callback Number: [phone]    Medication: Insulin Lispro (HumaLOG)    Dose/Frequency: 100 units/mL injection pen. Inject 6 units under the skin 3x daily with meals     Quantity: 15 mL     Ordering Provider:   [x] PCP/Provider -   [] Speciality/Provider -

## 2025-01-08 ENCOUNTER — TELEPHONE (OUTPATIENT)
Age: 70
End: 2025-01-08

## 2025-01-08 DIAGNOSIS — R35.0 URINARY FREQUENCY: Primary | ICD-10-CM

## 2025-01-08 DIAGNOSIS — N39.0 URINARY TRACT INFECTION WITHOUT HEMATURIA, SITE UNSPECIFIED: Primary | ICD-10-CM

## 2025-01-08 NOTE — TELEPHONE ENCOUNTER
PA for INSULIN LISPRO SUBMITTED to [facility]    via    [x]CMM-KEY: (Key: [id number])   []Surescripts-Case ID #    []Availity-Auth ID #  NDC #    []Faxed to plan   []Other website    []Phone call Case ID #      [x]PA sent as URGENT    All office notes, labs and other pertaining documents and studies sent. Clinical questions answered. Awaiting determination from insurance company.     Turnaround time for your insurance to make a decision on your Prior Authorization can take 7-21 business days.

## 2025-01-08 NOTE — TELEPHONE ENCOUNTER
PT received a call that urine testing orders were placed for him .    Pt asked for them to be sent to [facility] in [place]    Faxed to [phone]

## 2025-01-08 NOTE — PROGRESS NOTES
BRITTANY for patient with AP's note. Informed urine testing orders were in the system. Reminded of upcoming appt with Dr. Rees on 1/28 as well.

## 2025-01-10 NOTE — PROGRESS NOTES
Another encounter was started.    PT received a call that urine testing orders were placed for him .     Pt asked for them to be sent to [facility] in [place]     Faxed to [phone]

## 2025-01-10 NOTE — TELEPHONE ENCOUNTER
PA for INSULIN LISPRO  APPROVED     Date(s) approved 01/08/2025-12/31/2099      Patient advised by          [x][employer] Message  []Phone call   []LMOM  [x]L/M to call office as no active Communication consent on file  []Unable to leave detailed message as VM not approved on Communication consent       Pharmacy advised by    []Fax  []Phone call    Approval letter scanned into Media Yes

## 2025-01-14 ENCOUNTER — TELEPHONE (OUTPATIENT)
Dept: FAMILY MEDICINE CLINIC | Facility: CLINIC | Age: 70
End: 2025-01-14

## 2025-01-14 LAB
ALBUMIN SERPL-MCNC: 3.2 G/DL (ref 3.6–5.1)
ALBUMIN/GLOB SERPL: 0.8 (CALC) (ref 1–2.5)
ALP SERPL-CCNC: 132 U/L (ref 35–144)
ALT SERPL-CCNC: 14 U/L (ref 9–46)
AST SERPL-CCNC: 13 U/L (ref 10–35)
BASOPHILS # BLD AUTO: 122 CELLS/UL (ref 0–200)
BASOPHILS NFR BLD AUTO: 1.5 %
BILIRUB SERPL-MCNC: 0.2 MG/DL (ref 0.2–1.2)
BUN SERPL-MCNC: 39 MG/DL (ref 7–25)
BUN/CREAT SERPL: 20 (CALC) (ref 6–22)
CALCIUM SERPL-MCNC: 8.6 MG/DL (ref 8.6–10.3)
CHLORIDE SERPL-SCNC: 105 MMOL/L (ref 98–110)
CO2 SERPL-SCNC: 22 MMOL/L (ref 20–32)
CREAT SERPL-MCNC: 1.96 MG/DL (ref 0.7–1.28)
EOSINOPHIL # BLD AUTO: 130 CELLS/UL (ref 15–500)
EOSINOPHIL NFR BLD AUTO: 1.6 %
ERYTHROCYTE [DISTWIDTH] IN BLOOD BY AUTOMATED COUNT: 16.5 % (ref 11–15)
FERRITIN SERPL-MCNC: 370 NG/ML (ref 24–380)
GFR/BSA.PRED SERPLBLD CYS-BASED-ARV: 36 ML/MIN/1.73M2
GLOBULIN SER CALC-MCNC: 4 G/DL (CALC) (ref 1.9–3.7)
GLUCOSE SERPL-MCNC: 230 MG/DL (ref 65–99)
HCT VFR BLD AUTO: 28.7 % (ref 38.5–50)
HGB BLD-MCNC: 9.1 G/DL (ref 13.2–17.1)
IRON SATN MFR SERPL: 14 % (CALC) (ref 20–48)
IRON SERPL-MCNC: 36 MCG/DL (ref 50–180)
LYMPHOCYTES # BLD AUTO: 1418 CELLS/UL (ref 850–3900)
LYMPHOCYTES NFR BLD AUTO: 17.5 %
MCH RBC QN AUTO: 28.3 PG (ref 27–33)
MCHC RBC AUTO-ENTMCNC: 31.7 G/DL (ref 32–36)
MCV RBC AUTO: 89.1 FL (ref 80–100)
MONOCYTES # BLD AUTO: 616 CELLS/UL (ref 200–950)
MONOCYTES NFR BLD AUTO: 7.6 %
NEUTROPHILS # BLD AUTO: 5816 CELLS/UL (ref 1500–7800)
NEUTROPHILS NFR BLD AUTO: 71.8 %
PLATELET # BLD AUTO: 549 THOUSAND/UL (ref 140–400)
PMV BLD REES-ECKER: 9.5 FL (ref 7.5–12.5)
POTASSIUM SERPL-SCNC: 4.5 MMOL/L (ref 3.5–5.3)
PROT SERPL-MCNC: 7.2 G/DL (ref 6.1–8.1)
RBC # BLD AUTO: 3.22 MILLION/UL (ref 4.2–5.8)
SODIUM SERPL-SCNC: 138 MMOL/L (ref 135–146)
TIBC SERPL-MCNC: 259 MCG/DL (CALC) (ref 250–425)
WBC # BLD AUTO: 8.1 THOUSAND/UL (ref 3.8–10.8)

## 2025-01-15 RX ORDER — CEPHALEXIN 500 MG/1
500 CAPSULE ORAL EVERY 8 HOURS SCHEDULED
Qty: 21 CAPSULE | Refills: 0 | Status: SHIPPED | OUTPATIENT
Start: 2025-01-15 | End: 2025-01-22

## 2025-01-15 NOTE — TELEPHONE ENCOUNTER
Patient calling for recommendations.     He is currently having UTI symptoms. He is experiencing increased frequency (every 40 minutes), pain and pressure, and cloudy urine.    Pt did have a UA and culture done on 1/13. Warm transferred to [facility]

## 2025-01-15 NOTE — TELEPHONE ENCOUNTER
Contacted patient and made him aware that a script for Keflex was sent to his pharmacy to start taking based on preliminary urine culture report. Advised office will contact him with final culture report if antibiotic needs to be adjusted. Will continue following for susceptibility panel.

## 2025-01-15 NOTE — TELEPHONE ENCOUNTER
Patient called to report he continues with urine frequency, urine urgency, abdominal discomfort, urine is cloudy     Denies fevers, severe pain, .    Patient has IR biopsy retroperitoneum     1/13/25 Urine culture abnormal Specimen   Quality:  Adequate     Result:            50,000-100,000 CFU/mL of Gram positive cocci isolated       #[id number]    Advised patient to avoid bladder irritating foods and beverages.  Maintain adequate hydration of water intake, if not on fluid restrictions.      Please review/advise.

## 2025-01-15 NOTE — TELEPHONE ENCOUNTER
Preliminary results of urine culture is showing bacteria.  I will send in a course of antibiotics to his pharmacy on file.  We will wait for the final susceptibility and adjust antibiotics if necessary.

## 2025-01-16 ENCOUNTER — TELEPHONE (OUTPATIENT)
Dept: FAMILY MEDICINE CLINIC | Facility: CLINIC | Age: 70
End: 2025-01-16

## 2025-01-16 LAB
APPEARANCE UR: ABNORMAL
BACTERIA UR QL AUTO: ABNORMAL /HPF
BILIRUB UR QL STRIP: NEGATIVE
COLOR UR: YELLOW
GLUCOSE UR QL STRIP: ABNORMAL
HGB UR QL STRIP: ABNORMAL
HYALINE CASTS #/AREA URNS LPF: ABNORMAL /LPF
KETONES UR QL STRIP: NEGATIVE
LEUKOCYTE ESTERASE UR QL STRIP: ABNORMAL
NITRITE UR QL STRIP: NEGATIVE
PH UR STRIP: 5.5 [PH] (ref 5–8)
PROT UR QL STRIP: ABNORMAL
RBC #/AREA URNS HPF: ABNORMAL /HPF
SP GR UR STRIP: 1.01 (ref 1–1.03)
SQUAMOUS #/AREA URNS HPF: ABNORMAL /HPF
WBC #/AREA URNS HPF: ABNORMAL /HPF

## 2025-01-17 ENCOUNTER — RESULTS FOLLOW-UP (OUTPATIENT)
Dept: UROLOGY | Facility: AMBULATORY SURGERY CENTER | Age: 70
End: 2025-01-17

## 2025-01-17 ENCOUNTER — HOSPITAL ENCOUNTER (OUTPATIENT)
Dept: CT IMAGING | Facility: HOSPITAL | Age: 70
End: 2025-01-17
Attending: RADIOLOGY
Payer: MEDICARE

## 2025-01-17 VITALS
RESPIRATION RATE: 20 BRPM | HEART RATE: 87 BPM | TEMPERATURE: 97.9 F | OXYGEN SATURATION: 95 % | DIASTOLIC BLOOD PRESSURE: 65 MMHG | SYSTOLIC BLOOD PRESSURE: 120 MMHG

## 2025-01-17 DIAGNOSIS — N39.0 URINARY TRACT INFECTION WITHOUT HEMATURIA, SITE UNSPECIFIED: Primary | ICD-10-CM

## 2025-01-17 DIAGNOSIS — C61 MALIGNANT NEOPLASM OF PROSTATE (HCC): ICD-10-CM

## 2025-01-17 PROCEDURE — 88342 IMHCHEM/IMCYTCHM 1ST ANTB: CPT | Performed by: PATHOLOGY

## 2025-01-17 PROCEDURE — 99152 MOD SED SAME PHYS/QHP 5/>YRS: CPT

## 2025-01-17 PROCEDURE — 49180 BIOPSY ABDOMINAL MASS: CPT

## 2025-01-17 PROCEDURE — 77012 CT SCAN FOR NEEDLE BIOPSY: CPT | Performed by: RADIOLOGY

## 2025-01-17 PROCEDURE — 88341 IMHCHEM/IMCYTCHM EA ADD ANTB: CPT | Performed by: PATHOLOGY

## 2025-01-17 PROCEDURE — 49180 BIOPSY ABDOMINAL MASS: CPT | Performed by: RADIOLOGY

## 2025-01-17 PROCEDURE — 88184 FLOWCYTOMETRY/ TC 1 MARKER: CPT | Performed by: INTERNAL MEDICINE

## 2025-01-17 PROCEDURE — 99152 MOD SED SAME PHYS/QHP 5/>YRS: CPT | Performed by: RADIOLOGY

## 2025-01-17 PROCEDURE — 99153 MOD SED SAME PHYS/QHP EA: CPT

## 2025-01-17 PROCEDURE — 88305 TISSUE EXAM BY PATHOLOGIST: CPT | Performed by: PATHOLOGY

## 2025-01-17 PROCEDURE — NC001 PR NO CHARGE: Performed by: RADIOLOGY

## 2025-01-17 PROCEDURE — 77012 CT SCAN FOR NEEDLE BIOPSY: CPT

## 2025-01-17 RX ORDER — MIDAZOLAM HYDROCHLORIDE 2 MG/2ML
INJECTION, SOLUTION INTRAMUSCULAR; INTRAVENOUS AS NEEDED
Status: SHIPPED | OUTPATIENT
Start: 2025-01-17

## 2025-01-17 RX ORDER — ACETAMINOPHEN 325 MG/1
650 TABLET ORAL EVERY 4 HOURS PRN
Status: ACTIVE | OUTPATIENT
Start: 2025-01-17

## 2025-01-17 RX ORDER — SODIUM CHLORIDE 9 MG/ML
75 INJECTION, SOLUTION INTRAVENOUS CONTINUOUS
Status: DISPENSED | OUTPATIENT
Start: 2025-01-17

## 2025-01-17 RX ORDER — LIDOCAINE WITH 8.4% SOD BICARB 0.9%(10ML)
SYRINGE (ML) INJECTION AS NEEDED
Status: SHIPPED | OUTPATIENT
Start: 2025-01-17

## 2025-01-17 RX ORDER — FENTANYL CITRATE 50 UG/ML
INJECTION, SOLUTION INTRAMUSCULAR; INTRAVENOUS AS NEEDED
Status: SHIPPED | OUTPATIENT
Start: 2025-01-17

## 2025-01-17 RX ORDER — SULFAMETHOXAZOLE AND TRIMETHOPRIM 800; 160 MG/1; MG/1
1 TABLET ORAL EVERY 12 HOURS SCHEDULED
Qty: 14 TABLET | Refills: 0 | Status: SHIPPED | OUTPATIENT
Start: 2025-01-17 | End: 2025-01-24

## 2025-01-17 RX ADMIN — FENTANYL CITRATE 25 MCG: 50 INJECTION INTRAMUSCULAR; INTRAVENOUS at 08:50

## 2025-01-17 RX ADMIN — FENTANYL CITRATE 50 MCG: 50 INJECTION INTRAMUSCULAR; INTRAVENOUS at 08:48

## 2025-01-17 RX ADMIN — Medication 10 ML: at 08:55

## 2025-01-17 RX ADMIN — FENTANYL CITRATE 25 MCG: 50 INJECTION INTRAMUSCULAR; INTRAVENOUS at 08:55

## 2025-01-17 RX ADMIN — MIDAZOLAM 0.5 MG: 1 INJECTION INTRAMUSCULAR; INTRAVENOUS at 08:50

## 2025-01-17 RX ADMIN — SODIUM CHLORIDE 75 ML/HR: 0.9 INJECTION, SOLUTION INTRAVENOUS at 07:32

## 2025-01-17 RX ADMIN — MIDAZOLAM 1 MG: 1 INJECTION INTRAMUSCULAR; INTRAVENOUS at 08:48

## 2025-01-17 RX ADMIN — MIDAZOLAM 0.5 MG: 1 INJECTION INTRAMUSCULAR; INTRAVENOUS at 08:55

## 2025-01-17 NOTE — TELEPHONE ENCOUNTER
Reviewed patient's final susceptibility.  Keflex not reported out.  Will readjust antibiotic.  He should discontinue Keflex and start bactrim.

## 2025-01-17 NOTE — DISCHARGE INSTRUCTIONS
POST BIOPSY    Care after your procedure:    1. Limit your activities for 24 hours after your biopsy.    2. No driving day of biopsy.    3. Return to your normal diet.Small sips of flat soda will help with mild nausea.    4. Remove band-aid or dressing 24 hours after procedure.     Contact Interventional Radiology at [phone] ([facility] PATIENTS: Contact Interventional Radiology at [phone]) ([facility] PATIENTS: Contact Interventional Radiology at [phone]) if:    1. Difficulty breathing, nausea or vomiting.    2. Chills or fever above 101 degrees F.     3. Pain at biopsy site not relieved by medication.     4. Develop any redness, swelling, heat, unusual drainage, heavy bruising or bleeding from biopsy site.        Procedural Sedation   WHAT YOU NEED TO KNOW:   Procedural sedation is medicine used during procedures to help you feel relaxed and calm. You will remember little to none of the procedure. After sedation you may feel tired, weak, or unsteady on your feet. You may also have trouble concentrating or short-term memory loss. These symptoms should go away in 24 hours or less.   DISCHARGE INSTRUCTIONS:   Call 911 or have someone else call for any of the following:   You have sudden trouble breathing.     You cannot be woken.     Contact Interventional Radiology at [phone]   [facility] PATIENTS: Contact Interventional Radiology at [phone] [facility] PATIENTS: Contact Interventional Radiology at [phone]) if any of the following occur:      You have a severe headache or dizziness.     Your heart is beating faster than usual.    You have a fever or chills.     Your skin is itchy, swollen, or you have a rash.     You have nausea or are vomiting for more than 8 hours after the procedure.      You have questions or concerns about your condition or care.  Self-care:   Have someone stay with you for 24 hours. This person can drive you to errands and help you do things around the house. This person can  also watch for problems.      Rest and do quiet activities for 24 hours. Do not exercise, ride a bike, or play sports. Stand up slowly to prevent dizziness and falls. Take short walks around the house with another person. Slowly return to your usual activities the next day.      Do not drive or use dangerous machines or tools for 24 hours. You may injure yourself or others. Examples include a lawnmower, saw, or drill. Do not return to work for 24 hours if you use dangerous machines or tools for work.      Do not make important decisions for 24 hours. For example, do not sign important papers or invest money.      Drink liquids as directed. Liquids help flush the sedation medicine out of your body. Ask how much liquid to drink each day and which liquids are best for you.      Eat small, frequent meals to prevent nausea and vomiting. Start with clear liquids such as juice or broth. If you do not vomit after clear liquids, you can eat your usual foods.      Do not drink alcohol or take medicines that make you drowsy. This includes medicines that help you sleep and anxiety medicines. Ask your healthcare provider if it is safe for you to take pain medicine.  Follow up with your healthcare provider as directed: Write down your questions so you remember to ask them during your visits.

## 2025-01-17 NOTE — H&P
Interventional Radiology  History and Physical 1/17/2025     Alejandro Adames   1955   [id number]    Assessment/Plan:  69 year old male with history of prostate cancer and left retroperitoneal mass presents for biopsy of the left retroperitoneal mass.    Problem List Items Addressed This Visit    None  Visit Diagnoses         Malignant neoplasm of prostate (HCC)        Relevant Orders    IR biopsy retroperitoneum               Subjective:     Patient ID: Alejandro Adames is a 69 y.o. male.    History of Present Illness  Patient with history of prostate cancer and left retroperitoneal mass presents for biopsy of the left retroperitoneal mass.    Review of Systems      Past Medical History:   Diagnosis Date    Cancer (HCC)     pt unsure of primary site poss. bone vs kidney    Cutaneous skin tags 01/02/2019    Diabetes mellitus (HCC)     Hypertension     Ureteral obstruction     L nephrostomy tube in place 12/26/204    internal placement otday 12/26/204    Uses walker     in rehab presently   12/26/2024        Past Surgical History:   Procedure Laterality Date    IR BIOPSY INGUINAL LYMPH NODE  10/19/2024    IR INTERNAL URETERAL STENT PLACEMENT  12/26/2024    IR NEPHROSTOMY TO NEPHROURETERAL STENT  11/19/2024    IR NEPHROSTOMY TUBE PLACEMENT  10/19/2024        Social History     Tobacco Use   Smoking Status Former   Smokeless Tobacco Never        Social History     Substance and Sexual Activity   Alcohol Use Yes    Alcohol/week: 2.0 - 3.0 standard drinks of alcohol    Types: 2 - 3 Cans of beer per week    Comment: social        Social History     Substance and Sexual Activity   Drug Use No        Allergies   Allergen Reactions    Captopril Cough    Crestor [Rosuvastatin] Diarrhea    Enalapril Cough       Current Facility-Administered Medications   Medication Dose Route Frequency Provider Last Rate Last Admin    sodium chloride 0.9 % infusion  75 mL/hr Intravenous Continuous Jose Baker MD         Current  Outpatient Medications   Medication Sig Dispense Refill    amLODIPine (NORVASC) 5 mg tablet Take 1 tablet (5 mg total) by mouth daily 100 tablet 3    atorvastatin (LIPITOR) 40 mg tablet TAKE 1 TABLET BY MOUTH EVERY  tablet 1    cephalexin (KEFLEX) 500 mg capsule Take 1 capsule (500 mg total) by mouth every 8 (eight) hours for 7 days 21 capsule 0    folic acid (FOLVITE) 1 mg tablet Take 1 tablet (1 mg total) by mouth daily 30 tablet 0    insulin glargine (LANTUS SOLOSTAR) 100 units/mL injection pen Inject 18 Units under the skin daily 30 mL 1    insulin lispro (HumaLOG) 100 units/mL injection pen Inject 6 Units under the skin 3 (three) times a day with meals 15 mL 3    Alcohol Swabs (Alcohol Prep) PADS Use to prep skin prior to placing blood sugar monitor 100 each 1    Continuous Glucose Sensor (FreeStyle Ольга 3 Plus Sensor) MISC Use 1 each Every 15 days for blood sugar monitoring *receives through [employer]*      dronabinol (MARINOL) 5 MG capsule Take 1 capsule (5 mg total) by mouth 2 (two) times a day before meals (Patient not taking: Reported on 1/17/2025) 60 capsule 1    glucose blood (OneTouch Verio) test strip Check blood sugars once daily. Please substitute with appropriate alternative as covered by patient's insurance. Dx: E11.65 100 each 3    Insulin Pen Needle 32G X 4 MM MISC Use 3 (three) times a day 300 each 3    Lenvatinib, 20 MG Daily Dose, (Lenvima, 20 MG Daily Dose,) 2 x 10 MG CPPK Take 20 mg by mouth daily (Patient not taking: Reported on 12/31/2024) 30 each 5    menthol-zinc oxide (CALMOSPETINE) 0.44-20.625 % Apply topically 2 (two) times a day 113 g 3    OneTouch Delica Lancets 33G MISC Check blood sugars once daily. Please substitute with appropriate alternative as covered by patient's insurance. Dx: E11.65 100 each 3    oxyCODONE (ROXICODONE) 5 immediate release tablet Take 0.5 tablets (2.5 mg total) by mouth every 8 (eight) hours as needed for severe pain for up to 10 doses Max Daily  "Amount: 7.5 mg (Patient not taking: Reported on 1/17/2025) 5 tablet 0    senna-docusate sodium (SENOKOT S) 8.6-50 mg per tablet Take 1 tablet by mouth 2 (two) times a day as needed for constipation      triamcinolone (KENALOG) 0.1 % cream APPLY TOPICALLY TO THE AFFECTED AREA TWICE DAILY (Patient not taking: Reported on 1/6/2025) 30 g 0          Objective:    Vitals:    01/17/25 0720   BP: 135/69   Pulse: 82   Resp: 16   Temp: 97.5 °F (36.4 °C)   TempSrc: Temporal   SpO2: 97%        Physical Exam  Constitutional:       Appearance: Normal appearance.   Cardiovascular:      Rate and Rhythm: Normal rate.   Pulmonary:      Effort: Pulmonary effort is normal.           No results found for: \"BNP\"   Lab Results   Component Value Date    WBC 8.1 01/13/2025    HGB 9.1 (L) 01/13/2025    HCT 28.7 (L) 01/13/2025    MCV 89.1 01/13/2025     (H) 01/13/2025     Lab Results   Component Value Date    INR 1.05 10/19/2024    PROTIME 14.0 10/19/2024     No results found for: \"PTT\"      I have personally reviewed pertinent imaging and laboratory results.     Code Status: Prior  Advance Directive and Living Will:      Power of : Yes  POLST:      This text is generated with voice recognition software. There may be translation, syntax,  or grammatical errors. If you have any questions, please contact the dictating provider.   "

## 2025-01-17 NOTE — TELEPHONE ENCOUNTER
Called and left a detailed voicemail message for patient per communication consent. Advised for him to stop Keflex based on final urine culture/sensitivity and start Bactrim twice a day for 7 days. Call back number left if patient has questions or concerns.

## 2025-01-17 NOTE — BRIEF OP NOTE (RAD/CATH)
INTERVENTIONAL RADIOLOGY PROCEDURE NOTE    Date: 1/17/2025    Procedure:   Procedure Summary       Date: 01/17/25 Room / Location: [facility] CAT On license of [facility]    Anesthesia Start:  Anesthesia Stop:     Procedure: IR BIOPSY RETROPERITONEUM Diagnosis:       Malignant neoplasm of prostate (HCC)      Malignant neoplasm of prostate (HCC)      (Retroperitoneal mass.  Please biopsy retroperitoneal mass as recommended by multidisciplinary tumor board.)    Scheduled Providers:  Responsible Provider:     Anesthesia Type: Not recorded ASA Status: Not recorded            Preoperative diagnosis:   1. Malignant neoplasm of prostate (HCC)         Postoperative diagnosis: Same.    Surgeon: Ricki Mac MD     Assistant: None. No qualified resident was available.    Blood loss: None    Specimens: None     Findings: Successful left retroperitoneal mass biopsy.    Complications: None immediate.    Anesthesia: conscious sedation and local

## 2025-01-17 NOTE — TELEPHONE ENCOUNTER
Please advise if Kelfex appropriate - Patient in patient now have ir procedure  CIPROFLOXACIN          R     >=8     GENTAMICIN             S     <=0.5     LEVOFLOXACIN           I     4      MOXIFLOXACIN           I     1      NITROFURANTOIN         S     <=16     OXACILLIN              R     NR **1      TETRACYCLINE           S     <=1     TRIMETHOPRIM/SULFA     S     <=10     VANCOMYCIN             S     <=0.5

## 2025-01-21 LAB — SCAN RESULT: NORMAL

## 2025-01-21 PROCEDURE — 88341 IMHCHEM/IMCYTCHM EA ADD ANTB: CPT | Performed by: PATHOLOGY

## 2025-01-21 PROCEDURE — 88305 TISSUE EXAM BY PATHOLOGIST: CPT | Performed by: PATHOLOGY

## 2025-01-21 PROCEDURE — 88342 IMHCHEM/IMCYTCHM 1ST ANTB: CPT | Performed by: PATHOLOGY

## 2025-01-22 ENCOUNTER — PATIENT MESSAGE (OUTPATIENT)
Dept: FAMILY MEDICINE CLINIC | Facility: CLINIC | Age: 70
End: 2025-01-22

## 2025-01-22 DIAGNOSIS — E11.22 TYPE 2 DIABETES MELLITUS WITH STAGE 3A CHRONIC KIDNEY DISEASE, WITHOUT LONG-TERM CURRENT USE OF INSULIN (HCC): Primary | ICD-10-CM

## 2025-01-22 DIAGNOSIS — N18.31 TYPE 2 DIABETES MELLITUS WITH STAGE 3A CHRONIC KIDNEY DISEASE, WITHOUT LONG-TERM CURRENT USE OF INSULIN (HCC): Primary | ICD-10-CM

## 2025-01-23 ENCOUNTER — OFFICE VISIT (OUTPATIENT)
Dept: FAMILY MEDICINE CLINIC | Facility: CLINIC | Age: 70
End: 2025-01-23
Payer: MEDICARE

## 2025-01-23 VITALS
RESPIRATION RATE: 18 BRPM | WEIGHT: 156.4 LBS | BODY MASS INDEX: 22.39 KG/M2 | SYSTOLIC BLOOD PRESSURE: 130 MMHG | TEMPERATURE: 97.8 F | HEART RATE: 67 BPM | DIASTOLIC BLOOD PRESSURE: 70 MMHG | OXYGEN SATURATION: 97 % | HEIGHT: 70 IN

## 2025-01-23 DIAGNOSIS — E11.22 TYPE 2 DIABETES MELLITUS WITH STAGE 3A CHRONIC KIDNEY DISEASE, WITHOUT LONG-TERM CURRENT USE OF INSULIN (HCC): Primary | ICD-10-CM

## 2025-01-23 DIAGNOSIS — N18.31 TYPE 2 DIABETES MELLITUS WITH STAGE 3A CHRONIC KIDNEY DISEASE, WITHOUT LONG-TERM CURRENT USE OF INSULIN (HCC): Primary | ICD-10-CM

## 2025-01-23 DIAGNOSIS — R39.9 UTI SYMPTOMS: ICD-10-CM

## 2025-01-23 PROCEDURE — 99214 OFFICE O/P EST MOD 30 MIN: CPT | Performed by: FAMILY MEDICINE

## 2025-01-23 PROCEDURE — G2211 COMPLEX E/M VISIT ADD ON: HCPCS | Performed by: FAMILY MEDICINE

## 2025-01-23 NOTE — ASSESSMENT & PLAN NOTE
Lab Results   Component Value Date    HGBA1C 8.3 (A) 12/24/2024     Use 9 units short acting at night when eating heavy carb meal more, call with blood sugars in one week, follow up pharmacy ,blood sugar log reviewed and sugars higher at night more controlled during day time.

## 2025-01-23 NOTE — PROGRESS NOTES
"Name: Alejandro Adames      : 1955      MRN: [id number]  Encounter Provider: Romie Ely MD  Encounter Date: 2025   Encounter department: Counts include 234 beds at the [facility]  :  Assessment & Plan  Type 2 diabetes mellitus with stage 3a chronic kidney disease, without long-term current use of insulin (Formerly [facility])    Lab Results   Component Value Date    HGBA1C 8.3 (A) 2024     Use 9 units short acting at night when eating heavy carb meal more, call with blood sugars in one week, follow up pharmacy ,blood sugar log reviewed and sugars higher at night more controlled during day time.         UTI symptoms    Being treated for UTI, this may be due due to elevated blood sugars.              History of Present Illness   HPI    69 year old male with pmh of type 2 DM, renal cell carcinoma, presenting for elevated blood sugars.    Has been asymptomatic.    On CGM blood sugar elevated above 300 at night, trends lower thoguhtou the day and spikes after dinner time.      Review of Systems   Constitutional:  Negative for activity change and appetite change.   Respiratory:  Negative for apnea and chest tightness.    Gastrointestinal:  Negative for abdominal distention and abdominal pain.   Musculoskeletal:  Negative for arthralgias and back pain.       Objective   /70 (BP Location: Right arm, Patient Position: Sitting, Cuff Size: Standard)   Pulse 67   Temp 97.8 °F (36.6 °C) (Tympanic)   Resp 18   Ht 5' 9.5\" (1.765 m)   Wt 70.9 kg (156 lb 6.4 oz)   SpO2 97%   BMI 22.77 kg/m²      Physical Exam  Constitutional:       Appearance: Normal appearance.   Cardiovascular:      Rate and Rhythm: Normal rate and regular rhythm.      Pulses: Normal pulses.      Heart sounds: Normal heart sounds.   Musculoskeletal:         General: Normal range of motion.   Neurological:      Mental Status: He is alert.           "

## 2025-01-24 ENCOUNTER — HOSPITAL ENCOUNTER (OUTPATIENT)
Dept: CT IMAGING | Facility: HOSPITAL | Age: 70
Discharge: HOME | End: 2025-01-24
Attending: INTERNAL MEDICINE
Payer: MEDICARE

## 2025-01-24 ENCOUNTER — TELEPHONE (OUTPATIENT)
Age: 70
End: 2025-01-24

## 2025-01-24 DIAGNOSIS — C64.2 RENAL CELL CARCINOMA OF LEFT KIDNEY (HCC): ICD-10-CM

## 2025-01-24 PROCEDURE — 74177 CT ABD & PELVIS W/CONTRAST: CPT

## 2025-01-24 PROCEDURE — 71260 CT THORAX DX C+: CPT

## 2025-01-24 RX ADMIN — IOHEXOL 100 ML: 350 INJECTION, SOLUTION INTRAVENOUS at 12:19

## 2025-01-24 NOTE — TELEPHONE ENCOUNTER
Pt has appt on 2/25 with Dr. Castellon, appt needs to be rescheduled. Dr. Castellon's next available is 3/25 - pt already has a scheduled appointment for this day. Unable to reschedule, please advise, thank you.

## 2025-01-25 LAB
ALBUMIN SERPL-MCNC: 3.2 G/DL (ref 3.6–5.1)
ALBUMIN/GLOB SERPL: 0.8 (CALC) (ref 1–2.5)
ALP SERPL-CCNC: 131 U/L (ref 35–144)
ALT SERPL-CCNC: 13 U/L (ref 9–46)
AST SERPL-CCNC: 10 U/L (ref 10–35)
BASOPHILS # BLD AUTO: 61 CELLS/UL (ref 0–200)
BASOPHILS NFR BLD AUTO: 0.9 %
BILIRUB SERPL-MCNC: 0.2 MG/DL (ref 0.2–1.2)
BUN SERPL-MCNC: 33 MG/DL (ref 7–25)
BUN/CREAT SERPL: 20 (CALC) (ref 6–22)
CALCIUM SERPL-MCNC: 9 MG/DL (ref 8.6–10.3)
CHLORIDE SERPL-SCNC: 100 MMOL/L (ref 98–110)
CO2 SERPL-SCNC: 22 MMOL/L (ref 20–32)
CREAT SERPL-MCNC: 1.66 MG/DL (ref 0.7–1.28)
EOSINOPHIL # BLD AUTO: 163 CELLS/UL (ref 15–500)
EOSINOPHIL NFR BLD AUTO: 2.4 %
ERYTHROCYTE [DISTWIDTH] IN BLOOD BY AUTOMATED COUNT: 15.6 % (ref 11–15)
GFR/BSA.PRED SERPLBLD CYS-BASED-ARV: 44 ML/MIN/1.73M2
GLOBULIN SER CALC-MCNC: 4 G/DL (CALC) (ref 1.9–3.7)
GLUCOSE SERPL-MCNC: 336 MG/DL (ref 65–99)
HCT VFR BLD AUTO: 29.6 % (ref 38.5–50)
HGB BLD-MCNC: 9.2 G/DL (ref 13.2–17.1)
LYMPHOCYTES # BLD AUTO: 1197 CELLS/UL (ref 850–3900)
LYMPHOCYTES NFR BLD AUTO: 17.6 %
MCH RBC QN AUTO: 27.7 PG (ref 27–33)
MCHC RBC AUTO-ENTMCNC: 31.1 G/DL (ref 32–36)
MCV RBC AUTO: 89.2 FL (ref 80–100)
MONOCYTES # BLD AUTO: 490 CELLS/UL (ref 200–950)
MONOCYTES NFR BLD AUTO: 7.2 %
NEUTROPHILS # BLD AUTO: 4889 CELLS/UL (ref 1500–7800)
NEUTROPHILS NFR BLD AUTO: 71.9 %
PLATELET # BLD AUTO: 521 THOUSAND/UL (ref 140–400)
PMV BLD REES-ECKER: 9.7 FL (ref 7.5–12.5)
POTASSIUM SERPL-SCNC: 4.4 MMOL/L (ref 3.5–5.3)
PROT SERPL-MCNC: 7.2 G/DL (ref 6.1–8.1)
RBC # BLD AUTO: 3.32 MILLION/UL (ref 4.2–5.8)
SODIUM SERPL-SCNC: 130 MMOL/L (ref 135–146)
TSH SERPL-ACNC: 3.08 MIU/L (ref 0.4–4.5)
WBC # BLD AUTO: 6.8 THOUSAND/UL (ref 3.8–10.8)

## 2025-01-28 ENCOUNTER — OFFICE VISIT (OUTPATIENT)
Dept: UROLOGY | Facility: AMBULATORY SURGERY CENTER | Age: 70
End: 2025-01-28
Payer: MEDICARE

## 2025-01-28 ENCOUNTER — DOCUMENTATION (OUTPATIENT)
Dept: HEMATOLOGY ONCOLOGY | Facility: CLINIC | Age: 70
End: 2025-01-28

## 2025-01-28 ENCOUNTER — TELEPHONE (OUTPATIENT)
Dept: FAMILY MEDICINE CLINIC | Facility: CLINIC | Age: 70
End: 2025-01-28

## 2025-01-28 VITALS
DIASTOLIC BLOOD PRESSURE: 88 MMHG | HEIGHT: 70 IN | SYSTOLIC BLOOD PRESSURE: 128 MMHG | OXYGEN SATURATION: 99 % | WEIGHT: 154 LBS | HEART RATE: 57 BPM | BODY MASS INDEX: 22.05 KG/M2

## 2025-01-28 DIAGNOSIS — N13.39 OTHER HYDRONEPHROSIS: Primary | ICD-10-CM

## 2025-01-28 DIAGNOSIS — C64.9 MALIGNANT NEOPLASM OF KIDNEY, UNSPECIFIED LATERALITY (HCC): ICD-10-CM

## 2025-01-28 DIAGNOSIS — N40.1 BENIGN PROSTATIC HYPERPLASIA WITH NOCTURIA: ICD-10-CM

## 2025-01-28 DIAGNOSIS — R35.1 BENIGN PROSTATIC HYPERPLASIA WITH NOCTURIA: ICD-10-CM

## 2025-01-28 PROCEDURE — 99214 OFFICE O/P EST MOD 30 MIN: CPT | Performed by: UROLOGY

## 2025-01-28 RX ORDER — SODIUM CHLORIDE 9 MG/ML
125 INJECTION, SOLUTION INTRAVENOUS CONTINUOUS
OUTPATIENT
Start: 2025-01-28

## 2025-01-28 RX ORDER — TAMSULOSIN HYDROCHLORIDE 0.4 MG/1
0.4 CAPSULE ORAL
Qty: 90 CAPSULE | Refills: 3 | Status: SHIPPED | OUTPATIENT
Start: 2025-01-28

## 2025-01-28 RX ORDER — CEFAZOLIN SODIUM 2 G/50ML
2000 SOLUTION INTRAVENOUS ONCE
OUTPATIENT
Start: 2025-01-28 | End: 2025-01-28

## 2025-01-28 NOTE — LETTER
2025     Wayne Tellez Jr., MD  [address]   [address]  [facility] [address]    Patient: Alejandro Adames   YOB: 1955   Date of Visit: 2025       Dear Dr. Tellez:    Thank you for referring Alejandro Adames to me for evaluation. Below are my notes for this consultation.    If you have questions, please do not hesitate to call me. I look forward to following your patient along with you.         Sincerely,        Rj Rees MD        CC: MD Rj Mahoney MD  2025  1:15 PM  Sign when Signing Visit  Name: Alejandro Adames      : 1955      MRN: [id number]  Encounter Provider: Rj Rees MD  Encounter Date: 2025   Encounter department: [facility] [place]  :  Assessment & Plan  Other hydronephrosis    Orders:  •  Case request operating room: EXCHANGE STENT URETERAL, cystoscopy, left retrograde; Standing    Benign prostatic hyperplasia with nocturia    Orders:  •  tamsulosin (FLOMAX) 0.4 mg; Take 1 capsule (0.4 mg total) by mouth daily with dinner    Malignant neoplasm of kidney, unspecified laterality (HCC)           Impression: Metastatic renal cell carcinoma without an identifiable primary source, left hydronephrosis secondary to retroperitoneal adenopathy, indwelling left ureteral stent, history of prostate cancer status post radiation    Plan: His recent CT scan shows nodules in the chest and retroperitoneum.  Fortunately they appear to be responding to the Keytruda immunotherapy.  I will defer treatment to Dr. Castellon from medical oncology.  As there is no identifiable lesion in either kidney, I see no role for nephrectomy at this time.  His left nephroureteral stent was recently internalized by interventional radiology in late 2024.  I recommend returning to the operating room in the next 3 to 4 months for routine left stent exchange.  Risks of the procedure including, not  "limited to bleeding, infection, disease progression requiring nephrostomy tube decompression and renal compromise despite renal stent.    History of Present Illness  Alejandro Adames is a 69 y.o. male who presents in follow-up after 2 recent biopsies.  1 was an inguinal biopsy and more recently had a retroperitoneal biopsy.  Interestingly both biopsies reveal metastatic renal cell carcinoma.  On CT scan, however, there is no sign of renal lesion in either kidney.  The patient has been following with Dr. Castellon from medical oncology and is currently receiving Keytruda.  Also has a prior history of prostate cancer treated with radiation.  His posttreatment PSA level is 0.3 and stable.    Review of Systems       Objective  /88 (Patient Position: Sitting, Cuff Size: Adult)   Pulse 57   Ht 5' 10\" (1.778 m)   Wt 69.9 kg (154 lb)   SpO2 99%   BMI 22.10 kg/m²     Physical Exam on examination he is in no acute distress.  He is pleasant and conversant.  He is somewhat pale    Results  Lab Results   Component Value Date    PSA 0.355 10/19/2024    PSA 1.3 10/17/2022    PSA 4.9 (H) 04/08/2019     Lab Results   Component Value Date    CALCIUM 9.0 01/24/2025    K 4.4 01/24/2025    CO2 22 01/24/2025     01/24/2025    BUN 33 (H) 01/24/2025    CREATININE 1.66 (H) 01/24/2025     Lab Results   Component Value Date    WBC 6.8 01/24/2025    HGB 9.2 (L) 01/24/2025    HCT 29.6 (L) 01/24/2025    MCV 89.2 01/24/2025     (H) 01/24/2025       Office Urine Dip  No results found for this or any previous visit (from the past hour).]       "

## 2025-01-28 NOTE — TELEPHONE ENCOUNTER
"Pt called in to the office and stated to me \"that  he saw you 1.23.25 and you increased his humolog from 6-8 once daily. His blood sugars are still up 350 and also 400, so he also increased his second humolog inj from 6-8 as well, and still not coming down. He does have increased thirst and frequent urination\".     I wanted to bring him in to see you but you have no openings today. Would you be ok to for an overbook to see him in the office or would you advise the ED at this time? Please advise.  "

## 2025-01-28 NOTE — PROGRESS NOTES
Name: Alejandro Adames      : 1955      MRN: [id number]  Encounter Provider: Rj Rees MD  Encounter Date: 2025   Encounter department: [facility]OLOG[facility] [place]  :  Assessment & Plan  Other hydronephrosis    Orders:  •  Case request operating room: EXCHANGE STENT URETERAL, cystoscopy, left retrograde; Standing    Benign prostatic hyperplasia with nocturia    Orders:  •  tamsulosin (FLOMAX) 0.4 mg; Take 1 capsule (0.4 mg total) by mouth daily with dinner    Malignant neoplasm of kidney, unspecified laterality (HCC)           Impression: Metastatic renal cell carcinoma without an identifiable primary source, left hydronephrosis secondary to retroperitoneal adenopathy, indwelling left ureteral stent, history of prostate cancer status post radiation    Plan: His recent CT scan shows nodules in the chest and retroperitoneum.  Fortunately they appear to be responding to the Keytruda immunotherapy.  I will defer treatment to Dr. Castellon from medical oncology.  As there is no identifiable lesion in either kidney, I see no role for nephrectomy at this time.  His left nephroureteral stent was recently internalized by interventional radiology in late 2024.  I recommend returning to the operating room in the next 3 to 4 months for routine left stent exchange.  Risks of the procedure including, not limited to bleeding, infection, disease progression requiring nephrostomy tube decompression and renal compromise despite renal stent.    History of Present Illness   Alejandro Adames is a 69 y.o. male who presents in follow-up after 2 recent biopsies.  1 was an inguinal biopsy and more recently had a retroperitoneal biopsy.  Interestingly both biopsies reveal metastatic renal cell carcinoma.  On CT scan, however, there is no sign of renal lesion in either kidney.  The patient has been following with Dr. Castellon from medical oncology and is currently receiving Keytruda.  Also has a  "prior history of prostate cancer treated with radiation.  His posttreatment PSA level is 0.3 and stable.    Review of Systems       Objective   /88 (Patient Position: Sitting, Cuff Size: Adult)   Pulse 57   Ht 5' 10\" (1.778 m)   Wt 69.9 kg (154 lb)   SpO2 99%   BMI 22.10 kg/m²     Physical Exam on examination he is in no acute distress.  He is pleasant and conversant.  He is somewhat pale    Results  Lab Results   Component Value Date    PSA 0.355 10/19/2024    PSA 1.3 10/17/2022    PSA 4.9 (H) 04/08/2019     Lab Results   Component Value Date    CALCIUM 9.0 01/24/2025    K 4.4 01/24/2025    CO2 22 01/24/2025     01/24/2025    BUN 33 (H) 01/24/2025    CREATININE 1.66 (H) 01/24/2025     Lab Results   Component Value Date    WBC 6.8 01/24/2025    HGB 9.2 (L) 01/24/2025    HCT 29.6 (L) 01/24/2025    MCV 89.2 01/24/2025     (H) 01/24/2025       Office Urine Dip  No results found for this or any previous visit (from the past hour).]      "

## 2025-01-28 NOTE — PROGRESS NOTES
In-basket message received from Deniz Medrano RN to add patient to the urology [facility] on 2/4/2025. Chart reviewed and prep completed.

## 2025-01-29 ENCOUNTER — TELEMEDICINE (OUTPATIENT)
Dept: FAMILY MEDICINE CLINIC | Facility: CLINIC | Age: 70
End: 2025-01-29

## 2025-01-29 ENCOUNTER — OFFICE VISIT (OUTPATIENT)
Dept: HEMATOLOGY ONCOLOGY | Facility: CLINIC | Age: 70
End: 2025-01-29
Payer: MEDICARE

## 2025-01-29 ENCOUNTER — TELEPHONE (OUTPATIENT)
Dept: FAMILY MEDICINE CLINIC | Facility: CLINIC | Age: 70
End: 2025-01-29

## 2025-01-29 ENCOUNTER — OFFICE VISIT (OUTPATIENT)
Dept: FAMILY MEDICINE CLINIC | Facility: CLINIC | Age: 70
End: 2025-01-29
Payer: MEDICARE

## 2025-01-29 VITALS
HEIGHT: 70 IN | TEMPERATURE: 97.6 F | SYSTOLIC BLOOD PRESSURE: 140 MMHG | WEIGHT: 151 LBS | BODY MASS INDEX: 21.62 KG/M2 | HEART RATE: 86 BPM | OXYGEN SATURATION: 99 % | DIASTOLIC BLOOD PRESSURE: 72 MMHG | RESPIRATION RATE: 18 BRPM

## 2025-01-29 VITALS
HEIGHT: 70 IN | DIASTOLIC BLOOD PRESSURE: 80 MMHG | BODY MASS INDEX: 23.08 KG/M2 | WEIGHT: 161.2 LBS | OXYGEN SATURATION: 100 % | SYSTOLIC BLOOD PRESSURE: 130 MMHG | RESPIRATION RATE: 20 BRPM | TEMPERATURE: 97.3 F | HEART RATE: 84 BPM

## 2025-01-29 DIAGNOSIS — Z79.899 MEDICATION MANAGEMENT: ICD-10-CM

## 2025-01-29 DIAGNOSIS — N18.31 TYPE 2 DIABETES MELLITUS WITH STAGE 3A CHRONIC KIDNEY DISEASE, WITHOUT LONG-TERM CURRENT USE OF INSULIN (HCC): Primary | ICD-10-CM

## 2025-01-29 DIAGNOSIS — C64.9 METASTATIC RENAL CELL CARCINOMA TO BONE (HCC): Chronic | ICD-10-CM

## 2025-01-29 DIAGNOSIS — N18.31 TYPE 2 DIABETES MELLITUS WITH STAGE 3A CHRONIC KIDNEY DISEASE, WITHOUT LONG-TERM CURRENT USE OF INSULIN (HCC): ICD-10-CM

## 2025-01-29 DIAGNOSIS — C64.2 RENAL CELL CARCINOMA OF LEFT KIDNEY (HCC): ICD-10-CM

## 2025-01-29 DIAGNOSIS — E11.22 TYPE 2 DIABETES MELLITUS WITH STAGE 3A CHRONIC KIDNEY DISEASE, WITHOUT LONG-TERM CURRENT USE OF INSULIN (HCC): ICD-10-CM

## 2025-01-29 DIAGNOSIS — Z79.899 HIGH RISK MEDICATION USE: ICD-10-CM

## 2025-01-29 DIAGNOSIS — E11.22 TYPE 2 DIABETES MELLITUS WITH STAGE 3A CHRONIC KIDNEY DISEASE, WITHOUT LONG-TERM CURRENT USE OF INSULIN (HCC): Primary | ICD-10-CM

## 2025-01-29 DIAGNOSIS — C64.9 METASTATIC RENAL CELL CARCINOMA TO BONE (HCC): Primary | Chronic | ICD-10-CM

## 2025-01-29 DIAGNOSIS — E11.22 TYPE 2 DIABETES MELLITUS WITH STAGE 3A CHRONIC KIDNEY DISEASE, WITH LONG-TERM CURRENT USE OF INSULIN (HCC): Primary | ICD-10-CM

## 2025-01-29 DIAGNOSIS — C79.51 METASTATIC RENAL CELL CARCINOMA TO BONE (HCC): Chronic | ICD-10-CM

## 2025-01-29 DIAGNOSIS — C64.9 METASTATIC RENAL CELL CARCINOMA TO BONE (HCC): Primary | ICD-10-CM

## 2025-01-29 DIAGNOSIS — R39.9 UTI SYMPTOMS: ICD-10-CM

## 2025-01-29 DIAGNOSIS — C79.51 METASTATIC RENAL CELL CARCINOMA TO BONE (HCC): Primary | Chronic | ICD-10-CM

## 2025-01-29 DIAGNOSIS — C79.51 METASTATIC RENAL CELL CARCINOMA TO BONE (HCC): Primary | ICD-10-CM

## 2025-01-29 DIAGNOSIS — Z79.4 TYPE 2 DIABETES MELLITUS WITH STAGE 3A CHRONIC KIDNEY DISEASE, WITH LONG-TERM CURRENT USE OF INSULIN (HCC): Primary | ICD-10-CM

## 2025-01-29 DIAGNOSIS — N18.31 TYPE 2 DIABETES MELLITUS WITH STAGE 3A CHRONIC KIDNEY DISEASE, WITH LONG-TERM CURRENT USE OF INSULIN (HCC): Primary | ICD-10-CM

## 2025-01-29 LAB — GLUCOSE SERPL-MCNC: 505 MG/DL (ref 65–140)

## 2025-01-29 PROCEDURE — 99214 OFFICE O/P EST MOD 30 MIN: CPT | Performed by: FAMILY MEDICINE

## 2025-01-29 PROCEDURE — 99215 OFFICE O/P EST HI 40 MIN: CPT | Performed by: INTERNAL MEDICINE

## 2025-01-29 PROCEDURE — G2211 COMPLEX E/M VISIT ADD ON: HCPCS | Performed by: FAMILY MEDICINE

## 2025-01-29 PROCEDURE — PBNCHG PB NO CHARGE PLACEHOLDER: Performed by: PHARMACIST

## 2025-01-29 PROCEDURE — G2211 COMPLEX E/M VISIT ADD ON: HCPCS | Performed by: INTERNAL MEDICINE

## 2025-01-29 NOTE — ASSESSMENT & PLAN NOTE
Lab Results   Component Value Date    HGBA1C 8.3 (A) 12/24/2024     -  Most recent A1c: above goal.   - CGM TIR: significantly  below goal; TBR meets goal.  - Medications: well tolerated; denies any side effects from current medications.     Glucose levels poorly controlled. Recent UTI may be contributing to hyperglycemia.  No symptoms of DKA reported. Insulin regimen adjusted earlier today.     - Interventions:  No additional insulin changes today, reinforced new doses. Follow-up on Monday     Patient reports he will follow-up with PCP office in 2 days. Prioritize controlling fasting glucose    RECOMMENDATIONS: (For Friday's visit)  If fasting blood sugars are > 180, increase Lantus to 28 units SQ daily.  No changes to Humalog

## 2025-01-29 NOTE — ASSESSMENT & PLAN NOTE
Lab Results   Component Value Date    HGBA1C 8.3 (A) 12/24/2024     Increase to 12 units pre meal, 25 units at night.  Call in with blood sugars in two days, remains asymptomatic from hyperglycemia, first elevated when he had UTI.     Possible Keytruda reaction?    Will take extra dose 10 units when you get home.    Meeting with clinical pharmacy later today as well    Will follow up in two days to review blood sugars.    If symptoms, nausea, vomiting, light headed advised to go the emergency room    Orders:    Fingerstick Glucose (POCT)

## 2025-01-29 NOTE — PROGRESS NOTES
[facility] Clinical Integration Pharmacy Services   Sharon Mar, Enrique     Alejandro Adames is a 69 y.o. male who was referred to the clinical pharmacist by Wayne Uriarte Jr, MD for diabetes. Patient presents via video for follow-up.      Virtual Care Documentation  Encounter provider Mariajose Novak    The Patient is located at Home and in the following state in which I hold an active license: [place].    The patient was identified by name and date of birth. Alejandro Adames was informed that this is a telemedicine visit and that the visit is being conducted through the Epic Embedded platform. He agrees to proceed.  My office door was closed. No one else was in the room. He acknowledged consent and understanding of privacy and security of the video platform. The patient has agreed to participate and understands they can discontinue the visit at any time.       Assessment & Plan  Type 2 diabetes mellitus with stage 3a chronic kidney disease, with long-term current use of insulin ([facility])    Lab Results   Component Value Date    HGBA1C 8.3 (A) 12/24/2024     -  Most recent A1c: above goal.   - CGM TIR: significantly  below goal; TBR meets goal.  - Medications: well tolerated; denies any side effects from current medications.     Glucose levels poorly controlled. Recent UTI may be contributing to hyperglycemia.  No symptoms of DKA reported. Insulin regimen adjusted earlier today.     - Interventions:  No additional insulin changes today, reinforced new doses. Follow-up on Monday     Patient reports he will follow-up with PCP office in 2 days. Prioritize controlling fasting glucose    RECOMMENDATIONS: (For Friday's visit)  If fasting blood sugars are > 180, increase Lantus to 28 units SQ daily.  No changes to Humalog  Metastatic renal cell carcinoma to bone (HCC)    Lab Results   Component Value Date    GLUTAMICACID 472.7 (H) 11/27/2024    INSULINAUTO <5.0 11/27/2024     Patient is receiving Keytruda. 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     Would not anticipate stopping Keytruda, given limited treatment options for cancer. 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 High risk for DKA.      - Interventions:  Will discuss ketone monitoring and sick-day management at next visit      UTI symptoms  Ongoing urinary symptoms indicate possible unresolved or recurrent infection.     RECOMMENDATIONS:   5.   Consider repeat urinalysis or further evaluation if urinary symptoms persist     Medication management  Medication Reconciliation: I have reviewed the patient's allergies, medications and history as noted in the electronic medical record and updated as necessary..   - Rx Insurance: Medicare Supplement    Education:  - Provided on: ER precautions - N/V, fruity breath, rapid breathing    - He was given instructions on how to contact me in the event of adverse drug event, questions, or concerns.    Follow-up:   Follow-up with pharmacist  in 3-5 days  Next PCP visit: 1/31/2025    - Home Monitoring Records: CGM data.  - Labs: A1c due on or after 12/24/2025    Subjective:     Alejandro Adames is a 69 y.o. male with a history of diabetes with long term use of insulin. Diabetes course has acutely worsened.     Patient initially reported urinary symptoms of increased frequency (every 20 minutes), pain/pressure, and cloudy urine on 1/15/2025. After urine C&S, he completed a course of oral antibiotics for UTI. He finished 5 days ago. Today, the patient continues to report urinary frequency (every 60 minutes), pressure, and cloudy urine. Also loose bowels which started when he began ABX treatment. Has polydipsia but denies polphagia, fatigue, blurry vision, or HAs.    Glucose readings have been elevated since onset of UTI. Patient's basal/bolus insulin regimen has been most recently  increased by PCP office earlier today. He was also instructed to give additional injection of 10 units of Humalog this afternoon. Patient confirmed administering this dose.     Patient with metastatic renal cell carcinoma to bone, recommended treatment: Keytruda q6 weeks (last infusion 1/2/2025) + Lenvima daily. Lenvima on hold pending January biopsy results. He was hospitalized in 11/2024 for DKA following his first dose of Keytruda. 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         DM Self-Management:  - Self-monitoring: is performed regularly. He checks glucose levels using CGM. He uses phone as reader.   - He is connected to [employer].   - Hypoglycemic episodes: denies any recent - History of Level 3/severe hypoglycemic event: none   - Hypoglycemia symptoms: N/A - Treatment of hypoglycemia: discussed treatment   Glucometer: Yes, Brand: OneTouch Verio Reflect  CGM: Yes, Brand: FreeStyle Ольга 3     Current DM Regimen:  Lantus 25 units SQ daily  Humalog 12 units SQ AC           DM History:  Health Status: Complex; Goals - A1c: <8%; Time In Range: >70% with TBR <1% of time per ADA Guidelines for older adults.  Microvascular complications: none reported  Cardiovascular complications: none reported  - Statin: Yes   - ACEi/ARB: No, ACEi cough   Hospitalizations related to DM:  11/2024: DKA  Previous DM medications:   Metformin  MounSpencer patrickempic    CV Risk Factors:  - HF: No  - HTN: Yes - Obesity: No   - CKD: Decreased GFR - stage III, Ø albuminuria - Dyslipidemia: Yes - Smoking:  reports that he has quit smoking. He has never used smokeless tobacco.     - Thyroid disorder: CT Chest (10/25/2024): 1 cm left thyroid nodule. No follow-up needed.    - History of pancreatitis: No    Drug Therapy Problems:  - Medication Adherence: Responsible for medication management: patient. Patient is compliant all of the time.    Preventative Care:  DM Eye Exam: Due soon on: 1/31/2025 ; No  Retinopathy on Exam OU  Diabetic Foot Exam: Up to date; Low Risk: Normal Plantar Sensation   Kidney Health: Up to date    CGM Data Review:     Device: FreeStyle Ольга 3  Dates: 1/16 - 1/29/2025  Usage: 95%  Average glucose (mg/dL): 326  GMI (approx. lab A1c): 11.1%  Glycemic variability (CV): 25.3%    Glycemic patterns: persistent hyperglycemia throughout day and night, lowest values between 6am - 12 pm  Hypoglycemia: none     Metric CGM Target   Time above range (TAR) Very High >250 mg/dL 81 <5%   Time above range (TAR) High >180 mg/dL 13 <25%   Time in range (TIR)  mg/dL 6 >70%   Time below range (TBR) Low <70 mg/dL 0 <4%    Time below range (TBR)Very Low <54 mg/dL 0 <1%     More than 72 hours of data was reviewed. Report to be scanned to chart.        Meds/Allergies     Current Outpatient Medications:     Alcohol Swabs (Alcohol Prep) PADS, Use to prep skin prior to placing blood sugar monitor, Disp: 100 each, Rfl: 1    amLODIPine (NORVASC) 5 mg tablet, Take 1 tablet (5 mg total) by mouth daily, Disp: 100 tablet, Rfl: 3    atorvastatin (LIPITOR) 40 mg tablet, TAKE 1 TABLET BY MOUTH EVERY DAY, Disp: 100 tablet, Rfl: 1    Continuous Glucose Sensor (FreeStyle Ольга 3 Plus Sensor) MISC, Use 1 each Every 15 days for blood sugar monitoring *receives through Exec*, Disp: , Rfl:     glucose blood (OneTouch Verio) test strip, Check blood sugars once daily. Please substitute with appropriate alternative as covered by patient's insurance. Dx: E11.65, Disp: 100 each, Rfl: 3    insulin glargine (LANTUS SOLOSTAR) 100 units/mL injection pen, Inject 18 Units under the skin daily, Disp: 30 mL, Rfl: 1    insulin lispro (HumaLOG) 100 units/mL injection pen, Inject 6 Units under the skin 3 (three) times a day with meals (Patient taking differently: Inject 8 Units under the skin 3 (three) times a day with meals), Disp: 15 mL, Rfl: 3    Insulin Pen Needle 32G X 4 MM MISC, Use 3 (three) times a day, Disp: 300 each, Rfl:  3    Lenvatinib, 20 MG Daily Dose, (Lenvima, 20 MG Daily Dose,) 2 x 10 MG CPPK, Take 20 mg by mouth daily (Patient not taking: Reported on 12/31/2024), Disp: 30 each, Rfl: 5    menthol-zinc oxide (CALMOSPETINE) 0.44-20.625 %, Apply topically 2 (two) times a day, Disp: 113 g, Rfl: 3    OneTouch Delica Lancets 33G MISC, Check blood sugars once daily. Please substitute with appropriate alternative as covered by patient's insurance. Dx: E11.65, Disp: 100 each, Rfl: 3    oxyCODONE (ROXICODONE) 5 immediate release tablet, Take 0.5 tablets (2.5 mg total) by mouth every 8 (eight) hours as needed for severe pain for up to 10 doses Max Daily Amount: 7.5 mg (Patient not taking: Reported on 1/17/2025), Disp: 5 tablet, Rfl: 0    senna-docusate sodium (SENOKOT S) 8.6-50 mg per tablet, Take 1 tablet by mouth 2 (two) times a day as needed for constipation, Disp: , Rfl:     tamsulosin (FLOMAX) 0.4 mg, Take 1 capsule (0.4 mg total) by mouth daily with dinner, Disp: 90 capsule, Rfl: 3    triamcinolone (KENALOG) 0.1 % cream, APPLY TOPICALLY TO THE AFFECTED AREA TWICE DAILY (Patient not taking: No sig reported), Disp: 30 g, Rfl: 0  No current facility-administered medications for this visit.    Facility-Administered Medications Ordered in Other Visits:     acetaminophen (TYLENOL) tablet 650 mg, 650 mg, Oral, Q4H PRN, Ricki Mac MD    fentaNYL injection, , Intravenous, PRN, Ricki Mac MD, 25 mcg at 01/17/25 0855    lidocaine 1% buffered, , Infiltration, PRN, Ricki Mac MD, 10 mL at 01/17/25 0855    midazolam (VERSED) injection, , , PRN, Ricki Mac MD, 0.5 mg at 01/17/25 0855    sodium chloride 0.9 % infusion, 75 mL/hr, Intravenous, Continuous, Jose Baker MD, Stopped at 01/17/25 1033     Allergies   Allergen Reactions    Captopril Cough    Crestor [Rosuvastatin] Diarrhea    Enalapril Cough     Objective     Vital Signs:    Estimated body mass index is 23.13 kg/m² as calculated from the following:    Height as of an earlier  "encounter on 1/29/25: 5' 10\" (1.778 m).    Weight as of an earlier encounter on 1/29/25: 73.1 kg (161 lb 3.2 oz).     Pertinent Lab Data:  Lab Results   Component Value Date    SODIUM 130 (L) 01/24/2025    K 4.4 01/24/2025     01/24/2025    CO2 22 01/24/2025    BUN 33 (H) 01/24/2025    CREATININE 1.66 (H) 01/24/2025    CALCIUM 9.0 01/24/2025     Lab Results   Component Value Date    [id number] 2.100 10/21/2024      Lab Results   Component Value Date    EGFR 44 (L) 01/24/2025     Administrative Statements   Pharmacist Tracking Tool:   Reason For Outreach: Embedded Pharmacist  Demographics:  Intervention Method: Video  Type of Intervention: Follow-Up  Topics Addressed: Diabetes and Other  Pharmacologic Interventions: Prevent or Manage MAISHA  Non-Pharmacologic Interventions: Care coordination, Disease state education, and Personal CGM  Time:  Direct Patient Care:  25  mins  Care Coordination:  25  mins  Recommendation Recipient: Patient/Caregiver  Outcome: Accepted    Documentation under collaborative practice agreement. Orders pended for PCP signature if needed.    Sharon Mar, PharmD  Clinical Integration Pharmacist  [facility]  "

## 2025-01-29 NOTE — PATIENT INSTRUCTIONS
Take 10 units when you get home  Increase to 12 units pre meal  25 long acting at night    Call with blood sugars    Any nauseas vomiting or dizziness go the emergency room    Call or message us Friday with blood sugars    1. Type 2 diabetes mellitus with stage 3a chronic kidney disease, without long-term current use of insulin ([facility])  Assessment & Plan:    Lab Results   Component Value Date    HGBA1C 8.3 (A) 12/24/2024     Increase to 10 units pre meal, 25 units at night.  Call in with blood sugars in two days, remains asymptomatic from hyperglycemia, first elevated when he had UTI.      Orders:    Fingerstick Glucose (POCT)    Orders:  -     Fingerstick Glucose (POCT)

## 2025-01-29 NOTE — Clinical Note
Spoke with patient. Please see recommendations for Friday follow-up. I will check in with patient on Monday.

## 2025-01-29 NOTE — PROGRESS NOTES
Hematology/Oncology Outpatient Office Note    Date of Service: 2025    [facility]ATOLOG[facility] [address]  [place] [address]  [phone]    Reason for Consultation:   Chief Complaint   Patient presents with    Follow-up       Cancer Stage at diagnosis: IV    Referral Physician: No ref. provider found    Primary Care Physician:  Wayne Uriarte Jr, MD     Nickname: Charli    Lives with his brother, Gordy Ellis ECO     Today's ECO-3    Goals and Barriers:  Current Goal: Minimize effects of disease burden, extend life.   Barriers to accomplishing this: malaise    Patient's Capacity to Self Care:  none    ASSESSMENT & PLAN      Diagnosis ICD-10-CM Associated Orders   1. Metastatic renal cell carcinoma to bone (HCC)  C79.51     C64.9               This is a 69 y.o. c PMHx notable for DM, HTN, prostate cancer under good control, being seen in consultation for metastatic RCC    He has normocytic anemia from his metastatic malignancy, no evidence of iron deficiency.    Discussion of decision making  Oncology history updated, accordingly, during this visit  Goals of care/patient communication  I discussed with the patient the clinical course leading up to their cancer diagnosis. I reviewed relevant office notes, imaging reports and pathology result as well.  I told the patient that this is a case of incurable disease and what this means. We discussed that the goal of anti-cancer therapy is to provide best quality of life, extend overall survival, and progression free survival as shown in clinical trials. We also discussed that there might be a point when the cancer will no longer respond to this anti-neoplastic therapy. As a result, we also discussed the role of the palliative care team being introduced early in the treatment course. We will be making this referral  I explained the risks/benefits of the proposed cancer therapy: Lenvima +  Keytruda and after discussion including understanding risks of possible life-threatening complications and therapy-related malignancy development, informed consent for blood products and treatment has been signed and obtained.  TNM/Staging At Diagnosis  Cancer Staging   Malignant neoplasm of prostate (HCC)  Staging form: Prostate, AJCC 8th Edition  - Clinical: Stage IIB (cT1c, cN0, cM0, PSA: 4.9, Grade Group: 2) - Signed by Isaak Castellon MD on 10/24/2024  Prostate specific antigen (PSA) range: Less than 10  [place] score: 7  Histologic grading system: 5 grade system    Renal cell carcinoma of left kidney (HCC)  Staging form: Kidney, AJCC 8th Edition  - Clinical: Stage IV (cTX, cN1, cM1) - Signed by Isaak Castellon MD on 10/24/2024    Disease Features/Tumor Markers/Genetics  Tumor Marker: PSA  1/3/2019: 5.6  4/8/2019: 4.9  10/17/2022: 1.3  10/19/2024: 0.355  Notable Path Features:   10/19/2024 inguinal LN bx: retroperitoneal bx is positive this is most compatible with renal primary   Guardant NGS: 11/4/2024: TMB 12 muts/Mb, VHL mutation (can use Belzutifan), MSI stable  CARIS NGS: VHL mutation    1/17/2025: Retroperitoneal mass:  Scant atypical epithelial cells present highly suspicious for metastatic renal carcinoma  Treatment: Lenvima + Keytruda  Other Supportive care:  Treatment Team Members  Surgeon  Rad Onc  Palliative  [facility]: Dr. Connors  Labs  10/24/2024: creat 1.75  11/12/2024: creat 1.42  Diagnostics  10/18/2024 CT Chest w/o c: Nothing to suggest malignancy in the chest. The 3 mm left lower lobe nodule is of doubtful significance.  Abd/pelv w/c: Moderate left hydronephrosis with peripelvic/proximal periureteral inflammatory stranding secondary to obstructing large left retroperitoneal toribio conglomerate. Conglomerate of left retroperitoneal nodes measuring approximately 6.5 x 5 x 13 cm (2:106)   Enlarged left pelvic and other retroperitoneal nodes as described  3 mm sclerotic lesions in the right iliac  body and right femoral head  10/26/2024 MRI Abd w/wo c: No renal cortical mass is identified bilaterally. Mild urothelial thickening and enhancement within the left renal pelvis without measurable lesion.   11/5/2024  Tumor board discussion. Recommend urology follow up ASAP with Dr. Castro. Treat systemically  11/7/2024: NM Bone scan: No focal tracer activity characteristic of osseous metastatic disease.  12/3  tumor board: Consider biopsy of retroperitoneal mass for assist of pathology differentiation  1/24/2025 CT CAP w/c: stable  The conglomerate toribio mass which invades the left psoas muscle is also smaller, currently 4.8 x 4.2 cm, previously 5.1 x 4.9 cm   An index left external iliac node measures 2.8 x 1.6 cm, previously 2.8 x 1.9 cm (series 3 image 212.)  Another index left external iliac node currently measures 2.2 x 2.1 cm, previously 2.5 x 2.4 cm  Interval development of multiple irregular shaped opacities in the lungs with upper lobe predominance (Their appearance is not typical for metastatic disease, rather this is probably an infectious process). For example, there is a right upper lobe apical 2.2 x 1.5 cm opacity (series 6 image 27.) There is also a 3.3 x 1.8 cm medial left upper lobe   lesion that invades into the anterior mediastinum      Discussion of decision making    I personally reviewed the following lab results, the image studies, pathology, other specialty/physicians consult notes and recommendations, and outside medical records. I had a lengthy discussion with the patient and shared the work-up findings. We discussed the diagnosis and management plan as below. I spent 42 minutes reviewing the records (labs, clinician notes, outside records, medical history, ordering medicine/tests/procedures, monitoring of anti-neoplastic toxicities, interpreting the imaging/labs previously done) and coordination of care as well as direct time with the patient today, of which greater than 50% of the  time was spent in counseling and coordination of care with the patient/family.      Plan/Labs  F/u Urologist at [facility] for prostate cancer monitoring   F/u for Palliative care for advanced disease  Zometa 4 mg q12 weeks  Oncology genetics counselor referral made previously  Coordination with PCP to see if he can come off of Mounjaro  PT referral for overall poor performance status  Restaging CT CAP w/c ordered 4/21/2025   We discussed Lenvima 20 mg daily + Keytruda 400 mg q6 weeks which can be used for both clear cell and non clear cell indication as the histology is not clear to which subtype we are dealing with; holding off on Lenvima until we get his upcoming January biopsy back    Follow Up: q4 weeks for 3 months, then q12 weeks scheduled thru 4/29/2025 more*    All questions were answered to the patient's satisfaction during this encounter. The patient knows the contact information for our office and knows to reach out for any relevant concerns related to this encounter. They are to call for any temperature 100.4 or higher, new symptoms including but not restricted to shaking chills, decreased appetite, nausea, vomiting, diarrhea, increased fatigue, shortness of breath or chest pain, confusion, and not feeling the strength to come to the clinic. For all other listed problems and medical diagnosis in their chart - they are managed by PCP and/or other specialists, which the patient acknowledges. Thank you very much for your consultation and making us a part of this patient's care. We are continuing to follow closely with you. Please do not hesitate to reach out to me with any additional questions or concerns.    Isaak Castellon MD  Hematology & Medical Oncology Staff Physician             Disclaimer: This document was prepared using [employer] Fluency Direct technology. If a word or phrase is confusing, or does not make sense, this is likely due to recognition error which was not discovered during this clinician's  review. If you believe an error has occurred, please contact me through [facility] service line for amena?cation.      ONCOLOGY HISTORY OF PRESENT ILLNESS        Oncology History   Prostate cancer (HCC)   6/18/2019 Initial Diagnosis    Malignant neoplasm of prostate (HCC)     10/24/2024 -  Cancer Staged    Staging form: Prostate, AJCC 8th Edition  - Clinical: Stage IIB (cT1c, cN0, cM0, PSA: 4.9, Grade Group: 2) - Signed by Isaak Castellon MD on 10/24/2024  Prostate specific antigen (PSA) range: Less than 10  Kayley score: 7  Histologic grading system: 5 grade system       Metastatic renal cell carcinoma to bone (HCC)   10/24/2024 Initial Diagnosis    Renal cell carcinoma of left kidney (HCC)     10/24/2024 -  Cancer Staged    Staging form: Kidney, AJCC 8th Edition  - Clinical: Stage IV (cTX, cN1, cM1) - Signed by Isaak Castellon MD on 10/24/2024       11/14/2024 -  Chemotherapy    alteplase (CATHFLO), 2 mg, Intracatheter, Every 1 Minute as needed, 2 of 6 cycles  pembrolizumab (KEYTRUDA) IVPB, 400 mg, Intravenous, Once, 2 of 6 cycles  Administration: 400 mg (11/14/2024), 400 mg (1/2/2025)       History of prostate adenocarcinoma localized intermediate-favorable risk adenocarcinoma of the prostate (Stage II - cT1c, cN0, cM0, Kayley Score: 3+4=7, PSA: 4.9)  s/p SBRT (Administered 3,700-Gy over 5-fractions - Completed on September 18th, 2019)  11/27/2024: DKA admission    SUBJECTIVE  (INTERVAL HISTORY)      Clotting History None   Bleeding History None   Cancer History Prostate s/p above treatment, RCC   Family Cancer History None   H/O Blood/Plt Transfusion None   Tobacco/etoh/drug abuse 2 PPD x 20 years, quit at age 49, no etoh abuse or rec drug use           Retired Electric  (retired)     Improved appetite.  No acute issues.    I have reviewed the relevant past medical, surgical, social and family history. I have also reviewed allergies and medications for this patient.    Review of Systems  Baseline  weight: 175 lbs    Lost 13 lbs while on Mounjaro, since 7/2024, this has since been stopped.    Denies F/C, N/V, SOB, CP, LH, HA, rash, itching, gen weakness, melena, hematuria, hematochezia, falls.        A 10-point review of system was performed, pertinent positive and negative were detailed as above. Otherwise, the 10-point review of system was negative.      Past Medical History:   Diagnosis Date    Cancer (HCC)     pt unsure of primary site poss. bone vs kidney    Cutaneous skin tags 01/02/2019    Diabetes mellitus (HCC)     Hypertension     Ureteral obstruction     L nephrostomy tube in place 12/26/204    internal placement otday 12/26/204    Uses walker     in rehab presently   12/26/2024       Past Surgical History:   Procedure Laterality Date    IR BIOPSY INGUINAL LYMPH NODE  10/19/2024    IR BIOPSY RETROPERITONEUM  1/17/2025    IR INTERNAL URETERAL STENT PLACEMENT  12/26/2024    IR NEPHROSTOMY TO NEPHROURETERAL STENT  11/19/2024    IR NEPHROSTOMY TUBE PLACEMENT  10/19/2024       Family History   Problem Relation Age of Onset    No Known Problems Brother     Stroke Mother        Social History     Socioeconomic History    Marital status: Single     Spouse name: Not on file    Number of children: Not on file    Years of education: Not on file    Highest education level: Not on file   Occupational History    Occupation: Work history - travels a lot   Tobacco Use    Smoking status: Former    Smokeless tobacco: Never   Vaping Use    Vaping status: Never Used   Substance and Sexual Activity    Alcohol use: Yes     Alcohol/week: 2.0 - 3.0 standard drinks of alcohol     Types: 2 - 3 Cans of beer per week     Comment: social    Drug use: No    Sexual activity: Not Currently   Other Topics Concern    Not on file   Social History Narrative    Engages in travel abroad    Living independently alone'     Social Drivers of Health     Financial Resource Strain: Not on file   Food Insecurity: No Food Insecurity  (12/24/2024)    Hunger Vital Sign     Worried About Running Out of Food in the Last Year: Never true     Ran Out of Food in the Last Year: Never true   Transportation Needs: No Transportation Needs (12/24/2024)    PRAPARE - Transportation     Lack of Transportation (Medical): No     Lack of Transportation (Non-Medical): No   Physical Activity: Not on file   Stress: Not on file   Social Connections: Not on file   Intimate Partner Violence: Unknown (11/27/2024)    Nursing IPS     Feels Physically and Emotionally Safe: Not on file     Physically Hurt by Someone: Not on file     Humiliated or Emotionally Abused by Someone: Not on file     Physically Hurt by Someone: No     Hurt or Threatened by Someone: No   Housing Stability: Low Risk  (12/24/2024)    Housing Stability Vital Sign     Unable to Pay for Housing in the Last Year: No     Number of Times Moved in the Last Year: 0     Homeless in the Last Year: No       Allergies   Allergen Reactions    Captopril Cough    Crestor [Rosuvastatin] Diarrhea    Enalapril Cough       Current Outpatient Medications   Medication Sig Dispense Refill    Alcohol Swabs (Alcohol Prep) PADS Use to prep skin prior to placing blood sugar monitor 100 each 1    amLODIPine (NORVASC) 5 mg tablet Take 1 tablet (5 mg total) by mouth daily 100 tablet 3    atorvastatin (LIPITOR) 40 mg tablet TAKE 1 TABLET BY MOUTH EVERY  tablet 1    Continuous Glucose Sensor (FreeStyle Ольга 3 Plus Sensor) MISC Use 1 each Every 15 days for blood sugar monitoring *receives through [employer]*      glucose blood (OneTouch Verio) test strip Check blood sugars once daily. Please substitute with appropriate alternative as covered by patient's insurance. Dx: E11.65 100 each 3    insulin glargine (LANTUS SOLOSTAR) 100 units/mL injection pen Inject 18 Units under the skin daily (Patient taking differently: Inject 22 Units under the skin daily) 30 mL 1    insulin lispro (HumaLOG) 100 units/mL injection pen Inject 6  Units under the skin 3 (three) times a day with meals (Patient taking differently: Inject 8 Units under the skin 3 (three) times a day with meals) 15 mL 3    Insulin Pen Needle 32G X 4 MM MISC Use 3 (three) times a day 300 each 3    menthol-zinc oxide (CALMOSPETINE) 0.44-20.625 % Apply topically 2 (two) times a day 113 g 3    OneTouch Delica Lancets 33G MISC Check blood sugars once daily. Please substitute with appropriate alternative as covered by patient's insurance. Dx: E11.65 100 each 3    tamsulosin (FLOMAX) 0.4 mg Take 1 capsule (0.4 mg total) by mouth daily with dinner 90 capsule 3    dronabinol (MARINOL) 5 MG capsule Take 1 capsule (5 mg total) by mouth 2 (two) times a day before meals (Patient not taking: Reported on 1/17/2025) 60 capsule 1    folic acid (FOLVITE) 1 mg tablet Take 1 tablet (1 mg total) by mouth daily 30 tablet 0    Lenvatinib, 20 MG Daily Dose, (Lenvima, 20 MG Daily Dose,) 2 x 10 MG CPPK Take 20 mg by mouth daily (Patient not taking: Reported on 12/31/2024) 30 each 5    oxyCODONE (ROXICODONE) 5 immediate release tablet Take 0.5 tablets (2.5 mg total) by mouth every 8 (eight) hours as needed for severe pain for up to 10 doses Max Daily Amount: 7.5 mg (Patient not taking: Reported on 1/17/2025) 5 tablet 0    senna-docusate sodium (SENOKOT S) 8.6-50 mg per tablet Take 1 tablet by mouth 2 (two) times a day as needed for constipation      triamcinolone (KENALOG) 0.1 % cream APPLY TOPICALLY TO THE AFFECTED AREA TWICE DAILY (Patient not taking: No sig reported) 30 g 0     No current facility-administered medications for this visit.     Facility-Administered Medications Ordered in Other Visits   Medication Dose Route Frequency Provider Last Rate Last Admin    acetaminophen (TYLENOL) tablet 650 mg  650 mg Oral Q4H PRN Ricki Mac MD        fentaNYL injection   Intravenous PRN Ricki Mac MD   25 mcg at 01/17/25 0855    lidocaine 1% buffered   Infiltration PRN Ricki Mac MD   10 mL at 01/17/25 0855     midazolam (VERSED) injection    PRN Ricki Mac MD   0.5 mg at 01/17/25 0855    sodium chloride 0.9 % infusion  75 mL/hr Intravenous Continuous Jose Baker MD   Stopped at 01/17/25 1033       (Not in a hospital admission)      Objective:     24 Hour Vitals Assessment:     Vitals:    01/29/25 1058   BP: 140/72   Pulse: 86   Resp: 18   Temp: 97.6 °F (36.4 °C)   SpO2: 99%             PHYSICIAN EXAM:    General: Appearance: alert, cooperative, no distress.  HEENT: Normocephalic, atraumatic. No scleral icterus. conjunctivae clear. EOMI.  Chest: No tenderness to palpation. No open wound noted.  Lungs: Clear to auscultation bilaterally, Respirations unlabored.  Cardiac: Regular rate, +S1and S2  Abdomen: Soft, non-tender, non-distended. Bowel sounds are normal.   Extremities:  No edema, cyanosis, clubbing.  Skin: Skin color, turgor are normal. No rashes.  Lymphatics: no palpable supra-cervical, axillary, or inguinal adenopathy  Neurologic: Awake, Alert, and oriented, no gross focal deficits noted b/l.       DATA REVIEW:    Pathology Result:    Final Diagnosis   Date Value Ref Range Status   01/17/2025   Final    A.  Retroperitoneal mass:  Scant atypical epithelial cells present highly suspicious for metastatic renal carcinoma.     10/26/2024   Final    A. Urine, Renal, Left, ThinPrep:  Atypical urothelial cells (AUC) - see comment.  Benign squamous cells and mixed inflammatory cells.    Satisfactory for evaluation.    Comment:  The above diagnostic category is from the recently published book, The Sandra System for Reporting Urinary Cytology, and is in keeping with the ongoing effort for utilization of standardized diagnostic terminology in urine cytology.*    *The Sanrda System for Reporting Urinary Cytology. Jennifer Blanca, Caitlyn Kerr, Garnt Aguila; 2016.     Interpretation performed at [facility] [address] [address]      10/19/2024   Final    A. Lymph node, inguinal, left,  biopsy:  -   Metastatic carcinoma most suggestive of renal origin, see comment.     Comment: The patient's history of prostate cancer with new onset acute renal failure and retroperitoneal mass are noted. As per radiology there are no clear masses identified. The current sample is positive for metastatic carcinoma. The morphologic and immunohistochemical findings are most consistent with renal origin. However, in the absence of a clear mass lesion, further clinical and radiographic work-up is advised.    Intradepartmental consultation is in agreement (IK/CV).  Dr. Castellon is notified of the findings by Dr. Hines via NOT[employer] Secure Chat on 10/24/24.     04/12/2019   Final    A. Prostate, right peripheral zone, needle core biopsy:  - Benign prostate glands and stroma.    B. Prostate, right central zone, needle core biopsy:  - Prostatic adenocarcinoma, Kay[place] grade 3 + 3 = 6/10 (Grade group 1), involving one of two cores and 5% of the involved core.  - Focal high-grade prostatic intraepithelial neoplasia (HGPIN).  - Perineural invasion is absent.    C. Prostate, left peripheral zone, needle core biopsy:  - Prostatic adenocarcinoma, [place] grade 3 + 4 = 7 (>95% pattern 3 and <5% pattern 4, Grade group 2), involving one of 4 cores and 20% of the tissue.  - Prostatic adenocarcinoma, [place] grade 3 + 3 = 6/10 (Grade group 1), involving two of four cores and 5%, 10% of involved cores.  - Perineural invasion is absent.    D. Prostate, left central zone, needle core biopsy:  - Benign prostate glands and stroma.    Comment: Immunohistochemistry for prostate triple stain multiplex (,P63,[id number]) was preformed ob blocks A2, B1, C1, and C2 supporting the above diagnosis.    Intradepartmental consultation is in agreement.  Interpretation performed at [facility], [address] [place] [id number]            Image Results:   Image result are reviewed and documented in Hematology/Oncology history    CT chest abdomen pelvis  w contrast  Narrative: CT CHEST, ABDOMEN AND PELVIS WITH IV CONTRAST    INDICATION: C64.2: Malignant neoplasm of left kidney, except renal pelvis. Hematology oncology note from 11/1/2024 was reviewed. Patient has history of prostate cancer status post SBRT, completed in 2019, and also left renal cell carcinoma.    The 10/19/2024 left inguinal node biopsy demonstrated metastatic carcinoma suggestive of renal origin.    Patient started chemotherapy on 11/14/2024.    COMPARISON: Abdomen and pelvic CT from 10/18/2024, chest CT from 10/18/2024, abdomen MR from 10/26/2024. Bone scan from 11/7/2024.    TECHNIQUE: CT examination of the chest, abdomen and pelvis was performed. Multiplanar 2D reformatted images were created from the source data.    This examination, like all CT scans performed in the [facility], was performed utilizing techniques to minimize radiation dose exposure, including the use of iterative reconstruction and automated exposure control. Radiation dose length   product (DLP) for this visit: 642 mGy-cm    IV Contrast: 100 mL of iohexol (OMNIPAQUE)  Enteric Contrast: Not administered.    FINDINGS:    CHEST    LUNGS: Interval development of multiple irregular shaped opacities in the lungs with upper lobe predominance. For example, there is a right upper lobe apical 2.2 x 1.5 cm opacity (series 6 image 27.) There is also a 3.3 x 1.8 cm medial left upper lobe   lesion that invades into the anterior mediastinum (series 6 image 49.)    PLEURA: Unremarkable.    HEART/GREAT VESSELS: Heart is unremarkable for patient's age. No thoracic aortic aneurysm.    MEDIASTINUM AND MICHELLE: Unremarkable.    CHEST WALL AND LOWER NECK: Unremarkable.    ABDOMEN    LIVER/BILIARY TREE: Unremarkable.    GALLBLADDER: No calcified gallstones. No pericholecystic inflammatory change.    SPLEEN: Unremarkable.    PANCREAS: Unremarkable.    ADRENAL GLANDS: Unremarkable.    KIDNEYS/URETERS: Left ureteral stent in place with  resolution of left hydronephrosis. No right-sided hydronephrosis.    STOMACH AND BOWEL: Unremarkable.    APPENDIX: No findings to suggest appendicitis.    ABDOMINOPELVIC CAVITY: Interval mild decrease in retroperitoneal adenopathy.    An index left external iliac node measures 2.8 x 1.6 cm, previously 2.8 x 1.9 cm (series 3 image 212.)    Another index left external iliac node currently measures 2.2 x 2.1 cm, previously 2.5 x 2.4 cm. (Series 2 image 215.)    The conglomerate toribio mass which invades the left psoas muscle is also smaller, currently 4.8 x 4.2 cm, previously 5.1 x 4.9 cm (series 2 image 195.)    VESSELS: Unremarkable for patient's age.    PELVIS    REPRODUCTIVE ORGANS: Unremarkable for patient's age.    URINARY BLADDER: Unremarkable.    ABDOMINAL WALL/INGUINAL REGIONS: Unremarkable.    BONES: No acute fracture or suspicious osseous lesion.  Impression: Interval mild decrease in retroperitoneal adenopathy. The conglomerate left retroperitoneal mass invading the psoas muscle, and several discrete left external iliac nodes are all mildly smaller.    Interval development of multiple irregular shaped opacities in the lungs with upper lobe predominance. For example, there is a right upper lobe apical 2.2 x 1.5 cm opacity (series 6 image 27.) There is also a 3.3 x 1.8 cm medial left upper lobe lesion   that invades into the anterior mediastinum (series 6 image 49.) Their appearance is not typical for metastatic disease, rather this is probably an infectious process.    The study was marked in EPIC for significant notification.    Workstation performed: [id number]      LABS:  Lab data are reviewed and documented in [facility] history.       Lab Results   Component Value Date    HGB 9.2 (L) 01/24/2025    HCT 29.6 (L) 01/24/2025    MCV 89.2 01/24/2025     (H) 01/24/2025    WBC 6.8 01/24/2025    NRBC 0 10/21/2024    BANDSPCT 6 11/27/2024     Lab Results   Component Value Date    K 4.4 01/24/2025      "01/24/2025    CO2 22 01/24/2025    BUN 33 (H) 01/24/2025    CREATININE 1.66 (H) 01/24/2025    CALCIUM 9.0 01/24/2025    CORRECTEDCA 10.5 (H) 11/27/2024    AST 10 01/24/2025    ALT 13 01/24/2025    ALKPHOS 131 01/24/2025    EGFR 44 (L) 01/24/2025       Lab Results   Component Value Date    IRON 36 (L) 01/13/2025    TIBC 259 01/13/2025    FERRITIN 370 01/13/2025    FERRITIN 494 (H) 10/19/2024    FERRITIN 183 10/17/2022    FERRITIN 174 12/23/2021       Lab Results   Component Value Date    [id number] 606 10/19/2024       No results for input(s): \"WBC\", \"CREAT\", \"PLT\" in the last 72 hours.    By:  Isaak Castellon MD, 1/29/2025, 11:06 AM                                  "

## 2025-01-29 NOTE — PROGRESS NOTES
Name: Alejandro Adames      : 1955      MRN: [id number]  Encounter Provider: Romie Ely MD  Encounter Date: 2025   Encounter department: [facility] PRIMARY CARE  :  Assessment & Plan  Type 2 diabetes mellitus with stage 3a chronic kidney disease, without long-term current use of insulin ([facility])    Lab Results   Component Value Date    HGBA1C 8.3 (A) 2024     Increase to 12 units pre meal, 25 units at night.  Call in with blood sugars in two days, remains asymptomatic from hyperglycemia, first elevated when he had UTI.     Possible Keytruda reaction?    Will take extra dose 10 units when you get home.    Meeting with clinical pharmacy later today as well    Will follow up in two days to review blood sugars.    If symptoms, nausea, vomiting, light headed advised to go the emergency room    Orders:    Fingerstick Glucose (POCT)           History of Present Illness   69 year old with pmh of DM, renal cell carcinoma, presenting for hypergylcemia.    Last week increased insulin pre meal to 9 units pre dinnter, his blood sugars continue to worsen and patient increase AM to 9 units as well, yesterday increase night time by 3 units.    Glucose in office 505, not blood sugars increased when diagnoised with UTI, is on keytruda as well.    Has polyuria and polydipsia, otherwise feeling well with no lightheadiness, nausea or vomiting.    Did omelet and homefries for lunch about 2 hours ago.         Review of Systems   Constitutional:  Negative for activity change and appetite change.   Respiratory:  Negative for apnea, chest tightness and shortness of breath.    Cardiovascular:  Negative for chest pain and palpitations.   Gastrointestinal:  Positive for diarrhea. Negative for abdominal distention, abdominal pain, constipation, nausea and vomiting.   Musculoskeletal:  Negative for back pain.       Objective   /80 (BP Location: Left arm, Patient Position: Sitting, Cuff Size:  "Standard)   Pulse 84   Temp (!) 97.3 °F (36.3 °C) (Tympanic)   Resp 20   Ht 5' 10\" (1.778 m)   Wt 73.1 kg (161 lb 3.2 oz)   SpO2 100%   BMI 23.13 kg/m²      Physical Exam  Vitals and nursing note reviewed.   Constitutional:       General: He is not in acute distress.     Appearance: He is well-developed.   HENT:      Head: Normocephalic and atraumatic.   Eyes:      Conjunctiva/sclera: Conjunctivae normal.   Cardiovascular:      Rate and Rhythm: Normal rate and regular rhythm.      Heart sounds: No murmur heard.  Pulmonary:      Effort: Pulmonary effort is normal. No respiratory distress.      Breath sounds: Normal breath sounds.   Abdominal:      Palpations: Abdomen is soft.      Tenderness: There is no abdominal tenderness.   Musculoskeletal:         General: No swelling.      Cervical back: Neck supple.   Skin:     General: Skin is warm and dry.      Capillary Refill: Capillary refill takes less than 2 seconds.   Neurological:      Mental Status: He is alert.   Psychiatric:         Mood and Affect: Mood normal.         "

## 2025-01-30 ENCOUNTER — TELEPHONE (OUTPATIENT)
Dept: FAMILY MEDICINE CLINIC | Facility: CLINIC | Age: 70
End: 2025-01-30

## 2025-01-30 ENCOUNTER — RESULTS FOLLOW-UP (OUTPATIENT)
Dept: FAMILY MEDICINE CLINIC | Facility: CLINIC | Age: 70
End: 2025-01-30

## 2025-01-30 DIAGNOSIS — R39.9 UTI SYMPTOMS: Primary | ICD-10-CM

## 2025-01-30 NOTE — TELEPHONE ENCOUNTER
Blood sugar improved but still elevated now 320 (505 yesterday), still having urinary frequency and cloudy urine, concern UTI never resolved, recheck UA/Culture could be secondary to hyperglycemia.  He is otherwise feeling well.  He is about to use his morning dose of insulin.

## 2025-01-30 NOTE — ASSESSMENT & PLAN NOTE
Lab Results   Component Value Date    GLUTAMICACID 472.7 (H) 11/27/2024    INSULINAUTO <5.0 11/27/2024     Patient is receiving Keytruda. 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     Would not anticipate stopping Keytruda, given limited treatment options for cancer. 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 High risk for DKA.      - Interventions:  Will discuss ketone monitoring and sick-day management at next visit

## 2025-01-30 NOTE — TELEPHONE ENCOUNTER
Spoke with pt regarding canceling appt on 2/25 and ok to wait until 3/25 with Cande. Pt verbalized understanding.

## 2025-01-31 ENCOUNTER — DOCUMENTATION (OUTPATIENT)
Dept: HEMATOLOGY ONCOLOGY | Facility: CLINIC | Age: 70
End: 2025-01-31

## 2025-01-31 ENCOUNTER — RESULTS FOLLOW-UP (OUTPATIENT)
Dept: FAMILY MEDICINE CLINIC | Facility: CLINIC | Age: 70
End: 2025-01-31

## 2025-01-31 NOTE — TELEPHONE ENCOUNTER
Pt called today to give most recent blood sugar readings. He says it was up over 300 last night, as high as 350. It currently is 259 and dropping.    He is still waiting to hear back from PCP to discuss his abnormal urinalysis results.    Please advise.

## 2025-02-03 ENCOUNTER — RA CDI HCC (OUTPATIENT)
Dept: OTHER | Facility: HOSPITAL | Age: 70
End: 2025-02-03

## 2025-02-03 ENCOUNTER — TELEMEDICINE (OUTPATIENT)
Dept: FAMILY MEDICINE CLINIC | Facility: CLINIC | Age: 70
End: 2025-02-03

## 2025-02-03 DIAGNOSIS — N18.31 TYPE 2 DIABETES MELLITUS WITH STAGE 3A CHRONIC KIDNEY DISEASE, WITH LONG-TERM CURRENT USE OF INSULIN (HCC): Primary | ICD-10-CM

## 2025-02-03 DIAGNOSIS — Z79.4 TYPE 2 DIABETES MELLITUS WITH STAGE 3A CHRONIC KIDNEY DISEASE, WITH LONG-TERM CURRENT USE OF INSULIN (HCC): Primary | ICD-10-CM

## 2025-02-03 DIAGNOSIS — N18.31 TYPE 2 DIABETES MELLITUS WITH STAGE 3A CHRONIC KIDNEY DISEASE, WITHOUT LONG-TERM CURRENT USE OF INSULIN (HCC): ICD-10-CM

## 2025-02-03 DIAGNOSIS — N39.0 URINARY TRACT INFECTION WITHOUT HEMATURIA, SITE UNSPECIFIED: Primary | ICD-10-CM

## 2025-02-03 DIAGNOSIS — E11.22 TYPE 2 DIABETES MELLITUS WITH STAGE 3A CHRONIC KIDNEY DISEASE, WITHOUT LONG-TERM CURRENT USE OF INSULIN (HCC): ICD-10-CM

## 2025-02-03 DIAGNOSIS — E11.22 TYPE 2 DIABETES MELLITUS WITH STAGE 3A CHRONIC KIDNEY DISEASE, WITH LONG-TERM CURRENT USE OF INSULIN (HCC): Primary | ICD-10-CM

## 2025-02-03 LAB
LEFT EYE DIABETIC RETINOPATHY: NORMAL
RIGHT EYE DIABETIC RETINOPATHY: NORMAL

## 2025-02-03 PROCEDURE — PBNCHG PB NO CHARGE PLACEHOLDER: Performed by: PHARMACIST

## 2025-02-03 RX ORDER — SULFAMETHOXAZOLE AND TRIMETHOPRIM 800; 160 MG/1; MG/1
1 TABLET ORAL EVERY 12 HOURS SCHEDULED
Qty: 20 TABLET | Refills: 0 | Status: SHIPPED | OUTPATIENT
Start: 2025-02-03 | End: 2025-02-13

## 2025-02-03 NOTE — TELEPHONE ENCOUNTER
Relayed results to patient as per provider message. Patient expressed understanding and had the following question(s): Patient questioned what MRSA is. Successfully warm transferred call to clinical Merle.

## 2025-02-03 NOTE — PROGRESS NOTES
[facility]   Sharon Mar, PharmJOSEY     Alejandro Adames is a 69 y.o. male who was referred to the clinical pharmacist by Wayne Uriarte Jr, MD for diabetes. Patient presents via telephone for follow-up.      Virtual Care Documentation  Encounter provider Mariajose Novak    The Patient is located at Home and in the following state in which I hold an active license: [place].    The patient was identified by name and date of birth. Alejandro Adames was informed that this is a telemedicine visit and that the visit is being conducted through the [employer] Teams platform. He agrees to proceed.  My office door was closed. No one else was in the room. He acknowledged consent and understanding of privacy and security of the telephone platform. The patient has agreed to participate and understands they can discontinue the visit at any time.       Assessment & Plan  Type 2 diabetes mellitus with stage 3a chronic kidney disease, with long-term current use of insulin ([facility])    Lab Results   Component Value Date    HGBA1C 8.3 (A) 12/24/2024     -  Most recent A1c: above goal.   - CGM TIR: improved but remains below goal; TBR meets goal.  - Medications: well tolerated; denies any side effects from current medications.     Glucose levels poorly controlled. UTI being treated - should improve hyperglycemia.  No hypoglycemia. Will adjust basal insulin to address hyperglycemia.      - Interventions:  Increase Lantus to 28 units SQ HS, starting today  Titrate Lantus every Tuesday and Friday based on fasting blood glucose   Fasting Blood Sugar Lantus Adjustment   Above 180 Increase by 4 units   140-180 Increase by 2 units   100-140 NO CHANGE   Below 100 Reduce by 2 units   Below 70 Reduce by 4 units     3. Continue Humalog 12 units AC    Education:  - Reviewed titration evaluation and action steps with patient. He verbalized understanding.    - He was given instructions on how to contact me in  the event of adverse drug event, questions, or concerns.    Follow-up:   Follow-up with pharmacist in 2 weeks  Next PCP visit: 2/11/2025    - Home Monitoring Records: CGM data.  - Labs: A1c due on or after 3/24/2025    Subjective:     Alejandro Adames is a 69 y.o. male with a history of diabetes with long term use of insulin. Diabetes course been improving with treatment. He denies any polydipsia, polyphagia, fatigue and blurry vision. No foot burning, numbness or pain. Denies recent hypoglycemic episodes.     Patient continues to report urinary frequency. Has been prescribed Bactrim by PCP.     DM Self-Management:  - Self-monitoring: is performed regularly. He checks glucose levels using CGM. He uses phone as reader.   - He is connected to [employer].   - Hypoglycemic episodes: denies any recent - History of Level 3/severe hypoglycemic event: none   - Hypoglycemia symptoms: N/A - Treatment of hypoglycemia: discussed treatment   Glucometer: Yes, Brand: OneTouch Verio Reflect  CGM: Yes, Brand: FreeStyle Ольга 3     Current DM Regimen:  Lantus 25 units SQ HS  Humalog 12 units SQ AC           DM History:  Health Status: Complex; Goals - A1c: <8%; Time In Range: >70% with TBR <1% of time per ADA Guidelines for older adults.  Microvascular complications: none reported  Cardiovascular complications: none reported  - Statin: Yes   - ACEi/ARB: No, ACEi cough   Hospitalizations related to DM:  11/2024: DKA  Previous DM medications:   Metformin  Mounjaro, Ozempic    CV Risk Factors:  - HF: No  - HTN: Yes - Obesity: No   - CKD: Decreased GFR - stage III, Ø albuminuria - Dyslipidemia: Yes - Smoking:  reports that he has quit smoking. He has never used smokeless tobacco.     - Thyroid cancer: CT Chest (10/25/2024): 1 cm left thyroid nodule. No follow-up needed.    - History of pancreatitis: No    Drug Therapy Problems:  - Medication Adherence: Responsible for medication management: patient. Patient is compliant all of the  time.    Preventative Care:  DM Eye Exam: Up to date; No Retinopathy on Exam OU  Diabetic Foot Exam: Up to date; Low Risk: Normal Plantar Sensation   Kidney Health: Up to date    CGM Data Review:     Device: FreeStyle Ольга 3  Dates: 1/23 - 2/3/2025  Usage: 98%  Average glucose (mg/dL): 306  GMI (approx. lab A1c): 10.6%  Glycemic variability (CV): 32.3%    Glycemic patterns: persistent hyperglycemia throughout day and night, several days noted with wide variance in glucose  Hypoglycemia: none     Metric CGM Target   Time above range (TAR) Very High >250 mg/dL 74 <5%   Time above range (TAR) High >180 mg/dL 10 <25%   Time in range (TIR)  mg/dL 16 >70%   Time below range (TBR) Low <70 mg/dL 0 <4%    Time below range (TBR)Very Low <54 mg/dL 0 <1%     More than 72 hours of data was reviewed. Report to be scanned to chart.        Meds/Allergies     Current Outpatient Medications:     insulin glargine (LANTUS SOLOSTAR) 100 units/mL injection pen, Inject 25 Units under the skin daily, Disp: 30 mL, Rfl: 1    insulin lispro (HumaLOG) 100 units/mL injection pen, Inject 12 Units under the skin 3 (three) times a day with meals, Disp: 15 mL, Rfl: 3    Alcohol Swabs (Alcohol Prep) PADS, Use to prep skin prior to placing blood sugar monitor, Disp: 100 each, Rfl: 1    amLODIPine (NORVASC) 5 mg tablet, Take 1 tablet (5 mg total) by mouth daily, Disp: 100 tablet, Rfl: 3    atorvastatin (LIPITOR) 40 mg tablet, TAKE 1 TABLET BY MOUTH EVERY DAY, Disp: 100 tablet, Rfl: 1    Continuous Glucose Sensor (FreeStyle Ольга 3 Plus Sensor) MISC, Use 1 each Every 15 days for blood sugar monitoring *receives through [employer]*, Disp: , Rfl:     glucose blood (OneTouch Verio) test strip, Check blood sugars once daily. Please substitute with appropriate alternative as covered by patient's insurance. Dx: E11.65, Disp: 100 each, Rfl: 3    Insulin Pen Needle 32G X 4 MM MISC, Use 3 (three) times a day, Disp: 300 each, Rfl: 3    Lenvatinib, 20 MG  Daily Dose, (Lenvima, 20 MG Daily Dose,) 2 x 10 MG CPPK, Take 20 mg by mouth daily (Patient not taking: Reported on 12/31/2024), Disp: 30 each, Rfl: 5    menthol-zinc oxide (CALMOSPETINE) 0.44-20.625 %, Apply topically 2 (two) times a day, Disp: 113 g, Rfl: 3    OneTouch Delica Lancets 33G MISC, Check blood sugars once daily. Please substitute with appropriate alternative as covered by patient's insurance. Dx: E11.65, Disp: 100 each, Rfl: 3    oxyCODONE (ROXICODONE) 5 immediate release tablet, Take 0.5 tablets (2.5 mg total) by mouth every 8 (eight) hours as needed for severe pain for up to 10 doses Max Daily Amount: 7.5 mg (Patient not taking: Reported on 1/17/2025), Disp: 5 tablet, Rfl: 0    senna-docusate sodium (SENOKOT S) 8.6-50 mg per tablet, Take 1 tablet by mouth 2 (two) times a day as needed for constipation, Disp: , Rfl:     sulfamethoxazole-trimethoprim (BACTRIM DS) 800-160 mg per tablet, Take 1 tablet by mouth every 12 (twelve) hours for 10 days, Disp: 20 tablet, Rfl: 0    tamsulosin (FLOMAX) 0.4 mg, Take 1 capsule (0.4 mg total) by mouth daily with dinner, Disp: 90 capsule, Rfl: 3    triamcinolone (KENALOG) 0.1 % cream, APPLY TOPICALLY TO THE AFFECTED AREA TWICE DAILY (Patient not taking: No sig reported), Disp: 30 g, Rfl: 0  No current facility-administered medications for this visit.    Facility-Administered Medications Ordered in Other Visits:     acetaminophen (TYLENOL) tablet 650 mg, 650 mg, Oral, Q4H PRN, Ricki Mac MD    fentaNYL injection, , Intravenous, PRN, Ricki Mac MD, 25 mcg at 01/17/25 0855    lidocaine 1% buffered, , Infiltration, PRN, Ricki Mac MD, 10 mL at 01/17/25 0855    midazolam (VERSED) injection, , , PRN, Ricki Mac MD, 0.5 mg at 01/17/25 0855    sodium chloride 0.9 % infusion, 75 mL/hr, Intravenous, Continuous, Jose Baker MD, Stopped at 01/17/25 1033     Allergies   Allergen Reactions    Captopril Cough    Crestor [Rosuvastatin] Diarrhea    Enalapril Cough     Objective  "    Vital Signs:    Estimated body mass index is 23.13 kg/m² as calculated from the following:    Height as of 1/29/25: 5' 10\" (1.778 m).    Weight as of 1/29/25: 73.1 kg (161 lb 3.2 oz).     Pertinent Lab Data:  Lab Results   Component Value Date    SODIUM 130 (L) 01/24/2025    K 4.4 01/24/2025     01/24/2025    CO2 22 01/24/2025    BUN 33 (H) 01/24/2025    CREATININE 1.66 (H) 01/24/2025    CALCIUM 9.0 01/24/2025     Administrative Statements   Pharmacist Tracking Tool:   Reason For Outreach: Embedded Pharmacist  Demographics:  Intervention Method: Phone  Type of Intervention: Follow-Up  Topics Addressed: Diabetes and Other  Pharmacologic Interventions: Dose or Frequency Adjusted  Non-Pharmacologic Interventions: Personal CGM  Time:  Direct Patient Care:  15  mins  Care Coordination:  20  mins  Recommendation Recipient: Patient/Caregiver  Outcome: Accepted    Documentation under collaborative practice agreement. Orders pended for PCP signature if needed.    Sharon Mar, PharmD  Clinical Integration Pharmacist  [facility]  "

## 2025-02-03 NOTE — PROGRESS NOTES
E11.65  Z79.4  HCC coding opportunities          Chart Reviewed number of suggestions sent to Provider: 2     Patients Insurance     Medicare Insurance: Medicare

## 2025-02-04 ENCOUNTER — PREP FOR PROCEDURE (OUTPATIENT)
Dept: UROLOGY | Facility: AMBULATORY SURGERY CENTER | Age: 70
End: 2025-02-04

## 2025-02-04 ENCOUNTER — DOCUMENTATION (OUTPATIENT)
Dept: HEMATOLOGY ONCOLOGY | Facility: CLINIC | Age: 70
End: 2025-02-04

## 2025-02-04 ENCOUNTER — TELEPHONE (OUTPATIENT)
Dept: UROLOGY | Facility: AMBULATORY SURGERY CENTER | Age: 70
End: 2025-02-04

## 2025-02-04 ENCOUNTER — TELEPHONE (OUTPATIENT)
Age: 70
End: 2025-02-04

## 2025-02-04 DIAGNOSIS — E11.22 TYPE 2 DIABETES MELLITUS WITH STAGE 3A CHRONIC KIDNEY DISEASE, WITHOUT LONG-TERM CURRENT USE OF INSULIN (HCC): ICD-10-CM

## 2025-02-04 DIAGNOSIS — Z01.818 PREOP EXAMINATION: ICD-10-CM

## 2025-02-04 DIAGNOSIS — Z01.812 PRE-OPERATIVE LABORATORY EXAMINATION: ICD-10-CM

## 2025-02-04 DIAGNOSIS — N13.39 OTHER HYDRONEPHROSIS: Primary | ICD-10-CM

## 2025-02-04 DIAGNOSIS — N18.31 TYPE 2 DIABETES MELLITUS WITH STAGE 3A CHRONIC KIDNEY DISEASE, WITHOUT LONG-TERM CURRENT USE OF INSULIN (HCC): ICD-10-CM

## 2025-02-04 DIAGNOSIS — R39.89 SUSPECTED UTI: ICD-10-CM

## 2025-02-04 NOTE — TELEPHONE ENCOUNTER
Jackie from Urology called on behalf of pt to see if pt's 3.25 appt can be changed to a pre op clearance, I was not able to change the appt type and she did not have confirmation from patient to change the date. Is this possible to change or can patient be contacted to update his appt to another date.    Please advise      Pre op details  4.25.25  Cysto Left Stint exchange  St angelina Rees

## 2025-02-04 NOTE — PROGRESS NOTES
Oncology Navigator Note: Multidisciplinary Urology Case Review 2/4/2025    Presenting physician:  Dr. Negrita Castellon    Routed to providers to review note, place orders and notify patient as needed.     Diagnosis: Alejandro Adames is a 69 y.o. male who was presented at the Urology Oncology Multidisciplinary Tumor Conference today. Patient presented with metastatic renal cell carcinoma, currently treated with Keytruda. CT scans negative for kidney involvement. Presented for image review and treatment planning      Radiology Review:       [x] CT  [x] MRI  [] PET    Pathology Review:        1/17/25 - retroperitoneal mass  10/19/24 - inguinal lymph  node      Recommendations of Group:  Recommend next generation sequencing   Consider referral to radiation oncology       Future Appointments   Date Time Provider Department Center   2/3/2025  3:00 PM [facility]   2/11/2025  1:00 PM MD [facility] PCP [place]   2/13/2025  1:00 PM AL INF CHAIR 5 AL Infusion AL [facility]   2/20/2025 11:00 AM AL IR 1 AL IR [facility]   3/25/2025  1:15 PM Isaak Castellon MD HEM ONC ALL Practice-Onc   3/25/2025  3:45 PM MD [facility] AL PC PCP [place]   4/21/2025  2:30 PM AL CT 1 AL CT AL \[facility]\""   4/29/2025  2:00 PM Isaak Castellon MD HEM ONC ALL Practice-Onc   5/29/2025  1:00 PM Scot Ellison MD NEPH [facility]   7/9/2025  1:30 PM Isaak Castellon MD HEM ONC ALL Practice-Onc        Patient was discussed at the Multidisciplinary Urology Case review       NCCN guidelines were available for review.    The final treatment plan will be left to the discretion of the patient and the treating physician.     DISCLAIMERS:  TO THE TREATING PHYSICIAN:  This conference is a meeting of clinicians from various specialty areas who evaluate and discuss patients for whom a multidisciplinary treatment approach is being considered. Please note that the above opinion was a  consensus of the conference attendees and is intended only to assist in quality care of the discussed patient.  The responsibility for follow up on the input given during the conference, along with any final decisions regarding plan of care, is that of the patient and the patient's provider. Accordingly, appointments have only been recommended based on this information and have NOT been scheduled unless otherwise noted.      TO THE PATIENT:  This summary is a brief record of major aspects of your cancer treatment. You may choose to share a copy with any of your doctors or nurses. However, this is not a detailed or comprehensive record of your care.    Routing to office staff to review note, place orders and notify patient as needed.

## 2025-02-04 NOTE — TELEPHONE ENCOUNTER
Spoke with patient and confirmed surgery date of: 4/25/2025  Type of surgery: Cysto/ L. RGP/ L. Stent Exchange  Operating physician: Dr. Rees  Location of surgery: [facility]    Verbally went over prep with patient on: 2/4/2025  NPO  Bowel prep? No  Hospital calls afternoon prior with arrival time -Calls Friday afternoon for Monday surgeries  Patient needs ride to and from surgery outpatient  Pre-op testing to be done 2 weeks prior to surgery  CBC, BMP, A1C, Urine Culture  Blood thinners:   NONE  Clearances needed: Medical    Mailed/emailed to patient on: 2/4/2025  Copy of packet scanned into Media on:2/4/2025  Labs in packet  Soap / Bowel prep in packet  Pre-op & Post-op in packet  Dates of H&P and post-op if needed    Consent: in Media  If needed:    Medical/Cardiac Clearance: Medical  Appt with: Dr. Tellez  Appmelody date and time:  Date clearance form faxed:  Best fax number:  Office confirmed that appt on 3/25/25 will not be able to do a medical  clearance. They will reach out to pt to move appt since pt did not want me to reschedule it if they couldn't do the clearance.

## 2025-02-05 RX ORDER — INSULIN LISPRO 100 [IU]/ML
12 INJECTION, SOLUTION INTRAVENOUS; SUBCUTANEOUS
Qty: 15 ML | Refills: 3 | Status: SHIPPED | OUTPATIENT
Start: 2025-02-05

## 2025-02-06 RX ORDER — SODIUM CHLORIDE 9 MG/ML
20 INJECTION, SOLUTION INTRAVENOUS ONCE
OUTPATIENT
Start: 2025-03-27

## 2025-02-06 RX ORDER — SODIUM CHLORIDE 9 MG/ML
20 INJECTION, SOLUTION INTRAVENOUS ONCE
Status: CANCELLED | OUTPATIENT
Start: 2025-02-13

## 2025-02-07 PROBLEM — Z29.89 IMMUNOTHERAPY ENCOUNTER: Status: ACTIVE | Noted: 2025-02-07

## 2025-02-07 PROBLEM — Z79.899 HIGH RISK MEDICATION USE: Status: ACTIVE | Noted: 2025-02-07

## 2025-02-11 ENCOUNTER — TELEPHONE (OUTPATIENT)
Dept: FAMILY MEDICINE CLINIC | Facility: CLINIC | Age: 70
End: 2025-02-11

## 2025-02-11 ENCOUNTER — OFFICE VISIT (OUTPATIENT)
Dept: FAMILY MEDICINE CLINIC | Facility: CLINIC | Age: 70
End: 2025-02-11
Payer: MEDICARE

## 2025-02-11 ENCOUNTER — TELEMEDICINE (OUTPATIENT)
Dept: FAMILY MEDICINE CLINIC | Facility: CLINIC | Age: 70
End: 2025-02-11

## 2025-02-11 VITALS
RESPIRATION RATE: 12 BRPM | WEIGHT: 165.6 LBS | OXYGEN SATURATION: 99 % | HEART RATE: 78 BPM | BODY MASS INDEX: 23.71 KG/M2 | HEIGHT: 70 IN | SYSTOLIC BLOOD PRESSURE: 136 MMHG | DIASTOLIC BLOOD PRESSURE: 70 MMHG

## 2025-02-11 DIAGNOSIS — C61 PROSTATE CANCER (HCC): Chronic | ICD-10-CM

## 2025-02-11 DIAGNOSIS — R93.89 ABNORMAL CHEST CT: ICD-10-CM

## 2025-02-11 DIAGNOSIS — L97.421 CHRONIC ULCER OF LEFT HEEL LIMITED TO BREAKDOWN OF SKIN (HCC): ICD-10-CM

## 2025-02-11 DIAGNOSIS — E11.22 TYPE 2 DIABETES MELLITUS WITH STAGE 3A CHRONIC KIDNEY DISEASE, WITHOUT LONG-TERM CURRENT USE OF INSULIN (HCC): Primary | ICD-10-CM

## 2025-02-11 DIAGNOSIS — N18.31 TYPE 2 DIABETES MELLITUS WITH STAGE 3A CHRONIC KIDNEY DISEASE, WITHOUT LONG-TERM CURRENT USE OF INSULIN (HCC): Primary | ICD-10-CM

## 2025-02-11 DIAGNOSIS — E87.1 HYPONATREMIA: ICD-10-CM

## 2025-02-11 DIAGNOSIS — I10 ESSENTIAL HYPERTENSION: ICD-10-CM

## 2025-02-11 DIAGNOSIS — Z79.899 MEDICATION MANAGEMENT: ICD-10-CM

## 2025-02-11 DIAGNOSIS — R60.0 LEG EDEMA, LEFT: ICD-10-CM

## 2025-02-11 PROBLEM — L03.012 CELLULITIS OF FINGER OF LEFT HAND: Status: RESOLVED | Noted: 2024-12-24 | Resolved: 2025-02-11

## 2025-02-11 PROBLEM — U07.1 COVID-19: Status: RESOLVED | Noted: 2021-12-29 | Resolved: 2025-02-11

## 2025-02-11 PROCEDURE — G2211 COMPLEX E/M VISIT ADD ON: HCPCS | Performed by: FAMILY MEDICINE

## 2025-02-11 PROCEDURE — 99214 OFFICE O/P EST MOD 30 MIN: CPT | Performed by: FAMILY MEDICINE

## 2025-02-11 PROCEDURE — PBNCHG PB NO CHARGE PLACEHOLDER: Performed by: PHARMACIST

## 2025-02-11 RX ORDER — SODIUM CHLORIDE 9 MG/ML
75 INJECTION, SOLUTION INTRAVENOUS CONTINUOUS
OUTPATIENT
Start: 2025-02-11

## 2025-02-11 NOTE — TELEPHONE ENCOUNTER
[facility]  Sharon Mar, Enrique     Reason for Outreach: Patient referred to clinical pharmacist by Wayne Uriarte Jr, MD     Outreach attempt to patient to discuss LibreView report and insulin management.    Patient unavailable at time of call. Left voicemail with contact information for return call. Will follow-up

## 2025-02-11 NOTE — PROGRESS NOTES
Name: Alejandro Adames      : 1955      MRN: [id number]  Encounter Provider: Wayne Uriarte Jr, MD  Encounter Date: 2025   Encounter department: [facility] PRIMARY CARE  :  Assessment & Plan  Type 2 diabetes mellitus with stage 3a chronic kidney disease, without long-term current use of insulin (HCC)  His glucose readings have been somewhat labile.  He has actually had a few readings below 70 and he will keep a close eye on this.  No changes were made to his insulin today.  He will be following with our clinical pharmacist next week.  Lab Results   Component Value Date    HGBA1C 8.3 (A) 2024       Orders:    Hemoglobin A1C; Future    Comprehensive metabolic panel; Future    Essential hypertension  Blood pressure is controlled today.  Continue amlodipine.       Prostate cancer (HCC)  Continue close follow-up with oncology as well as urology.       Hyponatremia  He has labs ordered through oncology.  I did also order labs for diabetic follow-up in 4 months       Chronic ulcer of left heel limited to breakdown of skin (HCC)  Much improved with Calmoseptine       Abnormal chest CT  He is following with oncology in this regard.  He also is on Bactrim DS for UTI which should potentially cover a bacterial chest infection.  Fortunately, he looks well and lungs are clear.  He is having no excessive coughing.  Oncology does have another chest CT ordered.         Leg edema, left  He has pretty significant left lower extremity edema.  Check venous duplex.  Orders:    VAS VENOUS DUPLEX -LOWER LIMB UNILATERAL; Future           History of Present Illness   Patient presents today for follow-up of chronic health issues.  He is followed closely by oncology as well as urology for history of metastatic renal cell cancer.  He has not been utilizing any opioids and notes his pain is tolerable.  He does have some chronic ongoing fatigue.  Glucose readings are improved with some recent changes made to  "his regimen by our pharmacy team.  He did have 2 episodes where his glucose went below 70 and he was symptomatic so he has been keeping a closer eye on his Accu-Cheks.  He denies any fever or chills.  Is having no chest pain, shortness of breath or palpitations.    He does note some significant left lower extremity swelling.  He has a history of some chronic swelling but this seems to have gotten worse over the past 2 to 3 weeks.      Review of Systems   Constitutional:  Positive for fatigue. Negative for appetite change, chills, fever and unexpected weight change.   HENT:  Negative for trouble swallowing.    Eyes:  Negative for visual disturbance.   Respiratory:  Negative for cough, chest tightness, shortness of breath and wheezing.    Cardiovascular:  Positive for leg swelling. Negative for chest pain and palpitations.   Gastrointestinal:  Negative for abdominal distention, abdominal pain, blood in stool, constipation and diarrhea.   Endocrine: Negative for polyuria.   Genitourinary:  Negative for difficulty urinating and flank pain.   Musculoskeletal:  Negative for arthralgias and myalgias.   Skin:  Negative for rash.   Neurological:  Negative for dizziness and light-headedness.   Hematological:  Negative for adenopathy. Does not bruise/bleed easily.   Psychiatric/Behavioral:  Negative for dysphoric mood and sleep disturbance. The patient is not nervous/anxious.        Objective   /68 (BP Location: Left arm, Patient Position: Sitting, Cuff Size: Standard)   Pulse 78   Resp 12   Ht 5' 10\" (1.778 m)   Wt 75.1 kg (165 lb 9.6 oz)   SpO2 99%   BMI 23.76 kg/m²      Physical Exam    "

## 2025-02-11 NOTE — ASSESSMENT & PLAN NOTE
His glucose readings have been somewhat labile.  He has actually had a few readings below 70 and he will keep a close eye on this.  No changes were made to his insulin today.  He will be following with our clinical pharmacist next week.  Lab Results   Component Value Date    HGBA1C 8.3 (A) 12/24/2024       Orders:    Hemoglobin A1C; Future    Comprehensive metabolic panel; Future

## 2025-02-11 NOTE — ASSESSMENT & PLAN NOTE
He has labs ordered through oncology.  I did also order labs for diabetic follow-up in 4 months

## 2025-02-11 NOTE — ASSESSMENT & PLAN NOTE
He is following with oncology in this regard.  He also is on Bactrim DS for UTI which should potentially cover a bacterial chest infection.  Fortunately, he looks well and lungs are clear.  He is having no excessive coughing.  Oncology does have another chest CT ordered.

## 2025-02-11 NOTE — PROGRESS NOTES
[facility]   Sharon Mar, Enrique     Alejandro Adames is a 69 y.o. male who was referred to the clinical pharmacist by Wayne Uriarte Jr, MD for diabetes. Patient presents via telephone for follow-up.      Virtual Care Documentation  Encounter provider Mariajose Novak    The Patient is located at Home and in the following state in which I hold an active license: [place].    The patient was identified by name and date of birth. Alejandro Adames was informed that this is a telemedicine visit and that the visit is being conducted through the [employer] Teams platform. He agrees to proceed.  My office door was closed. No one else was in the room. He acknowledged consent and understanding of privacy and security of the telephone platform. The patient has agreed to participate and understands they can discontinue the visit at any time.       Assessment & Plan  Type 2 diabetes mellitus with stage 3a chronic kidney disease, without long-term current use of insulin ([facility])    Lab Results   Component Value Date    HGBA1C 8.3 (A) 12/24/2024     -  Most recent A1c: above goal.   - CGM TIR: below goal; TBR meets goal but there are indicators of glucose levels dropping.     Patient's glucose was uncontrolled on lower doses of Lantus. Titration has resulted in improvement in TIR.     Will split Lantus into two doses to stabilize glucose levels and reduce overnight drops     - Interventions:  Change Lantus to 16 units SQ QAM and 16 units SQ QHS   Medication management    Education:    - He was given instructions on how to contact me in the event of adverse drug event, questions, or concerns.    Follow-up:   Follow-up with pharmacist in 1 week  Next PCP visit: 3/26/2025    - Home Monitoring Records: CGM data.  - Labs: A1c due on or after 3/24/2025    Subjective:     Alejandro Adames is a 69 y.o. male with a history of diabetes with long term use of insulin. Diabetes course has been acutely  uncontrolled. Diagnosed with UTI in 1/2025 with resultant hyperglycemia.     Review of Systems   Constitutional:  Negative for fatigue.   Eyes:         No blurry vision   Endocrine: Negative for polydipsia and polyphagia.        No hypoglycemia   Genitourinary:  Positive for frequency. Negative for dysuria.        (+) cloudy urine   Neurological:         No foot burning, numbness or pain     DM Self-Management:  - Self-monitoring: is performed regularly. He checks glucose levels using CGM. He uses phone as reader.   - He is connected to [facility].   - Hypoglycemic episodes: denies any recent - History of Level 3/severe hypoglycemic event: none   - Hypoglycemia symptoms: N/A - Treatment of hypoglycemia: discussed treatment   Glucometer: Yes, Brand: OneTouch Verio Reflect  CGM: Yes, Brand: FreeStyle Ольга 3     Current DM Regimen:  Lantus 32 units SQ HS  Humalog 12 units SQ AC           DM History:  Health Status: Complex; Goals - A1c: <8%; Time In Range: >70% with TBR <1% of time per ADA Guidelines for older adults.  Microvascular complications: none reported  Cardiovascular complications: none reported  - Statin: Yes   - ACEi/ARB: No, ACEi cough   Hospitalizations related to DM:  11/2024: DKA  Previous DM medications:   Metformin  Mounjaro, Ozempic    Cardiometabolic Risk Factors:    Diabetes Composite Score: 3   Values used to calculate this score:    Points  Metrics       1        Blood Pressure: 132/74       1        Prescribed Statins: Yes                Hemoglobin A1c: 8.3       1        Smokes Tobacco: No       0        Prescribed Aspirin: No    - HF: No    - CKD: Decreased GFR - stage III, Ø albuminuria     - Thyroid cancer: No  CT Chest (10/25/2024): 1 cm left thyroid nodule. No follow-up needed.    - History of pancreatitis: No    Drug Therapy Problems:  - Medication Adherence: Responsible for medication management: patient. Patient is compliant all of the time.    CGM Data Review:     Device: FreeStyle  Ольга 3  Dates: 1/29 - 2/11/2025  Average glucose (mg/dL): 246  GMI (approx. lab A1c): 9.5%  Glycemic variability (CV): 40.7%    Glycemic patterns: persistent hyperglycemia, especially overnight and postprandial periods  Hypoglycemia: none     Metric CGM Target   Time above range (TAR) Very High >250 mg/dL 51 <5%   Time above range (TAR) High >180 mg/dL 67 <25%   Time in range (TIR)  mg/dL 33 >70%   Time below range (TBR) Low <70 mg/dL 0 <4%    Time below range (TBR)Very Low <54 mg/dL 0 <1%     More than 72 hours of data was reviewed. Report to be scanned to chart.        Meds/Allergies     Current Outpatient Medications:     Alcohol Swabs (Alcohol Prep) PADS, Use to prep skin prior to placing blood sugar monitor, Disp: 100 each, Rfl: 1    amLODIPine (NORVASC) 5 mg tablet, Take 1 tablet (5 mg total) by mouth daily, Disp: 100 tablet, Rfl: 3    atorvastatin (LIPITOR) 40 mg tablet, TAKE 1 TABLET BY MOUTH EVERY DAY, Disp: 100 tablet, Rfl: 1    Continuous Glucose Sensor (FreeStyle Ольга 3 Plus Sensor) MISC, Use 1 each Every 15 days for blood sugar monitoring *receives through [employer]*, Disp: , Rfl:     glucose blood (OneTouch Verio) test strip, Check blood sugars once daily. Please substitute with appropriate alternative as covered by patient's insurance. Dx: E11.65, Disp: 100 each, Rfl: 3    insulin glargine (LANTUS SOLOSTAR) 100 units/mL injection pen, Inject 25 Units under the skin daily, Disp: 30 mL, Rfl: 1    insulin lispro (HumaLOG) 100 units/mL injection pen, Inject 12 Units under the skin 3 (three) times a day with meals, Disp: 15 mL, Rfl: 3    Insulin Pen Needle 32G X 4 MM MISC, Use 3 (three) times a day, Disp: 300 each, Rfl: 3    Lenvatinib, 20 MG Daily Dose, (Lenvima, 20 MG Daily Dose,) 2 x 10 MG CPPK, Take 20 mg by mouth daily (Patient not taking: Reported on 12/31/2024), Disp: 30 each, Rfl: 5    menthol-zinc oxide (CALMOSPETINE) 0.44-20.625 %, Apply topically 2 (two) times a day, Disp: 113 g, Rfl:  "3    OneTouch Delica Lancets 33G MISC, Check blood sugars once daily. Please substitute with appropriate alternative as covered by patient's insurance. Dx: E11.65, Disp: 100 each, Rfl: 3    oxyCODONE (ROXICODONE) 5 immediate release tablet, Take 0.5 tablets (2.5 mg total) by mouth every 8 (eight) hours as needed for severe pain for up to 10 doses Max Daily Amount: 7.5 mg (Patient not taking: Reported on 1/17/2025), Disp: 5 tablet, Rfl: 0    senna-docusate sodium (SENOKOT S) 8.6-50 mg per tablet, Take 1 tablet by mouth 2 (two) times a day as needed for constipation, Disp: , Rfl:     sulfamethoxazole-trimethoprim (BACTRIM DS) 800-160 mg per tablet, Take 1 tablet by mouth every 12 (twelve) hours for 10 days, Disp: 20 tablet, Rfl: 0    tamsulosin (FLOMAX) 0.4 mg, Take 1 capsule (0.4 mg total) by mouth daily with dinner, Disp: 90 capsule, Rfl: 3     Allergies   Allergen Reactions    Captopril Cough    Crestor [Rosuvastatin] Diarrhea    Enalapril Cough     Objective     Vital Signs:    Estimated body mass index is 23.76 kg/m² as calculated from the following:    Height as of an earlier encounter on 2/11/25: 5' 10\" (1.778 m).    Weight as of an earlier encounter on 2/11/25: 75.1 kg (165 lb 9.6 oz).     Administrative Statements   Pharmacist Tracking Tool:   Reason For Outreach: Embedded Pharmacist  Demographics:  Intervention Method: Phone  Type of Intervention: Follow-Up  Topics Addressed: Diabetes and Other  Pharmacologic Interventions: Dose or Frequency Adjusted  Non-Pharmacologic Interventions: Personal CGM  Time:  Direct Patient Care:  15  mins  Care Coordination:  20  mins  Recommendation Recipient: Patient/Caregiver  Outcome: Accepted    Documentation under collaborative practice agreement. Orders pended for PCP signature if needed.    Sharon Mar, PharmD  Clinical Integration Pharmacist  [facility]  "

## 2025-02-12 ENCOUNTER — APPOINTMENT (OUTPATIENT)
Dept: LAB | Facility: HOSPITAL | Age: 70
End: 2025-02-12
Payer: MEDICARE

## 2025-02-12 ENCOUNTER — TELEPHONE (OUTPATIENT)
Dept: HEMATOLOGY ONCOLOGY | Facility: CLINIC | Age: 70
End: 2025-02-12

## 2025-02-12 ENCOUNTER — TELEPHONE (OUTPATIENT)
Dept: RADIOLOGY | Facility: HOSPITAL | Age: 70
End: 2025-02-12

## 2025-02-12 ENCOUNTER — TELEPHONE (OUTPATIENT)
Dept: FAMILY MEDICINE CLINIC | Facility: CLINIC | Age: 70
End: 2025-02-12

## 2025-02-12 ENCOUNTER — TELEPHONE (OUTPATIENT)
Dept: INFUSION CENTER | Facility: CLINIC | Age: 70
End: 2025-02-12

## 2025-02-12 DIAGNOSIS — C64.9 METASTATIC RENAL CELL CARCINOMA TO BONE (HCC): ICD-10-CM

## 2025-02-12 DIAGNOSIS — C79.51 METASTATIC RENAL CELL CARCINOMA TO BONE (HCC): ICD-10-CM

## 2025-02-12 DIAGNOSIS — Z29.89 IMMUNOTHERAPY ENCOUNTER: ICD-10-CM

## 2025-02-12 DIAGNOSIS — Z79.899 HIGH RISK MEDICATION USE: ICD-10-CM

## 2025-02-12 LAB
ALBUMIN SERPL BCG-MCNC: 3.4 G/DL (ref 3.5–5)
ALP SERPL-CCNC: 143 U/L (ref 34–104)
ALT SERPL W P-5'-P-CCNC: 22 U/L (ref 7–52)
ANION GAP SERPL CALCULATED.3IONS-SCNC: 8 MMOL/L (ref 4–13)
AST SERPL W P-5'-P-CCNC: 14 U/L (ref 13–39)
BASOPHILS # BLD AUTO: 0.13 THOUSANDS/ΜL (ref 0–0.1)
BASOPHILS NFR BLD AUTO: 1 % (ref 0–1)
BILIRUB SERPL-MCNC: 0.19 MG/DL (ref 0.2–1)
BUN SERPL-MCNC: 35 MG/DL (ref 5–25)
CALCIUM ALBUM COR SERPL-MCNC: 9.5 MG/DL (ref 8.3–10.1)
CALCIUM SERPL-MCNC: 9 MG/DL (ref 8.4–10.2)
CHLORIDE SERPL-SCNC: 108 MMOL/L (ref 96–108)
CO2 SERPL-SCNC: 19 MMOL/L (ref 21–32)
CREAT SERPL-MCNC: 1.89 MG/DL (ref 0.6–1.3)
EOSINOPHIL # BLD AUTO: 0.26 THOUSAND/ΜL (ref 0–0.61)
EOSINOPHIL NFR BLD AUTO: 3 % (ref 0–6)
ERYTHROCYTE [DISTWIDTH] IN BLOOD BY AUTOMATED COUNT: 16.1 % (ref 11.6–15.1)
GFR SERPL CREATININE-BSD FRML MDRD: 35 ML/MIN/1.73SQ M
GLUCOSE P FAST SERPL-MCNC: 114 MG/DL (ref 65–99)
HCT VFR BLD AUTO: 31.6 % (ref 36.5–49.3)
HGB BLD-MCNC: 9.6 G/DL (ref 12–17)
IMM GRANULOCYTES # BLD AUTO: 0.05 THOUSAND/UL (ref 0–0.2)
IMM GRANULOCYTES NFR BLD AUTO: 1 % (ref 0–2)
LYMPHOCYTES # BLD AUTO: 1.34 THOUSANDS/ΜL (ref 0.6–4.47)
LYMPHOCYTES NFR BLD AUTO: 15 % (ref 14–44)
MCH RBC QN AUTO: 28.1 PG (ref 26.8–34.3)
MCHC RBC AUTO-ENTMCNC: 30.4 G/DL (ref 31.4–37.4)
MCV RBC AUTO: 92 FL (ref 82–98)
MONOCYTES # BLD AUTO: 0.64 THOUSAND/ΜL (ref 0.17–1.22)
MONOCYTES NFR BLD AUTO: 7 % (ref 4–12)
NEUTROPHILS # BLD AUTO: 6.56 THOUSANDS/ΜL (ref 1.85–7.62)
NEUTS SEG NFR BLD AUTO: 73 % (ref 43–75)
NRBC BLD AUTO-RTO: 0 /100 WBCS
PLATELET # BLD AUTO: 507 THOUSANDS/UL (ref 149–390)
PMV BLD AUTO: 9 FL (ref 8.9–12.7)
POTASSIUM SERPL-SCNC: 3.7 MMOL/L (ref 3.5–5.3)
PROT SERPL-MCNC: 7.8 G/DL (ref 6.4–8.4)
RBC # BLD AUTO: 3.42 MILLION/UL (ref 3.88–5.62)
SODIUM SERPL-SCNC: 135 MMOL/L (ref 135–147)
TSH SERPL DL<=0.05 MIU/L-ACNC: 2.39 UIU/ML (ref 0.45–4.5)
WBC # BLD AUTO: 8.98 THOUSAND/UL (ref 4.31–10.16)

## 2025-02-12 PROCEDURE — 80053 COMPREHEN METABOLIC PANEL: CPT

## 2025-02-12 PROCEDURE — 36415 COLL VENOUS BLD VENIPUNCTURE: CPT

## 2025-02-12 PROCEDURE — 84443 ASSAY THYROID STIM HORMONE: CPT

## 2025-02-12 PROCEDURE — 85025 COMPLETE CBC W/AUTO DIFF WBC: CPT

## 2025-02-12 NOTE — TELEPHONE ENCOUNTER
ALEXANDRAM for pt to call back to reschedule his March 25, 2025 3:45 PM appointment with Dr. Tellez due to him changing his office hours.

## 2025-02-12 NOTE — TELEPHONE ENCOUNTER
Charli returned call and thought his labs from 2 weeks ago were still good. Pt re-educated that labs need to be obtained within 7 days of treatment, preferably the day before. Patient states [facility] is closed at this time so he will go to [facility] for labs now. Pt also confirmed appointment for tomorrow at 1300

## 2025-02-12 NOTE — TELEPHONE ENCOUNTER
Call out to patient in regard to labs recommended to be done 3-5 days advanced to allow staff to obtain from [facility].   Left vm to assess if patient had labs completed and or if infusion needs to be scheduled.     Call back number reviewed.

## 2025-02-13 ENCOUNTER — HOSPITAL ENCOUNTER (OUTPATIENT)
Dept: INFUSION CENTER | Facility: CLINIC | Age: 70
Discharge: HOME | End: 2025-02-13
Payer: MEDICARE

## 2025-02-13 VITALS
SYSTOLIC BLOOD PRESSURE: 132 MMHG | RESPIRATION RATE: 18 BRPM | DIASTOLIC BLOOD PRESSURE: 74 MMHG | HEART RATE: 89 BPM | TEMPERATURE: 96.3 F

## 2025-02-13 DIAGNOSIS — C64.2 RENAL CELL CARCINOMA OF LEFT KIDNEY (HCC): ICD-10-CM

## 2025-02-13 DIAGNOSIS — C64.2 RENAL CELL CARCINOMA OF LEFT KIDNEY (HCC): Primary | ICD-10-CM

## 2025-02-13 DIAGNOSIS — C79.51 METASTATIC RENAL CELL CARCINOMA TO BONE (HCC): ICD-10-CM

## 2025-02-13 DIAGNOSIS — Z29.89 IMMUNOTHERAPY ENCOUNTER: ICD-10-CM

## 2025-02-13 DIAGNOSIS — C64.9 METASTATIC RENAL CELL CARCINOMA TO BONE (HCC): ICD-10-CM

## 2025-02-13 DIAGNOSIS — Z79.899 HIGH RISK MEDICATION USE: Primary | ICD-10-CM

## 2025-02-13 PROCEDURE — 96413 CHEMO IV INFUSION 1 HR: CPT

## 2025-02-13 PROCEDURE — 96367 TX/PROPH/DG ADDL SEQ IV INF: CPT

## 2025-02-13 RX ORDER — SODIUM CHLORIDE 9 MG/ML
20 INJECTION, SOLUTION INTRAVENOUS ONCE
Status: COMPLETED | OUTPATIENT
Start: 2025-02-13 | End: 2025-02-13

## 2025-02-13 RX ORDER — SODIUM CHLORIDE 9 MG/ML
20 INJECTION, SOLUTION INTRAVENOUS ONCE
Status: DISCONTINUED | OUTPATIENT
Start: 2025-02-13 | End: 2025-02-16 | Stop reason: HOSPADM

## 2025-02-13 RX ORDER — SODIUM CHLORIDE 9 MG/ML
20 INJECTION, SOLUTION INTRAVENOUS ONCE
Status: CANCELLED | OUTPATIENT
Start: 2025-02-13

## 2025-02-13 RX ORDER — SODIUM CHLORIDE 9 MG/ML
20 INJECTION, SOLUTION INTRAVENOUS ONCE
OUTPATIENT
Start: 2025-05-08

## 2025-02-13 RX ADMIN — SODIUM CHLORIDE 20 ML/HR: 0.9 INJECTION, SOLUTION INTRAVENOUS at 13:53

## 2025-02-13 RX ADMIN — SODIUM CHLORIDE 400 MG: 9 INJECTION, SOLUTION INTRAVENOUS at 14:31

## 2025-02-13 RX ADMIN — ZOLEDRONIC ACID 3 MG: 4 INJECTION, SOLUTION, CONCENTRATE INTRAVENOUS at 13:57

## 2025-02-13 NOTE — PROGRESS NOTES
Patient states that he just finished his antibiotic Batrim today @12:30am. He states that he has been on on 2 different antibiotics for a UTI. Message sent to Shreya Cooper RN. Ok to treat today.

## 2025-02-13 NOTE — PROGRESS NOTES
Alejandro Adames  tolerated Keytruda and Zometa without incident.   Alejandro Adames is aware of future appt on 3/27 at 12pm.     AVS printed and given to Alejandro Adames:  Yes

## 2025-02-17 NOTE — ASSESSMENT & PLAN NOTE
Lab Results   Component Value Date    HGBA1C 8.3 (A) 12/24/2024     -  Most recent A1c: above goal.   - CGM TIR: below goal; TBR meets goal but there are indicators of glucose levels dropping.     Patient's glucose was uncontrolled on lower doses of Lantus. Titration has resulted in improvement in TIR.     Will split Lantus into two doses to stabilize glucose levels and reduce overnight drops     - Interventions:  Change Lantus to 16 units SQ QAM and 16 units SQ QHS

## 2025-02-18 ENCOUNTER — TELEMEDICINE (OUTPATIENT)
Age: 70
End: 2025-02-18

## 2025-02-18 DIAGNOSIS — E11.22 TYPE 2 DIABETES MELLITUS WITH STAGE 3A CHRONIC KIDNEY DISEASE, WITHOUT LONG-TERM CURRENT USE OF INSULIN (HCC): Primary | ICD-10-CM

## 2025-02-18 DIAGNOSIS — Z79.899 MEDICATION MANAGEMENT: ICD-10-CM

## 2025-02-18 DIAGNOSIS — N18.31 TYPE 2 DIABETES MELLITUS WITH STAGE 3A CHRONIC KIDNEY DISEASE, WITHOUT LONG-TERM CURRENT USE OF INSULIN (HCC): Primary | ICD-10-CM

## 2025-02-18 PROCEDURE — PBNCHG PB NO CHARGE PLACEHOLDER: Performed by: PHARMACIST

## 2025-02-18 NOTE — PROGRESS NOTES
[facility]   Sharon Mra, Enrique     Alejandro Adames is a 69 y.o. male who was referred to the clinical pharmacist by Wayne Uriarte Jr, MD for diabetes. Patient presents via telephone for follow-up.      Virtual Care Documentation  Encounter provider Mariajose Novak    The Patient is located at Home and in the following state in which I hold an active license: [place].    The patient was identified by name and date of birth. Alejandro Adames was informed that this is a telemedicine visit and that the visit is being conducted through the Microsoft Teams platform. He agrees to proceed.  My office door was closed. No one else was in the room. He acknowledged consent and understanding of privacy and security of the telephone platform. The patient has agreed to participate and understands they can discontinue the visit at any time.       Assessment & Plan  Type 2 diabetes mellitus with stage 3a chronic kidney disease, without long-term current use of insulin ([facility])    Lab Results   Component Value Date    HGBA1C 8.3 (A) 12/24/2024     -  Most recent A1c: above goal.   - CGM TIR: below goal; TBR meets goal but there are indicators of glucose levels dropping.      Can better align patient's insulin regimen to his glucose needs throughout the day.       - Interventions:  Increase evening Humalog to 16 units  Medication management    Education:    - He was given instructions on how to contact me in the event of adverse drug event, questions, or concerns.    Follow-up:   Follow-up with pharmacist in 2 weeks  Next PCP visit: 3/26/2025    - Home Monitoring Records: CGM data.  - Labs: A1c due on or after 3/24/2025    Subjective:     Alejandro Adames is a 69 y.o. male with a history of diabetes with long term use of insulin. Diabetes course has been acutely uncontrolled.     10-10:30 am: wakes up  - Lantus  12 pm: first meal   - Humalog   6 pm: second meal (largest)   - Humalog  11  pm: bedtime snack   - Lantus  1-2 am: bedtime    Review of Systems   Constitutional:  Negative for fatigue.   Eyes:         No blurry vision   Endocrine: Negative for polydipsia and polyphagia.        No hypoglycemia   Genitourinary:  Negative for dysuria.   Neurological:         No foot burning, numbness or pain     DM Self-Management:  - Self-monitoring: is performed regularly. He checks glucose levels using CGM. He uses phone as reader.   - He is connected to [employer].   - Hypoglycemic episodes: denies any recent - History of Level 3/severe hypoglycemic event: none   - Hypoglycemia symptoms: N/A - Treatment of hypoglycemia: honey or jelly    Glucometer: Yes, Brand: OneTouch Verio Reflect  CGM: Yes, Brand: FreeStyle Ольга 3     Current DM Regimen:  Lantus 16 units SQ BID  Humalog 12 units SQ AC           DM History:  Health Status: Complex; Goals - A1c: <8%; Time In Range: >70% with TBR <1% of time per ADA Guidelines for older adults.  Microvascular complications: none reported  Cardiovascular complications: none reported  - Statin: Yes   - ACEi/ARB: No, ACEi cough   Hospitalizations related to DM:  11/2024: DKA  Previous DM medications:   Metformin  Mounjaro Ozempic    Cardiometabolic Risk Factors:    Diabetes Composite Score: 3   Values used to calculate this score:    Points  Metrics       1        Blood Pressure: 132/74       1        Prescribed Statins: Yes                Hemoglobin A1c: 8.3       1        Smokes Tobacco: No       0        Prescribed Aspirin: No    - HF: No    - CKD: Decreased GFR - stage III, Ø albuminuria     - Thyroid cancer: No  CT Chest (10/25/2024): 1 cm left thyroid nodule. No follow-up needed.    - History of pancreatitis: No    Drug Therapy Problems:  - Medication Adherence: Responsible for medication management: patient. Patient is compliant all of the time.    CGM Data Review:     Device: FreeStyle Ольга 3  Dates: 2/5 - 2/18/2025  Average glucose (mg/dL): 237  GMI (approx. lab  A1c): 9%  Glycemic variability (CV): 40.5%    Glycemic patterns: persistent hyperglycemia, especially overnight and postprandial periods  Hypoglycemia: none     Metric CGM Target   Time above range (TAR) Very High >250 mg/dL 46 <5%   Time above range (TAR) High >180 mg/dL 20 <25%   Time in range (TIR)  mg/dL 34 >70%   Time below range (TBR) Low <70 mg/dL 0 <4%    Time below range (TBR)Very Low <54 mg/dL 0 <1%     More than 72 hours of data was reviewed. Report to be scanned to chart.        Meds/Allergies     Current Outpatient Medications:     Alcohol Swabs (Alcohol Prep) PADS, Use to prep skin prior to placing blood sugar monitor, Disp: 100 each, Rfl: 1    amLODIPine (NORVASC) 5 mg tablet, Take 1 tablet (5 mg total) by mouth daily, Disp: 100 tablet, Rfl: 3    atorvastatin (LIPITOR) 40 mg tablet, TAKE 1 TABLET BY MOUTH EVERY DAY, Disp: 100 tablet, Rfl: 1    Continuous Glucose Sensor (FreeStyle Ольга 3 Plus Sensor) MISC, Use 1 each Every 15 days for blood sugar monitoring *receives through [employer]*, Disp: , Rfl:     glucose blood (OneTouch Verio) test strip, Check blood sugars once daily. Please substitute with appropriate alternative as covered by patient's insurance. Dx: E11.65, Disp: 100 each, Rfl: 3    insulin glargine (LANTUS SOLOSTAR) 100 units/mL injection pen, Inject 25 Units under the skin daily, Disp: 30 mL, Rfl: 1    insulin lispro (HumaLOG) 100 units/mL injection pen, Inject 12 Units under the skin 3 (three) times a day with meals, Disp: 15 mL, Rfl: 3    Insulin Pen Needle 32G X 4 MM MISC, Use 3 (three) times a day, Disp: 300 each, Rfl: 3    Lenvatinib, 20 MG Daily Dose, (Lenvima, 20 MG Daily Dose,) 2 x 10 MG CPPK, Take 20 mg by mouth daily (Patient not taking: Reported on 12/31/2024), Disp: 30 each, Rfl: 5    menthol-zinc oxide (CALMOSPETINE) 0.44-20.625 %, Apply topically 2 (two) times a day, Disp: 113 g, Rfl: 3    OneTouch Delica Lancets 33G MISC, Check blood sugars once daily. Please  "substitute with appropriate alternative as covered by patient's insurance. Dx: E11.65, Disp: 100 each, Rfl: 3    oxyCODONE (ROXICODONE) 5 immediate release tablet, Take 0.5 tablets (2.5 mg total) by mouth every 8 (eight) hours as needed for severe pain for up to 10 doses Max Daily Amount: 7.5 mg (Patient not taking: Reported on 1/17/2025), Disp: 5 tablet, Rfl: 0    senna-docusate sodium (SENOKOT S) 8.6-50 mg per tablet, Take 1 tablet by mouth 2 (two) times a day as needed for constipation, Disp: , Rfl:     tamsulosin (FLOMAX) 0.4 mg, Take 1 capsule (0.4 mg total) by mouth daily with dinner, Disp: 90 capsule, Rfl: 3     Allergies   Allergen Reactions    Captopril Cough    Crestor [Rosuvastatin] Diarrhea    Enalapril Cough     Objective     Vital Signs:    Estimated body mass index is 23.76 kg/m² as calculated from the following:    Height as of 2/11/25: 5' 10\" (1.778 m).    Weight as of 2/11/25: 75.1 kg (165 lb 9.6 oz).     Administrative Statements   Pharmacist Tracking Tool:   Reason For Outreach: Embedded Pharmacist  Demographics:  Intervention Method: Phone  Type of Intervention: Follow-Up  Topics Addressed: Diabetes and Other  Pharmacologic Interventions: Dose or Frequency Adjusted  Non-Pharmacologic Interventions: Personal CGM  Time:  Direct Patient Care:  15  mins  Care Coordination:  20  mins  Recommendation Recipient: Patient/Caregiver  Outcome: Accepted    Documentation under collaborative practice agreement. Orders pended for PCP signature if needed.    Sharon Mar, PharmD  Clinical Integration Pharmacist  [facility]  "

## 2025-02-18 NOTE — ASSESSMENT & PLAN NOTE
Lab Results   Component Value Date    HGBA1C 8.3 (A) 12/24/2024     -  Most recent A1c: above goal.   - CGM TIR: below goal; TBR meets goal but there are indicators of glucose levels dropping.      Can better align patient's insulin regimen to his glucose needs throughout the day.       - Interventions:  Increase evening Humalog to 16 units

## 2025-02-19 ENCOUNTER — TELEPHONE (OUTPATIENT)
Dept: RADIOLOGY | Facility: HOSPITAL | Age: 70
End: 2025-02-19

## 2025-02-19 ENCOUNTER — TELEPHONE (OUTPATIENT)
Dept: UROLOGY | Facility: CLINIC | Age: 70
End: 2025-02-19

## 2025-02-19 ENCOUNTER — OFFICE VISIT (OUTPATIENT)
Dept: FAMILY MEDICINE CLINIC | Facility: CLINIC | Age: 70
End: 2025-02-19
Payer: MEDICARE

## 2025-02-19 VITALS
HEART RATE: 88 BPM | WEIGHT: 169.6 LBS | TEMPERATURE: 98.6 F | RESPIRATION RATE: 18 BRPM | SYSTOLIC BLOOD PRESSURE: 130 MMHG | BODY MASS INDEX: 24.28 KG/M2 | DIASTOLIC BLOOD PRESSURE: 72 MMHG | HEIGHT: 70 IN | OXYGEN SATURATION: 100 %

## 2025-02-19 DIAGNOSIS — R19.7 DIARRHEA, UNSPECIFIED TYPE: ICD-10-CM

## 2025-02-19 DIAGNOSIS — N30.01 ACUTE CYSTITIS WITH HEMATURIA: Primary | ICD-10-CM

## 2025-02-19 LAB
SL AMB  POCT GLUCOSE, UA: ABNORMAL
SL AMB LEUKOCYTE ESTERASE,UA: 125
SL AMB POCT BILIRUBIN,UA: ABNORMAL
SL AMB POCT BLOOD,UA: ABNORMAL
SL AMB POCT CLARITY,UA: ABNORMAL
SL AMB POCT COLOR,UA: ABNORMAL
SL AMB POCT KETONES,UA: ABNORMAL
SL AMB POCT NITRITE,UA: ABNORMAL
SL AMB POCT PH,UA: 5
SL AMB POCT SPECIFIC GRAVITY,UA: 1.02
SL AMB POCT URINE PROTEIN: 100
SL AMB POCT UROBILINOGEN: 0.2

## 2025-02-19 PROCEDURE — 99213 OFFICE O/P EST LOW 20 MIN: CPT | Performed by: FAMILY MEDICINE

## 2025-02-19 PROCEDURE — 87186 SC STD MICRODIL/AGAR DIL: CPT | Performed by: FAMILY MEDICINE

## 2025-02-19 PROCEDURE — 81002 URINALYSIS NONAUTO W/O SCOPE: CPT | Performed by: FAMILY MEDICINE

## 2025-02-19 PROCEDURE — 87086 URINE CULTURE/COLONY COUNT: CPT | Performed by: FAMILY MEDICINE

## 2025-02-19 PROCEDURE — 87077 CULTURE AEROBIC IDENTIFY: CPT | Performed by: FAMILY MEDICINE

## 2025-02-19 PROCEDURE — 87147 CULTURE TYPE IMMUNOLOGIC: CPT | Performed by: FAMILY MEDICINE

## 2025-02-19 PROCEDURE — G2211 COMPLEX E/M VISIT ADD ON: HCPCS | Performed by: FAMILY MEDICINE

## 2025-02-19 NOTE — TELEPHONE ENCOUNTER
PT calling to see if it would be okay to get his neph stent changed while having a UTI. PT also had concerns about catching the flu coming into the hospital. PT decided it was best to reschedule appt. Rescheduled to 3/12 at [place]

## 2025-02-19 NOTE — TELEPHONE ENCOUNTER
LM for pt. Spoke to Aps about his concern with his UTI and was told he mad have a different stent in and is scheduled to get it changed in April with urology. Called pt to see if there is a tube sticking out of his backside, if there is the appt is still needed if there isnt then the appt is no longer needed. Lm for pt regarding appt on 3/12   7months ago/normal

## 2025-02-19 NOTE — PROGRESS NOTES
Name: Alejandro Adames      : 1955      MRN: [id number]  Encounter Provider: Romie Ely MD  Encounter Date: 2025   Encounter department: [facility]  :  Assessment & Plan  Acute cystitis with hematuria    Advised close follow up urology due to uteral stent, has used two rounds of bactrim one here and previously with urologist with limited relief, unclear if true UTI or colonization of stent, will reach out to urology.  Patient advised to make follow up ASAP as well.  If febrile or flank pain advise to go the emergency room.    Orders:    Urine culture; Future    POCT urine dip    Diarrhea, unspecified type    Likely side effect of antibioitc described as loose, check stool cultures/cdiff if worsening or not improving.              History of Present Illness   HPI    69 year old male presenting for having urinary frequency.    Symptoms slightly improved with antibiotics, but never resolved, he had been urinating every two hours for about the last 6 weeks    He has used bactrim for possible MRSA infection    He has been afebrile, has been urinating about every hour for around the last day.            Review of Systems   Constitutional:  Negative for chills and fever.   HENT:  Negative for ear pain and sore throat.    Eyes:  Negative for pain and visual disturbance.   Respiratory:  Negative for cough and shortness of breath.    Cardiovascular:  Negative for chest pain and palpitations.   Gastrointestinal:  Negative for abdominal pain and vomiting.   Genitourinary:  Positive for dysuria, frequency and urgency. Negative for difficulty urinating, flank pain and hematuria.   Musculoskeletal:  Negative for arthralgias and back pain.   Skin:  Negative for color change and rash.   Neurological:  Negative for seizures and syncope.   All other systems reviewed and are negative.      Objective   /72 (BP Location: Left arm, Patient Position: Sitting, Cuff Size: Standard)  "  Pulse 88   Temp 98.6 °F (37 °C) (Tympanic)   Resp 18   Ht 5' 10\" (1.778 m)   Wt 76.9 kg (169 lb 9.6 oz)   SpO2 100%   BMI 24.34 kg/m²      Physical Exam  Constitutional:       Appearance: Normal appearance.   Cardiovascular:      Rate and Rhythm: Normal rate.      Pulses: Normal pulses.   Pulmonary:      Effort: Pulmonary effort is normal.      Breath sounds: Normal breath sounds.   Abdominal:      General: Abdomen is flat.   Neurological:      Mental Status: He is alert.         "

## 2025-02-20 ENCOUNTER — PATIENT OUTREACH (OUTPATIENT)
Dept: HEMATOLOGY ONCOLOGY | Facility: CLINIC | Age: 70
End: 2025-02-20

## 2025-02-20 NOTE — TELEPHONE ENCOUNTER
Called and LM for satinder that it is recommended he com into the office to talk about some medication - there is and appointment 2/25@ :00 with Coretta or can find another appointment if that doesn't work

## 2025-02-20 NOTE — PROGRESS NOTES
I reached out to Alejandro  to complete  to review for any barriers to care and to offer any supportive services that may be needed. I left VM with the reason for my call including my direct phone number .

## 2025-02-21 ENCOUNTER — OFFICE VISIT (OUTPATIENT)
Dept: UROLOGY | Facility: AMBULATORY SURGERY CENTER | Age: 70
End: 2025-02-21
Payer: MEDICARE

## 2025-02-21 ENCOUNTER — RESULTS FOLLOW-UP (OUTPATIENT)
Dept: FAMILY MEDICINE CLINIC | Facility: CLINIC | Age: 70
End: 2025-02-21

## 2025-02-21 VITALS
BODY MASS INDEX: 24.2 KG/M2 | DIASTOLIC BLOOD PRESSURE: 80 MMHG | HEART RATE: 90 BPM | SYSTOLIC BLOOD PRESSURE: 134 MMHG | WEIGHT: 169 LBS | OXYGEN SATURATION: 99 % | HEIGHT: 70 IN

## 2025-02-21 DIAGNOSIS — R35.0 URINARY FREQUENCY: ICD-10-CM

## 2025-02-21 DIAGNOSIS — N30.00 ACUTE CYSTITIS WITHOUT HEMATURIA: Primary | ICD-10-CM

## 2025-02-21 DIAGNOSIS — C64.9 METASTATIC RENAL CELL CARCINOMA TO BONE (HCC): Chronic | ICD-10-CM

## 2025-02-21 DIAGNOSIS — N30.01 ACUTE CYSTITIS WITH HEMATURIA: Primary | ICD-10-CM

## 2025-02-21 DIAGNOSIS — C79.51 METASTATIC RENAL CELL CARCINOMA TO BONE (HCC): Chronic | ICD-10-CM

## 2025-02-21 LAB
BACTERIA UR CULT: ABNORMAL
POST-VOID RESIDUAL VOLUME, ML POC: 61 ML

## 2025-02-21 PROCEDURE — 51798 US URINE CAPACITY MEASURE: CPT

## 2025-02-21 PROCEDURE — 99214 OFFICE O/P EST MOD 30 MIN: CPT

## 2025-02-21 RX ORDER — CEPHALEXIN 500 MG/1
500 CAPSULE ORAL EVERY 8 HOURS SCHEDULED
Qty: 15 CAPSULE | Refills: 0 | Status: SHIPPED | OUTPATIENT
Start: 2025-02-21 | End: 2025-02-26

## 2025-02-21 NOTE — ASSESSMENT & PLAN NOTE
Patient has had 3 positive urine cultures since January 2025, both adequately treated with antibiotics.  Two UC positive for MRSA, one positive.  Most recent urine culture has not been treated yet with antibiotics.  I do recommend for Staphylococcus Keflex for a 5-day course.  Antibiotics were sent to pharmacy on file.  We did discuss that his urinary symptoms might be related to the urinary tract infections but also could be related to his enlarged prostate and indwelling ureteral stent.  He also may be colonized with bacteria due to his indwelling ureteral stent.    Orders:    POCT Measure PVR    cephalexin (KEFLEX) 500 mg capsule; Take 1 capsule (500 mg total) by mouth every 8 (eight) hours for 5 days

## 2025-02-21 NOTE — ASSESSMENT & PLAN NOTE
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  He has had metastasis with nodules noted in the chest and retroperitoneum that appear to be responding to Keytruda immunotherapy.  He is followed closely by medical oncology with Dr. Castellon.     He has a left indwelling ureteral stent due to retroperitoneal adenopathy and is planning to undergo stent exchange in April 2025 with Dr. Rees.

## 2025-02-21 NOTE — PROGRESS NOTES
Name: Alejandro Adames      : 1955      MRN: [id number]  Encounter Provider: JEREMIAH Chamberlain  Encounter Date: 2025   Encounter department: [facility]OLOG[facility] [place]  :  Assessment & Plan  Acute cystitis without hematuria  Patient has had 3 positive urine cultures since 2025, both adequately treated with antibiotics.  Two UC positive for MRSA, one positive.  Most recent urine culture has not been treated yet with antibiotics.  I do recommend for Staphylococcus Keflex for a 5-day course.  Antibiotics were sent to pharmacy on file.  We did discuss that his urinary symptoms might be related to the urinary tract infections but also could be related to his enlarged prostate and indwelling ureteral stent.  He also may be colonized with bacteria due to his indwelling ureteral stent.    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  He has had metastasis with nodules noted in the chest and retroperitoneum that appear to be responding to Keytruda immunotherapy.  He is followed closely by medical oncology with Dr. Castellon.     He has a left indwelling ureteral stent due to retroperitoneal adenopathy and is planning to undergo stent exchange in 2025 with Dr. Rees.       Urinary frequency  Patient has had complaints since the beginning of January that he has developed urinary urgency and frequency.  He was prescribed Flomax by Dr. Rees but he has not yet initiated the medication.  I recommend that he initiate this medication, side effect profile rediscussed.  We also discussed diet lifestyle modifications such as limiting bladder irritants and  stopping fluid consumption 2 hours prior to bedtime.  I did discuss with him that these symptoms could be related to his indwelling ureteral stent.  If he does not notice any improvement with the Flomax we did discuss initiating another medication for overactive bladder.  Typically I would recommend trospium 20 mg twice daily.  He will call me if he does not notice any improvement in his urinary symptoms in the next 2 to 3 weeks.           History of Present Illness   Alejandro Adames is a 69 y.o. male who presents today to the office for follow-up.  He has history of metastatic renal cell carcinoma, prostate cancer status post radiation therapy, and most recently has had 3 urinary tract infections.    Today in the office he states that he is still continuing with urinary urgency and frequency.  He also states that he occasionally has cloudy looking urine.  He states that he did not start the Flomax that was prescribed to him back at the end of January by Dr. Rees.  He states that he has been on 2 rounds of antibiotics since that time and has since developed diarrhea.  He is not currently taking any probiotics for this.  He typically stays well-hydrated with water throughout the day.  He also reports that he does wake up in the middle of the night to urinate and will drink 1 to 2 glasses of water during this time. He denies any dysuria, hematuria, suprapubic or flank pain, fever/chills or nausea/vomiting.    Review of Systems   Constitutional:  Negative for chills and fever.   Respiratory: Negative.  Negative for cough and shortness of breath.    Cardiovascular: Negative.  Negative for chest pain.   Gastrointestinal: Negative.  Negative for abdominal distention, abdominal pain, nausea and vomiting.   Genitourinary:  Positive for frequency and urgency. Negative for decreased urine volume, difficulty urinating, dysuria, flank pain, hematuria, penile discharge, penile pain, penile swelling, scrotal swelling and  testicular pain.   Skin: Negative.  Negative for rash.   Neurological: Negative.    Hematological:  Negative for adenopathy. Does not bruise/bleed easily.          Objective   There were no vitals taken for this visit.    Physical Exam  Vitals reviewed.   Constitutional:       Appearance: Normal appearance.   HENT:      Head: Normocephalic and atraumatic.   Eyes:      Pupils: Pupils are equal, round, and reactive to light.   Cardiovascular:      Rate and Rhythm: Normal rate.   Pulmonary:      Effort: Pulmonary effort is normal.   Abdominal:      General: Abdomen is flat.      Palpations: Abdomen is soft.   Musculoskeletal:      Cervical back: Normal range of motion.   Skin:     General: Skin is warm and dry.   Neurological:      General: No focal deficit present.      Mental Status: He is alert and oriented to person, place, and time.   Psychiatric:         Mood and Affect: Mood normal.         Behavior: Behavior normal.         Thought Content: Thought content normal.         Judgment: Judgment normal.           Results   Lab Results   Component Value Date    PSA 0.355 10/19/2024    PSA 1.3 10/17/2022    PSA 4.9 (H) 04/08/2019     Lab Results   Component Value Date    CALCIUM 9.0 02/12/2025    K 3.7 02/12/2025    CO2 19 (L) 02/12/2025     02/12/2025    BUN 35 (H) 02/12/2025    CREATININE 1.89 (H) 02/12/2025     Lab Results   Component Value Date    WBC 8.98 02/12/2025    HGB 9.6 (L) 02/12/2025    HCT 31.6 (L) 02/12/2025    MCV 92 02/12/2025     (H) 02/12/2025       Office Urine Dip  No results found for this or any previous visit (from the past hour).

## 2025-02-21 NOTE — ASSESSMENT & PLAN NOTE
Patient has had complaints since the beginning of January that he has developed urinary urgency and frequency.  He was prescribed Flomax by Dr. Rees but he has not yet initiated the medication.  I recommend that he initiate this medication, side effect profile rediscussed.  We also discussed diet lifestyle modifications such as limiting bladder irritants and stopping fluid consumption 2 hours prior to bedtime.  I did discuss with him that these symptoms could be related to his indwelling ureteral stent.  If he does not notice any improvement with the Flomax we did discuss initiating another medication for overactive bladder.  Typically I would recommend trospium 20 mg twice daily.  He will call me if he does not notice any improvement in his urinary symptoms in the next 2 to 3 weeks.

## 2025-02-23 ENCOUNTER — PATIENT MESSAGE (OUTPATIENT)
Dept: FAMILY MEDICINE CLINIC | Facility: CLINIC | Age: 70
End: 2025-02-23

## 2025-02-23 DIAGNOSIS — N18.31 TYPE 2 DIABETES MELLITUS WITH STAGE 3A CHRONIC KIDNEY DISEASE, WITHOUT LONG-TERM CURRENT USE OF INSULIN (HCC): Primary | ICD-10-CM

## 2025-02-23 DIAGNOSIS — E11.22 TYPE 2 DIABETES MELLITUS WITH STAGE 3A CHRONIC KIDNEY DISEASE, WITHOUT LONG-TERM CURRENT USE OF INSULIN (HCC): Primary | ICD-10-CM

## 2025-02-24 RX ORDER — NITROFURANTOIN 25; 75 MG/1; MG/1
100 CAPSULE ORAL 2 TIMES DAILY
Qty: 10 CAPSULE | Refills: 0 | Status: SHIPPED | OUTPATIENT
Start: 2025-02-24 | End: 2025-03-01

## 2025-02-24 NOTE — TELEPHONE ENCOUNTER
Called and left a detailed message for this pt relaying this message. I also left our phone number in case this pt has any questions. If this pt calls back please relay these messages again. Thank you            ----- Message from JEREMIAH Chamberlain sent at 2/24/2025  8:53 AM EST -----  Reviewed final susceptibility from urine culture and Keflex is resistant.  I would like patient to discontinue the Keflex and start Macrobid.

## 2025-02-25 RX ORDER — INSULIN ASPART 100 [IU]/ML
INJECTION, SOLUTION INTRAVENOUS; SUBCUTANEOUS
Qty: 15 ML | Refills: 5 | Status: SHIPPED | OUTPATIENT
Start: 2025-02-25

## 2025-02-25 NOTE — PATIENT COMMUNICATION
Current DM Regimen:  Lantus 16 units SQ BID  Lispro SQ 12 units with lunch + 16 units with dinner     Will decrease PM lispro dose to 14 units to decrease hypoglycemia

## 2025-02-26 ENCOUNTER — TELEPHONE (OUTPATIENT)
Dept: UROLOGY | Facility: AMBULATORY SURGERY CENTER | Age: 70
End: 2025-02-26

## 2025-02-26 DIAGNOSIS — R39.9 UTI SYMPTOMS: Primary | ICD-10-CM

## 2025-02-26 NOTE — TELEPHONE ENCOUNTER
----- Message from Chely RSEENDIZ sent at 2/25/2025  3:33 PM EST -----  Please schedule with an AP  ----- Message -----  From: Rj Rees MD  Sent: 2/24/2025   5:22 PM EST  To: Chely Wang RN; MD Chely Bain,     Let;'s get him back with with us ASAP.  Thank you Jackson De La Rosa  ----- Message -----  From: Romie Ely MD  Sent: 2/19/2025   1:52 PM EST  To: Rj Rees MD    [facility] our mutual patient has had urinary frequency for the last month, he has taken bactrim x 2 with very little change, I was wondering if you or someone else would be able to get him in soon for evaluation.    Best,  Baldo

## 2025-02-26 NOTE — TELEPHONE ENCOUNTER
Pt returned call.  Pt stated that he was just seen on 2/21/25.  Was advised to call Alisson in 2 weeks with an update on his symptoms.     Please advise if pt needs to see someone again.

## 2025-02-26 NOTE — TELEPHONE ENCOUNTER
Called the patient and left a VM, we are calling to schedule a F/U appointment, please call us at [phone], thank-you.    Please schedule with AP for urinary frequency, thank-you.

## 2025-03-03 ENCOUNTER — APPOINTMENT (OUTPATIENT)
Dept: LAB | Facility: HOSPITAL | Age: 70
End: 2025-03-03
Payer: MEDICARE

## 2025-03-03 DIAGNOSIS — N13.30 HYDRONEPHROSIS OF LEFT KIDNEY: ICD-10-CM

## 2025-03-03 DIAGNOSIS — R35.0 URINARY FREQUENCY: ICD-10-CM

## 2025-03-03 DIAGNOSIS — E11.22 TYPE 2 DIABETES MELLITUS WITH STAGE 2 CHRONIC KIDNEY DISEASE, WITHOUT LONG-TERM CURRENT USE OF INSULIN  (HCC): ICD-10-CM

## 2025-03-03 DIAGNOSIS — E78.2 MIXED HYPERLIPIDEMIA: ICD-10-CM

## 2025-03-03 DIAGNOSIS — I10 ESSENTIAL HYPERTENSION: ICD-10-CM

## 2025-03-03 DIAGNOSIS — N17.9 AKI (ACUTE KIDNEY INJURY) (HCC): ICD-10-CM

## 2025-03-03 DIAGNOSIS — N18.2 TYPE 2 DIABETES MELLITUS WITH STAGE 2 CHRONIC KIDNEY DISEASE, WITHOUT LONG-TERM CURRENT USE OF INSULIN  (HCC): ICD-10-CM

## 2025-03-03 DIAGNOSIS — E11.9 TYPE 2 DIABETES MELLITUS WITHOUT COMPLICATION, WITHOUT LONG-TERM CURRENT USE OF INSULIN (HCC): ICD-10-CM

## 2025-03-03 LAB
BACTERIA UR QL AUTO: ABNORMAL /HPF
BILIRUB UR QL STRIP: NEGATIVE
CLARITY UR: ABNORMAL
COLOR UR: ABNORMAL
GLUCOSE UR STRIP-MCNC: ABNORMAL MG/DL
HGB UR QL STRIP.AUTO: ABNORMAL
KETONES UR STRIP-MCNC: NEGATIVE MG/DL
LEUKOCYTE ESTERASE UR QL STRIP: ABNORMAL
NITRITE UR QL STRIP: NEGATIVE
NON-SQ EPI CELLS URNS QL MICRO: ABNORMAL /HPF
PH UR STRIP.AUTO: 6 [PH]
PROT UR STRIP-MCNC: ABNORMAL MG/DL
RBC #/AREA URNS AUTO: ABNORMAL /HPF
RENAL EPI CELLS #/AREA URNS HPF: PRESENT /[HPF]
SP GR UR STRIP.AUTO: 1.01 (ref 1–1.03)
UROBILINOGEN UR STRIP-ACNC: <2 MG/DL
WBC #/AREA URNS AUTO: ABNORMAL /HPF
WBC CLUMPS # UR AUTO: PRESENT /UL

## 2025-03-03 PROCEDURE — 82043 UR ALBUMIN QUANTITATIVE: CPT

## 2025-03-03 PROCEDURE — 87147 CULTURE TYPE IMMUNOLOGIC: CPT

## 2025-03-03 PROCEDURE — 81001 URINALYSIS AUTO W/SCOPE: CPT

## 2025-03-03 PROCEDURE — 87086 URINE CULTURE/COLONY COUNT: CPT

## 2025-03-03 PROCEDURE — 87186 SC STD MICRODIL/AGAR DIL: CPT

## 2025-03-03 PROCEDURE — 82570 ASSAY OF URINE CREATININE: CPT

## 2025-03-03 RX ORDER — OXYBUTYNIN CHLORIDE 10 MG/1
10 TABLET, EXTENDED RELEASE ORAL
Qty: 30 TABLET | Refills: 4 | Status: SHIPPED | OUTPATIENT
Start: 2025-03-03

## 2025-03-03 NOTE — TELEPHONE ENCOUNTER
BRITTANY for [place] that per provider, Cloudy urine is not always associated with urinary tract infections. It is recommend clemente he increase his water intake to help with this.  also    his urinary symptoms could be related to his indwelling ureteral stent. I oxybutynin was sent ot pharmacy to help with the urinary frequency. Common side effects of the medication are dry mouth, dry eye and constipation.

## 2025-03-03 NOTE — TELEPHONE ENCOUNTER
Cloudy urine is not always associated with urinary tract infections.  I recommend increasing his water intake to help with this.  We also discussed that his urinary symptoms could be related to his indwelling ureteral stent.  I will send in oxybutynin to help with the urinary frequency.  Common side effects of the medication are dry mouth, dry eye and constipation.

## 2025-03-03 NOTE — TELEPHONE ENCOUNTER
Patient called in with concerns of cloudy urine as well as mucous. Reports the cloudiness is worsening by the day. States he is continuing with urinary frequency. Patient just finished abx on Saturday for recent UTI. He has had recurrent UTI's since January and is asking can be done about it. Denies any fevers, vomiting or hematuria. Advised to increase water intake and reviewed ED precautions. Placed urine orders to rule out infection. Please advise on recommendations.

## 2025-03-04 ENCOUNTER — TELEMEDICINE (OUTPATIENT)
Dept: FAMILY MEDICINE CLINIC | Facility: CLINIC | Age: 70
End: 2025-03-04

## 2025-03-04 ENCOUNTER — RESULTS FOLLOW-UP (OUTPATIENT)
Dept: FAMILY MEDICINE CLINIC | Facility: CLINIC | Age: 70
End: 2025-03-04

## 2025-03-04 DIAGNOSIS — N18.31 TYPE 2 DIABETES MELLITUS WITH STAGE 3A CHRONIC KIDNEY DISEASE, WITHOUT LONG-TERM CURRENT USE OF INSULIN (HCC): Primary | ICD-10-CM

## 2025-03-04 DIAGNOSIS — Z79.899 MEDICATION MANAGEMENT: ICD-10-CM

## 2025-03-04 DIAGNOSIS — N30.01 ACUTE CYSTITIS WITH HEMATURIA: ICD-10-CM

## 2025-03-04 DIAGNOSIS — E11.22 TYPE 2 DIABETES MELLITUS WITH STAGE 3A CHRONIC KIDNEY DISEASE, WITHOUT LONG-TERM CURRENT USE OF INSULIN (HCC): Primary | ICD-10-CM

## 2025-03-04 LAB
CREAT UR-MCNC: 38.1 MG/DL
MICROALBUMIN UR-MCNC: 199.1 MG/L
MICROALBUMIN/CREAT 24H UR: 523 MG/G CREATININE (ref 0–30)

## 2025-03-04 PROCEDURE — PBNCHG PB NO CHARGE PLACEHOLDER: Performed by: PHARMACIST

## 2025-03-04 NOTE — TELEPHONE ENCOUNTER
Please let patient know that proteinuria seem to have worsened although also noted recent UTI episodes and also current concern for cystitis which can also contribute to proteinuria.    Will continue to monitor with repeat labs during next office visit.

## 2025-03-04 NOTE — PROGRESS NOTES
[facility]   Sharon Mar, Enrique     Alejandro Adames is a 69 y.o. male who was referred to the clinical pharmacist by Wayne Uriarte Jr, MD for diabetes. Patient presents via telephone for follow-up.      Virtual Care Documentation  Encounter provider Mariajose Novak    The Patient is located at Home and in the following state in which I hold an active license: [place].    The patient was identified by name and date of birth. Alejandro Adames was informed that this is a telemedicine visit and that the visit is being conducted through the [employer] Teams platform. He agrees to proceed.  My office door was closed. No one else was in the room. He acknowledged consent and understanding of privacy and security of the telephone platform. The patient has agreed to participate and understands they can discontinue the visit at any time.       Assessment & Plan  Type 2 diabetes mellitus with stage 3a chronic kidney disease, without long-term current use of insulin ([facility])    Lab Results   Component Value Date    HGBA1C 8.3 (A) 12/24/2024     -  Most recent A1c: above goal.   - CGM TIR: below goal but has improved; TBR meets goal but patient with episodes of hypoglycemia.     Discussed potential use of sliding scale or insulin-to-carb ratio. Patient not comfortable.      - Interventions:  Change Lantus to 28 units SQ HS  - previously split dose  Continue Humalog SQ 12 units QAM + 16 units QPM     Acute cystitis with hematuria  - Since January 2025: urinary symptoms improve during ABX therapy but recur after completion  - Awaiting urine culture results  - Follow-up with urology for ongoing management  Medication management    Education:    - He was given instructions on how to contact me in the event of adverse drug event, questions, or concerns.    Follow-up:   Follow-up with pharmacist in 2 weeks  Next PCP visit: 3/26/2025    - Home Monitoring Records: CGM data.  - Labs: A1c due  on or after 3/24/2025    Subjective:     Alejandro Adames is a 69 y.o. male with a history of diabetes with long term use of insulin. Diabetes course has been acutely uncontrolled.     Recurrent UTI:   - Completed antibiotic course on 3/1/2025, however symptoms have returned.   - Urine sample submitted on 3/3/2025, awaiting culture results    Urology prescribed oxybutynin for OAB but patient has not started due to concern for side effect concerns (dry eyes, dry mouth)    At last visit, patient's evening Humalog was increased. Patient now having episodes of hypoglycemia.     Review of Systems   Constitutional:  Negative for fatigue.   Eyes:         No blurry vision   Endocrine: Negative for polydipsia and polyphagia.        No hypoglycemia   Genitourinary:  Positive for frequency and urgency. Negative for difficulty urinating and dysuria.        (+) cloudy urine   Neurological:         No foot burning, numbness or pain     DM Self-Management:  - Self-monitoring: is performed regularly. He checks glucose levels using CGM. He uses phone as reader.   - He is connected to [employer].   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 Per Urology, nodules are responding to treatment (2/21/2025).  Next dose: 3/27/2025    Health Status: Complex; Goals - A1c: <8%; Time In Range: >70% with TBR <1% of time per ADA Guidelines for older adults.  Microvascular complications: none reported  Cardiovascular complications: none reported  - Statin: Yes   - ACEi/ARB: No, ACEi cough   Hospitalizations  related to DM:  11/2024: DKA  Previous DM medications:   Metformin  Mounjaro, Ozempic    Cardiometabolic Risk Factors:  Diabetes Composite Score: 3   Values used to calculate this score:    Points  Metrics       1        Blood Pressure: 134/80       1        Prescribed Statins: Yes                Hemoglobin A1c: 8.3       1        Smokes Tobacco: No       0        Prescribed Aspirin: No    - HF: No    - CKD: Decreased GFR - stage III, Ø albuminuria     Drug Safety:  - Thyroid cancer: No  CT Chest (10/25/2024): 1 cm left thyroid nodule. No follow-up needed.    - History of pancreatitis: No    Drug Therapy Problems:  - Medication Adherence: Responsible for medication management: patient. Patient is compliant all of the time.    CGM Data Review:     Device: FreeStyle Ольга 3  Dates: 2/19 - 3/4/2025  Average glucose (mg/dL): 211  GMI (approx. lab A1c): 8.4%  Glycemic variability (CV): 43.5%    Glycemic patterns: persistent hyperglycemia, highly variable  Hypoglycemia: occurring mid morning (awakens patient) and late evening (8-10 pm)     Metric CGM Target   Time above range (TAR) Very High >250 mg/dL 31 <5%   Time above range (TAR) High >180 mg/dL 27 <25%   Time in range (TIR)  mg/dL 41 >70%   Time below range (TBR) Low <70 mg/dL 1 <4%    Time below range (TBR)Very Low <54 mg/dL 0 <1%     More than 72 hours of data was reviewed. Report to be scanned to chart.        Meds/Allergies     Current Outpatient Medications:     Alcohol Swabs (Alcohol Prep) PADS, Use to prep skin prior to placing blood sugar monitor, Disp: 100 each, Rfl: 1    amLODIPine (NORVASC) 5 mg tablet, Take 1 tablet (5 mg total) by mouth daily, Disp: 100 tablet, Rfl: 3    atorvastatin (LIPITOR) 40 mg tablet, TAKE 1 TABLET BY MOUTH EVERY DAY, Disp: 100 tablet, Rfl: 1    Continuous Glucose Sensor (FreeStyle Ольга 3 Plus Sensor) MISC, Use 1 each Every 15 days for blood sugar monitoring *receives through [employer]*, Disp: , Rfl:     glucose blood  "(OneTouch Verio) test strip, Check blood sugars once daily. Please substitute with appropriate alternative as covered by patient's insurance. Dx: E11.65, Disp: 100 each, Rfl: 3    insulin aspart (NovoLOG FlexPen) 100 UNIT/ML injection pen, Inject up to 15 units before meals. Administer within 5 to 10 minutes before a meal., Disp: 15 mL, Rfl: 5    insulin glargine (LANTUS SOLOSTAR) 100 units/mL injection pen, Inject 25 Units under the skin daily, Disp: 30 mL, Rfl: 1    Insulin Pen Needle 32G X 4 MM MISC, Use 3 (three) times a day, Disp: 300 each, Rfl: 3    Lenvatinib, 20 MG Daily Dose, (Lenvima, 20 MG Daily Dose,) 2 x 10 MG CPPK, Take 20 mg by mouth daily (Patient not taking: Reported on 12/31/2024), Disp: 30 each, Rfl: 5    menthol-zinc oxide (CALMOSPETINE) 0.44-20.625 %, Apply topically 2 (two) times a day, Disp: 113 g, Rfl: 3    OneTouch Delica Lancets 33G MISC, Check blood sugars once daily. Please substitute with appropriate alternative as covered by patient's insurance. Dx: E11.65, Disp: 100 each, Rfl: 3    oxybutynin (DITROPAN-XL) 10 MG 24 hr tablet, Take 1 tablet (10 mg total) by mouth daily at bedtime, Disp: 30 tablet, Rfl: 4    oxyCODONE (ROXICODONE) 5 immediate release tablet, Take 0.5 tablets (2.5 mg total) by mouth every 8 (eight) hours as needed for severe pain for up to 10 doses Max Daily Amount: 7.5 mg (Patient not taking: Reported on 1/17/2025), Disp: 5 tablet, Rfl: 0    senna-docusate sodium (SENOKOT S) 8.6-50 mg per tablet, Take 1 tablet by mouth 2 (two) times a day as needed for constipation, Disp: , Rfl:     tamsulosin (FLOMAX) 0.4 mg, Take 1 capsule (0.4 mg total) by mouth daily with dinner, Disp: 90 capsule, Rfl: 3     Allergies   Allergen Reactions    Captopril Cough    Crestor [Rosuvastatin] Diarrhea    Enalapril Cough     Objective     Vital Signs:    Estimated body mass index is 24.25 kg/m² as calculated from the following:    Height as of 2/21/25: 5' 10\" (1.778 m).    Weight as of 2/21/25: " 76.7 kg (169 lb).     Administrative Statements   Pharmacist Tracking Tool:   Reason For Outreach: Embedded Pharmacist  Demographics:  Intervention Method: Phone  Type of Intervention: Follow-Up  Topics Addressed: Diabetes and Other  Pharmacologic Interventions: Dose or Frequency Adjusted  Non-Pharmacologic Interventions: Personal CGM  Time:  Direct Patient Care:  15  mins  Care Coordination:  20  mins  Recommendation Recipient: Patient/Caregiver  Outcome: Accepted    Documentation under collaborative practice agreement. Orders pended for PCP signature if needed.    Sharon Mar, PharmD  Clinical Integration Pharmacist  [facility]

## 2025-03-04 NOTE — TELEPHONE ENCOUNTER
Spoke to pt regarding the following message per Dr. Ellison     Please let patient know that proteinuria seem to have worsened although also noted recent UTI episodes and also current concern for cystitis which can also contribute to proteinuria.    Will continue to monitor with repeat labs during next office visit.     Pt verbalized understanding.

## 2025-03-05 ENCOUNTER — RESULTS FOLLOW-UP (OUTPATIENT)
Dept: UROLOGY | Facility: AMBULATORY SURGERY CENTER | Age: 70
End: 2025-03-05

## 2025-03-05 DIAGNOSIS — N30.00 ACUTE CYSTITIS WITHOUT HEMATURIA: Primary | ICD-10-CM

## 2025-03-05 LAB — BACTERIA UR CULT: ABNORMAL

## 2025-03-05 RX ORDER — SULFAMETHOXAZOLE AND TRIMETHOPRIM 800; 160 MG/1; MG/1
1 TABLET ORAL EVERY 12 HOURS SCHEDULED
Qty: 14 TABLET | Refills: 0 | Status: SHIPPED | OUTPATIENT
Start: 2025-03-05 | End: 2025-03-12

## 2025-03-05 NOTE — TELEPHONE ENCOUNTER
Left message for pt           ----- Message from JEREMIAH Chamberlain sent at 3/5/2025  7:47 AM EST -----  Can you please call and let him know that I reviewed his urine culture and it continues to be positive for bacteria.  I am going to send in an additional course of antibiotics.  I also placed him on oxybutynin at his last office visit can you please see how he is doing and if the symptoms are improving since being on the medication.  I will also make Dr. Rees aware of what has been going on recently and that he continues to have the MRSA infection in his  urine.

## 2025-03-12 ENCOUNTER — HOSPITAL ENCOUNTER (OUTPATIENT)
Dept: RADIOLOGY | Facility: HOSPITAL | Age: 70
Discharge: HOME | End: 2025-03-12
Attending: RADIOLOGY

## 2025-03-12 DIAGNOSIS — N13.39 OTHER HYDRONEPHROSIS: ICD-10-CM

## 2025-03-12 NOTE — ASSESSMENT & PLAN NOTE
Lab Results   Component Value Date    HGBA1C 8.3 (A) 12/24/2024     -  Most recent A1c: above goal.   - CGM TIR: below goal but has improved; TBR meets goal but patient with episodes of hypoglycemia.     Discussed potential use of sliding scale or insulin-to-carb ratio. Patient not comfortable.      - Interventions:  Change Lantus to 28 units SQ HS  - previously split dose  Continue Humalog SQ 12 units QAM + 16 units QPM

## 2025-03-16 NOTE — PROGRESS NOTES
"     Name: Alejandro Adames      : 1955      MRN: [id number]  Encounter Provider: Isaak Castellon MD  Encounter Date: 3/25/2025   Encounter department: [facility]  :  Assessment & Plan  Metastatic renal cell carcinoma to bone (HCC)    This is a 69 y.o. c PMHx notable for DM, HTN, prostate cancer under good control, being seen in f/u for metastatic RCC while on systemic IO therapy           No follow-ups on file.    History of Present Illness         Objective   /64 (BP Location: Left arm, Patient Position: Sitting, Cuff Size: Adult)   Pulse 92   Temp 97.6 °F (36.4 °C) (Temporal)   Resp 16   Ht 5' 10\" (1.778 m)   Wt 76.9 kg (169 lb 8 oz)   SpO2 97%   BMI 24.32 kg/m²       Cancer Stage at diagnosis: IV    Referral Physician: No ref. provider found    Primary Care Physician:  Wayne Uriarte Jr, MD     Nickname: Charli    Lives with his brother, Gordy    Original ECO     Today's ECO-3    Goals and Barriers:  Current Goal: Minimize effects of disease burden, extend life.   Barriers to accomplishing this: malaise    Patient's Capacity to Self Care:  none      This is a 69 y.o. c PMHx notable for DM, HTN, prostate cancer under good control, being seen in f/u for metastatic RCC while on systemic IO therapy    He has normocytic anemia from his metastatic malignancy, no evidence of iron deficiency.    Discussion of decision making  Oncology history updated, accordingly, during this visit  Goals of care/patient communication  I discussed with the patient the clinical course leading up to their cancer diagnosis. I reviewed relevant office notes, imaging reports and pathology result as well.  I told the patient that this is a case of incurable disease and what this means. We discussed that the goal of anti-cancer therapy is to provide best quality of life, extend overall survival, and progression free survival as shown in clinical trials. We also discussed " that there might be a point when the cancer will no longer respond to this anti-neoplastic therapy. As a result, we also discussed the role of the palliative care team being introduced early in the treatment course. We will be making this referral  I explained the risks/benefits of the proposed cancer therapy: Lenvima + Keytruda and after discussion including understanding risks of possible life-threatening complications and therapy-related malignancy development, informed consent for blood products and treatment has been signed and obtained.  TNM/Staging At Diagnosis  Cancer Staging   Malignant neoplasm of prostate (HCC)  Staging form: Prostate, AJCC 8th Edition  - Clinical: Stage IIB (cT1c, cN0, cM0, PSA: 4.9, Grade Group: 2) - Signed by Isaak Castellon MD on 10/24/2024  Prostate specific antigen (PSA) range: Less than 10  Kayley score: 7  Histologic grading system: 5 grade system    Renal cell carcinoma of left kidney (HCC)  Staging form: Kidney, AJCC 8th Edition  - Clinical: Stage IV (cTX, cN1, cM1) - Signed by Isaak Castellon MD on 10/24/2024    Disease Features/Tumor Markers/Genetics  Tumor Marker: PSA  1/3/2019: 5.6  4/8/2019: 4.9  10/17/2022: 1.3  10/19/2024: 0.355  Notable Path Features:   10/19/2024 inguinal LN bx: retroperitoneal bx is positive this is most compatible with renal primary   Guardant NGS: 11/4/2024: TMB 12 muts/Mb, VHL mutation (can use Belzutifan), MSI stable  CARIS NGS: VHL mutation    1/17/2025: Retroperitoneal mass:  Scant atypical epithelial cells present highly suspicious for metastatic renal carcinoma  Treatment: Lenvima + Keytruda  Other Supportive care:  Treatment Team Members  Surgeon  Rad Onc  Pall[facility]  [facility]  Labs  10/24/2024: creat 1.75  11/12/2024: creat 1.42  Diagnostics  10/18/2024 CT Chest w/o c: Nothing to suggest malignancy in the chest. The 3 mm left lower lobe nodule is of doubtful significance.  Abd/pelv w/c: Moderate left hydronephrosis with  peripelvic/proximal periureteral inflammatory stranding secondary to obstructing large left retroperitoneal toribio conglomerate. Conglomerate of left retroperitoneal nodes measuring approximately 6.5 x 5 x 13 cm (2:106)   Enlarged left pelvic and other retroperitoneal nodes as described  3 mm sclerotic lesions in the right iliac body and right femoral head  10/26/2024 MRI Abd w/wo c: No renal cortical mass is identified bilaterally. Mild urothelial thickening and enhancement within the left renal pelvis without measurable lesion.   11/5/2024  Tumor board discussion. Recommend urology follow up ASAP with Dr. Castro. Treat systemically  11/7/2024: NM Bone scan: No focal tracer activity characteristic of osseous metastatic disease.  12/3  tumor board: Consider biopsy of retroperitoneal mass for assist of pathology differentiation  1/24/2025 CT CAP w/c: stable  The conglomerate toribio mass which invades the left psoas muscle is also smaller, currently 4.8 x 4.2 cm, previously 5.1 x 4.9 cm   An index left external iliac node measures 2.8 x 1.6 cm, previously 2.8 x 1.9 cm (series 3 image 212.)  Another index left external iliac node currently measures 2.2 x 2.1 cm, previously 2.5 x 2.4 cm  Interval development of multiple irregular shaped opacities in the lungs with upper lobe predominance (Their appearance is not typical for metastatic disease, rather this is probably an infectious process). For example, there is a right upper lobe apical 2.2 x 1.5 cm opacity (series 6 image 27.) There is also a 3.3 x 1.8 cm medial left upper lobe   lesion that invades into the anterior mediastinum  2/4/2025:  tumor board. Consider referral to radiation oncology    Discussion of decision making    I personally reviewed the following lab results, the image studies, pathology, other specialty/physicians consult notes and recommendations, and outside medical records. I had a lengthy discussion with the patient and shared the work-up  findings. We discussed the diagnosis and management plan as below. I spent 43 minutes reviewing the records (labs, clinician notes, outside records, medical history, ordering medicine/tests/procedures, monitoring of anti-neoplastic toxicities, interpreting the imaging/labs previously done) and coordination of care as well as direct time with the patient today, of which greater than 50% of the time was spent in counseling and coordination of care with the patient/family.      Plan/Labs  F/u Urologist at [facility] for prostate cancer monitoring   F/u Palliative care   Zometa 4 mg q12 weeks  Oncology genetics counselor referral made previously  Coordination with PCP to see if he can come off of Mounjaro  Restaging CT CAP w/c scheduled 4/21/2025   We discussed Lenvima 20 mg daily + Keytruda 400 mg q6 weeks which can be used for both clear cell and non clear cell indication as the histology is not clear to which subtype we are dealing with; holding off on Lenvima until POD    Follow Up: q12 weeks scheduled thru 7/9/2025    All questions were answered to the patient's satisfaction during this encounter. The patient knows the contact information for our office and knows to reach out for any relevant concerns related to this encounter. They are to call for any temperature 100.4 or higher, new symptoms including but not restricted to shaking chills, decreased appetite, nausea, vomiting, diarrhea, increased fatigue, shortness of breath or chest pain, confusion, and not feeling the strength to come to the clinic. For all other listed problems and medical diagnosis in their chart - they are managed by PCP and/or other specialists, which the patient acknowledges. Thank you very much for your consultation and making us a part of this patient's care. We are continuing to follow closely with you. Please do not hesitate to reach out to me with any additional questions or concerns.    Isaak Castellon MD  Hematology & Medical Oncology  Staff Physician             Disclaimer: This document was prepared using [employer] technology. If a word or phrase is confusing, or does not make sense, this is likely due to recognition error which was not discovered during this clinician's review. If you believe an error has occurred, please contact me through [facility] service line for amena?cation.      ONCOLOGY HISTORY OF PRESENT ILLNESS        Oncology History   Prostate cancer (HCC)   6/18/2019 Initial Diagnosis    Malignant neoplasm of prostate (HCC)     10/24/2024 -  Cancer Staged    Staging form: Prostate, AJCC 8th Edition  - Clinical: Stage IIB (cT1c, cN0, cM0, PSA: 4.9, Grade Group: 2) - Signed by Isaak Castellon MD on 10/24/2024  Prostate specific antigen (PSA) range: Less than 10  [place] score: 7  Histologic grading system: 5 grade system       Metastatic renal cell carcinoma to bone (HCC)   10/24/2024 Initial Diagnosis    Renal cell carcinoma of left kidney (HCC)     10/24/2024 -  Cancer Staged    Staging form: Kidney, AJCC 8th Edition  - Clinical: Stage IV (cTX, cN1, cM1) - Signed by Isaak Castellon MD on 10/24/2024       11/14/2024 -  Chemotherapy    alteplase (CATHFLO), 2 mg, Intracatheter, Every 1 Minute as needed, 3 of 7 cycles  pembrolizumab (KEYTRUDA) IVPB, 400 mg, Intravenous, Once, 3 of 7 cycles  Administration: 400 mg (11/14/2024), 400 mg (1/2/2025), 400 mg (2/13/2025)       History of prostate adenocarcinoma localized intermediate-favorable risk adenocarcinoma of the prostate (Stage II - cT1c, cN0, cM0, Kayley Score: 3+4=7, PSA: 4.9)  s/p SBRT (Administered 3,700-Gy over 5-fractions - Completed on September 18th, 2019)  11/27/2024: DKA admission    1/28/2025 [place]: His left nephroureteral stent was recently internalized by interventional radiology in late December 2024.  I recommend returning to the operating room in the next 3 to 4 months for routine left stent exchange.    SUBJECTIVE  (INTERVAL HISTORY)      Clotting  History None   Bleeding History None   Cancer History Prostate s/p above treatment, RCC   Family Cancer History None   H/O Blood/Plt Transfusion None   Tobacco/etoh/drug abuse 2 PPD x 20 years, quit at age 49, no etoh abuse or rec drug use           Retired Electric  (retired)     Improved appetite.  No acute issues. Driving himself. Still having cloudy urine, goes hourly to the bathroom.    I have reviewed the relevant past medical, surgical, social and family history. I have also reviewed allergies and medications for this patient.    Review of Systems  Baseline weight: 175 lbs    Lost 13 lbs while on Mounjaro, since 7/2024, this has since been stopped.    Denies F/C, N/V, SOB, CP, LH, HA, rash, itching, gen weakness, melena, hematuria, hematochezia, falls.        A 10-point review of system was performed, pertinent positive and negative were detailed as above. Otherwise, the 10-point review of system was negative.      Past Medical History:   Diagnosis Date    Cancer (HCC)     pt unsure of primary site poss. bone vs kidney    COVID-19 12/29/2021    Symptom onset 12/27/21      Cutaneous skin tags 01/02/2019    Diabetes mellitus (HCC)     Hypertension     Ureteral obstruction     L nephrostomy tube in place 12/26/204    internal placement otday 12/26/204    Uses walker     in rehab presently   12/26/2024       Past Surgical History:   Procedure Laterality Date    IR BIOPSY INGUINAL LYMPH NODE  10/19/2024    IR BIOPSY RETROPERITONEUM  1/17/2025    IR INTERNAL URETERAL STENT PLACEMENT  12/26/2024    IR NEPHROSTOMY TO NEPHROURETERAL STENT  11/19/2024    IR NEPHROSTOMY TUBE PLACEMENT  10/19/2024       Family History   Problem Relation Age of Onset    No Known Problems Brother     Stroke Mother        Social History     Socioeconomic History    Marital status: Single     Spouse name: Not on file    Number of children: Not on file    Years of education: Not on file    Highest education level: Not on file    Occupational History    Occupation: Work history - travels a lot   Tobacco Use    Smoking status: Former     Passive exposure: Past    Smokeless tobacco: Never   Vaping Use    Vaping status: Never Used   Substance and Sexual Activity    Alcohol use: Yes     Alcohol/week: 2.0 - 3.0 standard drinks of alcohol     Types: 2 - 3 Cans of beer per week     Comment: social    Drug use: No    Sexual activity: Not Currently   Other Topics Concern    Not on file   Social History Narrative    Engages in travel abroad    Living independently alone'     Social Drivers of Health     Financial Resource Strain: Not on file   Food Insecurity: No Food Insecurity (12/24/2024)    Hunger Vital Sign     Worried About Running Out of Food in the Last Year: Never true     Ran Out of Food in the Last Year: Never true   Transportation Needs: No Transportation Needs (12/24/2024)    PRAPARE - Transportation     Lack of Transportation (Medical): No     Lack of Transportation (Non-Medical): No   Physical Activity: Not on file   Stress: Not on file   Social Connections: Not on file   Intimate Partner Violence: Unknown (11/27/2024)    Nursing IPS     Feels Physically and Emotionally Safe: Not on file     Physically Hurt by Someone: Not on file     Humiliated or Emotionally Abused by Someone: Not on file     Physically Hurt by Someone: No     Hurt or Threatened by Someone: No   Housing Stability: Low Risk  (12/24/2024)    Housing Stability Vital Sign     Unable to Pay for Housing in the Last Year: No     Number of Times Moved in the Last Year: 0     Homeless in the Last Year: No       Allergies   Allergen Reactions    Captopril Cough    Crestor [Rosuvastatin] Diarrhea    Enalapril Cough       Current Outpatient Medications   Medication Sig Dispense Refill    Alcohol Swabs (Alcohol Prep) PADS Use to prep skin prior to placing blood sugar monitor 100 each 1    amLODIPine (NORVASC) 5 mg tablet Take 1 tablet (5 mg total) by mouth daily 100 tablet 3     atorvastatin (LIPITOR) 40 mg tablet TAKE 1 TABLET BY MOUTH EVERY  tablet 1    Continuous Glucose Sensor (FreeStyle Ольга 3 Plus Sensor) MISC Use 1 each every 15 (fifteen) days Every 15 days for blood sugar monitoring *receives through [employer]* 1 each 5    glucose blood (OneTouch Verio) test strip Check blood sugars once daily. Please substitute with appropriate alternative as covered by patient's insurance. Dx: E11.65 100 each 3    insulin aspart (NovoLOG FlexPen) 100 UNIT/ML injection pen Inject up to 15 units before meals. Administer within 5 to 10 minutes before a meal. 15 mL 5    insulin glargine (LANTUS SOLOSTAR) 100 units/mL injection pen Inject 25 Units under the skin daily 30 mL 1    Insulin Pen Needle 32G X 4 MM MISC Use 3 (three) times a day 300 each 1    menthol-zinc oxide (CALMOSPETINE) 0.44-20.625 % Apply topically 2 (two) times a day 113 g 3    OneTouch Delica Lancets 33G MISC Check blood sugars once daily. Please substitute with appropriate alternative as covered by patient's insurance. Dx: E11.65 100 each 3    oxybutynin (DITROPAN-XL) 10 MG 24 hr tablet Take 1 tablet (10 mg total) by mouth daily at bedtime 30 tablet 4    tamsulosin (FLOMAX) 0.4 mg Take 1 capsule (0.4 mg total) by mouth daily with dinner 90 capsule 3    Lenvatinib, 20 MG Daily Dose, (Lenvima, 20 MG Daily Dose,) 2 x 10 MG CPPK Take 20 mg by mouth daily (Patient not taking: Reported on 12/31/2024) 30 each 5    oxyCODONE (ROXICODONE) 5 immediate release tablet Take 0.5 tablets (2.5 mg total) by mouth every 8 (eight) hours as needed for severe pain for up to 10 doses Max Daily Amount: 7.5 mg (Patient not taking: Reported on 3/25/2025) 5 tablet 0    senna-docusate sodium (SENOKOT S) 8.6-50 mg per tablet Take 1 tablet by mouth 2 (two) times a day as needed for constipation       No current facility-administered medications for this visit.       (Not in a hospital admission)      Objective:     24 Hour Vitals Assessment:      Vitals:    03/25/25 1305   BP: 120/64   Pulse: 92   Resp: 16   Temp: 97.6 °F (36.4 °C)   SpO2: 97%         PHYSICIAN EXAM:    General: Appearance: alert, cooperative, no distress.  HEENT: Normocephalic, atraumatic. No scleral icterus. conjunctivae clear. EOMI.  Chest: No tenderness to palpation. No open wound noted.  Lungs: Clear to auscultation bilaterally, Respirations unlabored.  Cardiac: Regular rate, +S1and S2  Abdomen: Soft, non-tender, non-distended. Bowel sounds are normal.   Extremities:  No edema, cyanosis, clubbing.  Skin: Skin color, turgor are normal. No rashes.  Lymphatics: no palpable supra-cervical, axillary, or inguinal adenopathy  Neurologic: Awake, Alert, and oriented, no gross focal deficits noted b/l.       DATA REVIEW:    Pathology Result:    Final Diagnosis   Date Value Ref Range Status   01/17/2025   Final    A.  Retroperitoneal mass:  Scant atypical epithelial cells present highly suspicious for metastatic renal carcinoma.     10/26/2024   Final    A. Urine, Renal, Left, ThinPrep:  Atypical urothelial cells (AUC) - see comment.  Benign squamous cells and mixed inflammatory cells.    Satisfactory for evaluation.    Comment:  The above diagnostic category is from the recently published book, The Sandra System for Reporting Urinary Cytology, and is in keeping with the ongoing effort for utilization of standardized diagnostic terminology in urine cytology.*    *The Sandra System for Reporting Urinary Cytology. Jennifer Blanca, Caitlyn Kerr, Grant Aguila; 2016.     Interpretation performed at [facility] [address] [place] [id number]      10/19/2024   Final    A. Lymph node, inguinal, left, biopsy:  -   Metastatic carcinoma most suggestive of renal origin, see comment.     Comment: The patient's history of prostate cancer with new onset acute renal failure and retroperitoneal mass are noted. As per radiology there are no clear masses identified. The current sample is  positive for metastatic carcinoma. The morphologic and immunohistochemical findings are most consistent with renal origin. However, in the absence of a clear mass lesion, further clinical and radiographic work-up is advised.    Intradepartmental consultation is in agreement (IK/CV).  Dr. Castellon is notified of the findings by Dr. Hines via [employer] Secure Chat on 10/24/24.     04/12/2019   Final    A. Prostate, right peripheral zone, needle core biopsy:  - Benign prostate glands and stroma.    B. Prostate, right central zone, needle core biopsy:  - Prostatic adenocarcinoma, Kayley grade 3 + 3 = 6/10 (Grade group 1), involving one of two cores and 5% of the involved core.  - Focal high-grade prostatic intraepithelial neoplasia (HGPIN).  - Perineural invasion is absent.    C. Prostate, left peripheral zone, needle core biopsy:  - Prostatic adenocarcinoma, Kayley grade 3 + 4 = 7 (>95% pattern 3 and <5% pattern 4, Grade group 2), involving one of 4 cores and 20% of the tissue.  - Prostatic adenocarcinoma, [place] grade 3 + 3 = 6/10 (Grade group 1), involving two of four cores and 5%, 10% of involved cores.  - Perineural invasion is absent.    D. Prostate, left central zone, needle core biopsy:  - Benign prostate glands and stroma.    Comment: Immunohistochemistry for prostate triple stain multiplex (,P63,[id number]) was preformed ob blocks A2, B1, C1, and C2 supporting the above diagnosis.    Intradepartmental consultation is in agreement.  Interpretation performed at [facility], [address] [place] [id number]            Image Results:   Image result are reviewed and documented in Hematology/Oncology history    CT chest abdomen pelvis w contrast  Narrative: CT CHEST, ABDOMEN AND PELVIS WITH IV CONTRAST    INDICATION: C64.2: Malignant neoplasm of left kidney, except renal pelvis. Hematology oncology note from 11/1/2024 was reviewed. Patient has history of prostate cancer status post SBRT, completed in 2019, and  also left renal cell carcinoma.    The 10/19/2024 left inguinal node biopsy demonstrated metastatic carcinoma suggestive of renal origin.    Patient started chemotherapy on 11/14/2024.    COMPARISON: Abdomen and pelvic CT from 10/18/2024, chest CT from 10/18/2024, abdomen MR from 10/26/2024. Bone scan from 11/7/2024.    TECHNIQUE: CT examination of the chest, abdomen and pelvis was performed. Multiplanar 2D reformatted images were created from the source data.    This examination, like all CT scans performed in the [facility], was performed utilizing techniques to minimize radiation dose exposure, including the use of iterative reconstruction and automated exposure control. Radiation dose length   product (DLP) for this visit: 642 mGy-cm    IV Contrast: 100 mL of iohexol (OMNIPAQUE)  Enteric Contrast: Not administered.    FINDINGS:    CHEST    LUNGS: Interval development of multiple irregular shaped opacities in the lungs with upper lobe predominance. For example, there is a right upper lobe apical 2.2 x 1.5 cm opacity (series 6 image 27.) There is also a 3.3 x 1.8 cm medial left upper lobe   lesion that invades into the anterior mediastinum (series 6 image 49.)    PLEURA: Unremarkable.    HEART/GREAT VESSELS: Heart is unremarkable for patient's age. No thoracic aortic aneurysm.    MEDIASTINUM AND MICHELLE: Unremarkable.    CHEST WALL AND LOWER NECK: Unremarkable.    ABDOMEN    LIVER/BILIARY TREE: Unremarkable.    GALLBLADDER: No calcified gallstones. No pericholecystic inflammatory change.    SPLEEN: Unremarkable.    PANCREAS: Unremarkable.    ADRENAL GLANDS: Unremarkable.    KIDNEYS/URETERS: Left ureteral stent in place with resolution of left hydronephrosis. No right-sided hydronephrosis.    STOMACH AND BOWEL: Unremarkable.    APPENDIX: No findings to suggest appendicitis.    ABDOMINOPELVIC CAVITY: Interval mild decrease in retroperitoneal adenopathy.    An index left external iliac node measures 2.8  x 1.6 cm, previously 2.8 x 1.9 cm (series 3 image 212.)    Another index left external iliac node currently measures 2.2 x 2.1 cm, previously 2.5 x 2.4 cm. (Series 2 image 215.)    The conglomerate toribio mass which invades the left psoas muscle is also smaller, currently 4.8 x 4.2 cm, previously 5.1 x 4.9 cm (series 2 image 195.)    VESSELS: Unremarkable for patient's age.    PELVIS    REPRODUCTIVE ORGANS: Unremarkable for patient's age.    URINARY BLADDER: Unremarkable.    ABDOMINAL WALL/INGUINAL REGIONS: Unremarkable.    BONES: No acute fracture or suspicious osseous lesion.  Impression: Interval mild decrease in retroperitoneal adenopathy. The conglomerate left retroperitoneal mass invading the psoas muscle, and several discrete left external iliac nodes are all mildly smaller.    Interval development of multiple irregular shaped opacities in the lungs with upper lobe predominance. For example, there is a right upper lobe apical 2.2 x 1.5 cm opacity (series 6 image 27.) There is also a 3.3 x 1.8 cm medial left upper lobe lesion   that invades into the anterior mediastinum (series 6 image 49.) Their appearance is not typical for metastatic disease, rather this is probably an infectious process.    The study was marked in EPIC for significant notification.    Workstation performed: [id number]      LABS:  Lab data are reviewed and documented in HemOn history.       Lab Results   Component Value Date    HGB 9.7 (L) 03/21/2025    HCT 29.9 (L) 03/21/2025    MCV 84.7 03/21/2025     (H) 03/21/2025    WBC 6.9 03/21/2025    NRBC 0 02/12/2025    BANDSPCT 6 11/27/2024     Lab Results   Component Value Date    K 4.3 03/21/2025     03/21/2025    CO2 23 03/21/2025    BUN 36 (H) 03/21/2025    CREATININE 1.84 (H) 03/21/2025    GLUF 114 (H) 02/12/2025    CALCIUM 9.1 03/21/2025    CORRECTEDCA 9.5 02/12/2025    AST 14 03/21/2025    ALT 20 03/21/2025    ALKPHOS 135 03/21/2025    EGFR 39 (L) 03/21/2025       Lab Results  "  Component Value Date    IRON 36 (L) 01/13/2025    TIBC 259 01/13/2025    FERRITIN 370 01/13/2025    FERRITIN 494 (H) 10/19/2024    FERRITIN 183 10/17/2022    FERRITIN 174 12/23/2021       Lab Results   Component Value Date    WTRQHCAB29 606 10/19/2024       No results for input(s): \"WBC\", \"CREAT\", \"PLT\" in the last 72 hours.    By:  Isaak Castellon MD, 3/25/2025, 1:17 PM                                  "

## 2025-03-16 NOTE — ASSESSMENT & PLAN NOTE
This is a 69 y.o. c PMHx notable for DM, HTN, prostate cancer under good control, being seen in f/u for metastatic RCC while on systemic IO therapy

## 2025-03-17 ENCOUNTER — APPOINTMENT (OUTPATIENT)
Dept: LAB | Facility: HOSPITAL | Age: 70
End: 2025-03-17
Payer: MEDICARE

## 2025-03-17 ENCOUNTER — NURSE TRIAGE (OUTPATIENT)
Age: 70
End: 2025-03-17

## 2025-03-17 DIAGNOSIS — R39.9 UTI SYMPTOMS: Primary | ICD-10-CM

## 2025-03-17 LAB
BACTERIA UR QL AUTO: ABNORMAL /HPF
BILIRUB UR QL STRIP: NEGATIVE
CLARITY UR: ABNORMAL
COLOR UR: ABNORMAL
GLUCOSE UR STRIP-MCNC: ABNORMAL MG/DL
HGB UR QL STRIP.AUTO: ABNORMAL
KETONES UR STRIP-MCNC: NEGATIVE MG/DL
LEUKOCYTE ESTERASE UR QL STRIP: ABNORMAL
NITRITE UR QL STRIP: NEGATIVE
NON-SQ EPI CELLS URNS QL MICRO: ABNORMAL /HPF
PH UR STRIP.AUTO: 5.5 [PH]
PROT UR STRIP-MCNC: ABNORMAL MG/DL
RBC #/AREA URNS AUTO: ABNORMAL /HPF
SP GR UR STRIP.AUTO: 1.01 (ref 1–1.03)
UROBILINOGEN UR STRIP-ACNC: <2 MG/DL
WBC #/AREA URNS AUTO: ABNORMAL /HPF
WBC CLUMPS # UR AUTO: PRESENT /UL

## 2025-03-17 PROCEDURE — 81001 URINALYSIS AUTO W/SCOPE: CPT

## 2025-03-17 PROCEDURE — 87086 URINE CULTURE/COLONY COUNT: CPT

## 2025-03-17 NOTE — TELEPHONE ENCOUNTER
Patient is colonized with bacteria due to the stent that is in place. I also believe that his urgency and frequency is related to the stent as well.    Oxybutynin was sent to pharmacy and he didn't want to take  medication because of side effect of dry eye and dry mouth. He will wait for the results of 2nd urine studies

## 2025-03-17 NOTE — TELEPHONE ENCOUNTER
Patient is colonized with bacteria due to the stent that is in place.  I also believe that his urgency and frequency is related to the stent as well.  I sent in oxybutynin for him to take a little while ago, can you please ask him if he started this medication.

## 2025-03-17 NOTE — TELEPHONE ENCOUNTER
FOLLOW UP: Ordered urine testing, also inquiring next steps as symptoms never really improved with last round of ABX    REASON FOR CONVERSATION: Possible UTI    SYMPTOMS: Cloudy urine, frequency, urgency    OTHER: Reviewed Hydration, avoidance of bladder irritants and ED precautions.     DISPOSITION: Order Lab Work (overriding Next Available Appointment with Provider)        Reason for Disposition   The patient has an unknown case of mild dysuria    Answer Assessment - Initial Assessment Questions  1. When did your symptoms start?   continued  2. Do you experience pain or burning with every void?   No burning  3. Do you have any other urinary symptoms such as urinary frequency, urgency, incontinence, blood in your urine?   Frequency urgency cloudy  4. Do you have any abdominal pain or flank pain?  Abdominal discomfort  5. Do you have a fever of 101 or higher? How did you take your temperature?   no  6. Does your urine stream spray? (Specifically for males)   no  7. Have you become unable to urinate?   no  8. Do you have a history of urinary tract infections?   yes  9. Have you had a recent urologic procedure, surgery, or any recent urine testing?   no  10. Have you ever been tested for an STD?   no    Protocols used: Urology-Dysuria-ADULT-[place]

## 2025-03-18 ENCOUNTER — RESULTS FOLLOW-UP (OUTPATIENT)
Dept: UROLOGY | Facility: AMBULATORY SURGERY CENTER | Age: 70
End: 2025-03-18

## 2025-03-18 ENCOUNTER — TELEMEDICINE (OUTPATIENT)
Age: 70
End: 2025-03-18

## 2025-03-18 DIAGNOSIS — Z79.899 MEDICATION MANAGEMENT: ICD-10-CM

## 2025-03-18 DIAGNOSIS — N30.01 ACUTE CYSTITIS WITH HEMATURIA: ICD-10-CM

## 2025-03-18 DIAGNOSIS — N18.31 TYPE 2 DIABETES MELLITUS WITH STAGE 3A CHRONIC KIDNEY DISEASE, WITHOUT LONG-TERM CURRENT USE OF INSULIN (HCC): Primary | ICD-10-CM

## 2025-03-18 DIAGNOSIS — E11.22 TYPE 2 DIABETES MELLITUS WITH STAGE 3A CHRONIC KIDNEY DISEASE, WITHOUT LONG-TERM CURRENT USE OF INSULIN (HCC): Primary | ICD-10-CM

## 2025-03-18 LAB — BACTERIA UR CULT: NORMAL

## 2025-03-18 PROCEDURE — PBNCHG PB NO CHARGE PLACEHOLDER: Performed by: PHARMACIST

## 2025-03-18 NOTE — TELEPHONE ENCOUNTER
Call placed to patient, dicussed increasing water to 64 ounces daily, decreasing bladder irritants, take a probiotic    He states he drinks 32 ounces of Iced Tea daily. Discussed decreasing the tea

## 2025-03-19 NOTE — PROGRESS NOTES
[facility] Clinical Integration Pharmacy Services   Sharon Mar, Enrique     Alejandro Adames is a 69 y.o. male who was referred to the clinical pharmacist by Wayne Uriarte Jr, MD for diabetes. Patient presents via telephone for follow-up.      Virtual Care Documentation  Encounter provider Mariajose Novak    The Patient is located at Home and in the following state in which I hold an active license: [place].    The patient was identified by name and date of birth. Alejandro Adames was informed that this is a telemedicine visit and that the visit is being conducted through the [employer] Teams platform. He agrees to proceed.  My office door was closed. No one else was in the room. He acknowledged consent and understanding of privacy and security of the telephone platform. The patient has agreed to participate and understands they can discontinue the visit at any time.       Assessment & Plan  Type 2 diabetes mellitus with stage 3a chronic kidney disease, without long-term current use of insulin ([facility])    Lab Results   Component Value Date    HGBA1C 8.3 (A) 12/24/2024   -  Most recent A1c: above goal.   - CGM TIR: below goal; TBR meets goal.      Notable decrease in glucose variability with change in Lantus frequency. Will increase dose to target fasting hyperglycemia. Also increase evening Humalog.      - Interventions:  Increase Lantus to 32 units SQ HS  Increase Humalog SQ to 12 units QAM + 18 units QPM   - if BG > 200 before meals, add 2 more units  Acute cystitis with hematuria  Defer to Urology  Medication management    Education:    - He was given instructions on how to contact me in the event of adverse drug event, questions, or concerns.    Follow-up:   Follow-up with pharmacist in 2 weeks  Next PCP visit: 3/26/2025    - Home Monitoring Records: CGM data.  - Labs: A1c due on or after 3/24/2025    Subjective:     Alejandro Adames is a 69 y.o. male with a history of diabetes with long term use of  insulin. Diabetes course has been acutely uncontrolled.     Recurrent UTI:   - Reports ongoing symptoms  - Urine sample submitted on 3/17/2025, awaiting culture results    Patient now taking Lantus daily (vs. BID).      Review of Systems   Constitutional:  Negative for fatigue.   Eyes:         No blurry vision   Endocrine: Negative for polydipsia and polyphagia.        No hypoglycemia   Genitourinary:  Positive for dysuria, frequency and urgency. Negative for difficulty urinating.        (+) cloudy urine   Neurological:         No foot burning, numbness or pain     DM Self-Management:  - Self-monitoring: is performed regularly. He checks glucose levels using CGM. He uses phone as reader.   - He is connected to [employer].   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 Per Urology, nodules are responding to treatment (2/21/2025).  Next dose: 3/27/2025    Health Status: Complex; Goals - A1c: <8%; Time In Range: >70% with TBR <1% of time per ADA Guidelines for older adults.  Microvascular complications: none reported  Cardiovascular complications: none reported  - Statin: Yes   - ACEi/ARB: No, ACEi cough   Hospitalizations related to DM:  11/2024: DKA  Previous DM medications:   Metformin  Mounjaro, Ozempic    Cardiometabolic Risk Factors:  Diabetes Composite Score: 3   Values used to calculate this score:    Points  Metrics       1        Blood Pressure: 134/80       1        Prescribed Statins: Yes                Hemoglobin A1c: 8.3       1        Smokes Tobacco: No        0        Prescribed Aspirin: No    - HF: No    - CKD: Decreased GFR - stage III, Ø albuminuria     Drug Safety:  - Thyroid cancer: No  CT Chest (10/25/2024): 1 cm left thyroid nodule. No follow-up needed.    - History of pancreatitis: No    Drug Therapy Problems:  - Medication Adherence: Responsible for medication management: patient. Patient is compliant all of the time.    CGM Data Review:     Device: FreeStyle Ольга 3  Dates: 3/5 - 3/18/2025  Usage: 95%  Average glucose (mg/dL): 234  GMI (approx. lab A1c): 8.9%  Glycemic variability (CV): 34.5%    Glycemic patterns: persistent hyperglycemia, decreased variability  Hypoglycemia: occurring mid morning (awakens patient) and late evening (8-10 pm)     Metric CGM Target   Time above range (TAR) Very High >250 mg/dL 39 <5%   Time above range (TAR) High >180 mg/dL 24 <25%   Time in range (TIR)  mg/dL 26 >70%   Time below range (TBR) Low <70 mg/dL 1 <4%    Time below range (TBR)Very Low <54 mg/dL 0 <1%     More than 72 hours of data was reviewed. Report to be scanned to chart.        Meds/Allergies     Current Outpatient Medications:     Alcohol Swabs (Alcohol Prep) PADS, Use to prep skin prior to placing blood sugar monitor, Disp: 100 each, Rfl: 1    amLODIPine (NORVASC) 5 mg tablet, Take 1 tablet (5 mg total) by mouth daily, Disp: 100 tablet, Rfl: 3    atorvastatin (LIPITOR) 40 mg tablet, TAKE 1 TABLET BY MOUTH EVERY DAY, Disp: 100 tablet, Rfl: 1    Continuous Glucose Sensor (FreeStyle Ольга 3 Plus Sensor) MISC, Use 1 each Every 15 days for blood sugar monitoring *receives through [employer]*, Disp: , Rfl:     glucose blood (OneTouch Verio) test strip, Check blood sugars once daily. Please substitute with appropriate alternative as covered by patient's insurance. Dx: E11.65, Disp: 100 each, Rfl: 3    insulin aspart (NovoLOG FlexPen) 100 UNIT/ML injection pen, Inject up to 15 units before meals. Administer within 5 to 10 minutes before a meal., Disp: 15 mL,  "Rfl: 5    insulin glargine (LANTUS SOLOSTAR) 100 units/mL injection pen, Inject 25 Units under the skin daily, Disp: 30 mL, Rfl: 1    Insulin Pen Needle 32G X 4 MM MISC, Use 3 (three) times a day, Disp: 300 each, Rfl: 3    Lenvatinib, 20 MG Daily Dose, (Lenvima, 20 MG Daily Dose,) 2 x 10 MG CPPK, Take 20 mg by mouth daily (Patient not taking: Reported on 12/31/2024), Disp: 30 each, Rfl: 5    menthol-zinc oxide (CALMOSPETINE) 0.44-20.625 %, Apply topically 2 (two) times a day, Disp: 113 g, Rfl: 3    OneTouch Delica Lancets 33G MISC, Check blood sugars once daily. Please substitute with appropriate alternative as covered by patient's insurance. Dx: E11.65, Disp: 100 each, Rfl: 3    oxybutynin (DITROPAN-XL) 10 MG 24 hr tablet, Take 1 tablet (10 mg total) by mouth daily at bedtime, Disp: 30 tablet, Rfl: 4    oxyCODONE (ROXICODONE) 5 immediate release tablet, Take 0.5 tablets (2.5 mg total) by mouth every 8 (eight) hours as needed for severe pain for up to 10 doses Max Daily Amount: 7.5 mg (Patient not taking: Reported on 1/17/2025), Disp: 5 tablet, Rfl: 0    senna-docusate sodium (SENOKOT S) 8.6-50 mg per tablet, Take 1 tablet by mouth 2 (two) times a day as needed for constipation, Disp: , Rfl:     tamsulosin (FLOMAX) 0.4 mg, Take 1 capsule (0.4 mg total) by mouth daily with dinner, Disp: 90 capsule, Rfl: 3     Allergies   Allergen Reactions    Captopril Cough    Crestor [Rosuvastatin] Diarrhea    Enalapril Cough     Objective     Vital Signs:    Estimated body mass index is 24.25 kg/m² as calculated from the following:    Height as of 2/21/25: 5' 10\" (1.778 m).    Weight as of 2/21/25: 76.7 kg (169 lb).     Administrative Statements   Pharmacist Tracking Tool:   Reason For Outreach: Embedded Pharmacist  Demographics:  Intervention Method: Phone  Type of Intervention: Follow-Up  Topics Addressed: Diabetes and Other  Pharmacologic Interventions: Dose or Frequency Adjusted  Non-Pharmacologic Interventions: Personal " CGM  Time:  Direct Patient Care:  15  mins  Care Coordination:  20  mins  Recommendation Recipient: Patient/Caregiver  Outcome: Accepted    Documentation under collaborative practice agreement. Orders pended for PCP signature if needed.    Sharon Mar, PharmD  Clinical Integration Pharmacist  [facility]

## 2025-03-19 NOTE — ASSESSMENT & PLAN NOTE
Lab Results   Component Value Date    HGBA1C 8.3 (A) 12/24/2024   -  Most recent A1c: above goal.   - CGM TIR: below goal; TBR meets goal.      Notable decrease in glucose variability with change in Lantus frequency. Will increase dose to target fasting hyperglycemia. Also increase evening Humalog.      - Interventions:  Increase Lantus to 32 units SQ HS  Increase Humalog SQ to 12 units QAM + 18 units QPM   - if BG > 200 before meals, add 2 more units

## 2025-03-21 DIAGNOSIS — N18.2 TYPE 2 DIABETES MELLITUS WITH STAGE 2 CHRONIC KIDNEY DISEASE, WITHOUT LONG-TERM CURRENT USE OF INSULIN  (HCC): ICD-10-CM

## 2025-03-21 DIAGNOSIS — E11.22 TYPE 2 DIABETES MELLITUS WITH STAGE 2 CHRONIC KIDNEY DISEASE, WITHOUT LONG-TERM CURRENT USE OF INSULIN  (HCC): ICD-10-CM

## 2025-03-23 PROBLEM — N30.00 ACUTE CYSTITIS WITHOUT HEMATURIA: Status: RESOLVED | Noted: 2025-02-21 | Resolved: 2025-03-23

## 2025-03-25 ENCOUNTER — OFFICE VISIT (OUTPATIENT)
Dept: HEMATOLOGY ONCOLOGY | Facility: CLINIC | Age: 70
End: 2025-03-25
Payer: MEDICARE

## 2025-03-25 VITALS
DIASTOLIC BLOOD PRESSURE: 64 MMHG | WEIGHT: 169.5 LBS | SYSTOLIC BLOOD PRESSURE: 120 MMHG | BODY MASS INDEX: 24.26 KG/M2 | HEART RATE: 92 BPM | OXYGEN SATURATION: 97 % | HEIGHT: 70 IN | TEMPERATURE: 97.6 F | RESPIRATION RATE: 16 BRPM

## 2025-03-25 DIAGNOSIS — C64.9 METASTATIC RENAL CELL CARCINOMA TO BONE (HCC): Primary | Chronic | ICD-10-CM

## 2025-03-25 DIAGNOSIS — Z79.899 HIGH RISK MEDICATION USE: ICD-10-CM

## 2025-03-25 DIAGNOSIS — Z29.89 IMMUNOTHERAPY ENCOUNTER: ICD-10-CM

## 2025-03-25 DIAGNOSIS — N18.2 TYPE 2 DIABETES MELLITUS WITH STAGE 2 CHRONIC KIDNEY DISEASE, WITHOUT LONG-TERM CURRENT USE OF INSULIN  (HCC): ICD-10-CM

## 2025-03-25 DIAGNOSIS — E11.22 TYPE 2 DIABETES MELLITUS WITH STAGE 2 CHRONIC KIDNEY DISEASE, WITHOUT LONG-TERM CURRENT USE OF INSULIN  (HCC): ICD-10-CM

## 2025-03-25 DIAGNOSIS — C79.51 METASTATIC RENAL CELL CARCINOMA TO BONE (HCC): Primary | Chronic | ICD-10-CM

## 2025-03-25 PROCEDURE — 99215 OFFICE O/P EST HI 40 MIN: CPT | Performed by: INTERNAL MEDICINE

## 2025-03-25 PROCEDURE — G2211 COMPLEX E/M VISIT ADD ON: HCPCS | Performed by: INTERNAL MEDICINE

## 2025-03-25 RX ORDER — HYDROCHLOROTHIAZIDE 12.5 MG/1
1 CAPSULE ORAL
Qty: 1 EACH | Refills: 5 | Status: SHIPPED | OUTPATIENT
Start: 2025-03-25 | End: 2025-03-25 | Stop reason: SDUPTHER

## 2025-03-25 RX ORDER — SODIUM CHLORIDE 9 MG/ML
20 INJECTION, SOLUTION INTRAVENOUS ONCE
OUTPATIENT
Start: 2025-05-08

## 2025-03-25 RX ORDER — SODIUM CHLORIDE 9 MG/ML
20 INJECTION, SOLUTION INTRAVENOUS ONCE
OUTPATIENT
Start: 2025-06-19

## 2025-03-26 ENCOUNTER — TELEPHONE (OUTPATIENT)
Dept: FAMILY MEDICINE CLINIC | Facility: CLINIC | Age: 70
End: 2025-03-26

## 2025-03-26 ENCOUNTER — PATIENT OUTREACH (OUTPATIENT)
Dept: HEMATOLOGY ONCOLOGY | Facility: CLINIC | Age: 70
End: 2025-03-26

## 2025-03-26 ENCOUNTER — CONSULT (OUTPATIENT)
Dept: FAMILY MEDICINE CLINIC | Facility: CLINIC | Age: 70
End: 2025-03-26
Payer: MEDICARE

## 2025-03-26 VITALS
HEIGHT: 70 IN | HEART RATE: 51 BPM | DIASTOLIC BLOOD PRESSURE: 64 MMHG | BODY MASS INDEX: 23.62 KG/M2 | WEIGHT: 165 LBS | SYSTOLIC BLOOD PRESSURE: 120 MMHG | OXYGEN SATURATION: 100 % | TEMPERATURE: 97.8 F

## 2025-03-26 DIAGNOSIS — C64.9 METASTATIC RENAL CELL CARCINOMA TO BONE (HCC): Chronic | ICD-10-CM

## 2025-03-26 DIAGNOSIS — E11.22 TYPE 2 DIABETES MELLITUS WITH STAGE 3A CHRONIC KIDNEY DISEASE, WITHOUT LONG-TERM CURRENT USE OF INSULIN (HCC): Primary | ICD-10-CM

## 2025-03-26 DIAGNOSIS — E11.22 TYPE 2 DIABETES MELLITUS WITH STAGE 2 CHRONIC KIDNEY DISEASE, WITHOUT LONG-TERM CURRENT USE OF INSULIN  (HCC): ICD-10-CM

## 2025-03-26 DIAGNOSIS — I10 ESSENTIAL HYPERTENSION: ICD-10-CM

## 2025-03-26 DIAGNOSIS — N18.31 TYPE 2 DIABETES MELLITUS WITH STAGE 3A CHRONIC KIDNEY DISEASE, WITHOUT LONG-TERM CURRENT USE OF INSULIN (HCC): Primary | ICD-10-CM

## 2025-03-26 DIAGNOSIS — N18.2 TYPE 2 DIABETES MELLITUS WITH STAGE 2 CHRONIC KIDNEY DISEASE, WITHOUT LONG-TERM CURRENT USE OF INSULIN  (HCC): ICD-10-CM

## 2025-03-26 DIAGNOSIS — C61 PROSTATE CANCER (HCC): Chronic | ICD-10-CM

## 2025-03-26 DIAGNOSIS — N13.39 OTHER HYDRONEPHROSIS: ICD-10-CM

## 2025-03-26 DIAGNOSIS — C79.51 METASTATIC RENAL CELL CARCINOMA TO BONE (HCC): Chronic | ICD-10-CM

## 2025-03-26 PROCEDURE — G2211 COMPLEX E/M VISIT ADD ON: HCPCS | Performed by: FAMILY MEDICINE

## 2025-03-26 PROCEDURE — 99214 OFFICE O/P EST MOD 30 MIN: CPT | Performed by: FAMILY MEDICINE

## 2025-03-26 RX ORDER — INSULIN LISPRO 100 [IU]/ML
INJECTION, SOLUTION INTRAVENOUS; SUBCUTANEOUS
Qty: 15 ML | Refills: 5 | Status: SHIPPED | OUTPATIENT
Start: 2025-03-26

## 2025-03-26 NOTE — PROGRESS NOTES
Name: Alejandro Adames      : 1955      MRN: [id number]  Encounter Provider: Wayne Uriarte Jr, MD  Encounter Date: 3/26/2025   Encounter department: [facility] PRIMARY CARE  :  Assessment & Plan  Type 2 diabetes mellitus with stage 2 chronic kidney disease, without long-term current use of insulin  (HCC)  Continue current regimen.  Fortunately, his had no hypoglycemia.  Lab Results   Component Value Date    HGBA1C 8.3 (A) 2024            Type 2 diabetes mellitus with stage 3a chronic kidney disease, without long-term current use of insulin (HCC)  Continue the patient lispro insulin is covered so I sent an order in for this.  Lab Results   Component Value Date    HGBA1C 8.3 (A) 2024       Orders:    insulin lispro (HumaLOG) 100 units/mL injection pen; Inject up to 18 units prior to meals    Other hydronephrosis  Continue close urology follow-up.    Metastatic renal cell carcinoma to bone (HCC)  Continue close follow-up with oncology.       Prostate cancer (HCC)  Continue close follow-up with urology       Essential hypertension  Blood pressure is well-controlled at this time.                History of Present Illness   HPI patient presents today for follow-up for his chronic health issues.  He is being followed by both urology as well as oncology for history of renal cell cancer as well as prostate cancer.  Treatment seems to be going relatively well.  He suffers from intermittent fatigue and occasional diarrhea but seems to be doing pretty well.  He denies any current probs with dysuria or hematuria.  He has a history of type 2 diabetes.  He is had no significant hypoglycemia.  He feels CGM is been extremely helpful in diabetic control.  He has hypertension denies chest pain, shortness of breath or palpitations.        Review of Systems   Constitutional:  Positive for fatigue. Negative for appetite change, chills, fever and unexpected weight change.   HENT:  Negative for trouble  "swallowing.    Eyes:  Negative for visual disturbance.   Respiratory:  Negative for cough, chest tightness, shortness of breath and wheezing.    Cardiovascular:  Negative for chest pain, palpitations and leg swelling.   Gastrointestinal:  Negative for abdominal distention, abdominal pain, blood in stool, constipation and diarrhea.   Endocrine: Negative for polyuria.   Genitourinary:  Negative for difficulty urinating and flank pain.   Musculoskeletal:  Negative for arthralgias and myalgias.   Skin:  Negative for rash.   Neurological:  Negative for dizziness and light-headedness.   Hematological:  Negative for adenopathy. Does not bruise/bleed easily.   Psychiatric/Behavioral:  Negative for dysphoric mood and sleep disturbance. The patient is not nervous/anxious.        Objective   /64   Pulse (!) 51   Temp 97.8 °F (36.6 °C)   Ht 5' 10\" (1.778 m)   Wt 74.8 kg (165 lb)   SpO2 100%   BMI 23.68 kg/m²      Physical Exam  Constitutional:       General: He is not in acute distress.     Appearance: Normal appearance. He is well-developed. He is not diaphoretic.      Comments: Pale, NAD   HENT:      Head: Normocephalic.      Right Ear: External ear normal.      Left Ear: External ear normal.      Nose: Nose normal.   Eyes:      General:         Right eye: No discharge.         Left eye: No discharge.      Conjunctiva/sclera: Conjunctivae normal.      Pupils: Pupils are equal, round, and reactive to light.   Neck:      Thyroid: No thyromegaly.      Trachea: No tracheal deviation.   Cardiovascular:      Rate and Rhythm: Normal rate and regular rhythm.      Heart sounds: Normal heart sounds. No murmur heard.     No friction rub.   Pulmonary:      Effort: Pulmonary effort is normal. No respiratory distress.      Breath sounds: Normal breath sounds. No wheezing.   Chest:      Chest wall: No tenderness.   Abdominal:      General: There is no distension.      Palpations: There is no mass.      Tenderness: There is no " abdominal tenderness. There is no guarding or rebound.      Hernia: No hernia is present.   Musculoskeletal:         General: No swelling or deformity.      Cervical back: Normal range of motion.      Right lower leg: No edema.      Left lower leg: No edema.   Skin:     Findings: No erythema or rash.   Neurological:      General: No focal deficit present.      Mental Status: He is alert.      Cranial Nerves: No cranial nerve deficit.      Coordination: Coordination normal.   Psychiatric:         Thought Content: Thought content normal.

## 2025-03-26 NOTE — PROGRESS NOTES
Outreach made to patient to follow up on barriers to care and see if he has any questions or concerns at this time. I LM asking patient to return my call.

## 2025-03-26 NOTE — ASSESSMENT & PLAN NOTE
Continue the patient lispro insulin is covered so I sent an order in for this.  Lab Results   Component Value Date    HGBA1C 8.3 (A) 12/24/2024       Orders:    insulin lispro (HumaLOG) 100 units/mL injection pen; Inject up to 18 units prior to meals

## 2025-03-26 NOTE — TELEPHONE ENCOUNTER
[facility] Clinical Integration Pharmacy Services  Sharon Mar, PharmD     Reason for Outreach: Received inNefsiset message from PCP.    Patient reports difficulty obtaining CGM sensors.    Unclear where patient is currently obtaining sensors.    Most recently [place] orders through [employer]. Contact company. They reported orders are not active.    Outreach call placed to patient to request clarification of need. If patient returns call, please obtain company/pharmacy that is supplying sensors and what barrier is.

## 2025-03-26 NOTE — TELEPHONE ENCOUNTER
Patient requires new FreeStyle Ольга sensors and reported upcoming travel on [place], 3/31. Confirmed patient has an active [employer] account. Refill order placed on patient’s behalf. Per [employer], order will be processed within 24 hours and delivered in 3-5 business days, likely after patient’s departure.    Patient advised to purchase a sensor at cash price from [facility] for immediate use; prescription sent by Dr. Tellez. Instructed patient to contact [employer] directly for future orders and to initiate reorders when one sensor remains to allow sufficient processing time. Contact methods and MyApp option provided.

## 2025-03-27 ENCOUNTER — HOSPITAL ENCOUNTER (OUTPATIENT)
Dept: INFUSION CENTER | Facility: CLINIC | Age: 70
Discharge: HOME | End: 2025-03-27
Payer: MEDICARE

## 2025-03-27 VITALS
SYSTOLIC BLOOD PRESSURE: 126 MMHG | TEMPERATURE: 97.6 F | WEIGHT: 168.87 LBS | HEART RATE: 91 BPM | DIASTOLIC BLOOD PRESSURE: 67 MMHG | RESPIRATION RATE: 18 BRPM | BODY MASS INDEX: 24.18 KG/M2 | HEIGHT: 70 IN

## 2025-03-27 DIAGNOSIS — C79.51 METASTATIC RENAL CELL CARCINOMA TO BONE (HCC): ICD-10-CM

## 2025-03-27 DIAGNOSIS — Z79.899 HIGH RISK MEDICATION USE: Primary | ICD-10-CM

## 2025-03-27 DIAGNOSIS — Z29.89 IMMUNOTHERAPY ENCOUNTER: ICD-10-CM

## 2025-03-27 DIAGNOSIS — R39.9 UTI SYMPTOMS: ICD-10-CM

## 2025-03-27 DIAGNOSIS — C64.9 METASTATIC RENAL CELL CARCINOMA TO BONE (HCC): ICD-10-CM

## 2025-03-27 PROBLEM — E11.22 TYPE 2 DIABETES MELLITUS WITH STAGE 3A CHRONIC KIDNEY DISEASE, WITHOUT LONG-TERM CURRENT USE OF INSULIN (HCC): Status: ACTIVE | Noted: 2024-10-18

## 2025-03-27 PROBLEM — N18.31 TYPE 2 DIABETES MELLITUS WITH STAGE 3A CHRONIC KIDNEY DISEASE, WITHOUT LONG-TERM CURRENT USE OF INSULIN (HCC): Status: ACTIVE | Noted: 2024-10-18

## 2025-03-27 PROCEDURE — 96413 CHEMO IV INFUSION 1 HR: CPT

## 2025-03-27 RX ORDER — SODIUM CHLORIDE 9 MG/ML
20 INJECTION, SOLUTION INTRAVENOUS ONCE
Status: COMPLETED | OUTPATIENT
Start: 2025-03-27 | End: 2025-03-27

## 2025-03-27 RX ORDER — OXYBUTYNIN CHLORIDE 10 MG/1
10 TABLET, EXTENDED RELEASE ORAL
Qty: 90 TABLET | Refills: 2 | Status: SHIPPED | OUTPATIENT
Start: 2025-03-27

## 2025-03-27 RX ORDER — HYDROCHLOROTHIAZIDE 12.5 MG/1
1 CAPSULE ORAL
Qty: 1 EACH | Refills: 0 | Status: SHIPPED | OUTPATIENT
Start: 2025-03-27

## 2025-03-27 RX ADMIN — SODIUM CHLORIDE 20 ML/HR: 9 INJECTION, SOLUTION INTRAVENOUS at 12:56

## 2025-03-27 RX ADMIN — SODIUM CHLORIDE 400 MG: 9 INJECTION, SOLUTION INTRAVENOUS at 13:10

## 2025-03-27 NOTE — PROGRESS NOTES
Patient tolerated keytruda without incident. AVS declined by patient. Patient is aware of appointment on 5/8/25 at 12pm.

## 2025-03-27 NOTE — PLAN OF CARE
Problem: Potential for Falls  Goal: Patient will remain free of falls  Description: INTERVENTIONS:- Educate patient/family on patient safety including physical limitations- Instruct patient to call for assistance with activity - Consult OT/PT to assist with strengthening/mobility - Keep Call bell within reach- Keep bed low and locked with side rails adjusted as appropriate- Keep care items and personal belongings within reach- Initiate and maintain comfort rounds- Make Fall Risk Sign visible to staff- Offer Toileting every  Hours, in advance of need- Initiate/Maintain alarm- Obtain necessary fall risk management equipment: - Apply yellow socks and bracelet for high fall risk patients- Consider moving patient to room near nurses station  Reactivated

## 2025-03-31 ENCOUNTER — NURSE TRIAGE (OUTPATIENT)
Age: 70
End: 2025-03-31

## 2025-03-31 DIAGNOSIS — N18.31 TYPE 2 DIABETES MELLITUS WITH STAGE 3A CHRONIC KIDNEY DISEASE, WITHOUT LONG-TERM CURRENT USE OF INSULIN (HCC): ICD-10-CM

## 2025-03-31 DIAGNOSIS — E11.22 TYPE 2 DIABETES MELLITUS WITH STAGE 3A CHRONIC KIDNEY DISEASE, WITHOUT LONG-TERM CURRENT USE OF INSULIN (HCC): ICD-10-CM

## 2025-03-31 NOTE — TELEPHONE ENCOUNTER
"FOLLOW UP: Please call back to Ian at [facility] pharmacy [phone]    REASON FOR CONVERSATION: Medication Problem    SYMPTOMS: NA    OTHER: Patient told pharmacist that insulin dose was increased to up to 32 units per day.  Current prescription reads up to 18 units per day.  Patient is leaving for [place] tomorrow and will not have enough insulin for his trip with current prescription.     DISPOSITION: Discuss With PCP and Callback by Nurse Within 1 Hour    Reason for Disposition   Pharmacy calling with prescription question and triager unable to answer question    Answer Assessment - Initial Assessment Questions  1. NAME of MEDICINE: \"What medicine(s) are you calling about?\"        insulin lispro (HumaLOG) 100 units/mL injection pen     2. QUESTION: \"What is your question?\" (e.g., double dose of medicine, side effect)        Pharmacy called- patient told pharmacist that his insulin dose was raised to up to 32 units per day.  Current prescription reads up to 18 units per day.  Patient is leaving for [place] tomorrow and will not have enough insulin for his trip if dose is not increased.     3. PRESCRIBER: \"Who prescribed the medicine?\" Reason: if prescribed by specialist, call should be referred to that group.      Dr. Tellez    Protocols used: Medication Question Call-Adult-[place]    "

## 2025-04-11 ENCOUNTER — APPOINTMENT (OUTPATIENT)
Dept: LAB | Facility: HOSPITAL | Age: 70
End: 2025-04-11
Payer: MEDICARE

## 2025-04-11 ENCOUNTER — PATIENT MESSAGE (OUTPATIENT)
Dept: FAMILY MEDICINE CLINIC | Facility: CLINIC | Age: 70
End: 2025-04-11

## 2025-04-11 DIAGNOSIS — R39.89 SUSPECTED UTI: ICD-10-CM

## 2025-04-11 DIAGNOSIS — E11.22 TYPE 2 DIABETES MELLITUS WITH STAGE 3A CHRONIC KIDNEY DISEASE, WITHOUT LONG-TERM CURRENT USE OF INSULIN (HCC): ICD-10-CM

## 2025-04-11 DIAGNOSIS — Z01.812 PRE-OPERATIVE LABORATORY EXAMINATION: ICD-10-CM

## 2025-04-11 DIAGNOSIS — N18.31 TYPE 2 DIABETES MELLITUS WITH STAGE 3A CHRONIC KIDNEY DISEASE, WITHOUT LONG-TERM CURRENT USE OF INSULIN (HCC): ICD-10-CM

## 2025-04-11 DIAGNOSIS — N13.39 OTHER HYDRONEPHROSIS: ICD-10-CM

## 2025-04-11 DIAGNOSIS — Z01.818 PREOP EXAMINATION: ICD-10-CM

## 2025-04-11 LAB
ANION GAP SERPL CALCULATED.3IONS-SCNC: 7 MMOL/L (ref 4–13)
BASOPHILS # BLD AUTO: 0.07 THOUSANDS/ÂΜL (ref 0–0.1)
BASOPHILS NFR BLD AUTO: 1 % (ref 0–1)
BUN SERPL-MCNC: 42 MG/DL (ref 5–25)
CALCIUM SERPL-MCNC: 9.2 MG/DL (ref 8.4–10.2)
CHLORIDE SERPL-SCNC: 105 MMOL/L (ref 96–108)
CO2 SERPL-SCNC: 21 MMOL/L (ref 21–32)
CREAT SERPL-MCNC: 1.96 MG/DL (ref 0.6–1.3)
EOSINOPHIL # BLD AUTO: 0.15 THOUSAND/ÂΜL (ref 0–0.61)
EOSINOPHIL NFR BLD AUTO: 2 % (ref 0–6)
ERYTHROCYTE [DISTWIDTH] IN BLOOD BY AUTOMATED COUNT: 15.9 % (ref 11.6–15.1)
EST. AVERAGE GLUCOSE BLD GHB EST-MCNC: 212 MG/DL
GFR SERPL CREATININE-BSD FRML MDRD: 33 ML/MIN/1.73SQ M
GLUCOSE SERPL-MCNC: 201 MG/DL (ref 65–140)
HBA1C MFR BLD: 9 %
HCT VFR BLD AUTO: 30.6 % (ref 36.5–49.3)
HGB BLD-MCNC: 9.4 G/DL (ref 12–17)
IMM GRANULOCYTES # BLD AUTO: 0.03 THOUSAND/UL (ref 0–0.2)
IMM GRANULOCYTES NFR BLD AUTO: 0 % (ref 0–2)
LYMPHOCYTES # BLD AUTO: 1.38 THOUSANDS/ÂΜL (ref 0.6–4.47)
LYMPHOCYTES NFR BLD AUTO: 20 % (ref 14–44)
MCH RBC QN AUTO: 26.5 PG (ref 26.8–34.3)
MCHC RBC AUTO-ENTMCNC: 30.7 G/DL (ref 31.4–37.4)
MCV RBC AUTO: 86 FL (ref 82–98)
MONOCYTES # BLD AUTO: 0.64 THOUSAND/ÂΜL (ref 0.17–1.22)
MONOCYTES NFR BLD AUTO: 9 % (ref 4–12)
NEUTROPHILS # BLD AUTO: 4.68 THOUSANDS/ÂΜL (ref 1.85–7.62)
NEUTS SEG NFR BLD AUTO: 68 % (ref 43–75)
NRBC BLD AUTO-RTO: 0 /100 WBCS
PLATELET # BLD AUTO: 437 THOUSANDS/UL (ref 149–390)
PMV BLD AUTO: 9 FL (ref 8.9–12.7)
POTASSIUM SERPL-SCNC: 3.9 MMOL/L (ref 3.5–5.3)
RBC # BLD AUTO: 3.55 MILLION/UL (ref 3.88–5.62)
SODIUM SERPL-SCNC: 133 MMOL/L (ref 135–147)
WBC # BLD AUTO: 6.95 THOUSAND/UL (ref 4.31–10.16)

## 2025-04-11 PROCEDURE — 83036 HEMOGLOBIN GLYCOSYLATED A1C: CPT

## 2025-04-11 PROCEDURE — 85025 COMPLETE CBC W/AUTO DIFF WBC: CPT

## 2025-04-11 PROCEDURE — 87086 URINE CULTURE/COLONY COUNT: CPT

## 2025-04-11 PROCEDURE — 87147 CULTURE TYPE IMMUNOLOGIC: CPT

## 2025-04-11 PROCEDURE — 80048 BASIC METABOLIC PNL TOTAL CA: CPT

## 2025-04-11 PROCEDURE — 36415 COLL VENOUS BLD VENIPUNCTURE: CPT

## 2025-04-11 PROCEDURE — 87186 SC STD MICRODIL/AGAR DIL: CPT

## 2025-04-12 DIAGNOSIS — E78.5 HYPERLIPIDEMIA, UNSPECIFIED HYPERLIPIDEMIA TYPE: ICD-10-CM

## 2025-04-13 LAB — BACTERIA UR CULT: ABNORMAL

## 2025-04-13 NOTE — PATIENT COMMUNICATION
Patient not available for scheduled virtual visit on 4/11/2025.    Please contact patient and re-schedule with Ruchi BrownD

## 2025-04-14 ENCOUNTER — TELEPHONE (OUTPATIENT)
Age: 70
End: 2025-04-14

## 2025-04-14 ENCOUNTER — PATIENT MESSAGE (OUTPATIENT)
Dept: FAMILY MEDICINE CLINIC | Facility: CLINIC | Age: 70
End: 2025-04-14

## 2025-04-14 DIAGNOSIS — R39.9 UTI SYMPTOMS: Primary | ICD-10-CM

## 2025-04-14 RX ORDER — CEPHALEXIN 500 MG/1
500 CAPSULE ORAL EVERY 8 HOURS SCHEDULED
Qty: 33 CAPSULE | Refills: 0 | Status: SHIPPED | OUTPATIENT
Start: 2025-04-14 | End: 2025-04-25

## 2025-04-14 RX ORDER — ATORVASTATIN CALCIUM 40 MG/1
40 TABLET, FILM COATED ORAL DAILY
Qty: 90 TABLET | Refills: 1 | Status: SHIPPED | OUTPATIENT
Start: 2025-04-14

## 2025-04-14 NOTE — TELEPHONE ENCOUNTER
Notified Alejandro that Keflex was sent to he [facility] pharmacy on [address] and he should isac it up until the day of surgery - He understood

## 2025-04-14 NOTE — TELEPHONE ENCOUNTER
Keflex sent to pharmacy on file, he can start taking the medication today and continue until the day of surgery.

## 2025-04-14 NOTE — TELEPHONE ENCOUNTER
Patient called in to follow up on urine culture results done for pre-op testing. Patient states he is experiencing frequency and urgency. Reviewed Hydration, avoidance of bladder irritants and ED precautions.     [id number] [place] - [address] [[id number]]       Urine Culture 60,000-69,000 cfu/ml Staphylococcus aureus Abnormal         Susceptibility     Staphylococcus aureus     EDMOND     Cefazolin ($) Susceptible     Gentamicin ($$) Susceptible     Nitrofurantoin Susceptible     Oxacillin Susceptible     Penicillin Resistant     Tetracycline Susceptible     Trimethoprim + Sulfamethoxazole ($$$) Susceptible     Vancomycin ($) Susceptible

## 2025-04-17 ENCOUNTER — TELEPHONE (OUTPATIENT)
Age: 70
End: 2025-04-17

## 2025-04-17 NOTE — PRE-PROCEDURE INSTRUCTIONS
Pre-Surgery Instructions:   Medication Instructions    amLODIPine (NORVASC) 5 mg tablet Take day of surgery.    atorvastatin (LIPITOR) 40 mg tablet Take day of surgery.    cephalexin (KEFLEX) 500 mg capsule Take day of surgery.    insulin glargine (LANTUS SOLOSTAR) 100 units/mL injection pen Take night before surgery    insulin lispro (HumaLOG) 100 units/mL injection pen Hold day of surgery.    tamsulosin (FLOMAX) 0.4 mg Take day of surgery.    Medication instructions for day of surgery reviewed. Please take all instructed medications with only a sip of water.       You will receive a call one business day prior to surgery with an arrival time and hospital directions. If your surgery is scheduled on a Monday, the hospital will be calling you on the Friday prior to your surgery. If you have not heard from anyone by 8pm, please call the hospital supervisor through the hospital  at [phone]. ([place] [phone] or [place] [phone]).    Do not eat or drink anything after midnight the night before your surgery, including candy, mints, lifesavers, or chewing gum. Do not drink alcohol 24hrs before your surgery. Try not to smoke at least 24hrs before your surgery.       Follow the pre surgery showering instructions as listed in the “My Surgical Experience Booklet” or otherwise provided by your surgeon's office. Do not use a blade to shave the surgical area 1 week before surgery. It is okay to use a clean electric clippers up to 24 hours before surgery. Do not apply any lotions, creams, including makeup, cologne, deodorant, or perfumes after showering on the day of your surgery. Do not use dry shampoo, hair spray, hair gel, or any type of hair products.     No contact lenses, eye make-up, or artificial eyelashes. Remove nail polish, including gel polish, and any artificial, gel, or acrylic nails if possible. Remove all jewelry including rings and body piercing jewelry.     Wear causal clothing that is  easy to take on and off. Consider your type of surgery.    Keep any valuables, jewelry, piercings at home. Please bring any specially ordered equipment (sling, braces) if indicated.    Arrange for a responsible person to drive you to and from the hospital on the day of your surgery. Please confirm the visitor policy for the day of your procedure when you receive your phone call with an arrival time.     Call the surgeon's office with any new illnesses, exposures, or additional questions prior to surgery.    Please reference your “My Surgical Experience Booklet” for additional information to prepare for your upcoming surgery.

## 2025-04-17 NOTE — TELEPHONE ENCOUNTER
Mike calling from [facility] pharmacy to override early refill and verify dose increase for patient's insulin lispro (HumaLOG) 100 units/mL injection pen. Verified dose increase from 12 units three times per day to 18 units before meals (per sig). Patient is out of insulin and pharmacy will review orders and refill appropriately.

## 2025-04-17 NOTE — TELEPHONE ENCOUNTER
[facility] urology called to advise that Patient was seen for a pre op clearance on 3/26 and notes do not state if patient is clear for surgery. please advise.

## 2025-04-21 ENCOUNTER — HOSPITAL ENCOUNTER (OUTPATIENT)
Dept: CT IMAGING | Facility: HOSPITAL | Age: 70
Discharge: HOME | End: 2025-04-21
Attending: INTERNAL MEDICINE
Payer: MEDICARE

## 2025-04-21 ENCOUNTER — TELEPHONE (OUTPATIENT)
Age: 70
End: 2025-04-21

## 2025-04-21 DIAGNOSIS — C79.51 METASTATIC RENAL CELL CARCINOMA TO BONE (HCC): Chronic | ICD-10-CM

## 2025-04-21 DIAGNOSIS — C64.9 METASTATIC RENAL CELL CARCINOMA TO BONE (HCC): Chronic | ICD-10-CM

## 2025-04-21 PROCEDURE — 71250 CT THORAX DX C-: CPT

## 2025-04-21 PROCEDURE — 74176 CT ABD & PELVIS W/O CONTRAST: CPT

## 2025-04-21 NOTE — TELEPHONE ENCOUNTER
[facility] called today in regards to patients pre op appointment from 3/26/25. They stated they need to know if the patient was cleared for surgery, as its this Friday. Wanted to know if we could addend the appointment notes to show that the patient is cleared.

## 2025-04-21 NOTE — PROGRESS NOTES
"     Name: Alejandro Adames      : 1955      MRN: [id number]  Encounter Provider: Isaak Castellon MD  Encounter Date: 2025   Encounter department: [facility]  :  Assessment & Plan  Metastatic renal cell carcinoma to bone (HCC)    This is a 69 y.o. c PMHx notable for DM, HTN, prostate cancer under good control, being seen in f/u for metastatic RCC while on systemic IO therapy  Orders:    CT chest abdomen pelvis w contrast; Future        Return for Office Visit, Imaging - See orders.    History of Present Illness         Objective   Temp 97.7 °F (36.5 °C) (Temporal)   Resp 18   Ht 5' 10\" (1.778 m)   Wt 75.8 kg (167 lb)   BMI 23.96 kg/m²       Cancer Stage at diagnosis: IV    Referral Physician: No ref. provider found    Primary Care Physician:  Wayne Uriarte Jr, MD     Nickname: Charli    Lives with his brother, Gordy Ellis ECO     Today's ECO-3    Goals and Barriers:  Current Goal: Minimize effects of disease burden, extend life.   Barriers to accomplishing this: malaise    Patient's Capacity to Self Care:  none      This is a 69 y.o. c PMHx notable for DM, HTN, prostate cancer under good control, being seen in f/u for metastatic RCC while on systemic IO therapy    He has normocytic anemia from his metastatic malignancy, no evidence of iron deficiency.    Discussion of decision making  Oncology history updated, accordingly, during this visit  Goals of care/patient communication  I discussed with the patient the clinical course leading up to their cancer diagnosis. I reviewed relevant office notes, imaging reports and pathology result as well.  I told the patient that this is a case of incurable disease and what this means. We discussed that the goal of anti-cancer therapy is to provide best quality of life, extend overall survival, and progression free survival as shown in clinical trials. We also discussed that there might be a point when " the cancer will no longer respond to this anti-neoplastic therapy. As a result, we also discussed the role of the palliative care team being introduced early in the treatment course. We will be making this referral  I explained the risks/benefits of the proposed cancer therapy: Lenvima + Keytruda and after discussion including understanding risks of possible life-threatening complications and therapy-related malignancy development, informed consent for blood products and treatment has been signed and obtained.  TNM/Staging At Diagnosis  Cancer Staging   Malignant neoplasm of prostate (HCC)  Staging form: Prostate, AJCC 8th Edition  - Clinical: Stage IIB (cT1c, cN0, cM0, PSA: 4.9, Grade Group: 2) - Signed by Isaak Castellon MD on 10/24/2024  Prostate specific antigen (PSA) range: Less than 10  [place] score: 7  Histologic grading system: 5 grade system    Renal cell carcinoma of left kidney (HCC)  Staging form: Kidney, AJCC 8th Edition  - Clinical: Stage IV (cTX, cN1, cM1) - Signed by Isaak Castellon MD on 10/24/2024    Disease Features/Tumor Markers/Genetics  Tumor Marker: PSA  1/3/2019: 5.6  4/8/2019: 4.9  10/17/2022: 1.3  10/19/2024: 0.355  Notable Path Features:   10/19/2024 inguinal LN bx: retroperitoneal bx is positive this is most compatible with renal primary   Guardant NGS: 11/4/2024: TMB 12 muts/Mb, VHL mutation (can use Belzutifan), MSI stable  CARIS NGS: VHL mutation    1/17/2025: Retroperitoneal mass:  Scant atypical epithelial cells present highly suspicious for metastatic renal carcinoma  Treatment: Lenvima + Keytruda  Other Supportive care:  Treatment Team Members  Surgeon  Rad Onc  Palliative  [facility]: Dr. Connors  Labs  10/24/2024: creat 1.75  11/12/2024: creat 1.42  Diagnostics  10/18/2024 CT Chest w/o c: Nothing to suggest malignancy in the chest. The 3 mm left lower lobe nodule is of doubtful significance.  Abd/pelv w/c: Moderate left hydronephrosis with peripelvic/proximal periureteral  inflammatory stranding secondary to obstructing large left retroperitoneal toribio conglomerate. Conglomerate of left retroperitoneal nodes measuring approximately 6.5 x 5 x 13 cm (2:106)   Enlarged left pelvic and other retroperitoneal nodes as described  3 mm sclerotic lesions in the right iliac body and right femoral head  10/26/2024 MRI Abd w/wo c: No renal cortical mass is identified bilaterally. Mild urothelial thickening and enhancement within the left renal pelvis without measurable lesion.   11/5/2024  Tumor board discussion. Recommend urology follow up ASAP with Dr. Castro. Treat systemically  11/7/2024: NM Bone scan: No focal tracer activity characteristic of osseous metastatic disease.  12/3  tumor board: Consider biopsy of retroperitoneal mass for assist of pathology differentiation  1/24/2025 CT CAP w/c: stable  The conglomerate toribio mass which invades the left psoas muscle is also smaller, currently 4.8 x 4.2 cm, previously 5.1 x 4.9 cm   An index left external iliac node measures 2.8 x 1.6 cm, previously 2.8 x 1.9 cm (series 3 image 212.)  Another index left external iliac node currently measures 2.2 x 2.1 cm, previously 2.5 x 2.4 cm  Interval development of multiple irregular shaped opacities in the lungs with upper lobe predominance (Their appearance is not typical for metastatic disease, rather this is probably an infectious process). For example, there is a right upper lobe apical 2.2 x 1.5 cm opacity (series 6 image 27.) There is also a 3.3 x 1.8 cm medial left upper lobe   lesion that invades into the anterior mediastinum  2/4/2025:  tumor board. Consider referral to radiation oncology  4/21/2025 CT CAP w/c:  POD  lesion has likely slightly increased in size, measuring approximately 9.5 x 5.2 cm, previously   8.7 x 5.1 cm (series 2, image 191).   Left external iliac lymph node measures 3.3 x 2.6 cm, previously 2.5 x 2.1 cm (series 2, image 209).   Left pelvic sidewall lymph node measures  3.0 x 1.9 cm, previously 2.5 x 1.7 cm (series 2, image 217).  Left para-aortic lymph node at the level of the left renal vein measures 3.9 x 1.9 cm, previously 2.0 x 0.9 cm (series 2, image 137).  Left para-aortic lymph node at the L3 level measures 2.9 x 2.0 cm, previously 1.8 x 1.2 cm (series 2, image 154).    Discussion of decision making    I personally reviewed the following lab results, the image studies, pathology, other specialty/physicians consult notes and recommendations, and outside medical records. I had a lengthy discussion with the patient and shared the work-up findings. We discussed the diagnosis and management plan as below. I spent 42 minutes reviewing the records (labs, clinician notes, outside records, medical history, ordering medicine/tests/procedures, monitoring of anti-neoplastic toxicities, interpreting the imaging/labs previously done) and coordination of care as well as direct time with the patient today, of which greater than 50% of the time was spent in counseling and coordination of care with the patient/family.      Plan/Labs  F/u Urologist at [facility] for prostate cancer monitoring   F/u Palliative care   Zometa 4 mg q12 weeks  Oncology genetics counselor referral made previously  Coordination with PCP to see if he can come off of Mounjaro  Restaging CT CAP w/c ordered 7/30/2025   We discussed Lenvima 20 mg daily (he will start this now) + C5 Keytruda 400 mg q6 weeks which can be used for both clear cell and non clear cell indication as the histology is not clear to which subtype we are dealing with    Follow Up: q12 weeks scheduled thru 7/9/2025*    All questions were answered to the patient's satisfaction during this encounter. The patient knows the contact information for our office and knows to reach out for any relevant concerns related to this encounter. They are to call for any temperature 100.4 or higher, new symptoms including but not restricted to shaking chills, decreased  appetite, nausea, vomiting, diarrhea, increased fatigue, shortness of breath or chest pain, confusion, and not feeling the strength to come to the clinic. For all other listed problems and medical diagnosis in their chart - they are managed by PCP and/or other specialists, which the patient acknowledges. Thank you very much for your consultation and making us a part of this patient's care. We are continuing to follow closely with you. Please do not hesitate to reach out to me with any additional questions or concerns.    Isaak Castellon MD  Hematology & Medical Oncology Staff Physician             Disclaimer: This document was prepared using [employer] technology. If a word or phrase is confusing, or does not make sense, this is likely due to recognition error which was not discovered during this clinician's review. If you believe an error has occurred, please contact me through [facility] service line for amena?cation.      ONCOLOGY HISTORY OF PRESENT ILLNESS        Oncology History   Prostate cancer (HCC)   6/18/2019 Initial Diagnosis    Malignant neoplasm of prostate (HCC)     10/24/2024 -  Cancer Staged    Staging form: Prostate, AJCC 8th Edition  - Clinical: Stage IIB (cT1c, cN0, cM0, PSA: 4.9, Grade Group: 2) - Signed by Isaak Castellon MD on 10/24/2024  Prostate specific antigen (PSA) range: Less than 10  [place] score: 7  Histologic grading system: 5 grade system       Metastatic renal cell carcinoma to bone (HCC)   10/24/2024 Initial Diagnosis    Renal cell carcinoma of left kidney (HCC)     10/24/2024 -  Cancer Staged    Staging form: Kidney, AJCC 8th Edition  - Clinical: Stage IV (cTX, cN1, cM1) - Signed by Isaak Castellon MD on 10/24/2024       11/14/2024 -  Chemotherapy    pembrolizumab (KEYTRUDA) IVPB, 400 mg, 4 of 9 cycles  Administration: 400 mg (11/14/2024), 400 mg (1/2/2025), 400 mg (2/13/2025), 400 mg (3/27/2025)       History of prostate adenocarcinoma localized  intermediate-favorable risk adenocarcinoma of the prostate (Stage II - cT1c, cN0, cM0, Kayley Score: 3+4=7, PSA: 4.9)  s/p SBRT (Administered 3,700-Gy over 5-fractions - Completed on September 18th, 2019)  11/27/2024: DKA admission    1/28/2025 Jon[place]: His left nephroureteral stent was recently internalized by interventional radiology in late December 2024.  I recommend returning to the operating room in the next 3 to 4 months for routine left stent exchange.    4/29/2025: starting Lenvima 20 mg daily     SUBJECTIVE  (INTERVAL HISTORY)      Clotting History None   Bleeding History None   Cancer History Prostate s/p above treatment, RCC   Family Cancer History None   H/O Blood/Plt Transfusion None   Tobacco/etoh/drug abuse 2 PPD x 20 years, quit at age 49, no etoh abuse or rec drug use           Retired Electric  (retired)     Stable appetite.  No acute issue aside from Lle swelling. Driving himself. Goes hourly to the bathroom.    I have reviewed the relevant past medical, surgical, social and family history. I have also reviewed allergies and medications for this patient.    Review of Systems  Baseline weight: 175 lbs    Lost 13 lbs while on Mounjaro, since 7/2024, this has since been stopped.    Denies F/C, N/V, SOB, CP, LH, HA, rash, itching, gen weakness, melena, hematuria, hematochezia, falls.        A 10-point review of system was performed, pertinent positive and negative were detailed as above. Otherwise, the 10-point review of system was negative.      Past Medical History:   Diagnosis Date    Cancer (HCC)     pt unsure of primary site poss. bone vs kidney    COVID-19 12/29/2021    Symptom onset 12/27/21      Cutaneous skin tags 01/02/2019    Diabetes mellitus (HCC)     Hypertension     Ureteral obstruction     L nephrostomy tube in place 12/26/204    internal placement otday 12/26/204    Uses walker     in rehab presently   12/26/2024       Past Surgical History:   Procedure Laterality Date     FL RETROGRADE PYELOGRAM  4/25/2025    IR BIOPSY INGUINAL LYMPH NODE  10/19/2024    IR BIOPSY RETROPERITONEUM  1/17/2025    IR INTERNAL URETERAL STENT PLACEMENT  12/26/2024    IR NEPHROSTOMY TO NEPHROURETERAL STENT  11/19/2024    IR NEPHROSTOMY TUBE PLACEMENT  10/19/2024    SC CYSTO W/INSERT URETERAL STENT Left 4/25/2025    Procedure: EXCHANGE STENT URETERAL;  Surgeon: Rj Rees MD;  Location: [facility];  Service: Urology    SC CYSTOURETHROSCOPY W/URETERAL CATHETERIZATION Left 4/25/2025    Procedure: CYSTOSCOPY RETROGRADE PYELOGRAM WITH INSERTION STENT URETERAL;  Surgeon: Rj Rees MD;  Location: [facility];  Service: Urology       Family History   Problem Relation Age of Onset    No Known Problems Brother     Stroke Mother        Social History     Socioeconomic History    Marital status: Single     Spouse name: Not on file    Number of children: Not on file    Years of education: Not on file    Highest education level: Not on file   Occupational History    Occupation: Work history - travels a lot   Tobacco Use    Smoking status: Former     Current packs/day: 0.50     Average packs/day: 0.5 packs/day for 45.3 years (22.7 ttl pk-yrs)     Types: Cigarettes     Start date: 1980     Passive exposure: Past    Smokeless tobacco: Never   Vaping Use    Vaping status: Never Used   Substance and Sexual Activity    Alcohol use: Not Currently     Comment: social    Drug use: No    Sexual activity: Not Currently   Other Topics Concern    Not on file   Social History Narrative    Engages in travel abroad    Living independently alone'     Social Drivers of Health     Financial Resource Strain: Not on file   Food Insecurity: No Food Insecurity (12/24/2024)    Hunger Vital Sign     Worried About Running Out of Food in the Last Year: Never true     Ran Out of Food in the Last Year: Never true   Transportation Needs: No Transportation Needs (12/24/2024)    PRAPARE - Transportation     Lack of Transportation  (Medical): No     Lack of Transportation (Non-Medical): No   Physical Activity: Not on file   Stress: Not on file   Social Connections: Not on file   Intimate Partner Violence: Unknown (11/27/2024)    Nursing IPS     Feels Physically and Emotionally Safe: Not on file     Physically Hurt by Someone: Not on file     Humiliated or Emotionally Abused by Someone: Not on file     Physically Hurt by Someone: No     Hurt or Threatened by Someone: No   Housing Stability: Low Risk  (12/24/2024)    Housing Stability Vital Sign     Unable to Pay for Housing in the Last Year: No     Number of Times Moved in the Last Year: 0     Homeless in the Last Year: No       Allergies   Allergen Reactions    Captopril Cough    Crestor [Rosuvastatin] Diarrhea    Enalapril Cough       Current Outpatient Medications   Medication Sig Dispense Refill    Alcohol Swabs (Alcohol Prep) PADS Use to prep skin prior to placing blood sugar monitor 100 each 1    amLODIPine (NORVASC) 5 mg tablet Take 1 tablet (5 mg total) by mouth daily 100 tablet 3    atorvastatin (LIPITOR) 40 mg tablet TAKE 1 TABLET BY MOUTH EVERY DAY 90 tablet 1    Continuous Glucose Sensor (FreeStyle Ольга 3 Plus Sensor) MISC Use 1 each every 15 (fifteen) days Every 15 days for blood sugar monitoring *receives through [employer]* 1 each 0    glucose blood (OneTouch Verio) test strip Check blood sugars once daily. Please substitute with appropriate alternative as covered by patient's insurance. Dx: E11.65 100 each 3    HYDROcodone-acetaminophen (NORCO) 5-325 mg per tablet Take 1 tablet by mouth every 6 (six) hours as needed for pain for up to 10 days Max Daily Amount: 4 tablets 5 tablet 0    insulin glargine (LANTUS SOLOSTAR) 100 units/mL injection pen Inject 32 Units under the skin daily (Patient taking differently: Inject 32 Units under the skin daily at bedtime) 45 mL 1    insulin lispro (HumaLOG) 100 units/mL injection pen Inject up to 18 units prior to meals 15 mL 5    Insulin  Pen Needle 32G X 4 MM MISC Use 3 (three) times a day 300 each 1    Lenvatinib, 20 MG Daily Dose, (Lenvima, 20 MG Daily Dose,) 2 x 10 MG CPPK Take 20 mg by mouth daily 30 each 5    menthol-zinc oxide (CALMOSPETINE) 0.44-20.625 % Apply topically 2 (two) times a day 113 g 3    OneTouch Delica Lancets 33G MISC Check blood sugars once daily. Please substitute with appropriate alternative as covered by patient's insurance. Dx: E11.65 100 each 3    oxyCODONE (ROXICODONE) 5 immediate release tablet Take 0.5 tablets (2.5 mg total) by mouth every 8 (eight) hours as needed for severe pain for up to 10 doses Max Daily Amount: 7.5 mg 5 tablet 0    sulfamethoxazole-trimethoprim (BACTRIM DS) 800-160 mg per tablet Take 1 tablet by mouth 2 (two) times a day for 5 days 10 tablet 0    oxybutynin (DITROPAN-XL) 10 MG 24 hr tablet TAKE 1 TABLET BY MOUTH DAILY AT BEDTIME (Patient not taking: Reported on 4/29/2025) 90 tablet 2    senna-docusate sodium (SENOKOT S) 8.6-50 mg per tablet Take 1 tablet by mouth 2 (two) times a day as needed for constipation      tamsulosin (FLOMAX) 0.4 mg Take 1 capsule (0.4 mg total) by mouth daily with dinner (Patient not taking: Reported on 4/29/2025) 90 capsule 3     No current facility-administered medications for this visit.       (Not in a hospital admission)      Objective:     24 Hour Vitals Assessment:     Vitals:    04/29/25 1359   Resp: 18   Temp: 97.7 °F (36.5 °C)           PHYSICIAN EXAM:    General: Appearance: alert, cooperative, no distress.  HEENT: Normocephalic, atraumatic. No scleral icterus. conjunctivae clear. EOMI.  Chest: No tenderness to palpation. No open wound noted.  Lungs: Clear to auscultation bilaterally, Respirations unlabored.  Cardiac: Regular rate, +S1and S2  Abdomen: Soft, non-tender, non-distended. Bowel sounds are normal.   Extremities:  No edema, cyanosis, clubbing.  Skin: Skin color, turgor are normal. No rashes.  Lymphatics: no palpable supra-cervical, axillary, or  inguinal adenopathy  Neurologic: Awake, Alert, and oriented, no gross focal deficits noted b/l.       DATA REVIEW:    Pathology Result:    Final Diagnosis   Date Value Ref Range Status   01/17/2025   Final    A.  Retroperitoneal mass:  Scant atypical epithelial cells present highly suspicious for metastatic renal carcinoma.     10/26/2024   Final    A. Urine, Renal, Left, ThinPrep:  Atypical urothelial cells (AUC) - see comment.  Benign squamous cells and mixed inflammatory cells.    Satisfactory for evaluation.    Comment:  The above diagnostic category is from the recently published book, The Sandra System for Reporting Urinary Cytology, and is in keeping with the ongoing effort for utilization of standardized diagnostic terminology in urine cytology.*    *The Sandra System for Reporting Urinary Cytology. Jennifer Blanca, Caitlyn Kerr, Grant Aguila; 2016.     Interpretation performed at [facility] [address] [place] [id number]      10/19/2024   Final    A. Lymph node, inguinal, left, biopsy:  -   Metastatic carcinoma most suggestive of renal origin, see comment.     Comment: The patient's history of prostate cancer with new onset acute renal failure and retroperitoneal mass are noted. As per radiology there are no clear masses identified. The current sample is positive for metastatic carcinoma. The morphologic and immunohistochemical findings are most consistent with renal origin. However, in the absence of a clear mass lesion, further clinical and radiographic work-up is advised.    Intradepartmental consultation is in agreement (IK/CV).  Dr. Castellon is notified of the findings by Dr. Hines via [employer] Secure Chat on 10/24/24.     04/12/2019   Final    A. Prostate, right peripheral zone, needle core biopsy:  - Benign prostate glands and stroma.    B. Prostate, right central zone, needle core biopsy:  - Prostatic adenocarcinoma, [place] grade 3 + 3 = 6/10 (Grade group 1), involving one  of two cores and 5% of the involved core.  - Focal high-grade prostatic intraepithelial neoplasia (HGPIN).  - Perineural invasion is absent.    C. Prostate, left peripheral zone, needle core biopsy:  - Prostatic adenocarcinoma, [place] grade 3 + 4 = 7 (>95% pattern 3 and <5% pattern 4, Grade group 2), involving one of 4 cores and 20% of the tissue.  - Prostatic adenocarcinoma, Kay[place] grade 3 + 3 = 6/10 (Grade group 1), involving two of four cores and 5%, 10% of involved cores.  - Perineural invasion is absent.    D. Prostate, left central zone, needle core biopsy:  - Benign prostate glands and stroma.    Comment: Immunohistochemistry for prostate triple stain multiplex (,[id number]) was preformed ob blocks A2, B1, C1, and C2 supporting the above diagnosis.    Intradepartmental consultation is in agreement.  Interpretation performed at [facility], [address] [place] [address]            Image Results:   Image result are reviewed and documented in Hematology/Oncology history    CT chest abdomen pelvis wo contrast  Narrative: CT CHEST, ABDOMEN AND PELVIS WITHOUT IV CONTRAST    INDICATION: C79.51: Secondary malignant neoplasm of bone  C64.9: Malignant neoplasm of unspecified kidney, except renal pelvis.    COMPARISON: CT chest abdomen pelvis January 24, 2025    TECHNIQUE: CT examination of the chest, abdomen and pelvis was performed without intravenous contrast. Multiplanar 2D reformatted images were created from the source data.    This examination, like all CT scans performed in the [facility], was performed utilizing techniques to minimize radiation dose exposure, including the use of iterative reconstruction and automated exposure control. Radiation dose length   product (DLP) for this visit: 628 mGy-cm    Enteric Contrast: Not administered.    FINDINGS:    CHEST    LUNGS: Decreased size of the bilateral irregular shaped opacities. For example, the opacity in the right upper lobe  measures 0.9 x 0.8 cm, previously 2.8 x 1.1 cm (series 3, image 40), and the opacity in the left upper lobe measures 0.8 x 0.4 cm,   previously 1.2 x 0.7 cm (series 3, image 39).    Decreased size of the anterior left upper lobe opacity which extends into the mediastinal fat, now measuring 1.8 x 1.5 cm, previously 3.1 x 2.9 cm (series 3, image 91).    PLEURA: Unremarkable.    HEART/GREAT VESSELS: Heart is unremarkable for patient's age. No thoracic aortic aneurysm.    MEDIASTINUM AND MICHELLE: Unremarkable.    CHEST WALL AND LOWER NECK: Unremarkable.    ABDOMEN    LIVER/BILIARY TREE: Unremarkable.    GALLBLADDER: No calcified gallstones. No pericholecystic inflammatory change.    SPLEEN: Unremarkable.    PANCREAS: Unremarkable.    ADRENAL GLANDS: Unremarkable.    KIDNEYS/URETERS: Left ureteral stent in place. Left perinephric fat stranding is similar to prior study. No definite hydronephrosis.    STOMACH AND BOWEL: Colonic diverticulosis without findings of acute diverticulitis.    APPENDIX: No findings to suggest appendicitis.    ABDOMINOPELVIC CAVITY: Absence of intravenous contrast limits delineation of the toribio conglomerate which is inseparable from the left psoas muscle, though the lesion has likely slightly increased in size, measuring approximately 9.5 x 5.2 cm, previously   8.7 x 5.1 cm (series 2, image 191).    Left external iliac lymph node measures 3.3 x 2.6 cm, previously 2.5 x 2.1 cm (series 2, image 209).    Left pelvic sidewall lymph node measures 3.0 x 1.9 cm, previously 2.5 x 1.7 cm (series 2, image 217).    Left para-aortic lymph node at the level of the left renal vein measures 3.9 x 1.9 cm, previously 2.0 x 0.9 cm (series 2, image 137).    Left para-aortic lymph node at the L3 level measures 2.9 x 2.0 cm, previously 1.8 x 1.2 cm (series 2, image 154).    No pneumoperitoneum or ascites.    VESSELS: Atherosclerosis without abdominal aortic aneurysm.    PELVIS    REPRODUCTIVE ORGANS: Unremarkable for  patient's age.    URINARY BLADDER: Unremarkable.    ABDOMINAL WALL/INGUINAL REGIONS: New enlargement of the left inguinal lymph nodes which now measure up to 1.6 x 1.5 cm 0(series 2, image 225 (series 2, image 225). The lymph nodes previously measured up to 0.9 cm in short axis.    BONES: No acute fracture or suspicious osseous lesion.  Impression: Since January 24, 2025:  1.  New and increased abdominopelvic lymphadenopathy.  2.  Decreased size of the pulmonary opacities, which were likely infectious or inflammatory in etiology.    Workstation performed: [id number]      LABS:  Lab data are reviewed and documented in [facility] history.       Lab Results   Component Value Date    HGB 9.1 (L) 04/28/2025    HCT 29.1 (L) 04/28/2025    MCV 84 04/28/2025     (H) 04/28/2025    WBC 6.98 04/28/2025    NRBC 0 04/28/2025    BANDSPCT 6 11/27/2024     Lab Results   Component Value Date    K 3.7 04/28/2025     04/28/2025    CO2 23 04/28/2025    BUN 42 (H) 04/28/2025    CREATININE 2.07 (H) 04/28/2025    GLUF 114 (H) 02/12/2025    CALCIUM 8.7 04/28/2025    CORRECTEDCA 9.5 04/28/2025    AST 14 04/28/2025    ALT 19 04/28/2025    ALKPHOS 136 (H) 04/28/2025    EGFR 31 04/28/2025       Lab Results   Component Value Date    IRON 36 (L) 01/13/2025    TIBC 259 01/13/2025    FERRITIN 370 01/13/2025    FERRITIN 494 (H) 10/19/2024    FERRITIN 183 10/17/2022    FERRITIN 174 12/23/2021       Lab Results   Component Value Date    RCWVUNYU29 606 10/19/2024       Recent Labs     04/28/25  1424   WBC 6.98   *       By:  Isaak Castellon MD, 4/29/2025, 2:04 PM

## 2025-04-21 NOTE — ASSESSMENT & PLAN NOTE
This is a 69 y.o. c PMHx notable for DM, HTN, prostate cancer under good control, being seen in f/u for metastatic RCC while on systemic IO therapy  Orders:    CT chest abdomen pelvis w contrast; Future

## 2025-04-24 ENCOUNTER — ANESTHESIA EVENT (OUTPATIENT)
Dept: PERIOP | Facility: HOSPITAL | Age: 70
End: 2025-04-24
Payer: MEDICARE

## 2025-04-25 ENCOUNTER — HOSPITAL ENCOUNTER (OUTPATIENT)
Facility: HOSPITAL | Age: 70
Setting detail: OUTPATIENT SURGERY
Discharge: HOME | End: 2025-04-25
Attending: UROLOGY | Admitting: UROLOGY
Payer: MEDICARE

## 2025-04-25 ENCOUNTER — APPOINTMENT (OUTPATIENT)
Dept: RADIOLOGY | Facility: HOSPITAL | Age: 70
End: 2025-04-25
Payer: MEDICARE

## 2025-04-25 ENCOUNTER — ANESTHESIA (OUTPATIENT)
Dept: PERIOP | Facility: HOSPITAL | Age: 70
End: 2025-04-25
Payer: MEDICARE

## 2025-04-25 VITALS
BODY MASS INDEX: 24.11 KG/M2 | WEIGHT: 168.43 LBS | OXYGEN SATURATION: 96 % | RESPIRATION RATE: 17 BRPM | TEMPERATURE: 97.3 F | DIASTOLIC BLOOD PRESSURE: 66 MMHG | HEIGHT: 70 IN | HEART RATE: 75 BPM | SYSTOLIC BLOOD PRESSURE: 132 MMHG

## 2025-04-25 DIAGNOSIS — N13.39 OTHER HYDRONEPHROSIS: ICD-10-CM

## 2025-04-25 DIAGNOSIS — N13.30 HYDRONEPHROSIS OF LEFT KIDNEY: Primary | ICD-10-CM

## 2025-04-25 LAB
GLUCOSE SERPL-MCNC: 136 MG/DL (ref 65–140)
GLUCOSE SERPL-MCNC: 207 MG/DL (ref 65–140)
GLUCOSE SERPL-MCNC: 63 MG/DL (ref 65–140)
GLUCOSE SERPL-MCNC: 63 MG/DL (ref 65–140)

## 2025-04-25 PROCEDURE — C1758 CATHETER, URETERAL: HCPCS | Performed by: UROLOGY

## 2025-04-25 PROCEDURE — 87086 URINE CULTURE/COLONY COUNT: CPT | Performed by: UROLOGY

## 2025-04-25 PROCEDURE — 51798 US URINE CAPACITY MEASURE: CPT | Performed by: UROLOGY

## 2025-04-25 PROCEDURE — NC001 PR NO CHARGE: Performed by: UROLOGY

## 2025-04-25 PROCEDURE — C1769 GUIDE WIRE: HCPCS | Performed by: UROLOGY

## 2025-04-25 PROCEDURE — 74420 UROGRAPHY RTRGR +-KUB: CPT

## 2025-04-25 PROCEDURE — 82948 REAGENT STRIP/BLOOD GLUCOSE: CPT

## 2025-04-25 PROCEDURE — C2617 STENT, NON-COR, TEM W/O DEL: HCPCS | Performed by: UROLOGY

## 2025-04-25 PROCEDURE — 52332 CYSTOSCOPY AND TREATMENT: CPT | Performed by: UROLOGY

## 2025-04-25 DEVICE — INLAY OPTIMA URETERAL STENT W/O GUIDEWIRE
Type: IMPLANTABLE DEVICE | Site: KIDNEY | Status: FUNCTIONAL
Brand: BARD® INLAY OPTIMA® URETERAL STENT

## 2025-04-25 RX ORDER — SULFAMETHOXAZOLE AND TRIMETHOPRIM 800; 160 MG/1; MG/1
1 TABLET ORAL 2 TIMES DAILY
Qty: 10 TABLET | Refills: 0 | Status: SHIPPED | OUTPATIENT
Start: 2025-04-25 | End: 2025-04-28 | Stop reason: SDUPTHER

## 2025-04-25 RX ORDER — LIDOCAINE HYDROCHLORIDE 20 MG/ML
INJECTION, SOLUTION EPIDURAL; INFILTRATION; INTRACAUDAL; PERINEURAL AS NEEDED
Status: DISCONTINUED | OUTPATIENT
Start: 2025-04-25 | End: 2025-04-25

## 2025-04-25 RX ORDER — PROPOFOL 10 MG/ML
INJECTION, EMULSION INTRAVENOUS AS NEEDED
Status: DISCONTINUED | OUTPATIENT
Start: 2025-04-25 | End: 2025-04-25

## 2025-04-25 RX ORDER — FENTANYL CITRATE 50 UG/ML
INJECTION, SOLUTION INTRAMUSCULAR; INTRAVENOUS AS NEEDED
Status: DISCONTINUED | OUTPATIENT
Start: 2025-04-25 | End: 2025-04-25

## 2025-04-25 RX ORDER — SODIUM CHLORIDE, SODIUM LACTATE, POTASSIUM CHLORIDE, CALCIUM CHLORIDE 600; 310; 30; 20 MG/100ML; MG/100ML; MG/100ML; MG/100ML
INJECTION, SOLUTION INTRAVENOUS CONTINUOUS PRN
Status: DISCONTINUED | OUTPATIENT
Start: 2025-04-25 | End: 2025-04-25

## 2025-04-25 RX ORDER — FENTANYL CITRATE/PF 50 MCG/ML
25 SYRINGE (ML) INJECTION
Status: DISCONTINUED | OUTPATIENT
Start: 2025-04-25 | End: 2025-04-25 | Stop reason: HOSPADM

## 2025-04-25 RX ORDER — DEXAMETHASONE SODIUM PHOSPHATE 10 MG/ML
INJECTION, SOLUTION INTRAMUSCULAR; INTRAVENOUS AS NEEDED
Status: DISCONTINUED | OUTPATIENT
Start: 2025-04-25 | End: 2025-04-25

## 2025-04-25 RX ORDER — CEFAZOLIN SODIUM 2 G/50ML
2000 SOLUTION INTRAVENOUS ONCE
Status: COMPLETED | OUTPATIENT
Start: 2025-04-25 | End: 2025-04-25

## 2025-04-25 RX ORDER — ONDANSETRON 2 MG/ML
4 INJECTION INTRAMUSCULAR; INTRAVENOUS ONCE AS NEEDED
Status: DISCONTINUED | OUTPATIENT
Start: 2025-04-25 | End: 2025-04-25 | Stop reason: HOSPADM

## 2025-04-25 RX ORDER — MAGNESIUM HYDROXIDE 1200 MG/15ML
LIQUID ORAL AS NEEDED
Status: DISCONTINUED | OUTPATIENT
Start: 2025-04-25 | End: 2025-04-25 | Stop reason: HOSPADM

## 2025-04-25 RX ORDER — ONDANSETRON 2 MG/ML
INJECTION INTRAMUSCULAR; INTRAVENOUS AS NEEDED
Status: DISCONTINUED | OUTPATIENT
Start: 2025-04-25 | End: 2025-04-25

## 2025-04-25 RX ORDER — HYDROCODONE BITARTRATE AND ACETAMINOPHEN 5; 325 MG/1; MG/1
1 TABLET ORAL EVERY 6 HOURS PRN
Qty: 5 TABLET | Refills: 0 | Status: SHIPPED | OUTPATIENT
Start: 2025-04-25 | End: 2025-05-05

## 2025-04-25 RX ORDER — ALBUTEROL SULFATE 0.83 MG/ML
2.5 SOLUTION RESPIRATORY (INHALATION) ONCE AS NEEDED
Status: DISCONTINUED | OUTPATIENT
Start: 2025-04-25 | End: 2025-04-25 | Stop reason: HOSPADM

## 2025-04-25 RX ORDER — HYDROCODONE BITARTRATE AND ACETAMINOPHEN 5; 325 MG/1; MG/1
1 TABLET ORAL EVERY 4 HOURS PRN
Refills: 0 | Status: DISCONTINUED | OUTPATIENT
Start: 2025-04-25 | End: 2025-04-25 | Stop reason: HOSPADM

## 2025-04-25 RX ORDER — SODIUM CHLORIDE, SODIUM LACTATE, POTASSIUM CHLORIDE, CALCIUM CHLORIDE 600; 310; 30; 20 MG/100ML; MG/100ML; MG/100ML; MG/100ML
125 INJECTION, SOLUTION INTRAVENOUS CONTINUOUS
Status: DISCONTINUED | OUTPATIENT
Start: 2025-04-25 | End: 2025-04-25 | Stop reason: HOSPADM

## 2025-04-25 RX ADMIN — DEXAMETHASONE SODIUM PHOSPHATE 10 MG: 10 INJECTION, SOLUTION INTRAMUSCULAR; INTRAVENOUS at 09:39

## 2025-04-25 RX ADMIN — SODIUM CHLORIDE, SODIUM LACTATE, POTASSIUM CHLORIDE, AND CALCIUM CHLORIDE 125 ML/HR: .6; .31; .03; .02 INJECTION, SOLUTION INTRAVENOUS at 07:35

## 2025-04-25 RX ADMIN — FENTANYL CITRATE 25 MCG: 50 INJECTION INTRAMUSCULAR; INTRAVENOUS at 09:43

## 2025-04-25 RX ADMIN — FENTANYL CITRATE 25 MCG: 50 INJECTION INTRAMUSCULAR; INTRAVENOUS at 09:48

## 2025-04-25 RX ADMIN — ONDANSETRON 4 MG: 2 INJECTION INTRAMUSCULAR; INTRAVENOUS at 09:51

## 2025-04-25 RX ADMIN — LIDOCAINE HYDROCHLORIDE 100 MG: 20 INJECTION, SOLUTION EPIDURAL; INFILTRATION; INTRACAUDAL at 09:38

## 2025-04-25 RX ADMIN — CEFAZOLIN SODIUM 2000 MG: 2 SOLUTION INTRAVENOUS at 09:34

## 2025-04-25 RX ADMIN — PROPOFOL 200 MG: 10 INJECTION, EMULSION INTRAVENOUS at 09:38

## 2025-04-25 RX ADMIN — SODIUM CHLORIDE, SODIUM LACTATE, POTASSIUM CHLORIDE, AND CALCIUM CHLORIDE: .6; .31; .03; .02 INJECTION, SOLUTION INTRAVENOUS at 09:33

## 2025-04-25 NOTE — H&P
"Alejandro is a 69-year-old male with a history of metastatic renal cell carcinoma without an identifiable mass within the kidney.  His case has been presented at urology multidisciplinary cancer conference.  He continues on systemic therapy with medical oncology with Keytruda.  He has left-sided retroperitoneal adenopathy creating left-sided hydronephrosis.  He has a chronic indwelling left double-J ureteral stent which was initially a nephrostomy tube placed in interventional radiology and then internalized.  He also has a history of prostate cancer status post radiation.    /70 (BP Location: Right arm)   Pulse 77   Temp 97.8 °F (36.6 °C) (Temporal)   Resp 16   Ht 5' 10\" (1.778 m)   Wt 76.4 kg (168 lb 6.9 oz)   SpO2 100%   BMI 24.17 kg/m²     On examination he is in no acute distress.  He is cachectic.  Cardiac regular.  Respiratory no distress.  Abdomen soft nontender nondistended.  Skin is warm.  Extremities without edema     Impression: Metastatic renal cell carcinoma without an identifiable primary source, left hydronephrosis secondary to retroperitoneal adenopathy, indwelling left ureteral stent, history of prostate cancer status post radiation     Plan: I recommend cystoscopy with left retrograde pyelography and left ureteral stent exchange.  Risk of the procedure including, not limited to bleeding, infection, sepsis, and ureteral obstruction despite stent requiring nephrostomy tube drainage were discussed.  Informed consent obtained.  "

## 2025-04-25 NOTE — ANESTHESIA PREPROCEDURE EVALUATION
Procedure:  CYSTOSCOPY RETROGRADE PYELOGRAM WITH INSERTION STENT URETERAL (Left: Ureter)  EXCHANGE STENT URETERAL (Left: Ureter)    Relevant Problems   ANESTHESIA (within normal limits)      CARDIO   (+) Essential hypertension   (+) Mixed hyperlipidemia      ENDO   (+) Type 2 diabetes mellitus with stage 3a chronic kidney disease, without long-term current use of insulin (HCC) (Took insulin this morning)      GI/HEPATIC (within normal limits)      /RENAL   (+) Hydronephrosis of left kidney   (+) Prostate cancer (HCC)      HEMATOLOGY   (+) Anemia      PULMONARY (within normal limits)        Physical Exam    Airway    Mallampati score: II  TM Distance: <3 FB  Neck ROM: full     Dental   No notable dental hx     Cardiovascular  Cardiovascular exam normal    Pulmonary  Pulmonary exam normal     Other Findings        Anesthesia Plan  ASA Score- 2     Anesthesia Type- general with ASA Monitors.         Additional Monitors:     Airway Plan: LMA.    Comment: Glucose pending, took insulin at home this morning.       Plan Factors-    Chart reviewed.    Patient summary reviewed.          Obstructive sleep apnea risk education given perioperatively.        Induction- intravenous.    Postoperative Plan-         Informed Consent- Anesthetic plan and risks discussed with patient.        NPO Status:  Vitals Value Taken Time   Date of last liquid 04/25/25 04/25/25 0718   Time of last liquid 0630 04/25/25 0718   Date of last solid 04/24/25 04/25/25 0718   Time of last solid 2300 04/25/25 0718

## 2025-04-25 NOTE — ANESTHESIA POSTPROCEDURE EVALUATION
Post-Op Assessment Note    CV Status:  Stable    Pain management: adequate       Mental Status:  Sleepy   Hydration Status:  Euvolemic   PONV Controlled:  Controlled   Airway Patency:  Patent  Two or more mitigation strategies used for obstructive sleep apnea   Post Op Vitals Reviewed: Yes    No anethesia notable event occurred.    Staff: CRNA           Last Filed PACU Vitals:  Vitals Value Taken Time   Temp 98 °F (36.7 °C) 04/25/25 1001   Pulse 76 04/25/25 1001   /56 04/25/25 1001   Resp 12 04/25/25 1001   SpO2 98 % 04/25/25 1001

## 2025-04-25 NOTE — DISCHARGE INSTR - AVS FIRST PAGE
Alejandro,    Today you underwent exchange of your left ureteral stent.  Call for follow-up with urology in the next 3 months.    Dr. Rees  [phone]

## 2025-04-25 NOTE — OP NOTE
OPERATIVE REPORT  PATIENT NAME: Alejandro Adames    :  1955  MRN: [id number]  Pt Location: [facility] OR ROOM 04    SURGERY DATE: 2025    Surgeons and Role:     * Rj Rees MD - Primary    Preop Diagnosis:  Other hydronephrosis [N13.39]    Post-Op Diagnosis Codes:     * Other hydronephrosis [N13.39]    Procedure(s):  Left - CYSTOSCOPY RETROGRADE PYELOGRAM WITH INSERTION STENT URETERAL  Left - EXCHANGE STENT URETERAL    Specimen(s):  Urine culture    Estimated Blood Loss:   Minimal    Drains:  Left 7 Pashto by 26 cm stent    Anesthesia Type:   General    Operative Indications:  Other hydronephrosis [N13.39]      Operative Findings:  Minimal left-sided hydronephrosis.  Routine exchange of left double-J ureteral stent.  Left 7 Pashto by 26 cm stent placed without a string.      Complications:   None    Procedure and Technique:  Alejandro is a 69-year-old male with a history of metastatic renal cell carcinoma proven by biopsy.  Interestingly, he does not have a primary tumor in either kidney.  His case has been presented at urology multidisciplinary cancer conference.  He is managed by medical oncology with systemic Keytruda therapy.  He had a left nephrostomy tube in place secondary to retroperitoneal adenopathy obstructing the distal left ureter.  This was recently converted to an internal double-J ureteral stent without difficulty in interventional radiology.  Presents to the operating room today to undergo routine stent exchange.  Risk of the procedure including, not limited to bleeding, infection, sepsis, ureteral injury, need for additional surgery/stent exchanges was discussed.  Informed consent obtained.    The patient was brought to the operating room on 2025.  After smooth induction of general LMA anesthesia, the patient was placed in dorsolithotomy position.  His genitalia prepped and draped in sterile fashion.  Intravenous Ancef was administered based on his most recent culture.   Timeout was performed with all members of the operative team confirming the patient's identity, procedure to be performed, laterality of the case.    A 22 Senegalese rigid cystoscope with 30 degree lens was inserted.  The bladder was thoroughly inspected and was normal.  A stent was visualized coming from the left ureteral orifice.  The stent was grasped and presented to the urethral meatus.  A wire was inserted and the stent was removed.  5 Senegalese open-ended catheter was loaded and a left retrograde pyelogram was performed which did not reveal significant hydronephrosis.  Based on his most recent CT scan with persistent retroperitoneal adenopathy, however, I replaced a new left 7 Senegalese by 26 cm double-J ureteral stent.  The proximal coil was appreciated within the left renal pelvis and the distal coil was visualized within the bladder.  There was no string left in place.  The bladder was emptied and the cystoscope was removed.    Overall the patient tolerated the procedure well and there were no complications.  Patient was extubated in the operating room and transferred the PACU in stable condition at the conclusion of the case.    Patient Disposition:  PACU              SIGNATURE: Rj Rees MD  DATE: April 25, 2025  TIME: 9:39 AM

## 2025-04-25 NOTE — ANESTHESIA POSTPROCEDURE EVALUATION
Post-Op Assessment Note    Last Filed PACU Vitals:  Vitals Value Taken Time   Temp 97.3 °F (36.3 °C) 04/25/25 1046   Pulse 74 04/25/25 1058   /62 04/25/25 1053   Resp 16 04/25/25 1046   SpO2 94 % 04/25/25 1058   Vitals shown include unfiled device data.    Modified Jose:     Vitals Value Taken Time   Activity 2 04/25/25 1046   Respiration 2 04/25/25 1046   Circulation 2 04/25/25 1046   Consciousness 2 04/25/25 1046   Oxygen Saturation 2 04/25/25 1046     Modified Jose Score: 10

## 2025-04-27 LAB — BACTERIA UR CULT: NORMAL

## 2025-04-28 ENCOUNTER — TELEPHONE (OUTPATIENT)
Dept: UROLOGY | Facility: AMBULATORY SURGERY CENTER | Age: 70
End: 2025-04-28

## 2025-04-28 ENCOUNTER — APPOINTMENT (OUTPATIENT)
Dept: LAB | Facility: HOSPITAL | Age: 70
End: 2025-04-28
Payer: MEDICARE

## 2025-04-28 DIAGNOSIS — Z29.89 IMMUNOTHERAPY ENCOUNTER: ICD-10-CM

## 2025-04-28 DIAGNOSIS — C79.51 METASTATIC RENAL CELL CARCINOMA TO BONE (HCC): Chronic | ICD-10-CM

## 2025-04-28 DIAGNOSIS — C64.9 METASTATIC RENAL CELL CARCINOMA TO BONE (HCC): Chronic | ICD-10-CM

## 2025-04-28 DIAGNOSIS — Z79.899 HIGH RISK MEDICATION USE: ICD-10-CM

## 2025-04-28 DIAGNOSIS — N13.30 HYDRONEPHROSIS OF LEFT KIDNEY: ICD-10-CM

## 2025-04-28 DIAGNOSIS — C64.2 RENAL CELL CARCINOMA OF LEFT KIDNEY (HCC): ICD-10-CM

## 2025-04-28 LAB
ALBUMIN SERPL BCG-MCNC: 3 G/DL (ref 3.5–5)
ALP SERPL-CCNC: 136 U/L (ref 34–104)
ALT SERPL W P-5'-P-CCNC: 19 U/L (ref 7–52)
ANION GAP SERPL CALCULATED.3IONS-SCNC: 8 MMOL/L (ref 4–13)
AST SERPL W P-5'-P-CCNC: 14 U/L (ref 13–39)
BASOPHILS # BLD AUTO: 0.05 THOUSANDS/ÂΜL (ref 0–0.1)
BASOPHILS NFR BLD AUTO: 1 % (ref 0–1)
BILIRUB SERPL-MCNC: 0.31 MG/DL (ref 0.2–1)
BUN SERPL-MCNC: 42 MG/DL (ref 5–25)
CALCIUM ALBUM COR SERPL-MCNC: 9.5 MG/DL (ref 8.3–10.1)
CALCIUM SERPL-MCNC: 8.7 MG/DL (ref 8.4–10.2)
CHLORIDE SERPL-SCNC: 101 MMOL/L (ref 96–108)
CO2 SERPL-SCNC: 23 MMOL/L (ref 21–32)
CREAT SERPL-MCNC: 2.07 MG/DL (ref 0.6–1.3)
EOSINOPHIL # BLD AUTO: 0.14 THOUSAND/ÂΜL (ref 0–0.61)
EOSINOPHIL NFR BLD AUTO: 2 % (ref 0–6)
ERYTHROCYTE [DISTWIDTH] IN BLOOD BY AUTOMATED COUNT: 15.4 % (ref 11.6–15.1)
GFR SERPL CREATININE-BSD FRML MDRD: 31 ML/MIN/1.73SQ M
GLUCOSE SERPL-MCNC: 193 MG/DL (ref 65–140)
HCT VFR BLD AUTO: 29.1 % (ref 36.5–49.3)
HGB BLD-MCNC: 9.1 G/DL (ref 12–17)
IMM GRANULOCYTES # BLD AUTO: 0.01 THOUSAND/UL (ref 0–0.2)
IMM GRANULOCYTES NFR BLD AUTO: 0 % (ref 0–2)
LYMPHOCYTES # BLD AUTO: 1.62 THOUSANDS/ÂΜL (ref 0.6–4.47)
LYMPHOCYTES NFR BLD AUTO: 23 % (ref 14–44)
MCH RBC QN AUTO: 26.1 PG (ref 26.8–34.3)
MCHC RBC AUTO-ENTMCNC: 31.3 G/DL (ref 31.4–37.4)
MCV RBC AUTO: 84 FL (ref 82–98)
MONOCYTES # BLD AUTO: 0.78 THOUSAND/ÂΜL (ref 0.17–1.22)
MONOCYTES NFR BLD AUTO: 11 % (ref 4–12)
NEUTROPHILS # BLD AUTO: 4.38 THOUSANDS/ÂΜL (ref 1.85–7.62)
NEUTS SEG NFR BLD AUTO: 63 % (ref 43–75)
NRBC BLD AUTO-RTO: 0 /100 WBCS
PLATELET # BLD AUTO: 446 THOUSANDS/UL (ref 149–390)
PMV BLD AUTO: 9.1 FL (ref 8.9–12.7)
POTASSIUM SERPL-SCNC: 3.7 MMOL/L (ref 3.5–5.3)
PROT SERPL-MCNC: 7.8 G/DL (ref 6.4–8.4)
RBC # BLD AUTO: 3.48 MILLION/UL (ref 3.88–5.62)
SODIUM SERPL-SCNC: 132 MMOL/L (ref 135–147)
TSH SERPL DL<=0.05 MIU/L-ACNC: 1.86 UIU/ML (ref 0.45–4.5)
WBC # BLD AUTO: 6.98 THOUSAND/UL (ref 4.31–10.16)

## 2025-04-28 PROCEDURE — 36415 COLL VENOUS BLD VENIPUNCTURE: CPT

## 2025-04-28 PROCEDURE — 85025 COMPLETE CBC W/AUTO DIFF WBC: CPT

## 2025-04-28 PROCEDURE — 80053 COMPREHEN METABOLIC PANEL: CPT

## 2025-04-28 PROCEDURE — 84443 ASSAY THYROID STIM HORMONE: CPT

## 2025-04-28 RX ORDER — SULFAMETHOXAZOLE AND TRIMETHOPRIM 800; 160 MG/1; MG/1
1 TABLET ORAL 2 TIMES DAILY
Qty: 10 TABLET | Refills: 0 | Status: SHIPPED | OUTPATIENT
Start: 2025-04-28 | End: 2025-05-03

## 2025-04-28 NOTE — TELEPHONE ENCOUNTER
Post Op Note    Alejandro Adames is a 69 y.o. male s/p Left - CYSTOSCOPY RETROGRADE PYELOGRAM WITH INSERTION STENT URETERAL  Left - EXCHANGE STENT URETERAL performed 4/25/2025.  Alejandro Adames is a patient of Dr. Rees and is seen at the [place] office.      for patient seeing how he is doing after his procedure the other day. Informed to call back with any questions or concerns. Left information about next appt, 8/12 at 1:20 in the [place] office for a 3 month f/u to arrange next surgery per Dr. Rees.    If patient calls back, please confirm appt.

## 2025-04-28 NOTE — TELEPHONE ENCOUNTER
Post op patient calling back for nursing staff. Patient also states his antibiotic was never sent to pharmacy.     CB: [phone]     Pharmacy    [facility]/pharmacy #[id number] - [place] -   [address] [address]  Phone: [phone]  Fax: [phone]

## 2025-04-28 NOTE — TELEPHONE ENCOUNTER
Post Op Note    Alejandro Adames is a 69 y.o. male s/p Left - CYSTOSCOPY RETROGRADE PYELOGRAM WITH INSERTION STENT URETERAL  Left - EXCHANGE STENT URETERAL performed 4/25/2025.  Alejandro Adames is a patient of Dr. Rees and is seen at the [place] office.     How would you rate your pain on a scale from 1 to 10, 10 being the worst pain ever? 0  Have you had a fever? No  Have your bowel movements been regular? Yes  Do you have any difficulty urinating? No  Do you have any other questions or concerns that I can address at this time? None at this time.    Called and spoke with patient. Informed on abx being resent over to the pharmacy. Patient states he is doing well at this time with no issues or complaints. Confirmed follow up appt in 3 months with AP per Dr. Rees.

## 2025-04-29 ENCOUNTER — DOCUMENTATION (OUTPATIENT)
Dept: HEMATOLOGY ONCOLOGY | Facility: CLINIC | Age: 70
End: 2025-04-29

## 2025-04-29 ENCOUNTER — OFFICE VISIT (OUTPATIENT)
Dept: HEMATOLOGY ONCOLOGY | Facility: CLINIC | Age: 70
End: 2025-04-29
Payer: MEDICARE

## 2025-04-29 VITALS
BODY MASS INDEX: 23.91 KG/M2 | TEMPERATURE: 97.7 F | OXYGEN SATURATION: 98 % | HEART RATE: 93 BPM | DIASTOLIC BLOOD PRESSURE: 68 MMHG | WEIGHT: 167 LBS | HEIGHT: 70 IN | SYSTOLIC BLOOD PRESSURE: 132 MMHG | RESPIRATION RATE: 18 BRPM

## 2025-04-29 DIAGNOSIS — C64.9 METASTATIC RENAL CELL CARCINOMA TO BONE (HCC): Primary | Chronic | ICD-10-CM

## 2025-04-29 DIAGNOSIS — C79.51 METASTATIC RENAL CELL CARCINOMA TO BONE (HCC): Primary | Chronic | ICD-10-CM

## 2025-04-29 PROCEDURE — G2211 COMPLEX E/M VISIT ADD ON: HCPCS | Performed by: INTERNAL MEDICINE

## 2025-04-29 PROCEDURE — 99215 OFFICE O/P EST HI 40 MIN: CPT | Performed by: INTERNAL MEDICINE

## 2025-04-29 NOTE — Clinical Note
Pt has some Lenvima at home and I told him to start it, fyi, it was sent months ago and we held off on starting

## 2025-04-29 NOTE — PROGRESS NOTES
PT obtained jin through [facility] to help with his Lenvima medication. Details Below. Prior HW jin Swept                              Yassine Bridges  Phone:[phone]  Email:[email or web address]

## 2025-04-30 ENCOUNTER — PATIENT MESSAGE (OUTPATIENT)
Dept: FAMILY MEDICINE CLINIC | Facility: CLINIC | Age: 70
End: 2025-04-30

## 2025-05-02 DIAGNOSIS — R35.1 BENIGN PROSTATIC HYPERPLASIA WITH NOCTURIA: ICD-10-CM

## 2025-05-02 DIAGNOSIS — N40.1 BENIGN PROSTATIC HYPERPLASIA WITH NOCTURIA: ICD-10-CM

## 2025-05-02 RX ORDER — TAMSULOSIN HYDROCHLORIDE 0.4 MG/1
0.8 CAPSULE ORAL
Qty: 90 CAPSULE | Refills: 3 | Status: SHIPPED | OUTPATIENT
Start: 2025-05-02

## 2025-05-05 ENCOUNTER — TELEPHONE (OUTPATIENT)
Dept: HEMATOLOGY ONCOLOGY | Facility: CLINIC | Age: 70
End: 2025-05-05

## 2025-05-05 ENCOUNTER — APPOINTMENT (OUTPATIENT)
Dept: LAB | Facility: HOSPITAL | Age: 70
End: 2025-05-05
Payer: MEDICARE

## 2025-05-05 DIAGNOSIS — E11.22 TYPE 2 DIABETES MELLITUS WITH STAGE 2 CHRONIC KIDNEY DISEASE, WITHOUT LONG-TERM CURRENT USE OF INSULIN  (HCC): ICD-10-CM

## 2025-05-05 DIAGNOSIS — Z01.818 PRE-OP TESTING: ICD-10-CM

## 2025-05-05 DIAGNOSIS — L20.84 INTRINSIC ECZEMA: ICD-10-CM

## 2025-05-05 DIAGNOSIS — R79.89 INCREASE IN SERUM CREATININE FROM PRIOR MEASUREMENT: Primary | ICD-10-CM

## 2025-05-05 DIAGNOSIS — N18.2 TYPE 2 DIABETES MELLITUS WITH STAGE 2 CHRONIC KIDNEY DISEASE, WITHOUT LONG-TERM CURRENT USE OF INSULIN  (HCC): ICD-10-CM

## 2025-05-05 LAB
ALBUMIN SERPL BCG-MCNC: 3 G/DL (ref 3.5–5)
ALP SERPL-CCNC: 136 U/L (ref 34–104)
ALT SERPL W P-5'-P-CCNC: 17 U/L (ref 7–52)
ANION GAP SERPL CALCULATED.3IONS-SCNC: 7 MMOL/L (ref 4–13)
AST SERPL W P-5'-P-CCNC: 12 U/L (ref 13–39)
BASOPHILS # BLD AUTO: 0.05 THOUSANDS/ÂΜL (ref 0–0.1)
BASOPHILS NFR BLD AUTO: 1 % (ref 0–1)
BILIRUB SERPL-MCNC: 0.2 MG/DL (ref 0.2–1)
BUN SERPL-MCNC: 39 MG/DL (ref 5–25)
CALCIUM ALBUM COR SERPL-MCNC: 9.3 MG/DL (ref 8.3–10.1)
CALCIUM SERPL-MCNC: 8.5 MG/DL (ref 8.4–10.2)
CHLORIDE SERPL-SCNC: 104 MMOL/L (ref 96–108)
CO2 SERPL-SCNC: 20 MMOL/L (ref 21–32)
CREAT SERPL-MCNC: 2.28 MG/DL (ref 0.6–1.3)
EOSINOPHIL # BLD AUTO: 0.14 THOUSAND/ÂΜL (ref 0–0.61)
EOSINOPHIL NFR BLD AUTO: 2 % (ref 0–6)
ERYTHROCYTE [DISTWIDTH] IN BLOOD BY AUTOMATED COUNT: 15.2 % (ref 11.6–15.1)
EST. AVERAGE GLUCOSE BLD GHB EST-MCNC: 203 MG/DL
GFR SERPL CREATININE-BSD FRML MDRD: 28 ML/MIN/1.73SQ M
GLUCOSE P FAST SERPL-MCNC: 200 MG/DL (ref 65–99)
HBA1C MFR BLD: 8.7 %
HCT VFR BLD AUTO: 30.6 % (ref 36.5–49.3)
HGB BLD-MCNC: 9.5 G/DL (ref 12–17)
IMM GRANULOCYTES # BLD AUTO: 0.02 THOUSAND/UL (ref 0–0.2)
IMM GRANULOCYTES NFR BLD AUTO: 0 % (ref 0–2)
LYMPHOCYTES # BLD AUTO: 1.58 THOUSANDS/ÂΜL (ref 0.6–4.47)
LYMPHOCYTES NFR BLD AUTO: 26 % (ref 14–44)
MCH RBC QN AUTO: 26 PG (ref 26.8–34.3)
MCHC RBC AUTO-ENTMCNC: 31 G/DL (ref 31.4–37.4)
MCV RBC AUTO: 84 FL (ref 82–98)
MONOCYTES # BLD AUTO: 0.57 THOUSAND/ÂΜL (ref 0.17–1.22)
MONOCYTES NFR BLD AUTO: 10 % (ref 4–12)
NEUTROPHILS # BLD AUTO: 3.65 THOUSANDS/ÂΜL (ref 1.85–7.62)
NEUTS SEG NFR BLD AUTO: 61 % (ref 43–75)
NRBC BLD AUTO-RTO: 0 /100 WBCS
PLATELET # BLD AUTO: 458 THOUSANDS/UL (ref 149–390)
PMV BLD AUTO: 9 FL (ref 8.9–12.7)
POTASSIUM SERPL-SCNC: 3.8 MMOL/L (ref 3.5–5.3)
PROT SERPL-MCNC: 8.2 G/DL (ref 6.4–8.4)
RBC # BLD AUTO: 3.66 MILLION/UL (ref 3.88–5.62)
SODIUM SERPL-SCNC: 131 MMOL/L (ref 135–147)
T4 FREE SERPL-MCNC: 0.73 NG/DL (ref 0.61–1.12)
TSH SERPL DL<=0.05 MIU/L-ACNC: 4.75 UIU/ML (ref 0.45–4.5)
WBC # BLD AUTO: 6.01 THOUSAND/UL (ref 4.31–10.16)

## 2025-05-05 PROCEDURE — 84443 ASSAY THYROID STIM HORMONE: CPT

## 2025-05-05 PROCEDURE — 80053 COMPREHEN METABOLIC PANEL: CPT

## 2025-05-05 PROCEDURE — 83036 HEMOGLOBIN GLYCOSYLATED A1C: CPT

## 2025-05-05 PROCEDURE — 36415 COLL VENOUS BLD VENIPUNCTURE: CPT

## 2025-05-05 PROCEDURE — 84439 ASSAY OF FREE THYROXINE: CPT

## 2025-05-05 PROCEDURE — 85025 COMPLETE CBC W/AUTO DIFF WBC: CPT

## 2025-05-05 NOTE — TELEPHONE ENCOUNTER
Please see if able to add 1L NSS over 1 hour or 500mL over 30 minutes during treatment on 5/8.    Order placed for 1L NSS over 1 hour in addition to Pembrolizumab.    Dr. Zapata reviewed patient's most recent labs and recommends the creatinine of 2.28 to be reviewed by Nephrology.  Sent message to Dr. Ellison with CMP results.    Dr. Zapata (Covering provider) is recommending some additional IV hydration for the patient.    Patient notified and plan updated.

## 2025-05-05 NOTE — PROGRESS NOTES
Dr. Zapata reviewed patient's most recent labs and recommends the creatinine of 2.28 to be reviewed by Nephrology.  Sent message to Dr. Ellison with CMP results.    Dr. Zapata (Covering provider) is recommending some additional IV hydration for the patient.    Patient notified and plan updated.

## 2025-05-07 ENCOUNTER — OFFICE VISIT (OUTPATIENT)
Dept: FAMILY MEDICINE CLINIC | Facility: CLINIC | Age: 70
End: 2025-05-07

## 2025-05-07 VITALS — WEIGHT: 164.4 LBS | BODY MASS INDEX: 23.59 KG/M2

## 2025-05-07 DIAGNOSIS — R11.2 CHEMOTHERAPY INDUCED NAUSEA AND VOMITING: ICD-10-CM

## 2025-05-07 DIAGNOSIS — N18.31 TYPE 2 DIABETES MELLITUS WITH STAGE 3A CHRONIC KIDNEY DISEASE, WITH LONG-TERM CURRENT USE OF INSULIN (HCC): Primary | ICD-10-CM

## 2025-05-07 DIAGNOSIS — C79.51 METASTATIC RENAL CELL CARCINOMA TO BONE (HCC): Primary | ICD-10-CM

## 2025-05-07 DIAGNOSIS — T45.1X5A CHEMOTHERAPY INDUCED NAUSEA AND VOMITING: ICD-10-CM

## 2025-05-07 DIAGNOSIS — C64.2 RENAL CELL CARCINOMA OF LEFT KIDNEY (HCC): ICD-10-CM

## 2025-05-07 DIAGNOSIS — Z79.4 TYPE 2 DIABETES MELLITUS WITH STAGE 3A CHRONIC KIDNEY DISEASE, WITH LONG-TERM CURRENT USE OF INSULIN (HCC): Primary | ICD-10-CM

## 2025-05-07 DIAGNOSIS — C64.9 METASTATIC RENAL CELL CARCINOMA TO BONE (HCC): Primary | ICD-10-CM

## 2025-05-07 DIAGNOSIS — E11.22 TYPE 2 DIABETES MELLITUS WITH STAGE 3A CHRONIC KIDNEY DISEASE, WITH LONG-TERM CURRENT USE OF INSULIN (HCC): Primary | ICD-10-CM

## 2025-05-07 PROCEDURE — PBNCHG PB NO CHARGE PLACEHOLDER: Performed by: PHARMACIST

## 2025-05-07 RX ORDER — FOLIC ACID 1 MG/1
1 TABLET ORAL DAILY
COMMUNITY

## 2025-05-07 RX ORDER — ONDANSETRON 8 MG/1
8 TABLET, ORALLY DISINTEGRATING ORAL EVERY 8 HOURS PRN
Qty: 20 TABLET | Refills: 0 | Status: SHIPPED | OUTPATIENT
Start: 2025-05-07

## 2025-05-07 NOTE — PROGRESS NOTES
[facility]  Mariajose Brown    Administrative Statements   Encounter provider Mariajose Brown      Assessment/ Plan     Assessment & Plan  Type 2 diabetes mellitus with stage 3a chronic kidney disease, with long-term current use of insulin ([facility])    Lab Results   Component Value Date    HGBA1C 8.7 (H) 2025     Most recent A1c above goal   Reported SMBG elevated with occasional hypoglycemia followed by rebound hyperglycemia.   Lenvima could be contributing to hypoglycemia as it has 5% instance of causing hypo and may also cause weight loss.      Medications:   Lantus: reduce to 10 units   Humalo units twice daily with food   Home Monitoring: continue with Ольга 3+         Follow-up: 4-6 weeks - will review blood sugar readings on  to determine if insulin needs to be adjusted     Subjective   HPI    Medication Adherence/ Tolerability/ Cost:    Humalog - no longer following sliding scale, taking 10 units twice daily with food. Occasionally takes after meals due to pre-meal blood sugar being on the lower end and fear for hypoglycemia.   Lantus - 18 units last night. Has been slowly reducing    Started lenvima on     Did not start oxybutynin due to concerns it could worsen dry eyes, not interested in trial with alternative agent for bladder control at this time     Review of Systems     2. Lifestyle:   2 meals per day   Diet Recall:   Breakfast: 3 [employer]  on way to appointment - Humalog 10 units today  Yesterday had ham/cheese sandwich around 12pm  Dinner: 630pm had roast beef, rice (10 units Humalog after eating)  Snacks: last night jeffy montano nuggets 12 piece approx 12am (no Humalog at this time but took another 10 units when he went to bed).    3. Home monitoring devices  Continuous Glucose Monitor: Yes, Brand: ольга 3+    Hypoglycemia: The patient had several episodes/symptoms of hypoglycemia.     Objective       Blood Sugar Readings  CGM Report  scanned into chart          ASCVD Risk:  The 10-year ASCVD risk score (Adelaida MENDOZA, et al., 2019) is: 34.5%    Values used to calculate the score:      Age: 69 years      Sex: Male      Is Non- : No      Diabetic: Yes      Tobacco smoker: No      Systolic Blood Pressure: 132 mmHg      Is BP treated: Yes      HDL Cholesterol: 48 mg/dL      Total Cholesterol: 154 mg/dL     Vitals:  There were no vitals filed for this visit.    Eye Exam:    Lab Results   Component Value Date    LEFTDIABRET None 02/03/2025    RIGHTDIABRET None 02/03/2025       Labs:  Lab Results   Component Value Date    SODIUM 131 (L) 05/05/2025    K 3.8 05/05/2025    EGFR 28 05/05/2025    CREATININE 2.28 (H) 05/05/2025    GLUF 200 (H) 05/05/2025    [id number] 606 10/19/2024    MICROALBCRE 523 (H) 03/03/2025       Lab Results   Component Value Date    HGBA1C 8.7 (H) 05/05/2025    HGBA1C 9.0 (H) 04/11/2025    HGBA1C 8.3 (A) 12/24/2024         Pharmacist Tracking Tool     Pharmacist Tracking Tool  Reason For Outreach: Embedded Pharmacist  Demographics:  Intervention Method: In Person  Type of Intervention: Follow-Up  Topics Addressed: Diabetes  Pharmacologic Interventions: Dose or Frequency Adjusted  Non-Pharmacologic Interventions: Care coordination, Disease state education, and Personal CGM  Time:  Direct Patient Care:  20  mins  Care Coordination:  5  mins  Recommendation Recipient: Patient/Caregiver  Outcome: Accepted

## 2025-05-07 NOTE — ASSESSMENT & PLAN NOTE
Lab Results   Component Value Date    HGBA1C 8.7 (H) 2025     Most recent A1c above goal   Reported SMBG elevated with occasional hypoglycemia followed by rebound hyperglycemia.   Lenvima could be contributing to hypoglycemia as it has 5% instance of causing hypo and may also cause weight loss.      Medications:   Lantus: reduce to 10 units   Humalo units twice daily with food   Home Monitoring: continue with Ольга 3+

## 2025-05-07 NOTE — PROGRESS NOTES
"Per   \"I would hold the Zometa until the kidney function has improved.  He should be on Zofran as needed while on the Lenvima.\"    Prescription pending provider review and signature.     Zometa plan on hold.     Patient notified.    Call out to [facility] infusion spoke with MADAN Faith on 5/7/25 at 406 pm.   "

## 2025-05-08 ENCOUNTER — HOSPITAL ENCOUNTER (OUTPATIENT)
Dept: INFUSION CENTER | Facility: CLINIC | Age: 70
Discharge: HOME | End: 2025-05-08
Payer: MEDICARE

## 2025-05-08 DIAGNOSIS — Z29.89 IMMUNOTHERAPY ENCOUNTER: ICD-10-CM

## 2025-05-08 DIAGNOSIS — C64.2 RENAL CELL CARCINOMA OF LEFT KIDNEY (HCC): ICD-10-CM

## 2025-05-08 DIAGNOSIS — C79.51 METASTATIC RENAL CELL CARCINOMA TO BONE (HCC): ICD-10-CM

## 2025-05-08 DIAGNOSIS — C64.9 METASTATIC RENAL CELL CARCINOMA TO BONE (HCC): ICD-10-CM

## 2025-05-08 DIAGNOSIS — R79.89 INCREASE IN SERUM CREATININE FROM PRIOR MEASUREMENT: Primary | ICD-10-CM

## 2025-05-08 DIAGNOSIS — Z79.899 HIGH RISK MEDICATION USE: ICD-10-CM

## 2025-05-08 PROCEDURE — 96413 CHEMO IV INFUSION 1 HR: CPT

## 2025-05-08 RX ORDER — SODIUM CHLORIDE 9 MG/ML
20 INJECTION, SOLUTION INTRAVENOUS ONCE
Status: COMPLETED | OUTPATIENT
Start: 2025-05-08 | End: 2025-05-08

## 2025-05-08 RX ADMIN — SODIUM CHLORIDE 20 ML/HR: 0.9 INJECTION, SOLUTION INTRAVENOUS at 12:35

## 2025-05-08 RX ADMIN — SODIUM CHLORIDE 400 MG: 9 INJECTION, SOLUTION INTRAVENOUS at 13:04

## 2025-05-08 RX ADMIN — SODIUM CHLORIDE 1000 ML: 0.9 INJECTION, SOLUTION INTRAVENOUS at 12:59

## 2025-05-08 NOTE — PROGRESS NOTES
Pt. Denies new symptoms or concerns today. Labs reviewed. Ok to treat received for creatinine of 2.28.  1000 ml of NSS given as ordered. Pt. Tolerated Keytruda without adverse event.  Future appointments reviewed Pt. Will return on 6/19/25 at 1200.  AVS declined.

## 2025-05-09 ENCOUNTER — TELEPHONE (OUTPATIENT)
Dept: FAMILY MEDICINE CLINIC | Facility: CLINIC | Age: 70
End: 2025-05-09

## 2025-05-09 NOTE — TELEPHONE ENCOUNTER
Blood sugar readings reviewed since PharmD appointment on .  Lantus: reduce to 10 units   Humalo units twice daily with food

## 2025-05-14 ENCOUNTER — TELEPHONE (OUTPATIENT)
Dept: NEPHROLOGY | Facility: CLINIC | Age: 70
End: 2025-05-14

## 2025-05-14 ENCOUNTER — TELEPHONE (OUTPATIENT)
Age: 70
End: 2025-05-14

## 2025-05-14 DIAGNOSIS — N17.9 AKI (ACUTE KIDNEY INJURY) (HCC): Primary | ICD-10-CM

## 2025-05-14 NOTE — TELEPHONE ENCOUNTER
Patient returning a call. I reviewed the above message per provider. Patient verbalized understanding of all. Patient states he will reach out to hematology to discuss CT. Patient voiced no further question/concern.

## 2025-05-14 NOTE — TELEPHONE ENCOUNTER
----- Message from Ruthie EDGAR sent at 5/5/2025  1:32 PM EDT -----  Good afternoon Dr. Ellison, Dr. Zapata wanted the cmp to be brought to your attention. Alejandro is scheduled for Pembrolizumab this week.  Thank you  MADAN Cash

## 2025-05-14 NOTE — TELEPHONE ENCOUNTER
LVM for pt regarding the following message per Dr. Ellison     Can you please let patient know that creatinine slowly increasing up to 2.2 on most recent lab results.  Would like him to have repeat BMP, UACR, urine sodium, urine creatinine next week.  Also would like him to have kidney ultrasound.    I have placed order for all of this.  If creatinine continue to further worsen, we will have to discuss with hematology if any change in treatment plan needed but for now lets see what repeat labs and ultrasound shows.    Also I am reaching out to his hematology team to see if upcoming CT scan can be done without IV contrast since contrast can increase risk of further worsening JULIO.  He should discuss with hematology team as well.    Asked pt to please give us a call back with questions or concerns. Provided central scheduling number for pt.

## 2025-05-14 NOTE — TELEPHONE ENCOUNTER
----- Message from Bunny Zapata MD sent at 5/14/2025  1:27 PM EDT -----  CT scan should be done without IV contrast to avoid further decline of the kidney function.  ----- Message -----  From: Scot Ellison MD  Sent: 5/14/2025  12:19 PM EDT  To: Ruthie Cooper RN; Bunny Zapata MD; He#    Sorry was off last week. I am having my office reach out to him to do repeat BMP, some urine studies, and renal ultrasound.He has been having slow progressing increased creatinine over last 6 months.  If no other explanation for worsening creatinine, then we will have to regroup if he is ongoing current treatment for malignancy is contributing?Noted he is being scheduled for CT scan with IV contrast in the near future, given his worsening creatinine, he will be at higher risk with contrast exposure.  This is something can be done without IV contrast?ThanksScot Ellison  ----- Message -----  From: Ruthie Cooper RN  Sent: 5/5/2025   1:32 PM EDT  To: Scot Ellison MD    Good afternoon Dr. Ellison, Dr. Zapata wanted the cmp to be brought to your attention. Alejandro is scheduled for Pembrolizumab this week.  Thank you  MADAN Cash

## 2025-05-14 NOTE — TELEPHONE ENCOUNTER
Can you please let patient know that creatinine slowly increasing up to 2.2 on most recent lab results.  Would like him to have repeat BMP, UACR, urine sodium, urine creatinine next week.  Also would like him to have kidney ultrasound.    I have placed order for all of this.  If creatinine continue to further worsen, we will have to discuss with hematology if any change in treatment plan needed but for now lets see what repeat labs and ultrasound shows.    Also I am reaching out to his hematology team to see if upcoming CT scan can be done without IV contrast since contrast can increase risk of further worsening JULIO.  He should discuss with hematology team as well.

## 2025-05-15 NOTE — TELEPHONE ENCOUNTER
Attempted to call patient, unable to reach. Vm left informing patient that he would be receiving MyChart Message with instructions.

## 2025-05-16 ENCOUNTER — HOSPITAL ENCOUNTER (OUTPATIENT)
Dept: ULTRASOUND IMAGING | Facility: HOSPITAL | Age: 70
Discharge: HOME | End: 2025-05-16
Payer: MEDICARE

## 2025-05-16 DIAGNOSIS — N17.9 AKI (ACUTE KIDNEY INJURY) (HCC): ICD-10-CM

## 2025-05-16 PROCEDURE — 76775 US EXAM ABDO BACK WALL LIM: CPT

## 2025-05-20 ENCOUNTER — APPOINTMENT (OUTPATIENT)
Dept: LAB | Facility: HOSPITAL | Age: 70
End: 2025-05-20
Payer: MEDICARE

## 2025-05-20 DIAGNOSIS — C61 PROSTATE CANCER (HCC): ICD-10-CM

## 2025-05-20 DIAGNOSIS — N17.9 AKI (ACUTE KIDNEY INJURY) (HCC): ICD-10-CM

## 2025-05-20 LAB
ANION GAP SERPL CALCULATED.3IONS-SCNC: 7 MMOL/L (ref 4–13)
BUN SERPL-MCNC: 29 MG/DL (ref 5–25)
CALCIUM SERPL-MCNC: 8.5 MG/DL (ref 8.4–10.2)
CHLORIDE SERPL-SCNC: 101 MMOL/L (ref 96–108)
CO2 SERPL-SCNC: 24 MMOL/L (ref 21–32)
CREAT SERPL-MCNC: 1.8 MG/DL (ref 0.6–1.3)
CREAT UR-MCNC: 74.9 MG/DL
CREAT UR-MCNC: 76.5 MG/DL
GFR SERPL CREATININE-BSD FRML MDRD: 37 ML/MIN/1.73SQ M
GLUCOSE SERPL-MCNC: 290 MG/DL (ref 65–140)
MICROALBUMIN UR-MCNC: 311.8 MG/L
MICROALBUMIN/CREAT 24H UR: 416 MG/G CREATININE (ref 0–30)
POTASSIUM SERPL-SCNC: 3.2 MMOL/L (ref 3.5–5.3)
PSA SERPL-MCNC: 0.08 NG/ML (ref 0–4)
SODIUM SERPL-SCNC: 132 MMOL/L (ref 135–147)
SODIUM UR-SCNC: 44 MMOL/L

## 2025-05-20 PROCEDURE — 84300 ASSAY OF URINE SODIUM: CPT

## 2025-05-20 PROCEDURE — 36415 COLL VENOUS BLD VENIPUNCTURE: CPT

## 2025-05-20 PROCEDURE — 82570 ASSAY OF URINE CREATININE: CPT

## 2025-05-20 PROCEDURE — G0103 PSA SCREENING: HCPCS

## 2025-05-20 PROCEDURE — 80048 BASIC METABOLIC PNL TOTAL CA: CPT

## 2025-05-20 PROCEDURE — 82043 UR ALBUMIN QUANTITATIVE: CPT

## 2025-05-22 ENCOUNTER — TELEPHONE (OUTPATIENT)
Dept: HEMATOLOGY ONCOLOGY | Facility: CLINIC | Age: 70
End: 2025-05-22

## 2025-05-22 ENCOUNTER — RESULTS FOLLOW-UP (OUTPATIENT)
Dept: OTHER | Facility: HOSPITAL | Age: 70
End: 2025-05-22

## 2025-05-22 DIAGNOSIS — E87.6 HYPOKALEMIA: Primary | ICD-10-CM

## 2025-05-22 DIAGNOSIS — C64.9 METASTATIC RENAL CELL CARCINOMA TO BONE (HCC): Primary | ICD-10-CM

## 2025-05-22 DIAGNOSIS — C64.2 RENAL CELL CARCINOMA OF LEFT KIDNEY (HCC): ICD-10-CM

## 2025-05-22 DIAGNOSIS — R79.89 INCREASE IN SERUM CREATININE FROM PRIOR MEASUREMENT: ICD-10-CM

## 2025-05-22 DIAGNOSIS — C79.51 METASTATIC RENAL CELL CARCINOMA TO BONE (HCC): Primary | ICD-10-CM

## 2025-05-22 RX ORDER — POTASSIUM CHLORIDE 1500 MG/1
20 TABLET, EXTENDED RELEASE ORAL DAILY
Qty: 7 TABLET | Refills: 0 | Status: SHIPPED | OUTPATIENT
Start: 2025-05-22

## 2025-05-22 NOTE — TELEPHONE ENCOUNTER
Per recommendations from Dr. Ellison and Dr. Castellon patient's CT scan reordered w/o contrast.    Please cancel the CT CAP scan with contrast scheduled in July and replace with CT CAP scan without contrast same day.    If day/time changes, please let the patient know.   Thanks!

## 2025-05-22 NOTE — TELEPHONE ENCOUNTER
LVM for pt regarding the following message per Dr. Ellison     Please let him know creatinine slightly improved 1.8 and seems to be stable over last 3 months.  Potassium level slightly lower at 3.2.  I have prescribed potassium chloride 20 meq daily for next 1 week.  Will give him prescription for potassium level during office visit next week.     Asked pt to please give us a call back with any questions or concerns.

## 2025-05-22 NOTE — TELEPHONE ENCOUNTER
----- Message from Scot Ellison MD sent at 5/22/2025  3:13 PM EDT -----      ----- Message -----  From: Lab, Background User  Sent: 5/20/2025   3:09 PM EDT  To: Scot Ellison MD

## 2025-05-22 NOTE — TELEPHONE ENCOUNTER
Please let him know creatinine slightly improved 1.8 and seems to be stable over last 3 months.  Potassium level slightly lower at 3.2.  I have prescribed potassium chloride 20 meq daily for next 1 week.  Will give him prescription for potassium level during office visit next week.

## 2025-05-29 ENCOUNTER — RESULTS FOLLOW-UP (OUTPATIENT)
Dept: NEPHROLOGY | Facility: CLINIC | Age: 70
End: 2025-05-29

## 2025-05-29 ENCOUNTER — OFFICE VISIT (OUTPATIENT)
Dept: NEPHROLOGY | Facility: CLINIC | Age: 70
End: 2025-05-29
Payer: MEDICARE

## 2025-05-29 ENCOUNTER — APPOINTMENT (OUTPATIENT)
Dept: LAB | Facility: HOSPITAL | Age: 70
End: 2025-05-29
Payer: MEDICARE

## 2025-05-29 VITALS
DIASTOLIC BLOOD PRESSURE: 80 MMHG | WEIGHT: 164 LBS | SYSTOLIC BLOOD PRESSURE: 162 MMHG | HEIGHT: 70 IN | BODY MASS INDEX: 23.48 KG/M2

## 2025-05-29 DIAGNOSIS — R80.9 MICROALBUMINURIA: ICD-10-CM

## 2025-05-29 DIAGNOSIS — R19.00 RETROPERITONEAL MASS: ICD-10-CM

## 2025-05-29 DIAGNOSIS — I12.9 BENIGN HYPERTENSION WITH CKD (CHRONIC KIDNEY DISEASE) STAGE III (HCC): ICD-10-CM

## 2025-05-29 DIAGNOSIS — N13.30 HYDRONEPHROSIS OF LEFT KIDNEY: ICD-10-CM

## 2025-05-29 DIAGNOSIS — N18.32 STAGE 3B CHRONIC KIDNEY DISEASE (HCC): ICD-10-CM

## 2025-05-29 DIAGNOSIS — N18.30 BENIGN HYPERTENSION WITH CKD (CHRONIC KIDNEY DISEASE) STAGE III (HCC): Primary | ICD-10-CM

## 2025-05-29 DIAGNOSIS — E87.1 HYPONATREMIA: ICD-10-CM

## 2025-05-29 DIAGNOSIS — N18.31 TYPE 2 DIABETES MELLITUS WITH STAGE 3A CHRONIC KIDNEY DISEASE, WITHOUT LONG-TERM CURRENT USE OF INSULIN (HCC): ICD-10-CM

## 2025-05-29 DIAGNOSIS — E87.6 HYPOKALEMIA: ICD-10-CM

## 2025-05-29 DIAGNOSIS — N18.2 TYPE 2 DIABETES MELLITUS WITH STAGE 2 CHRONIC KIDNEY DISEASE, WITHOUT LONG-TERM CURRENT USE OF INSULIN  (HCC): ICD-10-CM

## 2025-05-29 DIAGNOSIS — N18.30 BENIGN HYPERTENSION WITH CKD (CHRONIC KIDNEY DISEASE) STAGE III (HCC): ICD-10-CM

## 2025-05-29 DIAGNOSIS — E11.22 TYPE 2 DIABETES MELLITUS WITH STAGE 2 CHRONIC KIDNEY DISEASE, WITHOUT LONG-TERM CURRENT USE OF INSULIN  (HCC): ICD-10-CM

## 2025-05-29 DIAGNOSIS — E11.22 TYPE 2 DIABETES MELLITUS WITH STAGE 3A CHRONIC KIDNEY DISEASE, WITHOUT LONG-TERM CURRENT USE OF INSULIN (HCC): ICD-10-CM

## 2025-05-29 DIAGNOSIS — E87.6 HYPOKALEMIA: Primary | ICD-10-CM

## 2025-05-29 DIAGNOSIS — I12.9 BENIGN HYPERTENSION WITH CKD (CHRONIC KIDNEY DISEASE) STAGE III (HCC): Primary | ICD-10-CM

## 2025-05-29 LAB
ALBUMIN SERPL BCG-MCNC: 3.1 G/DL (ref 3.5–5)
ALP SERPL-CCNC: 155 U/L (ref 34–104)
ALT SERPL W P-5'-P-CCNC: 14 U/L (ref 7–52)
ANION GAP SERPL CALCULATED.3IONS-SCNC: 7 MMOL/L (ref 4–13)
AST SERPL W P-5'-P-CCNC: 13 U/L (ref 13–39)
BASOPHILS # BLD AUTO: 0.04 THOUSANDS/ÂΜL (ref 0–0.1)
BASOPHILS NFR BLD AUTO: 1 % (ref 0–1)
BILIRUB SERPL-MCNC: 0.26 MG/DL (ref 0.2–1)
BUN SERPL-MCNC: 30 MG/DL (ref 5–25)
CALCIUM ALBUM COR SERPL-MCNC: 10 MG/DL (ref 8.3–10.1)
CALCIUM SERPL-MCNC: 9.3 MG/DL (ref 8.4–10.2)
CHLORIDE SERPL-SCNC: 102 MMOL/L (ref 96–108)
CO2 SERPL-SCNC: 24 MMOL/L (ref 21–32)
CREAT SERPL-MCNC: 1.67 MG/DL (ref 0.6–1.3)
EOSINOPHIL # BLD AUTO: 0.09 THOUSAND/ÂΜL (ref 0–0.61)
EOSINOPHIL NFR BLD AUTO: 1 % (ref 0–6)
ERYTHROCYTE [DISTWIDTH] IN BLOOD BY AUTOMATED COUNT: 15.8 % (ref 11.6–15.1)
GFR SERPL CREATININE-BSD FRML MDRD: 40 ML/MIN/1.73SQ M
GLUCOSE SERPL-MCNC: 139 MG/DL (ref 65–140)
HCT VFR BLD AUTO: 33.8 % (ref 36.5–49.3)
HGB BLD-MCNC: 10.3 G/DL (ref 12–17)
IMM GRANULOCYTES # BLD AUTO: 0.02 THOUSAND/UL (ref 0–0.2)
IMM GRANULOCYTES NFR BLD AUTO: 0 % (ref 0–2)
LYMPHOCYTES # BLD AUTO: 1.72 THOUSANDS/ÂΜL (ref 0.6–4.47)
LYMPHOCYTES NFR BLD AUTO: 23 % (ref 14–44)
MCH RBC QN AUTO: 25.2 PG (ref 26.8–34.3)
MCHC RBC AUTO-ENTMCNC: 30.5 G/DL (ref 31.4–37.4)
MCV RBC AUTO: 83 FL (ref 82–98)
MONOCYTES # BLD AUTO: 0.69 THOUSAND/ÂΜL (ref 0.17–1.22)
MONOCYTES NFR BLD AUTO: 9 % (ref 4–12)
NEUTROPHILS # BLD AUTO: 4.99 THOUSANDS/ÂΜL (ref 1.85–7.62)
NEUTS SEG NFR BLD AUTO: 66 % (ref 43–75)
NRBC BLD AUTO-RTO: 0 /100 WBCS
PLATELET # BLD AUTO: 376 THOUSANDS/UL (ref 149–390)
PMV BLD AUTO: 9.1 FL (ref 8.9–12.7)
POTASSIUM SERPL-SCNC: 3.1 MMOL/L (ref 3.5–5.3)
PROT SERPL-MCNC: 8.6 G/DL (ref 6.4–8.4)
RBC # BLD AUTO: 4.09 MILLION/UL (ref 3.88–5.62)
SODIUM SERPL-SCNC: 133 MMOL/L (ref 135–147)
WBC # BLD AUTO: 7.55 THOUSAND/UL (ref 4.31–10.16)

## 2025-05-29 PROCEDURE — 36415 COLL VENOUS BLD VENIPUNCTURE: CPT

## 2025-05-29 PROCEDURE — 85025 COMPLETE CBC W/AUTO DIFF WBC: CPT

## 2025-05-29 PROCEDURE — 80053 COMPREHEN METABOLIC PANEL: CPT

## 2025-05-29 PROCEDURE — 99214 OFFICE O/P EST MOD 30 MIN: CPT | Performed by: INTERNAL MEDICINE

## 2025-05-29 RX ORDER — AMLODIPINE BESYLATE 10 MG/1
10 TABLET ORAL DAILY
Qty: 30 TABLET | Refills: 5 | Status: SHIPPED | OUTPATIENT
Start: 2025-05-29

## 2025-05-29 RX ORDER — POTASSIUM CHLORIDE 1500 MG/1
TABLET, EXTENDED RELEASE ORAL
Qty: 35 TABLET | Refills: 5 | Status: SHIPPED | OUTPATIENT
Start: 2025-05-29

## 2025-05-29 NOTE — ASSESSMENT & PLAN NOTE
-CT scan showed moderate left hydronephrosis in October 2024 which was thought to be due to obstructing large left retroperitoneal toribio mass  -Status post initial left PCN which was eventually converted to PCNU on 11/19/2024.  -Most recent renal ultrasound in May 2025 shows mild pelvicaliectasis in the left kidney without definitive hydronephrosis, left nephroureteral stent present  -Follows with urology

## 2025-05-29 NOTE — ASSESSMENT & PLAN NOTE
-CT scan in October 2024 shows large left retroperitoneal otribio mass, enlarged left pelvic and other retroperitoneal nodes consistent with metastasis  -MRI abdomen showed no renal cortical mass, showed mild urothelial thickening and enhancement within left renal pelvis without measurable lesion.  -Bone scan shows no obvious bone metastasis.  -Repeat CT scan in April 2025 shows new increased abdominal pelvic lymphadenopathy, decreasing size of pulmonary opacities.  -Now remains on Keytruda and recently started lenvatinib about a month ago.   -This was suspected to be primary left kidney renal cell carcinoma

## 2025-05-29 NOTE — PROGRESS NOTES
NEPHROLOGY OUTPATIENT PROGRESS NOTE   Alejandro Adames 69 y.o. male MRN: [id number]  DATE: 5/29/2025  Reason for visit:   Chief Complaint   Patient presents with    Follow-up    Chronic Kidney Disease     ASSESSMENT and PLAN:  Assessment & Plan  Benign hypertension with CKD (chronic kidney disease) stage III (HCC)  -Blood pressure remains slightly higher in the office today.  -He has upper arm BP machine but does not check BP on regular basis.  -Increase amlodipine from 5 mg to 10 mg daily.  -Recently started lenvatinib could be contributing to elevated BP  - Advised to monitor BP on regular basis and call back if remains persistently greater than 135/85.  Stage 3b chronic kidney disease (HCC)  Lab Results   Component Value Date    EGFR 40 05/29/2025    EGFR 37 05/20/2025    EGFR 28 05/05/2025    CREATININE 1.67 (H) 05/29/2025    CREATININE 1.80 (H) 05/20/2025    CREATININE 2.28 (H) 05/05/2025   Progressive CKD stage III, baseline creatinine around 1.8 since February 2025, 1.4-1.6 since October 2024, prior 1.1-1.2  - Patient had episode of JULIO with creatinine 2.4 in October 2024 and since then earlier plateaued around 1.4-1.6 and more recently stays around 1.8.    -Recently JULIO episode with creatinine 2.2 in early May 2025 and now improved to most recent 1.8 on 5/20/2025.  -Progressive CKD suspect multifactorial in the setting of obstructive nephropathy, uncontrolled diabetes, prior use of NSAIDs, also remains on Keytruda and recently added lenvatinib (VEGFi) about a month ago  -UA in March 2025 shows 1+ proteinuria, 10-20 RBCs, innumerable WBCs.   -Renal ultrasound in May 2025 shows normal-sized kidneys, normal echogenicity, no hydro or stone on the right side, mild pelvicaliectasis similar to prior study on the left side without definitive hydronephrosis.  -Continue to closely monitor renal function  Hydronephrosis of left kidney  -CT scan showed moderate left hydronephrosis in October 2024 which was thought to be  due to obstructing large left retroperitoneal toribio mass  -Status post initial left PCN which was eventually converted to PCNU on 11/19/2024.  -Most recent renal ultrasound in May 2025 shows mild pelvicaliectasis in the left kidney without definitive hydronephrosis, left nephroureteral stent present  -Follows with urology  Retroperitoneal mass  -CT scan in October 2024 shows large left retroperitoneal toribio mass, enlarged left pelvic and other retroperitoneal nodes consistent with metastasis  -MRI abdomen showed no renal cortical mass, showed mild urothelial thickening and enhancement within left renal pelvis without measurable lesion.  -Bone scan shows no obvious bone metastasis.  -Repeat CT scan in April 2025 shows new increased abdominal pelvic lymphadenopathy, decreasing size of pulmonary opacities.  -Now remains on Keytruda and recently started lenvatinib about a month ago.   -This was suspected to be primary left kidney renal cell carcinoma  Hyponatremia  Corrected serum sodium around 134-135  Hypokalemia  Potassium level remains low at 3.2, was recently given potassium chloride 20 meq daily for total 7 days.  He will be getting repeat BMP today.  Microalbuminuria  UACR 416 mg, slightly better comparing to March 2025.  -Lenvatinib can contribute to proteinuria along with uncontrolled diabetes.  - Given progressive worsening CKD, fluctuating creatinine, avoiding ACE inhibitor for time being.  Eventually consider lisinopril if renal function remains stable  - Last hemoglobin A1c 8.7 in May 2025    Diagnoses and all orders for this visit:    Benign hypertension with CKD (chronic kidney disease) stage III (HCC)  -     amLODIPine (NORVASC) 10 mg tablet; Take 1 tablet (10 mg total) by mouth daily  -     Albumin / creatinine urine ratio; Future  -     Comprehensive metabolic panel; Future  -     CBC and differential; Future  -     Albumin / creatinine urine ratio; Future  -     CBC and differential; Future  -      Comprehensive metabolic panel; Future  -     Phosphorus; Future  -     PTH, intact; Future    Stage 3b chronic kidney disease (HCC)  -     Basic metabolic panel; Future  -     Albumin / creatinine urine ratio; Future  -     Comprehensive metabolic panel; Future  -     CBC and differential; Future  -     Albumin / creatinine urine ratio; Future  -     CBC and differential; Future  -     Comprehensive metabolic panel; Future  -     Phosphorus; Future  -     PTH, intact; Future    Hydronephrosis of left kidney  -     Basic metabolic panel; Future    Retroperitoneal mass    Hyponatremia  -     Comprehensive metabolic panel; Future    Hypokalemia  -     Comprehensive metabolic panel; Future    Microalbuminuria  -     Albumin / creatinine urine ratio; Future  -     Albumin / creatinine urine ratio; Future        SUBJECTIVE / HPI:  Patient is 69-year-old male with significant medical issues of prostate carcinoma about 5 years ago, hypertension for 20 years, diabetes for 10 years, history of left-sided hydronephrosis requiring left-sided PCN converted to PCNU, retroperitoneal toribio mass status post biopsy suggestive of primary renal cell carcinoma, comes for regular follow-up of progressive CKD.  More recently baseline creatinine around 1.8 since early 2025.  Recently had episode of JULIO with creatinine up to 2.2 in early May 2025 although this seems to be improving on most recent 1.8.     He was recently started on lenvatinib about a month ago.  BP remains above goal in the office today.       He denies any nausea or vomiting.  Feels overall tired.  Has decreased appetite.      REVIEW OF SYSTEMS:  More than 10 point review of systems were obtained and discussed in length with the patient. Complete review of systems were negative / unremarkable except mentioned above.     PHYSICAL EXAM:  Vitals:    05/29/25 1253 05/29/25 1325   BP: 162/84 162/80   BP Location: Left arm    Patient Position: Sitting    Cuff Size: Adult   "  Weight: 74.4 kg (164 lb)    Height: 5' 10\" (1.778 m)      Body mass index is 23.53 kg/m².    Physical Exam  Vitals reviewed.   Constitutional:       Appearance: He is well-developed.   HENT:      Head: Normocephalic and atraumatic.      Right Ear: External ear normal.      Left Ear: External ear normal.     Eyes:      General: No scleral icterus.     Conjunctiva/sclera: Conjunctivae normal.       Cardiovascular:      Comments: Trace edema in legs  Pulmonary:      Effort: Pulmonary effort is normal. No respiratory distress.      Breath sounds: Normal breath sounds. No wheezing or rales.   Abdominal:      General: Bowel sounds are normal. There is no distension.      Palpations: Abdomen is soft.      Tenderness: There is no abdominal tenderness.     Musculoskeletal:         General: No deformity.   Lymphadenopathy:      Cervical: No cervical adenopathy.     Skin:     Findings: No rash.     Neurological:      Mental Status: He is alert and oriented to person, place, and time.     Psychiatric:         Behavior: Behavior normal.         PAST MEDICAL HISTORY:  Past Medical History[1]    PAST SURGICAL HISTORY:  Past Surgical History[2]    SOCIAL HISTORY:  Social History     Substance and Sexual Activity   Alcohol Use Not Currently    Comment: social     Social History     Substance and Sexual Activity   Drug Use No     Tobacco Use History[3]    FAMILY HISTORY:  Family History[4]    MEDICATIONS:  Current Medications[5]    Lab Results:   Results for orders placed or performed in visit on 05/20/25   Basic metabolic panel   Result Value Ref Range    Sodium 132 (L) 135 - 147 mmol/L    Potassium 3.2 (L) 3.5 - 5.3 mmol/L    Chloride 101 96 - 108 mmol/L    CO2 24 21 - 32 mmol/L    ANION GAP 7 4 - 13 mmol/L    BUN 29 (H) 5 - 25 mg/dL    Creatinine 1.80 (H) 0.60 - 1.30 mg/dL    Glucose 290 (H) 65 - 140 mg/dL    Calcium 8.5 8.4 - 10.2 mg/dL    eGFR 37 ml/min/1.73sq m   Albumin / creatinine urine ratio   Result Value Ref Range    " Creatinine, Ur 74.9 Reference range not established. mg/dL    Albumin,U,Random 311.8 (H) <20.0 mg/L    Albumin Creat Ratio 416 (H) 0 - 30 mg/g creatinine   Creatinine, urine, random   Result Value Ref Range    Creatinine, Ur 76.5 Reference range not established. mg/dL   Sodium, urine, random   Result Value Ref Range    Sodium, Ur 44.0 mmol/L   PSA, Total Screen   Result Value Ref Range    PSA 0.083 0.000 - 4.000 ng/mL          [1]   Past Medical History:  Diagnosis Date    Cancer (HCC)     pt unsure of primary site poss. bone vs kidney    COVID-19 12/29/2021    Symptom onset 12/27/21      Cutaneous skin tags 01/02/2019    Diabetes mellitus (HCC)     Hypertension     Ureteral obstruction     L nephrostomy tube in place 12/26/204    internal placement otday 12/26/204    Uses walker     in rehab presently   12/26/2024   [2]   Past Surgical History:  Procedure Laterality Date    FL RETROGRADE PYELOGRAM  4/25/2025    IR BIOPSY INGUINAL LYMPH NODE  10/19/2024    IR BIOPSY RETROPERITONEUM  1/17/2025    IR INTERNAL URETERAL STENT PLACEMENT  12/26/2024    IR NEPHROSTOMY TO NEPHROURETERAL STENT  11/19/2024    IR NEPHROSTOMY TUBE PLACEMENT  10/19/2024    IL CYSTO W/INSERT URETERAL STENT Left 4/25/2025    Procedure: EXCHANGE STENT URETERAL;  Surgeon: Rj Rees MD;  Location: [facility];  Service: Urology    IL CYSTOURETHROSCOPY W/URETERAL CATHETERIZATION Left 4/25/2025    Procedure: CYSTOSCOPY RETROGRADE PYELOGRAM WITH INSERTION STENT URETERAL;  Surgeon: Rj Rees MD;  Location: [facility];  Service: Urology   [3]   Social History  Tobacco Use   Smoking Status Former    Current packs/day: 0.50    Average packs/day: 0.5 packs/day for 45.4 years (22.7 ttl pk-yrs)    Types: Cigarettes    Start date: 1980    Passive exposure: Past   Smokeless Tobacco Never   [4]   Family History  Problem Relation Name Age of Onset    No Known Problems Brother      Stroke Mother     [5]   Current Outpatient Medications:      Alcohol Swabs (Alcohol Prep) PADS, Use to prep skin prior to placing blood sugar monitor, Disp: 100 each, Rfl: 1    amLODIPine (NORVASC) 10 mg tablet, Take 1 tablet (10 mg total) by mouth daily, Disp: 30 tablet, Rfl: 5    atorvastatin (LIPITOR) 40 mg tablet, TAKE 1 TABLET BY MOUTH EVERY DAY, Disp: 90 tablet, Rfl: 1    Continuous Glucose Sensor (FreeStyle Ольга 3 Plus Sensor) MISC, Use 1 each every 15 (fifteen) days Every 15 days for blood sugar monitoring *receives through [employer]*, Disp: 1 each, Rfl: 0    folic acid (FOLVITE) 1 mg tablet, Take 1 mg by mouth in the morning., Disp: , Rfl:     glucose blood (OneTouch Verio) test strip, Check blood sugars once daily. Please substitute with appropriate alternative as covered by patient's insurance. Dx: E11.65, Disp: 100 each, Rfl: 3    insulin glargine (LANTUS SOLOSTAR) 100 units/mL injection pen, Inject 32 Units under the skin daily, Disp: 45 mL, Rfl: 1    insulin lispro (HumaLOG) 100 units/mL injection pen, Inject up to 18 units prior to meals, Disp: 15 mL, Rfl: 5    Insulin Pen Needle 32G X 4 MM MISC, Use 3 (three) times a day, Disp: 300 each, Rfl: 1    Lenvatinib, 20 MG Daily Dose, (Lenvima, 20 MG Daily Dose,) 2 x 10 MG CPPK, Take 20 mg by mouth daily, Disp: 30 each, Rfl: 5    OneTouch Delica Lancets 33G MISC, Check blood sugars once daily. Please substitute with appropriate alternative as covered by patient's insurance. Dx: E11.65, Disp: 100 each, Rfl: 3    tamsulosin (FLOMAX) 0.4 mg, Take 2 capsules (0.8 mg total) by mouth daily with dinner, Disp: 90 capsule, Rfl: 3    ondansetron (ZOFRAN-ODT) 8 mg disintegrating tablet, Take 1 tablet (8 mg total) by mouth every 8 (eight) hours as needed for nausea or vomiting (Patient not taking: Reported on 5/29/2025), Disp: 20 tablet, Rfl: 0    senna-docusate sodium (SENOKOT S) 8.6-50 mg per tablet, Take 1 tablet by mouth 2 (two) times a day as needed for constipation, Disp: , Rfl:

## 2025-05-29 NOTE — ASSESSMENT & PLAN NOTE
Potassium level remains low at 3.2, was recently given potassium chloride 20 meq daily for total 7 days.  He will be getting repeat BMP today.

## 2025-05-29 NOTE — ASSESSMENT & PLAN NOTE
-Blood pressure remains slightly higher in the office today.  -He has upper arm BP machine but does not check BP on regular basis.  -Increase amlodipine from 5 mg to 10 mg daily.  -Recently started lenvatinib could be contributing to elevated BP  - Advised to monitor BP on regular basis and call back if remains persistently greater than 135/85.

## 2025-05-29 NOTE — TELEPHONE ENCOUNTER
Please let patient know that creatinine improving 1.6 and stable.  Potassium still remains low at 3.5.    I have prescribed potassium chloride 20 meq tablets again to the pharmacy he should take 2 tablets daily for next 5 days and then continue 1 tablet daily afterwards.  Would like him to have repeat potassium and magnesium level in 1 week.  I have placed orders.

## 2025-05-29 NOTE — ASSESSMENT & PLAN NOTE
Lab Results   Component Value Date    EGFR 40 05/29/2025    EGFR 37 05/20/2025    EGFR 28 05/05/2025    CREATININE 1.67 (H) 05/29/2025    CREATININE 1.80 (H) 05/20/2025    CREATININE 2.28 (H) 05/05/2025   Progressive CKD stage III, baseline creatinine around 1.8 since February 2025, 1.4-1.6 since October 2024, prior 1.1-1.2  - Patient had episode of JULIO with creatinine 2.4 in October 2024 and since then earlier plateaued around 1.4-1.6 and more recently stays around 1.8.    -Recently JULIO episode with creatinine 2.2 in early May 2025 and now improved to most recent 1.8 on 5/20/2025.  -Progressive CKD suspect multifactorial in the setting of obstructive nephropathy, uncontrolled diabetes, prior use of NSAIDs, also remains on Keytruda and recently added lenvatinib (VEGFi) about a month ago  -UA in March 2025 shows 1+ proteinuria, 10-20 RBCs, innumerable WBCs.   -Renal ultrasound in May 2025 shows normal-sized kidneys, normal echogenicity, no hydro or stone on the right side, mild pelvicaliectasis similar to prior study on the left side without definitive hydronephrosis.  -Continue to closely monitor renal function

## 2025-05-30 NOTE — TELEPHONE ENCOUNTER
----- Message from Scot Ellison MD sent at 5/29/2025  4:46 PM EDT -----      ----- Message -----  From: Lab, Background User  Sent: 5/29/2025   3:41 PM EDT  To: Scot Ellison MD

## 2025-05-30 NOTE — TELEPHONE ENCOUNTER
Spoke with pt regarding the following message per Dr. Ellison     Please let patient know that creatinine improving 1.6 and stable.  Potassium still remains low at 3.5.    I have prescribed potassium chloride 20 meq tablets again to the pharmacy he should take 2 tablets daily for next 5 days and then continue 1 tablet daily afterwards.  Would like him to have repeat potassium and magnesium level in 1 week.  I have placed orders.     Pt verbalized understanding. Pt asking if he should also have UA as well. Please advise.

## 2025-05-30 NOTE — TELEPHONE ENCOUNTER
No need for urinalysis unless he has any concerning symptoms for UTI including dysuria, foul smell in the urine, fever, chills, flank pain.  If he has any of the symptoms concerning for UTI, please let me know in which case we can check UA with microscopy with reflex to culture.

## 2025-06-02 NOTE — TELEPHONE ENCOUNTER
LVM for pt regarding the following message per Dr. Ellison     No need for urinalysis unless he has any concerning symptoms for UTI including dysuria, foul smell in the urine, fever, chills, flank pain.  If he has any of the symptoms concerning for UTI, please let me know in which case we can check UA with microscopy with reflex to culture.     Asked pt to please give us a call back if he is having any UTI symptoms.

## 2025-06-02 NOTE — TELEPHONE ENCOUNTER
----- Message from Scot Ellison MD sent at 5/30/2025  4:44 PM EDT -----      ----- Message -----  From: Karime Marsh MA  Sent: 5/30/2025   3:15 PM EDT  To: Scot Ellison MD    ----- Message from Karime Marsh MA sent at 5/30/2025  3:15 PM EDT -----      ----- Message -----  From: Scot Ellison MD  Sent: 5/29/2025   4:46 PM EDT  To: Nephrology [place] [facility]    ----- Message from Scot Ellison MD sent at 5/29/2025  4:46 PM EDT -----      ----- Message -----  From: Lab, Background User  Sent: 5/29/2025   3:41 PM EDT  To: Scot Ellison MD

## 2025-06-11 ENCOUNTER — OFFICE VISIT (OUTPATIENT)
Dept: FAMILY MEDICINE CLINIC | Facility: CLINIC | Age: 70
End: 2025-06-11

## 2025-06-11 VITALS — WEIGHT: 160.4 LBS | BODY MASS INDEX: 23.02 KG/M2 | DIASTOLIC BLOOD PRESSURE: 72 MMHG | SYSTOLIC BLOOD PRESSURE: 118 MMHG

## 2025-06-11 DIAGNOSIS — Z79.4 TYPE 2 DIABETES MELLITUS WITH STAGE 3A CHRONIC KIDNEY DISEASE, WITH LONG-TERM CURRENT USE OF INSULIN (HCC): Primary | ICD-10-CM

## 2025-06-11 DIAGNOSIS — E11.22 TYPE 2 DIABETES MELLITUS WITH STAGE 3A CHRONIC KIDNEY DISEASE, WITH LONG-TERM CURRENT USE OF INSULIN (HCC): Primary | ICD-10-CM

## 2025-06-11 DIAGNOSIS — N18.31 TYPE 2 DIABETES MELLITUS WITH STAGE 3A CHRONIC KIDNEY DISEASE, WITH LONG-TERM CURRENT USE OF INSULIN (HCC): Primary | ICD-10-CM

## 2025-06-11 PROCEDURE — PBNCHG PB NO CHARGE PLACEHOLDER: Performed by: PHARMACIST

## 2025-06-11 NOTE — PROGRESS NOTES
[facility]  Mariajose Brown    Administrative Statements   Encounter provider Mariajose Brown      Assessment/ Plan     Assessment & Plan  Type 2 diabetes mellitus with stage 3a chronic kidney disease, with long-term current use of insulin ([facility])    Lab Results   Component Value Date    HGBA1C 8.7 (H) 05/05/2025     Most recent A1c above goal   CGM shows below goal TIR; hypoglycemia not present  Insulin absorption may be fluctuating as patient is administering in similar site     Medications:  Lantus: increase to 18 units once daily         Humalog   Blood Sugar Reading Amount   < 200 8 units   201 - 250 9 units   251 - 300 10 units   301 - 350 11 units   351 - 400 12 units   401 - 450 13 units   >=451 14 units     Home Monitoring: continue with Ольга 3           Follow-up: 2 weeks via [employer] Message; 4-6 weeks in person    Subjective   HPI    Medication Adherence/ Tolerability/ Cost:    Humalog - following sliding scale   Took 8 units prior to appointment today  Lantus - 15 units every day, takes in the evening    Patient admins insulin in similar site, may only rotate by 1 inch. Does not feel hard spot under insulin injection site    Keytruda Infusion: last 5/8/2025, next on 6/19/2025     Review of Systems   Constitutional:  Positive for fatigue.        Food has lost flavor    Reports signs/symptoms are related to chemo   Cardiovascular:  Negative for leg swelling.   Psychiatric/Behavioral:  Negative for sleep disturbance.         2. Lifestyle:   2 meals per day - denies changes in appetite since last appointment   Adverse drug reaction signs/symptoms or apppetite did not change on the days when blood sugar readings were higher vs lower  [employer]  on way to appointment       3. Home monitoring devices  Continuous Glucose Monitor: Yes, Brand: ольга 3+    Hypoglycemia: The patient had 0 episodes/symptoms of hypoglycemia.     Objective       Blood Sugar  Readings  CGM Report scanned into chart          ASCVD Risk:  The 10-year ASCVD risk score (Adelaida MENDOZA, et al., 2019) is: 45.8%    Values used to calculate the score:      Age: 69 years      Clincally relevant sex: Male      Is Non- : No      Diabetic: Yes      Tobacco smoker: No      Systolic Blood Pressure: 162 mmHg      Is BP treated: Yes      HDL Cholesterol: 48 mg/dL      Total Cholesterol: 154 mg/dL     Vitals:  There were no vitals filed for this visit.    Eye Exam:    Lab Results   Component Value Date    LEFTDIABRET None 02/03/2025    RIGHTDIABRET None 02/03/2025       Labs:  Lab Results   Component Value Date    SODIUM 133 (L) 05/29/2025    K 3.1 (L) 05/29/2025    EGFR 40 05/29/2025    CREATININE 1.67 (H) 05/29/2025    GLUF 200 (H) 05/05/2025    QSVPYZOG46 606 10/19/2024    MICROALBCRE 416 (H) 05/20/2025       Lab Results   Component Value Date    HGBA1C 8.7 (H) 05/05/2025    HGBA1C 9.0 (H) 04/11/2025    HGBA1C 8.3 (A) 12/24/2024         Pharmacist Tracking Tool     Pharmacist Tracking Tool  Reason For Outreach: Embedded Pharmacist  Demographics:  Intervention Method: In Person  Type of Intervention: Follow-Up  Topics Addressed: Diabetes  Pharmacologic Interventions: Dose or Frequency Adjusted  Non-Pharmacologic Interventions: Care coordination, Disease state education, and Personal CGM  Time:  Direct Patient Care: 20 mins  Care Coordination: 5 mins  Recommendation Recipient: Patient/Caregiver  Outcome: Accepted

## 2025-06-12 NOTE — ASSESSMENT & PLAN NOTE
Lab Results   Component Value Date    HGBA1C 8.7 (H) 05/05/2025     Most recent A1c above goal   CGM shows below goal TIR; hypoglycemia not present  Insulin absorption may be fluctuating as patient is administering in similar site     Medications:  Lantus: increase to 18 units once daily         Humalog   Blood Sugar Reading Amount   < 200 8 units   201 - 250 9 units   251 - 300 10 units   301 - 350 11 units   351 - 400 12 units   401 - 450 13 units   >=451 14 units     Home Monitoring: continue with Ольга 3

## 2025-06-18 ENCOUNTER — APPOINTMENT (OUTPATIENT)
Dept: LAB | Facility: HOSPITAL | Age: 70
End: 2025-06-18
Payer: MEDICARE

## 2025-06-18 DIAGNOSIS — C79.51 METASTATIC RENAL CELL CARCINOMA TO BONE (HCC): ICD-10-CM

## 2025-06-18 DIAGNOSIS — E87.6 HYPOKALEMIA: ICD-10-CM

## 2025-06-18 DIAGNOSIS — C64.9 METASTATIC RENAL CELL CARCINOMA TO BONE (HCC): ICD-10-CM

## 2025-06-18 DIAGNOSIS — Z79.899 HIGH RISK MEDICATION USE: ICD-10-CM

## 2025-06-18 DIAGNOSIS — C64.2 RENAL CELL CARCINOMA OF LEFT KIDNEY (HCC): ICD-10-CM

## 2025-06-18 LAB
ALBUMIN SERPL BCG-MCNC: 3.1 G/DL (ref 3.5–5)
ALP SERPL-CCNC: 130 U/L (ref 34–104)
ALT SERPL W P-5'-P-CCNC: 13 U/L (ref 7–52)
ANION GAP SERPL CALCULATED.3IONS-SCNC: 9 MMOL/L (ref 4–13)
AST SERPL W P-5'-P-CCNC: 14 U/L (ref 13–39)
BASOPHILS # BLD AUTO: 0.06 THOUSANDS/ÂΜL (ref 0–0.1)
BASOPHILS NFR BLD AUTO: 1 % (ref 0–1)
BILIRUB SERPL-MCNC: 0.31 MG/DL (ref 0.2–1)
BUN SERPL-MCNC: 32 MG/DL (ref 5–25)
CALCIUM ALBUM COR SERPL-MCNC: 9.5 MG/DL (ref 8.3–10.1)
CALCIUM SERPL-MCNC: 8.8 MG/DL (ref 8.4–10.2)
CHLORIDE SERPL-SCNC: 105 MMOL/L (ref 96–108)
CO2 SERPL-SCNC: 20 MMOL/L (ref 21–32)
CREAT SERPL-MCNC: 1.83 MG/DL (ref 0.6–1.3)
EOSINOPHIL # BLD AUTO: 0.11 THOUSAND/ÂΜL (ref 0–0.61)
EOSINOPHIL NFR BLD AUTO: 2 % (ref 0–6)
ERYTHROCYTE [DISTWIDTH] IN BLOOD BY AUTOMATED COUNT: 17.1 % (ref 11.6–15.1)
GFR SERPL CREATININE-BSD FRML MDRD: 36 ML/MIN/1.73SQ M
GLUCOSE SERPL-MCNC: 205 MG/DL (ref 65–140)
HCT VFR BLD AUTO: 33 % (ref 36.5–49.3)
HGB BLD-MCNC: 10.5 G/DL (ref 12–17)
IMM GRANULOCYTES # BLD AUTO: 0.01 THOUSAND/UL (ref 0–0.2)
IMM GRANULOCYTES NFR BLD AUTO: 0 % (ref 0–2)
LYMPHOCYTES # BLD AUTO: 1.34 THOUSANDS/ÂΜL (ref 0.6–4.47)
LYMPHOCYTES NFR BLD AUTO: 21 % (ref 14–44)
MCH RBC QN AUTO: 26 PG (ref 26.8–34.3)
MCHC RBC AUTO-ENTMCNC: 31.8 G/DL (ref 31.4–37.4)
MCV RBC AUTO: 82 FL (ref 82–98)
MONOCYTES # BLD AUTO: 0.53 THOUSAND/ÂΜL (ref 0.17–1.22)
MONOCYTES NFR BLD AUTO: 8 % (ref 4–12)
NEUTROPHILS # BLD AUTO: 4.41 THOUSANDS/ÂΜL (ref 1.85–7.62)
NEUTS SEG NFR BLD AUTO: 68 % (ref 43–75)
NRBC BLD AUTO-RTO: 0 /100 WBCS
PLATELET # BLD AUTO: 393 THOUSANDS/UL (ref 149–390)
PMV BLD AUTO: 9 FL (ref 8.9–12.7)
POTASSIUM SERPL-SCNC: 3.7 MMOL/L (ref 3.5–5.3)
PROT SERPL-MCNC: 8.2 G/DL (ref 6.4–8.4)
RBC # BLD AUTO: 4.04 MILLION/UL (ref 3.88–5.62)
SODIUM SERPL-SCNC: 134 MMOL/L (ref 135–147)
TSH SERPL DL<=0.05 MIU/L-ACNC: 4.36 UIU/ML (ref 0.45–4.5)
WBC # BLD AUTO: 6.46 THOUSAND/UL (ref 4.31–10.16)

## 2025-06-18 PROCEDURE — 36415 COLL VENOUS BLD VENIPUNCTURE: CPT

## 2025-06-18 PROCEDURE — 85025 COMPLETE CBC W/AUTO DIFF WBC: CPT

## 2025-06-18 PROCEDURE — 84443 ASSAY THYROID STIM HORMONE: CPT

## 2025-06-18 PROCEDURE — 80053 COMPREHEN METABOLIC PANEL: CPT

## 2025-06-19 ENCOUNTER — TELEPHONE (OUTPATIENT)
Dept: HEMATOLOGY ONCOLOGY | Facility: CLINIC | Age: 70
End: 2025-06-19

## 2025-06-19 ENCOUNTER — HOSPITAL ENCOUNTER (OUTPATIENT)
Dept: INFUSION CENTER | Facility: CLINIC | Age: 70
Discharge: HOME | End: 2025-06-19
Attending: INTERNAL MEDICINE
Payer: MEDICARE

## 2025-06-19 ENCOUNTER — PATIENT OUTREACH (OUTPATIENT)
Dept: CASE MANAGEMENT | Facility: OTHER | Age: 70
End: 2025-06-19

## 2025-06-19 VITALS
HEART RATE: 94 BPM | DIASTOLIC BLOOD PRESSURE: 91 MMHG | BODY MASS INDEX: 23.09 KG/M2 | TEMPERATURE: 97 F | WEIGHT: 160.94 LBS | SYSTOLIC BLOOD PRESSURE: 147 MMHG | RESPIRATION RATE: 18 BRPM

## 2025-06-19 DIAGNOSIS — C64.9 METASTATIC RENAL CELL CARCINOMA TO BONE (HCC): ICD-10-CM

## 2025-06-19 DIAGNOSIS — Z79.899 HIGH RISK MEDICATION USE: ICD-10-CM

## 2025-06-19 DIAGNOSIS — R19.7 DIARRHEA, UNSPECIFIED TYPE: ICD-10-CM

## 2025-06-19 DIAGNOSIS — C79.51 METASTATIC RENAL CELL CARCINOMA TO BONE (HCC): Chronic | ICD-10-CM

## 2025-06-19 DIAGNOSIS — N18.32 STAGE 3B CHRONIC KIDNEY DISEASE (HCC): Primary | ICD-10-CM

## 2025-06-19 DIAGNOSIS — C64.9 METASTATIC RENAL CELL CARCINOMA TO BONE (HCC): Chronic | ICD-10-CM

## 2025-06-19 DIAGNOSIS — C79.51 METASTATIC RENAL CELL CARCINOMA TO BONE (HCC): ICD-10-CM

## 2025-06-19 DIAGNOSIS — Z29.89 IMMUNOTHERAPY ENCOUNTER: Primary | ICD-10-CM

## 2025-06-19 DIAGNOSIS — C64.9 METASTATIC RENAL CELL CARCINOMA TO BONE (HCC): Primary | ICD-10-CM

## 2025-06-19 DIAGNOSIS — C64.2 RENAL CELL CARCINOMA OF LEFT KIDNEY (HCC): ICD-10-CM

## 2025-06-19 DIAGNOSIS — C79.51 METASTATIC RENAL CELL CARCINOMA TO BONE (HCC): Primary | ICD-10-CM

## 2025-06-19 DIAGNOSIS — N18.32 STAGE 3B CHRONIC KIDNEY DISEASE (HCC): ICD-10-CM

## 2025-06-19 DIAGNOSIS — Z29.89 IMMUNOTHERAPY ENCOUNTER: ICD-10-CM

## 2025-06-19 PROCEDURE — 96413 CHEMO IV INFUSION 1 HR: CPT

## 2025-06-19 RX ORDER — SODIUM CHLORIDE 9 MG/ML
20 INJECTION, SOLUTION INTRAVENOUS ONCE
Status: COMPLETED | OUTPATIENT
Start: 2025-06-19 | End: 2025-06-19

## 2025-06-19 RX ADMIN — SODIUM CHLORIDE 400 MG: 9 INJECTION, SOLUTION INTRAVENOUS at 13:00

## 2025-06-19 RX ADMIN — SODIUM CHLORIDE 20 ML/HR: 0.9 INJECTION, SOLUTION INTRAVENOUS at 12:29

## 2025-06-19 NOTE — TELEPHONE ENCOUNTER
Received secure chat message from Mary MARQUES RN at [facility] in regard to diarrhea reported by patient.    Per Dr. Castellon, hold lenvima until diarrhea resolves, do C diff testing asap, imodium only once c diff testing results negative  continue ketyruda     Orders placed.

## 2025-06-19 NOTE — PROGRESS NOTES
"Pt arrived to unit without complaint. Upon questioning pt admits to having liquid diarrhea 3-4 times/day 5-6 days/week. Pt denies associated abdominal pain, nausea or vomiting. Pt has not reported to Dr Castellon because he thought it \"was expected\". Pt has not started any antidiarrheals because he thought he shouldn't. Pt was planning on discussing with Dr GRAHAM at next office visit in July. Pt states he has been eating and drinking normally. Pt feels this diarrhea started after he started taking Lenvatinib approx 6-7 weeks ago. All symptoms reported to Dr Castellon. OK given to proceed with Keytruda today. Per Dr Castellon these are recommendations,\"hold lenvima until diarrhea resolves, do C diff testing asap, imodium only once c diff testing results negative\". Pt instructed in same and pt verbalized understanding.   "

## 2025-06-19 NOTE — PATIENT INSTRUCTIONS
"These are recommendations of Dr Castellon today  due to complaint of frequent diarrhea:    \"hold lenvima until diarrhea resolves, do C diff testing asap, imodium only once c diff testing results negative\"     You can go to any outpatient lab to do C diff testing.   "

## 2025-06-20 ENCOUNTER — APPOINTMENT (OUTPATIENT)
Dept: LAB | Facility: HOSPITAL | Age: 70
End: 2025-06-20
Payer: MEDICARE

## 2025-06-20 DIAGNOSIS — C79.51 METASTATIC RENAL CELL CARCINOMA TO BONE (HCC): ICD-10-CM

## 2025-06-20 DIAGNOSIS — R19.7 DIARRHEA, UNSPECIFIED TYPE: ICD-10-CM

## 2025-06-20 DIAGNOSIS — C64.9 METASTATIC RENAL CELL CARCINOMA TO BONE (HCC): ICD-10-CM

## 2025-06-20 DIAGNOSIS — C64.2 RENAL CELL CARCINOMA OF LEFT KIDNEY (HCC): ICD-10-CM

## 2025-06-20 PROCEDURE — 87493 C DIFF AMPLIFIED PROBE: CPT

## 2025-06-21 LAB — C DIFF TOX GENS STL QL NAA+PROBE: NEGATIVE

## 2025-06-22 DIAGNOSIS — K52.1 CHEMOTHERAPY INDUCED DIARRHEA: Primary | ICD-10-CM

## 2025-06-22 DIAGNOSIS — T45.1X5A CHEMOTHERAPY INDUCED DIARRHEA: Primary | ICD-10-CM

## 2025-06-22 RX ORDER — LOPERAMIDE HYDROCHLORIDE 2 MG/1
2 CAPSULE ORAL 4 TIMES DAILY PRN
Qty: 30 CAPSULE | Refills: 0 | Status: SHIPPED | OUTPATIENT
Start: 2025-06-22

## 2025-06-24 ENCOUNTER — TELEPHONE (OUTPATIENT)
Dept: FAMILY MEDICINE CLINIC | Facility: CLINIC | Age: 70
End: 2025-06-24

## 2025-06-24 DIAGNOSIS — I12.9 BENIGN HYPERTENSION WITH CKD (CHRONIC KIDNEY DISEASE) STAGE III (HCC): ICD-10-CM

## 2025-06-24 DIAGNOSIS — N18.30 BENIGN HYPERTENSION WITH CKD (CHRONIC KIDNEY DISEASE) STAGE III (HCC): ICD-10-CM

## 2025-06-24 DIAGNOSIS — E87.6 HYPOKALEMIA: ICD-10-CM

## 2025-06-24 RX ORDER — POTASSIUM CHLORIDE 1500 MG/1
TABLET, EXTENDED RELEASE ORAL
Qty: 35 TABLET | Refills: 5 | Status: SHIPPED | OUTPATIENT
Start: 2025-06-24

## 2025-06-24 RX ORDER — AMLODIPINE BESYLATE 10 MG/1
10 TABLET ORAL DAILY
Qty: 30 TABLET | Refills: 5 | Status: SHIPPED | OUTPATIENT
Start: 2025-06-24

## 2025-06-26 ENCOUNTER — PATIENT OUTREACH (OUTPATIENT)
Dept: CASE MANAGEMENT | Facility: OTHER | Age: 70
End: 2025-06-26

## 2025-06-29 DIAGNOSIS — N40.1 BENIGN PROSTATIC HYPERPLASIA WITH NOCTURIA: ICD-10-CM

## 2025-06-29 DIAGNOSIS — R35.1 BENIGN PROSTATIC HYPERPLASIA WITH NOCTURIA: ICD-10-CM

## 2025-06-30 RX ORDER — TAMSULOSIN HYDROCHLORIDE 0.4 MG/1
0.8 CAPSULE ORAL
Qty: 180 CAPSULE | Refills: 0 | Status: SHIPPED | OUTPATIENT
Start: 2025-06-30

## 2025-06-30 NOTE — PROGRESS NOTES
Name: Alejandro Adames      : 1955      MRN: [id number]  Encounter Provider: Isaak Castellon MD  Encounter Date: 2025   Encounter department: [facility]  :  Assessment & Plan  Metastatic renal cell carcinoma to bone (HCC)    This is a 69 y.o. c PMHx notable for DM, HTN, prostate cancer under good control, being seen in f/u for metastatic RCC while on systemic IO therapy         Assessment & Plan  1. Cancer.  The patient has been experiencing significant side effects from Lenvima, including diarrhea, night sweats, abdominal pain, back pain, loss of appetite, altered taste, and fatigue. He has not taken Lenvima since 2025. Liver function tests are within normal limits. However, creatinine levels have increased, suggesting possible dehydration. Sodium levels are slightly below the normal range at 129, while potassium levels have improved to 4.6. Blood glucose levels are elevated at 397. Hemoglobin has increased to 10.2, with white blood cell counts remaining normal. Platelet count is elevated at 721,000, likely due to inflammation from the cancer.     A reduction in Lenvima dosage to 14 mg is recommended, with a plan to reassess weekly. Over-the-counter zinc gluconate 50 mg is suggested to manage taste alterations. Increased hydration is advised to address the elevated creatinine levels. A message will be sent to his doctor regarding the low sodium levels. The patient will have a scan in a month to monitor the cancer's response to treatment. If the patient continues to have more good days than bad days, the plan is to start the reduced dose of Lenvima. If the patient does not tolerate the reduced dose, alternative treatments will be considered. Risks of continuing Lenvima include persistent side effects and potential worsening of symptoms. Benefits include potential stabilization or reduction of cancer progression. Alternatives include other medications or  clinical trials.    2. Blood pressure management.  Blood pressure is currently well-controlled at 116/64. It is recommended to reduce the amlodipine dosage from 10 mg to 5 mg, with a plan to increase back to 10 mg if blood pressure rises. This adjustment is suggested due to the anticipated lower dose of Lenvima, which may not require as high a dose of blood pressure medication.    3. Blood glucose management.  Blood glucose levels are elevated at 397. The patient has an appointment next week to address this issue. Continued monitoring and potential adjustments to diabetes management will be necessary.  Nickname: Charli    Lives with his brother, Gordy Ellis ECO     Today's ECO-3    Goals and Barriers:  Current Goal: Minimize effects of disease burden, extend life.   Barriers to accomplishing this: malaise    Patient's Capacity to Self Care:  none      This is a 69 y.o.  c PMHx notable for DM, HTN, prostate cancer under good control, being seen in f/u for metastatic RCC while on systemic IO therapy    He has normocytic anemia from his metastatic malignancy, no evidence of iron deficiency.    Discussion of decision making  Oncology history updated, accordingly, during this visit  Goals of care/patient communication  I discussed with the patient the clinical course leading up to their cancer diagnosis. I reviewed relevant office notes, imaging reports and pathology result as well.  I told the patient that this is a case of incurable disease and what this means. We discussed that the goal of anti-cancer therapy is to provide best quality of life, extend overall survival, and progression free survival as shown in clinical trials. We also discussed that there might be a point when the cancer will no longer respond to this anti-neoplastic therapy. As a result, we also discussed the role of the palliative care team being introduced early in the treatment course. We will be making this referral  I explained the  risks/benefits of the proposed cancer therapy: Lenvima + Keytruda and after discussion including understanding risks of possible life-threatening complications and therapy-related malignancy development, informed consent for blood products and treatment has been signed and obtained.  TNM/Staging At Diagnosis  Cancer Staging   Malignant neoplasm of prostate (HCC)  Staging form: Prostate, AJCC 8th Edition  - Clinical: Stage IIB (cT1c, cN0, cM0, PSA: 4.9, Grade Group: 2) - Signed by Isaak Castellon MD on 10/24/2024  Prostate specific antigen (PSA) range: Less than 10  Kayley score: 7  Histologic grading system: 5 grade system    Renal cell carcinoma of left kidney (HCC)  Staging form: Kidney, AJCC 8th Edition  - Clinical: Stage IV (cTX, cN1, cM1) - Signed by Isaak Castellon MD on 10/24/2024    Disease Features/Tumor Markers/Genetics  Tumor Marker: PSA  1/3/2019: 5.6  4/8/2019: 4.9  10/17/2022: 1.3  10/19/2024: 0.355  Notable Path Features:   10/19/2024 inguinal LN bx: retroperitoneal bx is positive this is most compatible with renal primary   Guardant NGS: 11/4/2024: TMB 12 muts/Mb, VHL mutation (can use Belzutifan), MSI stable  CARIS NGS: VHL mutation    1/17/2025: Retroperitoneal mass:  Scant atypical epithelial cells present highly suspicious for metastatic renal carcinoma  Treatment: Lenvima + Keytruda  Other Supportive care:  Treatment Team Members  Surgeon  Rad Onc  Pall[facility]  [facility] Dr[facility]  Labs  10/24/2024: creat 1.75  11/12/2024: creat 1.42  Diagnostics  10/18/2024 CT Chest w/o c: Nothing to suggest malignancy in the chest. The 3 mm left lower lobe nodule is of doubtful significance.  Abd/pelv w/c: Moderate left hydronephrosis with peripelvic/proximal periureteral inflammatory stranding secondary to obstructing large left retroperitoneal toribio conglomerate. Conglomerate of left retroperitoneal nodes measuring approximately 6.5 x 5 x 13 cm (2:106)   Enlarged left pelvic and other retroperitoneal nodes  as described  3 mm sclerotic lesions in the right iliac body and right femoral head  10/26/2024 MRI Abd w/wo c: No renal cortical mass is identified bilaterally. Mild urothelial thickening and enhancement within the left renal pelvis without measurable lesion.   11/5/2024  Tumor board discussion. Recommend urology follow up ASAP with Dr. Castro. Treat systemically  11/7/2024: NM Bone scan: No focal tracer activity characteristic of osseous metastatic disease.  12/3  tumor board: Consider biopsy of retroperitoneal mass for assist of pathology differentiation  1/24/2025 CT CAP w/c: stable  The conglomerate toribio mass which invades the left psoas muscle is also smaller, currently 4.8 x 4.2 cm, previously 5.1 x 4.9 cm   An index left external iliac node measures 2.8 x 1.6 cm, previously 2.8 x 1.9 cm (series 3 image 212.)  Another index left external iliac node currently measures 2.2 x 2.1 cm, previously 2.5 x 2.4 cm  Interval development of multiple irregular shaped opacities in the lungs with upper lobe predominance (Their appearance is not typical for metastatic disease, rather this is probably an infectious process). For example, there is a right upper lobe apical 2.2 x 1.5 cm opacity (series 6 image 27.) There is also a 3.3 x 1.8 cm medial left upper lobe   lesion that invades into the anterior mediastinum  2/4/2025:  tumor board. Consider referral to radiation oncology  4/21/2025 CT CAP w/c:  POD  lesion has likely slightly increased in size, measuring approximately 9.5 x 5.2 cm, previously   8.7 x 5.1 cm (series 2, image 191).   Left external iliac lymph node measures 3.3 x 2.6 cm, previously 2.5 x 2.1 cm (series 2, image 209).   Left pelvic sidewall lymph node measures 3.0 x 1.9 cm, previously 2.5 x 1.7 cm (series 2, image 217).  Left para-aortic lymph node at the level of the left renal vein measures 3.9 x 1.9 cm, previously 2.0 x 0.9 cm (series 2, image 137).  Left para-aortic lymph node at the L3 level  measures 2.9 x 2.0 cm, previously 1.8 x 1.2 cm (series 2, image 154).    Discussion of decision making    I personally reviewed the following lab results, the image studies, pathology, other specialty/physicians consult notes and recommendations, and outside medical records. I had a lengthy discussion with the patient and shared the work-up findings. We discussed the diagnosis and management plan as below. I spent 42 minutes reviewing the records (labs, clinician notes, outside records, medical history, ordering medicine/tests/procedures, monitoring of anti-neoplastic toxicities, interpreting the imaging/labs previously done) and coordination of care as well as direct time with the patient today, of which greater than 50% of the time was spent in counseling and coordination of care with the patient/family.      Plan/Labs  F/u Urologist at [facility] for prostate cancer monitoring   F/u Palliative care   Oncology genetics counselor referral made previously  Coordination with PCP to see if he can come off of Mounjaro  Restaging CT CAP w/c scheduled 7/30/2025   Cont Lenvima: dose reducing to 14 mg + C7 Keytruda 400 mg q6 weeks which can be used for both clear cell and non clear cell indication as the histology is not clear to which subtype we are dealing with    Follow Up: q4 weeks scheduled thru 8/6/2025    All questions were answered to the patient's satisfaction during this encounter. The patient knows the contact information for our office and knows to reach out for any relevant concerns related to this encounter. They are to call for any temperature 100.4 or higher, new symptoms including but not restricted to shaking chills, decreased appetite, nausea, vomiting, diarrhea, increased fatigue, shortness of breath or chest pain, confusion, and not feeling the strength to come to the clinic. For all other listed problems and medical diagnosis in their chart - they are managed by PCP and/or other specialists, which the  "patient acknowledges. Thank you very much for your consultation and making us a part of this patient's care. We are continuing to follow closely with you. Please do not hesitate to reach out to me with any additional questions or concerns.    Isaak Castellon MD  Hematology & Medical Oncology Staff Physician    No follow-ups on file.    History of Present Illness   Chief Complaint   Patient presents with    Follow-up     History of Present Illness  The patient presents for evaluation of cancer, blood pressure management, and blood glucose management.    He reports feeling unwell, with today being the first day in weeks that he has felt somewhat better. He has not taken Lenvima since 06/18/2025. Diarrhea has subsided, but he experiences difficulty sleeping, occasional night sweats, and mild abdominal pain. His appetite is poor, and he often feels too tired to eat. He also reports a change in taste perception, with everything tasting overly salty, although this seems to be slowly improving. Constant fatigue is noted, with some days spent mostly sleeping. He has been taking Senokot once daily and Flomax, which he reduced from two to one tablet last night, resulting in fewer bathroom visits and improved sleep.    He has been managing his blood sugar levels and has an upcoming appointment with his doctor next week.    Concern is expressed about blood pressure, which may be too low. Blood pressure medication was recently increased due to the potential of Lenvima to raise blood pressure.    He had a telemedicine consultation with Dr. Connors for his prostate and is now under our care for monitoring.        Objective   /64 (BP Location: Left arm, Patient Position: Sitting, Cuff Size: Adult)   Pulse (!) 107   Temp (!) 96.8 °F (36 °C) (Temporal)   Resp 16   Ht 5' 10\" (1.778 m)   Wt 73.9 kg (163 lb)   SpO2 95%   BMI 23.39 kg/m²     Results  Labs   - Creatinine: 07/07/2025, Increased   - Sodium: 07/07/2025, 129   " - Potassium: 07/07/2025, 4.6   - Blood Sugar: 07/07/2025, 397   - Hemoglobin: 07/07/2025, 10.2   - White Blood Cell Count: 07/07/2025, Normal   - Platelet Count: 07/07/2025, 721,000    Cancer Stage at diagnosis: IV    Referral Physician: No ref. provider found    Primary Care Physician:  Wayne Uriarte Jr, MD                  Disclaimer: This document was prepared using [employer] technology. If a word or phrase is confusing, or does not make sense, this is likely due to recognition error which was not discovered during this clinician's review. If you believe an error has occurred, please contact me through [employer] service line for amena?cation.      ONCOLOGY HISTORY OF PRESENT ILLNESS        Oncology History   Prostate cancer (HCC)   6/18/2019 Initial Diagnosis    Malignant neoplasm of prostate (HCC)     10/24/2024 -  Cancer Staged    Staging form: Prostate, AJCC 8th Edition  - Clinical: Stage IIB (cT1c, cN0, cM0, PSA: 4.9, Grade Group: 2) - Signed by Isaak Castellon MD on 10/24/2024  Prostate specific antigen (PSA) range: Less than 10  Cambridge score: 7  Histologic grading system: 5 grade system       Metastatic renal cell carcinoma to bone (HCC)   10/24/2024 Initial Diagnosis    Renal cell carcinoma of left kidney (HCC)     10/24/2024 -  Cancer Staged    Staging form: Kidney, AJCC 8th Edition  - Clinical: Stage IV (cTX, cN1, cM1) - Signed by Isaak Castellon MD on 10/24/2024       11/14/2024 -  Chemotherapy    pembrolizumab (KEYTRUDA) IVPB, 400 mg, 6 of 9 cycles  Administration: 400 mg (11/14/2024), 400 mg (1/2/2025), 400 mg (2/13/2025), 400 mg (3/27/2025), 400 mg (5/8/2025), 400 mg (6/19/2025)       History of prostate adenocarcinoma localized intermediate-favorable risk adenocarcinoma of the prostate (Stage II - cT1c, cN0, cM0, Kayley Score: 3+4=7, PSA: 4.9)  s/p SBRT (Administered 3,700-Gy over 5-fractions - Completed on September 18th, 2019)  11/27/2024: DKA admission    1/28/2025 Negrita:  His left nephroureteral stent was recently internalized by interventional radiology in late December 2024.  I recommend returning to the operating room in the next 3 to 4 months for routine left stent exchange.    4/29/2025: starting Lenvima 20 mg daily   6/2025: Lowering Lenvima to 14 mg daily  6/20/2025: C diff negative    SUBJECTIVE  (INTERVAL HISTORY)      Clotting History None   Bleeding History None   Cancer History Prostate s/p above treatment, RCC   Family Cancer History None   H/O Blood/Plt Transfusion None   Tobacco/etoh/drug abuse 2 PPD x 20 years, quit at age 49, no etoh abuse or rec drug use           Retired Electric  (retired)     LLE swelling. Driving himself. Goes hourly to the bathroom.    See above for updates    I have reviewed the relevant past medical, surgical, social and family history. I have also reviewed allergies and medications for this patient.    Review of Systems  Baseline weight: 175 lbs    Lost 13 lbs while on Mounjaro, since 7/2024, this has since been stopped.    Denies F/C, N/V, SOB, CP, LH, HA, rash, itching, gen weakness, melena, hematuria, hematochezia, falls.        A 10-point review of system was performed, pertinent positive and negative were detailed as above. Otherwise, the 10-point review of system was negative.      Past Medical History:   Diagnosis Date    Cancer (HCC)     pt unsure of primary site poss. bone vs kidney    COVID-19 12/29/2021    Symptom onset 12/27/21      Cutaneous skin tags 01/02/2019    Diabetes mellitus (HCC)     Hypertension     Ureteral obstruction     L nephrostomy tube in place 12/26/204    internal placement otday 12/26/204    Uses walker     in rehab presently   12/26/2024       Past Surgical History:   Procedure Laterality Date    FL RETROGRADE PYELOGRAM  4/25/2025    IR BIOPSY INGUINAL LYMPH NODE  10/19/2024    IR BIOPSY RETROPERITONEUM  1/17/2025    IR INTERNAL URETERAL STENT PLACEMENT  12/26/2024    IR NEPHROSTOMY TO NEPHROURETERAL  STENT  11/19/2024    IR NEPHROSTOMY TUBE PLACEMENT  10/19/2024    VA CYSTO W/INSERT URETERAL STENT Left 4/25/2025    Procedure: EXCHANGE STENT URETERAL;  Surgeon: Rj Rees MD;  Location: [facility];  Service: Urology    VA CYSTOURETHROSCOPY W/URETERAL CATHETERIZATION Left 4/25/2025    Procedure: CYSTOSCOPY RETROGRADE PYELOGRAM WITH INSERTION STENT URETERAL;  Surgeon: Rj Rees MD;  Location: [facility];  Service: Urology       Family History   Problem Relation Name Age of Onset    No Known Problems Brother      Stroke Mother         Social History     Socioeconomic History    Marital status: Single     Spouse name: Not on file    Number of children: Not on file    Years of education: Not on file    Highest education level: Not on file   Occupational History    Occupation: Work history - travels a lot   Tobacco Use    Smoking status: Former     Current packs/day: 0.50     Average packs/day: 0.5 packs/day for 45.5 years (22.8 ttl pk-yrs)     Types: Cigarettes     Start date: 1980     Passive exposure: Past    Smokeless tobacco: Never   Vaping Use    Vaping status: Never Used   Substance and Sexual Activity    Alcohol use: Not Currently     Comment: social    Drug use: No    Sexual activity: Not Currently   Other Topics Concern    Not on file   Social History Narrative    Engages in travel abroad    Living independently alone'     Social Drivers of Health     Financial Resource Strain: Not on file   Food Insecurity: No Food Insecurity (12/24/2024)    Nursing - Inadequate Food Risk Classification     Worried About Running Out of Food in the Last Year: Never true     Ran Out of Food in the Last Year: Never true     Ran Out of Food in the Last Year: Never true   Transportation Needs: No Transportation Needs (12/24/2024)    PRAPARE - Transportation     Lack of Transportation (Medical): No     Lack of Transportation (Non-Medical): No   Physical Activity: Not on file   Stress: Not on file   Social  Connections: Not on file   Intimate Partner Violence: Unknown (11/27/2024)    Nursing IPS     Feels Physically and Emotionally Safe: Not on file     Physically Hurt by Someone: Not on file     Humiliated or Emotionally Abused by Someone: Not on file     Physically Hurt by Someone: No     Hurt or Threatened by Someone: No   Housing Stability: Low Risk  (12/24/2024)    Housing Stability Vital Sign     Unable to Pay for Housing in the Last Year: No     Number of Times Moved in the Last Year: 0     Homeless in the Last Year: No       Allergies   Allergen Reactions    Captopril Cough    Crestor [Rosuvastatin] Diarrhea    Enalapril Cough       Current Outpatient Medications   Medication Sig Dispense Refill    Alcohol Swabs (Alcohol Prep) PADS Use to prep skin prior to placing blood sugar monitor 100 each 1    amLODIPine (NORVASC) 10 mg tablet Take 1 tablet (10 mg total) by mouth daily 30 tablet 5    atorvastatin (LIPITOR) 40 mg tablet TAKE 1 TABLET BY MOUTH EVERY DAY 90 tablet 1    Continuous Glucose Sensor (FreeStyle Ольга 3 Plus Sensor) MISC Use 1 each every 15 (fifteen) days Every 15 days for blood sugar monitoring *receives through [employer]* 1 each 0    folic acid (FOLVITE) 1 mg tablet Take 1 mg by mouth in the morning.      glucose blood (OneTouch Verio) test strip Check blood sugars once daily. Please substitute with appropriate alternative as covered by patient's insurance. Dx: E11.65 100 each 3    insulin glargine (LANTUS SOLOSTAR) 100 units/mL injection pen Inject 32 Units under the skin daily 45 mL 1    insulin lispro (HumaLOG) 100 units/mL injection pen Inject up to 20 units three times daily before meals according to sliding scale for diabetes 60 mL 3    Insulin Pen Needle 32G X 4 MM MISC Use 4 times a day For insulin injections 400 each 3    loperamide (IMODIUM) 2 mg capsule Take 1 capsule (2 mg total) by mouth 4 (four) times a day as needed for diarrhea 30 capsule 0    OneTouch Delica Lancets 33G MISC  Check blood sugars once daily. Please substitute with appropriate alternative as covered by patient's insurance. Dx: E11.65 100 each 3    potassium chloride (Klor-Con M20) 20 mEq tablet Take total 2 tablets 40 meq daily for next 5 days and then continue 20 meq 1 tablet daily afterwards. 35 tablet 5    tamsulosin (FLOMAX) 0.4 mg TAKE 2 CAPSULES BY MOUTH DAILY WITH DINNER.. 180 capsule 0    Lenvatinib, 20 MG Daily Dose, (Lenvima, 20 MG Daily Dose,) 2 x 10 MG CPPK Take 20 mg by mouth daily (Patient not taking: Reported on 7/9/2025) 30 each 5    ondansetron (ZOFRAN-ODT) 8 mg disintegrating tablet Take 1 tablet (8 mg total) by mouth every 8 (eight) hours as needed for nausea or vomiting (Patient not taking: Reported on 7/9/2025) 20 tablet 0     No current facility-administered medications for this visit.       (Not in a hospital admission)      Objective:     24 Hour Vitals Assessment:     Vitals:    07/09/25 1331   BP: 116/64   Pulse: (!) 107   Resp: 16   Temp: (!) 96.8 °F (36 °C)   SpO2: 95%       PHYSICIAN EXAM:    General: Appearance: alert, cooperative, no distress.  HEENT: Normocephalic, atraumatic. No scleral icterus. conjunctivae clear. EOMI.  Chest: No tenderness to palpation. No open wound noted.  Lungs: Clear to auscultation bilaterally, Respirations unlabored.  Cardiac: Tachycardia, +S1and S2  Abdomen: Soft, non-tender, non-distended. Bowel sounds are normal.   Extremities:  No edema, cyanosis, clubbing.  Skin: Skin color, turgor are normal. No rashes.  Lymphatics: no palpable supra-cervical, axillary, or inguinal adenopathy  Neurologic: Awake, Alert, and oriented, no gross focal deficits noted b/l.       DATA REVIEW:    Pathology Result:    Final Diagnosis   Date Value Ref Range Status   01/17/2025   Final    A.  Retroperitoneal mass:  Scant atypical epithelial cells present highly suspicious for metastatic renal carcinoma.     10/26/2024   Final    A. Urine, Renal, Left, ThinPrep:  Atypical urothelial cells  (AUC) - see comment.  Benign squamous cells and mixed inflammatory cells.    Satisfactory for evaluation.    Comment:  The above diagnostic category is from the recently published book, The Sandra System for Reporting Urinary Cytology, and is in keeping with the ongoing effort for utilization of standardized diagnostic terminology in urine cytology.*    *The Sandra System for Reporting Urinary Cytology. Jennifer Blanca, Caitlyn Kerr, Grant Aguila; 2016.     Interpretation performed at [facility] [address] [facility] [place] [address]      10/19/2024   Final    A. Lymph node, inguinal, left, biopsy:  -   Metastatic carcinoma most suggestive of renal origin, see comment.     Comment: The patient's history of prostate cancer with new onset acute renal failure and retroperitoneal mass are noted. As per radiology there are no clear masses identified. The current sample is positive for metastatic carcinoma. The morphologic and immunohistochemical findings are most consistent with renal origin. However, in the absence of a clear mass lesion, further clinical and radiographic work-up is advised.    Intradepartmental consultation is in agreement (IK/CV).  Dr. Castellon is notified of the findings by Dr. Hines via [employer] Secure Chat on 10/24/24.     04/12/2019   Final    A. Prostate, right peripheral zone, needle core biopsy:  - Benign prostate glands and stroma.    B. Prostate, right central zone, needle core biopsy:  - Prostatic adenocarcinoma, Kayley grade 3 + 3 = 6/10 (Grade group 1), involving one of two cores and 5% of the involved core.  - Focal high-grade prostatic intraepithelial neoplasia (HGPIN).  - Perineural invasion is absent.    C. Prostate, left peripheral zone, needle core biopsy:  - Prostatic adenocarcinoma, [place] grade 3 + 4 = 7 (>95% pattern 3 and <5% pattern 4, Grade group 2), involving one of 4 cores and 20% of the tissue.  - Prostatic adenocarcinoma, Kayley grade 3 + 3 = 6/10  (Grade group 1), involving two of four cores and 5%, 10% of involved cores.  - Perineural invasion is absent.    D. Prostate, left central zone, needle core biopsy:  - Benign prostate glands and stroma.    Comment: Immunohistochemistry for prostate triple stain multiplex (,[id number]) was preformed ob blocks A2, B1, C1, and C2 supporting the above diagnosis.    Intradepartmental consultation is in agreement.  Interpretation performed at [facility] [address]            Image Results:   Image result are reviewed and documented in Hematology/Oncology history    US kidney and bladder  Narrative: RENAL ULTRASOUND    INDICATION: N17.9: Acute kidney failure, unspecified. History of metastatic renal cell carcinoma. Left ureteral stent in place.    COMPARISON: CT chest, abdomen, pelvis 4/21/2025.    TECHNIQUE: Ultrasound of the retroperitoneum was performed with a curvilinear transducer utilizing volumetric sweeps and still imaging techniques.    FINDINGS:    KIDNEYS:  Symmetric and normal size.  Right kidney: 10.3 x 6.7 x 5.5 cm. Volume 198.1 mL  Left kidney: 10.8 x 7.2 x 5.9 cm. Volume 241.1 mL    Right kidney  Normal echogenicity and contour.  Stable rim calcified right interpolar renal cystic lesion measuring 0.9 cm, unchanged since CT 10/19/2024.  No hydronephrosis.  No shadowing calculi.  No perinephric fluid collections.  Mild perinephric fluid.    Left kidney  Normal echogenicity and contour.  No mass is identified.  Mild pelvocaliectasis, similar to prior study. No definite hydronephrosis.  Left nephroureteral stent proximal end within the left renal pelvis.  No shadowing calculi.  No perinephric fluid collections.    URETERS:  Left nephroureteral stent within the left ureter. No hydroureter.    BLADDER:  Left nephroureteral stent distal end within the bladder.  Partially distended.  Mild bladder dome wall thickening, unchanged since CT 10/18/2024.  No focal thickening or mass  lesions.  Left ureteral jet detected. Right ureteral jet not detected.    Hypoechoic masses inferomedial to the left kidney measuring up to 4.6 x 2.5 x 3.9 cm, corresponding to the enlarged left para-aortic lymph nodes on CT 4/21/2025, unchanged.    Nonenlarged prostate measuring 2.9 x 2.2 x 3.4 cm with a volume of 11.5 mL.  Impression: Left nephroureteral stent with similar mild left pelvocaliectasis. No definite hydronephrosis.    Chronic mild bladder dome wall thickening.    Unchanged left retroperitoneal lymphadenopathy.    Workstation performed: [id number]      LABS:  Lab data are reviewed and documented in [facility] history.       Lab Results   Component Value Date    HGB 10.2 (L) 07/07/2025    HCT 33.3 (L) 07/07/2025    MCV 85 07/07/2025     (H) 07/07/2025    WBC 8.53 07/07/2025    NRBC 0 07/07/2025    BANDSPCT 6 11/27/2024     Lab Results   Component Value Date    K 4.6 07/07/2025    CL 99 07/07/2025    CO2 22 07/07/2025    BUN 39 (H) 07/07/2025    CREATININE 2.30 (H) 07/07/2025    GLUF 200 (H) 05/05/2025    CALCIUM 8.4 07/07/2025    CORRECTEDCA 9.5 07/07/2025    AST 9 (L) 07/07/2025    ALT 11 07/07/2025    ALKPHOS 185 (H) 07/07/2025    EGFR 27 07/07/2025       Lab Results   Component Value Date    IRON 36 (L) 01/13/2025    TIBC 259 01/13/2025    FERRITIN 370 01/13/2025    FERRITIN 494 (H) 10/19/2024    FERRITIN 183 10/17/2022    FERRITIN 174 12/23/2021       Lab Results   Component Value Date    CKFCZBLI13 606 10/19/2024       Recent Labs     07/07/25  1502   WBC 8.53   *         By:  Isaak Castellon MD, 7/9/2025, 1:45 PM

## 2025-07-01 ENCOUNTER — PATIENT OUTREACH (OUTPATIENT)
Dept: CASE MANAGEMENT | Facility: OTHER | Age: 70
End: 2025-07-01

## 2025-07-07 ENCOUNTER — APPOINTMENT (OUTPATIENT)
Dept: LAB | Facility: HOSPITAL | Age: 70
End: 2025-07-07
Payer: MEDICARE

## 2025-07-07 DIAGNOSIS — C64.2 RENAL CELL CARCINOMA OF LEFT KIDNEY (HCC): ICD-10-CM

## 2025-07-07 DIAGNOSIS — C79.51 METASTATIC RENAL CELL CARCINOMA TO BONE (HCC): ICD-10-CM

## 2025-07-07 DIAGNOSIS — C64.9 METASTATIC RENAL CELL CARCINOMA TO BONE (HCC): ICD-10-CM

## 2025-07-07 DIAGNOSIS — Z79.899 HIGH RISK MEDICATION USE: ICD-10-CM

## 2025-07-07 LAB
ALBUMIN SERPL BCG-MCNC: 2.6 G/DL (ref 3.5–5)
ALP SERPL-CCNC: 185 U/L (ref 34–104)
ALT SERPL W P-5'-P-CCNC: 11 U/L (ref 7–52)
ANION GAP SERPL CALCULATED.3IONS-SCNC: 8 MMOL/L (ref 4–13)
AST SERPL W P-5'-P-CCNC: 9 U/L (ref 13–39)
BASOPHILS # BLD AUTO: 0.05 THOUSANDS/ÂΜL (ref 0–0.1)
BASOPHILS NFR BLD AUTO: 1 % (ref 0–1)
BILIRUB SERPL-MCNC: 0.31 MG/DL (ref 0.2–1)
BUN SERPL-MCNC: 39 MG/DL (ref 5–25)
CALCIUM ALBUM COR SERPL-MCNC: 9.5 MG/DL (ref 8.3–10.1)
CALCIUM SERPL-MCNC: 8.4 MG/DL (ref 8.4–10.2)
CHLORIDE SERPL-SCNC: 99 MMOL/L (ref 96–108)
CO2 SERPL-SCNC: 22 MMOL/L (ref 21–32)
CREAT SERPL-MCNC: 2.3 MG/DL (ref 0.6–1.3)
EOSINOPHIL # BLD AUTO: 0.05 THOUSAND/ÂΜL (ref 0–0.61)
EOSINOPHIL NFR BLD AUTO: 1 % (ref 0–6)
ERYTHROCYTE [DISTWIDTH] IN BLOOD BY AUTOMATED COUNT: 16.2 % (ref 11.6–15.1)
GFR SERPL CREATININE-BSD FRML MDRD: 27 ML/MIN/1.73SQ M
GLUCOSE SERPL-MCNC: 397 MG/DL (ref 65–140)
HCT VFR BLD AUTO: 33.3 % (ref 36.5–49.3)
HGB BLD-MCNC: 10.2 G/DL (ref 12–17)
IMM GRANULOCYTES # BLD AUTO: 0.05 THOUSAND/UL (ref 0–0.2)
IMM GRANULOCYTES NFR BLD AUTO: 1 % (ref 0–2)
LYMPHOCYTES # BLD AUTO: 1.28 THOUSANDS/ÂΜL (ref 0.6–4.47)
LYMPHOCYTES NFR BLD AUTO: 15 % (ref 14–44)
MCH RBC QN AUTO: 25.9 PG (ref 26.8–34.3)
MCHC RBC AUTO-ENTMCNC: 30.6 G/DL (ref 31.4–37.4)
MCV RBC AUTO: 85 FL (ref 82–98)
MONOCYTES # BLD AUTO: 0.74 THOUSAND/ÂΜL (ref 0.17–1.22)
MONOCYTES NFR BLD AUTO: 9 % (ref 4–12)
NEUTROPHILS # BLD AUTO: 6.36 THOUSANDS/ÂΜL (ref 1.85–7.62)
NEUTS SEG NFR BLD AUTO: 73 % (ref 43–75)
NRBC BLD AUTO-RTO: 0 /100 WBCS
PLATELET # BLD AUTO: 721 THOUSANDS/UL (ref 149–390)
PMV BLD AUTO: 9.4 FL (ref 8.9–12.7)
POTASSIUM SERPL-SCNC: 4.6 MMOL/L (ref 3.5–5.3)
PROT SERPL-MCNC: 7.8 G/DL (ref 6.4–8.4)
RBC # BLD AUTO: 3.94 MILLION/UL (ref 3.88–5.62)
SODIUM SERPL-SCNC: 129 MMOL/L (ref 135–147)
TSH SERPL DL<=0.05 MIU/L-ACNC: 3.89 UIU/ML (ref 0.45–4.5)
WBC # BLD AUTO: 8.53 THOUSAND/UL (ref 4.31–10.16)

## 2025-07-09 ENCOUNTER — OFFICE VISIT (OUTPATIENT)
Dept: HEMATOLOGY ONCOLOGY | Facility: CLINIC | Age: 70
End: 2025-07-09
Payer: MEDICARE

## 2025-07-09 VITALS
HEART RATE: 107 BPM | DIASTOLIC BLOOD PRESSURE: 64 MMHG | HEIGHT: 70 IN | SYSTOLIC BLOOD PRESSURE: 116 MMHG | BODY MASS INDEX: 23.34 KG/M2 | WEIGHT: 163 LBS | OXYGEN SATURATION: 95 % | RESPIRATION RATE: 16 BRPM | TEMPERATURE: 96.8 F

## 2025-07-09 DIAGNOSIS — C79.51 METASTATIC RENAL CELL CARCINOMA TO BONE (HCC): Primary | ICD-10-CM

## 2025-07-09 DIAGNOSIS — C64.9 METASTATIC RENAL CELL CARCINOMA TO BONE (HCC): Primary | ICD-10-CM

## 2025-07-09 DIAGNOSIS — C79.51 METASTATIC RENAL CELL CARCINOMA TO BONE (HCC): Primary | Chronic | ICD-10-CM

## 2025-07-09 DIAGNOSIS — C64.9 METASTATIC RENAL CELL CARCINOMA TO BONE (HCC): Primary | Chronic | ICD-10-CM

## 2025-07-09 PROCEDURE — G2211 COMPLEX E/M VISIT ADD ON: HCPCS | Performed by: INTERNAL MEDICINE

## 2025-07-09 PROCEDURE — 99215 OFFICE O/P EST HI 40 MIN: CPT | Performed by: INTERNAL MEDICINE

## 2025-07-09 RX ORDER — LENVATINIB 14 MG/DAY
14 KIT ORAL DAILY
Qty: 60 EACH | Refills: 5 | Status: SHIPPED | OUTPATIENT
Start: 2025-07-09

## 2025-07-09 NOTE — Clinical Note
Alejandro Guzman's sodium is 129. Thoughts on working it up (SIADH?) or just trialing fluids? Hebrodyr

## 2025-07-15 ENCOUNTER — OFFICE VISIT (OUTPATIENT)
Dept: FAMILY MEDICINE CLINIC | Facility: CLINIC | Age: 70
End: 2025-07-15

## 2025-07-15 VITALS — BODY MASS INDEX: 23.5 KG/M2 | WEIGHT: 163.8 LBS

## 2025-07-15 DIAGNOSIS — Z79.4 TYPE 2 DIABETES MELLITUS WITH STAGE 3A CHRONIC KIDNEY DISEASE, WITH LONG-TERM CURRENT USE OF INSULIN (HCC): Primary | ICD-10-CM

## 2025-07-15 DIAGNOSIS — N18.31 TYPE 2 DIABETES MELLITUS WITH STAGE 3A CHRONIC KIDNEY DISEASE, WITH LONG-TERM CURRENT USE OF INSULIN (HCC): Primary | ICD-10-CM

## 2025-07-15 DIAGNOSIS — E11.22 TYPE 2 DIABETES MELLITUS WITH STAGE 3A CHRONIC KIDNEY DISEASE, WITH LONG-TERM CURRENT USE OF INSULIN (HCC): Primary | ICD-10-CM

## 2025-07-15 NOTE — PROGRESS NOTES
[facility]  Mariajose Brown    Administrative Statements   Encounter provider Mariajose Brown      Assessment/ Plan     Assessment & Plan  Type 2 diabetes mellitus with stage 3a chronic kidney disease, with long-term current use of insulin ([facility])    Lab Results   Component Value Date    HGBA1C 8.7 (H) 05/05/2025     Most recent A1c above goal \  CGM shows at goal TIR; hypoglycemia over the past few days. Blood sugar readings are trending downward     Medications:  Lantus: reduce to 19 units once daily  Humalog: continue sliding scale with meals    Humalog   Blood Sugar Reading Amount   < 200 8 units   201 - 250 9 units   251 - 300 10 units   301 - 350 11 units   351 - 400 12 units   401 - 450 13 units   >=451 14 units      Home Monitoring: Ольга 3+  Will have PCP obtain poct A1c during PCP visit later this month           Follow-up: 1 week via [place] Message     Subjective   HPI    Medication Adherence/ Tolerability/ Cost:    Humalog - following sliding scale    Lantus - 22 units every day, takes in the evening     Rotating insulin sites    Keytruda Infusion: last 6/19/2025, next on 7/31/2025    Lenvima reduced, potential hypoglycemia adverse drug reaction. Stopped on 6/18     Review of Systems   Constitutional:  Positive for fatigue.        Food has lost flavor; decreased appetite. Does not feel appetite/flavor change has changed since last appointment, ongoing    Fatigue has worsened    Cardiovascular:  Negative for leg swelling.   Psychiatric/Behavioral:  Positive for sleep disturbance.         Limited sleep, frequently fatigued during the day        2. Lifestyle:  [employer]  on way to appointment   Lunch: hamburger roll + low salt ham + ham salami  Dinner: spaghetti + meat sauce (large plateful)  Denies any changes since 7/11 that may have impacted glycemic control      3. Home monitoring devices  Continuous Glucose Monitor: Yes, Brand: ольга  3+    Hypoglycemia: The patient had 0 episodes/symptoms of hypoglycemia.     Objective       Blood Sugar Readings  CGM Report scanned into chart    ASCVD Risk:  The 10-year ASCVD risk score (Adelaida MENDOZA, et al., 2019) is: 28.5%    Values used to calculate the score:      Age: 69 years      Clincally relevant sex: Male      Is Non- : No      Diabetic: Yes      Tobacco smoker: No      Systolic Blood Pressure: 116 mmHg      Is BP treated: Yes      HDL Cholesterol: 48 mg/dL      Total Cholesterol: 154 mg/dL     Vitals:  There were no vitals filed for this visit.    Eye Exam:    Lab Results   Component Value Date    LEFTDIABRET None 02/03/2025    RIGHTDIABRET None 02/03/2025       Labs:  Lab Results   Component Value Date    SODIUM 129 (L) 07/07/2025    K 4.6 07/07/2025    EGFR 27 07/07/2025    CREATININE 2.30 (H) 07/07/2025    GLUF 200 (H) 05/05/2025    HGNYLIWQ15 606 10/19/2024    MICROALBCRE 416 (H) 05/20/2025       Lab Results   Component Value Date    HGBA1C 8.7 (H) 05/05/2025    HGBA1C 9.0 (H) 04/11/2025    HGBA1C 8.3 (A) 12/24/2024         Pharmacist Tracking Tool     Pharmacist Tracking Tool  Reason For Outreach: Embedded Pharmacist  Demographics:  Intervention Method: In Person  Type of Intervention: Follow-Up  Topics Addressed: Diabetes  Pharmacologic Interventions: Dose or Frequency Adjusted  Non-Pharmacologic Interventions: Personal CGM  Time:  Direct Patient Care: 20 mins  Care Coordination: 10 mins  Recommendation Recipient: Patient/Caregiver  Outcome: Accepted

## 2025-07-16 DIAGNOSIS — J98.4 INFLAMMATION OF LUNG: ICD-10-CM

## 2025-07-16 DIAGNOSIS — Z29.89 IMMUNOTHERAPY ENCOUNTER: ICD-10-CM

## 2025-07-16 DIAGNOSIS — Z79.899 HIGH RISK MEDICATION USE: ICD-10-CM

## 2025-07-16 DIAGNOSIS — C64.9 METASTATIC RENAL CELL CARCINOMA TO BONE (HCC): Primary | ICD-10-CM

## 2025-07-16 DIAGNOSIS — C79.51 METASTATIC RENAL CELL CARCINOMA TO BONE (HCC): Primary | ICD-10-CM

## 2025-07-16 RX ORDER — METHYLPREDNISOLONE 4 MG/1
TABLET ORAL
Qty: 6 TABLET | Refills: 0 | Status: SHIPPED | OUTPATIENT
Start: 2025-07-16 | End: 2025-07-21

## 2025-07-16 RX ORDER — METHYLPREDNISOLONE 4 MG/1
4 TABLET ORAL DAILY
Qty: 21 TABLET | Refills: 0 | Status: CANCELLED | OUTPATIENT
Start: 2025-07-16 | End: 2025-07-22

## 2025-07-22 DIAGNOSIS — Z29.89 IMMUNOTHERAPY ENCOUNTER: ICD-10-CM

## 2025-07-22 DIAGNOSIS — C64.2 RENAL CELL CARCINOMA OF LEFT KIDNEY (HCC): ICD-10-CM

## 2025-07-22 DIAGNOSIS — C64.9 METASTATIC RENAL CELL CARCINOMA TO BONE (HCC): Primary | ICD-10-CM

## 2025-07-22 DIAGNOSIS — C79.51 METASTATIC RENAL CELL CARCINOMA TO BONE (HCC): Primary | ICD-10-CM

## 2025-07-22 DIAGNOSIS — J98.4 INFLAMMATION OF LUNG: ICD-10-CM

## 2025-07-23 ENCOUNTER — PREP FOR PROCEDURE (OUTPATIENT)
Dept: UROLOGY | Facility: AMBULATORY SURGERY CENTER | Age: 70
End: 2025-07-23

## 2025-07-23 ENCOUNTER — TELEPHONE (OUTPATIENT)
Dept: HEMATOLOGY ONCOLOGY | Facility: CLINIC | Age: 70
End: 2025-07-23

## 2025-07-23 ENCOUNTER — PATIENT MESSAGE (OUTPATIENT)
Dept: FAMILY MEDICINE CLINIC | Facility: CLINIC | Age: 70
End: 2025-07-23

## 2025-07-23 DIAGNOSIS — Z01.812 PRE-OPERATIVE LABORATORY EXAMINATION: ICD-10-CM

## 2025-07-23 DIAGNOSIS — N18.31 TYPE 2 DIABETES MELLITUS WITH STAGE 3A CHRONIC KIDNEY DISEASE, WITH LONG-TERM CURRENT USE OF INSULIN (HCC): ICD-10-CM

## 2025-07-23 DIAGNOSIS — Z79.4 TYPE 2 DIABETES MELLITUS WITH STAGE 3A CHRONIC KIDNEY DISEASE, WITH LONG-TERM CURRENT USE OF INSULIN (HCC): ICD-10-CM

## 2025-07-23 DIAGNOSIS — J98.4 INFLAMMATION OF LUNG: ICD-10-CM

## 2025-07-23 DIAGNOSIS — N13.30 HYDRONEPHROSIS OF LEFT KIDNEY: Primary | ICD-10-CM

## 2025-07-23 DIAGNOSIS — C64.9 METASTATIC RENAL CELL CARCINOMA TO BONE (HCC): ICD-10-CM

## 2025-07-23 DIAGNOSIS — R05.3 PERSISTENT DRY COUGH: ICD-10-CM

## 2025-07-23 DIAGNOSIS — C64.2 RENAL CELL CARCINOMA OF LEFT KIDNEY (HCC): ICD-10-CM

## 2025-07-23 DIAGNOSIS — E11.22 TYPE 2 DIABETES MELLITUS WITH STAGE 3A CHRONIC KIDNEY DISEASE, WITH LONG-TERM CURRENT USE OF INSULIN (HCC): ICD-10-CM

## 2025-07-23 DIAGNOSIS — Z29.89 IMMUNOTHERAPY ENCOUNTER: Primary | ICD-10-CM

## 2025-07-23 DIAGNOSIS — R39.89 SUSPECTED UTI: ICD-10-CM

## 2025-07-23 DIAGNOSIS — C79.51 METASTATIC RENAL CELL CARCINOMA TO BONE (HCC): ICD-10-CM

## 2025-07-23 NOTE — TELEPHONE ENCOUNTER
Please schedule STAT CT chest without contrast High resolution.    Please change order of CT CAP to CT Abdomen/Pelvis WO contrast for next weeks appointment.        Per Dr. Castellon    Let's do a CT Chest without contrast High resolution STAT to evaluate for pneumonitis     Change next week to CT Abd/pelv only without contrast    Patient notified of change.

## 2025-07-24 ENCOUNTER — APPOINTMENT (EMERGENCY)
Dept: CT IMAGING | Facility: HOSPITAL | Age: 70
DRG: 871 | End: 2025-07-24
Payer: MEDICARE

## 2025-07-24 ENCOUNTER — APPOINTMENT (EMERGENCY)
Dept: NON INVASIVE DIAGNOSTICS | Facility: HOSPITAL | Age: 70
DRG: 871 | End: 2025-07-24
Payer: MEDICARE

## 2025-07-24 ENCOUNTER — HOSPITAL ENCOUNTER (INPATIENT)
Facility: HOSPITAL | Age: 70
LOS: 6 days | Discharge: HOME HEALTH | DRG: 871 | End: 2025-07-30
Attending: EMERGENCY MEDICINE | Admitting: INTERNAL MEDICINE
Payer: MEDICARE

## 2025-07-24 ENCOUNTER — TELEPHONE (OUTPATIENT)
Age: 70
End: 2025-07-24

## 2025-07-24 DIAGNOSIS — R53.83 FATIGUE: ICD-10-CM

## 2025-07-24 DIAGNOSIS — R59.0 RETROPERITONEAL LYMPHADENOPATHY: ICD-10-CM

## 2025-07-24 DIAGNOSIS — J96.01 ACUTE HYPOXIC RESPIRATORY FAILURE (HCC): ICD-10-CM

## 2025-07-24 DIAGNOSIS — J90 PLEURAL EFFUSION, BILATERAL: Primary | ICD-10-CM

## 2025-07-24 DIAGNOSIS — R09.02 HYPOXIA: ICD-10-CM

## 2025-07-24 DIAGNOSIS — R26.9 GAIT DISORDER: ICD-10-CM

## 2025-07-24 DIAGNOSIS — R05.9 COUGH: ICD-10-CM

## 2025-07-24 DIAGNOSIS — J90 BILATERAL PLEURAL EFFUSION: ICD-10-CM

## 2025-07-24 LAB
2HR DELTA HS TROPONIN: 0 NG/L
ALBUMIN SERPL BCG-MCNC: 2.4 G/DL (ref 3.5–5)
ALP SERPL-CCNC: 195 U/L (ref 34–104)
ALT SERPL W P-5'-P-CCNC: 16 U/L (ref 7–52)
ANION GAP SERPL CALCULATED.3IONS-SCNC: 9 MMOL/L (ref 4–13)
APTT PPP: 39 SECONDS (ref 23–34)
AST SERPL W P-5'-P-CCNC: 10 U/L (ref 13–39)
BACTERIA UR QL AUTO: ABNORMAL /HPF
BASOPHILS # BLD AUTO: 0.01 THOUSANDS/ÂΜL (ref 0–0.1)
BASOPHILS NFR BLD AUTO: 0 % (ref 0–1)
BILIRUB SERPL-MCNC: 0.28 MG/DL (ref 0.2–1)
BILIRUB UR QL STRIP: NEGATIVE
BNP SERPL-MCNC: 21 PG/ML (ref 0–100)
BUN SERPL-MCNC: 40 MG/DL (ref 5–25)
CALCIUM ALBUM COR SERPL-MCNC: 9.7 MG/DL (ref 8.3–10.1)
CALCIUM SERPL-MCNC: 8.4 MG/DL (ref 8.4–10.2)
CARDIAC TROPONIN I PNL SERPL HS: 6 NG/L (ref ?–50)
CARDIAC TROPONIN I PNL SERPL HS: 6 NG/L (ref ?–50)
CHLORIDE SERPL-SCNC: 100 MMOL/L (ref 96–108)
CLARITY UR: CLEAR
CO2 SERPL-SCNC: 22 MMOL/L (ref 21–32)
COLOR UR: YELLOW
CREAT SERPL-MCNC: 1.73 MG/DL (ref 0.6–1.3)
EOSINOPHIL # BLD AUTO: 0.11 THOUSAND/ÂΜL (ref 0–0.61)
EOSINOPHIL NFR BLD AUTO: 1 % (ref 0–6)
ERYTHROCYTE [DISTWIDTH] IN BLOOD BY AUTOMATED COUNT: 16.3 % (ref 11.6–15.1)
FLUAV RNA RESP QL NAA+PROBE: NEGATIVE
FLUBV RNA RESP QL NAA+PROBE: NEGATIVE
GFR SERPL CREATININE-BSD FRML MDRD: 39 ML/MIN/1.73SQ M
GLUCOSE SERPL-MCNC: 232 MG/DL (ref 65–140)
GLUCOSE SERPL-MCNC: 253 MG/DL (ref 65–140)
GLUCOSE UR STRIP-MCNC: ABNORMAL MG/DL
HCT VFR BLD AUTO: 34.2 % (ref 36.5–49.3)
HGB BLD-MCNC: 10.6 G/DL (ref 12–17)
HGB UR QL STRIP.AUTO: ABNORMAL
HYALINE CASTS #/AREA URNS LPF: ABNORMAL /LPF
IMM GRANULOCYTES # BLD AUTO: 0.09 THOUSAND/UL (ref 0–0.2)
IMM GRANULOCYTES NFR BLD AUTO: 1 % (ref 0–2)
INR PPP: 1.14 (ref 0.85–1.19)
KETONES UR STRIP-MCNC: NEGATIVE MG/DL
L PNEUMO1 AG UR QL IA.RAPID: NEGATIVE
LACTATE SERPL-SCNC: 1.4 MMOL/L (ref 0.5–2)
LEUKOCYTE ESTERASE UR QL STRIP: ABNORMAL
LYMPHOCYTES # BLD AUTO: 1.54 THOUSANDS/ÂΜL (ref 0.6–4.47)
LYMPHOCYTES NFR BLD AUTO: 12 % (ref 14–44)
MAGNESIUM SERPL-MCNC: 1.9 MG/DL (ref 1.9–2.7)
MCH RBC QN AUTO: 25.2 PG (ref 26.8–34.3)
MCHC RBC AUTO-ENTMCNC: 31 G/DL (ref 31.4–37.4)
MCV RBC AUTO: 81 FL (ref 82–98)
MONOCYTES # BLD AUTO: 1.14 THOUSAND/ÂΜL (ref 0.17–1.22)
MONOCYTES NFR BLD AUTO: 9 % (ref 4–12)
MUCOUS THREADS UR QL AUTO: ABNORMAL
NEUTROPHILS # BLD AUTO: 10.27 THOUSANDS/ÂΜL (ref 1.85–7.62)
NEUTS SEG NFR BLD AUTO: 77 % (ref 43–75)
NITRITE UR QL STRIP: NEGATIVE
NON-SQ EPI CELLS URNS QL MICRO: ABNORMAL /HPF
NRBC BLD AUTO-RTO: 0 /100 WBCS
PH UR STRIP.AUTO: 6 [PH] (ref 4.5–8)
PLATELET # BLD AUTO: 779 THOUSANDS/UL (ref 149–390)
PMV BLD AUTO: 9.2 FL (ref 8.9–12.7)
POTASSIUM SERPL-SCNC: 4.2 MMOL/L (ref 3.5–5.3)
PROCALCITONIN SERPL-MCNC: 2.54 NG/ML
PROT SERPL-MCNC: 7.2 G/DL (ref 6.4–8.4)
PROT UR STRIP-MCNC: ABNORMAL MG/DL
PROTHROMBIN TIME: 14.9 SECONDS (ref 12.3–15)
RBC # BLD AUTO: 4.2 MILLION/UL (ref 3.88–5.62)
RBC #/AREA URNS AUTO: ABNORMAL /HPF
RSV RNA RESP QL NAA+PROBE: NEGATIVE
S PNEUM AG UR QL: NEGATIVE
SARS-COV-2 RNA RESP QL NAA+PROBE: NEGATIVE
SODIUM SERPL-SCNC: 131 MMOL/L (ref 135–147)
SP GR UR STRIP.AUTO: 1.01 (ref 1–1.03)
UROBILINOGEN UR QL STRIP.AUTO: 0.2 E.U./DL
WBC # BLD AUTO: 13.16 THOUSAND/UL (ref 4.31–10.16)
WBC #/AREA URNS AUTO: ABNORMAL /HPF

## 2025-07-24 PROCEDURE — 83605 ASSAY OF LACTIC ACID: CPT | Performed by: EMERGENCY MEDICINE

## 2025-07-24 PROCEDURE — 36415 COLL VENOUS BLD VENIPUNCTURE: CPT | Performed by: EMERGENCY MEDICINE

## 2025-07-24 PROCEDURE — 84145 PROCALCITONIN (PCT): CPT | Performed by: EMERGENCY MEDICINE

## 2025-07-24 PROCEDURE — 36416 COLLJ CAPILLARY BLOOD SPEC: CPT | Performed by: EMERGENCY MEDICINE

## 2025-07-24 PROCEDURE — 83735 ASSAY OF MAGNESIUM: CPT | Performed by: EMERGENCY MEDICINE

## 2025-07-24 PROCEDURE — 93971 EXTREMITY STUDY: CPT

## 2025-07-24 PROCEDURE — 96365 THER/PROPH/DIAG IV INF INIT: CPT

## 2025-07-24 PROCEDURE — 74176 CT ABD & PELVIS W/O CONTRAST: CPT

## 2025-07-24 PROCEDURE — 87040 BLOOD CULTURE FOR BACTERIA: CPT | Performed by: EMERGENCY MEDICINE

## 2025-07-24 PROCEDURE — 83880 ASSAY OF NATRIURETIC PEPTIDE: CPT | Performed by: EMERGENCY MEDICINE

## 2025-07-24 PROCEDURE — 81001 URINALYSIS AUTO W/SCOPE: CPT

## 2025-07-24 PROCEDURE — 99223 1ST HOSP IP/OBS HIGH 75: CPT | Performed by: INTERNAL MEDICINE

## 2025-07-24 PROCEDURE — 87449 NOS EACH ORGANISM AG IA: CPT | Performed by: INTERNAL MEDICINE

## 2025-07-24 PROCEDURE — 85730 THROMBOPLASTIN TIME PARTIAL: CPT | Performed by: EMERGENCY MEDICINE

## 2025-07-24 PROCEDURE — 85610 PROTHROMBIN TIME: CPT | Performed by: EMERGENCY MEDICINE

## 2025-07-24 PROCEDURE — 85025 COMPLETE CBC W/AUTO DIFF WBC: CPT | Performed by: EMERGENCY MEDICINE

## 2025-07-24 PROCEDURE — 71250 CT THORAX DX C-: CPT

## 2025-07-24 PROCEDURE — 93005 ELECTROCARDIOGRAM TRACING: CPT

## 2025-07-24 PROCEDURE — 99285 EMERGENCY DEPT VISIT HI MDM: CPT

## 2025-07-24 PROCEDURE — 84484 ASSAY OF TROPONIN QUANT: CPT | Performed by: EMERGENCY MEDICINE

## 2025-07-24 PROCEDURE — 82948 REAGENT STRIP/BLOOD GLUCOSE: CPT

## 2025-07-24 PROCEDURE — 87637 SARSCOV2&INF A&B&RSV AMP PRB: CPT | Performed by: EMERGENCY MEDICINE

## 2025-07-24 PROCEDURE — 99285 EMERGENCY DEPT VISIT HI MDM: CPT | Performed by: EMERGENCY MEDICINE

## 2025-07-24 PROCEDURE — 96361 HYDRATE IV INFUSION ADD-ON: CPT

## 2025-07-24 PROCEDURE — 80053 COMPREHEN METABOLIC PANEL: CPT | Performed by: EMERGENCY MEDICINE

## 2025-07-24 PROCEDURE — 87086 URINE CULTURE/COLONY COUNT: CPT

## 2025-07-24 RX ORDER — INSULIN LISPRO 100 [IU]/ML
5 INJECTION, SOLUTION INTRAVENOUS; SUBCUTANEOUS
Status: DISCONTINUED | OUTPATIENT
Start: 2025-07-25 | End: 2025-07-25

## 2025-07-24 RX ORDER — INSULIN GLARGINE 100 [IU]/ML
32 INJECTION, SOLUTION SUBCUTANEOUS
Status: DISCONTINUED | OUTPATIENT
Start: 2025-07-24 | End: 2025-07-25

## 2025-07-24 RX ORDER — ONDANSETRON 2 MG/ML
4 INJECTION INTRAMUSCULAR; INTRAVENOUS EVERY 4 HOURS PRN
Status: DISCONTINUED | OUTPATIENT
Start: 2025-07-24 | End: 2025-07-30 | Stop reason: HOSPADM

## 2025-07-24 RX ORDER — FOLIC ACID 1 MG/1
1 TABLET ORAL DAILY
Status: DISCONTINUED | OUTPATIENT
Start: 2025-07-25 | End: 2025-07-30 | Stop reason: HOSPADM

## 2025-07-24 RX ORDER — ATORVASTATIN CALCIUM 40 MG/1
40 TABLET, FILM COATED ORAL
Status: DISCONTINUED | OUTPATIENT
Start: 2025-07-25 | End: 2025-07-30 | Stop reason: HOSPADM

## 2025-07-24 RX ORDER — AZITHROMYCIN 250 MG/1
500 TABLET, FILM COATED ORAL EVERY 24 HOURS
Status: DISCONTINUED | OUTPATIENT
Start: 2025-07-25 | End: 2025-07-29

## 2025-07-24 RX ORDER — ACETAMINOPHEN 325 MG/1
650 TABLET ORAL EVERY 4 HOURS PRN
Status: DISCONTINUED | OUTPATIENT
Start: 2025-07-24 | End: 2025-07-30 | Stop reason: HOSPADM

## 2025-07-24 RX ORDER — GUAIFENESIN/DEXTROMETHORPHAN 100-10MG/5
10 SYRUP ORAL ONCE
Status: COMPLETED | OUTPATIENT
Start: 2025-07-24 | End: 2025-07-24

## 2025-07-24 RX ORDER — BENZONATATE 100 MG/1
100 CAPSULE ORAL ONCE
Status: COMPLETED | OUTPATIENT
Start: 2025-07-24 | End: 2025-07-24

## 2025-07-24 RX ORDER — HYDROCODONE BITARTRATE AND HOMATROPINE METHYLBROMIDE ORAL SOLUTION 5; 1.5 MG/5ML; MG/5ML
5 LIQUID ORAL EVERY 4 HOURS PRN
Refills: 0 | Status: DISCONTINUED | OUTPATIENT
Start: 2025-07-24 | End: 2025-07-30 | Stop reason: HOSPADM

## 2025-07-24 RX ORDER — AMLODIPINE BESYLATE 10 MG/1
10 TABLET ORAL DAILY
Status: DISCONTINUED | OUTPATIENT
Start: 2025-07-25 | End: 2025-07-30 | Stop reason: HOSPADM

## 2025-07-24 RX ORDER — INSULIN LISPRO 100 [IU]/ML
1-6 INJECTION, SOLUTION INTRAVENOUS; SUBCUTANEOUS
Status: DISCONTINUED | OUTPATIENT
Start: 2025-07-24 | End: 2025-07-30 | Stop reason: HOSPADM

## 2025-07-24 RX ORDER — TAMSULOSIN HYDROCHLORIDE 0.4 MG/1
0.8 CAPSULE ORAL
Status: DISCONTINUED | OUTPATIENT
Start: 2025-07-25 | End: 2025-07-30 | Stop reason: HOSPADM

## 2025-07-24 RX ORDER — FUROSEMIDE 10 MG/ML
20 INJECTION INTRAMUSCULAR; INTRAVENOUS ONCE
Status: COMPLETED | OUTPATIENT
Start: 2025-07-24 | End: 2025-07-24

## 2025-07-24 RX ORDER — SODIUM CHLORIDE 9 MG/ML
20 INJECTION, SOLUTION INTRAVENOUS ONCE
OUTPATIENT
Start: 2025-07-31

## 2025-07-24 RX ORDER — ACETAMINOPHEN 325 MG/1
975 TABLET ORAL ONCE
Status: COMPLETED | OUTPATIENT
Start: 2025-07-24 | End: 2025-07-24

## 2025-07-24 RX ORDER — HEPARIN SODIUM 5000 [USP'U]/ML
5000 INJECTION, SOLUTION INTRAVENOUS; SUBCUTANEOUS EVERY 8 HOURS SCHEDULED
Status: DISCONTINUED | OUTPATIENT
Start: 2025-07-24 | End: 2025-07-30 | Stop reason: HOSPADM

## 2025-07-24 RX ADMIN — SODIUM CHLORIDE 1000 ML: 0.9 INJECTION, SOLUTION INTRAVENOUS at 16:09

## 2025-07-24 RX ADMIN — FUROSEMIDE 20 MG: 10 INJECTION, SOLUTION INTRAMUSCULAR; INTRAVENOUS at 19:38

## 2025-07-24 RX ADMIN — INSULIN LISPRO 3 UNITS: 100 INJECTION, SOLUTION INTRAVENOUS; SUBCUTANEOUS at 22:24

## 2025-07-24 RX ADMIN — INSULIN GLARGINE 32 UNITS: 100 INJECTION, SOLUTION SUBCUTANEOUS at 22:24

## 2025-07-24 RX ADMIN — BENZONATATE 100 MG: 100 CAPSULE ORAL at 15:42

## 2025-07-24 RX ADMIN — AZITHROMYCIN MONOHYDRATE 500 MG: 500 INJECTION, POWDER, LYOPHILIZED, FOR SOLUTION INTRAVENOUS at 19:40

## 2025-07-24 RX ADMIN — HYDROCODONE BITARTRATE AND HOMATROPINE METHYLBROMIDE 5 ML: 1.5; 5 SOLUTION ORAL at 22:24

## 2025-07-24 RX ADMIN — DEXTROSE 2000 MG: 50 INJECTION, SOLUTION INTRAVENOUS at 16:58

## 2025-07-24 RX ADMIN — ACETAMINOPHEN 975 MG: 325 TABLET ORAL at 17:59

## 2025-07-24 RX ADMIN — GUAIFENESIN SYRUP AND DEXTROMETHORPHAN 10 ML: 100; 10 SYRUP ORAL at 15:42

## 2025-07-24 RX ADMIN — SODIUM CHLORIDE 1000 ML: 0.9 INJECTION, SOLUTION INTRAVENOUS at 17:10

## 2025-07-24 RX ADMIN — HEPARIN SODIUM 5000 UNITS: 5000 INJECTION INTRAVENOUS; SUBCUTANEOUS at 22:24

## 2025-07-25 ENCOUNTER — APPOINTMENT (INPATIENT)
Dept: NON INVASIVE DIAGNOSTICS | Facility: HOSPITAL | Age: 70
DRG: 871 | End: 2025-07-25
Payer: MEDICARE

## 2025-07-25 ENCOUNTER — APPOINTMENT (INPATIENT)
Dept: RADIOLOGY | Facility: HOSPITAL | Age: 70
DRG: 871 | End: 2025-07-25
Payer: MEDICARE

## 2025-07-25 PROBLEM — R91.8 PULMONARY NODULES: Status: ACTIVE | Noted: 2024-10-18

## 2025-07-25 PROBLEM — J96.01 ACUTE HYPOXIC RESPIRATORY FAILURE (HCC): Status: ACTIVE | Noted: 2025-07-25

## 2025-07-25 LAB
ALBUMIN SERPL BCG-MCNC: 2 G/DL (ref 3.5–5)
ALP SERPL-CCNC: 157 U/L (ref 34–104)
ALT SERPL W P-5'-P-CCNC: 14 U/L (ref 7–52)
ANION GAP SERPL CALCULATED.3IONS-SCNC: 7 MMOL/L (ref 4–13)
AORTIC ROOT: 3.3 CM
AORTIC VALVE MEAN VELOCITY: 11.3 M/S
APPEARANCE FLD: CLEAR
APPEARANCE FLD: CLEAR
ASCENDING AORTA: 3.3 CM
AST SERPL W P-5'-P-CCNC: 11 U/L (ref 13–39)
ATRIAL RATE: 87 BPM
ATRIAL RATE: 95 BPM
AV AREA BY CONTINUOUS VTI: 2.5 CM2
AV AREA PEAK VELOCITY: 2.5 CM2
AV LVOT MEAN GRADIENT: 3 MMHG
AV LVOT PEAK GRADIENT: 5 MMHG
AV MEAN PRESS GRAD SYS DOP V1V2: 6 MMHG
AV ORIFICE AREA US: 2.5 CM2
AV PEAK GRADIENT: 11 MMHG
AV VELOCITY RATIO: 0.72
AV VMAX SYS DOP: 1.61 M/S
BACTERIA UR CULT: NORMAL
BILIRUB SERPL-MCNC: 0.17 MG/DL (ref 0.2–1)
BSA FOR ECHO PROCEDURE: 1.89 M2
BUN SERPL-MCNC: 37 MG/DL (ref 5–25)
CALCIUM ALBUM COR SERPL-MCNC: 9.2 MG/DL (ref 8.3–10.1)
CALCIUM SERPL-MCNC: 7.6 MG/DL (ref 8.4–10.2)
CHLORIDE SERPL-SCNC: 106 MMOL/L (ref 96–108)
CO2 SERPL-SCNC: 21 MMOL/L (ref 21–32)
COLOR FLD: NORMAL
COLOR FLD: NORMAL
CREAT SERPL-MCNC: 1.51 MG/DL (ref 0.6–1.3)
DOP CALC AO VTI: 28.94 CM
DOP CALC LVOT AREA: 3.46 CM2
DOP CALC LVOT CARDIAC INDEX: 3.35 L/MIN/M2
DOP CALC LVOT CARDIAC OUTPUT: 6.34 L/MIN
DOP CALC LVOT DIAMETER: 2.1 CM
DOP CALC LVOT PEAK VEL VTI: 20.93 CM
DOP CALC LVOT PEAK VEL: 1.15 M/S
DOP CALC LVOT STROKE INDEX: 37 ML/M2
DOP CALC LVOT STROKE VOLUME: 72.46
E WAVE DECELERATION TIME: 159 MS
E/A RATIO: 0.8
ERYTHROCYTE [DISTWIDTH] IN BLOOD BY AUTOMATED COUNT: 16.3 % (ref 11.6–15.1)
FRACTIONAL SHORTENING: 29 (ref 28–44)
GFR SERPL CREATININE-BSD FRML MDRD: 46 ML/MIN/1.73SQ M
GLUCOSE FLD-MCNC: 156 MG/DL
GLUCOSE SERPL-MCNC: 105 MG/DL (ref 65–140)
GLUCOSE SERPL-MCNC: 114 MG/DL (ref 65–140)
GLUCOSE SERPL-MCNC: 158 MG/DL (ref 65–140)
GLUCOSE SERPL-MCNC: 192 MG/DL (ref 65–140)
GLUCOSE SERPL-MCNC: 63 MG/DL (ref 65–140)
GLUCOSE SERPL-MCNC: 95 MG/DL (ref 65–140)
GLUCOSE SERPL-MCNC: 97 MG/DL (ref 65–140)
HCT VFR BLD AUTO: 31.4 % (ref 36.5–49.3)
HGB BLD-MCNC: 9.3 G/DL (ref 12–17)
HISTIOCYTES NFR FLD: 18 %
HISTIOCYTES NFR FLD: 37 %
INTERVENTRICULAR SEPTUM IN DIASTOLE (PARASTERNAL SHORT AXIS VIEW): 0.9 CM
INTERVENTRICULAR SEPTUM: 0.9 CM (ref 0.6–1.1)
LAAS-AP2: 16.8 CM2
LAAS-AP4: 12.7 CM2
LDH FLD L TO P-CCNC: 176 U/L
LDH SERPL-CCNC: 335 U/L (ref 140–271)
LEFT ATRIUM SIZE: 2.9 CM
LEFT ATRIUM VOLUME (MOD BIPLANE): 35 ML
LEFT ATRIUM VOLUME INDEX (MOD BIPLANE): 18.5 ML/M2
LEFT INTERNAL DIMENSION IN SYSTOLE: 3.2 CM (ref 2.1–4)
LEFT VENTRICULAR INTERNAL DIMENSION IN DIASTOLE: 4.5 CM (ref 3.5–6)
LEFT VENTRICULAR POSTERIOR WALL IN END DIASTOLE: 0.9 CM
LEFT VENTRICULAR STROKE VOLUME: 50 ML
LV EF US.2D.A4C+ESTIMATED: 61 %
LVSV (TEICH): 50 ML
LYMPHOCYTES NFR BLD AUTO: 56 %
LYMPHOCYTES NFR BLD AUTO: 65 %
MCH RBC QN AUTO: 25.5 PG (ref 26.8–34.3)
MCHC RBC AUTO-ENTMCNC: 29.6 G/DL (ref 31.4–37.4)
MCV RBC AUTO: 86 FL (ref 82–98)
MONO+MESO NFR FLD MANUAL: 1 %
MONOCYTES NFR BLD AUTO: 11 %
MONOCYTES NFR BLD AUTO: 4 %
MV E'TISSUE VEL-LAT: 8 CM/S
MV E'TISSUE VEL-SEP: 8 CM/S
MV PEAK A VEL: 0.74 M/S
MV PEAK E VEL: 59 CM/S
MV STENOSIS PRESSURE HALF TIME: 46 MS
MV VALVE AREA P 1/2 METHOD: 4.78
NEUTS SEG NFR BLD AUTO: 3 %
NEUTS SEG NFR BLD AUTO: 5 %
P AXIS: 74 DEGREES
P AXIS: 74 DEGREES
PH BODY FLUID: 7.7
PLATELET # BLD AUTO: 570 THOUSANDS/UL (ref 149–390)
PMV BLD AUTO: 9.5 FL (ref 8.9–12.7)
POTASSIUM SERPL-SCNC: 3.9 MMOL/L (ref 3.5–5.3)
PR INTERVAL: 144 MS
PR INTERVAL: 148 MS
PROCALCITONIN SERPL-MCNC: 2.96 NG/ML
PROT FLD-MCNC: 3.1 G/DL
PROT SERPL-MCNC: 6.2 G/DL (ref 6.4–8.4)
PROT SERPL-MCNC: 6.7 G/DL (ref 6.4–8.4)
QRS AXIS: 76 DEGREES
QRS AXIS: 78 DEGREES
QRSD INTERVAL: 78 MS
QRSD INTERVAL: 82 MS
QT INTERVAL: 354 MS
QT INTERVAL: 374 MS
QTC INTERVAL: 444 MS
QTC INTERVAL: 450 MS
RA PRESSURE ESTIMATED: 3 MMHG
RBC # BLD AUTO: 3.64 MILLION/UL (ref 3.88–5.62)
RIGHT ATRIUM AREA SYSTOLE A4C: 9.6 CM2
RIGHT VENTRICLE ID DIMENSION: 3 CM
RV PSP: 24 MMHG
SINOTUBULAR JUNCTION: 2.5 CM
SITE: NORMAL
SITE: NORMAL
SL CV LEFT ATRIUM LENGTH A2C: 4.9 CM
SL CV LV EF: 60
SL CV PED ECHO LEFT VENTRICLE DIASTOLIC VOLUME (MOD BIPLANE) 2D: 91 ML
SL CV PED ECHO LEFT VENTRICLE SYSTOLIC VOLUME (MOD BIPLANE) 2D: 41 ML
SL CV SINUS OF VALSALVA 2D: 3.3 CM
SODIUM SERPL-SCNC: 134 MMOL/L (ref 135–147)
STJ: 2.5 CM
T WAVE AXIS: 80 DEGREES
T WAVE AXIS: 86 DEGREES
TOTAL CELLS COUNTED SPEC: 100
TOTAL CELLS COUNTED SPEC: 100
TOTAL NUCLEATED CELL COUNT: 725 /UL
TR MAX PG: 21 MMHG
TR PEAK VELOCITY: 2.3 M/S
TRICUSPID ANNULAR PLANE SYSTOLIC EXCURSION: 3.3 CM
TRICUSPID VALVE PEAK REGURGITATION VELOCITY: 2.31 M/S
VENTRICULAR RATE: 87 BPM
VENTRICULAR RATE: 95 BPM
WBC # BLD AUTO: 9.25 THOUSAND/UL (ref 4.31–10.16)
WBC # FLD MANUAL: 294 /UL

## 2025-07-25 PROCEDURE — NC001 PR NO CHARGE: Performed by: INTERNAL MEDICINE

## 2025-07-25 PROCEDURE — 82945 GLUCOSE OTHER FLUID: CPT

## 2025-07-25 PROCEDURE — 0W9B3ZZ DRAINAGE OF LEFT PLEURAL CAVITY, PERCUTANEOUS APPROACH: ICD-10-PCS | Performed by: INTERNAL MEDICINE

## 2025-07-25 PROCEDURE — 93010 ELECTROCARDIOGRAM REPORT: CPT | Performed by: INTERNAL MEDICINE

## 2025-07-25 PROCEDURE — 93971 EXTREMITY STUDY: CPT | Performed by: SURGERY

## 2025-07-25 PROCEDURE — 32555 ASPIRATE PLEURA W/ IMAGING: CPT | Performed by: INTERNAL MEDICINE

## 2025-07-25 PROCEDURE — 88112 CYTOPATH CELL ENHANCE TECH: CPT | Performed by: PATHOLOGY

## 2025-07-25 PROCEDURE — 88305 TISSUE EXAM BY PATHOLOGIST: CPT | Performed by: PATHOLOGY

## 2025-07-25 PROCEDURE — 83615 LACTATE (LD) (LDH) ENZYME: CPT

## 2025-07-25 PROCEDURE — 0W993ZZ DRAINAGE OF RIGHT PLEURAL CAVITY, PERCUTANEOUS APPROACH: ICD-10-PCS | Performed by: INTERNAL MEDICINE

## 2025-07-25 PROCEDURE — 71045 X-RAY EXAM CHEST 1 VIEW: CPT

## 2025-07-25 PROCEDURE — 84157 ASSAY OF PROTEIN OTHER: CPT

## 2025-07-25 PROCEDURE — 93306 TTE W/DOPPLER COMPLETE: CPT

## 2025-07-25 PROCEDURE — 84145 PROCALCITONIN (PCT): CPT | Performed by: INTERNAL MEDICINE

## 2025-07-25 PROCEDURE — 87070 CULTURE OTHR SPECIMN AEROBIC: CPT

## 2025-07-25 PROCEDURE — 89051 BODY FLUID CELL COUNT: CPT

## 2025-07-25 PROCEDURE — 83986 ASSAY PH BODY FLUID NOS: CPT

## 2025-07-25 PROCEDURE — 99223 1ST HOSP IP/OBS HIGH 75: CPT | Performed by: INTERNAL MEDICINE

## 2025-07-25 PROCEDURE — 82948 REAGENT STRIP/BLOOD GLUCOSE: CPT

## 2025-07-25 PROCEDURE — 85027 COMPLETE CBC AUTOMATED: CPT | Performed by: INTERNAL MEDICINE

## 2025-07-25 PROCEDURE — 80053 COMPREHEN METABOLIC PANEL: CPT | Performed by: INTERNAL MEDICINE

## 2025-07-25 PROCEDURE — 99232 SBSQ HOSP IP/OBS MODERATE 35: CPT | Performed by: STUDENT IN AN ORGANIZED HEALTH CARE EDUCATION/TRAINING PROGRAM

## 2025-07-25 PROCEDURE — 88341 IMHCHEM/IMCYTCHM EA ADD ANTB: CPT | Performed by: PATHOLOGY

## 2025-07-25 PROCEDURE — 97166 OT EVAL MOD COMPLEX 45 MIN: CPT

## 2025-07-25 PROCEDURE — 88342 IMHCHEM/IMCYTCHM 1ST ANTB: CPT | Performed by: PATHOLOGY

## 2025-07-25 PROCEDURE — 87205 SMEAR GRAM STAIN: CPT

## 2025-07-25 PROCEDURE — 84155 ASSAY OF PROTEIN SERUM: CPT

## 2025-07-25 RX ORDER — LEVALBUTEROL INHALATION SOLUTION 0.63 MG/3ML
0.31 SOLUTION RESPIRATORY (INHALATION) EVERY 6 HOURS PRN
Status: DISCONTINUED | OUTPATIENT
Start: 2025-07-25 | End: 2025-07-30 | Stop reason: HOSPADM

## 2025-07-25 RX ORDER — FUROSEMIDE 10 MG/ML
40 INJECTION INTRAMUSCULAR; INTRAVENOUS ONCE
Status: COMPLETED | OUTPATIENT
Start: 2025-07-25 | End: 2025-07-25

## 2025-07-25 RX ORDER — INSULIN GLARGINE 100 [IU]/ML
20 INJECTION, SOLUTION SUBCUTANEOUS
Status: DISCONTINUED | OUTPATIENT
Start: 2025-07-25 | End: 2025-07-25

## 2025-07-25 RX ORDER — INSULIN GLARGINE 100 [IU]/ML
25 INJECTION, SOLUTION SUBCUTANEOUS
Status: DISCONTINUED | OUTPATIENT
Start: 2025-07-25 | End: 2025-07-26

## 2025-07-25 RX ADMIN — ACETAMINOPHEN 650 MG: 325 TABLET ORAL at 19:55

## 2025-07-25 RX ADMIN — ACETAMINOPHEN 650 MG: 325 TABLET ORAL at 03:44

## 2025-07-25 RX ADMIN — FUROSEMIDE 40 MG: 10 INJECTION, SOLUTION INTRAVENOUS at 16:18

## 2025-07-25 RX ADMIN — HYDROCODONE BITARTRATE AND HOMATROPINE METHYLBROMIDE 5 ML: 1.5; 5 SOLUTION ORAL at 03:44

## 2025-07-25 RX ADMIN — INSULIN LISPRO 2 UNITS: 100 INJECTION, SOLUTION INTRAVENOUS; SUBCUTANEOUS at 16:20

## 2025-07-25 RX ADMIN — AZITHROMYCIN DIHYDRATE 500 MG: 250 TABLET, FILM COATED ORAL at 08:36

## 2025-07-25 RX ADMIN — HYDROCODONE BITARTRATE AND HOMATROPINE METHYLBROMIDE 5 ML: 1.5; 5 SOLUTION ORAL at 08:37

## 2025-07-25 RX ADMIN — INSULIN GLARGINE 25 UNITS: 100 INJECTION, SOLUTION SUBCUTANEOUS at 21:47

## 2025-07-25 RX ADMIN — AMLODIPINE BESYLATE 10 MG: 10 TABLET ORAL at 08:41

## 2025-07-25 RX ADMIN — FOLIC ACID 1 MG: 1 TABLET ORAL at 08:37

## 2025-07-25 RX ADMIN — DEXTROSE 1000 MG: 50 INJECTION, SOLUTION INTRAVENOUS at 08:37

## 2025-07-25 RX ADMIN — TAMSULOSIN HYDROCHLORIDE 0.8 MG: 0.4 CAPSULE ORAL at 16:18

## 2025-07-25 RX ADMIN — INSULIN LISPRO 5 UNITS: 100 INJECTION, SOLUTION INTRAVENOUS; SUBCUTANEOUS at 13:07

## 2025-07-25 RX ADMIN — ATORVASTATIN CALCIUM 40 MG: 40 TABLET, FILM COATED ORAL at 16:18

## 2025-07-25 RX ADMIN — INSULIN LISPRO 1 UNITS: 100 INJECTION, SOLUTION INTRAVENOUS; SUBCUTANEOUS at 13:07

## 2025-07-25 RX ADMIN — HEPARIN SODIUM 5000 UNITS: 5000 INJECTION INTRAVENOUS; SUBCUTANEOUS at 05:37

## 2025-07-25 RX ADMIN — HEPARIN SODIUM 5000 UNITS: 5000 INJECTION INTRAVENOUS; SUBCUTANEOUS at 15:25

## 2025-07-25 RX ADMIN — HEPARIN SODIUM 5000 UNITS: 5000 INJECTION INTRAVENOUS; SUBCUTANEOUS at 21:47

## 2025-07-26 PROBLEM — E44.0 MODERATE PROTEIN-CALORIE MALNUTRITION (HCC): Status: ACTIVE | Noted: 2025-07-26

## 2025-07-26 LAB
ANION GAP SERPL CALCULATED.3IONS-SCNC: 6 MMOL/L (ref 4–13)
BUN SERPL-MCNC: 35 MG/DL (ref 5–25)
CALCIUM SERPL-MCNC: 7.8 MG/DL (ref 8.4–10.2)
CHLORIDE SERPL-SCNC: 101 MMOL/L (ref 96–108)
CO2 SERPL-SCNC: 26 MMOL/L (ref 21–32)
CREAT SERPL-MCNC: 1.53 MG/DL (ref 0.6–1.3)
ERYTHROCYTE [DISTWIDTH] IN BLOOD BY AUTOMATED COUNT: 16.3 % (ref 11.6–15.1)
GFR SERPL CREATININE-BSD FRML MDRD: 45 ML/MIN/1.73SQ M
GLUCOSE SERPL-MCNC: 127 MG/DL (ref 65–140)
GLUCOSE SERPL-MCNC: 173 MG/DL (ref 65–140)
GLUCOSE SERPL-MCNC: 235 MG/DL (ref 65–140)
GLUCOSE SERPL-MCNC: 62 MG/DL (ref 65–140)
GLUCOSE SERPL-MCNC: 66 MG/DL (ref 65–140)
GLUCOSE SERPL-MCNC: 88 MG/DL (ref 65–140)
GLUCOSE SERPL-MCNC: 93 MG/DL (ref 65–140)
HCT VFR BLD AUTO: 33.5 % (ref 36.5–49.3)
HGB BLD-MCNC: 10 G/DL (ref 12–17)
MAGNESIUM SERPL-MCNC: 1.8 MG/DL (ref 1.9–2.7)
MCH RBC QN AUTO: 25.3 PG (ref 26.8–34.3)
MCHC RBC AUTO-ENTMCNC: 29.9 G/DL (ref 31.4–37.4)
MCV RBC AUTO: 85 FL (ref 82–98)
PLATELET # BLD AUTO: 584 THOUSANDS/UL (ref 149–390)
PMV BLD AUTO: 9.4 FL (ref 8.9–12.7)
POTASSIUM SERPL-SCNC: 4 MMOL/L (ref 3.5–5.3)
RBC # BLD AUTO: 3.96 MILLION/UL (ref 3.88–5.62)
SODIUM SERPL-SCNC: 133 MMOL/L (ref 135–147)
WBC # BLD AUTO: 8.67 THOUSAND/UL (ref 4.31–10.16)

## 2025-07-26 PROCEDURE — 99232 SBSQ HOSP IP/OBS MODERATE 35: CPT | Performed by: STUDENT IN AN ORGANIZED HEALTH CARE EDUCATION/TRAINING PROGRAM

## 2025-07-26 PROCEDURE — 82948 REAGENT STRIP/BLOOD GLUCOSE: CPT

## 2025-07-26 PROCEDURE — 99232 SBSQ HOSP IP/OBS MODERATE 35: CPT | Performed by: INTERNAL MEDICINE

## 2025-07-26 PROCEDURE — 83735 ASSAY OF MAGNESIUM: CPT | Performed by: STUDENT IN AN ORGANIZED HEALTH CARE EDUCATION/TRAINING PROGRAM

## 2025-07-26 PROCEDURE — 85027 COMPLETE CBC AUTOMATED: CPT | Performed by: STUDENT IN AN ORGANIZED HEALTH CARE EDUCATION/TRAINING PROGRAM

## 2025-07-26 PROCEDURE — 80048 BASIC METABOLIC PNL TOTAL CA: CPT | Performed by: STUDENT IN AN ORGANIZED HEALTH CARE EDUCATION/TRAINING PROGRAM

## 2025-07-26 RX ORDER — INSULIN GLARGINE 100 [IU]/ML
20 INJECTION, SOLUTION SUBCUTANEOUS
Status: DISCONTINUED | OUTPATIENT
Start: 2025-07-26 | End: 2025-07-26

## 2025-07-26 RX ORDER — INSULIN GLARGINE 100 [IU]/ML
15 INJECTION, SOLUTION SUBCUTANEOUS
Status: DISCONTINUED | OUTPATIENT
Start: 2025-07-26 | End: 2025-07-27

## 2025-07-26 RX ORDER — FUROSEMIDE 10 MG/ML
40 INJECTION INTRAMUSCULAR; INTRAVENOUS
Status: DISCONTINUED | OUTPATIENT
Start: 2025-07-26 | End: 2025-07-26

## 2025-07-26 RX ORDER — FUROSEMIDE 10 MG/ML
40 INJECTION INTRAMUSCULAR; INTRAVENOUS
Status: DISCONTINUED | OUTPATIENT
Start: 2025-07-26 | End: 2025-07-30 | Stop reason: HOSPADM

## 2025-07-26 RX ADMIN — ACETAMINOPHEN 650 MG: 325 TABLET ORAL at 15:22

## 2025-07-26 RX ADMIN — INSULIN LISPRO 1 UNITS: 100 INJECTION, SOLUTION INTRAVENOUS; SUBCUTANEOUS at 11:43

## 2025-07-26 RX ADMIN — FUROSEMIDE 40 MG: 10 INJECTION, SOLUTION INTRAMUSCULAR; INTRAVENOUS at 09:10

## 2025-07-26 RX ADMIN — ACETAMINOPHEN 650 MG: 325 TABLET ORAL at 05:23

## 2025-07-26 RX ADMIN — ACETAMINOPHEN 650 MG: 325 TABLET ORAL at 21:41

## 2025-07-26 RX ADMIN — AMLODIPINE BESYLATE 10 MG: 10 TABLET ORAL at 09:10

## 2025-07-26 RX ADMIN — FOLIC ACID 1 MG: 1 TABLET ORAL at 09:10

## 2025-07-26 RX ADMIN — AZITHROMYCIN DIHYDRATE 500 MG: 250 TABLET, FILM COATED ORAL at 09:10

## 2025-07-26 RX ADMIN — INSULIN GLARGINE 15 UNITS: 100 INJECTION, SOLUTION SUBCUTANEOUS at 21:37

## 2025-07-26 RX ADMIN — ATORVASTATIN CALCIUM 40 MG: 40 TABLET, FILM COATED ORAL at 15:51

## 2025-07-26 RX ADMIN — FUROSEMIDE 40 MG: 10 INJECTION, SOLUTION INTRAMUSCULAR; INTRAVENOUS at 15:51

## 2025-07-26 RX ADMIN — INSULIN LISPRO 3 UNITS: 100 INJECTION, SOLUTION INTRAVENOUS; SUBCUTANEOUS at 21:37

## 2025-07-26 RX ADMIN — HEPARIN SODIUM 5000 UNITS: 5000 INJECTION INTRAVENOUS; SUBCUTANEOUS at 21:39

## 2025-07-26 RX ADMIN — DEXTROSE 1000 MG: 50 INJECTION, SOLUTION INTRAVENOUS at 09:10

## 2025-07-26 RX ADMIN — TAMSULOSIN HYDROCHLORIDE 0.8 MG: 0.4 CAPSULE ORAL at 15:51

## 2025-07-26 RX ADMIN — HEPARIN SODIUM 5000 UNITS: 5000 INJECTION INTRAVENOUS; SUBCUTANEOUS at 13:10

## 2025-07-26 RX ADMIN — HEPARIN SODIUM 5000 UNITS: 5000 INJECTION INTRAVENOUS; SUBCUTANEOUS at 05:22

## 2025-07-27 ENCOUNTER — APPOINTMENT (INPATIENT)
Dept: RADIOLOGY | Facility: HOSPITAL | Age: 70
DRG: 871 | End: 2025-07-27
Payer: MEDICARE

## 2025-07-27 LAB
ANION GAP SERPL CALCULATED.3IONS-SCNC: 6 MMOL/L (ref 4–13)
BUN SERPL-MCNC: 39 MG/DL (ref 5–25)
CALCIUM SERPL-MCNC: 7.6 MG/DL (ref 8.4–10.2)
CHLORIDE SERPL-SCNC: 100 MMOL/L (ref 96–108)
CO2 SERPL-SCNC: 25 MMOL/L (ref 21–32)
CREAT SERPL-MCNC: 1.64 MG/DL (ref 0.6–1.3)
ERYTHROCYTE [DISTWIDTH] IN BLOOD BY AUTOMATED COUNT: 16.3 % (ref 11.6–15.1)
GFR SERPL CREATININE-BSD FRML MDRD: 41 ML/MIN/1.73SQ M
GLUCOSE SERPL-MCNC: 221 MG/DL (ref 65–140)
GLUCOSE SERPL-MCNC: 232 MG/DL (ref 65–140)
GLUCOSE SERPL-MCNC: 256 MG/DL (ref 65–140)
GLUCOSE SERPL-MCNC: 329 MG/DL (ref 65–140)
GLUCOSE SERPL-MCNC: 364 MG/DL (ref 65–140)
HCT VFR BLD AUTO: 30.9 % (ref 36.5–49.3)
HGB BLD-MCNC: 9.3 G/DL (ref 12–17)
MAGNESIUM SERPL-MCNC: 1.8 MG/DL (ref 1.9–2.7)
MCH RBC QN AUTO: 25.2 PG (ref 26.8–34.3)
MCHC RBC AUTO-ENTMCNC: 30.1 G/DL (ref 31.4–37.4)
MCV RBC AUTO: 84 FL (ref 82–98)
PLATELET # BLD AUTO: 607 THOUSANDS/UL (ref 149–390)
PMV BLD AUTO: 9.7 FL (ref 8.9–12.7)
POTASSIUM SERPL-SCNC: 4.1 MMOL/L (ref 3.5–5.3)
RBC # BLD AUTO: 3.69 MILLION/UL (ref 3.88–5.62)
SODIUM SERPL-SCNC: 131 MMOL/L (ref 135–147)
WBC # BLD AUTO: 7.76 THOUSAND/UL (ref 4.31–10.16)

## 2025-07-27 PROCEDURE — 83735 ASSAY OF MAGNESIUM: CPT | Performed by: STUDENT IN AN ORGANIZED HEALTH CARE EDUCATION/TRAINING PROGRAM

## 2025-07-27 PROCEDURE — 82948 REAGENT STRIP/BLOOD GLUCOSE: CPT

## 2025-07-27 PROCEDURE — 99232 SBSQ HOSP IP/OBS MODERATE 35: CPT | Performed by: INTERNAL MEDICINE

## 2025-07-27 PROCEDURE — 99232 SBSQ HOSP IP/OBS MODERATE 35: CPT | Performed by: STUDENT IN AN ORGANIZED HEALTH CARE EDUCATION/TRAINING PROGRAM

## 2025-07-27 PROCEDURE — 85027 COMPLETE CBC AUTOMATED: CPT | Performed by: STUDENT IN AN ORGANIZED HEALTH CARE EDUCATION/TRAINING PROGRAM

## 2025-07-27 PROCEDURE — 80048 BASIC METABOLIC PNL TOTAL CA: CPT | Performed by: STUDENT IN AN ORGANIZED HEALTH CARE EDUCATION/TRAINING PROGRAM

## 2025-07-27 PROCEDURE — 71045 X-RAY EXAM CHEST 1 VIEW: CPT

## 2025-07-27 RX ORDER — INSULIN GLARGINE 100 [IU]/ML
20 INJECTION, SOLUTION SUBCUTANEOUS
Status: DISCONTINUED | OUTPATIENT
Start: 2025-07-27 | End: 2025-07-28

## 2025-07-27 RX ORDER — INSULIN LISPRO 100 [IU]/ML
5 INJECTION, SOLUTION INTRAVENOUS; SUBCUTANEOUS
Status: DISCONTINUED | OUTPATIENT
Start: 2025-07-27 | End: 2025-07-27

## 2025-07-27 RX ORDER — MAGNESIUM SULFATE HEPTAHYDRATE 40 MG/ML
2 INJECTION, SOLUTION INTRAVENOUS ONCE
Status: COMPLETED | OUTPATIENT
Start: 2025-07-27 | End: 2025-07-27

## 2025-07-27 RX ORDER — INSULIN LISPRO 100 [IU]/ML
3 INJECTION, SOLUTION INTRAVENOUS; SUBCUTANEOUS
Status: DISCONTINUED | OUTPATIENT
Start: 2025-07-27 | End: 2025-07-28

## 2025-07-27 RX ADMIN — INSULIN LISPRO 3 UNITS: 100 INJECTION, SOLUTION INTRAVENOUS; SUBCUTANEOUS at 17:48

## 2025-07-27 RX ADMIN — HEPARIN SODIUM 5000 UNITS: 5000 INJECTION INTRAVENOUS; SUBCUTANEOUS at 21:44

## 2025-07-27 RX ADMIN — INSULIN LISPRO 3 UNITS: 100 INJECTION, SOLUTION INTRAVENOUS; SUBCUTANEOUS at 08:43

## 2025-07-27 RX ADMIN — INSULIN LISPRO 6 UNITS: 100 INJECTION, SOLUTION INTRAVENOUS; SUBCUTANEOUS at 21:45

## 2025-07-27 RX ADMIN — AZITHROMYCIN DIHYDRATE 500 MG: 250 TABLET, FILM COATED ORAL at 08:44

## 2025-07-27 RX ADMIN — INSULIN LISPRO 3 UNITS: 100 INJECTION, SOLUTION INTRAVENOUS; SUBCUTANEOUS at 12:25

## 2025-07-27 RX ADMIN — FOLIC ACID 1 MG: 1 TABLET ORAL at 08:44

## 2025-07-27 RX ADMIN — INSULIN LISPRO 5 UNITS: 100 INJECTION, SOLUTION INTRAVENOUS; SUBCUTANEOUS at 17:46

## 2025-07-27 RX ADMIN — MAGNESIUM SULFATE HEPTAHYDRATE 2 G: 40 INJECTION, SOLUTION INTRAVENOUS at 11:04

## 2025-07-27 RX ADMIN — TAMSULOSIN HYDROCHLORIDE 0.8 MG: 0.4 CAPSULE ORAL at 17:46

## 2025-07-27 RX ADMIN — INSULIN GLARGINE 20 UNITS: 100 INJECTION, SOLUTION SUBCUTANEOUS at 21:45

## 2025-07-27 RX ADMIN — FUROSEMIDE 40 MG: 10 INJECTION, SOLUTION INTRAMUSCULAR; INTRAVENOUS at 08:44

## 2025-07-27 RX ADMIN — AMLODIPINE BESYLATE 10 MG: 10 TABLET ORAL at 08:44

## 2025-07-27 RX ADMIN — FUROSEMIDE 40 MG: 10 INJECTION, SOLUTION INTRAMUSCULAR; INTRAVENOUS at 17:46

## 2025-07-27 RX ADMIN — HEPARIN SODIUM 5000 UNITS: 5000 INJECTION INTRAVENOUS; SUBCUTANEOUS at 05:17

## 2025-07-27 RX ADMIN — ATORVASTATIN CALCIUM 40 MG: 40 TABLET, FILM COATED ORAL at 17:46

## 2025-07-27 RX ADMIN — HEPARIN SODIUM 5000 UNITS: 5000 INJECTION INTRAVENOUS; SUBCUTANEOUS at 14:44

## 2025-07-27 RX ADMIN — DEXTROSE 1000 MG: 50 INJECTION, SOLUTION INTRAVENOUS at 08:43

## 2025-07-28 LAB
ANION GAP SERPL CALCULATED.3IONS-SCNC: 9 MMOL/L (ref 4–13)
BACTERIA SPEC BFLD CULT: NO GROWTH
BACTERIA SPEC BFLD CULT: NO GROWTH
BUN SERPL-MCNC: 46 MG/DL (ref 5–25)
CALCIUM SERPL-MCNC: 7.6 MG/DL (ref 8.4–10.2)
CHLORIDE SERPL-SCNC: 99 MMOL/L (ref 96–108)
CO2 SERPL-SCNC: 22 MMOL/L (ref 21–32)
CREAT SERPL-MCNC: 1.66 MG/DL (ref 0.6–1.3)
ERYTHROCYTE [DISTWIDTH] IN BLOOD BY AUTOMATED COUNT: 16.2 % (ref 11.6–15.1)
GFR SERPL CREATININE-BSD FRML MDRD: 41 ML/MIN/1.73SQ M
GLUCOSE SERPL-MCNC: 198 MG/DL (ref 65–140)
GLUCOSE SERPL-MCNC: 225 MG/DL (ref 65–140)
GLUCOSE SERPL-MCNC: 248 MG/DL (ref 65–140)
GLUCOSE SERPL-MCNC: 264 MG/DL (ref 65–140)
GLUCOSE SERPL-MCNC: 270 MG/DL (ref 65–140)
GRAM STN SPEC: NORMAL
HCT VFR BLD AUTO: 28.2 % (ref 36.5–49.3)
HGB BLD-MCNC: 8.6 G/DL (ref 12–17)
MAGNESIUM SERPL-MCNC: 2.1 MG/DL (ref 1.9–2.7)
MCH RBC QN AUTO: 25.1 PG (ref 26.8–34.3)
MCHC RBC AUTO-ENTMCNC: 30.5 G/DL (ref 31.4–37.4)
MCV RBC AUTO: 82 FL (ref 82–98)
PLATELET # BLD AUTO: 560 THOUSANDS/UL (ref 149–390)
PMV BLD AUTO: 9.7 FL (ref 8.9–12.7)
POTASSIUM SERPL-SCNC: 3.9 MMOL/L (ref 3.5–5.3)
RBC # BLD AUTO: 3.43 MILLION/UL (ref 3.88–5.62)
SODIUM SERPL-SCNC: 130 MMOL/L (ref 135–147)
WBC # BLD AUTO: 9.91 THOUSAND/UL (ref 4.31–10.16)

## 2025-07-28 PROCEDURE — 94664 DEMO&/EVAL PT USE INHALER: CPT

## 2025-07-28 PROCEDURE — 94669 MECHANICAL CHEST WALL OSCILL: CPT

## 2025-07-28 PROCEDURE — 97163 PT EVAL HIGH COMPLEX 45 MIN: CPT

## 2025-07-28 PROCEDURE — 82948 REAGENT STRIP/BLOOD GLUCOSE: CPT

## 2025-07-28 PROCEDURE — 99232 SBSQ HOSP IP/OBS MODERATE 35: CPT | Performed by: INTERNAL MEDICINE

## 2025-07-28 PROCEDURE — 94668 MNPJ CHEST WALL SBSQ: CPT

## 2025-07-28 PROCEDURE — 85027 COMPLETE CBC AUTOMATED: CPT | Performed by: STUDENT IN AN ORGANIZED HEALTH CARE EDUCATION/TRAINING PROGRAM

## 2025-07-28 PROCEDURE — 80048 BASIC METABOLIC PNL TOTAL CA: CPT | Performed by: STUDENT IN AN ORGANIZED HEALTH CARE EDUCATION/TRAINING PROGRAM

## 2025-07-28 PROCEDURE — 83735 ASSAY OF MAGNESIUM: CPT | Performed by: STUDENT IN AN ORGANIZED HEALTH CARE EDUCATION/TRAINING PROGRAM

## 2025-07-28 RX ORDER — BENZONATATE 100 MG/1
100 CAPSULE ORAL 3 TIMES DAILY PRN
Status: DISCONTINUED | OUTPATIENT
Start: 2025-07-28 | End: 2025-07-30 | Stop reason: HOSPADM

## 2025-07-28 RX ORDER — INSULIN GLARGINE 100 [IU]/ML
25 INJECTION, SOLUTION SUBCUTANEOUS
Status: DISCONTINUED | OUTPATIENT
Start: 2025-07-28 | End: 2025-07-30 | Stop reason: HOSPADM

## 2025-07-28 RX ORDER — INSULIN LISPRO 100 [IU]/ML
5 INJECTION, SOLUTION INTRAVENOUS; SUBCUTANEOUS
Status: DISCONTINUED | OUTPATIENT
Start: 2025-07-28 | End: 2025-07-29

## 2025-07-28 RX ADMIN — ATORVASTATIN CALCIUM 40 MG: 40 TABLET, FILM COATED ORAL at 16:51

## 2025-07-28 RX ADMIN — HEPARIN SODIUM 5000 UNITS: 5000 INJECTION INTRAVENOUS; SUBCUTANEOUS at 06:02

## 2025-07-28 RX ADMIN — AMLODIPINE BESYLATE 10 MG: 10 TABLET ORAL at 08:11

## 2025-07-28 RX ADMIN — HEPARIN SODIUM 5000 UNITS: 5000 INJECTION INTRAVENOUS; SUBCUTANEOUS at 13:50

## 2025-07-28 RX ADMIN — HYDROCODONE BITARTRATE AND HOMATROPINE METHYLBROMIDE 5 ML: 1.5; 5 SOLUTION ORAL at 21:15

## 2025-07-28 RX ADMIN — INSULIN LISPRO 3 UNITS: 100 INJECTION, SOLUTION INTRAVENOUS; SUBCUTANEOUS at 21:15

## 2025-07-28 RX ADMIN — FUROSEMIDE 40 MG: 10 INJECTION, SOLUTION INTRAMUSCULAR; INTRAVENOUS at 08:10

## 2025-07-28 RX ADMIN — INSULIN LISPRO 5 UNITS: 100 INJECTION, SOLUTION INTRAVENOUS; SUBCUTANEOUS at 17:55

## 2025-07-28 RX ADMIN — DEXTROSE 1000 MG: 50 INJECTION, SOLUTION INTRAVENOUS at 08:09

## 2025-07-28 RX ADMIN — BENZONATATE 100 MG: 100 CAPSULE ORAL at 13:52

## 2025-07-28 RX ADMIN — AZITHROMYCIN DIHYDRATE 500 MG: 250 TABLET, FILM COATED ORAL at 08:11

## 2025-07-28 RX ADMIN — TAMSULOSIN HYDROCHLORIDE 0.8 MG: 0.4 CAPSULE ORAL at 16:51

## 2025-07-28 RX ADMIN — FOLIC ACID 1 MG: 1 TABLET ORAL at 08:11

## 2025-07-28 RX ADMIN — INSULIN LISPRO 5 UNITS: 100 INJECTION, SOLUTION INTRAVENOUS; SUBCUTANEOUS at 12:02

## 2025-07-28 RX ADMIN — FUROSEMIDE 40 MG: 10 INJECTION, SOLUTION INTRAMUSCULAR; INTRAVENOUS at 16:45

## 2025-07-28 RX ADMIN — INSULIN LISPRO 2 UNITS: 100 INJECTION, SOLUTION INTRAVENOUS; SUBCUTANEOUS at 08:17

## 2025-07-28 RX ADMIN — INSULIN LISPRO 4 UNITS: 100 INJECTION, SOLUTION INTRAVENOUS; SUBCUTANEOUS at 12:02

## 2025-07-28 RX ADMIN — INSULIN LISPRO 2 UNITS: 100 INJECTION, SOLUTION INTRAVENOUS; SUBCUTANEOUS at 17:53

## 2025-07-28 RX ADMIN — INSULIN GLARGINE 25 UNITS: 100 INJECTION, SOLUTION SUBCUTANEOUS at 21:15

## 2025-07-28 RX ADMIN — HEPARIN SODIUM 5000 UNITS: 5000 INJECTION INTRAVENOUS; SUBCUTANEOUS at 21:15

## 2025-07-29 ENCOUNTER — TELEPHONE (OUTPATIENT)
Age: 70
End: 2025-07-29

## 2025-07-29 LAB
ALBUMIN SERPL BCG-MCNC: 2 G/DL (ref 3.5–5)
ALP SERPL-CCNC: 188 U/L (ref 34–104)
ALT SERPL W P-5'-P-CCNC: 10 U/L (ref 7–52)
ANION GAP SERPL CALCULATED.3IONS-SCNC: 8 MMOL/L (ref 4–13)
AST SERPL W P-5'-P-CCNC: 9 U/L (ref 13–39)
BACTERIA BLD CULT: NORMAL
BACTERIA BLD CULT: NORMAL
BASOPHILS # BLD AUTO: 0.02 THOUSANDS/ÂΜL (ref 0–0.1)
BASOPHILS NFR BLD AUTO: 0 % (ref 0–1)
BILIRUB SERPL-MCNC: 0.24 MG/DL (ref 0.2–1)
BUN SERPL-MCNC: 47 MG/DL (ref 5–25)
CALCIUM ALBUM COR SERPL-MCNC: 9.4 MG/DL (ref 8.3–10.1)
CALCIUM SERPL-MCNC: 7.8 MG/DL (ref 8.4–10.2)
CHLORIDE SERPL-SCNC: 99 MMOL/L (ref 96–108)
CO2 SERPL-SCNC: 24 MMOL/L (ref 21–32)
CREAT SERPL-MCNC: 1.73 MG/DL (ref 0.6–1.3)
EOSINOPHIL # BLD AUTO: 0.05 THOUSAND/ÂΜL (ref 0–0.61)
EOSINOPHIL NFR BLD AUTO: 1 % (ref 0–6)
ERYTHROCYTE [DISTWIDTH] IN BLOOD BY AUTOMATED COUNT: 16.2 % (ref 11.6–15.1)
GFR SERPL CREATININE-BSD FRML MDRD: 38 ML/MIN/1.73SQ M
GLUCOSE SERPL-MCNC: 183 MG/DL (ref 65–140)
GLUCOSE SERPL-MCNC: 328 MG/DL (ref 65–140)
GLUCOSE SERPL-MCNC: 351 MG/DL (ref 65–140)
GLUCOSE SERPL-MCNC: 90 MG/DL (ref 65–140)
GLUCOSE SERPL-MCNC: 95 MG/DL (ref 65–140)
HCT VFR BLD AUTO: 28.3 % (ref 36.5–49.3)
HGB BLD-MCNC: 8.7 G/DL (ref 12–17)
IMM GRANULOCYTES # BLD AUTO: 0.05 THOUSAND/UL (ref 0–0.2)
IMM GRANULOCYTES NFR BLD AUTO: 1 % (ref 0–2)
LYMPHOCYTES # BLD AUTO: 0.96 THOUSANDS/ÂΜL (ref 0.6–4.47)
LYMPHOCYTES NFR BLD AUTO: 10 % (ref 14–44)
MAGNESIUM SERPL-MCNC: 1.8 MG/DL (ref 1.9–2.7)
MCH RBC QN AUTO: 25 PG (ref 26.8–34.3)
MCHC RBC AUTO-ENTMCNC: 30.7 G/DL (ref 31.4–37.4)
MCV RBC AUTO: 81 FL (ref 82–98)
MONOCYTES # BLD AUTO: 1.09 THOUSAND/ÂΜL (ref 0.17–1.22)
MONOCYTES NFR BLD AUTO: 11 % (ref 4–12)
NEUTROPHILS # BLD AUTO: 7.93 THOUSANDS/ÂΜL (ref 1.85–7.62)
NEUTS SEG NFR BLD AUTO: 77 % (ref 43–75)
NRBC BLD AUTO-RTO: 0 /100 WBCS
PHOSPHATE SERPL-MCNC: 4.4 MG/DL (ref 2.3–4.1)
PLATELET # BLD AUTO: 561 THOUSANDS/UL (ref 149–390)
PMV BLD AUTO: 8.9 FL (ref 8.9–12.7)
POTASSIUM SERPL-SCNC: 3.9 MMOL/L (ref 3.5–5.3)
PROCALCITONIN SERPL-MCNC: 2.82 NG/ML
PROT SERPL-MCNC: 6 G/DL (ref 6.4–8.4)
RBC # BLD AUTO: 3.48 MILLION/UL (ref 3.88–5.62)
SODIUM SERPL-SCNC: 131 MMOL/L (ref 135–147)
WBC # BLD AUTO: 10.1 THOUSAND/UL (ref 4.31–10.16)

## 2025-07-29 PROCEDURE — 84100 ASSAY OF PHOSPHORUS: CPT | Performed by: INTERNAL MEDICINE

## 2025-07-29 PROCEDURE — 80053 COMPREHEN METABOLIC PANEL: CPT | Performed by: INTERNAL MEDICINE

## 2025-07-29 PROCEDURE — 83735 ASSAY OF MAGNESIUM: CPT | Performed by: INTERNAL MEDICINE

## 2025-07-29 PROCEDURE — 82948 REAGENT STRIP/BLOOD GLUCOSE: CPT

## 2025-07-29 PROCEDURE — 85025 COMPLETE CBC W/AUTO DIFF WBC: CPT | Performed by: INTERNAL MEDICINE

## 2025-07-29 PROCEDURE — 84145 PROCALCITONIN (PCT): CPT | Performed by: INTERNAL MEDICINE

## 2025-07-29 PROCEDURE — 94761 N-INVAS EAR/PLS OXIMETRY MLT: CPT

## 2025-07-29 PROCEDURE — 99232 SBSQ HOSP IP/OBS MODERATE 35: CPT | Performed by: INTERNAL MEDICINE

## 2025-07-29 RX ORDER — INSULIN LISPRO 100 [IU]/ML
8 INJECTION, SOLUTION INTRAVENOUS; SUBCUTANEOUS
Status: DISCONTINUED | OUTPATIENT
Start: 2025-07-29 | End: 2025-07-30 | Stop reason: HOSPADM

## 2025-07-29 RX ORDER — MAGNESIUM SULFATE HEPTAHYDRATE 40 MG/ML
2 INJECTION, SOLUTION INTRAVENOUS ONCE
Status: COMPLETED | OUTPATIENT
Start: 2025-07-29 | End: 2025-07-29

## 2025-07-29 RX ADMIN — HYDROCODONE BITARTRATE AND HOMATROPINE METHYLBROMIDE 5 ML: 1.5; 5 SOLUTION ORAL at 05:20

## 2025-07-29 RX ADMIN — FOLIC ACID 1 MG: 1 TABLET ORAL at 08:13

## 2025-07-29 RX ADMIN — INSULIN LISPRO 6 UNITS: 100 INJECTION, SOLUTION INTRAVENOUS; SUBCUTANEOUS at 16:06

## 2025-07-29 RX ADMIN — INSULIN GLARGINE 25 UNITS: 100 INJECTION, SOLUTION SUBCUTANEOUS at 21:36

## 2025-07-29 RX ADMIN — INSULIN LISPRO 5 UNITS: 100 INJECTION, SOLUTION INTRAVENOUS; SUBCUTANEOUS at 21:37

## 2025-07-29 RX ADMIN — FUROSEMIDE 40 MG: 10 INJECTION, SOLUTION INTRAMUSCULAR; INTRAVENOUS at 16:05

## 2025-07-29 RX ADMIN — DEXTROSE 2000 MG: 50 INJECTION, SOLUTION INTRAVENOUS at 16:05

## 2025-07-29 RX ADMIN — HEPARIN SODIUM 5000 UNITS: 5000 INJECTION INTRAVENOUS; SUBCUTANEOUS at 21:36

## 2025-07-29 RX ADMIN — INSULIN LISPRO 8 UNITS: 100 INJECTION, SOLUTION INTRAVENOUS; SUBCUTANEOUS at 16:06

## 2025-07-29 RX ADMIN — HEPARIN SODIUM 5000 UNITS: 5000 INJECTION INTRAVENOUS; SUBCUTANEOUS at 05:16

## 2025-07-29 RX ADMIN — ATORVASTATIN CALCIUM 40 MG: 40 TABLET, FILM COATED ORAL at 16:06

## 2025-07-29 RX ADMIN — AZITHROMYCIN DIHYDRATE 500 MG: 250 TABLET, FILM COATED ORAL at 08:13

## 2025-07-29 RX ADMIN — MAGNESIUM SULFATE HEPTAHYDRATE 2 G: 40 INJECTION, SOLUTION INTRAVENOUS at 16:53

## 2025-07-29 RX ADMIN — HEPARIN SODIUM 5000 UNITS: 5000 INJECTION INTRAVENOUS; SUBCUTANEOUS at 13:06

## 2025-07-29 RX ADMIN — TAMSULOSIN HYDROCHLORIDE 0.8 MG: 0.4 CAPSULE ORAL at 16:06

## 2025-07-29 RX ADMIN — HYDROCODONE BITARTRATE AND HOMATROPINE METHYLBROMIDE 5 ML: 1.5; 5 SOLUTION ORAL at 18:21

## 2025-07-29 RX ADMIN — INSULIN LISPRO 1 UNITS: 100 INJECTION, SOLUTION INTRAVENOUS; SUBCUTANEOUS at 13:01

## 2025-07-30 ENCOUNTER — TRANSITIONAL CARE MANAGEMENT (OUTPATIENT)
Dept: FAMILY MEDICINE CLINIC | Facility: CLINIC | Age: 70
End: 2025-07-30

## 2025-07-30 VITALS
TEMPERATURE: 97.4 F | HEART RATE: 99 BPM | OXYGEN SATURATION: 89 % | HEIGHT: 70 IN | BODY MASS INDEX: 22.16 KG/M2 | RESPIRATION RATE: 17 BRPM | SYSTOLIC BLOOD PRESSURE: 100 MMHG | DIASTOLIC BLOOD PRESSURE: 52 MMHG | WEIGHT: 154.76 LBS

## 2025-07-30 LAB
ALBUMIN SERPL BCG-MCNC: 2 G/DL (ref 3.5–5)
ALP SERPL-CCNC: 188 U/L (ref 34–104)
ALT SERPL W P-5'-P-CCNC: 8 U/L (ref 7–52)
ANION GAP SERPL CALCULATED.3IONS-SCNC: 11 MMOL/L (ref 4–13)
AST SERPL W P-5'-P-CCNC: 9 U/L (ref 13–39)
BASOPHILS # BLD AUTO: 0.02 THOUSANDS/ÂΜL (ref 0–0.1)
BASOPHILS NFR BLD AUTO: 0 % (ref 0–1)
BILIRUB SERPL-MCNC: 0.2 MG/DL (ref 0.2–1)
BUN SERPL-MCNC: 52 MG/DL (ref 5–25)
CALCIUM ALBUM COR SERPL-MCNC: 9.4 MG/DL (ref 8.3–10.1)
CALCIUM SERPL-MCNC: 7.8 MG/DL (ref 8.4–10.2)
CHLORIDE SERPL-SCNC: 98 MMOL/L (ref 96–108)
CO2 SERPL-SCNC: 21 MMOL/L (ref 21–32)
CREAT SERPL-MCNC: 1.75 MG/DL (ref 0.6–1.3)
DME PARACHUTE DELIVERY DATE ACTUAL: NORMAL
DME PARACHUTE DELIVERY DATE EXPECTED: NORMAL
DME PARACHUTE DELIVERY DATE REQUESTED: NORMAL
DME PARACHUTE DELIVERY NOTE: NORMAL
DME PARACHUTE ITEM DESCRIPTION: NORMAL
DME PARACHUTE ORDER STATUS: NORMAL
DME PARACHUTE SUPPLIER NAME: NORMAL
DME PARACHUTE SUPPLIER PHONE: NORMAL
EOSINOPHIL # BLD AUTO: 0.09 THOUSAND/ÂΜL (ref 0–0.61)
EOSINOPHIL NFR BLD AUTO: 1 % (ref 0–6)
ERYTHROCYTE [DISTWIDTH] IN BLOOD BY AUTOMATED COUNT: 16 % (ref 11.6–15.1)
GFR SERPL CREATININE-BSD FRML MDRD: 38 ML/MIN/1.73SQ M
GLUCOSE SERPL-MCNC: 160 MG/DL (ref 65–140)
GLUCOSE SERPL-MCNC: 202 MG/DL (ref 65–140)
GLUCOSE SERPL-MCNC: 207 MG/DL (ref 65–140)
GLUCOSE SERPL-MCNC: 225 MG/DL (ref 65–140)
HCT VFR BLD AUTO: 29.9 % (ref 36.5–49.3)
HGB BLD-MCNC: 8.9 G/DL (ref 12–17)
IMM GRANULOCYTES # BLD AUTO: 0.05 THOUSAND/UL (ref 0–0.2)
IMM GRANULOCYTES NFR BLD AUTO: 1 % (ref 0–2)
LYMPHOCYTES # BLD AUTO: 0.81 THOUSANDS/ÂΜL (ref 0.6–4.47)
LYMPHOCYTES NFR BLD AUTO: 9 % (ref 14–44)
MAGNESIUM SERPL-MCNC: 2.4 MG/DL (ref 1.9–2.7)
MCH RBC QN AUTO: 24.9 PG (ref 26.8–34.3)
MCHC RBC AUTO-ENTMCNC: 29.8 G/DL (ref 31.4–37.4)
MCV RBC AUTO: 84 FL (ref 82–98)
MONOCYTES # BLD AUTO: 0.92 THOUSAND/ÂΜL (ref 0.17–1.22)
MONOCYTES NFR BLD AUTO: 10 % (ref 4–12)
NEUTROPHILS # BLD AUTO: 6.99 THOUSANDS/ÂΜL (ref 1.85–7.62)
NEUTS SEG NFR BLD AUTO: 79 % (ref 43–75)
NRBC BLD AUTO-RTO: 0 /100 WBCS
PHOSPHATE SERPL-MCNC: 5 MG/DL (ref 2.3–4.1)
PLATELET # BLD AUTO: 531 THOUSANDS/UL (ref 149–390)
PMV BLD AUTO: 9.4 FL (ref 8.9–12.7)
POTASSIUM SERPL-SCNC: 4.1 MMOL/L (ref 3.5–5.3)
PROT SERPL-MCNC: 5.8 G/DL (ref 6.4–8.4)
RBC # BLD AUTO: 3.58 MILLION/UL (ref 3.88–5.62)
SODIUM SERPL-SCNC: 130 MMOL/L (ref 135–147)
WBC # BLD AUTO: 8.88 THOUSAND/UL (ref 4.31–10.16)

## 2025-07-30 PROCEDURE — 88341 IMHCHEM/IMCYTCHM EA ADD ANTB: CPT | Performed by: PATHOLOGY

## 2025-07-30 PROCEDURE — 88305 TISSUE EXAM BY PATHOLOGIST: CPT | Performed by: PATHOLOGY

## 2025-07-30 PROCEDURE — 85025 COMPLETE CBC W/AUTO DIFF WBC: CPT | Performed by: INTERNAL MEDICINE

## 2025-07-30 PROCEDURE — 83735 ASSAY OF MAGNESIUM: CPT | Performed by: INTERNAL MEDICINE

## 2025-07-30 PROCEDURE — 94762 N-INVAS EAR/PLS OXIMTRY CONT: CPT

## 2025-07-30 PROCEDURE — 84100 ASSAY OF PHOSPHORUS: CPT | Performed by: INTERNAL MEDICINE

## 2025-07-30 PROCEDURE — 82948 REAGENT STRIP/BLOOD GLUCOSE: CPT

## 2025-07-30 PROCEDURE — 88112 CYTOPATH CELL ENHANCE TECH: CPT | Performed by: PATHOLOGY

## 2025-07-30 PROCEDURE — 99239 HOSP IP/OBS DSCHRG MGMT >30: CPT | Performed by: INTERNAL MEDICINE

## 2025-07-30 PROCEDURE — 80053 COMPREHEN METABOLIC PANEL: CPT | Performed by: INTERNAL MEDICINE

## 2025-07-30 PROCEDURE — 88342 IMHCHEM/IMCYTCHM 1ST ANTB: CPT | Performed by: PATHOLOGY

## 2025-07-30 RX ADMIN — INSULIN LISPRO 8 UNITS: 100 INJECTION, SOLUTION INTRAVENOUS; SUBCUTANEOUS at 09:29

## 2025-07-30 RX ADMIN — HEPARIN SODIUM 5000 UNITS: 5000 INJECTION INTRAVENOUS; SUBCUTANEOUS at 04:39

## 2025-07-30 RX ADMIN — INSULIN LISPRO 8 UNITS: 100 INJECTION, SOLUTION INTRAVENOUS; SUBCUTANEOUS at 11:36

## 2025-07-30 RX ADMIN — BENZONATATE 100 MG: 100 CAPSULE ORAL at 02:37

## 2025-07-30 RX ADMIN — INSULIN LISPRO 1 UNITS: 100 INJECTION, SOLUTION INTRAVENOUS; SUBCUTANEOUS at 09:28

## 2025-07-30 RX ADMIN — FOLIC ACID 1 MG: 1 TABLET ORAL at 09:30

## 2025-07-30 RX ADMIN — HYDROCODONE BITARTRATE AND HOMATROPINE METHYLBROMIDE 5 ML: 1.5; 5 SOLUTION ORAL at 09:36

## 2025-07-30 RX ADMIN — INSULIN LISPRO 2 UNITS: 100 INJECTION, SOLUTION INTRAVENOUS; SUBCUTANEOUS at 11:35

## 2025-07-31 ENCOUNTER — HOSPITAL ENCOUNTER (OUTPATIENT)
Dept: INFUSION CENTER | Facility: CLINIC | Age: 70
Discharge: HOME | DRG: 312 | End: 2025-07-31
Attending: INTERNAL MEDICINE
Payer: MEDICARE

## 2025-07-31 VITALS
DIASTOLIC BLOOD PRESSURE: 71 MMHG | HEART RATE: 105 BPM | TEMPERATURE: 97.5 F | RESPIRATION RATE: 20 BRPM | SYSTOLIC BLOOD PRESSURE: 108 MMHG | OXYGEN SATURATION: 91 %

## 2025-07-31 DIAGNOSIS — Z79.899 HIGH RISK MEDICATION USE: ICD-10-CM

## 2025-07-31 DIAGNOSIS — N18.32 STAGE 3B CHRONIC KIDNEY DISEASE (HCC): Primary | ICD-10-CM

## 2025-07-31 DIAGNOSIS — C79.51 METASTATIC RENAL CELL CARCINOMA TO BONE (HCC): ICD-10-CM

## 2025-07-31 DIAGNOSIS — C64.9 METASTATIC RENAL CELL CARCINOMA TO BONE (HCC): ICD-10-CM

## 2025-07-31 DIAGNOSIS — Z29.89 IMMUNOTHERAPY ENCOUNTER: ICD-10-CM

## 2025-07-31 PROCEDURE — 96413 CHEMO IV INFUSION 1 HR: CPT

## 2025-07-31 RX ORDER — SODIUM CHLORIDE 9 MG/ML
20 INJECTION, SOLUTION INTRAVENOUS ONCE
Status: COMPLETED | OUTPATIENT
Start: 2025-07-31 | End: 2025-07-31

## 2025-07-31 RX ADMIN — SODIUM CHLORIDE 400 MG: 9 INJECTION, SOLUTION INTRAVENOUS at 14:01

## 2025-07-31 RX ADMIN — SODIUM CHLORIDE 20 ML/HR: 0.9 INJECTION, SOLUTION INTRAVENOUS at 13:46

## 2025-08-01 ENCOUNTER — NURSE TRIAGE (OUTPATIENT)
Age: 70
End: 2025-08-01

## 2025-08-01 PROBLEM — R42 DIZZINESS: Status: ACTIVE | Noted: 2025-01-01

## 2025-08-01 PROBLEM — R65.10 SIRS (SYSTEMIC INFLAMMATORY RESPONSE SYNDROME) (HCC): Status: ACTIVE | Noted: 2025-01-01

## 2025-08-03 PROBLEM — I95.1 ORTHOSTATIC HYPOTENSION: Status: ACTIVE | Noted: 2025-08-03

## 2025-08-04 RX ORDER — SODIUM CHLORIDE 9 MG/ML
20 INJECTION, SOLUTION INTRAVENOUS ONCE
Status: CANCELLED | OUTPATIENT
Start: 2025-09-11

## 2025-08-06 ENCOUNTER — TELEPHONE (OUTPATIENT)
Dept: NEPHROLOGY | Facility: CLINIC | Age: 70
End: 2025-08-06

## 2025-08-06 ENCOUNTER — TRANSITIONAL CARE MANAGEMENT (OUTPATIENT)
Dept: FAMILY MEDICINE CLINIC | Facility: CLINIC | Age: 70
End: 2025-08-06

## 2025-08-06 DIAGNOSIS — N18.30 BENIGN HYPERTENSION WITH CKD (CHRONIC KIDNEY DISEASE) STAGE III (HCC): Primary | ICD-10-CM

## 2025-08-06 DIAGNOSIS — N18.32 STAGE 3B CHRONIC KIDNEY DISEASE (HCC): ICD-10-CM

## 2025-08-06 DIAGNOSIS — I12.9 BENIGN HYPERTENSION WITH CKD (CHRONIC KIDNEY DISEASE) STAGE III (HCC): Primary | ICD-10-CM

## 2025-08-06 DIAGNOSIS — N17.9 AKI (ACUTE KIDNEY INJURY) (HCC): ICD-10-CM

## 2025-08-07 DIAGNOSIS — C79.51 METASTATIC RENAL CELL CARCINOMA TO BONE (HCC): Primary | ICD-10-CM

## 2025-08-07 DIAGNOSIS — C64.9 METASTATIC RENAL CELL CARCINOMA TO BONE (HCC): Primary | ICD-10-CM

## 2025-08-07 RX ORDER — LENVATINIB 14 MG/DAY
14 KIT ORAL DAILY
Qty: 60 EACH | Refills: 5 | Status: ON HOLD | OUTPATIENT
Start: 2025-08-07 | End: 2025-08-12

## 2025-08-08 ENCOUNTER — TELEPHONE (OUTPATIENT)
Dept: HEMATOLOGY ONCOLOGY | Facility: CLINIC | Age: 70
End: 2025-08-08

## 2025-08-10 PROBLEM — N17.9 ACUTE KIDNEY INJURY (HCC): Status: ACTIVE | Noted: 2025-08-10

## 2025-08-10 PROBLEM — J18.9 PNEUMONIA: Status: ACTIVE | Noted: 2025-01-01

## 2025-08-11 PROBLEM — Z51.5 PALLIATIVE CARE ENCOUNTER: Status: ACTIVE | Noted: 2025-01-01

## 2025-08-11 PROBLEM — R60.0 LEG EDEMA: Status: ACTIVE | Noted: 2025-01-01

## 2025-08-11 PROBLEM — Z71.89 GOALS OF CARE, COUNSELING/DISCUSSION: Status: ACTIVE | Noted: 2025-01-01

## 2025-08-13 ENCOUNTER — HOME CARE VISIT (OUTPATIENT)
Dept: HOME HOSPICE | Facility: HOSPICE | Age: 70
End: 2025-08-13
Payer: MEDICARE

## (undated) DEVICE — GUIDEWIRE STRGHT TIP 0.035 IN  SOLO PLUS

## (undated) DEVICE — SPECIMEN CONTAINER STERILE PEEL PACK

## (undated) DEVICE — GLOVE SRG BIOGEL 7.5

## (undated) DEVICE — SCD SEQUENTIAL COMPRESSION COMFORT SLEEVE MEDIUM KNEE LENGTH: Brand: KENDALL SCD

## (undated) DEVICE — CATH URETERAL 5FR X 70 CM FLEX TIP POLYUR BARD

## (undated) DEVICE — 3M™ STERI-STRIP™ COMPOUND BENZOIN TINCTURE 40 BAGS/CARTON 4 CARTONS/CASE C1544: Brand: 3M™ STERI-STRIP™

## (undated) DEVICE — TUBING SUCTION 5MM X 12 FT

## (undated) DEVICE — GLOVE INDICATOR PI UNDERGLOVE SZ 8 BLUE

## (undated) DEVICE — PACK TUR

## (undated) DEVICE — UROLOGIC DRAIN BAG: Brand: UNBRANDED